# Patient Record
Sex: FEMALE | Race: WHITE | NOT HISPANIC OR LATINO | Employment: UNEMPLOYED | URBAN - NONMETROPOLITAN AREA
[De-identification: names, ages, dates, MRNs, and addresses within clinical notes are randomized per-mention and may not be internally consistent; named-entity substitution may affect disease eponyms.]

---

## 2017-03-20 ENCOUNTER — TRANSCRIBE ORDERS (OUTPATIENT)
Dept: ADMINISTRATIVE | Facility: HOSPITAL | Age: 57
End: 2017-03-20

## 2017-03-20 ENCOUNTER — HOSPITAL ENCOUNTER (OUTPATIENT)
Dept: GENERAL RADIOLOGY | Facility: HOSPITAL | Age: 57
Discharge: HOME | End: 2017-03-20
Attending: INTERNAL MEDICINE | Admitting: INTERNAL MEDICINE

## 2017-03-20 ENCOUNTER — TRANSCRIBE ORDERS (OUTPATIENT)
Dept: LAB | Facility: HOSPITAL | Age: 57
End: 2017-03-20

## 2017-03-20 DIAGNOSIS — D50.9 IRON DEFICIENCY ANEMIA, UNSPECIFIED: Primary | ICD-10-CM

## 2017-03-20 DIAGNOSIS — R19.7 DIARRHEA, UNSPECIFIED: ICD-10-CM

## 2017-03-20 DIAGNOSIS — L03.90 RECURRENT CELLULITIS: Primary | ICD-10-CM

## 2017-03-20 DIAGNOSIS — R19.7 DIARRHEA, UNSPECIFIED TYPE: ICD-10-CM

## 2017-03-20 PROCEDURE — 71020 HC CHEST PA AND LATERAL: CPT

## 2017-03-21 ENCOUNTER — APPOINTMENT (OUTPATIENT)
Dept: LAB | Facility: HOSPITAL | Age: 57
End: 2017-03-21
Attending: INTERNAL MEDICINE

## 2017-03-21 LAB
ADV 40+41 DNA STL QL NAA+NON-PROBE: NOT DETECTED
ASTRO TYP 1-8 RNA STL QL NAA+NON-PROBE: NOT DETECTED
C CAYETANENSIS DNA STL QL NAA+NON-PROBE: NOT DETECTED
C DIFF TOX GENS STL QL NAA+PROBE: NOT DETECTED
CAMPY SP DNA.DIARRHEA STL QL NAA+PROBE: NOT DETECTED
CRYPTOSP STL CULT: NOT DETECTED
E COLI DNA SPEC QL NAA+PROBE: NOT DETECTED
E HISTOLYT AG STL-ACNC: NOT DETECTED
EAEC PAA PLAS AGGR+AATA ST NAA+NON-PRB: NOT DETECTED
EC STX1+STX2 GENES STL QL NAA+NON-PROBE: NOT DETECTED
EPEC EAE GENE STL QL NAA+NON-PROBE: NOT DETECTED
ETEC LTA+ST1A+ST1B TOX ST NAA+NON-PROBE: NOT DETECTED
G LAMBLIA DNA SPEC QL NAA+PROBE: NOT DETECTED
NOROVIRUS GI+II RNA STL QL NAA+NON-PROBE: NOT DETECTED
P SHIGELLOIDES DNA STL QL NAA+NON-PROBE: NOT DETECTED
RV RNA STL NAA+PROBE: NOT DETECTED
SALMONELLA DNA SPEC QL NAA+PROBE: NOT DETECTED
SAPO I+II+IV+V RNA STL QL NAA+NON-PROBE: NOT DETECTED
SHIGELLA SP+EIEC IPAH ST NAA+NON-PROBE: NOT DETECTED
V CHOLERAE DNA SPEC QL NAA+PROBE: NOT DETECTED
VIBRIO DNA SPEC NAA+PROBE: NOT DETECTED
YERSINIA STL CULT: NOT DETECTED

## 2017-03-21 PROCEDURE — 87999 UNLISTED MICROBIOLOGY PX: CPT | Performed by: INTERNAL MEDICINE

## 2017-03-21 PROCEDURE — 87507 IADNA-DNA/RNA PROBE TQ 12-25: CPT

## 2017-05-08 PROCEDURE — 10004125 HB COUNTER-FIRST DAY ADMIT

## 2017-05-09 ENCOUNTER — APPOINTMENT (OUTPATIENT)
Dept: ULTRASOUND IMAGING | Age: 57
DRG: 219 | End: 2017-05-09
Attending: NURSE PRACTITIONER

## 2017-05-09 ENCOUNTER — HOSPITAL ENCOUNTER (INPATIENT)
Age: 57
LOS: 22 days | Discharge: NURSING, REHAB OR LONG-TERM CARE | DRG: 219 | End: 2017-05-31
Attending: INTERNAL MEDICINE

## 2017-05-09 ENCOUNTER — APPOINTMENT (OUTPATIENT)
Dept: CV DIAGNOSTICS | Age: 57
DRG: 219 | End: 2017-05-09
Attending: INTERNAL MEDICINE

## 2017-05-09 ENCOUNTER — ANESTHESIA EVENT (OUTPATIENT)
Dept: SURGERY | Age: 57
DRG: 219 | End: 2017-05-09

## 2017-05-09 ENCOUNTER — APPOINTMENT (OUTPATIENT)
Dept: GENERAL RADIOLOGY | Age: 57
DRG: 219 | End: 2017-05-09
Attending: THORACIC SURGERY (CARDIOTHORACIC VASCULAR SURGERY)

## 2017-05-09 DIAGNOSIS — Z95.3 S/P MITRAL VALVE REPLACEMENT WITH BIOPROSTHETIC VALVE: ICD-10-CM

## 2017-05-09 DIAGNOSIS — Z87.74 S/P PATENT FORAMEN OVALE CLOSURE: ICD-10-CM

## 2017-05-09 DIAGNOSIS — Z95.1 S/P CABG X 4: ICD-10-CM

## 2017-05-09 DIAGNOSIS — Z45.09 ENCOUNTER FOR MANAGEMENT OF INTRA-AORTIC BALLOON PUMP: ICD-10-CM

## 2017-05-09 DIAGNOSIS — Z98.890 S/P TRICUSPID VALVE REPAIR: ICD-10-CM

## 2017-05-09 DIAGNOSIS — R57.0 CARDIOGENIC SHOCK (CMD): ICD-10-CM

## 2017-05-09 DIAGNOSIS — I21.4 NSTEMI (NON-ST ELEVATED MYOCARDIAL INFARCTION) (CMD): ICD-10-CM

## 2017-05-09 LAB
ALBUMIN SERPL-MCNC: 2.2 G/DL (ref 3.6–5.1)
ALBUMIN SERPL-MCNC: 2.2 G/DL (ref 3.6–5.1)
ALBUMIN/GLOB SERPL: 0.5 {RATIO} (ref 1–2.4)
ALBUMIN/GLOB SERPL: 0.5 {RATIO} (ref 1–2.4)
ALP SERPL-CCNC: 94 UNITS/L (ref 45–117)
ALP SERPL-CCNC: 96 UNITS/L (ref 45–117)
ALT SERPL-CCNC: 31 UNITS/L
ALT SERPL-CCNC: 38 UNITS/L
ANION GAP SERPL CALC-SCNC: 12 MMOL/L (ref 10–20)
ANION GAP SERPL CALC-SCNC: 13 MMOL/L (ref 10–20)
APPEARANCE UR: CLEAR
APTT PPP: 34 SEC (ref 22–30)
APTT PPP: 53 SEC (ref 22–30)
ASCENDING AORTA (AAD): 3.6 CM
AST SERPL-CCNC: 58 UNITS/L
AST SERPL-CCNC: 75 UNITS/L
ATRIAL RATE (BPM): 86
AV MEAN GRADIENT (AVMG): 4.8 MMHG
AV PEAK GRADIENT (AVPG): 9.9 MMHG
AV PEAK VELOCITY (AVPV): 1.6 M/S
BACTERIA #/AREA URNS HPF: NORMAL /HPF
BASOPHILS # BLD AUTO: 0 K/MCL (ref 0–0.3)
BASOPHILS # BLD AUTO: 0 K/MCL (ref 0–0.3)
BASOPHILS NFR BLD AUTO: 0 %
BASOPHILS NFR BLD AUTO: 0 %
BILIRUB SERPL-MCNC: 0.5 MG/DL (ref 0.2–1)
BILIRUB SERPL-MCNC: 0.5 MG/DL (ref 0.2–1)
BILIRUB UR QL STRIP: NEGATIVE
BLOOD BANK CMNT PATIENT-IMP: NORMAL
BNP SERPL-MCNC: 603 PG/ML
BUN SERPL-MCNC: 42 MG/DL (ref 6–20)
BUN SERPL-MCNC: 43 MG/DL (ref 6–20)
BUN/CREAT SERPL: 28 (ref 7–25)
BUN/CREAT SERPL: 29 (ref 7–25)
CALCIUM SERPL-MCNC: 8.3 MG/DL (ref 8.4–10.2)
CALCIUM SERPL-MCNC: 8.8 MG/DL (ref 8.4–10.2)
CHLORIDE SERPL-SCNC: 101 MMOL/L (ref 98–107)
CHLORIDE SERPL-SCNC: 102 MMOL/L (ref 98–107)
CHOLEST SERPL-MCNC: 115 MG/DL
CHOLEST/HDLC SERPL: 2.9 {RATIO}
CO2 SERPL-SCNC: 25 MMOL/L (ref 21–32)
CO2 SERPL-SCNC: 27 MMOL/L (ref 21–32)
COLOR UR: YELLOW
CREAT SERPL-MCNC: 1.49 MG/DL (ref 0.51–0.95)
CREAT SERPL-MCNC: 1.49 MG/DL (ref 0.51–0.95)
DIFFERENTIAL METHOD BLD: ABNORMAL
DIFFERENTIAL METHOD BLD: ABNORMAL
DOP CALC LVOT PEAK VEL (LVOTPV): 1.1 M/S
E WAVE DECELARATION TIME (MDT): 118.1 MS
EOSINOPHIL # BLD AUTO: 0.1 K/MCL (ref 0.1–0.5)
EOSINOPHIL # BLD AUTO: 0.1 K/MCL (ref 0.1–0.5)
EOSINOPHIL NFR SPEC: 0 %
EOSINOPHIL NFR SPEC: 0 %
ERYTHROCYTE [DISTWIDTH] IN BLOOD: 13.4 % (ref 11–15)
ERYTHROCYTE [DISTWIDTH] IN BLOOD: 13.6 % (ref 11–15)
EST RIGHT VENT SYSTOLIC PRESSURE BY TRICUSPID REGURGITATION JET (RVSP): 52.2 MMHG
GLOBULIN SER-MCNC: 4.1 G/DL (ref 2–4)
GLOBULIN SER-MCNC: 4.2 G/DL (ref 2–4)
GLUCOSE BLDC GLUCOMTR-MCNC: 289 MG/DL (ref 65–99)
GLUCOSE BLDC GLUCOMTR-MCNC: 305 MG/DL (ref 65–99)
GLUCOSE SERPL-MCNC: 266 MG/DL (ref 65–99)
GLUCOSE SERPL-MCNC: 276 MG/DL (ref 65–99)
GLUCOSE UR STRIP-MCNC: 500 MG/DL
HCT VFR BLD CALC: 35.3 % (ref 36–46.5)
HCT VFR BLD CALC: 35.9 % (ref 36–46.5)
HDLC SERPL-MCNC: 39 MG/DL
HGB BLD-MCNC: 11.5 G/DL (ref 12–15.5)
HGB BLD-MCNC: 11.9 G/DL (ref 12–15.5)
HGB UR QL STRIP: ABNORMAL
HYALINE CASTS #/AREA URNS LPF: NORMAL /LPF (ref 0–5)
INR PPP: 1
INR PPP: 1.1
INTERVENTRICULAR SEPTUM IN END DIASTOLE (IVSD): 1 CM
KETONES UR STRIP-MCNC: NEGATIVE MG/DL
LDLC SERPL-MCNC: 50 MG/DL
LEFT INTERNAL DIMENSION IN SYSTOLE (LVSD): 5.4 CM
LEFT VENTRICULAR INTERNAL DIMENSION IN DIASTOLE (LVDD): 6.1 CM
LEFT VENTRICULAR POSTERIOR WALL IN END DIASTOLE (LVPW): 1.2 CM
LEUKOCYTE ESTERASE UR QL STRIP: NEGATIVE
LV EF: 30 %
LVOT 2D (LVOTD): 2 CM
LVOT VTI (LVOTVTI): 15.6 CM
LYMPHOCYTES # BLD MANUAL: 1.4 K/MCL (ref 1–4)
LYMPHOCYTES # BLD MANUAL: 1.7 K/MCL (ref 1–4)
LYMPHOCYTES NFR BLD MANUAL: 12 %
LYMPHOCYTES NFR BLD MANUAL: 14 %
MCH RBC QN AUTO: 30.8 PG (ref 26–34)
MCH RBC QN AUTO: 31.2 PG (ref 26–34)
MCHC RBC AUTO-ENTMCNC: 32.6 G/DL (ref 32–36.5)
MCHC RBC AUTO-ENTMCNC: 33.1 G/DL (ref 32–36.5)
MCV RBC AUTO: 94.2 FL (ref 78–100)
MCV RBC AUTO: 94.6 FL (ref 78–100)
MONOCYTES # BLD MANUAL: 0.8 K/MCL (ref 0.3–0.9)
MONOCYTES # BLD MANUAL: 0.8 K/MCL (ref 0.3–0.9)
MONOCYTES NFR BLD MANUAL: 7 %
MONOCYTES NFR BLD MANUAL: 7 %
MRSA DNA SPEC QL NAA+PROBE: NOT DETECTED
MV PEAK A VELOCITY (MVPAV): 0.9 M/S
MV PEAK E VELOCITY (MVPEV): 1.1 M/S
NEUTROPHILS # BLD AUTO: 9.2 K/MCL (ref 1.8–7.7)
NEUTROPHILS # BLD AUTO: 9.5 K/MCL (ref 1.8–7.7)
NEUTROPHILS NFR BLD AUTO: 79 %
NEUTROPHILS NFR BLD AUTO: 81 %
NITRITE UR QL STRIP: NEGATIVE
NONHDLC SERPL-MCNC: 76 MG/DL
P AXIS (DEGREES): 63
PH UR STRIP: 5.5 UNITS (ref 5–7)
PLATELET # BLD: 188 K/MCL (ref 140–450)
PLATELET # BLD: 194 K/MCL (ref 140–450)
POTASSIUM SERPL-SCNC: 4.2 MMOL/L (ref 3.4–5.1)
POTASSIUM SERPL-SCNC: 4.3 MMOL/L (ref 3.4–5.1)
PR-INTERVAL (MSEC): 148
PROT SERPL-MCNC: 6.3 G/DL (ref 6.4–8.2)
PROT SERPL-MCNC: 6.4 G/DL (ref 6.4–8.2)
PROT UR STRIP-MCNC: 30 MG/DL
PROTHROMBIN TIME: 11.5 SEC (ref 9.7–11.8)
PROTHROMBIN TIME: 11.8 SEC (ref 9.7–11.8)
QRS-INTERVAL (MSEC): 112
QT-INTERVAL (MSEC): 376
QTC: 449
R AXIS (DEGREES): 3
RBC # BLD: 3.73 MIL/MCL (ref 4–5.2)
RBC # BLD: 3.81 MIL/MCL (ref 4–5.2)
RBC #/AREA URNS HPF: NORMAL /HPF (ref 0–3)
REPORT TEXT: NORMAL
RV TISSUE DOPPLER FREE WALL HEART RATE (RVSTV): 0.1 M/S
SINUSES OF VALSALVA (SVD): 3 CM
SODIUM SERPL-SCNC: 136 MMOL/L (ref 135–145)
SODIUM SERPL-SCNC: 136 MMOL/L (ref 135–145)
SP GR UR STRIP: 1.02 (ref 1–1.03)
SPECIMEN SOURCE: ABNORMAL
SPECIMEN SOURCE: NORMAL
SQUAMOUS #/AREA URNS HPF: NORMAL /HPF (ref 0–5)
T AXIS (DEGREES): 65
TRICUSPID VALVE PEAK REGURGITATION VELOCITY (TRPV): 3 M/S
TRIGL SERPL-MCNC: 130 MG/DL
TROPONIN I SERPL-MCNC: 24.77 NG/ML
TSH SERPL-ACNC: 1.9 MCUNITS/ML (ref 0.35–5)
TSH SERPL-ACNC: 1.98 MCUNITS/ML (ref 0.35–5)
TV ESTIMATED RIGHT ARTERIAL PRESSURE (RAP): 15 MMHG
URATE CRY URNS QL MICRO: PRESENT
UROBILINOGEN UR STRIP-MCNC: 0.2 MG/DL (ref 0–1)
VENTRICULAR RATE EKG/MIN (BPM): 86
WBC # BLD: 11.7 K/MCL (ref 4.2–11)
WBC # BLD: 11.7 K/MCL (ref 4.2–11)
WBC #/AREA URNS HPF: NORMAL /HPF (ref 0–5)

## 2017-05-09 PROCEDURE — 82962 GLUCOSE BLOOD TEST: CPT

## 2017-05-09 PROCEDURE — 87389 HIV-1 AG W/HIV-1&-2 AB AG IA: CPT

## 2017-05-09 PROCEDURE — 71010 XR CHEST AP OR PA: CPT | Performed by: RADIOLOGY

## 2017-05-09 PROCEDURE — 85730 THROMBOPLASTIN TIME PARTIAL: CPT

## 2017-05-09 PROCEDURE — 93005 ELECTROCARDIOGRAM TRACING: CPT | Performed by: INTERNAL MEDICINE

## 2017-05-09 PROCEDURE — 84484 ASSAY OF TROPONIN QUANT: CPT

## 2017-05-09 PROCEDURE — 84443 ASSAY THYROID STIM HORMONE: CPT

## 2017-05-09 PROCEDURE — 85025 COMPLETE CBC W/AUTO DIFF WBC: CPT

## 2017-05-09 PROCEDURE — 93880 EXTRACRANIAL BILAT STUDY: CPT

## 2017-05-09 PROCEDURE — 93010 ELECTROCARDIOGRAM REPORT: CPT | Performed by: INTERNAL MEDICINE

## 2017-05-09 PROCEDURE — 10002801 HB RX 250 W/O HCPCS: Performed by: INTERNAL MEDICINE

## 2017-05-09 PROCEDURE — 85610 PROTHROMBIN TIME: CPT

## 2017-05-09 PROCEDURE — 86705 HEP B CORE ANTIBODY IGM: CPT

## 2017-05-09 PROCEDURE — 10000008 HB ROOM CHARGE ICU OR CCU

## 2017-05-09 PROCEDURE — 10002800 HB RX 250 W HCPCS: Performed by: INTERNAL MEDICINE

## 2017-05-09 PROCEDURE — 36415 COLL VENOUS BLD VENIPUNCTURE: CPT

## 2017-05-09 PROCEDURE — 86803 HEPATITIS C AB TEST: CPT

## 2017-05-09 PROCEDURE — 99253 IP/OBS CNSLTJ NEW/EST LOW 45: CPT | Performed by: INTERNAL MEDICINE

## 2017-05-09 PROCEDURE — 86923 COMPATIBILITY TEST ELECTRIC: CPT

## 2017-05-09 PROCEDURE — 10002803 HB RX 637: Performed by: NURSE PRACTITIONER

## 2017-05-09 PROCEDURE — 80061 LIPID PANEL: CPT

## 2017-05-09 PROCEDURE — 81001 URINALYSIS AUTO W/SCOPE: CPT

## 2017-05-09 PROCEDURE — 86022 PLATELET ANTIBODIES: CPT

## 2017-05-09 PROCEDURE — 93880 EXTRACRANIAL BILAT STUDY: CPT | Performed by: RADIOLOGY

## 2017-05-09 PROCEDURE — 87640 STAPH A DNA AMP PROBE: CPT

## 2017-05-09 PROCEDURE — 10004149 HB COUNTER-NURSING 1:1 CARE

## 2017-05-09 PROCEDURE — 10004651 HB RX, NO CHARGE ITEM: Performed by: INTERNAL MEDICINE

## 2017-05-09 PROCEDURE — 71010 XR CHEST AP OR PA: CPT

## 2017-05-09 PROCEDURE — 83880 ASSAY OF NATRIURETIC PEPTIDE: CPT

## 2017-05-09 PROCEDURE — 87340 HEPATITIS B SURFACE AG IA: CPT

## 2017-05-09 PROCEDURE — 80053 COMPREHEN METABOLIC PANEL: CPT

## 2017-05-09 PROCEDURE — 93306 TTE W/DOPPLER COMPLETE: CPT | Performed by: INTERNAL MEDICINE

## 2017-05-09 PROCEDURE — 10002803 HB RX 637: Performed by: INTERNAL MEDICINE

## 2017-05-09 PROCEDURE — 86900 BLOOD TYPING SEROLOGIC ABO: CPT

## 2017-05-09 PROCEDURE — 83036 HEMOGLOBIN GLYCOSYLATED A1C: CPT

## 2017-05-09 PROCEDURE — 10004125 HB COUNTER-FIRST DAY ADMIT

## 2017-05-09 PROCEDURE — 99253 IP/OBS CNSLTJ NEW/EST LOW 45: CPT | Performed by: THORACIC SURGERY (CARDIOTHORACIC VASCULAR SURGERY)

## 2017-05-09 PROCEDURE — 87641 MR-STAPH DNA AMP PROBE: CPT

## 2017-05-09 RX ORDER — SODIUM CHLORIDE 9 MG/ML
INJECTION, SOLUTION INTRAVENOUS CONTINUOUS PRN
Status: DISCONTINUED | OUTPATIENT
Start: 2017-05-09 | End: 2017-05-10

## 2017-05-09 RX ORDER — POTASSIUM CHLORIDE 1.5 G/1.58G
20 POWDER, FOR SOLUTION ORAL EVERY 4 HOURS PRN
Status: DISCONTINUED | OUTPATIENT
Start: 2017-05-09 | End: 2017-05-10

## 2017-05-09 RX ORDER — NICOTINE POLACRILEX 4 MG
15 LOZENGE BUCCAL PRN
Status: DISCONTINUED | OUTPATIENT
Start: 2017-05-09 | End: 2017-05-10

## 2017-05-09 RX ORDER — NICOTINE POLACRILEX 4 MG
15 LOZENGE BUCCAL
Status: DISCONTINUED | OUTPATIENT
Start: 2017-05-09 | End: 2017-05-10 | Stop reason: HOSPADM

## 2017-05-09 RX ORDER — DEXTROSE MONOHYDRATE 25 G/50ML
25 INJECTION, SOLUTION INTRAVENOUS PRN
Status: DISCONTINUED | OUTPATIENT
Start: 2017-05-09 | End: 2017-05-09

## 2017-05-09 RX ORDER — ONDANSETRON 4 MG/1
4 TABLET, ORALLY DISINTEGRATING ORAL ONCE
Status: COMPLETED | OUTPATIENT
Start: 2017-05-10 | End: 2017-05-10

## 2017-05-09 RX ORDER — LIDOCAINE AND PRILOCAINE 25; 25 MG/G; MG/G
1 CREAM TOPICAL PRN
Status: DISCONTINUED | OUTPATIENT
Start: 2017-05-09 | End: 2017-05-10 | Stop reason: HOSPADM

## 2017-05-09 RX ORDER — PANTOPRAZOLE SODIUM 40 MG/10ML
40 INJECTION, POWDER, LYOPHILIZED, FOR SOLUTION INTRAVENOUS NIGHTLY
Status: DISCONTINUED | OUTPATIENT
Start: 2017-05-09 | End: 2017-05-09 | Stop reason: RX

## 2017-05-09 RX ORDER — 0.9 % SODIUM CHLORIDE 0.9 %
2 VIAL (ML) INJECTION PRN
Status: DISCONTINUED | OUTPATIENT
Start: 2017-05-09 | End: 2017-05-10

## 2017-05-09 RX ORDER — POTASSIUM CHLORIDE 20 MEQ/1
20 TABLET, EXTENDED RELEASE ORAL EVERY 4 HOURS PRN
Status: DISCONTINUED | OUTPATIENT
Start: 2017-05-09 | End: 2017-05-10

## 2017-05-09 RX ORDER — HUMAN INSULIN 100 [IU]/ML
INJECTION, SOLUTION SUBCUTANEOUS
Status: DISCONTINUED | OUTPATIENT
Start: 2017-05-09 | End: 2017-05-10 | Stop reason: HOSPADM

## 2017-05-09 RX ORDER — CHLORHEXIDINE GLUCONATE ORAL RINSE 1.2 MG/ML
15 SOLUTION DENTAL PRN
Status: DISCONTINUED | OUTPATIENT
Start: 2017-05-09 | End: 2017-05-10

## 2017-05-09 RX ORDER — MAGNESIUM SULFATE 1 G/100ML
1 INJECTION INTRAVENOUS DAILY PRN
Status: DISCONTINUED | OUTPATIENT
Start: 2017-05-09 | End: 2017-05-10

## 2017-05-09 RX ORDER — 0.9 % SODIUM CHLORIDE 0.9 %
2 VIAL (ML) INJECTION EVERY 12 HOURS SCHEDULED
Status: DISCONTINUED | OUTPATIENT
Start: 2017-05-09 | End: 2017-05-10

## 2017-05-09 RX ORDER — HEPARIN SODIUM 1000 [USP'U]/ML
3000 INJECTION, SOLUTION INTRAVENOUS; SUBCUTANEOUS PRN
Status: DISCONTINUED | OUTPATIENT
Start: 2017-05-09 | End: 2017-05-10

## 2017-05-09 RX ORDER — MUPIROCIN 20 MG/G
1 OINTMENT TOPICAL
Status: COMPLETED | OUTPATIENT
Start: 2017-05-09 | End: 2017-05-10

## 2017-05-09 RX ORDER — METOPROLOL TARTRATE 50 MG/1
50 TABLET, FILM COATED ORAL EVERY 12 HOURS SCHEDULED
Status: DISCONTINUED | OUTPATIENT
Start: 2017-05-09 | End: 2017-05-09

## 2017-05-09 RX ORDER — POTASSIUM CHLORIDE 14.9 MG/ML
40 INJECTION INTRAVENOUS EVERY 4 HOURS PRN
Status: DISCONTINUED | OUTPATIENT
Start: 2017-05-09 | End: 2017-05-10

## 2017-05-09 RX ORDER — RAMELTEON 8 MG/1
8 TABLET ORAL NIGHTLY PRN
Status: DISCONTINUED | OUTPATIENT
Start: 2017-05-09 | End: 2017-05-10

## 2017-05-09 RX ORDER — HYDRALAZINE HYDROCHLORIDE 20 MG/ML
5 INJECTION INTRAMUSCULAR; INTRAVENOUS EVERY 4 HOURS PRN
Status: DISCONTINUED | OUTPATIENT
Start: 2017-05-09 | End: 2017-05-10

## 2017-05-09 RX ORDER — NITROGLYCERIN 0.4 MG/1
0.4 TABLET SUBLINGUAL EVERY 5 MIN PRN
Status: DISCONTINUED | OUTPATIENT
Start: 2017-05-09 | End: 2017-05-10 | Stop reason: HOSPADM

## 2017-05-09 RX ORDER — LIDOCAINE HYDROCHLORIDE 10 MG/ML
.5-1 INJECTION, SOLUTION INFILTRATION; PERINEURAL PRN
Status: DISCONTINUED | OUTPATIENT
Start: 2017-05-09 | End: 2017-05-10 | Stop reason: HOSPADM

## 2017-05-09 RX ORDER — DEXTROSE MONOHYDRATE 50 MG/ML
100 INJECTION, SOLUTION INTRAVENOUS CONTINUOUS PRN
Status: DISCONTINUED | OUTPATIENT
Start: 2017-05-09 | End: 2017-05-10

## 2017-05-09 RX ORDER — INSULIN LISPRO 100 [IU]/ML
4 INJECTION, SOLUTION INTRAVENOUS; SUBCUTANEOUS
Status: DISCONTINUED | OUTPATIENT
Start: 2017-05-09 | End: 2017-05-10

## 2017-05-09 RX ORDER — SODIUM CHLORIDE, SODIUM LACTATE, POTASSIUM CHLORIDE, CALCIUM CHLORIDE 600; 310; 30; 20 MG/100ML; MG/100ML; MG/100ML; MG/100ML
INJECTION, SOLUTION INTRAVENOUS CONTINUOUS
Status: DISCONTINUED | OUTPATIENT
Start: 2017-05-10 | End: 2017-05-10

## 2017-05-09 RX ORDER — HUMAN INSULIN 100 [IU]/ML
INJECTION, SOLUTION SUBCUTANEOUS EVERY 6 HOURS SCHEDULED
Status: DISCONTINUED | OUTPATIENT
Start: 2017-05-09 | End: 2017-05-09

## 2017-05-09 RX ORDER — NITROGLYCERIN 20 MG/100ML
INJECTION INTRAVENOUS CONTINUOUS
Status: DISCONTINUED | OUTPATIENT
Start: 2017-05-09 | End: 2017-05-10

## 2017-05-09 RX ORDER — NYSTATIN 100000 U/G
OINTMENT TOPICAL 3 TIMES DAILY
Status: DISCONTINUED | OUTPATIENT
Start: 2017-05-09 | End: 2017-05-28

## 2017-05-09 RX ORDER — CHLORHEXIDINE GLUCONATE ORAL RINSE 1.2 MG/ML
15 SOLUTION DENTAL EVERY 12 HOURS SCHEDULED
Status: DISCONTINUED | OUTPATIENT
Start: 2017-05-09 | End: 2017-05-10

## 2017-05-09 RX ORDER — FAMOTIDINE 10 MG/ML
20 INJECTION, SOLUTION INTRAVENOUS NIGHTLY
Status: DISCONTINUED | OUTPATIENT
Start: 2017-05-09 | End: 2017-05-10

## 2017-05-09 RX ORDER — POTASSIUM CHLORIDE 20 MEQ/1
40 TABLET, EXTENDED RELEASE ORAL EVERY 4 HOURS PRN
Status: DISCONTINUED | OUTPATIENT
Start: 2017-05-09 | End: 2017-05-10

## 2017-05-09 RX ORDER — SODIUM CHLORIDE 9 MG/ML
INJECTION, SOLUTION INTRAVENOUS CONTINUOUS
Status: DISCONTINUED | OUTPATIENT
Start: 2017-05-10 | End: 2017-05-10

## 2017-05-09 RX ORDER — DEXTROSE MONOHYDRATE 50 MG/ML
INJECTION, SOLUTION INTRAVENOUS CONTINUOUS PRN
Status: DISCONTINUED | OUTPATIENT
Start: 2017-05-09 | End: 2017-05-09

## 2017-05-09 RX ORDER — BISACODYL 10 MG
10 SUPPOSITORY, RECTAL RECTAL DAILY PRN
Status: DISCONTINUED | OUTPATIENT
Start: 2017-05-09 | End: 2017-05-10 | Stop reason: HOSPADM

## 2017-05-09 RX ORDER — POTASSIUM CHLORIDE 1.5 G/1.58G
40 POWDER, FOR SOLUTION ORAL EVERY 4 HOURS PRN
Status: DISCONTINUED | OUTPATIENT
Start: 2017-05-09 | End: 2017-05-10

## 2017-05-09 RX ORDER — HYDRALAZINE HYDROCHLORIDE 20 MG/ML
INJECTION INTRAMUSCULAR; INTRAVENOUS
Status: DISPENSED
Start: 2017-05-09 | End: 2017-05-09

## 2017-05-09 RX ORDER — HEPARIN SODIUM AND DEXTROSE 5000; 5 [USP'U]/100ML; G/100ML
INJECTION INTRAVENOUS CONTINUOUS
Status: DISCONTINUED | OUTPATIENT
Start: 2017-05-09 | End: 2017-05-10

## 2017-05-09 RX ORDER — CHLORHEXIDINE GLUCONATE ORAL RINSE 1.2 MG/ML
15 SOLUTION DENTAL
Status: COMPLETED | OUTPATIENT
Start: 2017-05-09 | End: 2017-05-10

## 2017-05-09 RX ORDER — DEXTROSE MONOHYDRATE 25 G/50ML
25 INJECTION, SOLUTION INTRAVENOUS PRN
Status: DISCONTINUED | OUTPATIENT
Start: 2017-05-09 | End: 2017-05-10 | Stop reason: HOSPADM

## 2017-05-09 RX ORDER — DEXTROSE MONOHYDRATE 25 G/50ML
25 INJECTION, SOLUTION INTRAVENOUS PRN
Status: DISCONTINUED | OUTPATIENT
Start: 2017-05-09 | End: 2017-05-10

## 2017-05-09 RX ORDER — NICOTINE POLACRILEX 4 MG
15 LOZENGE BUCCAL PRN
Status: DISCONTINUED | OUTPATIENT
Start: 2017-05-09 | End: 2017-05-09

## 2017-05-09 RX ORDER — LOSARTAN POTASSIUM AND HYDROCHLOROTHIAZIDE 25; 100 MG/1; MG/1
1 TABLET ORAL DAILY
Status: ON HOLD | COMMUNITY
End: 2017-05-30 | Stop reason: HOSPADM

## 2017-05-09 RX ORDER — POTASSIUM CHLORIDE 14.9 MG/ML
20 INJECTION INTRAVENOUS EVERY 4 HOURS PRN
Status: DISCONTINUED | OUTPATIENT
Start: 2017-05-09 | End: 2017-05-10

## 2017-05-09 RX ORDER — DILTIAZEM HYDROCHLORIDE 180 MG/1
180 CAPSULE, EXTENDED RELEASE ORAL DAILY
Status: ON HOLD | COMMUNITY
End: 2017-05-30 | Stop reason: HOSPADM

## 2017-05-09 RX ORDER — DEXTROSE MONOHYDRATE 50 MG/ML
INJECTION, SOLUTION INTRAVENOUS CONTINUOUS PRN
Status: DISCONTINUED | OUTPATIENT
Start: 2017-05-09 | End: 2017-05-10

## 2017-05-09 RX ORDER — IBUPROFEN 200 MG
200 TABLET ORAL EVERY 6 HOURS PRN
Status: ON HOLD | COMMUNITY
End: 2017-05-19 | Stop reason: HOSPADM

## 2017-05-09 RX ORDER — AMOXICILLIN 250 MG
2 CAPSULE ORAL DAILY PRN
Status: DISCONTINUED | OUTPATIENT
Start: 2017-05-09 | End: 2017-05-10 | Stop reason: HOSPADM

## 2017-05-09 RX ORDER — NITROGLYCERIN 0.4 MG/1
0.4 TABLET SUBLINGUAL EVERY 5 MIN PRN
Status: DISCONTINUED | OUTPATIENT
Start: 2017-05-09 | End: 2017-05-10

## 2017-05-09 RX ADMIN — FAMOTIDINE 20 MG: 10 INJECTION, SOLUTION INTRAVENOUS at 21:15

## 2017-05-09 RX ADMIN — METOPROLOL TARTRATE 25 MG: 25 TABLET, FILM COATED ORAL at 21:15

## 2017-05-09 RX ADMIN — NYSTATIN: 100000 OINTMENT TOPICAL at 23:02

## 2017-05-09 RX ADMIN — INSULIN HUMAN 4 UNITS/HR: 100 INJECTION, SOLUTION PARENTERAL at 21:15

## 2017-05-09 RX ADMIN — Medication 2 ML: at 21:10

## 2017-05-09 RX ADMIN — INSULIN HUMAN 6 UNITS: 100 INJECTION, SOLUTION PARENTERAL at 18:30

## 2017-05-09 ASSESSMENT — COGNITIVE AND FUNCTIONAL STATUS - GENERAL
ARE YOU BLIND OR DO YOU HAVE SERIOUS DIFFICULTY SEEING, EVEN WHEN WEARING GLASSES: NO
ARE YOU DEAF OR DO YOU HAVE SERIOUS DIFFICULTY  HEARING: NO

## 2017-05-09 ASSESSMENT — LIFESTYLE VARIABLES
TOBACCO_USE_STATUS_IN_THE_LAST_30_DAYS: NO TOBACCO USED IN THE LAST 30 DAYS
AUDIT-C TOTAL SCORE: 0
HOW OFTEN DO YOU HAVE A DRINK CONTAINING ALCOHOL: NEVER
HOW MANY STANDARD DRINKS CONTAINING ALCOHOL DO YOU HAVE ON A TYPICAL DAY: 0,1 OR 2
HOW OFTEN DO YOU HAVE 6 OR MORE DRINKS ON ONE OCCASION: NEVER
E-CIGARETTE_USE: NO E-CIGARETTES USE IN THE LAST 30 DAYS
ALCOHOL_USE_STATUS: NO OR LOW RISK WITH VALIDATED TOOL

## 2017-05-09 ASSESSMENT — PAIN SCALES - GENERAL
PAIN_LEVEL_AT_REST: 0
PAIN_LEVEL_AT_REST: 0
PAIN_LEVEL_WITH_ACTIVITY: 0
PAIN_LEVEL_AT_REST: 0
PAIN_LEVEL_WITH_ACTIVITY: 0
PAIN_LEVEL_WITH_ACTIVITY: 0

## 2017-05-09 ASSESSMENT — ACTIVITIES OF DAILY LIVING (ADL)
ADL_SCORE: 12
ADL_SHORT_OF_BREATH: NO
CHRONIC_PAIN_PRESENT: NO
ADL_BEFORE_ADMISSION: INDEPENDENT
RECENT_DECLINE_ADL: NO

## 2017-05-09 ASSESSMENT — PULMONARY FUNCTION TESTS
FEV1_PREDICTED: 30
FEV1: 33
FEV1_PREDICTED: 36
FEV1_PREDICTED: 33
FEV1: 33
FEV1: .98
FEV1/FVC_PERCENT_PREDICTED: 78

## 2017-05-10 ENCOUNTER — SURGERY (OUTPATIENT)
Age: 57
End: 2017-05-10

## 2017-05-10 ENCOUNTER — APPOINTMENT (OUTPATIENT)
Dept: GENERAL RADIOLOGY | Age: 57
DRG: 219 | End: 2017-05-10
Attending: THORACIC SURGERY (CARDIOTHORACIC VASCULAR SURGERY)

## 2017-05-10 ENCOUNTER — ANESTHESIA (OUTPATIENT)
Dept: SURGERY | Age: 57
DRG: 219 | End: 2017-05-10

## 2017-05-10 PROBLEM — Z95.1 S/P CABG X 4: Status: ACTIVE | Noted: 2017-05-10

## 2017-05-10 PROBLEM — Z95.3 S/P MITRAL VALVE REPLACEMENT WITH BIOPROSTHETIC VALVE: Status: ACTIVE | Noted: 2017-05-10

## 2017-05-10 PROBLEM — Z98.890 S/P TRICUSPID VALVE REPAIR: Status: ACTIVE | Noted: 2017-05-10

## 2017-05-10 PROBLEM — Z87.74 S/P PATENT FORAMEN OVALE CLOSURE: Status: ACTIVE | Noted: 2017-05-10

## 2017-05-10 LAB
ADDENDUM DOC: NORMAL
ALBUMIN SERPL-MCNC: 2.1 G/DL (ref 3.6–5.1)
ALBUMIN/GLOB SERPL: 0.5 {RATIO} (ref 1–2.4)
ALP SERPL-CCNC: 88 UNITS/L (ref 45–117)
ALT SERPL-CCNC: 34 UNITS/L
ANION GAP SERPL CALC-SCNC: 17 MMOL/L (ref 10–20)
APTT PPP: 28 SEC (ref 22–30)
APTT PPP: 35 SEC (ref 22–30)
AST SERPL-CCNC: 58 UNITS/L
BASE DEFICIT BLDA-SCNC: 1 MMOL/L (ref 0–2)
BASE DEFICIT BLDA-SCNC: 4 MMOL/L (ref 0–2)
BASE DEFICIT BLDA-SCNC: 5 MMOL/L (ref 0–2)
BASE DEFICIT BLDA-SCNC: 5 MMOL/L (ref 0–2)
BASE EXCESS BLDA CALC-SCNC: 0 MMOL/L (ref 0–3)
BASE EXCESS BLDA CALC-SCNC: 0 MMOL/L (ref 0–3)
BASOPHILS # BLD: 0 K/MCL (ref 0–0.3)
BASOPHILS NFR BLD: 0 %
BILIRUB SERPL-MCNC: 0.4 MG/DL (ref 0.2–1)
BUN SERPL-MCNC: 42 MG/DL (ref 6–20)
BUN/CREAT SERPL: 29 (ref 7–25)
CA-I BLD ISE-SCNC: 1.07 MMOL/L (ref 1.15–1.29)
CA-I BLD ISE-SCNC: 1.07 MMOL/L (ref 1.15–1.29)
CA-I BLD ISE-SCNC: 1.09 MMOL/L (ref 1.15–1.29)
CA-I BLD ISE-SCNC: 1.1 MMOL/L (ref 1.15–1.29)
CA-I BLD ISE-SCNC: 1.11 MMOL/L (ref 1.15–1.29)
CA-I BLD ISE-SCNC: 1.11 MMOL/L (ref 1.15–1.29)
CA-I BLD ISE-SCNC: 1.12 MMOL/L (ref 1.15–1.29)
CA-I BLD ISE-SCNC: 1.13 MMOL/L (ref 1.15–1.29)
CA-I BLD ISE-SCNC: 1.35 MMOL/L (ref 1.15–1.29)
CALCIUM SERPL-MCNC: 8.4 MG/DL (ref 8.4–10.2)
CHLORIDE SERPL-SCNC: 102 MMOL/L (ref 98–107)
CO2 BLD-SCNC: 21 MMOL/L (ref 19–24)
CO2 BLD-SCNC: 24 MMOL/L (ref 19–24)
CO2 BLD-SCNC: 25 MMOL/L (ref 19–24)
CO2 BLD-SCNC: 26 MMOL/L (ref 19–24)
CO2 BLD-SCNC: 26 MMOL/L (ref 19–24)
CO2 BLD-SCNC: 27 MMOL/L (ref 19–24)
CO2 BLD-SCNC: 27 MMOL/L (ref 19–24)
CO2 SERPL-SCNC: 27 MMOL/L (ref 21–32)
CREAT SERPL-MCNC: 1.44 MG/DL (ref 0.51–0.95)
DIFFERENTIAL METHOD BLD: ABNORMAL
EOSINOPHIL # BLD: 0.2 K/MCL (ref 0.1–0.5)
EOSINOPHIL NFR BLD: 2 %
ERYTHROCYTE [DISTWIDTH] IN BLOOD: 13.4 % (ref 11–15)
ERYTHROCYTE [DISTWIDTH] IN BLOOD: 13.5 % (ref 11–15)
FIBRINOGEN PPP-MCNC: 500 MG/DL (ref 190–425)
GLOBULIN SER-MCNC: 4.2 G/DL (ref 2–4)
GLUCOSE BLD-MCNC: 150 MG/DL (ref 65–99)
GLUCOSE BLD-MCNC: 169 MG/DL (ref 65–99)
GLUCOSE BLD-MCNC: 177 MG/DL (ref 65–99)
GLUCOSE BLD-MCNC: 179 MG/DL (ref 65–99)
GLUCOSE BLD-MCNC: 183 MG/DL (ref 65–99)
GLUCOSE BLD-MCNC: 185 MG/DL (ref 65–99)
GLUCOSE BLD-MCNC: 188 MG/DL (ref 65–99)
GLUCOSE BLD-MCNC: 189 MG/DL (ref 65–99)
GLUCOSE BLD-MCNC: 205 MG/DL (ref 65–99)
GLUCOSE BLDC GLUCOMTR-MCNC: 143 MG/DL (ref 65–99)
GLUCOSE BLDC GLUCOMTR-MCNC: 148 MG/DL (ref 65–99)
GLUCOSE BLDC GLUCOMTR-MCNC: 149 MG/DL (ref 65–99)
GLUCOSE BLDC GLUCOMTR-MCNC: 158 MG/DL (ref 65–99)
GLUCOSE BLDC GLUCOMTR-MCNC: 159 MG/DL (ref 65–99)
GLUCOSE BLDC GLUCOMTR-MCNC: 163 MG/DL (ref 65–99)
GLUCOSE BLDC GLUCOMTR-MCNC: 164 MG/DL (ref 65–99)
GLUCOSE BLDC GLUCOMTR-MCNC: 171 MG/DL (ref 65–99)
GLUCOSE BLDC GLUCOMTR-MCNC: 183 MG/DL (ref 65–99)
GLUCOSE BLDC GLUCOMTR-MCNC: 189 MG/DL (ref 65–99)
GLUCOSE BLDC GLUCOMTR-MCNC: 193 MG/DL (ref 65–99)
GLUCOSE BLDC GLUCOMTR-MCNC: 199 MG/DL (ref 65–99)
GLUCOSE BLDC GLUCOMTR-MCNC: 225 MG/DL (ref 65–99)
GLUCOSE BLDC GLUCOMTR-MCNC: 258 MG/DL (ref 65–99)
GLUCOSE BLDC GLUCOMTR-MCNC: 262 MG/DL (ref 65–99)
GLUCOSE BLDC GLUCOMTR-MCNC: 300 MG/DL (ref 65–99)
GLUCOSE SERPL-MCNC: 170 MG/DL (ref 65–99)
HBA1C MFR BLD: 11.4 % (ref 4.5–5.6)
HBV CORE IGM SER QL: NEGATIVE
HBV SURFACE AG SER QL: NEGATIVE
HCO3 BLDA-SCNC: 20 MMOL/L (ref 22–28)
HCO3 BLDA-SCNC: 21 MMOL/L (ref 22–28)
HCO3 BLDA-SCNC: 22 MMOL/L (ref 22–28)
HCO3 BLDA-SCNC: 23 MMOL/L (ref 22–28)
HCO3 BLDA-SCNC: 23 MMOL/L (ref 22–28)
HCO3 BLDA-SCNC: 24 MMOL/L (ref 22–28)
HCO3 BLDA-SCNC: 25 MMOL/L (ref 22–28)
HCT VFR BLD CALC: 28.4 % (ref 36–46.5)
HCT VFR BLD CALC: 30 % (ref 36–46.5)
HCT VFR BLD CALC: 31 % (ref 36–46.5)
HCT VFR BLD CALC: 34.9 % (ref 36–46.5)
HCV AB SER QL: NEGATIVE
HGB BLD-MCNC: 10.9 G/DL (ref 12–15.5)
HGB BLD-MCNC: 11.3 G/DL (ref 12–15.5)
HGB BLD-MCNC: 11.3 G/DL (ref 12–15.5)
HGB BLD-MCNC: 12.1 G/DL (ref 12–15.5)
HGB BLD-MCNC: 8.3 G/DL (ref 12–15.5)
HGB BLD-MCNC: 8.8 G/DL (ref 12–15.5)
HGB BLD-MCNC: 8.9 G/DL (ref 12–15.5)
HGB BLD-MCNC: 9 G/DL (ref 12–15.5)
HGB BLD-MCNC: 9.1 G/DL (ref 12–15.5)
HGB BLD-MCNC: 9.3 G/DL (ref 12–15.5)
HGB BLD-MCNC: 9.5 G/DL (ref 12–15.5)
HGB BLD-MCNC: 9.7 G/DL (ref 12–15.5)
HGB BLDA-MCNC: 10 G/DL (ref 12–15.5)
HGB BLDA-MCNC: 9.5 G/DL (ref 12–15.5)
HGB BLDA-MCNC: 9.6 G/DL (ref 12–15.5)
HIV 1+2 AB+HIV1 P24 AG SERPL QL IA: NONREACTIVE
HOROWITZ INDEX BLDA+IHG-RTO: 89 % (ref 94–98)
HOROWITZ INDEX BLDA+IHG-RTO: 95 % (ref 94–98)
HOROWITZ INDEX BLDA+IHG-RTO: 96 % (ref 94–98)
HOROWITZ INDEX BLDV+IHG-RTO: 58 % (ref 60–80)
INHALED O2 CONCENTRATION: 50 %
INR PPP: 1.1
LYMPHOCYTES # BLD: 1.2 K/MCL (ref 1–4)
LYMPHOCYTES NFR BLD: 11 %
MCH RBC QN AUTO: 30.4 PG (ref 26–34)
MCH RBC QN AUTO: 31.1 PG (ref 26–34)
MCHC RBC AUTO-ENTMCNC: 32.4 G/DL (ref 32–36.5)
MCHC RBC AUTO-ENTMCNC: 32.7 G/DL (ref 32–36.5)
MCV RBC AUTO: 93.8 FL (ref 78–100)
MCV RBC AUTO: 95 FL (ref 78–100)
METHGB MFR BLD: 1.3 %
METHGB MFR BLD: 1.5 %
METHGB MFR BLD: 1.8 %
MONOCYTES # BLD: 0.7 K/MCL (ref 0.3–0.9)
MONOCYTES NFR BLD: 7 %
MRSA DNA SPEC QL NAA+PROBE: NOT DETECTED
NEUTROPHILS # BLD: 8.5 K/MCL (ref 1.8–7.7)
NEUTS SEG NFR BLD: 80 %
PCO2 BLDA: 36 MM HG (ref 32–45)
PCO2 BLDA: 37 MM HG (ref 32–45)
PCO2 BLDA: 40 MM HG (ref 32–45)
PCO2 BLDA: 41 MM HG (ref 32–45)
PCO2 BLDA: 41 MM HG (ref 32–45)
PCO2 BLDA: 42 MM HG (ref 32–45)
PCO2 BLDA: 42 MM HG (ref 32–45)
PCO2 BLDA: 44 MM HG (ref 32–45)
PCO2 BLDA: 44 MM HG (ref 32–45)
PCO2 BLDA: 45 MM HG (ref 32–45)
PCO2 BLDA: 48 MM HG (ref 32–45)
PCO2 BLDA: 48 MM HG (ref 32–45)
PF4 HEPARIN CMPLX IGG SER-IMP: NORMAL
PF4 HEPARIN CMPLX IGG SERPL QL IA: NEGATIVE
PH BLDA: 7.3 UNITS (ref 7.35–7.45)
PH BLDA: 7.32 UNITS (ref 7.35–7.45)
PH BLDA: 7.33 UNITS (ref 7.35–7.45)
PH BLDA: 7.34 UNITS (ref 7.35–7.45)
PH BLDA: 7.35 UNITS (ref 7.35–7.45)
PH BLDA: 7.37 UNITS (ref 7.35–7.45)
PH BLDA: 7.37 UNITS (ref 7.35–7.45)
PH BLDA: 7.38 UNITS (ref 7.35–7.45)
PH BLDA: 7.41 UNITS (ref 7.35–7.45)
PH BLDA: 7.42 UNITS (ref 7.35–7.45)
PLAT MORPH BLD: NORMAL
PLATELET # BLD: 100 K/MCL (ref 140–450)
PLATELET # BLD: 185 K/MCL (ref 140–450)
PO2 BLDA: 106 MM HG (ref 83–108)
PO2 BLDA: 149 MM HG (ref 83–108)
PO2 BLDA: 256 MM HG (ref 83–108)
PO2 BLDA: 270 MM HG (ref 83–108)
PO2 BLDA: 307 MM HG (ref 83–108)
PO2 BLDA: 308 MM HG (ref 83–108)
PO2 BLDA: 314 MM HG (ref 83–108)
PO2 BLDA: 314 MM HG (ref 83–108)
PO2 BLDA: 346 MM HG (ref 83–108)
PO2 BLDA: 352 MM HG (ref 83–108)
PO2 BLDA: 69 MM HG (ref 83–108)
PO2 BLDA: 92 MM HG (ref 83–108)
POTASSIUM BLD-SCNC: 3.8 MMOL/L (ref 3.4–5.1)
POTASSIUM BLD-SCNC: 4.1 MMOL/L (ref 3.4–5.1)
POTASSIUM BLD-SCNC: 4.1 MMOL/L (ref 3.4–5.1)
POTASSIUM BLD-SCNC: 4.2 MMOL/L (ref 3.4–5.1)
POTASSIUM BLD-SCNC: 4.5 MMOL/L (ref 3.4–5.1)
POTASSIUM BLD-SCNC: 4.5 MMOL/L (ref 3.4–5.1)
POTASSIUM BLD-SCNC: 4.6 MMOL/L (ref 3.4–5.1)
POTASSIUM BLD-SCNC: 4.8 MMOL/L (ref 3.4–5.1)
POTASSIUM BLD-SCNC: 4.8 MMOL/L (ref 3.4–5.1)
POTASSIUM SERPL-SCNC: 3.8 MMOL/L (ref 3.4–5.1)
POTASSIUM SERPL-SCNC: 4 MMOL/L (ref 3.4–5.1)
POTASSIUM SERPL-SCNC: 4.4 MMOL/L (ref 3.4–5.1)
PROT SERPL-MCNC: 6.3 G/DL (ref 6.4–8.2)
PROTHROMBIN TIME: 12 SEC (ref 9.7–11.8)
RBC # BLD: 2.99 MIL/MCL (ref 4–5.2)
RBC # BLD: 3.72 MIL/MCL (ref 4–5.2)
RBC MORPH BLD: NORMAL
S AUREUS DNA SPEC QL NAA+PROBE: POSITIVE
SAO2 % BLDA: 100 %
SAO2 % BLDA: 93 % (ref 95–99)
SAO2 % BLDA: 96 %
SAO2 % BLDA: 98 %
SAO2 % BLDA: 98 % (ref 95–99)
SAO2 % BLDA: 99 %
SAO2 % BLDA: 99 % (ref 95–99)
SAO2 % BLDV: 59 % (ref 60–80)
SODIUM SERPL-SCNC: 142 MMOL/L (ref 135–145)
SPECIMEN SOURCE: ABNORMAL
TROPONIN I SERPL-MCNC: 21.35 NG/ML
TROPONIN I SERPL-MCNC: 23.07 NG/ML
WBC # BLD: 12.2 K/MCL (ref 4.2–11)
WBC # BLD: 9.9 K/MCL (ref 4.2–11)
WBC MORPH BLD: NORMAL

## 2017-05-10 PROCEDURE — 10004281 HB COUNTER-STAFF TIME PER 15 MIN

## 2017-05-10 PROCEDURE — 10002800 HB RX 250 W HCPCS: Performed by: INTERNAL MEDICINE

## 2017-05-10 PROCEDURE — 10002801 HB RX 250 W/O HCPCS: Performed by: ANESTHESIOLOGY

## 2017-05-10 PROCEDURE — 10002116 HB ADDITIONAL OR RN TIME/30 MIN

## 2017-05-10 PROCEDURE — 10001401 HB CARDIAC SURGERY 3: Performed by: THORACIC SURGERY (CARDIOTHORACIC VASCULAR SURGERY)

## 2017-05-10 PROCEDURE — 021209W BYPASS CORONARY ARTERY, THREE ARTERIES FROM AORTA WITH AUTOLOGOUS VENOUS TISSUE, OPEN APPROACH: ICD-10-PCS | Performed by: THORACIC SURGERY (CARDIOTHORACIC VASCULAR SURGERY)

## 2017-05-10 PROCEDURE — 5A1945Z RESPIRATORY VENTILATION, 24-96 CONSECUTIVE HOURS: ICD-10-PCS | Performed by: INTERNAL MEDICINE

## 2017-05-10 PROCEDURE — 10002803 HB RX 637: Performed by: NURSE PRACTITIONER

## 2017-05-10 PROCEDURE — 84132 ASSAY OF SERUM POTASSIUM: CPT

## 2017-05-10 PROCEDURE — 02QJ0ZZ REPAIR TRICUSPID VALVE, OPEN APPROACH: ICD-10-PCS | Performed by: THORACIC SURGERY (CARDIOTHORACIC VASCULAR SURGERY)

## 2017-05-10 PROCEDURE — 82962 GLUCOSE BLOOD TEST: CPT

## 2017-05-10 PROCEDURE — 33641 REPAIR HEART SEPTUM DEFECT: CPT | Performed by: PHYSICIAN ASSISTANT

## 2017-05-10 PROCEDURE — 10002801 HB RX 250 W/O HCPCS: Performed by: PHYSICIAN ASSISTANT

## 2017-05-10 PROCEDURE — 10002803 HB RX 637: Performed by: INTERNAL MEDICINE

## 2017-05-10 PROCEDURE — 10003061 HB DISPOSABLE INSTRUMENT/SUPPLY 6: Performed by: THORACIC SURGERY (CARDIOTHORACIC VASCULAR SURGERY)

## 2017-05-10 PROCEDURE — B24BZZ4 ULTRASONOGRAPHY OF HEART WITH AORTA, TRANSESOPHAGEAL: ICD-10-PCS | Performed by: ANESTHESIOLOGY

## 2017-05-10 PROCEDURE — 10002807 HB RX 258: Performed by: INTERNAL MEDICINE

## 2017-05-10 PROCEDURE — 33508 ENDOSCOPIC VEIN HARVEST: CPT | Performed by: PHYSICIAN ASSISTANT

## 2017-05-10 PROCEDURE — 10002807 HB RX 258: Performed by: ANESTHESIOLOGY

## 2017-05-10 PROCEDURE — 84484 ASSAY OF TROPONIN QUANT: CPT

## 2017-05-10 PROCEDURE — 33430 REPLACEMENT OF MITRAL VALVE: CPT | Performed by: PHYSICIAN ASSISTANT

## 2017-05-10 PROCEDURE — 10002800 HB RX 250 W HCPCS: Performed by: PHYSICIAN ASSISTANT

## 2017-05-10 PROCEDURE — 82947 ASSAY GLUCOSE BLOOD QUANT: CPT

## 2017-05-10 PROCEDURE — 10003125 HB IMPLANT, CARDIOVASCULAR 6: Performed by: THORACIC SURGERY (CARDIOTHORACIC VASCULAR SURGERY)

## 2017-05-10 PROCEDURE — 85025 COMPLETE CBC W/AUTO DIFF WBC: CPT

## 2017-05-10 PROCEDURE — 83050 HGB METHEMOGLOBIN QUAN: CPT

## 2017-05-10 PROCEDURE — C9132 KCENTRA, PER I.U.: HCPCS | Performed by: ANESTHESIOLOGY

## 2017-05-10 PROCEDURE — 99254 IP/OBS CNSLTJ NEW/EST MOD 60: CPT | Performed by: INTERNAL MEDICINE

## 2017-05-10 PROCEDURE — P9045 ALBUMIN (HUMAN), 5%, 250 ML: HCPCS | Performed by: PHYSICIAN ASSISTANT

## 2017-05-10 PROCEDURE — 10003056 HB DISPOSABLE INSTRUMENT/SUPPLY 1: Performed by: THORACIC SURGERY (CARDIOTHORACIC VASCULAR SURGERY)

## 2017-05-10 PROCEDURE — 10004149 HB COUNTER-NURSING 1:1 CARE

## 2017-05-10 PROCEDURE — 10002800 HB RX 250 W HCPCS

## 2017-05-10 PROCEDURE — 88312 SPECIAL STAINS GROUP 1: CPT

## 2017-05-10 PROCEDURE — 33519 CABG ARTERY-VEIN THREE: CPT | Performed by: PHYSICIAN ASSISTANT

## 2017-05-10 PROCEDURE — 10003058 HB DISPOSABLE INSTRUMENT/SUPPLY 3: Performed by: THORACIC SURGERY (CARDIOTHORACIC VASCULAR SURGERY)

## 2017-05-10 PROCEDURE — 10003086 HB IMPLANT GENERAL OR NON CARDIAC 6: Performed by: THORACIC SURGERY (CARDIOTHORACIC VASCULAR SURGERY)

## 2017-05-10 PROCEDURE — 94770 HB END TIDAL CO2 DETERMINATION: CPT

## 2017-05-10 PROCEDURE — 33430 REPLACEMENT OF MITRAL VALVE: CPT | Performed by: THORACIC SURGERY (CARDIOTHORACIC VASCULAR SURGERY)

## 2017-05-10 PROCEDURE — 10002801 HB RX 250 W/O HCPCS: Performed by: THORACIC SURGERY (CARDIOTHORACIC VASCULAR SURGERY)

## 2017-05-10 PROCEDURE — 85730 THROMBOPLASTIN TIME PARTIAL: CPT

## 2017-05-10 PROCEDURE — 85018 HEMOGLOBIN: CPT

## 2017-05-10 PROCEDURE — 10004125 HB COUNTER-FIRST DAY ADMIT

## 2017-05-10 PROCEDURE — C1729 CATH, DRAINAGE: HCPCS | Performed by: THORACIC SURGERY (CARDIOTHORACIC VASCULAR SURGERY)

## 2017-05-10 PROCEDURE — 33508 ENDOSCOPIC VEIN HARVEST: CPT | Performed by: THORACIC SURGERY (CARDIOTHORACIC VASCULAR SURGERY)

## 2017-05-10 PROCEDURE — 10003060 HB DISPOSABLE INSTRUMENT/SUPPLY 5: Performed by: THORACIC SURGERY (CARDIOTHORACIC VASCULAR SURGERY)

## 2017-05-10 PROCEDURE — 99291 CRITICAL CARE FIRST HOUR: CPT | Performed by: ANESTHESIOLOGY

## 2017-05-10 PROCEDURE — P9047 ALBUMIN (HUMAN), 25%, 50ML: HCPCS | Performed by: THORACIC SURGERY (CARDIOTHORACIC VASCULAR SURGERY)

## 2017-05-10 PROCEDURE — 10002807 HB RX 258: Performed by: THORACIC SURGERY (CARDIOTHORACIC VASCULAR SURGERY)

## 2017-05-10 PROCEDURE — 82800 BLOOD PH: CPT

## 2017-05-10 PROCEDURE — 84295 ASSAY OF SERUM SODIUM: CPT

## 2017-05-10 PROCEDURE — 02L70ZK OCCLUSION OF LEFT ATRIAL APPENDAGE, OPEN APPROACH: ICD-10-PCS | Performed by: THORACIC SURGERY (CARDIOTHORACIC VASCULAR SURGERY)

## 2017-05-10 PROCEDURE — 99190 SPECIAL PUMP SERVICES: CPT

## 2017-05-10 PROCEDURE — 02RG08Z REPLACEMENT OF MITRAL VALVE WITH ZOOPLASTIC TISSUE, OPEN APPROACH: ICD-10-PCS | Performed by: THORACIC SURGERY (CARDIOTHORACIC VASCULAR SURGERY)

## 2017-05-10 PROCEDURE — 10002800 HB RX 250 W HCPCS: Performed by: THORACIC SURGERY (CARDIOTHORACIC VASCULAR SURGERY)

## 2017-05-10 PROCEDURE — 02100ZC BYPASS CORONARY ARTERY, ONE ARTERY FROM THORACIC ARTERY, OPEN APPROACH: ICD-10-PCS | Performed by: THORACIC SURGERY (CARDIOTHORACIC VASCULAR SURGERY)

## 2017-05-10 PROCEDURE — 85610 PROTHROMBIN TIME: CPT

## 2017-05-10 PROCEDURE — 10002117 HB ADDITIONAL SURGERY TIME/30 MIN: Performed by: THORACIC SURGERY (CARDIOTHORACIC VASCULAR SURGERY)

## 2017-05-10 PROCEDURE — 10002803 HB RX 637: Performed by: PHYSICIAN ASSISTANT

## 2017-05-10 PROCEDURE — 85384 FIBRINOGEN ACTIVITY: CPT

## 2017-05-10 PROCEDURE — 99191 SPECIAL PUMP SERVICES: CPT

## 2017-05-10 PROCEDURE — 82810 BLOOD GASES O2 SAT ONLY: CPT

## 2017-05-10 PROCEDURE — 88304 TISSUE EXAM BY PATHOLOGIST: CPT

## 2017-05-10 PROCEDURE — 10000008 HB ROOM CHARGE ICU OR CCU

## 2017-05-10 PROCEDURE — 85347 COAGULATION TIME ACTIVATED: CPT | Performed by: ANESTHESIOLOGY

## 2017-05-10 PROCEDURE — 5A1221Z PERFORMANCE OF CARDIAC OUTPUT, CONTINUOUS: ICD-10-PCS | Performed by: THORACIC SURGERY (CARDIOTHORACIC VASCULAR SURGERY)

## 2017-05-10 PROCEDURE — C1768 GRAFT, VASCULAR: HCPCS | Performed by: THORACIC SURGERY (CARDIOTHORACIC VASCULAR SURGERY)

## 2017-05-10 PROCEDURE — 33463 VALVULOPLASTY TRICUSPID: CPT | Performed by: THORACIC SURGERY (CARDIOTHORACIC VASCULAR SURGERY)

## 2017-05-10 PROCEDURE — 80053 COMPREHEN METABOLIC PANEL: CPT

## 2017-05-10 PROCEDURE — 82330 ASSAY OF CALCIUM: CPT

## 2017-05-10 PROCEDURE — 10002357 HB ANESTH FM W/SWAN ADD'L 1/2 HR: Performed by: THORACIC SURGERY (CARDIOTHORACIC VASCULAR SURGERY)

## 2017-05-10 PROCEDURE — P9045 ALBUMIN (HUMAN), 5%, 250 ML: HCPCS

## 2017-05-10 PROCEDURE — 10002800 HB RX 250 W HCPCS: Performed by: ANESTHESIOLOGY

## 2017-05-10 PROCEDURE — A7041 WATER SEAL DRAIN CONTAINER: HCPCS | Performed by: THORACIC SURGERY (CARDIOTHORACIC VASCULAR SURGERY)

## 2017-05-10 PROCEDURE — 99233 SBSQ HOSP IP/OBS HIGH 50: CPT | Performed by: INTERNAL MEDICINE

## 2017-05-10 PROCEDURE — 33641 REPAIR HEART SEPTUM DEFECT: CPT | Performed by: THORACIC SURGERY (CARDIOTHORACIC VASCULAR SURGERY)

## 2017-05-10 PROCEDURE — 33533 CABG ARTERIAL SINGLE: CPT | Performed by: THORACIC SURGERY (CARDIOTHORACIC VASCULAR SURGERY)

## 2017-05-10 PROCEDURE — 85396 CLOTTING ASSAY WHOLE BLOOD: CPT | Performed by: ANESTHESIOLOGY

## 2017-05-10 PROCEDURE — 85027 COMPLETE CBC AUTOMATED: CPT

## 2017-05-10 PROCEDURE — 06BP4ZZ EXCISION OF RIGHT SAPHENOUS VEIN, PERCUTANEOUS ENDOSCOPIC APPROACH: ICD-10-PCS | Performed by: THORACIC SURGERY (CARDIOTHORACIC VASCULAR SURGERY)

## 2017-05-10 PROCEDURE — 10002356 HB ANESTH FM W/SWAN 1ST 1/2 HR: Performed by: THORACIC SURGERY (CARDIOTHORACIC VASCULAR SURGERY)

## 2017-05-10 PROCEDURE — 82803 BLOOD GASES ANY COMBINATION: CPT

## 2017-05-10 PROCEDURE — 71010 XR CHEST AP OR PA: CPT

## 2017-05-10 PROCEDURE — 10003059 HB DISPOSABLE INSTRUMENT/SUPPLY 4: Performed by: THORACIC SURGERY (CARDIOTHORACIC VASCULAR SURGERY)

## 2017-05-10 PROCEDURE — 10002803 HB RX 637: Performed by: ANESTHESIOLOGY

## 2017-05-10 PROCEDURE — 33463 VALVULOPLASTY TRICUSPID: CPT | Performed by: PHYSICIAN ASSISTANT

## 2017-05-10 PROCEDURE — 36415 COLL VENOUS BLD VENIPUNCTURE: CPT

## 2017-05-10 PROCEDURE — 33533 CABG ARTERIAL SINGLE: CPT | Performed by: PHYSICIAN ASSISTANT

## 2017-05-10 PROCEDURE — 02Q60ZZ REPAIR RIGHT ATRIUM, OPEN APPROACH: ICD-10-PCS | Performed by: THORACIC SURGERY (CARDIOTHORACIC VASCULAR SURGERY)

## 2017-05-10 PROCEDURE — 33519 CABG ARTERY-VEIN THREE: CPT | Performed by: THORACIC SURGERY (CARDIOTHORACIC VASCULAR SURGERY)

## 2017-05-10 PROCEDURE — 94003 VENT MGMT INPAT SUBQ DAY: CPT

## 2017-05-10 PROCEDURE — 82805 BLOOD GASES W/O2 SATURATION: CPT

## 2017-05-10 PROCEDURE — 94002 VENT MGMT INPAT INIT DAY: CPT

## 2017-05-10 PROCEDURE — P9045 ALBUMIN (HUMAN), 5%, 250 ML: HCPCS | Performed by: ANESTHESIOLOGY

## 2017-05-10 PROCEDURE — 71010 XR CHEST AP OR PA: CPT | Performed by: RADIOLOGY

## 2017-05-10 DEVICE — MMIS - VALVE MTRL 25MM MOSAIC CINCH PORCINE 18MM: Type: IMPLANTABLE DEVICE | Site: CHEST | Status: FUNCTIONAL

## 2017-05-10 DEVICE — MMIS - FELT CV 1X1IN THK1.65MM PTFE STRL LF: Type: IMPLANTABLE DEVICE | Site: CHEST | Status: FUNCTIONAL

## 2017-05-10 DEVICE — MMIS - RELOAD STPLR 60MM 4MM 4.5MM 5MM BLK ENDO GIA: Type: IMPLANTABLE DEVICE | Site: CHEST | Status: FUNCTIONAL

## 2017-05-10 RX ORDER — SODIUM CHLORIDE 9 MG/ML
INJECTION, SOLUTION INTRAVENOUS CONTINUOUS PRN
Status: DISCONTINUED | OUTPATIENT
Start: 2017-05-10 | End: 2017-05-19

## 2017-05-10 RX ORDER — DEXTROSE AND SODIUM CHLORIDE 5; .45 G/100ML; G/100ML
INJECTION, SOLUTION INTRAVENOUS CONTINUOUS PRN
Status: DISCONTINUED | OUTPATIENT
Start: 2017-05-10 | End: 2017-05-19

## 2017-05-10 RX ORDER — ADENOSINE 3 MG/ML
INJECTION, SOLUTION INTRAVENOUS PRN
Status: DISCONTINUED | OUTPATIENT
Start: 2017-05-10 | End: 2017-05-10 | Stop reason: HOSPADM

## 2017-05-10 RX ORDER — SODIUM CHLORIDE 9 MG/ML
INJECTION, SOLUTION INTRAVENOUS CONTINUOUS PRN
Status: DISCONTINUED | OUTPATIENT
Start: 2017-05-10 | End: 2017-05-10

## 2017-05-10 RX ORDER — CALCIUM CHLORIDE 100 MG/ML
INJECTION INTRAVENOUS; INTRAVENTRICULAR PRN
Status: DISCONTINUED | OUTPATIENT
Start: 2017-05-10 | End: 2017-05-10 | Stop reason: HOSPADM

## 2017-05-10 RX ORDER — BISACODYL 10 MG
10 SUPPOSITORY, RECTAL RECTAL ONCE
Status: COMPLETED | OUTPATIENT
Start: 2017-05-12 | End: 2017-05-12

## 2017-05-10 RX ORDER — POTASSIUM CHLORIDE 29.8 MG/ML
60 INJECTION INTRAVENOUS PRN
Status: DISCONTINUED | OUTPATIENT
Start: 2017-05-10 | End: 2017-05-19

## 2017-05-10 RX ORDER — BISACODYL 10 MG
10 SUPPOSITORY, RECTAL RECTAL DAILY PRN
Status: DISCONTINUED | OUTPATIENT
Start: 2017-05-10 | End: 2017-05-31 | Stop reason: HOSPADM

## 2017-05-10 RX ORDER — CHLORHEXIDINE GLUCONATE ORAL RINSE 1.2 MG/ML
15 SOLUTION DENTAL EVERY 12 HOURS SCHEDULED
Status: DISCONTINUED | OUTPATIENT
Start: 2017-05-10 | End: 2017-05-11

## 2017-05-10 RX ORDER — OXYCODONE HYDROCHLORIDE 5 MG/1
5-10 TABLET ORAL
Status: DISCONTINUED | OUTPATIENT
Start: 2017-05-10 | End: 2017-05-15

## 2017-05-10 RX ORDER — FAMOTIDINE 10 MG/ML
20 INJECTION, SOLUTION INTRAVENOUS NIGHTLY
Status: DISCONTINUED | OUTPATIENT
Start: 2017-05-10 | End: 2017-05-12 | Stop reason: SDUPTHER

## 2017-05-10 RX ORDER — MAGNESIUM HYDROXIDE/ALUMINUM HYDROXICE/SIMETHICONE 120; 1200; 1200 MG/30ML; MG/30ML; MG/30ML
30 SUSPENSION ORAL EVERY 4 HOURS PRN
Status: DISCONTINUED | OUTPATIENT
Start: 2017-05-10 | End: 2017-05-31 | Stop reason: HOSPADM

## 2017-05-10 RX ORDER — AMOXICILLIN 250 MG
2 CAPSULE ORAL DAILY PRN
Status: DISCONTINUED | OUTPATIENT
Start: 2017-05-10 | End: 2017-05-31 | Stop reason: HOSPADM

## 2017-05-10 RX ORDER — ACETAMINOPHEN 650 MG/1
650 SUPPOSITORY RECTAL EVERY 4 HOURS PRN
Status: DISCONTINUED | OUTPATIENT
Start: 2017-05-10 | End: 2017-05-31 | Stop reason: HOSPADM

## 2017-05-10 RX ORDER — DEXTROSE MONOHYDRATE, SODIUM CHLORIDE, AND POTASSIUM CHLORIDE 50; 2.98; 4.5 G/1000ML; G/1000ML; G/1000ML
INJECTION, SOLUTION INTRAVENOUS CONTINUOUS PRN
Status: DISCONTINUED | OUTPATIENT
Start: 2017-05-10 | End: 2017-05-19

## 2017-05-10 RX ORDER — DOBUTAMINE HYDROCHLORIDE 200 MG/100ML
INJECTION INTRAVENOUS CONTINUOUS PRN
Status: DISCONTINUED | OUTPATIENT
Start: 2017-05-10 | End: 2017-05-10

## 2017-05-10 RX ORDER — DOBUTAMINE HYDROCHLORIDE 200 MG/100ML
INJECTION INTRAVENOUS CONTINUOUS
Status: DISCONTINUED | OUTPATIENT
Start: 2017-05-10 | End: 2017-05-18

## 2017-05-10 RX ORDER — CHLORHEXIDINE GLUCONATE ORAL RINSE 1.2 MG/ML
15 SOLUTION DENTAL PRN
Status: DISCONTINUED | OUTPATIENT
Start: 2017-05-10 | End: 2017-05-11

## 2017-05-10 RX ORDER — SODIUM CHLORIDE, SODIUM LACTATE, POTASSIUM CHLORIDE, CALCIUM CHLORIDE 600; 310; 30; 20 MG/100ML; MG/100ML; MG/100ML; MG/100ML
INJECTION, SOLUTION INTRAVENOUS CONTINUOUS PRN
Status: DISCONTINUED | OUTPATIENT
Start: 2017-05-10 | End: 2017-05-10

## 2017-05-10 RX ORDER — AMOXICILLIN 250 MG
2 CAPSULE ORAL DAILY
Status: DISCONTINUED | OUTPATIENT
Start: 2017-05-11 | End: 2017-05-31 | Stop reason: HOSPADM

## 2017-05-10 RX ORDER — ALBUMIN (HUMAN) 12.5 G/50ML
SOLUTION INTRAVENOUS CONTINUOUS PRN
Status: DISCONTINUED | OUTPATIENT
Start: 2017-05-10 | End: 2017-05-10

## 2017-05-10 RX ORDER — DEXMEDETOMIDINE HYDROCHLORIDE 4 UG/ML
INJECTION, SOLUTION INTRAVENOUS CONTINUOUS PRN
Status: DISCONTINUED | OUTPATIENT
Start: 2017-05-10 | End: 2017-05-10

## 2017-05-10 RX ORDER — ALBUMIN, HUMAN INJ 5% 5 %
SOLUTION INTRAVENOUS CONTINUOUS PRN
Status: DISCONTINUED | OUTPATIENT
Start: 2017-05-10 | End: 2017-05-10

## 2017-05-10 RX ORDER — POTASSIUM CHLORIDE 29.8 MG/ML
20 INJECTION INTRAVENOUS PRN
Status: DISCONTINUED | OUTPATIENT
Start: 2017-05-10 | End: 2017-05-19

## 2017-05-10 RX ORDER — POTASSIUM CHLORIDE 29.8 MG/ML
40 INJECTION INTRAVENOUS PRN
Status: DISCONTINUED | OUTPATIENT
Start: 2017-05-10 | End: 2017-05-19

## 2017-05-10 RX ORDER — ENEMA 19; 7 G/133ML; G/133ML
1 ENEMA RECTAL ONCE
Status: DISCONTINUED | OUTPATIENT
Start: 2017-05-13 | End: 2017-05-17

## 2017-05-10 RX ORDER — DEXTROSE MONOHYDRATE 50 MG/ML
INJECTION, SOLUTION INTRAVENOUS CONTINUOUS PRN
Status: DISCONTINUED | OUTPATIENT
Start: 2017-05-10 | End: 2017-05-19 | Stop reason: SDUPTHER

## 2017-05-10 RX ORDER — CEFAZOLIN SODIUM 1 G/3ML
INJECTION, POWDER, FOR SOLUTION INTRAMUSCULAR; INTRAVENOUS PRN
Status: DISCONTINUED | OUTPATIENT
Start: 2017-05-10 | End: 2017-05-10

## 2017-05-10 RX ORDER — CALCIUM CHLORIDE 100 MG/ML
INJECTION INTRAVENOUS; INTRAVENTRICULAR PRN
Status: DISCONTINUED | OUTPATIENT
Start: 2017-05-10 | End: 2017-05-10

## 2017-05-10 RX ORDER — VECURONIUM BROMIDE 1 MG/ML
INJECTION, POWDER, LYOPHILIZED, FOR SOLUTION INTRAVENOUS PRN
Status: DISCONTINUED | OUTPATIENT
Start: 2017-05-10 | End: 2017-05-10

## 2017-05-10 RX ORDER — ALBUMIN, HUMAN INJ 5% 5 %
12.5 SOLUTION INTRAVENOUS PRN
Status: COMPLETED | OUTPATIENT
Start: 2017-05-10 | End: 2017-05-16

## 2017-05-10 RX ORDER — NICOTINE POLACRILEX 4 MG
15 LOZENGE BUCCAL PRN
Status: DISCONTINUED | OUTPATIENT
Start: 2017-05-10 | End: 2017-05-19 | Stop reason: SDUPTHER

## 2017-05-10 RX ORDER — POTASSIUM CHLORIDE, SODIUM CHLORIDE, CALCIUM CHLORIDE, AND MAGNESIUM CHLORIDE 17.6; 325.3; 119.3; 643 MG/100ML; MG/100ML; MG/100ML; MG/100ML
INJECTION, SOLUTION INTRA-ARTERIAL PRN
Status: DISCONTINUED | OUTPATIENT
Start: 2017-05-10 | End: 2017-05-10 | Stop reason: HOSPADM

## 2017-05-10 RX ORDER — PROPOFOL 10 MG/ML
INJECTION, EMULSION INTRAVENOUS PRN
Status: DISCONTINUED | OUTPATIENT
Start: 2017-05-10 | End: 2017-05-10

## 2017-05-10 RX ORDER — DEXTROSE AND SODIUM CHLORIDE 5; .45 G/100ML; G/100ML
INJECTION, SOLUTION INTRAVENOUS
Status: DISPENSED
Start: 2017-05-10 | End: 2017-05-11

## 2017-05-10 RX ORDER — DEXMEDETOMIDINE HYDROCHLORIDE 4 UG/ML
INJECTION, SOLUTION INTRAVENOUS CONTINUOUS
Status: DISCONTINUED | OUTPATIENT
Start: 2017-05-10 | End: 2017-05-11

## 2017-05-10 RX ORDER — ONDANSETRON 2 MG/ML
INJECTION INTRAMUSCULAR; INTRAVENOUS PRN
Status: DISCONTINUED | OUTPATIENT
Start: 2017-05-10 | End: 2017-05-10

## 2017-05-10 RX ORDER — MANNITOL 250 MG/ML
INJECTION, SOLUTION INTRAVENOUS CONTINUOUS PRN
Status: DISCONTINUED | OUTPATIENT
Start: 2017-05-10 | End: 2017-05-10

## 2017-05-10 RX ORDER — HEPARIN SODIUM 1000 [USP'U]/ML
INJECTION, SOLUTION INTRAVENOUS; SUBCUTANEOUS PRN
Status: DISCONTINUED | OUTPATIENT
Start: 2017-05-10 | End: 2017-05-10

## 2017-05-10 RX ORDER — HYDROCODONE BITARTRATE AND ACETAMINOPHEN 5; 325 MG/1; MG/1
1-2 TABLET ORAL EVERY 4 HOURS PRN
Status: DISCONTINUED | OUTPATIENT
Start: 2017-05-10 | End: 2017-05-13

## 2017-05-10 RX ORDER — ASPIRIN 81 MG/1
81 TABLET, CHEWABLE ORAL DAILY
Status: DISCONTINUED | OUTPATIENT
Start: 2017-05-11 | End: 2017-05-31 | Stop reason: HOSPADM

## 2017-05-10 RX ORDER — NITROGLYCERIN 20 MG/100ML
INJECTION INTRAVENOUS CONTINUOUS PRN
Status: ACTIVE | OUTPATIENT
Start: 2017-05-10 | End: 2017-05-12

## 2017-05-10 RX ORDER — ONDANSETRON 2 MG/ML
4 INJECTION INTRAMUSCULAR; INTRAVENOUS 2 TIMES DAILY PRN
Status: DISCONTINUED | OUTPATIENT
Start: 2017-05-10 | End: 2017-05-31 | Stop reason: HOSPADM

## 2017-05-10 RX ORDER — DEXTROSE MONOHYDRATE 25 G/50ML
25 INJECTION, SOLUTION INTRAVENOUS PRN
Status: DISCONTINUED | OUTPATIENT
Start: 2017-05-10 | End: 2017-05-19 | Stop reason: SDUPTHER

## 2017-05-10 RX ORDER — MUPIROCIN 20 MG/G
1 OINTMENT TOPICAL EVERY 12 HOURS SCHEDULED
Status: COMPLETED | OUTPATIENT
Start: 2017-05-10 | End: 2017-05-14

## 2017-05-10 RX ORDER — PHENYLEPHRINE HYDROCHLORIDE 10 MG/ML
INJECTION, SOLUTION INTRAMUSCULAR; INTRAVENOUS; SUBCUTANEOUS PRN
Status: DISCONTINUED | OUTPATIENT
Start: 2017-05-10 | End: 2017-05-10

## 2017-05-10 RX ORDER — POTASSIUM CHLORIDE 29.8 MG/ML
INJECTION INTRAVENOUS PRN
Status: DISCONTINUED | OUTPATIENT
Start: 2017-05-10 | End: 2017-05-10 | Stop reason: HOSPADM

## 2017-05-10 RX ORDER — PAPAVERINE HYDROCHLORIDE 30 MG/ML
INJECTION, SOLUTION INTRAVENOUS PRN
Status: DISCONTINUED | OUTPATIENT
Start: 2017-05-10 | End: 2017-05-10 | Stop reason: HOSPADM

## 2017-05-10 RX ORDER — INSULIN LISPRO 100 [IU]/ML
4 INJECTION, SOLUTION INTRAVENOUS; SUBCUTANEOUS
Status: DISCONTINUED | OUTPATIENT
Start: 2017-05-11 | End: 2017-05-18

## 2017-05-10 RX ORDER — ALBUMIN, HUMAN INJ 5% 5 %
SOLUTION INTRAVENOUS
Status: COMPLETED
Start: 2017-05-10 | End: 2017-05-10

## 2017-05-10 RX ORDER — ACETAMINOPHEN 325 MG/1
650 TABLET ORAL EVERY 4 HOURS PRN
Status: DISCONTINUED | OUTPATIENT
Start: 2017-05-10 | End: 2017-05-31 | Stop reason: HOSPADM

## 2017-05-10 RX ORDER — POLYETHYLENE GLYCOL 3350 17 G/17G
17 POWDER, FOR SOLUTION ORAL 2 TIMES DAILY
Status: DISCONTINUED | OUTPATIENT
Start: 2017-05-14 | End: 2017-05-28

## 2017-05-10 RX ORDER — MAGNESIUM SULFATE 500 MG/ML
VIAL (ML) INJECTION PRN
Status: DISCONTINUED | OUTPATIENT
Start: 2017-05-10 | End: 2017-05-10 | Stop reason: HOSPADM

## 2017-05-10 RX ORDER — LORAZEPAM 2 MG/ML
INJECTION INTRAMUSCULAR PRN
Status: DISCONTINUED | OUTPATIENT
Start: 2017-05-10 | End: 2017-05-10

## 2017-05-10 RX ORDER — NITROGLYCERIN 0.4 MG/1
0.4 TABLET SUBLINGUAL EVERY 5 MIN PRN
Status: DISCONTINUED | OUTPATIENT
Start: 2017-05-10 | End: 2017-05-31 | Stop reason: HOSPADM

## 2017-05-10 RX ORDER — HEPARIN SODIUM 1000 [USP'U]/ML
INJECTION, SOLUTION INTRAVENOUS; SUBCUTANEOUS PRN
Status: DISCONTINUED | OUTPATIENT
Start: 2017-05-10 | End: 2017-05-10 | Stop reason: HOSPADM

## 2017-05-10 RX ADMIN — SODIUM CHLORIDE: 9 INJECTION, SOLUTION INTRAVENOUS at 08:41

## 2017-05-10 RX ADMIN — MAGNESIUM SULFATE HEPTAHYDRATE: 500 INJECTION, SOLUTION INTRAMUSCULAR; INTRAVENOUS at 15:49

## 2017-05-10 RX ADMIN — VASOPRESSIN 0.1 UNITS/MIN: 20 INJECTION INTRAVENOUS at 16:27

## 2017-05-10 RX ADMIN — PHENYLEPHRINE HYDROCHLORIDE 200 MCG: 10 INJECTION INTRAVENOUS at 13:36

## 2017-05-10 RX ADMIN — LIDOCAINE HYDROCHLORIDE 100 MG: 20 INJECTION, SOLUTION INTRAVENOUS at 13:13

## 2017-05-10 RX ADMIN — INSULIN HUMAN 8 UNITS: 100 INJECTION, SOLUTION PARENTERAL at 10:05

## 2017-05-10 RX ADMIN — MAGNESIUM SULFATE HEPTAHYDRATE 2.5 G: 500 INJECTION, SOLUTION INTRAMUSCULAR; INTRAVENOUS at 13:33

## 2017-05-10 RX ADMIN — INSULIN HUMAN 12 UNITS/HR: 100 INJECTION, SOLUTION PARENTERAL at 18:32

## 2017-05-10 RX ADMIN — SODIUM BICARBONATE 100 MEQ: 84 INJECTION, SOLUTION INTRAVENOUS at 18:33

## 2017-05-10 RX ADMIN — POTASSIUM CHLORIDE 20 MEQ: 29.8 INJECTION, SOLUTION INTRAVENOUS at 18:52

## 2017-05-10 RX ADMIN — MUPIROCIN 1 APPLICATION.: 20 OINTMENT TOPICAL at 08:23

## 2017-05-10 RX ADMIN — SODIUM CHLORIDE, POTASSIUM CHLORIDE, SODIUM LACTATE AND CALCIUM CHLORIDE: 600; 310; 30; 20 INJECTION, SOLUTION INTRAVENOUS at 14:27

## 2017-05-10 RX ADMIN — CEFAZOLIN SODIUM 2 G: 1 INJECTION, POWDER, FOR SOLUTION INTRAMUSCULAR; INTRAVENOUS at 12:11

## 2017-05-10 RX ADMIN — PROTAMINE SULFATE 250 MG: 10 INJECTION, SOLUTION INTRAVENOUS at 13:38

## 2017-05-10 RX ADMIN — ALBUMIN HUMAN 12.5 G: 0.05 INJECTION, SOLUTION INTRAVENOUS at 16:26

## 2017-05-10 RX ADMIN — DOBUTAMINE HYDROCHLORIDE 5 MCG/KG/MIN: 200 INJECTION INTRAVENOUS at 09:17

## 2017-05-10 RX ADMIN — INSULIN HUMAN 6 UNITS: 100 INJECTION, SOLUTION PARENTERAL at 11:58

## 2017-05-10 RX ADMIN — CHLORHEXIDINE GLUCONATE ORAL RINSE 15 ML: 1.2 SOLUTION DENTAL at 08:24

## 2017-05-10 RX ADMIN — PHENYLEPHRINE HYDROCHLORIDE 200 MCG: 10 INJECTION INTRAVENOUS at 08:55

## 2017-05-10 RX ADMIN — ADENOSINE 4.6 MG: 3 INJECTION, SOLUTION INTRAVENOUS at 13:27

## 2017-05-10 RX ADMIN — ONDANSETRON 4 MG: 2 SOLUTION INTRAMUSCULAR; INTRAVENOUS at 10:05

## 2017-05-10 RX ADMIN — CALCIUM CHLORIDE 1 G: 100 INJECTION INTRAVENOUS; INTRAVENTRICULAR at 13:41

## 2017-05-10 RX ADMIN — PHENYLEPHRINE HYDROCHLORIDE 200 MCG: 10 INJECTION INTRAVENOUS at 08:49

## 2017-05-10 RX ADMIN — PHENYLEPHRINE HYDROCHLORIDE 200 MCG: 10 INJECTION INTRAVENOUS at 13:38

## 2017-05-10 RX ADMIN — VECURONIUM BROMIDE 20 MG: 1 INJECTION, POWDER, LYOPHILIZED, FOR SOLUTION INTRAVENOUS at 08:43

## 2017-05-10 RX ADMIN — SODIUM BICARBONATE 50 MEQ: 84 INJECTION, SOLUTION INTRAVENOUS at 06:51

## 2017-05-10 RX ADMIN — CALCIUM CHLORIDE 1 G: 100 INJECTION INTRAVENOUS; INTRAVENTRICULAR at 13:17

## 2017-05-10 RX ADMIN — VECURONIUM BROMIDE 10 MG: 1 INJECTION, POWDER, LYOPHILIZED, FOR SOLUTION INTRAVENOUS at 11:08

## 2017-05-10 RX ADMIN — PHENYLEPHRINE HYDROCHLORIDE 10.5 MG: 10 INJECTION INTRAMUSCULAR; INTRAVENOUS; SUBCUTANEOUS at 13:33

## 2017-05-10 RX ADMIN — PROTHROMBIN, COAGULATION FACTOR VII HUMAN, COAGULATION FACTOR IX HUMAN, COAGULATION FACTOR X HUMAN, PROTEIN C, PROTEIN S HUMAN, AND WATER 1625 UNITS: KIT at 14:00

## 2017-05-10 RX ADMIN — ALBUMIN HUMAN 12.5 G: 0.05 INJECTION, SOLUTION INTRAVENOUS at 21:00

## 2017-05-10 RX ADMIN — HEPARIN SODIUM 250 ML: 1000 INJECTION, SOLUTION INTRAVENOUS; SUBCUTANEOUS at 10:05

## 2017-05-10 RX ADMIN — ONDANSETRON 4 MG: 4 TABLET, ORALLY DISINTEGRATING ORAL at 07:30

## 2017-05-10 RX ADMIN — MUPIROCIN 1 APPLICATION.: 20 OINTMENT TOPICAL at 21:23

## 2017-05-10 RX ADMIN — INSULIN HUMAN 8 UNITS/HR: 100 INJECTION, SOLUTION PARENTERAL at 09:47

## 2017-05-10 RX ADMIN — HEPARIN SODIUM 10000 UNITS: 1000 INJECTION, SOLUTION INTRAVENOUS; SUBCUTANEOUS at 10:56

## 2017-05-10 RX ADMIN — CEFAZOLIN 2000 MG: 10 INJECTION, POWDER, FOR SOLUTION INTRAVENOUS at 22:17

## 2017-05-10 RX ADMIN — CARDIOPLEGIA REPERFUSION LOW K LOW TROM SOLUTION 41.5 ML: at 13:31

## 2017-05-10 RX ADMIN — HEPARIN SODIUM 10000 UNITS: 1000 INJECTION, SOLUTION INTRAVENOUS; SUBCUTANEOUS at 10:46

## 2017-05-10 RX ADMIN — HEPARIN SODIUM 15000 UNITS: 1000 INJECTION, SOLUTION INTRAVENOUS; SUBCUTANEOUS at 12:01

## 2017-05-10 RX ADMIN — PAPAVERINE HYDROCHLORIDE 120 MG: 30 INJECTION, SOLUTION INTRAVENOUS at 10:05

## 2017-05-10 RX ADMIN — PHENYLEPHRINE HYDROCHLORIDE 200 MCG: 10 INJECTION INTRAVENOUS at 13:32

## 2017-05-10 RX ADMIN — LORAZEPAM 2 MG: 2 INJECTION INTRAMUSCULAR; INTRAVENOUS at 08:41

## 2017-05-10 RX ADMIN — HEPARIN SODIUM 40000 UNITS: 1000 INJECTION, SOLUTION INTRAVENOUS; SUBCUTANEOUS at 09:54

## 2017-05-10 RX ADMIN — Medication 50 MCG/HR: at 14:01

## 2017-05-10 RX ADMIN — ALBUMIN HUMAN: 0.05 INJECTION, SOLUTION INTRAVENOUS at 14:55

## 2017-05-10 RX ADMIN — SODIUM CHLORIDE: 9 INJECTION, SOLUTION INTRAVENOUS at 05:33

## 2017-05-10 RX ADMIN — CEFAZOLIN 2000 MG: 10 INJECTION, POWDER, FOR SOLUTION INTRAVENOUS at 16:26

## 2017-05-10 RX ADMIN — FENTANYL CITRATE 100 MCG: 50 INJECTION INTRAMUSCULAR; INTRAVENOUS at 08:43

## 2017-05-10 RX ADMIN — CEFAZOLIN SODIUM 2 G: 1 INJECTION, POWDER, FOR SOLUTION INTRAMUSCULAR; INTRAVENOUS at 09:11

## 2017-05-10 RX ADMIN — ALBUMIN HUMAN 12.5 G: 0.25 SOLUTION INTRAVENOUS at 06:51

## 2017-05-10 RX ADMIN — POTASSIUM CHLORIDE 48 MEQ: 2 INJECTION, SOLUTION, CONCENTRATE INTRAVENOUS at 13:19

## 2017-05-10 RX ADMIN — VASOPRESSIN 0.1 UNITS/MIN: 20 INJECTION INTRAVENOUS at 09:17

## 2017-05-10 RX ADMIN — MANNITOL 25 G: 12.5 INJECTION, SOLUTION INTRAVENOUS at 06:51

## 2017-05-10 RX ADMIN — PHENYLEPHRINE HYDROCHLORIDE 200 MCG: 10 INJECTION INTRAVENOUS at 09:05

## 2017-05-10 RX ADMIN — HEPARIN SODIUM AND DEXTROSE 13 UNITS/KG/HR: 5000; 5 INJECTION INTRAVENOUS at 05:34

## 2017-05-10 RX ADMIN — ALBUMIN HUMAN: 0.05 INJECTION, SOLUTION INTRAVENOUS at 14:45

## 2017-05-10 RX ADMIN — ALBUMIN HUMAN 12.5 G: 0.05 INJECTION, SOLUTION INTRAVENOUS at 20:14

## 2017-05-10 RX ADMIN — MANNITOL 12.5 G: 12.5 INJECTION, SOLUTION INTRAVENOUS at 11:58

## 2017-05-10 RX ADMIN — AMINOCAPROIC ACID 10 G: 250 INJECTION, SOLUTION INTRAVENOUS at 09:12

## 2017-05-10 RX ADMIN — MANNITOL 12.5 G: 12.5 INJECTION, SOLUTION INTRAVENOUS at 11:37

## 2017-05-10 RX ADMIN — FAMOTIDINE 20 MG: 10 INJECTION, SOLUTION INTRAVENOUS at 21:23

## 2017-05-10 RX ADMIN — AMINOCAPROIC ACID 2 G/HR: 250 INJECTION, SOLUTION INTRAVENOUS at 09:35

## 2017-05-10 RX ADMIN — NYSTATIN: 100000 OINTMENT TOPICAL at 22:00

## 2017-05-10 RX ADMIN — METOPROLOL TARTRATE 25 MG: 25 TABLET, FILM COATED ORAL at 07:30

## 2017-05-10 RX ADMIN — CHLORHEXIDINE GLUCONATE 15 ML: 1.2 RINSE ORAL at 21:23

## 2017-05-10 RX ADMIN — PROPOFOL 80 MG: 10 INJECTION, EMULSION INTRAVENOUS at 08:43

## 2017-05-10 RX ADMIN — DEXTROSE AND SODIUM CHLORIDE: 5; 450 INJECTION, SOLUTION INTRAVENOUS at 15:50

## 2017-05-10 RX ADMIN — DEXMEDETOMIDINE 0.4 MCG/KG/HR: 100 INJECTION, SOLUTION, CONCENTRATE INTRAVENOUS at 14:01

## 2017-05-10 RX ADMIN — SODIUM CHLORIDE, POTASSIUM CHLORIDE, SODIUM LACTATE AND CALCIUM CHLORIDE: 600; 310; 30; 20 INJECTION, SOLUTION INTRAVENOUS at 09:13

## 2017-05-10 RX ADMIN — DESMOPRESSIN ACETATE 30 MCG: 4 SOLUTION INTRAVENOUS at 13:49

## 2017-05-10 RX ADMIN — EPINEPHRINE 1 MCG/MIN: 1 INJECTION, SOLUTION INTRAMUSCULAR; SUBCUTANEOUS at 13:21

## 2017-05-10 RX ADMIN — HEPARIN SODIUM 3000 UNITS: 1000 INJECTION, SOLUTION INTRAVENOUS; SUBCUTANEOUS at 10:08

## 2017-05-10 RX ADMIN — FENTANYL CITRATE 150 MCG: 50 INJECTION INTRAMUSCULAR; INTRAVENOUS at 09:38

## 2017-05-10 RX ADMIN — PHENYLEPHRINE HYDROCHLORIDE 200 MCG: 10 INJECTION INTRAVENOUS at 09:17

## 2017-05-10 RX ADMIN — SODIUM CHLORIDE: 9 INJECTION, SOLUTION INTRAVENOUS at 00:00

## 2017-05-10 RX ADMIN — MANNITOL 12.5 G: 12.5 INJECTION, SOLUTION INTRAVENOUS at 13:12

## 2017-05-10 ASSESSMENT — PAIN SCALES - GENERAL
PAIN_LEVEL_AT_REST: 0
PAIN_LEVEL_WITH_ACTIVITY: 0
PAIN_LEVEL_WITH_ACTIVITY: 0
PAIN_LEVEL_AT_REST: 0

## 2017-05-10 ASSESSMENT — ACTIVITIES OF DAILY LIVING (ADL)
FEEDING: DEPENDENT
ADL_SCORE: 0
BATHING: DEPENDENT
TOILETING: DEPENDENT
DRESSING: DEPENDENT

## 2017-05-11 ENCOUNTER — APPOINTMENT (OUTPATIENT)
Dept: GENERAL RADIOLOGY | Age: 57
DRG: 219 | End: 2017-05-11
Attending: PHYSICIAN ASSISTANT

## 2017-05-11 LAB
APTT PPP: 30 SEC (ref 22–30)
BASE DEFICIT BLDA-SCNC: 1 MMOL/L (ref 0–2)
BASE EXCESS BLDA CALC-SCNC: 0 MMOL/L (ref 0–3)
BASE EXCESS BLDA CALC-SCNC: 0 MMOL/L (ref 0–3)
CO2 BLD-SCNC: 24 MMOL/L (ref 19–24)
ERYTHROCYTE [DISTWIDTH] IN BLOOD: 13.6 % (ref 11–15)
FIBRINOGEN PPP-MCNC: 467 MG/DL (ref 190–425)
GLUCOSE BLD-MCNC: 172 MG/DL (ref 65–99)
GLUCOSE BLDC GLUCOMTR-MCNC: 108 MG/DL (ref 65–99)
GLUCOSE BLDC GLUCOMTR-MCNC: 115 MG/DL (ref 65–99)
GLUCOSE BLDC GLUCOMTR-MCNC: 116 MG/DL (ref 65–99)
GLUCOSE BLDC GLUCOMTR-MCNC: 117 MG/DL (ref 65–99)
GLUCOSE BLDC GLUCOMTR-MCNC: 121 MG/DL (ref 65–99)
GLUCOSE BLDC GLUCOMTR-MCNC: 123 MG/DL (ref 65–99)
GLUCOSE BLDC GLUCOMTR-MCNC: 129 MG/DL (ref 65–99)
GLUCOSE BLDC GLUCOMTR-MCNC: 130 MG/DL (ref 65–99)
GLUCOSE BLDC GLUCOMTR-MCNC: 131 MG/DL (ref 65–99)
GLUCOSE BLDC GLUCOMTR-MCNC: 132 MG/DL (ref 65–99)
GLUCOSE BLDC GLUCOMTR-MCNC: 136 MG/DL (ref 65–99)
GLUCOSE BLDC GLUCOMTR-MCNC: 145 MG/DL (ref 65–99)
GLUCOSE BLDC GLUCOMTR-MCNC: 148 MG/DL (ref 65–99)
GLUCOSE BLDC GLUCOMTR-MCNC: 153 MG/DL (ref 65–99)
GLUCOSE BLDC GLUCOMTR-MCNC: 155 MG/DL (ref 65–99)
GLUCOSE BLDC GLUCOMTR-MCNC: 161 MG/DL (ref 65–99)
HCO3 BLDA-SCNC: 24 MMOL/L (ref 22–28)
HCT VFR BLD CALC: 26 % (ref 36–46.5)
HCT VFR BLD CALC: 26.3 % (ref 36–46.5)
HGB BLD-MCNC: 10 G/DL (ref 12–15.5)
HGB BLD-MCNC: 8.6 G/DL (ref 12–15.5)
HGB BLDA-MCNC: 8.4 G/DL (ref 12–15.5)
HOROWITZ INDEX BLDA+IHG-RTO: 96 % (ref 94–98)
HOROWITZ INDEX BLDA+IHG-RTO: 97 % (ref 94–98)
HOROWITZ INDEX BLDV+IHG-RTO: 65 % (ref 60–80)
INHALED O2 CONCENTRATION: 40 %
INHALED O2 CONCENTRATION: 40 %
INR PPP: 1.1
INR PPP: 1.1
MCH RBC QN AUTO: 31 PG (ref 26–34)
MCHC RBC AUTO-ENTMCNC: 32.7 G/DL (ref 32–36.5)
MCV RBC AUTO: 94.9 FL (ref 78–100)
METHGB MFR BLD: 1.8 %
PCO2 BLDA: 34 MM HG (ref 32–45)
PCO2 BLDA: 35 MM HG (ref 32–45)
PCO2 BLDA: 36 MM HG (ref 32–45)
PCO2 BLDA: NORMAL MM HG (ref 32–45)
PH BLDA: 7.42 UNITS (ref 7.35–7.45)
PH BLDA: 7.44 UNITS (ref 7.35–7.45)
PH BLDA: 7.46 UNITS (ref 7.35–7.45)
PH BLDA: NORMAL UNITS (ref 7.35–7.45)
PLATELET # BLD: 83 K/MCL (ref 140–450)
PO2 BLDA: 138 MM HG (ref 83–108)
PO2 BLDA: 145 MM HG (ref 83–108)
PO2 BLDA: 155 MM HG (ref 83–108)
POTASSIUM BLD-SCNC: 4.4 MMOL/L (ref 3.4–5.1)
POTASSIUM BLD-SCNC: 4.5 MMOL/L (ref 3.4–5.1)
POTASSIUM SERPL-SCNC: 4.2 MMOL/L (ref 3.4–5.1)
POTASSIUM SERPL-SCNC: 4.4 MMOL/L (ref 3.4–5.1)
PROTHROMBIN TIME: 12 SEC (ref 9.7–11.8)
PROTHROMBIN TIME: 12.2 SEC (ref 9.7–11.8)
RBC # BLD: 2.77 MIL/MCL (ref 4–5.2)
SAO2 % BLDA: 99 % (ref 95–99)
SAO2 % BLDA: 99 % (ref 95–99)
SAO2 % BLDV: 67 % (ref 60–80)
WBC # BLD: 7 K/MCL (ref 4.2–11)

## 2017-05-11 PROCEDURE — 02PYXDZ REMOVAL OF INTRALUMINAL DEVICE FROM GREAT VESSEL, EXTERNAL APPROACH: ICD-10-PCS | Performed by: THORACIC SURGERY (CARDIOTHORACIC VASCULAR SURGERY)

## 2017-05-11 PROCEDURE — P9045 ALBUMIN (HUMAN), 5%, 250 ML: HCPCS | Performed by: PHYSICIAN ASSISTANT

## 2017-05-11 PROCEDURE — 84132 ASSAY OF SERUM POTASSIUM: CPT

## 2017-05-11 PROCEDURE — 85610 PROTHROMBIN TIME: CPT

## 2017-05-11 PROCEDURE — 85027 COMPLETE CBC AUTOMATED: CPT

## 2017-05-11 PROCEDURE — 99232 SBSQ HOSP IP/OBS MODERATE 35: CPT | Performed by: INTERNAL MEDICINE

## 2017-05-11 PROCEDURE — 82805 BLOOD GASES W/O2 SATURATION: CPT

## 2017-05-11 PROCEDURE — 99291 CRITICAL CARE FIRST HOUR: CPT | Performed by: ANESTHESIOLOGY

## 2017-05-11 PROCEDURE — 10000008 HB ROOM CHARGE ICU OR CCU

## 2017-05-11 PROCEDURE — 33968 REMOVE AORTIC ASSIST DEVICE: CPT | Performed by: PHYSICIAN ASSISTANT

## 2017-05-11 PROCEDURE — 94750 HB PULMONARY COMPLIANCE STUDY: CPT

## 2017-05-11 PROCEDURE — 10002807 HB RX 258: Performed by: PHYSICIAN ASSISTANT

## 2017-05-11 PROCEDURE — 99254 IP/OBS CNSLTJ NEW/EST MOD 60: CPT | Performed by: INTERNAL MEDICINE

## 2017-05-11 PROCEDURE — 10004149 HB COUNTER-NURSING 1:1 CARE

## 2017-05-11 PROCEDURE — 82962 GLUCOSE BLOOD TEST: CPT

## 2017-05-11 PROCEDURE — 10002800 HB RX 250 W HCPCS: Performed by: THORACIC SURGERY (CARDIOTHORACIC VASCULAR SURGERY)

## 2017-05-11 PROCEDURE — 10004281 HB COUNTER-STAFF TIME PER 15 MIN

## 2017-05-11 PROCEDURE — 71010 XR CHEST AP OR PA: CPT

## 2017-05-11 PROCEDURE — 10002800 HB RX 250 W HCPCS: Performed by: INTERNAL MEDICINE

## 2017-05-11 PROCEDURE — 85018 HEMOGLOBIN: CPT

## 2017-05-11 PROCEDURE — 10002807 HB RX 258: Performed by: THORACIC SURGERY (CARDIOTHORACIC VASCULAR SURGERY)

## 2017-05-11 PROCEDURE — 85730 THROMBOPLASTIN TIME PARTIAL: CPT

## 2017-05-11 PROCEDURE — 10002800 HB RX 250 W HCPCS: Performed by: PHYSICIAN ASSISTANT

## 2017-05-11 PROCEDURE — 10002803 HB RX 637: Performed by: PHYSICIAN ASSISTANT

## 2017-05-11 PROCEDURE — 84295 ASSAY OF SERUM SODIUM: CPT

## 2017-05-11 PROCEDURE — 10002801 HB RX 250 W/O HCPCS: Performed by: THORACIC SURGERY (CARDIOTHORACIC VASCULAR SURGERY)

## 2017-05-11 PROCEDURE — 10002800 HB RX 250 W HCPCS

## 2017-05-11 PROCEDURE — 83050 HGB METHEMOGLOBIN QUAN: CPT

## 2017-05-11 PROCEDURE — 10004450 HB COUNTER-CARDIAC REHAB PHASE I

## 2017-05-11 PROCEDURE — 82810 BLOOD GASES O2 SAT ONLY: CPT

## 2017-05-11 PROCEDURE — 10002801 HB RX 250 W/O HCPCS: Performed by: PHYSICIAN ASSISTANT

## 2017-05-11 PROCEDURE — 10002803 HB RX 637: Performed by: NURSE PRACTITIONER

## 2017-05-11 PROCEDURE — 71010 XR CHEST AP OR PA: CPT | Performed by: RADIOLOGY

## 2017-05-11 PROCEDURE — 94770 HB END TIDAL CO2 DETERMINATION: CPT

## 2017-05-11 PROCEDURE — P9045 ALBUMIN (HUMAN), 5%, 250 ML: HCPCS | Performed by: THORACIC SURGERY (CARDIOTHORACIC VASCULAR SURGERY)

## 2017-05-11 PROCEDURE — 94003 VENT MGMT INPAT SUBQ DAY: CPT

## 2017-05-11 PROCEDURE — 82803 BLOOD GASES ANY COMBINATION: CPT

## 2017-05-11 PROCEDURE — P9045 ALBUMIN (HUMAN), 5%, 250 ML: HCPCS

## 2017-05-11 PROCEDURE — 85384 FIBRINOGEN ACTIVITY: CPT

## 2017-05-11 RX ORDER — WARFARIN SODIUM 2 MG/1
2 TABLET ORAL ONCE
Status: COMPLETED | OUTPATIENT
Start: 2017-05-11 | End: 2017-05-11

## 2017-05-11 RX ORDER — ALBUMIN, HUMAN INJ 5% 5 %
12.5 SOLUTION INTRAVENOUS ONCE
Status: COMPLETED | OUTPATIENT
Start: 2017-05-11 | End: 2017-05-11

## 2017-05-11 RX ORDER — PROTAMINE SULFATE 10 MG/ML
INJECTION, SOLUTION INTRAVENOUS PRN
Status: DISCONTINUED | OUTPATIENT
Start: 2017-05-10 | End: 2017-05-11

## 2017-05-11 RX ORDER — ATORVASTATIN CALCIUM 80 MG/1
80 TABLET, FILM COATED ORAL NIGHTLY
Status: DISCONTINUED | OUTPATIENT
Start: 2017-05-11 | End: 2017-05-14

## 2017-05-11 RX ORDER — INSULIN GLARGINE 100 [IU]/ML
50 INJECTION, SOLUTION SUBCUTANEOUS NIGHTLY
Status: DISCONTINUED | OUTPATIENT
Start: 2017-05-11 | End: 2017-05-13

## 2017-05-11 RX ORDER — ALBUMIN, HUMAN INJ 5% 5 %
12.5 SOLUTION INTRAVENOUS PRN
Status: DISCONTINUED | OUTPATIENT
Start: 2017-05-11 | End: 2017-05-11 | Stop reason: ALTCHOICE

## 2017-05-11 RX ORDER — ALBUMIN (HUMAN) 12.5 G/50ML
12.5 SOLUTION INTRAVENOUS PRN
Status: DISCONTINUED | OUTPATIENT
Start: 2017-05-11 | End: 2017-05-19

## 2017-05-11 RX ORDER — ALBUMIN, HUMAN INJ 5% 5 %
SOLUTION INTRAVENOUS
Status: COMPLETED
Start: 2017-05-11 | End: 2017-05-11

## 2017-05-11 RX ADMIN — Medication 1 EACH: at 22:41

## 2017-05-11 RX ADMIN — ATORVASTATIN CALCIUM 80 MG: 80 TABLET ORAL at 22:40

## 2017-05-11 RX ADMIN — INSULIN HUMAN 8.8 UNITS/HR: 100 INJECTION, SOLUTION PARENTERAL at 18:00

## 2017-05-11 RX ADMIN — INSULIN HUMAN 13.5 UNITS/HR: 100 INJECTION, SOLUTION PARENTERAL at 04:30

## 2017-05-11 RX ADMIN — CHLORHEXIDINE GLUCONATE 15 ML: 1.2 RINSE ORAL at 08:00

## 2017-05-11 RX ADMIN — ALBUMIN, HUMAN INJ 5% 12.5 G: 5 SOLUTION at 06:56

## 2017-05-11 RX ADMIN — POTASSIUM CHLORIDE, DEXTROSE MONOHYDRATE AND SODIUM CHLORIDE: 300; 5; 450 INJECTION, SOLUTION INTRAVENOUS at 06:30

## 2017-05-11 RX ADMIN — DEXMEDETOMIDINE 0.1 MCG/KG/HR: 100 INJECTION, SOLUTION, CONCENTRATE INTRAVENOUS at 01:21

## 2017-05-11 RX ADMIN — DEXTROSE AND SODIUM CHLORIDE: 5; 450 INJECTION, SOLUTION INTRAVENOUS at 08:00

## 2017-05-11 RX ADMIN — ALBUMIN HUMAN 12.5 G: 0.05 INJECTION, SOLUTION INTRAVENOUS at 15:39

## 2017-05-11 RX ADMIN — NYSTATIN: 100000 OINTMENT TOPICAL at 13:31

## 2017-05-11 RX ADMIN — Medication 40 MCG/HR: at 04:30

## 2017-05-11 RX ADMIN — MUPIROCIN 1 APPLICATION.: 20 OINTMENT TOPICAL at 22:39

## 2017-05-11 RX ADMIN — SENNA AND DOCUSATE SODIUM 2 TABLET: 50; 8.6 TABLET, FILM COATED ORAL at 22:41

## 2017-05-11 RX ADMIN — ALBUMIN HUMAN 12.5 G: 0.05 INJECTION, SOLUTION INTRAVENOUS at 03:00

## 2017-05-11 RX ADMIN — ALBUMIN HUMAN 12.5 G: 0.05 INJECTION, SOLUTION INTRAVENOUS at 06:56

## 2017-05-11 RX ADMIN — INSULIN GLARGINE 50 UNITS: 100 INJECTION, SOLUTION SUBCUTANEOUS at 22:40

## 2017-05-11 RX ADMIN — NYSTATIN: 100000 OINTMENT TOPICAL at 22:41

## 2017-05-11 RX ADMIN — NYSTATIN: 100000 OINTMENT TOPICAL at 09:26

## 2017-05-11 RX ADMIN — DOBUTAMINE HYDROCHLORIDE 5 MCG/KG/MIN: 200 INJECTION INTRAVENOUS at 17:23

## 2017-05-11 RX ADMIN — WARFARIN SODIUM 2 MG: 2 TABLET ORAL at 22:40

## 2017-05-11 RX ADMIN — FUROSEMIDE 2.5 MG/HR: 10 INJECTION, SOLUTION INTRAVENOUS at 22:31

## 2017-05-11 RX ADMIN — FAMOTIDINE 20 MG: 10 INJECTION, SOLUTION INTRAVENOUS at 22:39

## 2017-05-11 RX ADMIN — MUPIROCIN 1 APPLICATION.: 20 OINTMENT TOPICAL at 09:26

## 2017-05-11 RX ADMIN — DOBUTAMINE HYDROCHLORIDE 5 MCG/KG/MIN: 200 INJECTION INTRAVENOUS at 00:51

## 2017-05-11 ASSESSMENT — ACTIVITIES OF DAILY LIVING (ADL)
BATHING: DEPENDENT
DRESSING: DEPENDENT
BATHING: DEPENDENT
TOILETING: DEPENDENT
ADL_SCORE: 0
TOILETING: DEPENDENT
DRESSING: DEPENDENT
FEEDING: DEPENDENT
FEEDING: DEPENDENT
ADL_SCORE: 0
DRESSING: DEPENDENT
FEEDING: DEPENDENT
FEEDING: DEPENDENT
DRESSING: DEPENDENT
TOILETING: DEPENDENT
BATHING: DEPENDENT
TOILETING: DEPENDENT
ADL_SCORE: 0
ADL_SCORE: 0
BATHING: DEPENDENT

## 2017-05-11 ASSESSMENT — PAIN SCALES - GENERAL: PAIN_LEVEL_AT_REST: 1

## 2017-05-12 ENCOUNTER — APPOINTMENT (OUTPATIENT)
Dept: GENERAL RADIOLOGY | Age: 57
DRG: 219 | End: 2017-05-12
Attending: THORACIC SURGERY (CARDIOTHORACIC VASCULAR SURGERY)

## 2017-05-12 LAB
AMORPH SED URNS QL MICRO: PRESENT
ANION GAP SERPL CALC-SCNC: 16 MMOL/L (ref 10–20)
APPEARANCE UR: ABNORMAL
BACTERIA #/AREA URNS HPF: ABNORMAL /HPF
BASE DEFICIT BLDA-SCNC: 1 MMOL/L (ref 0–2)
BILIRUB UR QL STRIP: NEGATIVE
BUN SERPL-MCNC: 51 MG/DL (ref 6–20)
BUN/CREAT SERPL: 28 (ref 7–25)
CALCIUM SERPL-MCNC: 8.3 MG/DL (ref 8.4–10.2)
CHLORIDE SERPL-SCNC: 108 MMOL/L (ref 98–107)
CO2 SERPL-SCNC: 23 MMOL/L (ref 21–32)
COLOR UR: ABNORMAL
CREAT SERPL-MCNC: 1.84 MG/DL (ref 0.51–0.95)
ERYTHROCYTE [DISTWIDTH] IN BLOOD: 14.3 % (ref 11–15)
GLUCOSE BLDC GLUCOMTR-MCNC: 108 MG/DL (ref 65–99)
GLUCOSE BLDC GLUCOMTR-MCNC: 109 MG/DL (ref 65–99)
GLUCOSE BLDC GLUCOMTR-MCNC: 112 MG/DL (ref 65–99)
GLUCOSE BLDC GLUCOMTR-MCNC: 113 MG/DL (ref 65–99)
GLUCOSE BLDC GLUCOMTR-MCNC: 117 MG/DL (ref 65–99)
GLUCOSE BLDC GLUCOMTR-MCNC: 117 MG/DL (ref 65–99)
GLUCOSE BLDC GLUCOMTR-MCNC: 118 MG/DL (ref 65–99)
GLUCOSE BLDC GLUCOMTR-MCNC: 118 MG/DL (ref 65–99)
GLUCOSE BLDC GLUCOMTR-MCNC: 121 MG/DL (ref 65–99)
GLUCOSE BLDC GLUCOMTR-MCNC: 123 MG/DL (ref 65–99)
GLUCOSE BLDC GLUCOMTR-MCNC: 127 MG/DL (ref 65–99)
GLUCOSE BLDC GLUCOMTR-MCNC: 128 MG/DL (ref 65–99)
GLUCOSE SERPL-MCNC: 121 MG/DL (ref 65–99)
GLUCOSE UR STRIP-MCNC: NEGATIVE MG/DL
GRAN CASTS #/AREA URNS LPF: ABNORMAL /LPF
HCO3 BLDA-SCNC: 23 MMOL/L (ref 22–28)
HCT VFR BLD CALC: 24.6 % (ref 36–46.5)
HCT VFR BLD CALC: 26 % (ref 36–46.5)
HGB BLD-MCNC: 7.9 G/DL (ref 12–15.5)
HGB BLDA-MCNC: 8.2 G/DL (ref 12–15.5)
HGB UR QL STRIP: ABNORMAL
HOROWITZ INDEX BLDA+IHG-RTO: 96 % (ref 94–98)
HOROWITZ INDEX BLDA+IHG-RTO: 97 % (ref 94–98)
HOROWITZ INDEX BLDA+IHG-RTO: 97 % (ref 94–98)
HOROWITZ INDEX BLDV+IHG-RTO: 49 % (ref 60–80)
HOROWITZ INDEX BLDV+IHG-RTO: 50 % (ref 60–80)
HOROWITZ INDEX BLDV+IHG-RTO: 56 % (ref 60–80)
HYALINE CASTS #/AREA URNS LPF: ABNORMAL /LPF (ref 0–5)
INHALED O2 CONCENTRATION: 40 %
INR PPP: 1.2
KETONES UR STRIP-MCNC: ABNORMAL MG/DL
LEUKOCYTE ESTERASE UR QL STRIP: NEGATIVE
MAGNESIUM SERPL-MCNC: 2.9 MG/DL (ref 1.7–2.4)
MCH RBC QN AUTO: 30.9 PG (ref 26–34)
MCHC RBC AUTO-ENTMCNC: 32.1 G/DL (ref 32–36.5)
MCV RBC AUTO: 96.1 FL (ref 78–100)
METHGB MFR BLD: 0.9 %
NITRITE UR QL STRIP: NEGATIVE
PCO2 BLDA: 38 MM HG (ref 32–45)
PH BLDA: 7.4 UNITS (ref 7.35–7.45)
PH BLDA: 7.4 UNITS (ref 7.35–7.45)
PH BLDA: 7.41 UNITS (ref 7.35–7.45)
PH UR STRIP: 5 UNITS (ref 5–7)
PLATELET # BLD: 90 K/MCL (ref 140–450)
PO2 BLDA: 127 MM HG (ref 83–108)
PO2 BLDA: 138 MM HG (ref 83–108)
PO2 BLDA: 155 MM HG (ref 83–108)
POTASSIUM SERPL-SCNC: 4.4 MMOL/L (ref 3.4–5.1)
POTASSIUM SERPL-SCNC: 4.5 MMOL/L (ref 3.4–5.1)
POTASSIUM SERPL-SCNC: 4.6 MMOL/L (ref 3.4–5.1)
POTASSIUM SERPL-SCNC: 4.7 MMOL/L (ref 3.4–5.1)
PROT UR STRIP-MCNC: 30 MG/DL
PROTHROMBIN TIME: 13 SEC (ref 9.7–11.8)
RBC # BLD: 2.56 MIL/MCL (ref 4–5.2)
RBC #/AREA URNS HPF: ABNORMAL /HPF (ref 0–3)
SAO2 % BLDA: 99 % (ref 95–99)
SAO2 % BLDV: 49 % (ref 60–80)
SAO2 % BLDV: 51 % (ref 60–80)
SAO2 % BLDV: 57 % (ref 60–80)
SODIUM SERPL-SCNC: 142 MMOL/L (ref 135–145)
SP GR UR STRIP: 1.02 (ref 1–1.03)
SPECIMEN SOURCE: ABNORMAL
SQUAMOUS #/AREA URNS HPF: ABNORMAL /HPF (ref 0–5)
UROBILINOGEN UR STRIP-MCNC: 1 MG/DL (ref 0–1)
WBC # BLD: 8.8 K/MCL (ref 4.2–11)
WBC #/AREA URNS HPF: ABNORMAL /HPF (ref 0–5)

## 2017-05-12 PROCEDURE — 83050 HGB METHEMOGLOBIN QUAN: CPT

## 2017-05-12 PROCEDURE — 10002800 HB RX 250 W HCPCS: Performed by: PHYSICIAN ASSISTANT

## 2017-05-12 PROCEDURE — 85027 COMPLETE CBC AUTOMATED: CPT

## 2017-05-12 PROCEDURE — 82962 GLUCOSE BLOOD TEST: CPT

## 2017-05-12 PROCEDURE — 10004281 HB COUNTER-STAFF TIME PER 15 MIN

## 2017-05-12 PROCEDURE — 94640 AIRWAY INHALATION TREATMENT: CPT

## 2017-05-12 PROCEDURE — 10002800 HB RX 250 W HCPCS: Performed by: INTERNAL MEDICINE

## 2017-05-12 PROCEDURE — 85610 PROTHROMBIN TIME: CPT

## 2017-05-12 PROCEDURE — 71010 XR CHEST AP OR PA: CPT

## 2017-05-12 PROCEDURE — 99291 CRITICAL CARE FIRST HOUR: CPT | Performed by: ANESTHESIOLOGY

## 2017-05-12 PROCEDURE — 84295 ASSAY OF SERUM SODIUM: CPT

## 2017-05-12 PROCEDURE — 80048 BASIC METABOLIC PNL TOTAL CA: CPT

## 2017-05-12 PROCEDURE — 10004651 HB RX, NO CHARGE ITEM: Performed by: PHYSICIAN ASSISTANT

## 2017-05-12 PROCEDURE — 83735 ASSAY OF MAGNESIUM: CPT

## 2017-05-12 PROCEDURE — P9047 ALBUMIN (HUMAN), 25%, 50ML: HCPCS | Performed by: THORACIC SURGERY (CARDIOTHORACIC VASCULAR SURGERY)

## 2017-05-12 PROCEDURE — 10003441 HB CARDIAC REHAB PHASE 1

## 2017-05-12 PROCEDURE — 99232 SBSQ HOSP IP/OBS MODERATE 35: CPT | Performed by: INTERNAL MEDICINE

## 2017-05-12 PROCEDURE — 30233N1 TRANSFUSION OF NONAUTOLOGOUS RED BLOOD CELLS INTO PERIPHERAL VEIN, PERCUTANEOUS APPROACH: ICD-10-PCS | Performed by: THORACIC SURGERY (CARDIOTHORACIC VASCULAR SURGERY)

## 2017-05-12 PROCEDURE — 10002801 HB RX 250 W/O HCPCS: Performed by: ANESTHESIOLOGY

## 2017-05-12 PROCEDURE — 10002801 HB RX 250 W/O HCPCS: Performed by: THORACIC SURGERY (CARDIOTHORACIC VASCULAR SURGERY)

## 2017-05-12 PROCEDURE — P9016 RBC LEUKOCYTES REDUCED: HCPCS

## 2017-05-12 PROCEDURE — 10002803 HB RX 637: Performed by: NURSE PRACTITIONER

## 2017-05-12 PROCEDURE — 10002803 HB RX 637: Performed by: PHYSICIAN ASSISTANT

## 2017-05-12 PROCEDURE — 81001 URINALYSIS AUTO W/SCOPE: CPT

## 2017-05-12 PROCEDURE — 85018 HEMOGLOBIN: CPT

## 2017-05-12 PROCEDURE — 10002800 HB RX 250 W HCPCS: Performed by: ANESTHESIOLOGY

## 2017-05-12 PROCEDURE — P9047 ALBUMIN (HUMAN), 25%, 50ML: HCPCS | Performed by: ANESTHESIOLOGY

## 2017-05-12 PROCEDURE — 93005 ELECTROCARDIOGRAM TRACING: CPT | Performed by: PHYSICIAN ASSISTANT

## 2017-05-12 PROCEDURE — 10002801 HB RX 250 W/O HCPCS: Performed by: PHYSICIAN ASSISTANT

## 2017-05-12 PROCEDURE — 84132 ASSAY OF SERUM POTASSIUM: CPT

## 2017-05-12 PROCEDURE — 10002803 HB RX 637: Performed by: THORACIC SURGERY (CARDIOTHORACIC VASCULAR SURGERY)

## 2017-05-12 PROCEDURE — 10000008 HB ROOM CHARGE ICU OR CCU

## 2017-05-12 PROCEDURE — 82810 BLOOD GASES O2 SAT ONLY: CPT

## 2017-05-12 PROCEDURE — 10002807 HB RX 258: Performed by: THORACIC SURGERY (CARDIOTHORACIC VASCULAR SURGERY)

## 2017-05-12 PROCEDURE — 10002800 HB RX 250 W HCPCS: Performed by: THORACIC SURGERY (CARDIOTHORACIC VASCULAR SURGERY)

## 2017-05-12 PROCEDURE — 71010 XR CHEST AP OR PA: CPT | Performed by: RADIOLOGY

## 2017-05-12 PROCEDURE — 82805 BLOOD GASES W/O2 SATURATION: CPT

## 2017-05-12 PROCEDURE — 10002803 HB RX 637: Performed by: ANESTHESIOLOGY

## 2017-05-12 RX ORDER — WARFARIN SODIUM 2 MG/1
2 TABLET ORAL ONCE
Status: COMPLETED | OUTPATIENT
Start: 2017-05-12 | End: 2017-05-12

## 2017-05-12 RX ORDER — ALBUMIN (HUMAN) 12.5 G/50ML
25 SOLUTION INTRAVENOUS ONCE
Status: COMPLETED | OUTPATIENT
Start: 2017-05-12 | End: 2017-05-16

## 2017-05-12 RX ORDER — IPRATROPIUM BROMIDE AND ALBUTEROL SULFATE 2.5; .5 MG/3ML; MG/3ML
3 SOLUTION RESPIRATORY (INHALATION)
Status: DISCONTINUED | OUTPATIENT
Start: 2017-05-12 | End: 2017-05-16

## 2017-05-12 RX ORDER — FAMOTIDINE 20 MG/1
20 TABLET, FILM COATED ORAL NIGHTLY
Status: DISCONTINUED | OUTPATIENT
Start: 2017-05-12 | End: 2017-05-31 | Stop reason: HOSPADM

## 2017-05-12 RX ADMIN — MUPIROCIN 1 APPLICATION.: 20 OINTMENT TOPICAL at 20:18

## 2017-05-12 RX ADMIN — ATORVASTATIN CALCIUM 80 MG: 80 TABLET ORAL at 20:16

## 2017-05-12 RX ADMIN — MUPIROCIN 1 APPLICATION.: 20 OINTMENT TOPICAL at 08:36

## 2017-05-12 RX ADMIN — DOBUTAMINE HYDROCHLORIDE 3 MCG/KG/MIN: 200 INJECTION INTRAVENOUS at 12:00

## 2017-05-12 RX ADMIN — NYSTATIN: 100000 OINTMENT TOPICAL at 13:29

## 2017-05-12 RX ADMIN — ALBUMIN HUMAN 25 G: 0.25 SOLUTION INTRAVENOUS at 15:30

## 2017-05-12 RX ADMIN — Medication 1 EACH: at 17:00

## 2017-05-12 RX ADMIN — DOBUTAMINE HYDROCHLORIDE 3 MCG/KG/MIN: 200 INJECTION INTRAVENOUS at 14:09

## 2017-05-12 RX ADMIN — IPRATROPIUM BROMIDE AND ALBUTEROL SULFATE 3 ML: .5; 3 SOLUTION RESPIRATORY (INHALATION) at 15:45

## 2017-05-12 RX ADMIN — CALCIUM CHLORIDE 1 G: 100 INJECTION INTRAVENOUS; INTRAVENTRICULAR at 14:30

## 2017-05-12 RX ADMIN — SENNA AND DOCUSATE SODIUM 2 TABLET: 50; 8.6 TABLET, FILM COATED ORAL at 09:23

## 2017-05-12 RX ADMIN — GUAIFENESIN AND DEXTROMETHORPHAN HYDROBROMIDE 2 TABLET: 600; 30 TABLET, EXTENDED RELEASE ORAL at 13:42

## 2017-05-12 RX ADMIN — NYSTATIN: 100000 OINTMENT TOPICAL at 09:23

## 2017-05-12 RX ADMIN — GUAIFENESIN AND DEXTROMETHORPHAN HYDROBROMIDE 2 TABLET: 600; 30 TABLET, EXTENDED RELEASE ORAL at 20:18

## 2017-05-12 RX ADMIN — BISACODYL 10 MG: 10 SUPPOSITORY RECTAL at 09:55

## 2017-05-12 RX ADMIN — FAMOTIDINE 20 MG: 20 TABLET, FILM COATED ORAL at 20:18

## 2017-05-12 RX ADMIN — WARFARIN SODIUM 2 MG: 2 TABLET ORAL at 17:00

## 2017-05-12 RX ADMIN — IPRATROPIUM BROMIDE AND ALBUTEROL SULFATE 3 ML: .5; 3 SOLUTION RESPIRATORY (INHALATION) at 20:04

## 2017-05-12 RX ADMIN — ASPIRIN 81 MG: 81 TABLET, CHEWABLE ORAL at 09:23

## 2017-05-12 RX ADMIN — IPRATROPIUM BROMIDE AND ALBUTEROL SULFATE 3 ML: .5; 3 SOLUTION RESPIRATORY (INHALATION) at 11:36

## 2017-05-12 RX ADMIN — INSULIN GLARGINE 50 UNITS: 100 INJECTION, SOLUTION SUBCUTANEOUS at 20:18

## 2017-05-12 RX ADMIN — MUPIROCIN 1 APPLICATION.: 20 OINTMENT TOPICAL at 20:19

## 2017-05-12 RX ADMIN — DEXTROSE AND SODIUM CHLORIDE: 5; 450 INJECTION, SOLUTION INTRAVENOUS at 01:19

## 2017-05-12 RX ADMIN — ALBUMIN HUMAN 25 G: 0.25 SOLUTION INTRAVENOUS at 11:25

## 2017-05-12 RX ADMIN — NYSTATIN: 100000 OINTMENT TOPICAL at 20:20

## 2017-05-12 RX ADMIN — INSULIN HUMAN 2.3 UNITS/HR: 100 INJECTION, SOLUTION PARENTERAL at 10:00

## 2017-05-12 ASSESSMENT — ACTIVITIES OF DAILY LIVING (ADL)
FEEDING: DEPENDENT
DRESSING: NEEDS ASSISTANCE
FEEDING: DEPENDENT
TOILETING: DEPENDENT
DRESSING: DEPENDENT
BATHING: DEPENDENT
DRESSING: DEPENDENT
ADL_SCORE: 0
TOILETING: DEPENDENT
BATHING: NEEDS ASSISTANCE
ADL_SCORE: 6
DRESSING: DEPENDENT
TOILETING: NEEDS ASSISTANCE
BATHING: DEPENDENT
BATHING: DEPENDENT
ADL_SCORE: 0
TOILETING: DEPENDENT
FEEDING: DEPENDENT
ADL_SCORE: 0
FEEDING: NEEDS ASSISTANCE

## 2017-05-12 ASSESSMENT — PAIN SCALES - GENERAL
PAIN_LEVEL_AT_REST: 1

## 2017-05-13 ENCOUNTER — APPOINTMENT (OUTPATIENT)
Dept: GENERAL RADIOLOGY | Age: 57
DRG: 219 | End: 2017-05-13
Attending: NURSE PRACTITIONER

## 2017-05-13 LAB
ABO + RH BLD: NORMAL
ABO + RH BLD: NORMAL
ANION GAP SERPL CALC-SCNC: 17 MMOL/L (ref 10–20)
ATRIAL RATE (BPM): 100
BASE DEFICIT BLDA-SCNC: 2 MMOL/L (ref 0–2)
BASE DEFICIT BLDA-SCNC: 3 MMOL/L (ref 0–2)
BASE DEFICIT BLDA-SCNC: 4 MMOL/L (ref 0–2)
BLD GP AB SCN SERPL QL: NEGATIVE
BLD GP AB SCN SERPL QL: NEGATIVE
BLD PROD TYP BPU: NORMAL
BLD UNIT ID BPU: NORMAL
BUN SERPL-MCNC: 67 MG/DL (ref 6–20)
BUN/CREAT SERPL: 30 (ref 7–25)
CALCIUM SERPL-MCNC: 8 MG/DL (ref 8.4–10.2)
CHLORIDE SERPL-SCNC: 108 MMOL/L (ref 98–107)
CO2 SERPL-SCNC: 22 MMOL/L (ref 21–32)
CREAT SERPL-MCNC: 2.22 MG/DL (ref 0.51–0.95)
CROSSMATCH EXPIRE: NORMAL
CROSSMATCH EXPIRE: NORMAL
ERYTHROCYTE [DISTWIDTH] IN BLOOD: 15.8 % (ref 11–15)
GAS FLOW.O2 O2 DELIVERY SYS: 4 LPM
GAS FLOW.O2 O2 DELIVERY SYS: 4 LPM
GAS FLOW.O2 O2 DELIVERY SYS: 8 LPM
GLUCOSE BLDC GLUCOMTR-MCNC: 104 MG/DL (ref 65–99)
GLUCOSE BLDC GLUCOMTR-MCNC: 108 MG/DL (ref 65–99)
GLUCOSE BLDC GLUCOMTR-MCNC: 109 MG/DL (ref 65–99)
GLUCOSE BLDC GLUCOMTR-MCNC: 111 MG/DL (ref 65–99)
GLUCOSE BLDC GLUCOMTR-MCNC: 112 MG/DL (ref 65–99)
GLUCOSE BLDC GLUCOMTR-MCNC: 113 MG/DL (ref 65–99)
GLUCOSE BLDC GLUCOMTR-MCNC: 117 MG/DL (ref 65–99)
GLUCOSE BLDC GLUCOMTR-MCNC: 117 MG/DL (ref 65–99)
GLUCOSE BLDC GLUCOMTR-MCNC: 119 MG/DL (ref 65–99)
GLUCOSE BLDC GLUCOMTR-MCNC: 119 MG/DL (ref 65–99)
GLUCOSE BLDC GLUCOMTR-MCNC: 120 MG/DL (ref 65–99)
GLUCOSE BLDC GLUCOMTR-MCNC: 124 MG/DL (ref 65–99)
GLUCOSE BLDC GLUCOMTR-MCNC: 125 MG/DL (ref 65–99)
GLUCOSE BLDC GLUCOMTR-MCNC: 132 MG/DL (ref 65–99)
GLUCOSE SERPL-MCNC: 120 MG/DL (ref 65–99)
HCO3 BLDA-SCNC: 20 MMOL/L (ref 22–28)
HCO3 BLDA-SCNC: 22 MMOL/L (ref 22–28)
HCO3 BLDA-SCNC: 22 MMOL/L (ref 22–28)
HCT VFR BLD CALC: 25.8 % (ref 36–46.5)
HGB BLD-MCNC: 8.2 G/DL (ref 12–15.5)
HOROWITZ INDEX BLDA+IHG-RTO: 89 % (ref 94–98)
HOROWITZ INDEX BLDA+IHG-RTO: 96 % (ref 94–98)
HOROWITZ INDEX BLDA+IHG-RTO: 96 % (ref 94–98)
INR PPP: 1.7
MAJ XM SERPL-IMP: NORMAL
MCH RBC QN AUTO: 29.8 PG (ref 26–34)
MCHC RBC AUTO-ENTMCNC: 31.8 G/DL (ref 32–36.5)
MCV RBC AUTO: 93.8 FL (ref 78–100)
NUM BPU REQUESTED: 2
PCO2 BLDA: 34 MM HG (ref 32–45)
PCO2 BLDA: 38 MM HG (ref 32–45)
PCO2 BLDA: 39 MM HG (ref 32–45)
PH BLDA: 7.37 UNITS (ref 7.35–7.45)
PH BLDA: 7.38 UNITS (ref 7.35–7.45)
PH BLDA: 7.4 UNITS (ref 7.35–7.45)
PLATELET # BLD: 97 K/MCL (ref 140–450)
PO2 BLDA: 109 MM HG (ref 83–108)
PO2 BLDA: 62 MM HG (ref 83–108)
PO2 BLDA: 98 MM HG (ref 83–108)
POTASSIUM SERPL-SCNC: 4.6 MMOL/L (ref 3.4–5.1)
POTASSIUM SERPL-SCNC: 4.6 MMOL/L (ref 3.4–5.1)
POTASSIUM SERPL-SCNC: 4.9 MMOL/L (ref 3.4–5.1)
PR-INTERVAL (MSEC): 240
PROTHROMBIN TIME: 18.4 SEC (ref 9.7–11.8)
QRS-INTERVAL (MSEC): 98
QT-INTERVAL (MSEC): 330
QTC: 425
R AXIS (DEGREES): 19
RBC # BLD: 2.75 MIL/MCL (ref 4–5.2)
REPORT TEXT: NORMAL
SAO2 % BLDA: 91 % (ref 95–99)
SAO2 % BLDA: 98 % (ref 95–99)
SAO2 % BLDA: 99 % (ref 95–99)
SODIUM SERPL-SCNC: 142 MMOL/L (ref 135–145)
STATUS OF UNIT: NORMAL
T AXIS (DEGREES): 83
TRANSFUSION STATUS: NORMAL
UNIT DIVISION: 0
VENTRICULAR RATE EKG/MIN (BPM): 100
WBC # BLD: 11.1 K/MCL (ref 4.2–11)

## 2017-05-13 PROCEDURE — 71010 XR CHEST AP OR PA: CPT

## 2017-05-13 PROCEDURE — 80048 BASIC METABOLIC PNL TOTAL CA: CPT

## 2017-05-13 PROCEDURE — 10002807 HB RX 258: Performed by: THORACIC SURGERY (CARDIOTHORACIC VASCULAR SURGERY)

## 2017-05-13 PROCEDURE — 10002801 HB RX 250 W/O HCPCS: Performed by: PHYSICIAN ASSISTANT

## 2017-05-13 PROCEDURE — 10004172 HB COUNTER-THERAPY VISIT OT

## 2017-05-13 PROCEDURE — 10002803 HB RX 637: Performed by: NURSE PRACTITIONER

## 2017-05-13 PROCEDURE — 82962 GLUCOSE BLOOD TEST: CPT

## 2017-05-13 PROCEDURE — 93010 ELECTROCARDIOGRAM REPORT: CPT | Performed by: INTERNAL MEDICINE

## 2017-05-13 PROCEDURE — 10002803 HB RX 637: Performed by: PHYSICIAN ASSISTANT

## 2017-05-13 PROCEDURE — 10003441 HB CARDIAC REHAB PHASE 1

## 2017-05-13 PROCEDURE — 97163 PT EVAL HIGH COMPLEX 45 MIN: CPT

## 2017-05-13 PROCEDURE — 93005 ELECTROCARDIOGRAM TRACING: CPT | Performed by: PHYSICIAN ASSISTANT

## 2017-05-13 PROCEDURE — 85027 COMPLETE CBC AUTOMATED: CPT

## 2017-05-13 PROCEDURE — 97166 OT EVAL MOD COMPLEX 45 MIN: CPT

## 2017-05-13 PROCEDURE — 10004281 HB COUNTER-STAFF TIME PER 15 MIN

## 2017-05-13 PROCEDURE — 99291 CRITICAL CARE FIRST HOUR: CPT | Performed by: ANESTHESIOLOGY

## 2017-05-13 PROCEDURE — 99232 SBSQ HOSP IP/OBS MODERATE 35: CPT | Performed by: INTERNAL MEDICINE

## 2017-05-13 PROCEDURE — 85610 PROTHROMBIN TIME: CPT

## 2017-05-13 PROCEDURE — 94640 AIRWAY INHALATION TREATMENT: CPT

## 2017-05-13 PROCEDURE — 97530 THERAPEUTIC ACTIVITIES: CPT

## 2017-05-13 PROCEDURE — 10002800 HB RX 250 W HCPCS: Performed by: PHYSICIAN ASSISTANT

## 2017-05-13 PROCEDURE — 10002800 HB RX 250 W HCPCS: Performed by: THORACIC SURGERY (CARDIOTHORACIC VASCULAR SURGERY)

## 2017-05-13 PROCEDURE — 10002801 HB RX 250 W/O HCPCS: Performed by: ANESTHESIOLOGY

## 2017-05-13 PROCEDURE — 82805 BLOOD GASES W/O2 SATURATION: CPT

## 2017-05-13 PROCEDURE — 97535 SELF CARE MNGMENT TRAINING: CPT

## 2017-05-13 PROCEDURE — 10002800 HB RX 250 W HCPCS: Performed by: INTERNAL MEDICINE

## 2017-05-13 PROCEDURE — 10004173 HB COUNTER-THERAPY VISIT PT

## 2017-05-13 PROCEDURE — 86900 BLOOD TYPING SEROLOGIC ABO: CPT

## 2017-05-13 PROCEDURE — 71010 XR CHEST AP OR PA: CPT | Performed by: RADIOLOGY

## 2017-05-13 PROCEDURE — 10002803 HB RX 637: Performed by: THORACIC SURGERY (CARDIOTHORACIC VASCULAR SURGERY)

## 2017-05-13 PROCEDURE — 10002803 HB RX 637: Performed by: ANESTHESIOLOGY

## 2017-05-13 PROCEDURE — 10004651 HB RX, NO CHARGE ITEM: Performed by: PHYSICIAN ASSISTANT

## 2017-05-13 PROCEDURE — 10000008 HB ROOM CHARGE ICU OR CCU

## 2017-05-13 PROCEDURE — 84132 ASSAY OF SERUM POTASSIUM: CPT

## 2017-05-13 RX ORDER — INSULIN GLARGINE 100 [IU]/ML
60 INJECTION, SOLUTION SUBCUTANEOUS NIGHTLY
Status: DISCONTINUED | OUTPATIENT
Start: 2017-05-13 | End: 2017-05-14

## 2017-05-13 RX ADMIN — MUPIROCIN 1 APPLICATION.: 20 OINTMENT TOPICAL at 09:12

## 2017-05-13 RX ADMIN — IPRATROPIUM BROMIDE AND ALBUTEROL SULFATE 3 ML: .5; 3 SOLUTION RESPIRATORY (INHALATION) at 09:36

## 2017-05-13 RX ADMIN — FUROSEMIDE 2.5 MG/HR: 10 INJECTION, SOLUTION INTRAVENOUS at 04:50

## 2017-05-13 RX ADMIN — SENNA AND DOCUSATE SODIUM 2 TABLET: 50; 8.6 TABLET, FILM COATED ORAL at 09:12

## 2017-05-13 RX ADMIN — IPRATROPIUM BROMIDE AND ALBUTEROL SULFATE 3 ML: .5; 3 SOLUTION RESPIRATORY (INHALATION) at 12:24

## 2017-05-13 RX ADMIN — IPRATROPIUM BROMIDE AND ALBUTEROL SULFATE 3 ML: .5; 3 SOLUTION RESPIRATORY (INHALATION) at 15:49

## 2017-05-13 RX ADMIN — MUPIROCIN 1 APPLICATION.: 20 OINTMENT TOPICAL at 21:07

## 2017-05-13 RX ADMIN — GUAIFENESIN AND DEXTROMETHORPHAN HYDROBROMIDE 2 TABLET: 600; 30 TABLET, EXTENDED RELEASE ORAL at 21:07

## 2017-05-13 RX ADMIN — ASPIRIN 81 MG: 81 TABLET, CHEWABLE ORAL at 09:12

## 2017-05-13 RX ADMIN — IPRATROPIUM BROMIDE AND ALBUTEROL SULFATE 3 ML: .5; 3 SOLUTION RESPIRATORY (INHALATION) at 18:40

## 2017-05-13 RX ADMIN — NYSTATIN: 100000 OINTMENT TOPICAL at 09:12

## 2017-05-13 RX ADMIN — NYSTATIN: 100000 OINTMENT TOPICAL at 14:00

## 2017-05-13 RX ADMIN — FAMOTIDINE 20 MG: 20 TABLET, FILM COATED ORAL at 21:07

## 2017-05-13 RX ADMIN — DEXTROSE AND SODIUM CHLORIDE: 5; 450 INJECTION, SOLUTION INTRAVENOUS at 04:50

## 2017-05-13 RX ADMIN — ATORVASTATIN CALCIUM 80 MG: 80 TABLET ORAL at 21:08

## 2017-05-13 RX ADMIN — INSULIN HUMAN 2.5 UNITS/HR: 100 INJECTION, SOLUTION PARENTERAL at 04:50

## 2017-05-13 RX ADMIN — GUAIFENESIN AND DEXTROMETHORPHAN HYDROBROMIDE 2 TABLET: 600; 30 TABLET, EXTENDED RELEASE ORAL at 09:12

## 2017-05-13 RX ADMIN — INSULIN GLARGINE 60 UNITS: 100 INJECTION, SOLUTION SUBCUTANEOUS at 21:07

## 2017-05-13 RX ADMIN — NYSTATIN: 100000 OINTMENT TOPICAL at 21:07

## 2017-05-13 RX ADMIN — DOBUTAMINE HYDROCHLORIDE 3 MCG/KG/MIN: 200 INJECTION INTRAVENOUS at 14:22

## 2017-05-13 ASSESSMENT — ACTIVITIES OF DAILY LIVING (ADL)
PRIOR_ADL: INDEPENDENT
TOILETING: DEPENDENT
ADL_SCORE: 0
DRESSING: DEPENDENT
TOILETING: DEPENDENT
PRIOR_ADL_BATHING: INDEPENDENT
FEEDING: DEPENDENT
ADL_SCORE: 0
FEEDING: DEPENDENT
TOILETING: DEPENDENT
ADL_SCORE: 0
TOILETING: DEPENDENT
DRESSING: DEPENDENT
ADL_SCORE: 0
DRESSING: DEPENDENT
BATHING: DEPENDENT
TOILETING: DEPENDENT
FEEDING: DEPENDENT
DRESSING: DEPENDENT
BATHING: DEPENDENT
BATHING: DEPENDENT
ADL_SCORE: 0
FEEDING: DEPENDENT
BATHING: DEPENDENT
FEEDING: DEPENDENT
BATHING: DEPENDENT
DRESSING: DEPENDENT

## 2017-05-13 ASSESSMENT — PAIN SCALES - GENERAL
PAIN_LEVEL_AT_REST: 5
PAIN_LEVEL_AT_REST: 0
PAIN_LEVEL_AT_REST: 5
PAIN_LEVEL_AT_REST: 5

## 2017-05-14 LAB
ALBUMIN SERPL-MCNC: 2.7 G/DL (ref 3.6–5.1)
ALBUMIN/GLOB SERPL: 0.9 {RATIO} (ref 1–2.4)
ALP SERPL-CCNC: 153 UNITS/L (ref 45–117)
ALT SERPL-CCNC: 321 UNITS/L
ANION GAP SERPL CALC-SCNC: 18 MMOL/L (ref 10–20)
AST SERPL-CCNC: 418 UNITS/L
BASE DEFICIT BLDA-SCNC: 4 MMOL/L (ref 0–2)
BASOPHILS # BLD AUTO: 0 K/MCL (ref 0–0.3)
BASOPHILS NFR BLD AUTO: 0 %
BILIRUB SERPL-MCNC: 1.1 MG/DL (ref 0.2–1)
BUN SERPL-MCNC: 81 MG/DL (ref 6–20)
BUN/CREAT SERPL: 35 (ref 7–25)
CALCIUM SERPL-MCNC: 8 MG/DL (ref 8.4–10.2)
CHLORIDE SERPL-SCNC: 106 MMOL/L (ref 98–107)
CO2 SERPL-SCNC: 21 MMOL/L (ref 21–32)
CREAT SERPL-MCNC: 2.3 MG/DL (ref 0.51–0.95)
DIFFERENTIAL METHOD BLD: ABNORMAL
EOSINOPHIL # BLD AUTO: 0 K/MCL (ref 0.1–0.5)
EOSINOPHIL NFR SPEC: 0 %
ERYTHROCYTE [DISTWIDTH] IN BLOOD: 15.3 % (ref 11–15)
GAS FLOW.O2 O2 DELIVERY SYS: 4 LPM
GLOBULIN SER-MCNC: 3.1 G/DL (ref 2–4)
GLUCOSE BLDC GLUCOMTR-MCNC: 104 MG/DL (ref 65–99)
GLUCOSE BLDC GLUCOMTR-MCNC: 110 MG/DL (ref 65–99)
GLUCOSE BLDC GLUCOMTR-MCNC: 112 MG/DL (ref 65–99)
GLUCOSE BLDC GLUCOMTR-MCNC: 120 MG/DL (ref 65–99)
GLUCOSE BLDC GLUCOMTR-MCNC: 124 MG/DL (ref 65–99)
GLUCOSE BLDC GLUCOMTR-MCNC: 126 MG/DL (ref 65–99)
GLUCOSE BLDC GLUCOMTR-MCNC: 132 MG/DL (ref 65–99)
GLUCOSE BLDC GLUCOMTR-MCNC: 134 MG/DL (ref 65–99)
GLUCOSE BLDC GLUCOMTR-MCNC: 138 MG/DL (ref 65–99)
GLUCOSE BLDC GLUCOMTR-MCNC: 140 MG/DL (ref 65–99)
GLUCOSE BLDC GLUCOMTR-MCNC: 142 MG/DL (ref 65–99)
GLUCOSE BLDC GLUCOMTR-MCNC: 143 MG/DL (ref 65–99)
GLUCOSE BLDC GLUCOMTR-MCNC: 147 MG/DL (ref 65–99)
GLUCOSE BLDC GLUCOMTR-MCNC: 153 MG/DL (ref 65–99)
GLUCOSE BLDC GLUCOMTR-MCNC: 154 MG/DL (ref 65–99)
GLUCOSE BLDC GLUCOMTR-MCNC: 157 MG/DL (ref 65–99)
GLUCOSE BLDC GLUCOMTR-MCNC: 166 MG/DL (ref 65–99)
GLUCOSE BLDC GLUCOMTR-MCNC: 97 MG/DL (ref 65–99)
GLUCOSE SERPL-MCNC: 135 MG/DL (ref 65–99)
HCO3 BLDA-SCNC: 20 MMOL/L (ref 22–28)
HCT VFR BLD CALC: 25.2 % (ref 36–46.5)
HGB BLD-MCNC: 8.4 G/DL (ref 12–15.5)
HOROWITZ INDEX BLDA+IHG-RTO: 95 % (ref 94–98)
HOROWITZ INDEX BLDV+IHG-RTO: 55 % (ref 60–80)
INR PPP: 1.9
LYMPHOCYTES # BLD MANUAL: 1.1 K/MCL (ref 1–4)
LYMPHOCYTES NFR BLD MANUAL: 10 %
MAGNESIUM SERPL-MCNC: 2.7 MG/DL (ref 1.7–2.4)
MCH RBC QN AUTO: 30.8 PG (ref 26–34)
MCHC RBC AUTO-ENTMCNC: 33.3 G/DL (ref 32–36.5)
MCV RBC AUTO: 92.3 FL (ref 78–100)
MONOCYTES # BLD MANUAL: 1 K/MCL (ref 0.3–0.9)
MONOCYTES NFR BLD MANUAL: 9 %
NEUTROPHILS # BLD AUTO: 9.2 K/MCL (ref 1.8–7.7)
NEUTROPHILS NFR BLD AUTO: 81 %
NRBC BLD MANUAL-RTO: 0 %
PCO2 BLDA: 33 MM HG (ref 32–45)
PH BLDA: 7.4 UNITS (ref 7.35–7.45)
PLATELET # BLD: 108 K/MCL (ref 140–450)
PO2 BLDA: 93 MM HG (ref 83–108)
POTASSIUM SERPL-SCNC: 4.3 MMOL/L (ref 3.4–5.1)
POTASSIUM SERPL-SCNC: 4.6 MMOL/L (ref 3.4–5.1)
PROT SERPL-MCNC: 5.8 G/DL (ref 6.4–8.2)
PROTHROMBIN TIME: 20.7 SEC (ref 9.7–11.8)
RBC # BLD: 2.73 MIL/MCL (ref 4–5.2)
SAO2 % BLDA: 97 % (ref 95–99)
SAO2 % BLDV: 56 % (ref 60–80)
SODIUM SERPL-SCNC: 140 MMOL/L (ref 135–145)
WBC # BLD: 11.3 K/MCL (ref 4.2–11)

## 2017-05-14 PROCEDURE — 10002803 HB RX 637: Performed by: INTERNAL MEDICINE

## 2017-05-14 PROCEDURE — 99232 SBSQ HOSP IP/OBS MODERATE 35: CPT | Performed by: INTERNAL MEDICINE

## 2017-05-14 PROCEDURE — 10002807 HB RX 258: Performed by: THORACIC SURGERY (CARDIOTHORACIC VASCULAR SURGERY)

## 2017-05-14 PROCEDURE — 82805 BLOOD GASES W/O2 SATURATION: CPT

## 2017-05-14 PROCEDURE — 10002801 HB RX 250 W/O HCPCS: Performed by: ANESTHESIOLOGY

## 2017-05-14 PROCEDURE — 10002803 HB RX 637: Performed by: ANESTHESIOLOGY

## 2017-05-14 PROCEDURE — 85025 COMPLETE CBC W/AUTO DIFF WBC: CPT

## 2017-05-14 PROCEDURE — 85610 PROTHROMBIN TIME: CPT

## 2017-05-14 PROCEDURE — 84132 ASSAY OF SERUM POTASSIUM: CPT

## 2017-05-14 PROCEDURE — 10002800 HB RX 250 W HCPCS: Performed by: PHYSICIAN ASSISTANT

## 2017-05-14 PROCEDURE — 10002800 HB RX 250 W HCPCS: Performed by: NURSE PRACTITIONER

## 2017-05-14 PROCEDURE — 82962 GLUCOSE BLOOD TEST: CPT

## 2017-05-14 PROCEDURE — 83735 ASSAY OF MAGNESIUM: CPT

## 2017-05-14 PROCEDURE — 10004651 HB RX, NO CHARGE ITEM: Performed by: PHYSICIAN ASSISTANT

## 2017-05-14 PROCEDURE — 10002801 HB RX 250 W/O HCPCS: Performed by: PHYSICIAN ASSISTANT

## 2017-05-14 PROCEDURE — 99291 CRITICAL CARE FIRST HOUR: CPT | Performed by: INTERNAL MEDICINE

## 2017-05-14 PROCEDURE — 82810 BLOOD GASES O2 SAT ONLY: CPT

## 2017-05-14 PROCEDURE — 10002800 HB RX 250 W HCPCS: Performed by: INTERNAL MEDICINE

## 2017-05-14 PROCEDURE — 10002807 HB RX 258: Performed by: PHYSICIAN ASSISTANT

## 2017-05-14 PROCEDURE — 10000008 HB ROOM CHARGE ICU OR CCU

## 2017-05-14 PROCEDURE — 93005 ELECTROCARDIOGRAM TRACING: CPT | Performed by: NURSE PRACTITIONER

## 2017-05-14 PROCEDURE — 94640 AIRWAY INHALATION TREATMENT: CPT

## 2017-05-14 PROCEDURE — 10002800 HB RX 250 W HCPCS

## 2017-05-14 PROCEDURE — 10002800 HB RX 250 W HCPCS: Performed by: THORACIC SURGERY (CARDIOTHORACIC VASCULAR SURGERY)

## 2017-05-14 PROCEDURE — 10002803 HB RX 637: Performed by: THORACIC SURGERY (CARDIOTHORACIC VASCULAR SURGERY)

## 2017-05-14 PROCEDURE — 10002803 HB RX 637: Performed by: PHYSICIAN ASSISTANT

## 2017-05-14 PROCEDURE — 80053 COMPREHEN METABOLIC PANEL: CPT

## 2017-05-14 RX ORDER — LANOLIN ALCOHOL/MO/W.PET/CERES
3 CREAM (GRAM) TOPICAL NIGHTLY
Status: DISCONTINUED | OUTPATIENT
Start: 2017-05-14 | End: 2017-05-31 | Stop reason: HOSPADM

## 2017-05-14 RX ORDER — HYDRALAZINE HYDROCHLORIDE 20 MG/ML
INJECTION INTRAMUSCULAR; INTRAVENOUS
Status: COMPLETED
Start: 2017-05-14 | End: 2017-05-14

## 2017-05-14 RX ORDER — GUAIFENESIN 600 MG/1
600 TABLET, EXTENDED RELEASE ORAL EVERY 12 HOURS SCHEDULED
Status: DISCONTINUED | OUTPATIENT
Start: 2017-05-14 | End: 2017-05-31 | Stop reason: HOSPADM

## 2017-05-14 RX ORDER — INSULIN GLARGINE 100 [IU]/ML
55 INJECTION, SOLUTION SUBCUTANEOUS NIGHTLY
Status: DISCONTINUED | OUTPATIENT
Start: 2017-05-14 | End: 2017-05-15

## 2017-05-14 RX ORDER — AMLODIPINE BESYLATE 2.5 MG/1
2.5 TABLET ORAL DAILY
Status: DISCONTINUED | OUTPATIENT
Start: 2017-05-14 | End: 2017-05-18

## 2017-05-14 RX ORDER — HYDRALAZINE HYDROCHLORIDE 20 MG/ML
10-20 INJECTION INTRAMUSCULAR; INTRAVENOUS EVERY 6 HOURS PRN
Status: DISCONTINUED | OUTPATIENT
Start: 2017-05-14 | End: 2017-05-18

## 2017-05-14 RX ADMIN — ASPIRIN 81 MG: 81 TABLET, CHEWABLE ORAL at 08:46

## 2017-05-14 RX ADMIN — DEXTROSE AND SODIUM CHLORIDE 10 ML/HR: 5; 450 INJECTION, SOLUTION INTRAVENOUS at 04:35

## 2017-05-14 RX ADMIN — IPRATROPIUM BROMIDE AND ALBUTEROL SULFATE 3 ML: .5; 3 SOLUTION RESPIRATORY (INHALATION) at 19:49

## 2017-05-14 RX ADMIN — GUAIFENESIN 600 MG: 600 TABLET, EXTENDED RELEASE ORAL at 17:09

## 2017-05-14 RX ADMIN — ACETAMINOPHEN 650 MG: 325 TABLET ORAL at 09:45

## 2017-05-14 RX ADMIN — INSULIN GLARGINE 55 UNITS: 100 INJECTION, SOLUTION SUBCUTANEOUS at 20:17

## 2017-05-14 RX ADMIN — SENNA AND DOCUSATE SODIUM 2 TABLET: 50; 8.6 TABLET, FILM COATED ORAL at 08:46

## 2017-05-14 RX ADMIN — DEXTROSE AND SODIUM CHLORIDE 10 ML/HR: 5; 450 INJECTION, SOLUTION INTRAVENOUS at 00:25

## 2017-05-14 RX ADMIN — IPRATROPIUM BROMIDE AND ALBUTEROL SULFATE 3 ML: .5; 3 SOLUTION RESPIRATORY (INHALATION) at 16:26

## 2017-05-14 RX ADMIN — AMLODIPINE BESYLATE 2.5 MG: 2.5 TABLET ORAL at 11:22

## 2017-05-14 RX ADMIN — FUROSEMIDE 2.5 MG/HR: 10 INJECTION, SOLUTION INTRAVENOUS at 04:33

## 2017-05-14 RX ADMIN — IPRATROPIUM BROMIDE AND ALBUTEROL SULFATE 3 ML: .5; 3 SOLUTION RESPIRATORY (INHALATION) at 08:03

## 2017-05-14 RX ADMIN — SODIUM CHLORIDE: 9 INJECTION, SOLUTION INTRAVENOUS at 04:54

## 2017-05-14 RX ADMIN — IPRATROPIUM BROMIDE AND ALBUTEROL SULFATE 3 ML: .5; 3 SOLUTION RESPIRATORY (INHALATION) at 12:40

## 2017-05-14 RX ADMIN — Medication 3 MG: at 21:55

## 2017-05-14 RX ADMIN — GUAIFENESIN AND DEXTROMETHORPHAN HYDROBROMIDE 2 TABLET: 600; 30 TABLET, EXTENDED RELEASE ORAL at 08:46

## 2017-05-14 RX ADMIN — NYSTATIN: 100000 OINTMENT TOPICAL at 08:46

## 2017-05-14 RX ADMIN — NYSTATIN: 100000 OINTMENT TOPICAL at 13:49

## 2017-05-14 RX ADMIN — HYDRALAZINE HYDROCHLORIDE 20 MG: 20 INJECTION INTRAMUSCULAR; INTRAVENOUS at 08:04

## 2017-05-14 RX ADMIN — MUPIROCIN 1 APPLICATION.: 20 OINTMENT TOPICAL at 09:47

## 2017-05-14 RX ADMIN — FAMOTIDINE 20 MG: 20 TABLET, FILM COATED ORAL at 20:17

## 2017-05-14 RX ADMIN — DOBUTAMINE HYDROCHLORIDE 2 MCG/KG/MIN: 200 INJECTION INTRAVENOUS at 13:51

## 2017-05-14 RX ADMIN — NYSTATIN: 100000 OINTMENT TOPICAL at 20:17

## 2017-05-14 RX ADMIN — MUPIROCIN 1 APPLICATION.: 20 OINTMENT TOPICAL at 20:17

## 2017-05-14 RX ADMIN — INSULIN HUMAN 1.2 UNITS/HR: 100 INJECTION, SOLUTION PARENTERAL at 04:31

## 2017-05-14 RX ADMIN — BISACODYL 10 MG: 10 SUPPOSITORY RECTAL at 08:46

## 2017-05-14 ASSESSMENT — PAIN SCALES - GENERAL
PAIN_LEVEL_AT_REST: 0
PAIN_LEVEL_AT_REST: 0
PAIN_LEVEL_AT_REST: 3
PAIN_LEVEL_AT_REST: 0
PAIN_LEVEL_AT_REST: 0
PAIN_LEVEL_AT_REST: 5
PAIN_LEVEL_AT_REST: 0
PAIN_LEVEL_AT_REST: 0
PAIN_LEVEL_AT_REST: 8
PAIN_LEVEL_AT_REST: 6

## 2017-05-15 LAB
ALBUMIN SERPL-MCNC: 2.5 G/DL (ref 3.6–5.1)
ALBUMIN/GLOB SERPL: 0.8 {RATIO} (ref 1–2.4)
ALP SERPL-CCNC: 164 UNITS/L (ref 45–117)
ALT SERPL-CCNC: 273 UNITS/L
ANION GAP SERPL CALC-SCNC: 18 MMOL/L (ref 10–20)
AST SERPL-CCNC: 275 UNITS/L
ATRIAL RATE (BPM): 104
ATRIAL RATE (BPM): 93
BASE DEFICIT BLDA-SCNC: 4 MMOL/L (ref 0–2)
BASE DEFICIT BLDA-SCNC: 4 MMOL/L (ref 0–2)
BASOPHILS # BLD AUTO: 0 K/MCL (ref 0–0.3)
BASOPHILS NFR BLD AUTO: 0 %
BILIRUB SERPL-MCNC: 1 MG/DL (ref 0.2–1)
BUN SERPL-MCNC: 89 MG/DL (ref 6–20)
BUN/CREAT SERPL: 41 (ref 7–25)
CALCIUM SERPL-MCNC: 8.4 MG/DL (ref 8.4–10.2)
CHLORIDE SERPL-SCNC: 103 MMOL/L (ref 98–107)
CO2 SERPL-SCNC: 21 MMOL/L (ref 21–32)
CREAT SERPL-MCNC: 2.19 MG/DL (ref 0.51–0.95)
DIFFERENTIAL METHOD BLD: ABNORMAL
EOSINOPHIL # BLD AUTO: 0.1 K/MCL (ref 0.1–0.5)
EOSINOPHIL NFR SPEC: 1 %
ERYTHROCYTE [DISTWIDTH] IN BLOOD: 14.9 % (ref 11–15)
GAS FLOW.O2 O2 DELIVERY SYS: 1 LPM
GAS FLOW.O2 O2 DELIVERY SYS: 2 LPM
GLOBULIN SER-MCNC: 3.3 G/DL (ref 2–4)
GLUCOSE BLDC GLUCOMTR-MCNC: 100 MG/DL (ref 65–99)
GLUCOSE BLDC GLUCOMTR-MCNC: 105 MG/DL (ref 65–99)
GLUCOSE BLDC GLUCOMTR-MCNC: 106 MG/DL (ref 65–99)
GLUCOSE BLDC GLUCOMTR-MCNC: 107 MG/DL (ref 65–99)
GLUCOSE BLDC GLUCOMTR-MCNC: 132 MG/DL (ref 65–99)
GLUCOSE BLDC GLUCOMTR-MCNC: 158 MG/DL (ref 65–99)
GLUCOSE BLDC GLUCOMTR-MCNC: 159 MG/DL (ref 65–99)
GLUCOSE BLDC GLUCOMTR-MCNC: 161 MG/DL (ref 65–99)
GLUCOSE BLDC GLUCOMTR-MCNC: 161 MG/DL (ref 65–99)
GLUCOSE BLDC GLUCOMTR-MCNC: 166 MG/DL (ref 65–99)
GLUCOSE BLDC GLUCOMTR-MCNC: 184 MG/DL (ref 65–99)
GLUCOSE BLDC GLUCOMTR-MCNC: 186 MG/DL (ref 65–99)
GLUCOSE BLDC GLUCOMTR-MCNC: 196 MG/DL (ref 65–99)
GLUCOSE BLDC GLUCOMTR-MCNC: 209 MG/DL (ref 65–99)
GLUCOSE BLDC GLUCOMTR-MCNC: 56 MG/DL (ref 65–99)
GLUCOSE BLDC GLUCOMTR-MCNC: 56 MG/DL (ref 65–99)
GLUCOSE BLDC GLUCOMTR-MCNC: 60 MG/DL (ref 65–99)
GLUCOSE BLDC GLUCOMTR-MCNC: 81 MG/DL (ref 65–99)
GLUCOSE BLDC GLUCOMTR-MCNC: 85 MG/DL (ref 65–99)
GLUCOSE BLDC GLUCOMTR-MCNC: 86 MG/DL (ref 65–99)
GLUCOSE BLDC GLUCOMTR-MCNC: 87 MG/DL (ref 65–99)
GLUCOSE BLDC GLUCOMTR-MCNC: 89 MG/DL (ref 65–99)
GLUCOSE SERPL-MCNC: 100 MG/DL (ref 65–99)
HCO3 BLDA-SCNC: 20 MMOL/L (ref 22–28)
HCO3 BLDA-SCNC: 20 MMOL/L (ref 22–28)
HCT VFR BLD CALC: 27 % (ref 36–46.5)
HGB BLD-MCNC: 8.9 G/DL (ref 12–15.5)
HOROWITZ INDEX BLDA+IHG-RTO: 95 % (ref 94–98)
HOROWITZ INDEX BLDA+IHG-RTO: 96 % (ref 94–98)
INR PPP: 1.5
IRON SATN MFR SERPL: 9 % (ref 15–45)
IRON SERPL-MCNC: 12 MCG/DL (ref 50–170)
LYMPHOCYTES # BLD MANUAL: 1.2 K/MCL (ref 1–4)
LYMPHOCYTES NFR BLD MANUAL: 10 %
MCH RBC QN AUTO: 30.2 PG (ref 26–34)
MCHC RBC AUTO-ENTMCNC: 33 G/DL (ref 32–36.5)
MCV RBC AUTO: 91.5 FL (ref 78–100)
MONOCYTES # BLD MANUAL: 1.1 K/MCL (ref 0.3–0.9)
MONOCYTES NFR BLD MANUAL: 10 %
NEUTROPHILS # BLD AUTO: 9.1 K/MCL (ref 1.8–7.7)
NEUTROPHILS NFR BLD AUTO: 79 %
NRBC BLD MANUAL-RTO: 0 %
PATHOLOGIST NAME: NORMAL
PCO2 BLDA: 30 MM HG (ref 32–45)
PCO2 BLDA: 32 MM HG (ref 32–45)
PH BLDA: 7.42 UNITS (ref 7.35–7.45)
PH BLDA: 7.42 UNITS (ref 7.35–7.45)
PLATELET # BLD: 132 K/MCL (ref 140–450)
PO2 BLDA: 131 MM HG (ref 83–108)
PO2 BLDA: 93 MM HG (ref 83–108)
POTASSIUM SERPL-SCNC: 3.9 MMOL/L (ref 3.4–5.1)
POTASSIUM SERPL-SCNC: 4 MMOL/L (ref 3.4–5.1)
PROT SERPL-MCNC: 5.8 G/DL (ref 6.4–8.2)
PROTHROMBIN TIME: 16.3 SEC (ref 9.7–11.8)
QRS-INTERVAL (MSEC): 102
QRS-INTERVAL (MSEC): 106
QT-INTERVAL (MSEC): 340
QT-INTERVAL (MSEC): 374
QTC: 412
QTC: 427
R AXIS (DEGREES): 12
R AXIS (DEGREES): 30
RBC # BLD: 2.95 MIL/MCL (ref 4–5.2)
REPORT TEXT: NORMAL
REPORT TEXT: NORMAL
SAO2 % BLDA: 97 % (ref 95–99)
SAO2 % BLDA: 99 % (ref 95–99)
SODIUM SERPL-SCNC: 138 MMOL/L (ref 135–145)
T AXIS (DEGREES): 83
T AXIS (DEGREES): 83
TIBC SERPL-MCNC: 134 MCG/DL (ref 250–450)
VENTRICULAR RATE EKG/MIN (BPM): 73
VENTRICULAR RATE EKG/MIN (BPM): 95
WBC # BLD: 11.5 K/MCL (ref 4.2–11)

## 2017-05-15 PROCEDURE — 93010 ELECTROCARDIOGRAM REPORT: CPT | Performed by: INTERNAL MEDICINE

## 2017-05-15 PROCEDURE — 85610 PROTHROMBIN TIME: CPT

## 2017-05-15 PROCEDURE — 85025 COMPLETE CBC W/AUTO DIFF WBC: CPT

## 2017-05-15 PROCEDURE — 86022 PLATELET ANTIBODIES: CPT

## 2017-05-15 PROCEDURE — 10000008 HB ROOM CHARGE ICU OR CCU

## 2017-05-15 PROCEDURE — 84132 ASSAY OF SERUM POTASSIUM: CPT

## 2017-05-15 PROCEDURE — 80053 COMPREHEN METABOLIC PANEL: CPT

## 2017-05-15 PROCEDURE — 10002801 HB RX 250 W/O HCPCS: Performed by: ANESTHESIOLOGY

## 2017-05-15 PROCEDURE — 99232 SBSQ HOSP IP/OBS MODERATE 35: CPT | Performed by: INTERNAL MEDICINE

## 2017-05-15 PROCEDURE — 10004651 HB RX, NO CHARGE ITEM: Performed by: PHYSICIAN ASSISTANT

## 2017-05-15 PROCEDURE — 97530 THERAPEUTIC ACTIVITIES: CPT

## 2017-05-15 PROCEDURE — 10002800 HB RX 250 W HCPCS: Performed by: PHYSICIAN ASSISTANT

## 2017-05-15 PROCEDURE — 10002800 HB RX 250 W HCPCS: Performed by: NURSE PRACTITIONER

## 2017-05-15 PROCEDURE — 99291 CRITICAL CARE FIRST HOUR: CPT | Performed by: INTERNAL MEDICINE

## 2017-05-15 PROCEDURE — 82962 GLUCOSE BLOOD TEST: CPT

## 2017-05-15 PROCEDURE — 97535 SELF CARE MNGMENT TRAINING: CPT

## 2017-05-15 PROCEDURE — 10002803 HB RX 637: Performed by: NURSE PRACTITIONER

## 2017-05-15 PROCEDURE — 99231 SBSQ HOSP IP/OBS SF/LOW 25: CPT | Performed by: INTERNAL MEDICINE

## 2017-05-15 PROCEDURE — 82805 BLOOD GASES W/O2 SATURATION: CPT

## 2017-05-15 PROCEDURE — 10004325 HB COUNTER ASSESSMENT EA 15 MIN

## 2017-05-15 PROCEDURE — 10004172 HB COUNTER-THERAPY VISIT OT

## 2017-05-15 PROCEDURE — 10002807 HB RX 258: Performed by: THORACIC SURGERY (CARDIOTHORACIC VASCULAR SURGERY)

## 2017-05-15 PROCEDURE — 10002801 HB RX 250 W/O HCPCS: Performed by: PHYSICIAN ASSISTANT

## 2017-05-15 PROCEDURE — 10002800 HB RX 250 W HCPCS: Performed by: INTERNAL MEDICINE

## 2017-05-15 PROCEDURE — 10002800 HB RX 250 W HCPCS: Performed by: THORACIC SURGERY (CARDIOTHORACIC VASCULAR SURGERY)

## 2017-05-15 PROCEDURE — 10002803 HB RX 637: Performed by: PHYSICIAN ASSISTANT

## 2017-05-15 PROCEDURE — 97116 GAIT TRAINING THERAPY: CPT

## 2017-05-15 PROCEDURE — 83540 ASSAY OF IRON: CPT

## 2017-05-15 PROCEDURE — 10004173 HB COUNTER-THERAPY VISIT PT

## 2017-05-15 PROCEDURE — 94640 AIRWAY INHALATION TREATMENT: CPT

## 2017-05-15 PROCEDURE — 10002803 HB RX 637: Performed by: THORACIC SURGERY (CARDIOTHORACIC VASCULAR SURGERY)

## 2017-05-15 PROCEDURE — 10002803 HB RX 637: Performed by: INTERNAL MEDICINE

## 2017-05-15 RX ORDER — MULTIVIT WITH MINERALS/LUTEIN
250 TABLET ORAL DAILY
Status: DISCONTINUED | OUTPATIENT
Start: 2017-05-15 | End: 2017-05-31 | Stop reason: HOSPADM

## 2017-05-15 RX ORDER — METOLAZONE 2.5 MG/1
2.5 TABLET ORAL ONCE
Status: DISCONTINUED | OUTPATIENT
Start: 2017-05-15 | End: 2017-05-15

## 2017-05-15 RX ORDER — WARFARIN SODIUM 2 MG/1
2 TABLET ORAL ONCE
Status: COMPLETED | OUTPATIENT
Start: 2017-05-15 | End: 2017-05-15

## 2017-05-15 RX ORDER — TRAMADOL HYDROCHLORIDE 50 MG/1
50 TABLET ORAL EVERY 6 HOURS PRN
Status: DISCONTINUED | OUTPATIENT
Start: 2017-05-15 | End: 2017-05-31 | Stop reason: HOSPADM

## 2017-05-15 RX ORDER — IRON POLYSACCHARIDE COMPLEX 150 MG
150 CAPSULE ORAL
Status: DISCONTINUED | OUTPATIENT
Start: 2017-05-15 | End: 2017-05-31 | Stop reason: HOSPADM

## 2017-05-15 RX ORDER — INSULIN GLARGINE 100 [IU]/ML
45 INJECTION, SOLUTION SUBCUTANEOUS NIGHTLY
Status: DISCONTINUED | OUTPATIENT
Start: 2017-05-15 | End: 2017-05-16

## 2017-05-15 RX ADMIN — IPRATROPIUM BROMIDE AND ALBUTEROL SULFATE 3 ML: .5; 3 SOLUTION RESPIRATORY (INHALATION) at 08:56

## 2017-05-15 RX ADMIN — ASPIRIN 81 MG: 81 TABLET, CHEWABLE ORAL at 09:00

## 2017-05-15 RX ADMIN — IPRATROPIUM BROMIDE AND ALBUTEROL SULFATE 3 ML: .5; 3 SOLUTION RESPIRATORY (INHALATION) at 16:35

## 2017-05-15 RX ADMIN — GUAIFENESIN 600 MG: 600 TABLET, EXTENDED RELEASE ORAL at 20:52

## 2017-05-15 RX ADMIN — DEXTROSE AND SODIUM CHLORIDE: 5; 450 INJECTION, SOLUTION INTRAVENOUS at 04:00

## 2017-05-15 RX ADMIN — ASCORBIC ACID TAB 250 MG 250 MG: 250 TAB at 21:48

## 2017-05-15 RX ADMIN — POTASSIUM CHLORIDE 20 MEQ: 29.8 INJECTION, SOLUTION INTRAVENOUS at 13:10

## 2017-05-15 RX ADMIN — AMLODIPINE BESYLATE 2.5 MG: 2.5 TABLET ORAL at 09:00

## 2017-05-15 RX ADMIN — Medication 3 MG: at 21:49

## 2017-05-15 RX ADMIN — INSULIN GLARGINE 45 UNITS: 100 INJECTION, SOLUTION SUBCUTANEOUS at 20:51

## 2017-05-15 RX ADMIN — NYSTATIN: 100000 OINTMENT TOPICAL at 20:52

## 2017-05-15 RX ADMIN — FAMOTIDINE 20 MG: 20 TABLET, FILM COATED ORAL at 20:52

## 2017-05-15 RX ADMIN — HYDRALAZINE HYDROCHLORIDE 20 MG: 20 INJECTION INTRAMUSCULAR; INTRAVENOUS at 03:11

## 2017-05-15 RX ADMIN — GUAIFENESIN 600 MG: 600 TABLET, EXTENDED RELEASE ORAL at 09:00

## 2017-05-15 RX ADMIN — POLYETHYLENE GLYCOL (3350) 17 G: 17 POWDER, FOR SOLUTION ORAL at 17:19

## 2017-05-15 RX ADMIN — ACETAMINOPHEN 650 MG: 325 TABLET ORAL at 18:53

## 2017-05-15 RX ADMIN — SENNA AND DOCUSATE SODIUM 2 TABLET: 50; 8.6 TABLET, FILM COATED ORAL at 09:00

## 2017-05-15 RX ADMIN — DOBUTAMINE HYDROCHLORIDE 1.6 MCG/KG/MIN: 200 INJECTION INTRAVENOUS at 15:15

## 2017-05-15 RX ADMIN — ACETAMINOPHEN 650 MG: 325 TABLET ORAL at 03:47

## 2017-05-15 RX ADMIN — WARFARIN SODIUM 2 MG: 2 TABLET ORAL at 17:19

## 2017-05-15 RX ADMIN — Medication 1 EACH: at 17:21

## 2017-05-15 RX ADMIN — IPRATROPIUM BROMIDE AND ALBUTEROL SULFATE 3 ML: .5; 3 SOLUTION RESPIRATORY (INHALATION) at 20:33

## 2017-05-15 RX ADMIN — NYSTATIN: 100000 OINTMENT TOPICAL at 09:00

## 2017-05-15 RX ADMIN — INSULIN HUMAN 9 UNITS/HR: 100 INJECTION, SOLUTION PARENTERAL at 15:15

## 2017-05-15 RX ADMIN — FUROSEMIDE 2.5 MG/HR: 10 INJECTION, SOLUTION INTRAVENOUS at 04:00

## 2017-05-15 RX ADMIN — NYSTATIN: 100000 OINTMENT TOPICAL at 13:14

## 2017-05-15 RX ADMIN — IPRATROPIUM BROMIDE AND ALBUTEROL SULFATE 3 ML: .5; 3 SOLUTION RESPIRATORY (INHALATION) at 11:20

## 2017-05-15 RX ADMIN — Medication 150 MG: at 17:19

## 2017-05-15 ASSESSMENT — ACTIVITIES OF DAILY LIVING (ADL)
TOILETING: NEEDS ASSISTANCE
BATHING: DEPENDENT
DRESSING: NEEDS ASSISTANCE
BATHING: DEPENDENT
FEEDING: NEEDS ASSISTANCE
ADL_SCORE: 5
BATHING: DEPENDENT
DRESSING: NEEDS ASSISTANCE
FEEDING: NEEDS ASSISTANCE
TOILETING: NEEDS ASSISTANCE
ADL_SCORE: 5
FEEDING: NEEDS ASSISTANCE
TOILETING: NEEDS ASSISTANCE
DRESSING: DEPENDENT
FEEDING: NEEDS ASSISTANCE
ADL_SCORE: 5
DRESSING: NEEDS ASSISTANCE
BATHING: DEPENDENT
ADL_SCORE: 1
TOILETING: DEPENDENT

## 2017-05-15 ASSESSMENT — PAIN SCALES - GENERAL
PAIN_LEVEL_AT_REST: 8
PAIN_LEVEL_AT_REST: 7
PAIN_LEVEL_AT_REST: 0
PAIN_LEVEL_AT_REST: 6
PAIN_LEVEL_AT_REST: 5
PAIN_LEVEL_AT_REST: 7
PAIN_LEVEL_AT_REST: 6

## 2017-05-16 LAB
ABO + RH BLD: NORMAL
ALBUMIN SERPL-MCNC: 2.4 G/DL (ref 3.6–5.1)
ALBUMIN/GLOB SERPL: 0.7 {RATIO} (ref 1–2.4)
ALP SERPL-CCNC: 163 UNITS/L (ref 45–117)
ALT SERPL-CCNC: 286 UNITS/L
ANION GAP SERPL CALC-SCNC: 19 MMOL/L (ref 10–20)
APTT PPP: 46 SEC (ref 22–30)
AST SERPL-CCNC: 293 UNITS/L
ATRIAL RATE (BPM): 85
BILIRUB SERPL-MCNC: 0.9 MG/DL (ref 0.2–1)
BLD GP AB SCN SERPL QL: NEGATIVE
BUN SERPL-MCNC: 93 MG/DL (ref 6–20)
BUN/CREAT SERPL: 47 (ref 7–25)
CALCIUM SERPL-MCNC: 8 MG/DL (ref 8.4–10.2)
CHLORIDE SERPL-SCNC: 102 MMOL/L (ref 98–107)
CO2 SERPL-SCNC: 21 MMOL/L (ref 21–32)
CREAT SERPL-MCNC: 1.98 MG/DL (ref 0.51–0.95)
CROSSMATCH EXPIRE: NORMAL
ERYTHROCYTE [DISTWIDTH] IN BLOOD: 14.5 % (ref 11–15)
GLOBULIN SER-MCNC: 3.4 G/DL (ref 2–4)
GLUCOSE BLDC GLUCOMTR-MCNC: 107 MG/DL (ref 65–99)
GLUCOSE BLDC GLUCOMTR-MCNC: 126 MG/DL (ref 65–99)
GLUCOSE BLDC GLUCOMTR-MCNC: 136 MG/DL (ref 65–99)
GLUCOSE BLDC GLUCOMTR-MCNC: 144 MG/DL (ref 65–99)
GLUCOSE BLDC GLUCOMTR-MCNC: 146 MG/DL (ref 65–99)
GLUCOSE BLDC GLUCOMTR-MCNC: 152 MG/DL (ref 65–99)
GLUCOSE BLDC GLUCOMTR-MCNC: 158 MG/DL (ref 65–99)
GLUCOSE BLDC GLUCOMTR-MCNC: 159 MG/DL (ref 65–99)
GLUCOSE BLDC GLUCOMTR-MCNC: 162 MG/DL (ref 65–99)
GLUCOSE BLDC GLUCOMTR-MCNC: 181 MG/DL (ref 65–99)
GLUCOSE BLDC GLUCOMTR-MCNC: 75 MG/DL (ref 65–99)
GLUCOSE BLDC GLUCOMTR-MCNC: 79 MG/DL (ref 65–99)
GLUCOSE BLDC GLUCOMTR-MCNC: 82 MG/DL (ref 65–99)
GLUCOSE BLDC GLUCOMTR-MCNC: 83 MG/DL (ref 65–99)
GLUCOSE BLDC GLUCOMTR-MCNC: 84 MG/DL (ref 65–99)
GLUCOSE BLDC GLUCOMTR-MCNC: 84 MG/DL (ref 65–99)
GLUCOSE BLDC GLUCOMTR-MCNC: 89 MG/DL (ref 65–99)
GLUCOSE BLDC GLUCOMTR-MCNC: 94 MG/DL (ref 65–99)
GLUCOSE BLDC GLUCOMTR-MCNC: 95 MG/DL (ref 65–99)
GLUCOSE SERPL-MCNC: 83 MG/DL (ref 65–99)
HCT VFR BLD CALC: 26.8 % (ref 36–46.5)
HGB BLD-MCNC: 9.1 G/DL (ref 12–15.5)
HOROWITZ INDEX BLDV+IHG-RTO: 63 % (ref 60–80)
INR PPP: 1.3
MAGNESIUM SERPL-MCNC: 2.4 MG/DL (ref 1.7–2.4)
MCH RBC QN AUTO: 30.6 PG (ref 26–34)
MCHC RBC AUTO-ENTMCNC: 34 G/DL (ref 32–36.5)
MCV RBC AUTO: 90.2 FL (ref 78–100)
PF4 HEPARIN CMPLX IGG SER-IMP: ABNORMAL
PF4 HEPARIN CMPLX IGG SERPL IA: 1.44
PF4 HEPARIN CMPLX IGG SERPL QL IA: POSITIVE
PLATELET # BLD: 155 K/MCL (ref 140–450)
POTASSIUM SERPL-SCNC: 3.7 MMOL/L (ref 3.4–5.1)
POTASSIUM SERPL-SCNC: 4 MMOL/L (ref 3.4–5.1)
POTASSIUM SERPL-SCNC: 4.3 MMOL/L (ref 3.4–5.1)
PRCNT HEP INHIBITED SER-RTO: 99 %
PROT SERPL-MCNC: 5.8 G/DL (ref 6.4–8.2)
PROTHROMBIN TIME: 14.3 SEC (ref 9.7–11.8)
QRS-INTERVAL (MSEC): 110
QT-INTERVAL (MSEC): 376
QTC: 457
R AXIS (DEGREES): 30
RBC # BLD: 2.97 MIL/MCL (ref 4–5.2)
REPORT TEXT: NORMAL
SAO2 % BLDV: 64 % (ref 60–80)
SODIUM SERPL-SCNC: 138 MMOL/L (ref 135–145)
T AXIS (DEGREES): 79
VENTRICULAR RATE EKG/MIN (BPM): 89
WBC # BLD: 14 K/MCL (ref 4.2–11)

## 2017-05-16 PROCEDURE — 10002800 HB RX 250 W HCPCS: Performed by: INTERNAL MEDICINE

## 2017-05-16 PROCEDURE — 99291 CRITICAL CARE FIRST HOUR: CPT | Performed by: INTERNAL MEDICINE

## 2017-05-16 PROCEDURE — 10004651 HB RX, NO CHARGE ITEM: Performed by: PHYSICIAN ASSISTANT

## 2017-05-16 PROCEDURE — 97110 THERAPEUTIC EXERCISES: CPT

## 2017-05-16 PROCEDURE — 10002803 HB RX 637: Performed by: THORACIC SURGERY (CARDIOTHORACIC VASCULAR SURGERY)

## 2017-05-16 PROCEDURE — 93005 ELECTROCARDIOGRAM TRACING: CPT | Performed by: THORACIC SURGERY (CARDIOTHORACIC VASCULAR SURGERY)

## 2017-05-16 PROCEDURE — 82810 BLOOD GASES O2 SAT ONLY: CPT

## 2017-05-16 PROCEDURE — 10002800 HB RX 250 W HCPCS: Performed by: PHYSICIAN ASSISTANT

## 2017-05-16 PROCEDURE — 86901 BLOOD TYPING SEROLOGIC RH(D): CPT

## 2017-05-16 PROCEDURE — 10002803 HB RX 637: Performed by: PHYSICIAN ASSISTANT

## 2017-05-16 PROCEDURE — 84132 ASSAY OF SERUM POTASSIUM: CPT

## 2017-05-16 PROCEDURE — 10002800 HB RX 250 W HCPCS: Performed by: THORACIC SURGERY (CARDIOTHORACIC VASCULAR SURGERY)

## 2017-05-16 PROCEDURE — 99232 SBSQ HOSP IP/OBS MODERATE 35: CPT | Performed by: INTERNAL MEDICINE

## 2017-05-16 PROCEDURE — 10002807 HB RX 258: Performed by: THORACIC SURGERY (CARDIOTHORACIC VASCULAR SURGERY)

## 2017-05-16 PROCEDURE — 93010 ELECTROCARDIOGRAM REPORT: CPT | Performed by: INTERNAL MEDICINE

## 2017-05-16 PROCEDURE — 10002803 HB RX 637: Performed by: NURSE PRACTITIONER

## 2017-05-16 PROCEDURE — 80053 COMPREHEN METABOLIC PANEL: CPT

## 2017-05-16 PROCEDURE — 10000008 HB ROOM CHARGE ICU OR CCU

## 2017-05-16 PROCEDURE — P9047 ALBUMIN (HUMAN), 25%, 50ML: HCPCS | Performed by: NURSE PRACTITIONER

## 2017-05-16 PROCEDURE — 10002800 HB RX 250 W HCPCS: Performed by: NURSE PRACTITIONER

## 2017-05-16 PROCEDURE — 85610 PROTHROMBIN TIME: CPT

## 2017-05-16 PROCEDURE — 83735 ASSAY OF MAGNESIUM: CPT

## 2017-05-16 PROCEDURE — 10004173 HB COUNTER-THERAPY VISIT PT

## 2017-05-16 PROCEDURE — 10002801 HB RX 250 W/O HCPCS: Performed by: ANESTHESIOLOGY

## 2017-05-16 PROCEDURE — 85027 COMPLETE CBC AUTOMATED: CPT

## 2017-05-16 PROCEDURE — 85730 THROMBOPLASTIN TIME PARTIAL: CPT

## 2017-05-16 PROCEDURE — 10004172 HB COUNTER-THERAPY VISIT OT

## 2017-05-16 PROCEDURE — 10002807 HB RX 258: Performed by: INTERNAL MEDICINE

## 2017-05-16 PROCEDURE — 97530 THERAPEUTIC ACTIVITIES: CPT

## 2017-05-16 PROCEDURE — 82962 GLUCOSE BLOOD TEST: CPT

## 2017-05-16 PROCEDURE — 10002801 HB RX 250 W/O HCPCS: Performed by: PHYSICIAN ASSISTANT

## 2017-05-16 PROCEDURE — 97535 SELF CARE MNGMENT TRAINING: CPT

## 2017-05-16 PROCEDURE — 94640 AIRWAY INHALATION TREATMENT: CPT

## 2017-05-16 PROCEDURE — 10002803 HB RX 637: Performed by: INTERNAL MEDICINE

## 2017-05-16 RX ORDER — ALBUMIN (HUMAN) 12.5 G/50ML
25 SOLUTION INTRAVENOUS ONCE
Status: COMPLETED | OUTPATIENT
Start: 2017-05-16 | End: 2017-05-16

## 2017-05-16 RX ORDER — IPRATROPIUM BROMIDE AND ALBUTEROL SULFATE 2.5; .5 MG/3ML; MG/3ML
3 SOLUTION RESPIRATORY (INHALATION)
Status: DISCONTINUED | OUTPATIENT
Start: 2017-05-16 | End: 2017-05-20

## 2017-05-16 RX ADMIN — HYDRALAZINE HYDROCHLORIDE 20 MG: 20 INJECTION INTRAMUSCULAR; INTRAVENOUS at 18:53

## 2017-05-16 RX ADMIN — IPRATROPIUM BROMIDE AND ALBUTEROL SULFATE 3 ML: .5; 3 SOLUTION RESPIRATORY (INHALATION) at 15:38

## 2017-05-16 RX ADMIN — NYSTATIN: 100000 OINTMENT TOPICAL at 08:12

## 2017-05-16 RX ADMIN — POLYETHYLENE GLYCOL (3350) 17 G: 17 POWDER, FOR SOLUTION ORAL at 18:00

## 2017-05-16 RX ADMIN — NYSTATIN: 100000 OINTMENT TOPICAL at 15:00

## 2017-05-16 RX ADMIN — GUAIFENESIN 600 MG: 600 TABLET, EXTENDED RELEASE ORAL at 08:11

## 2017-05-16 RX ADMIN — IPRATROPIUM BROMIDE AND ALBUTEROL SULFATE 3 ML: .5; 3 SOLUTION RESPIRATORY (INHALATION) at 11:00

## 2017-05-16 RX ADMIN — NYSTATIN: 100000 OINTMENT TOPICAL at 20:43

## 2017-05-16 RX ADMIN — ALBUMIN HUMAN 25 G: 0.25 SOLUTION INTRAVENOUS at 14:46

## 2017-05-16 RX ADMIN — ASCORBIC ACID TAB 250 MG 250 MG: 250 TAB at 18:00

## 2017-05-16 RX ADMIN — GUAIFENESIN 600 MG: 600 TABLET, EXTENDED RELEASE ORAL at 20:42

## 2017-05-16 RX ADMIN — DEXTROSE AND SODIUM CHLORIDE: 5; 450 INJECTION, SOLUTION INTRAVENOUS at 04:29

## 2017-05-16 RX ADMIN — SODIUM CHLORIDE 0.5 MCG/KG/MIN: 9 INJECTION, SOLUTION INTRAVENOUS at 09:41

## 2017-05-16 RX ADMIN — POTASSIUM CHLORIDE 20 MEQ: 29.8 INJECTION, SOLUTION INTRAVENOUS at 11:27

## 2017-05-16 RX ADMIN — FAMOTIDINE 20 MG: 20 TABLET, FILM COATED ORAL at 20:41

## 2017-05-16 RX ADMIN — INSULIN HUMAN 3 UNITS/HR: 100 INJECTION, SOLUTION PARENTERAL at 16:51

## 2017-05-16 RX ADMIN — Medication 3 MG: at 20:42

## 2017-05-16 RX ADMIN — ASPIRIN 81 MG: 81 TABLET, CHEWABLE ORAL at 08:11

## 2017-05-16 RX ADMIN — IPRATROPIUM BROMIDE AND ALBUTEROL SULFATE 3 ML: .5; 3 SOLUTION RESPIRATORY (INHALATION) at 07:53

## 2017-05-16 RX ADMIN — FUROSEMIDE 2.5 MG/HR: 10 INJECTION, SOLUTION INTRAVENOUS at 04:25

## 2017-05-16 RX ADMIN — Medication 150 MG: at 18:00

## 2017-05-16 RX ADMIN — AMLODIPINE BESYLATE 2.5 MG: 2.5 TABLET ORAL at 08:11

## 2017-05-16 RX ADMIN — DEXTROSE AND SODIUM CHLORIDE: 5; 450 INJECTION, SOLUTION INTRAVENOUS at 04:30

## 2017-05-16 RX ADMIN — SENNA AND DOCUSATE SODIUM 2 TABLET: 50; 8.6 TABLET, FILM COATED ORAL at 08:11

## 2017-05-16 ASSESSMENT — ACTIVITIES OF DAILY LIVING (ADL)
BATHING: NEEDS ASSISTANCE
FEEDING: NEEDS ASSISTANCE
TOILETING: DEPENDENT
FEEDING: NEEDS ASSISTANCE
ADL_SCORE: 6
BATHING: NEEDS ASSISTANCE
DRESSING: NEEDS ASSISTANCE
ADL_SCORE: 6
TOILETING: NEEDS ASSISTANCE
ADL_SCORE: 6
DRESSING: NEEDS ASSISTANCE
DRESSING: NEEDS ASSISTANCE
DRESSING: DEPENDENT
BATHING: DEPENDENT
FEEDING: NEEDS ASSISTANCE
FEEDING: NEEDS ASSISTANCE
DRESSING: DEPENDENT
ADL_SCORE: 3
BATHING: NEEDS ASSISTANCE
TOILETING: DEPENDENT
BATHING: DEPENDENT
TOILETING: NEEDS ASSISTANCE
TOILETING: NEEDS ASSISTANCE
ADL_SCORE: 3
FEEDING: NEEDS ASSISTANCE

## 2017-05-16 ASSESSMENT — PAIN SCALES - GENERAL
PAIN_LEVEL_AT_REST: 0
PAIN_LEVEL_AT_REST: 8
PAIN_LEVEL_WITH_ACTIVITY: 0
PAIN_LEVEL_AT_REST: 0
PAIN_LEVEL_WITH_ACTIVITY: 6
PAIN_LEVEL_AT_REST: 0
PAIN_LEVEL_AT_REST: 0

## 2017-05-17 LAB
ALBUMIN SERPL-MCNC: 1.6 G/DL (ref 3.6–5.1)
ALBUMIN/GLOB SERPL: 0.7 {RATIO} (ref 1–2.4)
ALP SERPL-CCNC: 91 UNITS/L (ref 45–117)
ALT SERPL-CCNC: 155 UNITS/L
ANALYZER ANC (IANC): NORMAL K/MCL (ref 1.8–7.7)
ANION GAP SERPL CALC-SCNC: 14 MMOL/L (ref 10–20)
ANION GAP SERPL CALC-SCNC: 15 MMOL/L (ref 10–20)
APTT PPP: 59 SEC (ref 22–30)
AST SERPL-CCNC: 136 UNITS/L
BILIRUB SERPL-MCNC: 0.6 MG/DL (ref 0.2–1)
BUN SERPL-MCNC: 63 MG/DL (ref 6–20)
BUN SERPL-MCNC: 92 MG/DL (ref 6–20)
BUN/CREAT SERPL: 52 (ref 7–25)
BUN/CREAT SERPL: 57 (ref 7–25)
CA-I ADJ PH7.4 SERPL-SCNC: 1.18 MMOL/L (ref 1.15–1.29)
CA-I SERPL-SCNC: 1.19 MMOL/L (ref 1.15–1.29)
CALCIUM SERPL-MCNC: 5.1 MG/DL (ref 8.4–10.2)
CALCIUM SERPL-MCNC: 8.1 MG/DL (ref 8.4–10.2)
CHLORIDE SERPL-SCNC: 103 MMOL/L (ref 98–107)
CHLORIDE SERPL-SCNC: 115 MMOL/L (ref 98–107)
CO2 SERPL-SCNC: 15 MMOL/L (ref 21–32)
CO2 SERPL-SCNC: 22 MMOL/L (ref 21–32)
CREAT SERPL-MCNC: 1.11 MG/DL (ref 0.51–0.95)
CREAT SERPL-MCNC: 1.76 MG/DL (ref 0.51–0.95)
DEPRECATED RDW RBC AUTO: NORMAL FL (ref 39–50)
DEPRECATED SRA 0.1U/ML PORCINE HEPARIN S: 68 %
DEPRECATED SRA 100U/ML PORCINE HEPARIN S: 2 %
ERYTHROCYTE [DISTWIDTH] IN BLOOD: 14.6 % (ref 11–15)
ERYTHROCYTE [DISTWIDTH] IN BLOOD: NORMAL % (ref 11–15)
GLOBULIN SER-MCNC: 2.2 G/DL (ref 2–4)
GLUCOSE BLDC GLUCOMTR-MCNC: 104 MG/DL (ref 65–99)
GLUCOSE BLDC GLUCOMTR-MCNC: 113 MG/DL (ref 65–99)
GLUCOSE BLDC GLUCOMTR-MCNC: 113 MG/DL (ref 65–99)
GLUCOSE BLDC GLUCOMTR-MCNC: 114 MG/DL (ref 65–99)
GLUCOSE BLDC GLUCOMTR-MCNC: 116 MG/DL (ref 65–99)
GLUCOSE BLDC GLUCOMTR-MCNC: 120 MG/DL (ref 65–99)
GLUCOSE BLDC GLUCOMTR-MCNC: 125 MG/DL (ref 65–99)
GLUCOSE BLDC GLUCOMTR-MCNC: 151 MG/DL (ref 65–99)
GLUCOSE BLDC GLUCOMTR-MCNC: 219 MG/DL (ref 65–99)
GLUCOSE BLDC GLUCOMTR-MCNC: 223 MG/DL (ref 65–99)
GLUCOSE BLDC GLUCOMTR-MCNC: 54 MG/DL (ref 65–99)
GLUCOSE BLDC GLUCOMTR-MCNC: 77 MG/DL (ref 65–99)
GLUCOSE BLDC GLUCOMTR-MCNC: 81 MG/DL (ref 65–99)
GLUCOSE BLDC GLUCOMTR-MCNC: 90 MG/DL (ref 65–99)
GLUCOSE BLDC GLUCOMTR-MCNC: 90 MG/DL (ref 65–99)
GLUCOSE BLDC GLUCOMTR-MCNC: 93 MG/DL (ref 65–99)
GLUCOSE BLDC GLUCOMTR-MCNC: 99 MG/DL (ref 65–99)
GLUCOSE SERPL-MCNC: 106 MG/DL (ref 65–99)
GLUCOSE SERPL-MCNC: 78 MG/DL (ref 65–99)
HCT VFR BLD CALC: 27 % (ref 36–46.5)
HCT VFR BLD CALC: NORMAL % (ref 36–46.5)
HGB BLD-MCNC: 9 G/DL (ref 12–15.5)
HGB BLD-MCNC: NORMAL G/DL (ref 12–15.5)
INR PPP: 10.3
INR PPP: 2.5
MCH RBC QN AUTO: 30.3 PG (ref 26–34)
MCH RBC QN AUTO: NORMAL PG (ref 26–34)
MCHC RBC AUTO-ENTMCNC: 33.3 G/DL (ref 32–36.5)
MCHC RBC AUTO-ENTMCNC: NORMAL G/DL (ref 32–36.5)
MCV RBC AUTO: 90.9 FL (ref 78–100)
MCV RBC AUTO: NORMAL FL (ref 78–100)
PLATELET # BLD: 162 K/MCL (ref 140–450)
PLATELET # BLD: NORMAL K/MCL (ref 140–450)
POTASSIUM SERPL-SCNC: 2.7 MMOL/L (ref 3.4–5.1)
POTASSIUM SERPL-SCNC: 4.4 MMOL/L (ref 3.4–5.1)
PROT SERPL-MCNC: 3.8 G/DL (ref 6.4–8.2)
PROTHROMBIN TIME: 109.4 SEC (ref 9.7–11.8)
PROTHROMBIN TIME: 27.2 SEC (ref 9.7–11.8)
RBC # BLD: 2.97 MIL/MCL (ref 4–5.2)
RBC # BLD: NORMAL MIL/MCL (ref 4–5.2)
SODIUM SERPL-SCNC: 135 MMOL/L (ref 135–145)
SODIUM SERPL-SCNC: 142 MMOL/L (ref 135–145)
SRA PORCINE INTERP SER-IMP: NORMAL
WBC # BLD: 15.4 K/MCL (ref 4.2–11)
WBC # BLD: NORMAL K/MCL (ref 4.2–11)

## 2017-05-17 PROCEDURE — 99233 SBSQ HOSP IP/OBS HIGH 50: CPT | Performed by: INTERNAL MEDICINE

## 2017-05-17 PROCEDURE — 99251 INPATIENT CONSULT LEVEL ONE: CPT | Performed by: INTERNAL MEDICINE

## 2017-05-17 PROCEDURE — 10002016 HB COUNTER INCENTIVE SPIROMETRY

## 2017-05-17 PROCEDURE — 10004651 HB RX, NO CHARGE ITEM: Performed by: PHYSICIAN ASSISTANT

## 2017-05-17 PROCEDURE — 85610 PROTHROMBIN TIME: CPT

## 2017-05-17 PROCEDURE — 97530 THERAPEUTIC ACTIVITIES: CPT

## 2017-05-17 PROCEDURE — 94640 AIRWAY INHALATION TREATMENT: CPT

## 2017-05-17 PROCEDURE — 85027 COMPLETE CBC AUTOMATED: CPT

## 2017-05-17 PROCEDURE — 10002800 HB RX 250 W HCPCS: Performed by: INTERNAL MEDICINE

## 2017-05-17 PROCEDURE — 97116 GAIT TRAINING THERAPY: CPT

## 2017-05-17 PROCEDURE — 10002803 HB RX 637: Performed by: PHYSICIAN ASSISTANT

## 2017-05-17 PROCEDURE — 10000008 HB ROOM CHARGE ICU OR CCU

## 2017-05-17 PROCEDURE — 80048 BASIC METABOLIC PNL TOTAL CA: CPT

## 2017-05-17 PROCEDURE — 82330 ASSAY OF CALCIUM: CPT

## 2017-05-17 PROCEDURE — 85730 THROMBOPLASTIN TIME PARTIAL: CPT

## 2017-05-17 PROCEDURE — 80053 COMPREHEN METABOLIC PANEL: CPT

## 2017-05-17 PROCEDURE — 10002803 HB RX 637: Performed by: NURSE PRACTITIONER

## 2017-05-17 PROCEDURE — 10004172 HB COUNTER-THERAPY VISIT OT

## 2017-05-17 PROCEDURE — 82962 GLUCOSE BLOOD TEST: CPT

## 2017-05-17 PROCEDURE — 97535 SELF CARE MNGMENT TRAINING: CPT

## 2017-05-17 PROCEDURE — 99232 SBSQ HOSP IP/OBS MODERATE 35: CPT | Performed by: INTERNAL MEDICINE

## 2017-05-17 PROCEDURE — 10002803 HB RX 637: Performed by: THORACIC SURGERY (CARDIOTHORACIC VASCULAR SURGERY)

## 2017-05-17 PROCEDURE — 10004173 HB COUNTER-THERAPY VISIT PT

## 2017-05-17 PROCEDURE — 10002803 HB RX 637: Performed by: INTERNAL MEDICINE

## 2017-05-17 PROCEDURE — 10002801 HB RX 250 W/O HCPCS: Performed by: PHYSICIAN ASSISTANT

## 2017-05-17 PROCEDURE — 10002800 HB RX 250 W HCPCS: Performed by: PHYSICIAN ASSISTANT

## 2017-05-17 PROCEDURE — 10002807 HB RX 258: Performed by: INTERNAL MEDICINE

## 2017-05-17 RX ORDER — INSULIN GLARGINE 100 [IU]/ML
20 INJECTION, SOLUTION SUBCUTANEOUS NIGHTLY
Status: DISCONTINUED | OUTPATIENT
Start: 2017-05-17 | End: 2017-05-17

## 2017-05-17 RX ORDER — WARFARIN SODIUM 1 MG/1
1 TABLET ORAL ONCE
Status: DISCONTINUED | OUTPATIENT
Start: 2017-05-17 | End: 2017-05-18

## 2017-05-17 RX ORDER — INSULIN GLARGINE 100 [IU]/ML
20 INJECTION, SOLUTION SUBCUTANEOUS DAILY
Status: DISCONTINUED | OUTPATIENT
Start: 2017-05-17 | End: 2017-05-18

## 2017-05-17 RX ADMIN — GUAIFENESIN 600 MG: 600 TABLET, EXTENDED RELEASE ORAL at 22:00

## 2017-05-17 RX ADMIN — FAMOTIDINE 20 MG: 20 TABLET, FILM COATED ORAL at 22:00

## 2017-05-17 RX ADMIN — GUAIFENESIN 600 MG: 600 TABLET, EXTENDED RELEASE ORAL at 08:47

## 2017-05-17 RX ADMIN — INSULIN LISPRO 4 UNITS: 100 INJECTION, SOLUTION INTRAVENOUS; SUBCUTANEOUS at 14:25

## 2017-05-17 RX ADMIN — POLYETHYLENE GLYCOL (3350) 17 G: 17 POWDER, FOR SOLUTION ORAL at 17:24

## 2017-05-17 RX ADMIN — DEXTROSE AND SODIUM CHLORIDE: 5; 450 INJECTION, SOLUTION INTRAVENOUS at 04:21

## 2017-05-17 RX ADMIN — ASPIRIN 81 MG: 81 TABLET, CHEWABLE ORAL at 08:47

## 2017-05-17 RX ADMIN — AMLODIPINE BESYLATE 2.5 MG: 2.5 TABLET ORAL at 08:47

## 2017-05-17 RX ADMIN — NYSTATIN: 100000 OINTMENT TOPICAL at 14:26

## 2017-05-17 RX ADMIN — SODIUM CHLORIDE 0.5 MCG/KG/MIN: 9 INJECTION, SOLUTION INTRAVENOUS at 05:58

## 2017-05-17 RX ADMIN — NYSTATIN: 100000 OINTMENT TOPICAL at 22:00

## 2017-05-17 RX ADMIN — Medication 150 MG: at 17:24

## 2017-05-17 RX ADMIN — DEXTROSE AND SODIUM CHLORIDE: 5; 450 INJECTION, SOLUTION INTRAVENOUS at 04:22

## 2017-05-17 RX ADMIN — NYSTATIN: 100000 OINTMENT TOPICAL at 08:47

## 2017-05-17 RX ADMIN — ASCORBIC ACID TAB 250 MG 250 MG: 250 TAB at 17:24

## 2017-05-17 RX ADMIN — POLYETHYLENE GLYCOL (3350) 17 G: 17 POWDER, FOR SOLUTION ORAL at 08:47

## 2017-05-17 RX ADMIN — Medication 3 MG: at 22:00

## 2017-05-17 RX ADMIN — SENNA AND DOCUSATE SODIUM 2 TABLET: 50; 8.6 TABLET, FILM COATED ORAL at 08:47

## 2017-05-17 RX ADMIN — POTASSIUM CHLORIDE 20 MEQ: 29.8 INJECTION, SOLUTION INTRAVENOUS at 05:19

## 2017-05-17 RX ADMIN — INSULIN LISPRO 4 UNITS: 100 INJECTION, SOLUTION INTRAVENOUS; SUBCUTANEOUS at 08:48

## 2017-05-17 RX ADMIN — INSULIN GLARGINE 20 UNITS: 100 INJECTION, SOLUTION SUBCUTANEOUS at 10:00

## 2017-05-17 ASSESSMENT — ACTIVITIES OF DAILY LIVING (ADL)
TOILETING: NEEDS ASSISTANCE
TOILETING: NEEDS ASSISTANCE
DRESSING: NEEDS ASSISTANCE
DRESSING: NEEDS ASSISTANCE
BATHING: DEPENDENT
FEEDING: INDEPENDENT
FEEDING: INDEPENDENT
DRESSING: DEPENDENT
BATHING: NEEDS ASSISTANCE
ADL_SCORE: 5
FEEDING: INDEPENDENT
ADL_SCORE: 7
BATHING: NEEDS ASSISTANCE
FEEDING: INDEPENDENT
TOILETING: NEEDS ASSISTANCE
ADL_SCORE: 7
ADL_SCORE: 7
TOILETING: NEEDS ASSISTANCE
BATHING: NEEDS ASSISTANCE
DRESSING: NEEDS ASSISTANCE

## 2017-05-17 ASSESSMENT — PAIN SCALES - GENERAL
PAIN_LEVEL_AT_REST: 0
PAIN_LEVEL_AT_REST: 0
PAIN_LEVEL_WITH_ACTIVITY: 0

## 2017-05-18 ENCOUNTER — APPOINTMENT (OUTPATIENT)
Dept: CV DIAGNOSTICS | Age: 57
DRG: 219 | End: 2017-05-18
Attending: NURSE PRACTITIONER

## 2017-05-18 LAB
ALBUMIN SERPL-MCNC: 2.4 G/DL (ref 3.6–5.1)
ALBUMIN/GLOB SERPL: 0.7 {RATIO} (ref 1–2.4)
ALP SERPL-CCNC: 133 UNITS/L (ref 45–117)
ALT SERPL-CCNC: 213 UNITS/L
ANION GAP SERPL CALC-SCNC: 14 MMOL/L (ref 10–20)
APTT PPP: 46 SEC (ref 22–30)
AST SERPL-CCNC: 133 UNITS/L
BASOPHILS # BLD AUTO: 0 K/MCL (ref 0–0.3)
BASOPHILS NFR BLD AUTO: 0 %
BILIRUB SERPL-MCNC: 0.8 MG/DL (ref 0.2–1)
BUN SERPL-MCNC: 80 MG/DL (ref 6–20)
BUN/CREAT SERPL: 54 (ref 7–25)
CALCIUM SERPL-MCNC: 8.3 MG/DL (ref 8.4–10.2)
CHLORIDE SERPL-SCNC: 103 MMOL/L (ref 98–107)
CO2 SERPL-SCNC: 23 MMOL/L (ref 21–32)
CREAT SERPL-MCNC: 1.47 MG/DL (ref 0.51–0.95)
DIFFERENTIAL METHOD BLD: ABNORMAL
EOSINOPHIL # BLD AUTO: 0.3 K/MCL (ref 0.1–0.5)
EOSINOPHIL NFR SPEC: 2 %
ERYTHROCYTE [DISTWIDTH] IN BLOOD: 14.6 % (ref 11–15)
GLOBULIN SER-MCNC: 3.5 G/DL (ref 2–4)
GLUCOSE BLDC GLUCOMTR-MCNC: 120 MG/DL (ref 65–99)
GLUCOSE BLDC GLUCOMTR-MCNC: 240 MG/DL (ref 65–99)
GLUCOSE BLDC GLUCOMTR-MCNC: 262 MG/DL (ref 65–99)
GLUCOSE SERPL-MCNC: 101 MG/DL (ref 65–99)
HCT VFR BLD CALC: 29.1 % (ref 36–46.5)
HGB BLD-MCNC: 9.4 G/DL (ref 12–15.5)
INR PPP: 2.2
LYMPHOCYTES # BLD MANUAL: 1.5 K/MCL (ref 1–4)
LYMPHOCYTES NFR BLD MANUAL: 10 %
MCH RBC QN AUTO: 29.5 PG (ref 26–34)
MCHC RBC AUTO-ENTMCNC: 32.3 G/DL (ref 32–36.5)
MCV RBC AUTO: 91.2 FL (ref 78–100)
MONOCYTES # BLD MANUAL: 1.1 K/MCL (ref 0.3–0.9)
MONOCYTES NFR BLD MANUAL: 7 %
NEUTROPHILS # BLD AUTO: 12 K/MCL (ref 1.8–7.7)
NEUTROPHILS NFR BLD AUTO: 81 %
PLATELET # BLD: 196 K/MCL (ref 140–450)
POTASSIUM SERPL-SCNC: 4.2 MMOL/L (ref 3.4–5.1)
PROT SERPL-MCNC: 5.9 G/DL (ref 6.4–8.2)
PROTHROMBIN TIME: 23.7 SEC (ref 9.7–11.8)
RBC # BLD: 3.19 MIL/MCL (ref 4–5.2)
SODIUM SERPL-SCNC: 136 MMOL/L (ref 135–145)
WBC # BLD: 14.8 K/MCL (ref 4.2–11)

## 2017-05-18 PROCEDURE — 10002803 HB RX 637: Performed by: THORACIC SURGERY (CARDIOTHORACIC VASCULAR SURGERY)

## 2017-05-18 PROCEDURE — 97530 THERAPEUTIC ACTIVITIES: CPT

## 2017-05-18 PROCEDURE — 10000008 HB ROOM CHARGE ICU OR CCU

## 2017-05-18 PROCEDURE — 10004651 HB RX, NO CHARGE ITEM: Performed by: PHYSICIAN ASSISTANT

## 2017-05-18 PROCEDURE — 10002803 HB RX 637: Performed by: PHYSICIAN ASSISTANT

## 2017-05-18 PROCEDURE — 85610 PROTHROMBIN TIME: CPT

## 2017-05-18 PROCEDURE — 85730 THROMBOPLASTIN TIME PARTIAL: CPT

## 2017-05-18 PROCEDURE — 10002800 HB RX 250 W HCPCS: Performed by: INTERNAL MEDICINE

## 2017-05-18 PROCEDURE — 94640 AIRWAY INHALATION TREATMENT: CPT

## 2017-05-18 PROCEDURE — 10002803 HB RX 637: Performed by: INTERNAL MEDICINE

## 2017-05-18 PROCEDURE — 10002807 HB RX 258: Performed by: INTERNAL MEDICINE

## 2017-05-18 PROCEDURE — 93306 TTE W/DOPPLER COMPLETE: CPT | Performed by: INTERNAL MEDICINE

## 2017-05-18 PROCEDURE — 99231 SBSQ HOSP IP/OBS SF/LOW 25: CPT | Performed by: INTERNAL MEDICINE

## 2017-05-18 PROCEDURE — 99233 SBSQ HOSP IP/OBS HIGH 50: CPT | Performed by: INTERNAL MEDICINE

## 2017-05-18 PROCEDURE — 85025 COMPLETE CBC W/AUTO DIFF WBC: CPT

## 2017-05-18 PROCEDURE — 99232 SBSQ HOSP IP/OBS MODERATE 35: CPT | Performed by: INTERNAL MEDICINE

## 2017-05-18 PROCEDURE — 82962 GLUCOSE BLOOD TEST: CPT

## 2017-05-18 PROCEDURE — 10004325 HB COUNTER ASSESSMENT EA 15 MIN

## 2017-05-18 PROCEDURE — 10004173 HB COUNTER-THERAPY VISIT PT

## 2017-05-18 PROCEDURE — 10002803 HB RX 637: Performed by: NURSE PRACTITIONER

## 2017-05-18 PROCEDURE — 10002801 HB RX 250 W/O HCPCS: Performed by: PHYSICIAN ASSISTANT

## 2017-05-18 PROCEDURE — 97116 GAIT TRAINING THERAPY: CPT

## 2017-05-18 PROCEDURE — 80053 COMPREHEN METABOLIC PANEL: CPT

## 2017-05-18 RX ORDER — INSULIN GLARGINE 100 [IU]/ML
25 INJECTION, SOLUTION SUBCUTANEOUS DAILY
Status: DISCONTINUED | OUTPATIENT
Start: 2017-05-19 | End: 2017-05-19

## 2017-05-18 RX ORDER — INSULIN LISPRO 100 [IU]/ML
6 INJECTION, SOLUTION INTRAVENOUS; SUBCUTANEOUS
Status: DISCONTINUED | OUTPATIENT
Start: 2017-05-18 | End: 2017-05-19

## 2017-05-18 RX ORDER — LIDOCAINE AND PRILOCAINE 25; 25 MG/G; MG/G
1 CREAM TOPICAL ONCE
Status: COMPLETED | OUTPATIENT
Start: 2017-05-18 | End: 2017-05-18

## 2017-05-18 RX ORDER — WARFARIN SODIUM 1 MG/1
1 TABLET ORAL ONCE
Status: DISCONTINUED | OUTPATIENT
Start: 2017-05-18 | End: 2017-05-18

## 2017-05-18 RX ORDER — CARVEDILOL 3.12 MG/1
3.12 TABLET ORAL 2 TIMES DAILY WITH MEALS
Status: DISCONTINUED | OUTPATIENT
Start: 2017-05-18 | End: 2017-05-18

## 2017-05-18 RX ORDER — AMLODIPINE BESYLATE 5 MG/1
5 TABLET ORAL DAILY
Status: DISCONTINUED | OUTPATIENT
Start: 2017-05-19 | End: 2017-05-31 | Stop reason: HOSPADM

## 2017-05-18 RX ORDER — WARFARIN SODIUM 5 MG/1
5 TABLET ORAL ONCE
Status: COMPLETED | OUTPATIENT
Start: 2017-05-18 | End: 2017-05-18

## 2017-05-18 RX ORDER — HYDRALAZINE HYDROCHLORIDE 20 MG/ML
10-20 INJECTION INTRAMUSCULAR; INTRAVENOUS EVERY 6 HOURS PRN
Status: DISCONTINUED | OUTPATIENT
Start: 2017-05-18 | End: 2017-05-31 | Stop reason: HOSPADM

## 2017-05-18 RX ORDER — FOLIC ACID 1 MG/1
1 TABLET ORAL DAILY
Status: DISCONTINUED | OUTPATIENT
Start: 2017-05-18 | End: 2017-05-31 | Stop reason: HOSPADM

## 2017-05-18 RX ORDER — AMLODIPINE BESYLATE 2.5 MG/1
2.5 TABLET ORAL ONCE
Status: COMPLETED | OUTPATIENT
Start: 2017-05-18 | End: 2017-05-18

## 2017-05-18 RX ADMIN — GUAIFENESIN 600 MG: 600 TABLET, EXTENDED RELEASE ORAL at 20:40

## 2017-05-18 RX ADMIN — LIDOCAINE AND PRILOCAINE 1 APPLICATION.: 25; 25 CREAM TOPICAL at 14:16

## 2017-05-18 RX ADMIN — INSULIN GLARGINE 20 UNITS: 100 INJECTION, SOLUTION SUBCUTANEOUS at 08:33

## 2017-05-18 RX ADMIN — DEXTROSE AND SODIUM CHLORIDE: 5; 450 INJECTION, SOLUTION INTRAVENOUS at 06:38

## 2017-05-18 RX ADMIN — FAMOTIDINE 20 MG: 20 TABLET, FILM COATED ORAL at 20:40

## 2017-05-18 RX ADMIN — ASCORBIC ACID TAB 250 MG 250 MG: 250 TAB at 17:40

## 2017-05-18 RX ADMIN — Medication 3 MG: at 20:40

## 2017-05-18 RX ADMIN — INSULIN LISPRO 6 UNITS: 100 INJECTION, SOLUTION INTRAVENOUS; SUBCUTANEOUS at 19:15

## 2017-05-18 RX ADMIN — Medication 150 MG: at 17:39

## 2017-05-18 RX ADMIN — SODIUM CHLORIDE 0.5 MCG/KG/MIN: 9 INJECTION, SOLUTION INTRAVENOUS at 06:37

## 2017-05-18 RX ADMIN — NYSTATIN: 100000 OINTMENT TOPICAL at 13:00

## 2017-05-18 RX ADMIN — ASPIRIN 81 MG: 81 TABLET, CHEWABLE ORAL at 08:32

## 2017-05-18 RX ADMIN — AMLODIPINE BESYLATE 2.5 MG: 2.5 TABLET ORAL at 08:32

## 2017-05-18 RX ADMIN — SENNA AND DOCUSATE SODIUM 2 TABLET: 50; 8.6 TABLET, FILM COATED ORAL at 08:32

## 2017-05-18 RX ADMIN — INSULIN LISPRO 6 UNITS: 100 INJECTION, SOLUTION INTRAVENOUS; SUBCUTANEOUS at 13:49

## 2017-05-18 RX ADMIN — NYSTATIN: 100000 OINTMENT TOPICAL at 08:34

## 2017-05-18 RX ADMIN — NYSTATIN: 100000 OINTMENT TOPICAL at 20:40

## 2017-05-18 RX ADMIN — AMLODIPINE BESYLATE 2.5 MG: 2.5 TABLET ORAL at 11:00

## 2017-05-18 RX ADMIN — FOLIC ACID 1 MG: 1 TABLET ORAL at 12:49

## 2017-05-18 RX ADMIN — Medication 1 EACH: at 17:30

## 2017-05-18 RX ADMIN — GUAIFENESIN 600 MG: 600 TABLET, EXTENDED RELEASE ORAL at 08:32

## 2017-05-18 RX ADMIN — WARFARIN SODIUM 5 MG: 5 TABLET ORAL at 17:40

## 2017-05-18 ASSESSMENT — ACTIVITIES OF DAILY LIVING (ADL)
TOILETING: NEEDS ASSISTANCE
ADL_SCORE: 7
FEEDING: INDEPENDENT
DRESSING: NEEDS ASSISTANCE
TOILETING: NEEDS ASSISTANCE
BATHING: NEEDS ASSISTANCE
DRESSING: NEEDS ASSISTANCE
TOILETING: NEEDS ASSISTANCE
BATHING: NEEDS ASSISTANCE
BATHING: NEEDS ASSISTANCE
ADL_SCORE: 7
TOILETING: NEEDS ASSISTANCE
DRESSING: NEEDS ASSISTANCE
BATHING: NEEDS ASSISTANCE
ADL_SCORE: 7
FEEDING: INDEPENDENT
DRESSING: NEEDS ASSISTANCE
FEEDING: INDEPENDENT
TOILETING: NEEDS ASSISTANCE
BATHING: NEEDS ASSISTANCE
DRESSING: NEEDS ASSISTANCE
FEEDING: INDEPENDENT
DRESSING: NEEDS ASSISTANCE
BATHING: NEEDS ASSISTANCE
FEEDING: INDEPENDENT
TOILETING: NEEDS ASSISTANCE
FEEDING: INDEPENDENT
ADL_SCORE: 7

## 2017-05-18 ASSESSMENT — PAIN SCALES - GENERAL
PAIN_LEVEL_AT_REST: 0
PAIN_LEVEL_AT_REST: 1
PAIN_LEVEL_AT_REST: 2
PAIN_LEVEL_AT_REST: 5
PAIN_LEVEL_AT_REST: 2
PAIN_LEVEL_AT_REST: 0
PAIN_LEVEL_AT_REST: 1

## 2017-05-18 ASSESSMENT — ENCOUNTER SYMPTOMS
SUBJECTIVE PATIENT PAIN CONTROL: WELL CONTROLLED
DIETARY ISSUES: ADEQUATE INTAKE
DIARRHEA: 0
PAIN SEVERITY NOW: MILD
FEVER: 0
NAUSEA: 0
SHORTNESS OF BREATH: 0
WEAKNESS: 1
VOMITING: 0
COUGH: 0

## 2017-05-19 LAB
ALBUMIN SERPL-MCNC: 2.2 G/DL (ref 3.6–5.1)
ALBUMIN/GLOB SERPL: 0.6 {RATIO} (ref 1–2.4)
ALP SERPL-CCNC: 118 UNITS/L (ref 45–117)
ALT SERPL-CCNC: 157 UNITS/L
ANION GAP SERPL CALC-SCNC: 14 MMOL/L (ref 10–20)
APTT PPP: 43 SEC (ref 22–30)
AST SERPL-CCNC: 90 UNITS/L
BASOPHILS # BLD AUTO: 0 K/MCL (ref 0–0.3)
BASOPHILS NFR BLD AUTO: 0 %
BILIRUB SERPL-MCNC: 0.7 MG/DL (ref 0.2–1)
BUN SERPL-MCNC: 81 MG/DL (ref 6–20)
BUN/CREAT SERPL: 59 (ref 7–25)
CALCIUM SERPL-MCNC: 7.8 MG/DL (ref 8.4–10.2)
CHLORIDE SERPL-SCNC: 104 MMOL/L (ref 98–107)
CO2 SERPL-SCNC: 22 MMOL/L (ref 21–32)
CREAT SERPL-MCNC: 1.37 MG/DL (ref 0.51–0.95)
DIFFERENTIAL METHOD BLD: ABNORMAL
EOSINOPHIL # BLD AUTO: 0.3 K/MCL (ref 0.1–0.5)
EOSINOPHIL NFR SPEC: 2 %
ERYTHROCYTE [DISTWIDTH] IN BLOOD: 14.7 % (ref 11–15)
GLOBULIN SER-MCNC: 3.9 G/DL (ref 2–4)
GLUCOSE BLDC GLUCOMTR-MCNC: 190 MG/DL (ref 65–99)
GLUCOSE BLDC GLUCOMTR-MCNC: 206 MG/DL (ref 65–99)
GLUCOSE BLDC GLUCOMTR-MCNC: 217 MG/DL (ref 65–99)
GLUCOSE SERPL-MCNC: 220 MG/DL (ref 65–99)
HCT VFR BLD CALC: 27.6 % (ref 36–46.5)
HGB BLD-MCNC: 9 G/DL (ref 12–15.5)
INR PPP: 1.3
INR PPP: 1.9
LYMPHOCYTES # BLD MANUAL: 1.3 K/MCL (ref 1–4)
LYMPHOCYTES NFR BLD MANUAL: 10 %
MCH RBC QN AUTO: 29.9 PG (ref 26–34)
MCHC RBC AUTO-ENTMCNC: 32.6 G/DL (ref 32–36.5)
MCV RBC AUTO: 91.7 FL (ref 78–100)
MONOCYTES # BLD MANUAL: 0.9 K/MCL (ref 0.3–0.9)
MONOCYTES NFR BLD MANUAL: 7 %
NEUTROPHILS # BLD AUTO: 10.4 K/MCL (ref 1.8–7.7)
NEUTROPHILS NFR BLD AUTO: 81 %
NRBC BLD MANUAL-RTO: 0 %
PLATELET # BLD: 229 K/MCL (ref 140–450)
POTASSIUM SERPL-SCNC: 4 MMOL/L (ref 3.4–5.1)
POTASSIUM SERPL-SCNC: 5.5 MMOL/L (ref 3.4–5.1)
PROT SERPL-MCNC: 6.1 G/DL (ref 6.4–8.2)
PROTHROMBIN TIME: 14.4 SEC (ref 9.7–11.8)
PROTHROMBIN TIME: 20.5 SEC (ref 9.7–11.8)
RBC # BLD: 3.01 MIL/MCL (ref 4–5.2)
SODIUM SERPL-SCNC: 134 MMOL/L (ref 135–145)
WBC # BLD: 12.9 K/MCL (ref 4.2–11)

## 2017-05-19 PROCEDURE — 10002800 HB RX 250 W HCPCS: Performed by: INTERNAL MEDICINE

## 2017-05-19 PROCEDURE — 10002807 HB RX 258: Performed by: INTERNAL MEDICINE

## 2017-05-19 PROCEDURE — 10000002 HB ROOM CHARGE MED SURG

## 2017-05-19 PROCEDURE — 10002807 HB RX 258: Performed by: NURSE PRACTITIONER

## 2017-05-19 PROCEDURE — 10002016 HB COUNTER INCENTIVE SPIROMETRY

## 2017-05-19 PROCEDURE — 85025 COMPLETE CBC W/AUTO DIFF WBC: CPT

## 2017-05-19 PROCEDURE — 99231 SBSQ HOSP IP/OBS SF/LOW 25: CPT | Performed by: INTERNAL MEDICINE

## 2017-05-19 PROCEDURE — 82962 GLUCOSE BLOOD TEST: CPT

## 2017-05-19 PROCEDURE — 10002801 HB RX 250 W/O HCPCS: Performed by: PHYSICIAN ASSISTANT

## 2017-05-19 PROCEDURE — 10002801 HB RX 250 W/O HCPCS: Performed by: NURSE PRACTITIONER

## 2017-05-19 PROCEDURE — 10002800 HB RX 250 W HCPCS: Performed by: NURSE PRACTITIONER

## 2017-05-19 PROCEDURE — 94640 AIRWAY INHALATION TREATMENT: CPT

## 2017-05-19 PROCEDURE — 84132 ASSAY OF SERUM POTASSIUM: CPT

## 2017-05-19 PROCEDURE — 99233 SBSQ HOSP IP/OBS HIGH 50: CPT | Performed by: INTERNAL MEDICINE

## 2017-05-19 PROCEDURE — 85730 THROMBOPLASTIN TIME PARTIAL: CPT

## 2017-05-19 PROCEDURE — 10004651 HB RX, NO CHARGE ITEM: Performed by: PHYSICIAN ASSISTANT

## 2017-05-19 PROCEDURE — 10002803 HB RX 637: Performed by: INTERNAL MEDICINE

## 2017-05-19 PROCEDURE — 85610 PROTHROMBIN TIME: CPT

## 2017-05-19 PROCEDURE — 99232 SBSQ HOSP IP/OBS MODERATE 35: CPT | Performed by: INTERNAL MEDICINE

## 2017-05-19 PROCEDURE — 10004651 HB RX, NO CHARGE ITEM: Performed by: THORACIC SURGERY (CARDIOTHORACIC VASCULAR SURGERY)

## 2017-05-19 PROCEDURE — 10002803 HB RX 637: Performed by: PHYSICIAN ASSISTANT

## 2017-05-19 PROCEDURE — 80053 COMPREHEN METABOLIC PANEL: CPT

## 2017-05-19 PROCEDURE — 10003441 HB CARDIAC REHAB PHASE 1

## 2017-05-19 PROCEDURE — 10002803 HB RX 637: Performed by: NURSE PRACTITIONER

## 2017-05-19 PROCEDURE — 10003445 HB TELEMETRY PER DAY

## 2017-05-19 PROCEDURE — 10002803 HB RX 637: Performed by: THORACIC SURGERY (CARDIOTHORACIC VASCULAR SURGERY)

## 2017-05-19 RX ORDER — MAGNESIUM SULFATE 1 G/100ML
1 INJECTION INTRAVENOUS DAILY PRN
Status: DISCONTINUED | OUTPATIENT
Start: 2017-05-19 | End: 2017-05-31 | Stop reason: HOSPADM

## 2017-05-19 RX ORDER — CARVEDILOL 3.12 MG/1
3.12 TABLET ORAL 2 TIMES DAILY WITH MEALS
Status: DISCONTINUED | OUTPATIENT
Start: 2017-05-19 | End: 2017-05-31 | Stop reason: HOSPADM

## 2017-05-19 RX ORDER — INSULIN LISPRO 100 [IU]/ML
10 INJECTION, SOLUTION INTRAVENOUS; SUBCUTANEOUS
Status: DISCONTINUED | OUTPATIENT
Start: 2017-05-19 | End: 2017-05-21

## 2017-05-19 RX ORDER — POTASSIUM CHLORIDE 14.9 MG/ML
40 INJECTION INTRAVENOUS EVERY 4 HOURS PRN
Status: DISCONTINUED | OUTPATIENT
Start: 2017-05-19 | End: 2017-05-31 | Stop reason: HOSPADM

## 2017-05-19 RX ORDER — WARFARIN SODIUM 5 MG/1
5 TABLET ORAL EVERY EVENING
Status: DISCONTINUED | OUTPATIENT
Start: 2017-05-19 | End: 2017-05-21

## 2017-05-19 RX ORDER — DEXTROSE MONOHYDRATE 50 MG/ML
INJECTION, SOLUTION INTRAVENOUS CONTINUOUS PRN
Status: DISCONTINUED | OUTPATIENT
Start: 2017-05-19 | End: 2017-05-31 | Stop reason: HOSPADM

## 2017-05-19 RX ORDER — DEXTROSE MONOHYDRATE 25 G/50ML
25 INJECTION, SOLUTION INTRAVENOUS PRN
Status: DISCONTINUED | OUTPATIENT
Start: 2017-05-19 | End: 2017-05-31 | Stop reason: HOSPADM

## 2017-05-19 RX ORDER — INSULIN GLARGINE 100 [IU]/ML
32 INJECTION, SOLUTION SUBCUTANEOUS DAILY
Status: DISCONTINUED | OUTPATIENT
Start: 2017-05-20 | End: 2017-05-22

## 2017-05-19 RX ORDER — DEXTROSE MONOHYDRATE 25 G/50ML
25 INJECTION, SOLUTION INTRAVENOUS ONCE
Status: COMPLETED | OUTPATIENT
Start: 2017-05-19 | End: 2017-05-19

## 2017-05-19 RX ORDER — POTASSIUM CHLORIDE 14.9 MG/ML
20 INJECTION INTRAVENOUS EVERY 4 HOURS PRN
Status: DISCONTINUED | OUTPATIENT
Start: 2017-05-19 | End: 2017-05-31 | Stop reason: HOSPADM

## 2017-05-19 RX ORDER — NICOTINE POLACRILEX 4 MG
15 LOZENGE BUCCAL PRN
Status: DISCONTINUED | OUTPATIENT
Start: 2017-05-19 | End: 2017-05-31 | Stop reason: HOSPADM

## 2017-05-19 RX ORDER — POTASSIUM CHLORIDE 1.5 G/1.58G
40 POWDER, FOR SOLUTION ORAL EVERY 4 HOURS PRN
Status: DISCONTINUED | OUTPATIENT
Start: 2017-05-19 | End: 2017-05-31 | Stop reason: HOSPADM

## 2017-05-19 RX ORDER — POTASSIUM CHLORIDE 20 MEQ/1
40 TABLET, EXTENDED RELEASE ORAL EVERY 4 HOURS PRN
Status: DISCONTINUED | OUTPATIENT
Start: 2017-05-19 | End: 2017-05-31 | Stop reason: HOSPADM

## 2017-05-19 RX ORDER — FUROSEMIDE 10 MG/ML
40 INJECTION INTRAMUSCULAR; INTRAVENOUS ONCE
Status: DISCONTINUED | OUTPATIENT
Start: 2017-05-19 | End: 2017-05-19

## 2017-05-19 RX ORDER — FONDAPARINUX SODIUM 10 MG/.8ML
10 INJECTION SUBCUTANEOUS NIGHTLY
Status: DISCONTINUED | OUTPATIENT
Start: 2017-05-19 | End: 2017-05-23

## 2017-05-19 RX ORDER — POTASSIUM CHLORIDE 20 MEQ/1
20 TABLET, EXTENDED RELEASE ORAL EVERY 4 HOURS PRN
Status: DISCONTINUED | OUTPATIENT
Start: 2017-05-19 | End: 2017-05-31 | Stop reason: HOSPADM

## 2017-05-19 RX ORDER — POTASSIUM CHLORIDE 1.5 G/1.58G
20 POWDER, FOR SOLUTION ORAL EVERY 4 HOURS PRN
Status: DISCONTINUED | OUTPATIENT
Start: 2017-05-19 | End: 2017-05-31 | Stop reason: HOSPADM

## 2017-05-19 RX ORDER — FUROSEMIDE 40 MG/1
40 TABLET ORAL DAILY
Status: DISCONTINUED | OUTPATIENT
Start: 2017-05-19 | End: 2017-05-20

## 2017-05-19 RX ADMIN — DEXTROSE AND SODIUM CHLORIDE 10 ML/HR: 5; 450 INJECTION, SOLUTION INTRAVENOUS at 05:25

## 2017-05-19 RX ADMIN — CARVEDILOL 3.12 MG: 3.12 TABLET, FILM COATED ORAL at 18:57

## 2017-05-19 RX ADMIN — GUAIFENESIN 600 MG: 600 TABLET, EXTENDED RELEASE ORAL at 20:00

## 2017-05-19 RX ADMIN — SODIUM CHLORIDE 0.5 MCG/KG/MIN: 9 INJECTION, SOLUTION INTRAVENOUS at 05:31

## 2017-05-19 RX ADMIN — INSULIN LISPRO 8 UNITS: 100 INJECTION, SOLUTION INTRAVENOUS; SUBCUTANEOUS at 08:00

## 2017-05-19 RX ADMIN — INSULIN LISPRO 10 UNITS: 100 INJECTION, SOLUTION INTRAVENOUS; SUBCUTANEOUS at 13:33

## 2017-05-19 RX ADMIN — INSULIN HUMAN 10 UNITS: 100 INJECTION, SOLUTION PARENTERAL at 09:56

## 2017-05-19 RX ADMIN — SENNA AND DOCUSATE SODIUM 2 TABLET: 50; 8.6 TABLET, FILM COATED ORAL at 09:51

## 2017-05-19 RX ADMIN — Medication 10 ML: at 21:13

## 2017-05-19 RX ADMIN — Medication 3 MG: at 21:12

## 2017-05-19 RX ADMIN — FAMOTIDINE 20 MG: 20 TABLET, FILM COATED ORAL at 20:00

## 2017-05-19 RX ADMIN — Medication 150 MG: at 18:00

## 2017-05-19 RX ADMIN — SODIUM BICARBONATE 50 MEQ: 84 INJECTION, SOLUTION INTRAVENOUS at 09:55

## 2017-05-19 RX ADMIN — ASCORBIC ACID TAB 250 MG 250 MG: 250 TAB at 18:00

## 2017-05-19 RX ADMIN — CARVEDILOL 3.12 MG: 3.12 TABLET, FILM COATED ORAL at 12:25

## 2017-05-19 RX ADMIN — AMLODIPINE BESYLATE 5 MG: 5 TABLET ORAL at 09:51

## 2017-05-19 RX ADMIN — NYSTATIN: 100000 OINTMENT TOPICAL at 09:52

## 2017-05-19 RX ADMIN — ASPIRIN 81 MG: 81 TABLET, CHEWABLE ORAL at 09:51

## 2017-05-19 RX ADMIN — NYSTATIN: 100000 OINTMENT TOPICAL at 20:00

## 2017-05-19 RX ADMIN — DEXTROSE MONOHYDRATE 25 G: 25 INJECTION, SOLUTION INTRAVENOUS at 09:55

## 2017-05-19 RX ADMIN — FUROSEMIDE 40 MG: 40 TABLET ORAL at 18:57

## 2017-05-19 RX ADMIN — INSULIN GLARGINE 25 UNITS: 100 INJECTION, SOLUTION SUBCUTANEOUS at 09:51

## 2017-05-19 RX ADMIN — WARFARIN SODIUM 5 MG: 5 TABLET ORAL at 18:18

## 2017-05-19 RX ADMIN — NYSTATIN: 100000 OINTMENT TOPICAL at 13:00

## 2017-05-19 RX ADMIN — CALCIUM CHLORIDE 1 G: 100 INJECTION INTRAVENOUS; INTRAVENTRICULAR at 09:55

## 2017-05-19 RX ADMIN — GUAIFENESIN 600 MG: 600 TABLET, EXTENDED RELEASE ORAL at 09:51

## 2017-05-19 RX ADMIN — FONDAPARINUX SODIUM 10 MG: 10 INJECTION SUBCUTANEOUS at 20:00

## 2017-05-19 RX ADMIN — INSULIN LISPRO 12 UNITS: 100 INJECTION, SOLUTION INTRAVENOUS; SUBCUTANEOUS at 18:19

## 2017-05-19 RX ADMIN — FOLIC ACID 1 MG: 1 TABLET ORAL at 09:51

## 2017-05-19 ASSESSMENT — ACTIVITIES OF DAILY LIVING (ADL)
DRESSING: NEEDS ASSISTANCE
FEEDING: INDEPENDENT
TOILETING: NEEDS ASSISTANCE
ADL_SCORE: 7
BATHING: NEEDS ASSISTANCE
BATHING: NEEDS ASSISTANCE
TOILETING: NEEDS ASSISTANCE
ADL_SCORE: 7
DRESSING: NEEDS ASSISTANCE
FEEDING: INDEPENDENT

## 2017-05-19 ASSESSMENT — ENCOUNTER SYMPTOMS
SUBJECTIVE PATIENT PAIN CONTROL: WELL CONTROLLED
DIETARY ISSUES: ADEQUATE INTAKE
SHORTNESS OF BREATH: 0
FEVER: 0
PAIN SEVERITY NOW: MILD
WEAKNESS: 1
VOMITING: 0
NAUSEA: 0
COUGH: 0
DIARRHEA: 0

## 2017-05-19 ASSESSMENT — PAIN SCALES - GENERAL
PAIN_LEVEL_AT_REST: 4
PAIN_LEVEL_AT_REST: 2
PAIN_LEVEL_AT_REST: 1

## 2017-05-20 LAB
ALBUMIN SERPL-MCNC: 2.3 G/DL (ref 3.6–5.1)
ALBUMIN/GLOB SERPL: 0.6 {RATIO} (ref 1–2.4)
ALP SERPL-CCNC: 115 UNITS/L (ref 45–117)
ALT SERPL-CCNC: 114 UNITS/L
ANION GAP SERPL CALC-SCNC: 15 MMOL/L (ref 10–20)
AST SERPL-CCNC: 39 UNITS/L
BASOPHILS # BLD AUTO: 0 K/MCL (ref 0–0.3)
BASOPHILS NFR BLD AUTO: 0 %
BILIRUB SERPL-MCNC: 0.6 MG/DL (ref 0.2–1)
BUN SERPL-MCNC: 70 MG/DL (ref 6–20)
BUN/CREAT SERPL: 51 (ref 7–25)
CALCIUM SERPL-MCNC: 8.3 MG/DL (ref 8.4–10.2)
CHLORIDE SERPL-SCNC: 102 MMOL/L (ref 98–107)
CO2 SERPL-SCNC: 21 MMOL/L (ref 21–32)
CREAT SERPL-MCNC: 1.38 MG/DL (ref 0.51–0.95)
DIFFERENTIAL METHOD BLD: ABNORMAL
EOSINOPHIL # BLD AUTO: 0.3 K/MCL (ref 0.1–0.5)
EOSINOPHIL NFR SPEC: 3 %
ERYTHROCYTE [DISTWIDTH] IN BLOOD: 14.5 % (ref 11–15)
GLOBULIN SER-MCNC: 3.6 G/DL (ref 2–4)
GLUCOSE BLDC GLUCOMTR-MCNC: 188 MG/DL (ref 65–99)
GLUCOSE BLDC GLUCOMTR-MCNC: 193 MG/DL (ref 65–99)
GLUCOSE BLDC GLUCOMTR-MCNC: 237 MG/DL (ref 65–99)
GLUCOSE BLDC GLUCOMTR-MCNC: 239 MG/DL (ref 65–99)
GLUCOSE SERPL-MCNC: 220 MG/DL (ref 65–99)
HCT VFR BLD CALC: 27.4 % (ref 36–46.5)
HGB BLD-MCNC: 8.9 G/DL (ref 12–15.5)
INR PPP: 1.3
LYMPHOCYTES # BLD MANUAL: 1.7 K/MCL (ref 1–4)
LYMPHOCYTES NFR BLD MANUAL: 15 %
MAGNESIUM SERPL-MCNC: 1.9 MG/DL (ref 1.7–2.4)
MCH RBC QN AUTO: 29.9 PG (ref 26–34)
MCHC RBC AUTO-ENTMCNC: 32.5 G/DL (ref 32–36.5)
MCV RBC AUTO: 91.9 FL (ref 78–100)
MONOCYTES # BLD MANUAL: 0.5 K/MCL (ref 0.3–0.9)
MONOCYTES NFR BLD MANUAL: 4 %
NEUTROPHILS # BLD AUTO: 8.8 K/MCL (ref 1.8–7.7)
NEUTROPHILS NFR BLD AUTO: 78 %
PLATELET # BLD: 234 K/MCL (ref 140–450)
POTASSIUM SERPL-SCNC: 4.2 MMOL/L (ref 3.4–5.1)
PROT SERPL-MCNC: 5.9 G/DL (ref 6.4–8.2)
PROTHROMBIN TIME: 13.7 SEC (ref 9.7–11.8)
RBC # BLD: 2.98 MIL/MCL (ref 4–5.2)
SODIUM SERPL-SCNC: 134 MMOL/L (ref 135–145)
WBC # BLD: 11.4 K/MCL (ref 4.2–11)

## 2017-05-20 PROCEDURE — 85610 PROTHROMBIN TIME: CPT

## 2017-05-20 PROCEDURE — 10002801 HB RX 250 W/O HCPCS: Performed by: INTERNAL MEDICINE

## 2017-05-20 PROCEDURE — 94640 AIRWAY INHALATION TREATMENT: CPT

## 2017-05-20 PROCEDURE — 10002803 HB RX 637: Performed by: PHYSICIAN ASSISTANT

## 2017-05-20 PROCEDURE — 82962 GLUCOSE BLOOD TEST: CPT

## 2017-05-20 PROCEDURE — 10003441 HB CARDIAC REHAB PHASE 1

## 2017-05-20 PROCEDURE — 10002803 HB RX 637: Performed by: NURSE PRACTITIONER

## 2017-05-20 PROCEDURE — 10004281 HB COUNTER-STAFF TIME PER 15 MIN

## 2017-05-20 PROCEDURE — 10004651 HB RX, NO CHARGE ITEM: Performed by: PHYSICIAN ASSISTANT

## 2017-05-20 PROCEDURE — 80053 COMPREHEN METABOLIC PANEL: CPT

## 2017-05-20 PROCEDURE — 10004325 HB COUNTER ASSESSMENT EA 15 MIN

## 2017-05-20 PROCEDURE — 94150 VITAL CAPACITY TEST: CPT

## 2017-05-20 PROCEDURE — 10002803 HB RX 637: Performed by: THORACIC SURGERY (CARDIOTHORACIC VASCULAR SURGERY)

## 2017-05-20 PROCEDURE — 10002803 HB RX 637: Performed by: INTERNAL MEDICINE

## 2017-05-20 PROCEDURE — 10000002 HB ROOM CHARGE MED SURG

## 2017-05-20 PROCEDURE — 10002019 HB COUNTER RESP ASSESSMENT

## 2017-05-20 PROCEDURE — 10003445 HB TELEMETRY PER DAY

## 2017-05-20 PROCEDURE — 83735 ASSAY OF MAGNESIUM: CPT

## 2017-05-20 PROCEDURE — 10004651 HB RX, NO CHARGE ITEM: Performed by: THORACIC SURGERY (CARDIOTHORACIC VASCULAR SURGERY)

## 2017-05-20 PROCEDURE — 99232 SBSQ HOSP IP/OBS MODERATE 35: CPT | Performed by: INTERNAL MEDICINE

## 2017-05-20 PROCEDURE — 10002800 HB RX 250 W HCPCS: Performed by: INTERNAL MEDICINE

## 2017-05-20 PROCEDURE — 10002801 HB RX 250 W/O HCPCS: Performed by: THORACIC SURGERY (CARDIOTHORACIC VASCULAR SURGERY)

## 2017-05-20 PROCEDURE — 85025 COMPLETE CBC W/AUTO DIFF WBC: CPT

## 2017-05-20 RX ORDER — IPRATROPIUM BROMIDE AND ALBUTEROL SULFATE 2.5; .5 MG/3ML; MG/3ML
3 SOLUTION RESPIRATORY (INHALATION)
Status: DISCONTINUED | OUTPATIENT
Start: 2017-05-20 | End: 2017-05-21

## 2017-05-20 RX ADMIN — AMLODIPINE BESYLATE 5 MG: 5 TABLET ORAL at 09:29

## 2017-05-20 RX ADMIN — FUROSEMIDE 40 MG: 40 TABLET ORAL at 09:29

## 2017-05-20 RX ADMIN — ASPIRIN 81 MG: 81 TABLET, CHEWABLE ORAL at 09:29

## 2017-05-20 RX ADMIN — SENNA AND DOCUSATE SODIUM 2 TABLET: 50; 8.6 TABLET, FILM COATED ORAL at 09:29

## 2017-05-20 RX ADMIN — Medication 10 ML: at 13:30

## 2017-05-20 RX ADMIN — NYSTATIN: 100000 OINTMENT TOPICAL at 13:30

## 2017-05-20 RX ADMIN — INSULIN LISPRO 10 UNITS: 100 INJECTION, SOLUTION INTRAVENOUS; SUBCUTANEOUS at 07:42

## 2017-05-20 RX ADMIN — WARFARIN SODIUM 5 MG: 5 TABLET ORAL at 17:21

## 2017-05-20 RX ADMIN — IPRATROPIUM BROMIDE AND ALBUTEROL SULFATE 3 ML: .5; 3 SOLUTION RESPIRATORY (INHALATION) at 00:52

## 2017-05-20 RX ADMIN — GUAIFENESIN 600 MG: 600 TABLET, EXTENDED RELEASE ORAL at 09:29

## 2017-05-20 RX ADMIN — Medication 150 MG: at 17:19

## 2017-05-20 RX ADMIN — IPRATROPIUM BROMIDE AND ALBUTEROL SULFATE 3 ML: .5; 3 SOLUTION RESPIRATORY (INHALATION) at 15:38

## 2017-05-20 RX ADMIN — ASCORBIC ACID TAB 250 MG 250 MG: 250 TAB at 17:20

## 2017-05-20 RX ADMIN — INSULIN GLARGINE 32 UNITS: 100 INJECTION, SOLUTION SUBCUTANEOUS at 09:29

## 2017-05-20 RX ADMIN — NYSTATIN: 100000 OINTMENT TOPICAL at 20:56

## 2017-05-20 RX ADMIN — NYSTATIN: 100000 OINTMENT TOPICAL at 09:32

## 2017-05-20 RX ADMIN — FAMOTIDINE 20 MG: 20 TABLET, FILM COATED ORAL at 20:51

## 2017-05-20 RX ADMIN — FOLIC ACID 1 MG: 1 TABLET ORAL at 09:29

## 2017-05-20 RX ADMIN — Medication 10 ML: at 09:38

## 2017-05-20 RX ADMIN — INSULIN LISPRO 10 UNITS: 100 INJECTION, SOLUTION INTRAVENOUS; SUBCUTANEOUS at 12:45

## 2017-05-20 RX ADMIN — FONDAPARINUX SODIUM 10 MG: 10 INJECTION SUBCUTANEOUS at 20:54

## 2017-05-20 RX ADMIN — CARVEDILOL 3.12 MG: 3.12 TABLET, FILM COATED ORAL at 17:20

## 2017-05-20 RX ADMIN — Medication 3 MG: at 20:51

## 2017-05-20 RX ADMIN — IPRATROPIUM BROMIDE AND ALBUTEROL SULFATE 3 ML: .5; 3 SOLUTION RESPIRATORY (INHALATION) at 21:45

## 2017-05-20 RX ADMIN — IPRATROPIUM BROMIDE AND ALBUTEROL SULFATE 3 ML: .5; 3 SOLUTION RESPIRATORY (INHALATION) at 09:13

## 2017-05-20 RX ADMIN — CARVEDILOL 3.12 MG: 3.12 TABLET, FILM COATED ORAL at 09:29

## 2017-05-20 RX ADMIN — GUAIFENESIN 600 MG: 600 TABLET, EXTENDED RELEASE ORAL at 20:51

## 2017-05-20 RX ADMIN — Medication 10 ML: at 20:55

## 2017-05-20 RX ADMIN — INSULIN LISPRO 12 UNITS: 100 INJECTION, SOLUTION INTRAVENOUS; SUBCUTANEOUS at 17:19

## 2017-05-20 ASSESSMENT — PAIN SCALES - GENERAL
PAIN_LEVEL_AT_REST: 0
PAIN_LEVEL_AT_REST: 1
PAIN_LEVEL_AT_REST: 0
PAIN_LEVEL_AT_REST: 1

## 2017-05-20 ASSESSMENT — PULMONARY FUNCTION TESTS
FEV1: .79
FEV1_PREDICTED: 24
FEV1/FVC_PERCENT_PREDICTED: 50-69%
FEV1_PREDICTED: 26
FEV1: .84
FEV1_PREDICTED: 28
FEV1/FVC_PERCENT_PREDICTED: 51
FEV1/FVC: 50-69%
FEV1/FVC_PERCENT_PREDICTED: 50-69%
FEV1: 28
FEV1_PREDICTED: 23
FEV1_PREDICTED: 31
FEV1/FVC_PERCENT_PREDICTED: 62
FEV1_PREDICTED: 29
FEV1_PREDICTED: 26
FEV1: 26
FEV1: 28
FEV1/FVC: 50-69%
FEV1: 26

## 2017-05-21 ENCOUNTER — APPOINTMENT (OUTPATIENT)
Dept: GENERAL RADIOLOGY | Age: 57
DRG: 219 | End: 2017-05-21
Attending: THORACIC SURGERY (CARDIOTHORACIC VASCULAR SURGERY)

## 2017-05-21 LAB
ANION GAP SERPL CALC-SCNC: 14 MMOL/L (ref 10–20)
BUN SERPL-MCNC: 68 MG/DL (ref 6–20)
BUN/CREAT SERPL: 50 (ref 7–25)
CALCIUM SERPL-MCNC: 7.9 MG/DL (ref 8.4–10.2)
CHLORIDE SERPL-SCNC: 103 MMOL/L (ref 98–107)
CO2 SERPL-SCNC: 21 MMOL/L (ref 21–32)
CREAT SERPL-MCNC: 1.35 MG/DL (ref 0.51–0.95)
GLUCOSE BLDC GLUCOMTR-MCNC: 167 MG/DL (ref 65–99)
GLUCOSE BLDC GLUCOMTR-MCNC: 173 MG/DL (ref 65–99)
GLUCOSE BLDC GLUCOMTR-MCNC: 183 MG/DL (ref 65–99)
GLUCOSE BLDC GLUCOMTR-MCNC: 219 MG/DL (ref 65–99)
GLUCOSE SERPL-MCNC: 187 MG/DL (ref 65–99)
INR PPP: 1.4
MAGNESIUM SERPL-MCNC: 2.1 MG/DL (ref 1.7–2.4)
POTASSIUM SERPL-SCNC: 4.3 MMOL/L (ref 3.4–5.1)
PROTHROMBIN TIME: 15 SEC (ref 9.7–11.8)
SODIUM SERPL-SCNC: 134 MMOL/L (ref 135–145)

## 2017-05-21 PROCEDURE — 97116 GAIT TRAINING THERAPY: CPT

## 2017-05-21 PROCEDURE — 10002803 HB RX 637: Performed by: NURSE PRACTITIONER

## 2017-05-21 PROCEDURE — 10003445 HB TELEMETRY PER DAY

## 2017-05-21 PROCEDURE — 10004094 HB COUNTER, VISIT INPATIENT

## 2017-05-21 PROCEDURE — 10004651 HB RX, NO CHARGE ITEM: Performed by: PHYSICIAN ASSISTANT

## 2017-05-21 PROCEDURE — 10004651 HB RX, NO CHARGE ITEM: Performed by: THORACIC SURGERY (CARDIOTHORACIC VASCULAR SURGERY)

## 2017-05-21 PROCEDURE — 83735 ASSAY OF MAGNESIUM: CPT

## 2017-05-21 PROCEDURE — 10002800 HB RX 250 W HCPCS: Performed by: INTERNAL MEDICINE

## 2017-05-21 PROCEDURE — 10002803 HB RX 637: Performed by: PHYSICIAN ASSISTANT

## 2017-05-21 PROCEDURE — 10002019 HB COUNTER RESP ASSESSMENT

## 2017-05-21 PROCEDURE — 10004281 HB COUNTER-STAFF TIME PER 15 MIN

## 2017-05-21 PROCEDURE — 10003716 HB NURSING WOUND CARE PER 15 MIN

## 2017-05-21 PROCEDURE — 82962 GLUCOSE BLOOD TEST: CPT

## 2017-05-21 PROCEDURE — 10003441 HB CARDIAC REHAB PHASE 1

## 2017-05-21 PROCEDURE — 36415 COLL VENOUS BLD VENIPUNCTURE: CPT

## 2017-05-21 PROCEDURE — 10004185 HB COUNTER-VISIT  CENSUS

## 2017-05-21 PROCEDURE — 99232 SBSQ HOSP IP/OBS MODERATE 35: CPT | Performed by: INTERNAL MEDICINE

## 2017-05-21 PROCEDURE — 10002803 HB RX 637: Performed by: THORACIC SURGERY (CARDIOTHORACIC VASCULAR SURGERY)

## 2017-05-21 PROCEDURE — 71010 XR CHEST AP OR PA: CPT

## 2017-05-21 PROCEDURE — 94150 VITAL CAPACITY TEST: CPT

## 2017-05-21 PROCEDURE — 10002801 HB RX 250 W/O HCPCS: Performed by: INTERNAL MEDICINE

## 2017-05-21 PROCEDURE — 97530 THERAPEUTIC ACTIVITIES: CPT

## 2017-05-21 PROCEDURE — 85610 PROTHROMBIN TIME: CPT

## 2017-05-21 PROCEDURE — 80048 BASIC METABOLIC PNL TOTAL CA: CPT

## 2017-05-21 PROCEDURE — 10002803 HB RX 637: Performed by: INTERNAL MEDICINE

## 2017-05-21 PROCEDURE — 71010 XR CHEST AP OR PA: CPT | Performed by: RADIOLOGY

## 2017-05-21 PROCEDURE — 10000002 HB ROOM CHARGE MED SURG

## 2017-05-21 PROCEDURE — 10004173 HB COUNTER-THERAPY VISIT PT

## 2017-05-21 PROCEDURE — 97112 NEUROMUSCULAR REEDUCATION: CPT

## 2017-05-21 RX ORDER — INSULIN LISPRO 100 [IU]/ML
14 INJECTION, SOLUTION INTRAVENOUS; SUBCUTANEOUS
Status: DISCONTINUED | OUTPATIENT
Start: 2017-05-21 | End: 2017-05-23

## 2017-05-21 RX ORDER — WARFARIN SODIUM 6 MG/1
6 TABLET ORAL EVERY EVENING
Status: DISCONTINUED | OUTPATIENT
Start: 2017-05-21 | End: 2017-05-22

## 2017-05-21 RX ADMIN — INSULIN LISPRO 10 UNITS: 100 INJECTION, SOLUTION INTRAVENOUS; SUBCUTANEOUS at 08:16

## 2017-05-21 RX ADMIN — NYSTATIN: 100000 OINTMENT TOPICAL at 13:41

## 2017-05-21 RX ADMIN — NYSTATIN: 100000 OINTMENT TOPICAL at 20:37

## 2017-05-21 RX ADMIN — Medication 10 ML: at 08:16

## 2017-05-21 RX ADMIN — GUAIFENESIN 600 MG: 600 TABLET, EXTENDED RELEASE ORAL at 20:37

## 2017-05-21 RX ADMIN — FAMOTIDINE 20 MG: 20 TABLET, FILM COATED ORAL at 20:37

## 2017-05-21 RX ADMIN — TRAMADOL HYDROCHLORIDE 50 MG: 50 TABLET, FILM COATED ORAL at 05:11

## 2017-05-21 RX ADMIN — NYSTATIN: 100000 OINTMENT TOPICAL at 08:18

## 2017-05-21 RX ADMIN — ASCORBIC ACID TAB 250 MG 250 MG: 250 TAB at 17:58

## 2017-05-21 RX ADMIN — TRAMADOL HYDROCHLORIDE 50 MG: 50 TABLET, FILM COATED ORAL at 18:37

## 2017-05-21 RX ADMIN — FOLIC ACID 1 MG: 1 TABLET ORAL at 08:15

## 2017-05-21 RX ADMIN — GUAIFENESIN 600 MG: 600 TABLET, EXTENDED RELEASE ORAL at 08:15

## 2017-05-21 RX ADMIN — Medication 3 MG: at 20:37

## 2017-05-21 RX ADMIN — INSULIN LISPRO 14 UNITS: 100 INJECTION, SOLUTION INTRAVENOUS; SUBCUTANEOUS at 18:37

## 2017-05-21 RX ADMIN — Medication 10 ML: at 13:43

## 2017-05-21 RX ADMIN — WARFARIN SODIUM 6 MG: 6 TABLET ORAL at 17:58

## 2017-05-21 RX ADMIN — ALUMINUM HYDROXIDE, MAGNESIUM HYDROXIDE, AND SIMETHICONE 30 ML: 200; 200; 20 SUSPENSION ORAL at 18:37

## 2017-05-21 RX ADMIN — CARVEDILOL 3.12 MG: 3.12 TABLET, FILM COATED ORAL at 08:16

## 2017-05-21 RX ADMIN — ASPIRIN 81 MG: 81 TABLET, CHEWABLE ORAL at 08:16

## 2017-05-21 RX ADMIN — Medication 150 MG: at 17:58

## 2017-05-21 RX ADMIN — AMLODIPINE BESYLATE 5 MG: 5 TABLET ORAL at 08:16

## 2017-05-21 RX ADMIN — CARVEDILOL 3.12 MG: 3.12 TABLET, FILM COATED ORAL at 17:58

## 2017-05-21 RX ADMIN — INSULIN GLARGINE 32 UNITS: 100 INJECTION, SOLUTION SUBCUTANEOUS at 08:16

## 2017-05-21 RX ADMIN — INSULIN LISPRO 14 UNITS: 100 INJECTION, SOLUTION INTRAVENOUS; SUBCUTANEOUS at 13:41

## 2017-05-21 RX ADMIN — FONDAPARINUX SODIUM 10 MG: 10 INJECTION SUBCUTANEOUS at 20:37

## 2017-05-21 RX ADMIN — Medication 10 ML: at 20:38

## 2017-05-21 ASSESSMENT — PAIN SCALES - GENERAL
PAIN_LEVEL_AT_REST: 0
PAIN_LEVEL_AT_REST: 0
PAIN_LEVEL_WITH_ACTIVITY: 6
PAIN_LEVEL_WITH_ACTIVITY: 0
PAIN_LEVEL_AT_REST: 0
PAIN_LEVEL_AT_REST: 0
PAIN_LEVEL_AT_REST: 8
PAIN_LEVEL_AT_REST: 9
PAIN_LEVEL_AT_REST: 0

## 2017-05-21 ASSESSMENT — PULMONARY FUNCTION TESTS
FEV1: 31
FEV1_PREDICTED: 29
FEV1: 31
FEV1: .93
FEV1/FVC_PERCENT_PREDICTED: 58
FEV1/FVC: 50-69%
FEV1_PREDICTED: 31
FEV1_PREDICTED: 35
FEV1/FVC_PERCENT_PREDICTED: 50-69%

## 2017-05-22 LAB
ANION GAP SERPL CALC-SCNC: 14 MMOL/L (ref 10–20)
BASOPHILS # BLD AUTO: 0 K/MCL (ref 0–0.3)
BASOPHILS NFR BLD AUTO: 0 %
BUN SERPL-MCNC: 58 MG/DL (ref 6–20)
BUN/CREAT SERPL: 43 (ref 7–25)
CALCIUM SERPL-MCNC: 8.1 MG/DL (ref 8.4–10.2)
CHLORIDE SERPL-SCNC: 103 MMOL/L (ref 98–107)
CO2 SERPL-SCNC: 23 MMOL/L (ref 21–32)
CREAT SERPL-MCNC: 1.35 MG/DL (ref 0.51–0.95)
DIFFERENTIAL METHOD BLD: ABNORMAL
EOSINOPHIL # BLD AUTO: 0.3 K/MCL (ref 0.1–0.5)
EOSINOPHIL NFR SPEC: 2 %
ERYTHROCYTE [DISTWIDTH] IN BLOOD: 14.6 % (ref 11–15)
GLUCOSE BLDC GLUCOMTR-MCNC: 114 MG/DL (ref 65–99)
GLUCOSE BLDC GLUCOMTR-MCNC: 131 MG/DL (ref 65–99)
GLUCOSE BLDC GLUCOMTR-MCNC: 160 MG/DL (ref 65–99)
GLUCOSE BLDC GLUCOMTR-MCNC: 182 MG/DL (ref 65–99)
GLUCOSE SERPL-MCNC: 225 MG/DL (ref 65–99)
HCT VFR BLD CALC: 28 % (ref 36–46.5)
HGB BLD-MCNC: 8.9 G/DL (ref 12–15.5)
INR PPP: 1.5
LYMPHOCYTES # BLD MANUAL: 2.1 K/MCL (ref 1–4)
LYMPHOCYTES NFR BLD MANUAL: 17 %
MCH RBC QN AUTO: 29.2 PG (ref 26–34)
MCHC RBC AUTO-ENTMCNC: 31.8 G/DL (ref 32–36.5)
MCV RBC AUTO: 91.8 FL (ref 78–100)
MONOCYTES # BLD MANUAL: 0.8 K/MCL (ref 0.3–0.9)
MONOCYTES NFR BLD MANUAL: 6 %
NEUTROPHILS # BLD AUTO: 9.4 K/MCL (ref 1.8–7.7)
NEUTROPHILS NFR BLD AUTO: 75 %
PLATELET # BLD: 292 K/MCL (ref 140–450)
POTASSIUM SERPL-SCNC: 4.5 MMOL/L (ref 3.4–5.1)
PROTHROMBIN TIME: 16.6 SEC (ref 9.7–11.8)
RBC # BLD: 3.05 MIL/MCL (ref 4–5.2)
SODIUM SERPL-SCNC: 135 MMOL/L (ref 135–145)
WBC # BLD: 12.6 K/MCL (ref 4.2–11)

## 2017-05-22 PROCEDURE — 82962 GLUCOSE BLOOD TEST: CPT

## 2017-05-22 PROCEDURE — 99232 SBSQ HOSP IP/OBS MODERATE 35: CPT | Performed by: INTERNAL MEDICINE

## 2017-05-22 PROCEDURE — 10002803 HB RX 637: Performed by: NURSE PRACTITIONER

## 2017-05-22 PROCEDURE — 10002803 HB RX 637: Performed by: PHYSICIAN ASSISTANT

## 2017-05-22 PROCEDURE — 10004173 HB COUNTER-THERAPY VISIT PT

## 2017-05-22 PROCEDURE — 85610 PROTHROMBIN TIME: CPT

## 2017-05-22 PROCEDURE — 97530 THERAPEUTIC ACTIVITIES: CPT

## 2017-05-22 PROCEDURE — 10002803 HB RX 637: Performed by: INTERNAL MEDICINE

## 2017-05-22 PROCEDURE — 10002800 HB RX 250 W HCPCS: Performed by: INTERNAL MEDICINE

## 2017-05-22 PROCEDURE — 10004651 HB RX, NO CHARGE ITEM: Performed by: PHYSICIAN ASSISTANT

## 2017-05-22 PROCEDURE — 80048 BASIC METABOLIC PNL TOTAL CA: CPT

## 2017-05-22 PROCEDURE — 97535 SELF CARE MNGMENT TRAINING: CPT

## 2017-05-22 PROCEDURE — 36415 COLL VENOUS BLD VENIPUNCTURE: CPT

## 2017-05-22 PROCEDURE — 85025 COMPLETE CBC W/AUTO DIFF WBC: CPT

## 2017-05-22 PROCEDURE — 10004172 HB COUNTER-THERAPY VISIT OT

## 2017-05-22 PROCEDURE — 10004651 HB RX, NO CHARGE ITEM: Performed by: THORACIC SURGERY (CARDIOTHORACIC VASCULAR SURGERY)

## 2017-05-22 PROCEDURE — 10003441 HB CARDIAC REHAB PHASE 1

## 2017-05-22 PROCEDURE — 10003445 HB TELEMETRY PER DAY

## 2017-05-22 PROCEDURE — 10000002 HB ROOM CHARGE MED SURG

## 2017-05-22 PROCEDURE — 97116 GAIT TRAINING THERAPY: CPT

## 2017-05-22 PROCEDURE — 10002803 HB RX 637: Performed by: THORACIC SURGERY (CARDIOTHORACIC VASCULAR SURGERY)

## 2017-05-22 RX ORDER — WARFARIN SODIUM 7.5 MG/1
7.5 TABLET ORAL EVERY EVENING
Status: DISCONTINUED | OUTPATIENT
Start: 2017-05-22 | End: 2017-05-23

## 2017-05-22 RX ORDER — INSULIN GLARGINE 100 [IU]/ML
36 INJECTION, SOLUTION SUBCUTANEOUS DAILY
Status: DISCONTINUED | OUTPATIENT
Start: 2017-05-23 | End: 2017-05-31

## 2017-05-22 RX ADMIN — Medication 10 ML: at 06:14

## 2017-05-22 RX ADMIN — WARFARIN SODIUM 7.5 MG: 7.5 TABLET ORAL at 18:30

## 2017-05-22 RX ADMIN — INSULIN LISPRO 14 UNITS: 100 INJECTION, SOLUTION INTRAVENOUS; SUBCUTANEOUS at 18:28

## 2017-05-22 RX ADMIN — FONDAPARINUX SODIUM 10 MG: 10 INJECTION SUBCUTANEOUS at 21:19

## 2017-05-22 RX ADMIN — NYSTATIN: 100000 OINTMENT TOPICAL at 08:48

## 2017-05-22 RX ADMIN — POLYETHYLENE GLYCOL (3350) 17 G: 17 POWDER, FOR SOLUTION ORAL at 18:36

## 2017-05-22 RX ADMIN — TRAMADOL HYDROCHLORIDE 50 MG: 50 TABLET, FILM COATED ORAL at 09:33

## 2017-05-22 RX ADMIN — Medication 10 ML: at 13:19

## 2017-05-22 RX ADMIN — INSULIN LISPRO 14 UNITS: 100 INJECTION, SOLUTION INTRAVENOUS; SUBCUTANEOUS at 08:53

## 2017-05-22 RX ADMIN — INSULIN GLARGINE 32 UNITS: 100 INJECTION, SOLUTION SUBCUTANEOUS at 08:50

## 2017-05-22 RX ADMIN — Medication 3 MG: at 21:19

## 2017-05-22 RX ADMIN — Medication 10 ML: at 21:19

## 2017-05-22 RX ADMIN — GUAIFENESIN 600 MG: 600 TABLET, EXTENDED RELEASE ORAL at 08:49

## 2017-05-22 RX ADMIN — SENNA AND DOCUSATE SODIUM 2 TABLET: 50; 8.6 TABLET, FILM COATED ORAL at 08:48

## 2017-05-22 RX ADMIN — FOLIC ACID 1 MG: 1 TABLET ORAL at 08:49

## 2017-05-22 RX ADMIN — GUAIFENESIN 600 MG: 600 TABLET, EXTENDED RELEASE ORAL at 22:25

## 2017-05-22 RX ADMIN — ASCORBIC ACID TAB 250 MG 250 MG: 250 TAB at 18:31

## 2017-05-22 RX ADMIN — ALUMINUM HYDROXIDE, MAGNESIUM HYDROXIDE, AND SIMETHICONE 30 ML: 200; 200; 20 SUSPENSION ORAL at 18:45

## 2017-05-22 RX ADMIN — AMLODIPINE BESYLATE 5 MG: 5 TABLET ORAL at 08:47

## 2017-05-22 RX ADMIN — ASPIRIN 81 MG: 81 TABLET, CHEWABLE ORAL at 08:48

## 2017-05-22 RX ADMIN — FAMOTIDINE 20 MG: 20 TABLET, FILM COATED ORAL at 21:19

## 2017-05-22 RX ADMIN — CARVEDILOL 3.12 MG: 3.12 TABLET, FILM COATED ORAL at 08:48

## 2017-05-22 RX ADMIN — ALUMINUM HYDROXIDE, MAGNESIUM HYDROXIDE, AND SIMETHICONE 30 ML: 200; 200; 20 SUSPENSION ORAL at 09:33

## 2017-05-22 RX ADMIN — CARVEDILOL 3.12 MG: 3.12 TABLET, FILM COATED ORAL at 18:31

## 2017-05-22 RX ADMIN — Medication 150 MG: at 18:31

## 2017-05-22 RX ADMIN — NYSTATIN: 100000 OINTMENT TOPICAL at 13:19

## 2017-05-22 RX ADMIN — INSULIN LISPRO 14 UNITS: 100 INJECTION, SOLUTION INTRAVENOUS; SUBCUTANEOUS at 12:42

## 2017-05-22 RX ADMIN — NYSTATIN: 100000 OINTMENT TOPICAL at 21:19

## 2017-05-22 ASSESSMENT — PAIN SCALES - GENERAL
PAIN_LEVEL_AT_REST: 3
PAIN_LEVEL_AT_REST: 0
PAIN_LEVEL_AT_REST: 3
PAIN_LEVEL_AT_REST: 0
PAIN_LEVEL_AT_REST: 3
PAIN_LEVEL_AT_REST: 7

## 2017-05-22 ASSESSMENT — ENCOUNTER SYMPTOMS
SHORTNESS OF BREATH: 0
DIETARY ISSUES: ADEQUATE INTAKE
VOMITING: 0
PAIN SEVERITY NOW: MILD
SUBJECTIVE PATIENT PAIN CONTROL: WELL CONTROLLED
DIARRHEA: 0
NAUSEA: 0
COUGH: 0
WEAKNESS: 1
FEVER: 0

## 2017-05-23 ENCOUNTER — APPOINTMENT (OUTPATIENT)
Dept: GENERAL RADIOLOGY | Age: 57
DRG: 219 | End: 2017-05-23
Attending: NURSE PRACTITIONER

## 2017-05-23 LAB
ALBUMIN SERPL-MCNC: 2.2 G/DL (ref 3.6–5.1)
ALBUMIN/GLOB SERPL: 0.6 {RATIO} (ref 1–2.4)
ALP SERPL-CCNC: 108 UNITS/L (ref 45–117)
ALT SERPL-CCNC: 82 UNITS/L
ANION GAP SERPL CALC-SCNC: 16 MMOL/L (ref 10–20)
AST SERPL-CCNC: 33 UNITS/L
BILIRUB SERPL-MCNC: 0.4 MG/DL (ref 0.2–1)
BUN SERPL-MCNC: 54 MG/DL (ref 6–20)
BUN/CREAT SERPL: 43 (ref 7–25)
CALCIUM SERPL-MCNC: 8.1 MG/DL (ref 8.4–10.2)
CHLORIDE SERPL-SCNC: 105 MMOL/L (ref 98–107)
CO2 SERPL-SCNC: 23 MMOL/L (ref 21–32)
CREAT SERPL-MCNC: 1.26 MG/DL (ref 0.51–0.95)
ERYTHROCYTE [DISTWIDTH] IN BLOOD: 14.6 % (ref 11–15)
GLOBULIN SER-MCNC: 3.6 G/DL (ref 2–4)
GLUCOSE BLDC GLUCOMTR-MCNC: 107 MG/DL (ref 65–99)
GLUCOSE BLDC GLUCOMTR-MCNC: 115 MG/DL (ref 65–99)
GLUCOSE BLDC GLUCOMTR-MCNC: 211 MG/DL (ref 65–99)
GLUCOSE BLDC GLUCOMTR-MCNC: 90 MG/DL (ref 65–99)
GLUCOSE SERPL-MCNC: 86 MG/DL (ref 65–99)
HCT VFR BLD CALC: 24.9 % (ref 36–46.5)
HGB BLD-MCNC: 8.1 G/DL (ref 12–15.5)
INR PPP: 1.8
MCH RBC QN AUTO: 29.6 PG (ref 26–34)
MCHC RBC AUTO-ENTMCNC: 32.5 G/DL (ref 32–36.5)
MCV RBC AUTO: 90.9 FL (ref 78–100)
PLATELET # BLD: 271 K/MCL (ref 140–450)
POTASSIUM SERPL-SCNC: 4.5 MMOL/L (ref 3.4–5.1)
PROT SERPL-MCNC: 5.8 G/DL (ref 6.4–8.2)
PROTHROMBIN TIME: 19.2 SEC (ref 9.7–11.8)
RBC # BLD: 2.74 MIL/MCL (ref 4–5.2)
SODIUM SERPL-SCNC: 139 MMOL/L (ref 135–145)
WBC # BLD: 10.9 K/MCL (ref 4.2–11)

## 2017-05-23 PROCEDURE — 10004185 HB COUNTER-VISIT  CENSUS

## 2017-05-23 PROCEDURE — 10002800 HB RX 250 W HCPCS: Performed by: INTERNAL MEDICINE

## 2017-05-23 PROCEDURE — 99232 SBSQ HOSP IP/OBS MODERATE 35: CPT | Performed by: INTERNAL MEDICINE

## 2017-05-23 PROCEDURE — 82962 GLUCOSE BLOOD TEST: CPT

## 2017-05-23 PROCEDURE — 71020 XR CHEST PA AND LATERAL: CPT

## 2017-05-23 PROCEDURE — 10003441 HB CARDIAC REHAB PHASE 1

## 2017-05-23 PROCEDURE — 10004325 HB COUNTER ASSESSMENT EA 15 MIN

## 2017-05-23 PROCEDURE — 10004651 HB RX, NO CHARGE ITEM: Performed by: PHYSICIAN ASSISTANT

## 2017-05-23 PROCEDURE — 10002803 HB RX 637: Performed by: NURSE PRACTITIONER

## 2017-05-23 PROCEDURE — 85610 PROTHROMBIN TIME: CPT

## 2017-05-23 PROCEDURE — 97535 SELF CARE MNGMENT TRAINING: CPT

## 2017-05-23 PROCEDURE — 10002803 HB RX 637: Performed by: PHYSICIAN ASSISTANT

## 2017-05-23 PROCEDURE — 10002803 HB RX 637: Performed by: THORACIC SURGERY (CARDIOTHORACIC VASCULAR SURGERY)

## 2017-05-23 PROCEDURE — 10003445 HB TELEMETRY PER DAY

## 2017-05-23 PROCEDURE — 10004172 HB COUNTER-THERAPY VISIT OT

## 2017-05-23 PROCEDURE — 71020 XR CHEST PA AND LATERAL: CPT | Performed by: RADIOLOGY

## 2017-05-23 PROCEDURE — 10004651 HB RX, NO CHARGE ITEM: Performed by: THORACIC SURGERY (CARDIOTHORACIC VASCULAR SURGERY)

## 2017-05-23 PROCEDURE — 10002803 HB RX 637: Performed by: INTERNAL MEDICINE

## 2017-05-23 PROCEDURE — 36415 COLL VENOUS BLD VENIPUNCTURE: CPT

## 2017-05-23 PROCEDURE — 10000002 HB ROOM CHARGE MED SURG

## 2017-05-23 PROCEDURE — 10003716 HB NURSING WOUND CARE PER 15 MIN

## 2017-05-23 PROCEDURE — 80053 COMPREHEN METABOLIC PANEL: CPT

## 2017-05-23 PROCEDURE — 97530 THERAPEUTIC ACTIVITIES: CPT

## 2017-05-23 PROCEDURE — 10004094 HB COUNTER, VISIT INPATIENT

## 2017-05-23 PROCEDURE — 85027 COMPLETE CBC AUTOMATED: CPT

## 2017-05-23 RX ORDER — WARFARIN SODIUM 5 MG/1
5 TABLET ORAL EVERY EVENING
Status: DISCONTINUED | OUTPATIENT
Start: 2017-05-23 | End: 2017-05-24

## 2017-05-23 RX ORDER — INSULIN LISPRO 100 [IU]/ML
10 INJECTION, SOLUTION INTRAVENOUS; SUBCUTANEOUS
Status: DISCONTINUED | OUTPATIENT
Start: 2017-05-23 | End: 2017-05-31

## 2017-05-23 RX ADMIN — CARVEDILOL 3.12 MG: 3.12 TABLET, FILM COATED ORAL at 09:11

## 2017-05-23 RX ADMIN — GUAIFENESIN 600 MG: 600 TABLET, EXTENDED RELEASE ORAL at 20:23

## 2017-05-23 RX ADMIN — Medication 10 ML: at 20:23

## 2017-05-23 RX ADMIN — FOLIC ACID 1 MG: 1 TABLET ORAL at 09:10

## 2017-05-23 RX ADMIN — ASPIRIN 81 MG: 81 TABLET, CHEWABLE ORAL at 09:11

## 2017-05-23 RX ADMIN — INSULIN LISPRO 14 UNITS: 100 INJECTION, SOLUTION INTRAVENOUS; SUBCUTANEOUS at 17:03

## 2017-05-23 RX ADMIN — SENNA AND DOCUSATE SODIUM 2 TABLET: 50; 8.6 TABLET, FILM COATED ORAL at 09:11

## 2017-05-23 RX ADMIN — FAMOTIDINE 20 MG: 20 TABLET, FILM COATED ORAL at 20:23

## 2017-05-23 RX ADMIN — WARFARIN SODIUM 5 MG: 5 TABLET ORAL at 17:03

## 2017-05-23 RX ADMIN — NYSTATIN: 100000 OINTMENT TOPICAL at 13:39

## 2017-05-23 RX ADMIN — GUAIFENESIN 600 MG: 600 TABLET, EXTENDED RELEASE ORAL at 09:11

## 2017-05-23 RX ADMIN — Medication 3 MG: at 20:23

## 2017-05-23 RX ADMIN — INSULIN LISPRO 10 UNITS: 100 INJECTION, SOLUTION INTRAVENOUS; SUBCUTANEOUS at 13:38

## 2017-05-23 RX ADMIN — NYSTATIN: 100000 OINTMENT TOPICAL at 20:23

## 2017-05-23 RX ADMIN — Medication 10 ML: at 05:20

## 2017-05-23 RX ADMIN — Medication 150 MG: at 17:03

## 2017-05-23 RX ADMIN — ASCORBIC ACID TAB 250 MG 250 MG: 250 TAB at 17:03

## 2017-05-23 RX ADMIN — NYSTATIN: 100000 OINTMENT TOPICAL at 09:12

## 2017-05-23 RX ADMIN — CARVEDILOL 3.12 MG: 3.12 TABLET, FILM COATED ORAL at 17:03

## 2017-05-23 RX ADMIN — POLYETHYLENE GLYCOL (3350) 17 G: 17 POWDER, FOR SOLUTION ORAL at 09:15

## 2017-05-23 RX ADMIN — INSULIN GLARGINE 36 UNITS: 100 INJECTION, SOLUTION SUBCUTANEOUS at 09:11

## 2017-05-23 RX ADMIN — Medication 10 ML: at 13:38

## 2017-05-23 RX ADMIN — INSULIN LISPRO 10 UNITS: 100 INJECTION, SOLUTION INTRAVENOUS; SUBCUTANEOUS at 09:15

## 2017-05-23 RX ADMIN — AMLODIPINE BESYLATE 5 MG: 5 TABLET ORAL at 09:10

## 2017-05-23 ASSESSMENT — PAIN SCALES - GENERAL
PAIN_LEVEL_AT_REST: 0

## 2017-05-23 ASSESSMENT — ENCOUNTER SYMPTOMS
DIARRHEA: 0
PAIN SEVERITY NOW: MILD
FEVER: 0
NAUSEA: 0
WEAKNESS: 1
SUBJECTIVE PATIENT PAIN CONTROL: WELL CONTROLLED
VOMITING: 0
DIETARY ISSUES: ADEQUATE INTAKE
SHORTNESS OF BREATH: 0
COUGH: 0

## 2017-05-24 LAB
ANION GAP SERPL CALC-SCNC: 13 MMOL/L (ref 10–20)
BASOPHILS # BLD AUTO: 0 K/MCL (ref 0–0.3)
BASOPHILS NFR BLD AUTO: 0 %
BUN SERPL-MCNC: 50 MG/DL (ref 6–20)
BUN/CREAT SERPL: 38 (ref 7–25)
CALCIUM SERPL-MCNC: 8 MG/DL (ref 8.4–10.2)
CHLORIDE SERPL-SCNC: 105 MMOL/L (ref 98–107)
CO2 SERPL-SCNC: 23 MMOL/L (ref 21–32)
CREAT SERPL-MCNC: 1.31 MG/DL (ref 0.51–0.95)
DIFFERENTIAL METHOD BLD: ABNORMAL
EOSINOPHIL # BLD AUTO: 0.2 K/MCL (ref 0.1–0.5)
EOSINOPHIL NFR SPEC: 2 %
ERYTHROCYTE [DISTWIDTH] IN BLOOD: 14.6 % (ref 11–15)
GLUCOSE BLDC GLUCOMTR-MCNC: 122 MG/DL (ref 65–99)
GLUCOSE BLDC GLUCOMTR-MCNC: 135 MG/DL (ref 65–99)
GLUCOSE BLDC GLUCOMTR-MCNC: 146 MG/DL (ref 65–99)
GLUCOSE BLDC GLUCOMTR-MCNC: 244 MG/DL (ref 65–99)
GLUCOSE SERPL-MCNC: 98 MG/DL (ref 65–99)
HCT VFR BLD CALC: 26.9 % (ref 36–46.5)
HGB BLD-MCNC: 8.6 G/DL (ref 12–15.5)
INR PPP: 2.4
LYMPHOCYTES # BLD MANUAL: 2.1 K/MCL (ref 1–4)
LYMPHOCYTES NFR BLD MANUAL: 21 %
MCH RBC QN AUTO: 29.4 PG (ref 26–34)
MCHC RBC AUTO-ENTMCNC: 32 G/DL (ref 32–36.5)
MCV RBC AUTO: 91.8 FL (ref 78–100)
MONOCYTES # BLD MANUAL: 0.7 K/MCL (ref 0.3–0.9)
MONOCYTES NFR BLD MANUAL: 7 %
NEUTROPHILS # BLD AUTO: 6.8 K/MCL (ref 1.8–7.7)
NEUTROPHILS NFR BLD AUTO: 70 %
PLATELET # BLD: 315 K/MCL (ref 140–450)
POTASSIUM SERPL-SCNC: 4.5 MMOL/L (ref 3.4–5.1)
PROTHROMBIN TIME: 25.8 SEC (ref 9.7–11.8)
RBC # BLD: 2.93 MIL/MCL (ref 4–5.2)
SODIUM SERPL-SCNC: 136 MMOL/L (ref 135–145)
WBC # BLD: 9.7 K/MCL (ref 4.2–11)

## 2017-05-24 PROCEDURE — 97116 GAIT TRAINING THERAPY: CPT

## 2017-05-24 PROCEDURE — 10004173 HB COUNTER-THERAPY VISIT PT

## 2017-05-24 PROCEDURE — 10004651 HB RX, NO CHARGE ITEM: Performed by: PHYSICIAN ASSISTANT

## 2017-05-24 PROCEDURE — 10002803 HB RX 637: Performed by: NURSE PRACTITIONER

## 2017-05-24 PROCEDURE — 97110 THERAPEUTIC EXERCISES: CPT

## 2017-05-24 PROCEDURE — 10002803 HB RX 637: Performed by: INTERNAL MEDICINE

## 2017-05-24 PROCEDURE — 99232 SBSQ HOSP IP/OBS MODERATE 35: CPT | Performed by: INTERNAL MEDICINE

## 2017-05-24 PROCEDURE — 85610 PROTHROMBIN TIME: CPT

## 2017-05-24 PROCEDURE — 10003445 HB TELEMETRY PER DAY

## 2017-05-24 PROCEDURE — 36415 COLL VENOUS BLD VENIPUNCTURE: CPT

## 2017-05-24 PROCEDURE — 10004651 HB RX, NO CHARGE ITEM: Performed by: THORACIC SURGERY (CARDIOTHORACIC VASCULAR SURGERY)

## 2017-05-24 PROCEDURE — 85025 COMPLETE CBC W/AUTO DIFF WBC: CPT

## 2017-05-24 PROCEDURE — 10002800 HB RX 250 W HCPCS: Performed by: INTERNAL MEDICINE

## 2017-05-24 PROCEDURE — 10002803 HB RX 637: Performed by: PHYSICIAN ASSISTANT

## 2017-05-24 PROCEDURE — 10000002 HB ROOM CHARGE MED SURG

## 2017-05-24 PROCEDURE — 10002803 HB RX 637: Performed by: THORACIC SURGERY (CARDIOTHORACIC VASCULAR SURGERY)

## 2017-05-24 PROCEDURE — 80048 BASIC METABOLIC PNL TOTAL CA: CPT

## 2017-05-24 PROCEDURE — 82962 GLUCOSE BLOOD TEST: CPT

## 2017-05-24 RX ORDER — TRAMADOL HYDROCHLORIDE 50 MG/1
50 TABLET ORAL EVERY 6 HOURS PRN
Qty: 60 TABLET | Refills: 0 | Status: ON HOLD | OUTPATIENT
Start: 2017-05-24 | End: 2017-06-08 | Stop reason: SDUPTHER

## 2017-05-24 RX ORDER — WARFARIN SODIUM 4 MG/1
4 TABLET ORAL EVERY EVENING
Status: DISCONTINUED | OUTPATIENT
Start: 2017-05-24 | End: 2017-05-25

## 2017-05-24 RX ADMIN — FOLIC ACID 1 MG: 1 TABLET ORAL at 09:09

## 2017-05-24 RX ADMIN — NYSTATIN: 100000 OINTMENT TOPICAL at 13:37

## 2017-05-24 RX ADMIN — GUAIFENESIN 600 MG: 600 TABLET, EXTENDED RELEASE ORAL at 09:09

## 2017-05-24 RX ADMIN — INSULIN LISPRO 10 UNITS: 100 INJECTION, SOLUTION INTRAVENOUS; SUBCUTANEOUS at 18:10

## 2017-05-24 RX ADMIN — Medication 10 ML: at 13:36

## 2017-05-24 RX ADMIN — POLYETHYLENE GLYCOL (3350) 17 G: 17 POWDER, FOR SOLUTION ORAL at 09:09

## 2017-05-24 RX ADMIN — CARVEDILOL 3.12 MG: 3.12 TABLET, FILM COATED ORAL at 17:23

## 2017-05-24 RX ADMIN — INSULIN LISPRO 10 UNITS: 100 INJECTION, SOLUTION INTRAVENOUS; SUBCUTANEOUS at 14:19

## 2017-05-24 RX ADMIN — FAMOTIDINE 20 MG: 20 TABLET, FILM COATED ORAL at 20:16

## 2017-05-24 RX ADMIN — WARFARIN SODIUM 4 MG: 4 TABLET ORAL at 17:23

## 2017-05-24 RX ADMIN — AMLODIPINE BESYLATE 5 MG: 5 TABLET ORAL at 09:09

## 2017-05-24 RX ADMIN — CARVEDILOL 3.12 MG: 3.12 TABLET, FILM COATED ORAL at 09:09

## 2017-05-24 RX ADMIN — ASCORBIC ACID TAB 250 MG 250 MG: 250 TAB at 17:23

## 2017-05-24 RX ADMIN — Medication 10 ML: at 20:17

## 2017-05-24 RX ADMIN — TRAMADOL HYDROCHLORIDE 50 MG: 50 TABLET, FILM COATED ORAL at 18:13

## 2017-05-24 RX ADMIN — Medication 150 MG: at 17:23

## 2017-05-24 RX ADMIN — NYSTATIN: 100000 OINTMENT TOPICAL at 20:16

## 2017-05-24 RX ADMIN — Medication 10 ML: at 10:32

## 2017-05-24 RX ADMIN — SENNA AND DOCUSATE SODIUM 2 TABLET: 50; 8.6 TABLET, FILM COATED ORAL at 09:09

## 2017-05-24 RX ADMIN — Medication 3 MG: at 20:16

## 2017-05-24 RX ADMIN — ASPIRIN 81 MG: 81 TABLET, CHEWABLE ORAL at 09:09

## 2017-05-24 RX ADMIN — GUAIFENESIN 600 MG: 600 TABLET, EXTENDED RELEASE ORAL at 20:16

## 2017-05-24 RX ADMIN — INSULIN GLARGINE 36 UNITS: 100 INJECTION, SOLUTION SUBCUTANEOUS at 09:08

## 2017-05-24 RX ADMIN — INSULIN LISPRO 10 UNITS: 100 INJECTION, SOLUTION INTRAVENOUS; SUBCUTANEOUS at 09:24

## 2017-05-24 RX ADMIN — NYSTATIN: 100000 OINTMENT TOPICAL at 09:09

## 2017-05-24 ASSESSMENT — ACTIVITIES OF DAILY LIVING (ADL)
DRESSING: NEEDS ASSISTANCE
BATHING: NEEDS ASSISTANCE
ADL_SCORE: 8
FEEDING: INDEPENDENT
TOILETING: NEEDS ASSISTANCE

## 2017-05-24 ASSESSMENT — PAIN SCALES - GENERAL
PAIN_LEVEL_AT_REST: 5
PAIN_LEVEL_AT_REST: 0

## 2017-05-24 ASSESSMENT — ENCOUNTER SYMPTOMS
PAIN SEVERITY NOW: MILD
VOMITING: 0
SUBJECTIVE PATIENT PAIN CONTROL: WELL CONTROLLED
DIETARY ISSUES: ADEQUATE INTAKE
COUGH: 0
WEAKNESS: 1
DIARRHEA: 0
NAUSEA: 0
FEVER: 0
SHORTNESS OF BREATH: 0

## 2017-05-25 LAB
ANION GAP SERPL CALC-SCNC: 13 MMOL/L (ref 10–20)
BASOPHILS # BLD AUTO: 0 K/MCL (ref 0–0.3)
BASOPHILS NFR BLD AUTO: 0 %
BUN SERPL-MCNC: 45 MG/DL (ref 6–20)
BUN/CREAT SERPL: 33 (ref 7–25)
CALCIUM SERPL-MCNC: 8 MG/DL (ref 8.4–10.2)
CHLORIDE SERPL-SCNC: 106 MMOL/L (ref 98–107)
CO2 SERPL-SCNC: 23 MMOL/L (ref 21–32)
CREAT SERPL-MCNC: 1.37 MG/DL (ref 0.51–0.95)
DIFFERENTIAL METHOD BLD: ABNORMAL
EOSINOPHIL # BLD AUTO: 0.3 K/MCL (ref 0.1–0.5)
EOSINOPHIL NFR SPEC: 3 %
ERYTHROCYTE [DISTWIDTH] IN BLOOD: 14.6 % (ref 11–15)
GLUCOSE BLDC GLUCOMTR-MCNC: 103 MG/DL (ref 65–99)
GLUCOSE BLDC GLUCOMTR-MCNC: 119 MG/DL (ref 65–99)
GLUCOSE BLDC GLUCOMTR-MCNC: 120 MG/DL (ref 65–99)
GLUCOSE BLDC GLUCOMTR-MCNC: 121 MG/DL (ref 65–99)
GLUCOSE SERPL-MCNC: 97 MG/DL (ref 65–99)
HCT VFR BLD CALC: 26.9 % (ref 36–46.5)
HGB BLD-MCNC: 8.4 G/DL (ref 12–15.5)
INR PPP: 2.4
LYMPHOCYTES # BLD MANUAL: 2.1 K/MCL (ref 1–4)
LYMPHOCYTES NFR BLD MANUAL: 22 %
MCH RBC QN AUTO: 29.1 PG (ref 26–34)
MCHC RBC AUTO-ENTMCNC: 31.2 G/DL (ref 32–36.5)
MCV RBC AUTO: 93.1 FL (ref 78–100)
MONOCYTES # BLD MANUAL: 0.7 K/MCL (ref 0.3–0.9)
MONOCYTES NFR BLD MANUAL: 7 %
NEUTROPHILS # BLD AUTO: 6.5 K/MCL (ref 1.8–7.7)
NEUTROPHILS NFR BLD AUTO: 68 %
PLATELET # BLD: 308 K/MCL (ref 140–450)
POTASSIUM SERPL-SCNC: 4.7 MMOL/L (ref 3.4–5.1)
PROTHROMBIN TIME: 26.2 SEC (ref 9.7–11.8)
RBC # BLD: 2.89 MIL/MCL (ref 4–5.2)
SODIUM SERPL-SCNC: 137 MMOL/L (ref 135–145)
WBC # BLD: 9.5 K/MCL (ref 4.2–11)

## 2017-05-25 PROCEDURE — 10003445 HB TELEMETRY PER DAY

## 2017-05-25 PROCEDURE — 99232 SBSQ HOSP IP/OBS MODERATE 35: CPT | Performed by: INTERNAL MEDICINE

## 2017-05-25 PROCEDURE — 10002803 HB RX 637: Performed by: INTERNAL MEDICINE

## 2017-05-25 PROCEDURE — 10003716 HB NURSING WOUND CARE PER 15 MIN

## 2017-05-25 PROCEDURE — 10004651 HB RX, NO CHARGE ITEM: Performed by: PHYSICIAN ASSISTANT

## 2017-05-25 PROCEDURE — 10002803 HB RX 637: Performed by: NURSE PRACTITIONER

## 2017-05-25 PROCEDURE — 10003441 HB CARDIAC REHAB PHASE 1

## 2017-05-25 PROCEDURE — 10004094 HB COUNTER, VISIT INPATIENT

## 2017-05-25 PROCEDURE — 85610 PROTHROMBIN TIME: CPT

## 2017-05-25 PROCEDURE — 36415 COLL VENOUS BLD VENIPUNCTURE: CPT

## 2017-05-25 PROCEDURE — 80048 BASIC METABOLIC PNL TOTAL CA: CPT

## 2017-05-25 PROCEDURE — 82962 GLUCOSE BLOOD TEST: CPT

## 2017-05-25 PROCEDURE — 10002800 HB RX 250 W HCPCS: Performed by: INTERNAL MEDICINE

## 2017-05-25 PROCEDURE — 10000002 HB ROOM CHARGE MED SURG

## 2017-05-25 PROCEDURE — 85025 COMPLETE CBC W/AUTO DIFF WBC: CPT

## 2017-05-25 PROCEDURE — 10004185 HB COUNTER-VISIT  CENSUS

## 2017-05-25 PROCEDURE — 10004651 HB RX, NO CHARGE ITEM: Performed by: THORACIC SURGERY (CARDIOTHORACIC VASCULAR SURGERY)

## 2017-05-25 PROCEDURE — 10002803 HB RX 637: Performed by: PHYSICIAN ASSISTANT

## 2017-05-25 PROCEDURE — 10002803 HB RX 637: Performed by: THORACIC SURGERY (CARDIOTHORACIC VASCULAR SURGERY)

## 2017-05-25 RX ORDER — AMLODIPINE BESYLATE 5 MG/1
5 TABLET ORAL DAILY
Status: ON HOLD | INPATIENT
Start: 2017-05-25 | End: 2017-06-23 | Stop reason: HOSPADM

## 2017-05-25 RX ORDER — ASPIRIN 81 MG/1
81 TABLET, CHEWABLE ORAL DAILY
Status: SHIPPED | INPATIENT
Start: 2017-05-25

## 2017-05-25 RX ORDER — WARFARIN SODIUM 5 MG/1
5 TABLET ORAL EVERY EVENING
Status: DISCONTINUED | OUTPATIENT
Start: 2017-05-25 | End: 2017-05-26

## 2017-05-25 RX ADMIN — GUAIFENESIN 600 MG: 600 TABLET, EXTENDED RELEASE ORAL at 20:23

## 2017-05-25 RX ADMIN — FOLIC ACID 1 MG: 1 TABLET ORAL at 08:31

## 2017-05-25 RX ADMIN — INSULIN LISPRO 10 UNITS: 100 INJECTION, SOLUTION INTRAVENOUS; SUBCUTANEOUS at 13:09

## 2017-05-25 RX ADMIN — ASPIRIN 81 MG: 81 TABLET, CHEWABLE ORAL at 08:31

## 2017-05-25 RX ADMIN — FAMOTIDINE 20 MG: 20 TABLET, FILM COATED ORAL at 20:23

## 2017-05-25 RX ADMIN — Medication 10 ML: at 13:11

## 2017-05-25 RX ADMIN — POLYETHYLENE GLYCOL (3350) 17 G: 17 POWDER, FOR SOLUTION ORAL at 08:32

## 2017-05-25 RX ADMIN — INSULIN LISPRO 10 UNITS: 100 INJECTION, SOLUTION INTRAVENOUS; SUBCUTANEOUS at 08:36

## 2017-05-25 RX ADMIN — Medication 3 MG: at 20:23

## 2017-05-25 RX ADMIN — Medication 150 MG: at 17:51

## 2017-05-25 RX ADMIN — CARVEDILOL 3.12 MG: 3.12 TABLET, FILM COATED ORAL at 08:31

## 2017-05-25 RX ADMIN — NYSTATIN: 100000 OINTMENT TOPICAL at 08:31

## 2017-05-25 RX ADMIN — INSULIN LISPRO 10 UNITS: 100 INJECTION, SOLUTION INTRAVENOUS; SUBCUTANEOUS at 17:51

## 2017-05-25 RX ADMIN — NYSTATIN: 100000 OINTMENT TOPICAL at 20:23

## 2017-05-25 RX ADMIN — INSULIN GLARGINE 36 UNITS: 100 INJECTION, SOLUTION SUBCUTANEOUS at 08:31

## 2017-05-25 RX ADMIN — SENNA AND DOCUSATE SODIUM 2 TABLET: 50; 8.6 TABLET, FILM COATED ORAL at 08:31

## 2017-05-25 RX ADMIN — CARVEDILOL 3.12 MG: 3.12 TABLET, FILM COATED ORAL at 16:24

## 2017-05-25 RX ADMIN — GUAIFENESIN 600 MG: 600 TABLET, EXTENDED RELEASE ORAL at 08:31

## 2017-05-25 RX ADMIN — AMLODIPINE BESYLATE 5 MG: 5 TABLET ORAL at 08:31

## 2017-05-25 RX ADMIN — Medication 10 ML: at 20:24

## 2017-05-25 RX ADMIN — ASCORBIC ACID TAB 250 MG 250 MG: 250 TAB at 17:51

## 2017-05-25 RX ADMIN — WARFARIN SODIUM 5 MG: 5 TABLET ORAL at 17:51

## 2017-05-25 RX ADMIN — NYSTATIN: 100000 OINTMENT TOPICAL at 13:11

## 2017-05-25 ASSESSMENT — ACTIVITIES OF DAILY LIVING (ADL)
FEEDING: INDEPENDENT
TOILETING: NEEDS ASSISTANCE
BATHING: NEEDS ASSISTANCE
ADL_SCORE: 8
DRESSING: NEEDS ASSISTANCE

## 2017-05-25 ASSESSMENT — ENCOUNTER SYMPTOMS
PAIN SEVERITY NOW: MILD
DIARRHEA: 0
DIETARY ISSUES: ADEQUATE INTAKE
WEAKNESS: 1
COUGH: 0
SHORTNESS OF BREATH: 0
SUBJECTIVE PATIENT PAIN CONTROL: WELL CONTROLLED
VOMITING: 0
FEVER: 0
NAUSEA: 0

## 2017-05-25 ASSESSMENT — PAIN SCALES - GENERAL
PAIN_LEVEL_AT_REST: 4
PAIN_LEVEL_AT_REST: 3
PAIN_LEVEL_AT_REST: 4
PAIN_LEVEL_AT_REST: 4

## 2017-05-26 LAB
GLUCOSE BLDC GLUCOMTR-MCNC: 110 MG/DL (ref 65–99)
GLUCOSE BLDC GLUCOMTR-MCNC: 170 MG/DL (ref 65–99)
GLUCOSE BLDC GLUCOMTR-MCNC: 197 MG/DL (ref 65–99)
GLUCOSE BLDC GLUCOMTR-MCNC: 73 MG/DL (ref 65–99)
INR PPP: 2.2
PROTHROMBIN TIME: 23.9 SEC (ref 9.7–11.8)

## 2017-05-26 PROCEDURE — 85610 PROTHROMBIN TIME: CPT

## 2017-05-26 PROCEDURE — 10002803 HB RX 637: Performed by: NURSE PRACTITIONER

## 2017-05-26 PROCEDURE — 10004173 HB COUNTER-THERAPY VISIT PT

## 2017-05-26 PROCEDURE — 97530 THERAPEUTIC ACTIVITIES: CPT

## 2017-05-26 PROCEDURE — 10002800 HB RX 250 W HCPCS: Performed by: INTERNAL MEDICINE

## 2017-05-26 PROCEDURE — 10002803 HB RX 637: Performed by: INTERNAL MEDICINE

## 2017-05-26 PROCEDURE — 10004651 HB RX, NO CHARGE ITEM: Performed by: PHYSICIAN ASSISTANT

## 2017-05-26 PROCEDURE — 36415 COLL VENOUS BLD VENIPUNCTURE: CPT

## 2017-05-26 PROCEDURE — 10002803 HB RX 637: Performed by: THORACIC SURGERY (CARDIOTHORACIC VASCULAR SURGERY)

## 2017-05-26 PROCEDURE — 82962 GLUCOSE BLOOD TEST: CPT

## 2017-05-26 PROCEDURE — 10004651 HB RX, NO CHARGE ITEM: Performed by: THORACIC SURGERY (CARDIOTHORACIC VASCULAR SURGERY)

## 2017-05-26 PROCEDURE — 99232 SBSQ HOSP IP/OBS MODERATE 35: CPT | Performed by: INTERNAL MEDICINE

## 2017-05-26 PROCEDURE — 10003445 HB TELEMETRY PER DAY

## 2017-05-26 PROCEDURE — 97116 GAIT TRAINING THERAPY: CPT

## 2017-05-26 PROCEDURE — 10002803 HB RX 637: Performed by: PHYSICIAN ASSISTANT

## 2017-05-26 PROCEDURE — 10000002 HB ROOM CHARGE MED SURG

## 2017-05-26 PROCEDURE — 10003441 HB CARDIAC REHAB PHASE 1

## 2017-05-26 RX ORDER — WARFARIN SODIUM 6 MG/1
6 TABLET ORAL EVERY EVENING
Status: DISCONTINUED | OUTPATIENT
Start: 2017-05-26 | End: 2017-05-28

## 2017-05-26 RX ORDER — FUROSEMIDE 20 MG/1
20 TABLET ORAL DAILY
Status: DISCONTINUED | OUTPATIENT
Start: 2017-05-26 | End: 2017-05-29

## 2017-05-26 RX ADMIN — ASCORBIC ACID TAB 250 MG 250 MG: 250 TAB at 16:56

## 2017-05-26 RX ADMIN — GUAIFENESIN 600 MG: 600 TABLET, EXTENDED RELEASE ORAL at 21:07

## 2017-05-26 RX ADMIN — FOLIC ACID 1 MG: 1 TABLET ORAL at 08:52

## 2017-05-26 RX ADMIN — Medication 3 MG: at 21:08

## 2017-05-26 RX ADMIN — INSULIN LISPRO 10 UNITS: 100 INJECTION, SOLUTION INTRAVENOUS; SUBCUTANEOUS at 10:47

## 2017-05-26 RX ADMIN — INSULIN GLARGINE 36 UNITS: 100 INJECTION, SOLUTION SUBCUTANEOUS at 08:52

## 2017-05-26 RX ADMIN — AMLODIPINE BESYLATE 5 MG: 5 TABLET ORAL at 08:52

## 2017-05-26 RX ADMIN — Medication 10 ML: at 05:00

## 2017-05-26 RX ADMIN — INSULIN LISPRO 8 UNITS: 100 INJECTION, SOLUTION INTRAVENOUS; SUBCUTANEOUS at 18:37

## 2017-05-26 RX ADMIN — Medication 10 ML: at 12:38

## 2017-05-26 RX ADMIN — NYSTATIN: 100000 OINTMENT TOPICAL at 14:30

## 2017-05-26 RX ADMIN — SENNA AND DOCUSATE SODIUM 2 TABLET: 50; 8.6 TABLET, FILM COATED ORAL at 08:52

## 2017-05-26 RX ADMIN — Medication 150 MG: at 16:56

## 2017-05-26 RX ADMIN — CARVEDILOL 3.12 MG: 3.12 TABLET, FILM COATED ORAL at 16:56

## 2017-05-26 RX ADMIN — NYSTATIN: 100000 OINTMENT TOPICAL at 21:09

## 2017-05-26 RX ADMIN — INSULIN LISPRO 12 UNITS: 100 INJECTION, SOLUTION INTRAVENOUS; SUBCUTANEOUS at 12:36

## 2017-05-26 RX ADMIN — Medication 10 ML: at 21:09

## 2017-05-26 RX ADMIN — TRAMADOL HYDROCHLORIDE 50 MG: 50 TABLET, FILM COATED ORAL at 21:08

## 2017-05-26 RX ADMIN — FAMOTIDINE 20 MG: 20 TABLET, FILM COATED ORAL at 21:08

## 2017-05-26 RX ADMIN — CARVEDILOL 3.12 MG: 3.12 TABLET, FILM COATED ORAL at 08:52

## 2017-05-26 RX ADMIN — WARFARIN SODIUM 6 MG: 6 TABLET ORAL at 19:37

## 2017-05-26 RX ADMIN — NYSTATIN: 100000 OINTMENT TOPICAL at 08:53

## 2017-05-26 RX ADMIN — GUAIFENESIN 600 MG: 600 TABLET, EXTENDED RELEASE ORAL at 08:52

## 2017-05-26 RX ADMIN — ASPIRIN 81 MG: 81 TABLET, CHEWABLE ORAL at 08:52

## 2017-05-26 RX ADMIN — FUROSEMIDE 20 MG: 20 TABLET ORAL at 14:29

## 2017-05-26 ASSESSMENT — PAIN SCALES - GENERAL
PAIN_LEVEL_AT_REST: 0
PAIN_LEVEL_AT_REST: 0
PAIN_LEVEL_AT_REST: 2
PAIN_LEVEL_AT_REST: 0
PAIN_LEVEL_AT_REST: 0

## 2017-05-26 ASSESSMENT — ENCOUNTER SYMPTOMS
WEAKNESS: 1
DIARRHEA: 0
FEVER: 0
VOMITING: 0
NAUSEA: 0
PAIN SEVERITY NOW: MILD
SHORTNESS OF BREATH: 0
SUBJECTIVE PATIENT PAIN CONTROL: WELL CONTROLLED
COUGH: 0
DIETARY ISSUES: ADEQUATE INTAKE

## 2017-05-27 LAB
ANION GAP SERPL CALC-SCNC: 13 MMOL/L (ref 10–20)
BASOPHILS # BLD AUTO: 0 K/MCL (ref 0–0.3)
BASOPHILS NFR BLD AUTO: 0 %
BUN SERPL-MCNC: 35 MG/DL (ref 6–20)
BUN/CREAT SERPL: 27 (ref 7–25)
CALCIUM SERPL-MCNC: 8 MG/DL (ref 8.4–10.2)
CHLORIDE SERPL-SCNC: 106 MMOL/L (ref 98–107)
CO2 SERPL-SCNC: 24 MMOL/L (ref 21–32)
CREAT SERPL-MCNC: 1.32 MG/DL (ref 0.51–0.95)
DIFFERENTIAL METHOD BLD: ABNORMAL
EOSINOPHIL # BLD AUTO: 0.2 K/MCL (ref 0.1–0.5)
EOSINOPHIL NFR SPEC: 2 %
ERYTHROCYTE [DISTWIDTH] IN BLOOD: 14.8 % (ref 11–15)
GLUCOSE BLDC GLUCOMTR-MCNC: 118 MG/DL (ref 65–99)
GLUCOSE BLDC GLUCOMTR-MCNC: 128 MG/DL (ref 65–99)
GLUCOSE BLDC GLUCOMTR-MCNC: 163 MG/DL (ref 65–99)
GLUCOSE BLDC GLUCOMTR-MCNC: 191 MG/DL (ref 65–99)
GLUCOSE SERPL-MCNC: 114 MG/DL (ref 65–99)
HCT VFR BLD CALC: 24.3 % (ref 36–46.5)
HGB BLD-MCNC: 7.8 G/DL (ref 12–15.5)
INR PPP: 2.2
LYMPHOCYTES # BLD MANUAL: 1.7 K/MCL (ref 1–4)
LYMPHOCYTES NFR BLD MANUAL: 21 %
MCH RBC QN AUTO: 29.3 PG (ref 26–34)
MCHC RBC AUTO-ENTMCNC: 32.1 G/DL (ref 32–36.5)
MCV RBC AUTO: 91.4 FL (ref 78–100)
MONOCYTES # BLD MANUAL: 0.5 K/MCL (ref 0.3–0.9)
MONOCYTES NFR BLD MANUAL: 6 %
NEUTROPHILS # BLD AUTO: 5.9 K/MCL (ref 1.8–7.7)
NEUTROPHILS NFR BLD AUTO: 71 %
PLATELET # BLD: 301 K/MCL (ref 140–450)
POTASSIUM SERPL-SCNC: 4.5 MMOL/L (ref 3.4–5.1)
PROTHROMBIN TIME: 24.4 SEC (ref 9.7–11.8)
RBC # BLD: 2.66 MIL/MCL (ref 4–5.2)
SODIUM SERPL-SCNC: 138 MMOL/L (ref 135–145)
WBC # BLD: 8.3 K/MCL (ref 4.2–11)

## 2017-05-27 PROCEDURE — 10002803 HB RX 637: Performed by: INTERNAL MEDICINE

## 2017-05-27 PROCEDURE — 10004094 HB COUNTER, VISIT INPATIENT

## 2017-05-27 PROCEDURE — 10004651 HB RX, NO CHARGE ITEM: Performed by: THORACIC SURGERY (CARDIOTHORACIC VASCULAR SURGERY)

## 2017-05-27 PROCEDURE — 10002803 HB RX 637: Performed by: NURSE PRACTITIONER

## 2017-05-27 PROCEDURE — 82962 GLUCOSE BLOOD TEST: CPT

## 2017-05-27 PROCEDURE — 10004173 HB COUNTER-THERAPY VISIT PT

## 2017-05-27 PROCEDURE — 10002803 HB RX 637: Performed by: PHYSICIAN ASSISTANT

## 2017-05-27 PROCEDURE — 10004172 HB COUNTER-THERAPY VISIT OT

## 2017-05-27 PROCEDURE — 85025 COMPLETE CBC W/AUTO DIFF WBC: CPT

## 2017-05-27 PROCEDURE — 10002800 HB RX 250 W HCPCS: Performed by: INTERNAL MEDICINE

## 2017-05-27 PROCEDURE — 97530 THERAPEUTIC ACTIVITIES: CPT

## 2017-05-27 PROCEDURE — 80048 BASIC METABOLIC PNL TOTAL CA: CPT

## 2017-05-27 PROCEDURE — 10003716 HB NURSING WOUND CARE PER 15 MIN

## 2017-05-27 PROCEDURE — 10004185 HB COUNTER-VISIT  CENSUS

## 2017-05-27 PROCEDURE — 97535 SELF CARE MNGMENT TRAINING: CPT

## 2017-05-27 PROCEDURE — 10000002 HB ROOM CHARGE MED SURG

## 2017-05-27 PROCEDURE — 97110 THERAPEUTIC EXERCISES: CPT

## 2017-05-27 PROCEDURE — 10003445 HB TELEMETRY PER DAY

## 2017-05-27 PROCEDURE — 10003441 HB CARDIAC REHAB PHASE 1

## 2017-05-27 PROCEDURE — 10004651 HB RX, NO CHARGE ITEM: Performed by: PHYSICIAN ASSISTANT

## 2017-05-27 PROCEDURE — 97116 GAIT TRAINING THERAPY: CPT

## 2017-05-27 PROCEDURE — 10002803 HB RX 637: Performed by: THORACIC SURGERY (CARDIOTHORACIC VASCULAR SURGERY)

## 2017-05-27 PROCEDURE — 85610 PROTHROMBIN TIME: CPT

## 2017-05-27 RX ADMIN — INSULIN LISPRO 12 UNITS: 100 INJECTION, SOLUTION INTRAVENOUS; SUBCUTANEOUS at 18:00

## 2017-05-27 RX ADMIN — GUAIFENESIN 600 MG: 600 TABLET, EXTENDED RELEASE ORAL at 08:55

## 2017-05-27 RX ADMIN — Medication 150 MG: at 19:13

## 2017-05-27 RX ADMIN — GUAIFENESIN 600 MG: 600 TABLET, EXTENDED RELEASE ORAL at 21:06

## 2017-05-27 RX ADMIN — CARVEDILOL 3.12 MG: 3.12 TABLET, FILM COATED ORAL at 17:41

## 2017-05-27 RX ADMIN — NYSTATIN: 100000 OINTMENT TOPICAL at 13:11

## 2017-05-27 RX ADMIN — NYSTATIN: 100000 OINTMENT TOPICAL at 08:54

## 2017-05-27 RX ADMIN — Medication 10 ML: at 21:07

## 2017-05-27 RX ADMIN — CARVEDILOL 3.12 MG: 3.12 TABLET, FILM COATED ORAL at 08:55

## 2017-05-27 RX ADMIN — INSULIN LISPRO 10 UNITS: 100 INJECTION, SOLUTION INTRAVENOUS; SUBCUTANEOUS at 09:51

## 2017-05-27 RX ADMIN — WARFARIN SODIUM 6 MG: 6 TABLET ORAL at 17:41

## 2017-05-27 RX ADMIN — FAMOTIDINE 20 MG: 20 TABLET, FILM COATED ORAL at 21:06

## 2017-05-27 RX ADMIN — AMLODIPINE BESYLATE 5 MG: 5 TABLET ORAL at 08:54

## 2017-05-27 RX ADMIN — Medication 10 ML: at 13:13

## 2017-05-27 RX ADMIN — SENNA AND DOCUSATE SODIUM 2 TABLET: 50; 8.6 TABLET, FILM COATED ORAL at 08:55

## 2017-05-27 RX ADMIN — Medication 10 ML: at 06:51

## 2017-05-27 RX ADMIN — ASCORBIC ACID TAB 250 MG 250 MG: 250 TAB at 17:41

## 2017-05-27 RX ADMIN — NYSTATIN: 100000 OINTMENT TOPICAL at 21:07

## 2017-05-27 RX ADMIN — Medication 3 MG: at 21:06

## 2017-05-27 RX ADMIN — INSULIN GLARGINE 36 UNITS: 100 INJECTION, SOLUTION SUBCUTANEOUS at 09:50

## 2017-05-27 RX ADMIN — INSULIN LISPRO 10 UNITS: 100 INJECTION, SOLUTION INTRAVENOUS; SUBCUTANEOUS at 13:26

## 2017-05-27 RX ADMIN — FOLIC ACID 1 MG: 1 TABLET ORAL at 08:55

## 2017-05-27 RX ADMIN — ASPIRIN 81 MG: 81 TABLET, CHEWABLE ORAL at 08:55

## 2017-05-27 RX ADMIN — FUROSEMIDE 20 MG: 20 TABLET ORAL at 08:55

## 2017-05-27 RX ADMIN — COLLAGENASE SANTYL: 250 OINTMENT TOPICAL at 15:57

## 2017-05-27 ASSESSMENT — PAIN SCALES - GENERAL
PAIN_LEVEL_AT_REST: 0

## 2017-05-28 LAB
GLUCOSE BLDC GLUCOMTR-MCNC: 119 MG/DL (ref 65–99)
GLUCOSE BLDC GLUCOMTR-MCNC: 141 MG/DL (ref 65–99)
GLUCOSE BLDC GLUCOMTR-MCNC: 164 MG/DL (ref 65–99)
GLUCOSE BLDC GLUCOMTR-MCNC: 209 MG/DL (ref 65–99)
GLUCOSE BLDC GLUCOMTR-MCNC: 63 MG/DL (ref 65–99)
HCT VFR BLD CALC: 25.8 % (ref 36–46.5)
HGB BLD-MCNC: 7.9 G/DL (ref 12–15.5)
INR PPP: 2.7
PROTHROMBIN TIME: 28.9 SEC (ref 9.7–11.8)

## 2017-05-28 PROCEDURE — 10004651 HB RX, NO CHARGE ITEM: Performed by: THORACIC SURGERY (CARDIOTHORACIC VASCULAR SURGERY)

## 2017-05-28 PROCEDURE — 10004172 HB COUNTER-THERAPY VISIT OT

## 2017-05-28 PROCEDURE — 10002803 HB RX 637: Performed by: INTERNAL MEDICINE

## 2017-05-28 PROCEDURE — 10002803 HB RX 637: Performed by: NURSE PRACTITIONER

## 2017-05-28 PROCEDURE — 10002800 HB RX 250 W HCPCS: Performed by: INTERNAL MEDICINE

## 2017-05-28 PROCEDURE — 10003441 HB CARDIAC REHAB PHASE 1

## 2017-05-28 PROCEDURE — 10000002 HB ROOM CHARGE MED SURG

## 2017-05-28 PROCEDURE — 10002803 HB RX 637: Performed by: PHYSICIAN ASSISTANT

## 2017-05-28 PROCEDURE — 82962 GLUCOSE BLOOD TEST: CPT

## 2017-05-28 PROCEDURE — 85014 HEMATOCRIT: CPT

## 2017-05-28 PROCEDURE — 10002800 HB RX 250 W HCPCS: Performed by: NURSE PRACTITIONER

## 2017-05-28 PROCEDURE — 10003445 HB TELEMETRY PER DAY

## 2017-05-28 PROCEDURE — 97530 THERAPEUTIC ACTIVITIES: CPT

## 2017-05-28 PROCEDURE — 85610 PROTHROMBIN TIME: CPT

## 2017-05-28 PROCEDURE — 10004651 HB RX, NO CHARGE ITEM: Performed by: PHYSICIAN ASSISTANT

## 2017-05-28 PROCEDURE — 10002803 HB RX 637: Performed by: THORACIC SURGERY (CARDIOTHORACIC VASCULAR SURGERY)

## 2017-05-28 RX ORDER — FUROSEMIDE 10 MG/ML
40 INJECTION INTRAMUSCULAR; INTRAVENOUS ONCE
Status: COMPLETED | OUTPATIENT
Start: 2017-05-28 | End: 2017-05-28

## 2017-05-28 RX ORDER — INSULIN GLARGINE 100 [IU]/ML
36 INJECTION, SOLUTION SUBCUTANEOUS DAILY
Qty: 10 ML | Refills: 0 | Status: SHIPPED
Start: 2017-05-28 | End: 2017-05-31 | Stop reason: HOSPADM

## 2017-05-28 RX ORDER — INSULIN LISPRO 100 [IU]/ML
10 INJECTION, SOLUTION INTRAVENOUS; SUBCUTANEOUS
Qty: 10 ML | Refills: 0 | Status: SHIPPED
Start: 2017-05-28 | End: 2017-05-31 | Stop reason: HOSPADM

## 2017-05-28 RX ORDER — WARFARIN SODIUM 4 MG/1
4 TABLET ORAL DAILY
Status: DISCONTINUED | OUTPATIENT
Start: 2017-05-28 | End: 2017-05-31 | Stop reason: HOSPADM

## 2017-05-28 RX ADMIN — INSULIN LISPRO 10 UNITS: 100 INJECTION, SOLUTION INTRAVENOUS; SUBCUTANEOUS at 09:51

## 2017-05-28 RX ADMIN — Medication 10 ML: at 20:53

## 2017-05-28 RX ADMIN — INSULIN GLARGINE 36 UNITS: 100 INJECTION, SOLUTION SUBCUTANEOUS at 09:51

## 2017-05-28 RX ADMIN — COLLAGENASE SANTYL: 250 OINTMENT TOPICAL at 14:45

## 2017-05-28 RX ADMIN — ASCORBIC ACID TAB 250 MG 250 MG: 250 TAB at 18:05

## 2017-05-28 RX ADMIN — FOLIC ACID 1 MG: 1 TABLET ORAL at 08:32

## 2017-05-28 RX ADMIN — Medication 10 ML: at 06:28

## 2017-05-28 RX ADMIN — INSULIN LISPRO 10 UNITS: 100 INJECTION, SOLUTION INTRAVENOUS; SUBCUTANEOUS at 19:27

## 2017-05-28 RX ADMIN — FUROSEMIDE 20 MG: 20 TABLET ORAL at 08:32

## 2017-05-28 RX ADMIN — Medication 150 MG: at 18:05

## 2017-05-28 RX ADMIN — GUAIFENESIN 600 MG: 600 TABLET, EXTENDED RELEASE ORAL at 08:32

## 2017-05-28 RX ADMIN — SENNA AND DOCUSATE SODIUM 2 TABLET: 50; 8.6 TABLET, FILM COATED ORAL at 08:31

## 2017-05-28 RX ADMIN — Medication 3 MG: at 20:52

## 2017-05-28 RX ADMIN — Medication 10 ML: at 14:44

## 2017-05-28 RX ADMIN — NYSTATIN: 100000 OINTMENT TOPICAL at 08:33

## 2017-05-28 RX ADMIN — FAMOTIDINE 20 MG: 20 TABLET, FILM COATED ORAL at 20:52

## 2017-05-28 RX ADMIN — CARVEDILOL 3.12 MG: 3.12 TABLET, FILM COATED ORAL at 08:32

## 2017-05-28 RX ADMIN — AMLODIPINE BESYLATE 5 MG: 5 TABLET ORAL at 08:32

## 2017-05-28 RX ADMIN — INSULIN LISPRO 12 UNITS: 100 INJECTION, SOLUTION INTRAVENOUS; SUBCUTANEOUS at 12:54

## 2017-05-28 RX ADMIN — FUROSEMIDE 40 MG: 10 INJECTION, SOLUTION INTRAVENOUS at 14:43

## 2017-05-28 RX ADMIN — WARFARIN SODIUM 4 MG: 4 TABLET ORAL at 18:04

## 2017-05-28 RX ADMIN — CARVEDILOL 3.12 MG: 3.12 TABLET, FILM COATED ORAL at 18:04

## 2017-05-28 RX ADMIN — ASPIRIN 81 MG: 81 TABLET, CHEWABLE ORAL at 08:32

## 2017-05-28 RX ADMIN — GUAIFENESIN 600 MG: 600 TABLET, EXTENDED RELEASE ORAL at 20:52

## 2017-05-28 ASSESSMENT — PAIN SCALES - GENERAL
PAIN_LEVEL_AT_REST: 0

## 2017-05-29 LAB
ANION GAP SERPL CALC-SCNC: 11 MMOL/L (ref 10–20)
BUN SERPL-MCNC: 33 MG/DL (ref 6–20)
BUN/CREAT SERPL: 24 (ref 7–25)
CALCIUM SERPL-MCNC: 7.9 MG/DL (ref 8.4–10.2)
CHLORIDE SERPL-SCNC: 106 MMOL/L (ref 98–107)
CO2 SERPL-SCNC: 26 MMOL/L (ref 21–32)
CREAT SERPL-MCNC: 1.38 MG/DL (ref 0.51–0.95)
ERYTHROCYTE [DISTWIDTH] IN BLOOD: 14.8 % (ref 11–15)
GLUCOSE BLDC GLUCOMTR-MCNC: 129 MG/DL (ref 65–99)
GLUCOSE BLDC GLUCOMTR-MCNC: 140 MG/DL (ref 65–99)
GLUCOSE BLDC GLUCOMTR-MCNC: 165 MG/DL (ref 65–99)
GLUCOSE BLDC GLUCOMTR-MCNC: 86 MG/DL (ref 65–99)
GLUCOSE SERPL-MCNC: 121 MG/DL (ref 65–99)
HCT VFR BLD CALC: 24.5 % (ref 36–46.5)
HGB BLD-MCNC: 7.8 G/DL (ref 12–15.5)
INR PPP: 2.9
MCH RBC QN AUTO: 29.2 PG (ref 26–34)
MCHC RBC AUTO-ENTMCNC: 31.8 G/DL (ref 32–36.5)
MCV RBC AUTO: 91.8 FL (ref 78–100)
PLATELET # BLD: 290 K/MCL (ref 140–450)
POTASSIUM SERPL-SCNC: 4 MMOL/L (ref 3.4–5.1)
PROTHROMBIN TIME: 31.7 SEC (ref 9.7–11.8)
RBC # BLD: 2.67 MIL/MCL (ref 4–5.2)
SODIUM SERPL-SCNC: 139 MMOL/L (ref 135–145)
WBC # BLD: 7.8 K/MCL (ref 4.2–11)

## 2017-05-29 PROCEDURE — 80048 BASIC METABOLIC PNL TOTAL CA: CPT

## 2017-05-29 PROCEDURE — 10002803 HB RX 637: Performed by: THORACIC SURGERY (CARDIOTHORACIC VASCULAR SURGERY)

## 2017-05-29 PROCEDURE — 10002803 HB RX 637: Performed by: NURSE PRACTITIONER

## 2017-05-29 PROCEDURE — 82962 GLUCOSE BLOOD TEST: CPT

## 2017-05-29 PROCEDURE — 85610 PROTHROMBIN TIME: CPT

## 2017-05-29 PROCEDURE — 99232 SBSQ HOSP IP/OBS MODERATE 35: CPT | Performed by: INTERNAL MEDICINE

## 2017-05-29 PROCEDURE — 10004651 HB RX, NO CHARGE ITEM: Performed by: THORACIC SURGERY (CARDIOTHORACIC VASCULAR SURGERY)

## 2017-05-29 PROCEDURE — 10002803 HB RX 637: Performed by: INTERNAL MEDICINE

## 2017-05-29 PROCEDURE — 85027 COMPLETE CBC AUTOMATED: CPT

## 2017-05-29 PROCEDURE — 10003445 HB TELEMETRY PER DAY

## 2017-05-29 PROCEDURE — 10002803 HB RX 637: Performed by: PHYSICIAN ASSISTANT

## 2017-05-29 PROCEDURE — 10002800 HB RX 250 W HCPCS: Performed by: NURSE PRACTITIONER

## 2017-05-29 PROCEDURE — 10000002 HB ROOM CHARGE MED SURG

## 2017-05-29 PROCEDURE — 10004651 HB RX, NO CHARGE ITEM: Performed by: PHYSICIAN ASSISTANT

## 2017-05-29 PROCEDURE — 10002800 HB RX 250 W HCPCS: Performed by: INTERNAL MEDICINE

## 2017-05-29 PROCEDURE — 10003441 HB CARDIAC REHAB PHASE 1

## 2017-05-29 RX ORDER — FUROSEMIDE 40 MG/1
40 TABLET ORAL DAILY
Status: DISCONTINUED | OUTPATIENT
Start: 2017-05-30 | End: 2017-05-31 | Stop reason: HOSPADM

## 2017-05-29 RX ORDER — FUROSEMIDE 10 MG/ML
40 INJECTION INTRAMUSCULAR; INTRAVENOUS ONCE
Status: COMPLETED | OUTPATIENT
Start: 2017-05-29 | End: 2017-05-29

## 2017-05-29 RX ORDER — CARVEDILOL 3.12 MG/1
3.12 TABLET ORAL 2 TIMES DAILY WITH MEALS
Status: ON HOLD | INPATIENT
Start: 2017-05-29 | End: 2017-06-23 | Stop reason: HOSPADM

## 2017-05-29 RX ORDER — LIDOCAINE HYDROCHLORIDE 20 MG/ML
10 INJECTION, SOLUTION INFILTRATION; PERINEURAL ONCE
Status: DISCONTINUED | OUTPATIENT
Start: 2017-05-29 | End: 2017-05-31

## 2017-05-29 RX ADMIN — INSULIN GLARGINE 36 UNITS: 100 INJECTION, SOLUTION SUBCUTANEOUS at 08:36

## 2017-05-29 RX ADMIN — Medication 10 ML: at 21:17

## 2017-05-29 RX ADMIN — COLLAGENASE SANTYL: 250 OINTMENT TOPICAL at 08:38

## 2017-05-29 RX ADMIN — CARVEDILOL 3.12 MG: 3.12 TABLET, FILM COATED ORAL at 08:36

## 2017-05-29 RX ADMIN — FAMOTIDINE 20 MG: 20 TABLET, FILM COATED ORAL at 21:17

## 2017-05-29 RX ADMIN — SENNA AND DOCUSATE SODIUM 2 TABLET: 50; 8.6 TABLET, FILM COATED ORAL at 08:35

## 2017-05-29 RX ADMIN — FOLIC ACID 1 MG: 1 TABLET ORAL at 08:36

## 2017-05-29 RX ADMIN — ASCORBIC ACID TAB 250 MG 250 MG: 250 TAB at 16:58

## 2017-05-29 RX ADMIN — INSULIN LISPRO 10 UNITS: 100 INJECTION, SOLUTION INTRAVENOUS; SUBCUTANEOUS at 10:22

## 2017-05-29 RX ADMIN — Medication 10 ML: at 15:01

## 2017-05-29 RX ADMIN — FUROSEMIDE 20 MG: 20 TABLET ORAL at 08:36

## 2017-05-29 RX ADMIN — FUROSEMIDE 40 MG: 10 INJECTION, SOLUTION INTRAVENOUS at 15:00

## 2017-05-29 RX ADMIN — AMLODIPINE BESYLATE 5 MG: 5 TABLET ORAL at 08:36

## 2017-05-29 RX ADMIN — Medication 10 ML: at 05:36

## 2017-05-29 RX ADMIN — CARVEDILOL 3.12 MG: 3.12 TABLET, FILM COATED ORAL at 16:58

## 2017-05-29 RX ADMIN — INSULIN LISPRO 10 UNITS: 100 INJECTION, SOLUTION INTRAVENOUS; SUBCUTANEOUS at 13:11

## 2017-05-29 RX ADMIN — WARFARIN SODIUM 4 MG: 4 TABLET ORAL at 16:58

## 2017-05-29 RX ADMIN — Medication 150 MG: at 16:58

## 2017-05-29 RX ADMIN — GUAIFENESIN 600 MG: 600 TABLET, EXTENDED RELEASE ORAL at 08:35

## 2017-05-29 RX ADMIN — Medication 3 MG: at 21:17

## 2017-05-29 RX ADMIN — ASPIRIN 81 MG: 81 TABLET, CHEWABLE ORAL at 08:36

## 2017-05-29 RX ADMIN — GUAIFENESIN 600 MG: 600 TABLET, EXTENDED RELEASE ORAL at 21:17

## 2017-05-29 RX ADMIN — INSULIN LISPRO 10 UNITS: 100 INJECTION, SOLUTION INTRAVENOUS; SUBCUTANEOUS at 19:47

## 2017-05-29 ASSESSMENT — PAIN SCALES - GENERAL
PAIN_LEVEL_AT_REST: 0

## 2017-05-30 LAB
ANION GAP SERPL CALC-SCNC: 10 MMOL/L (ref 10–20)
AORTIC VALVE AREA: 1.7 CM2
AV MEAN GRADIENT (AVMG): 2.8 MMHG
AV PEAK GRADIENT (AVPG): 8.1 MMHG
AV PEAK VELOCITY (AVPV): 1.4 M/S
BASOPHILS # BLD AUTO: 0 K/MCL (ref 0–0.3)
BASOPHILS NFR BLD AUTO: 0 %
BUN SERPL-MCNC: 32 MG/DL (ref 6–20)
BUN/CREAT SERPL: 23 (ref 7–25)
CALCIUM SERPL-MCNC: 8.3 MG/DL (ref 8.4–10.2)
CHLORIDE SERPL-SCNC: 104 MMOL/L (ref 98–107)
CO2 SERPL-SCNC: 27 MMOL/L (ref 21–32)
CREAT SERPL-MCNC: 1.42 MG/DL (ref 0.51–0.95)
DIFFERENTIAL METHOD BLD: ABNORMAL
DOP CALC LVOT PEAK VEL (LVOTPV): 0.8 M/S
EOSINOPHIL # BLD AUTO: 0.2 K/MCL (ref 0.1–0.5)
EOSINOPHIL NFR SPEC: 2 %
ERYTHROCYTE [DISTWIDTH] IN BLOOD: 14.8 % (ref 11–15)
GLUCOSE BLDC GLUCOMTR-MCNC: 131 MG/DL (ref 65–99)
GLUCOSE BLDC GLUCOMTR-MCNC: 153 MG/DL (ref 65–99)
GLUCOSE BLDC GLUCOMTR-MCNC: 77 MG/DL (ref 65–99)
GLUCOSE BLDC GLUCOMTR-MCNC: 94 MG/DL (ref 65–99)
GLUCOSE SERPL-MCNC: 102 MG/DL (ref 65–99)
HCT VFR BLD CALC: 25.2 % (ref 36–46.5)
HGB BLD-MCNC: 7.9 G/DL (ref 12–15.5)
INR PPP: 2.8
INTERVENTRICULAR SEPTUM IN END DIASTOLE (IVSD): 0.9 CM
LEFT INTERNAL DIMENSION IN SYSTOLE (LVSD): 1.3 CM
LEFT VENTRICULAR INTERNAL DIMENSION IN DIASTOLE (LVDD): 4.7 CM
LV EF: 30 %
LVOT 2D (LVOTD): 1.7 CM
LVOT VTI (LVOTVTI): 13.1 CM
LYMPHOCYTES # BLD MANUAL: 1.3 K/MCL (ref 1–4)
LYMPHOCYTES NFR BLD MANUAL: 21 %
MCH RBC QN AUTO: 28.8 PG (ref 26–34)
MCHC RBC AUTO-ENTMCNC: 31.3 G/DL (ref 32–36.5)
MCV RBC AUTO: 92 FL (ref 78–100)
MONOCYTES # BLD MANUAL: 0.5 K/MCL (ref 0.3–0.9)
MONOCYTES NFR BLD MANUAL: 8 %
NEUTROPHILS # BLD AUTO: 4.4 K/MCL (ref 1.8–7.7)
NEUTROPHILS NFR BLD AUTO: 69 %
PLATELET # BLD: 303 K/MCL (ref 140–450)
POTASSIUM SERPL-SCNC: 4 MMOL/L (ref 3.4–5.1)
PROTHROMBIN TIME: 30.1 SEC (ref 9.7–11.8)
RBC # BLD: 2.74 MIL/MCL (ref 4–5.2)
SINUSES OF VALSALVA (SVD): 3.1 CM
SODIUM SERPL-SCNC: 137 MMOL/L (ref 135–145)
WBC # BLD: 6.4 K/MCL (ref 4.2–11)

## 2017-05-30 PROCEDURE — 10004651 HB RX, NO CHARGE ITEM: Performed by: THORACIC SURGERY (CARDIOTHORACIC VASCULAR SURGERY)

## 2017-05-30 PROCEDURE — 10002803 HB RX 637: Performed by: NURSE PRACTITIONER

## 2017-05-30 PROCEDURE — 10002803 HB RX 637: Performed by: PHYSICIAN ASSISTANT

## 2017-05-30 PROCEDURE — 97530 THERAPEUTIC ACTIVITIES: CPT

## 2017-05-30 PROCEDURE — 10002803 HB RX 637: Performed by: THORACIC SURGERY (CARDIOTHORACIC VASCULAR SURGERY)

## 2017-05-30 PROCEDURE — 10003441 HB CARDIAC REHAB PHASE 1

## 2017-05-30 PROCEDURE — 99232 SBSQ HOSP IP/OBS MODERATE 35: CPT | Performed by: INTERNAL MEDICINE

## 2017-05-30 PROCEDURE — 10004172 HB COUNTER-THERAPY VISIT OT

## 2017-05-30 PROCEDURE — 10002803 HB RX 637: Performed by: INTERNAL MEDICINE

## 2017-05-30 PROCEDURE — 97535 SELF CARE MNGMENT TRAINING: CPT

## 2017-05-30 PROCEDURE — 10003445 HB TELEMETRY PER DAY

## 2017-05-30 PROCEDURE — 82962 GLUCOSE BLOOD TEST: CPT

## 2017-05-30 PROCEDURE — 10004173 HB COUNTER-THERAPY VISIT PT

## 2017-05-30 PROCEDURE — 36415 COLL VENOUS BLD VENIPUNCTURE: CPT

## 2017-05-30 PROCEDURE — 97116 GAIT TRAINING THERAPY: CPT

## 2017-05-30 PROCEDURE — 80048 BASIC METABOLIC PNL TOTAL CA: CPT

## 2017-05-30 PROCEDURE — 10000002 HB ROOM CHARGE MED SURG

## 2017-05-30 PROCEDURE — 10002800 HB RX 250 W HCPCS: Performed by: INTERNAL MEDICINE

## 2017-05-30 PROCEDURE — 10004651 HB RX, NO CHARGE ITEM: Performed by: PHYSICIAN ASSISTANT

## 2017-05-30 PROCEDURE — 10004325 HB COUNTER ASSESSMENT EA 15 MIN

## 2017-05-30 PROCEDURE — 85025 COMPLETE CBC W/AUTO DIFF WBC: CPT

## 2017-05-30 PROCEDURE — 85610 PROTHROMBIN TIME: CPT

## 2017-05-30 RX ORDER — IRON POLYSACCHARIDE COMPLEX 150 MG
150 CAPSULE ORAL
Status: SHIPPED | INPATIENT
Start: 2017-05-30

## 2017-05-30 RX ORDER — WARFARIN SODIUM 5 MG/1
5 TABLET ORAL ONCE
Status: DISCONTINUED | OUTPATIENT
Start: 2017-05-30 | End: 2017-05-30

## 2017-05-30 RX ORDER — WARFARIN SODIUM 5 MG/1
TABLET ORAL
Status: SHIPPED | INPATIENT
Start: 2017-05-30 | End: 2017-05-31

## 2017-05-30 RX ORDER — FUROSEMIDE 40 MG/1
40 TABLET ORAL DAILY
Status: ON HOLD | INPATIENT
Start: 2017-05-30 | End: 2017-06-23 | Stop reason: HOSPADM

## 2017-05-30 RX ADMIN — Medication 10 ML: at 20:25

## 2017-05-30 RX ADMIN — ASCORBIC ACID TAB 250 MG 250 MG: 250 TAB at 17:20

## 2017-05-30 RX ADMIN — AMLODIPINE BESYLATE 5 MG: 5 TABLET ORAL at 08:53

## 2017-05-30 RX ADMIN — INSULIN LISPRO 10 UNITS: 100 INJECTION, SOLUTION INTRAVENOUS; SUBCUTANEOUS at 08:49

## 2017-05-30 RX ADMIN — CARVEDILOL 3.12 MG: 3.12 TABLET, FILM COATED ORAL at 08:53

## 2017-05-30 RX ADMIN — GUAIFENESIN 600 MG: 600 TABLET, EXTENDED RELEASE ORAL at 20:24

## 2017-05-30 RX ADMIN — GUAIFENESIN 600 MG: 600 TABLET, EXTENDED RELEASE ORAL at 08:52

## 2017-05-30 RX ADMIN — INSULIN GLARGINE 36 UNITS: 100 INJECTION, SOLUTION SUBCUTANEOUS at 08:53

## 2017-05-30 RX ADMIN — Medication 3 MG: at 20:24

## 2017-05-30 RX ADMIN — FUROSEMIDE 40 MG: 40 TABLET ORAL at 08:52

## 2017-05-30 RX ADMIN — SENNA AND DOCUSATE SODIUM 2 TABLET: 50; 8.6 TABLET, FILM COATED ORAL at 08:53

## 2017-05-30 RX ADMIN — Medication 10 ML: at 14:20

## 2017-05-30 RX ADMIN — INSULIN LISPRO 10 UNITS: 100 INJECTION, SOLUTION INTRAVENOUS; SUBCUTANEOUS at 17:19

## 2017-05-30 RX ADMIN — FOLIC ACID 1 MG: 1 TABLET ORAL at 08:53

## 2017-05-30 RX ADMIN — Medication 150 MG: at 17:20

## 2017-05-30 RX ADMIN — CARVEDILOL 3.12 MG: 3.12 TABLET, FILM COATED ORAL at 17:21

## 2017-05-30 RX ADMIN — INSULIN LISPRO 8 UNITS: 100 INJECTION, SOLUTION INTRAVENOUS; SUBCUTANEOUS at 12:30

## 2017-05-30 RX ADMIN — Medication 10 ML: at 05:23

## 2017-05-30 RX ADMIN — WARFARIN SODIUM 4 MG: 4 TABLET ORAL at 17:21

## 2017-05-30 RX ADMIN — FAMOTIDINE 20 MG: 20 TABLET, FILM COATED ORAL at 20:24

## 2017-05-30 RX ADMIN — ASPIRIN 81 MG: 81 TABLET, CHEWABLE ORAL at 08:53

## 2017-05-30 ASSESSMENT — ENCOUNTER SYMPTOMS
NAUSEA: 0
FEVER: 0
DIARRHEA: 0
VOMITING: 0
COUGH: 0
PAIN SEVERITY NOW: MILD
SHORTNESS OF BREATH: 0
WEAKNESS: 1
DIETARY ISSUES: ADEQUATE INTAKE
SUBJECTIVE PATIENT PAIN CONTROL: WELL CONTROLLED

## 2017-05-30 ASSESSMENT — PAIN SCALES - GENERAL
PAIN_LEVEL_AT_REST: 0
PAIN_LEVEL_WITH_ACTIVITY: 0
PAIN_LEVEL_AT_REST: 0

## 2017-05-31 ENCOUNTER — CASE MANAGEMENT (OUTPATIENT)
Dept: FAMILY MEDICINE | Age: 57
End: 2017-05-31

## 2017-05-31 ENCOUNTER — HOSPITAL ENCOUNTER (EMERGENCY)
Age: 57
End: 2017-05-31

## 2017-05-31 VITALS
HEART RATE: 91 BPM | HEIGHT: 71 IN | TEMPERATURE: 98.3 F | WEIGHT: 260.14 LBS | RESPIRATION RATE: 20 BRPM | DIASTOLIC BLOOD PRESSURE: 65 MMHG | OXYGEN SATURATION: 97 % | BODY MASS INDEX: 36.42 KG/M2 | SYSTOLIC BLOOD PRESSURE: 113 MMHG

## 2017-05-31 LAB
GLUCOSE BLDC GLUCOMTR-MCNC: 111 MG/DL (ref 65–99)
GLUCOSE BLDC GLUCOMTR-MCNC: 65 MG/DL (ref 65–99)
GLUCOSE BLDC GLUCOMTR-MCNC: 79 MG/DL (ref 65–99)
GLUCOSE BLDC GLUCOMTR-MCNC: 97 MG/DL (ref 65–99)
INR PPP: 2.6
PROTHROMBIN TIME: 27.7 SEC (ref 9.7–11.8)

## 2017-05-31 PROCEDURE — 10002803 HB RX 637: Performed by: NURSE PRACTITIONER

## 2017-05-31 PROCEDURE — 10004172 HB COUNTER-THERAPY VISIT OT

## 2017-05-31 PROCEDURE — 99232 SBSQ HOSP IP/OBS MODERATE 35: CPT | Performed by: INTERNAL MEDICINE

## 2017-05-31 PROCEDURE — 82962 GLUCOSE BLOOD TEST: CPT

## 2017-05-31 PROCEDURE — 10004651 HB RX, NO CHARGE ITEM: Performed by: PHYSICIAN ASSISTANT

## 2017-05-31 PROCEDURE — 10002803 HB RX 637: Performed by: INTERNAL MEDICINE

## 2017-05-31 PROCEDURE — 10002803 HB RX 637: Performed by: PHYSICIAN ASSISTANT

## 2017-05-31 PROCEDURE — 10003441 HB CARDIAC REHAB PHASE 1

## 2017-05-31 PROCEDURE — 97535 SELF CARE MNGMENT TRAINING: CPT

## 2017-05-31 PROCEDURE — 10002800 HB RX 250 W HCPCS: Performed by: INTERNAL MEDICINE

## 2017-05-31 PROCEDURE — 10004651 HB RX, NO CHARGE ITEM: Performed by: THORACIC SURGERY (CARDIOTHORACIC VASCULAR SURGERY)

## 2017-05-31 PROCEDURE — 85610 PROTHROMBIN TIME: CPT

## 2017-05-31 RX ORDER — WARFARIN SODIUM 2 MG/1
TABLET ORAL
Status: ON HOLD | INPATIENT
Start: 2017-05-31 | End: 2017-06-23 | Stop reason: HOSPADM

## 2017-05-31 RX ORDER — INSULIN GLARGINE 100 [IU]/ML
28 INJECTION, SOLUTION SUBCUTANEOUS DAILY
Status: DISCONTINUED | OUTPATIENT
Start: 2017-06-01 | End: 2017-05-31 | Stop reason: HOSPADM

## 2017-05-31 RX ORDER — INSULIN LISPRO 100 [IU]/ML
8 INJECTION, SOLUTION INTRAVENOUS; SUBCUTANEOUS
Qty: 10 ML | Refills: 12 | Status: SHIPPED | OUTPATIENT
Start: 2017-05-31 | End: 2017-11-08 | Stop reason: SDUPTHER

## 2017-05-31 RX ORDER — INSULIN GLARGINE 100 [IU]/ML
28 INJECTION, SOLUTION SUBCUTANEOUS DAILY
Qty: 10 ML | Refills: 12 | Status: SHIPPED | OUTPATIENT
Start: 2017-06-01

## 2017-05-31 RX ADMIN — Medication 10 ML: at 05:46

## 2017-05-31 RX ADMIN — INSULIN LISPRO 6 UNITS: 100 INJECTION, SOLUTION INTRAVENOUS; SUBCUTANEOUS at 13:23

## 2017-05-31 RX ADMIN — ASCORBIC ACID TAB 250 MG 250 MG: 250 TAB at 17:01

## 2017-05-31 RX ADMIN — GUAIFENESIN 600 MG: 600 TABLET, EXTENDED RELEASE ORAL at 08:31

## 2017-05-31 RX ADMIN — WARFARIN SODIUM 4 MG: 4 TABLET ORAL at 17:01

## 2017-05-31 RX ADMIN — COLLAGENASE SANTYL: 250 OINTMENT TOPICAL at 08:33

## 2017-05-31 RX ADMIN — CARVEDILOL 3.12 MG: 3.12 TABLET, FILM COATED ORAL at 08:32

## 2017-05-31 RX ADMIN — Medication 150 MG: at 17:01

## 2017-05-31 RX ADMIN — ASPIRIN 81 MG: 81 TABLET, CHEWABLE ORAL at 08:31

## 2017-05-31 RX ADMIN — FOLIC ACID 1 MG: 1 TABLET ORAL at 08:31

## 2017-05-31 RX ADMIN — INSULIN LISPRO 10 UNITS: 100 INJECTION, SOLUTION INTRAVENOUS; SUBCUTANEOUS at 08:31

## 2017-05-31 RX ADMIN — FUROSEMIDE 40 MG: 40 TABLET ORAL at 08:31

## 2017-05-31 RX ADMIN — CARVEDILOL 3.12 MG: 3.12 TABLET, FILM COATED ORAL at 17:01

## 2017-05-31 RX ADMIN — SENNA AND DOCUSATE SODIUM 2 TABLET: 50; 8.6 TABLET, FILM COATED ORAL at 08:32

## 2017-05-31 RX ADMIN — AMLODIPINE BESYLATE 5 MG: 5 TABLET ORAL at 08:31

## 2017-05-31 RX ADMIN — INSULIN GLARGINE 36 UNITS: 100 INJECTION, SOLUTION SUBCUTANEOUS at 08:32

## 2017-05-31 ASSESSMENT — PAIN SCALES - GENERAL
PAIN_LEVEL_AT_REST: 0
PAIN_LEVEL_AT_REST: 0

## 2017-05-31 ASSESSMENT — ENCOUNTER SYMPTOMS
DIARRHEA: 0
DIETARY ISSUES: ADEQUATE INTAKE
VOMITING: 0
FEVER: 0
COUGH: 0
SUBJECTIVE PATIENT PAIN CONTROL: WELL CONTROLLED
WEAKNESS: 1
SHORTNESS OF BREATH: 0
PAIN SEVERITY NOW: MILD
NAUSEA: 0

## 2017-06-01 ENCOUNTER — TELEPHONE (OUTPATIENT)
Dept: FAMILY MEDICINE | Age: 57
End: 2017-06-01

## 2017-06-01 ENCOUNTER — TELEPHONE (OUTPATIENT)
Dept: CARDIOLOGY | Age: 57
End: 2017-06-01

## 2017-06-01 ENCOUNTER — OFF PREMISE (OUTPATIENT)
Dept: OTHER | Age: 57
End: 2017-06-01

## 2017-06-01 LAB
ANION GAP SERPL CALC-SCNC: 14 MMOL/L (ref 10–18)
BASOPHILS # BLD: 0.03 10E3/UL (ref 0.01–0.1)
BASOPHILS NFR BLD: 1 % (ref 0–1)
BUN SERPL-MCNC: 29 MG/DL (ref 6–23)
CALCIUM SERPL-MCNC: 8.8 MG/DL (ref 8.6–10.2)
CHLORIDE SERPL-SCNC: 103 MMOL/L (ref 96–105)
CO2 SERPL-SCNC: 23 MMOL/L (ref 22–29)
CREAT SERPL-MCNC: 1.36 MG/DL (ref 0.5–1.1)
EOSINOPHIL # BLD: 0.13 10E3/UL (ref 0.03–0.52)
EOSINOPHIL NFR BLD: 2 % (ref 0–6)
ERYTHROCYTE [DISTWIDTH] IN BLOOD: 14.5 % (ref 11–14.9)
GLUCOSE SERPL-MCNC: 160 MG/DL (ref 65–99)
HCT VFR BLD CALC: 25 % (ref 34–45)
HGB BLD-MCNC: 7.8 G/DL (ref 11.3–15.1)
IMMATURE GRANULOCYTES (OFFPRE25): 0 % (ref 0–1)
INR PPP: 2.4
LYMPHOCYTES # BLD: 1.11 10E3/UL (ref 0.9–3.2)
LYMPHOCYTES NFR BLD: 19 % (ref 17–46)
MCH RBC QN AUTO: 28.8 PG (ref 25.7–34.1)
MCHC RBC AUTO-ENTMCNC: 31 G/DL (ref 32–36)
MCV RBC AUTO: 93 FL (ref 79–98)
MONOCYTES # BLD: 0.38 10E3/UL (ref 0.26–0.86)
MONOCYTES NFR BLD: 7 % (ref 4–13)
MPV (OFFPRE2): 11.5 FL (ref 9–11.8)
NEUTROPHILS # BLD: 4.23 10E3/UL (ref 1.9–7.8)
NEUTROPHILS NFR BLD: 71 % (ref 43–74)
PLATELET # BLD: 289 10E3/UL (ref 165–366)
POTASSIUM SERPL-SCNC: 4.2 MMOL/L (ref 3.4–5.1)
PROTHROMBIN TIME: 24.1 SEC (ref 9.5–11.8)
RBC # BLD: 2.7 10E6/UL (ref 3.7–5.2)
SODIUM SERPL-SCNC: 141 MMOL/L (ref 136–145)
WBC # BLD: 5.9 10E3/UL (ref 3.9–11.2)

## 2017-06-02 ENCOUNTER — ANTI-COAG (OUTPATIENT)
Dept: CARDIOLOGY | Age: 57
End: 2017-06-02

## 2017-06-02 ENCOUNTER — TELEPHONE (OUTPATIENT)
Dept: FAMILY MEDICINE | Age: 57
End: 2017-06-02

## 2017-06-02 ENCOUNTER — CASE MANAGEMENT (OUTPATIENT)
Dept: FAMILY MEDICINE | Age: 57
End: 2017-06-02

## 2017-06-02 ENCOUNTER — TELEPHONE (OUTPATIENT)
Dept: CARDIOLOGY | Age: 57
End: 2017-06-02

## 2017-06-02 DIAGNOSIS — Z95.3 S/P MITRAL VALVE REPLACEMENT WITH BIOPROSTHETIC VALVE: ICD-10-CM

## 2017-06-04 ENCOUNTER — OFF PREMISE (OUTPATIENT)
Dept: OTHER | Age: 57
End: 2017-06-04

## 2017-06-04 LAB — HEMOCCULT STL QL: NEGATIVE

## 2017-06-05 ENCOUNTER — OFF PREMISE (OUTPATIENT)
Dept: OTHER | Age: 57
End: 2017-06-05

## 2017-06-05 LAB
BASOPHILS # BLD: 0.03 10E3/UL (ref 0.01–0.1)
BASOPHILS NFR BLD: 1 % (ref 0–1)
EOSINOPHIL # BLD: 0.24 10E3/UL (ref 0.03–0.52)
EOSINOPHIL NFR BLD: 5 % (ref 0–6)
ERYTHROCYTE [DISTWIDTH] IN BLOOD: 14.4 % (ref 11–14.9)
HCT VFR BLD CALC: 25 % (ref 34–45)
HGB BLD-MCNC: 7.9 G/DL (ref 11.3–15.1)
IMMATURE GRANULOCYTES (OFFPRE25): 0 % (ref 0–1)
LYMPHOCYTES # BLD: 1.23 10E3/UL (ref 0.9–3.2)
LYMPHOCYTES NFR BLD: 24 % (ref 17–46)
MCH RBC QN AUTO: 29 PG (ref 25.7–34.1)
MCHC RBC AUTO-ENTMCNC: 32 G/DL (ref 32–36)
MCV RBC AUTO: 92 FL (ref 79–98)
MONOCYTES # BLD: 0.31 10E3/UL (ref 0.26–0.86)
MONOCYTES NFR BLD: 6 % (ref 4–13)
MPV (OFFPRE2): 11.6 FL (ref 9–11.8)
NEUTROPHILS # BLD: 3.33 10E3/UL (ref 1.9–7.8)
NEUTROPHILS NFR BLD: 64 % (ref 43–74)
PLATELET # BLD: 299 10E3/UL (ref 165–366)
RBC # BLD: 2.7 10E6/UL (ref 3.7–5.2)
WBC # BLD: 5.2 10E3/UL (ref 3.9–11.2)

## 2017-06-06 RX ORDER — TRAMADOL HYDROCHLORIDE 50 MG/1
50 TABLET ORAL EVERY 6 HOURS PRN
Qty: 30 TABLET | Refills: 0 | Status: ON HOLD | OUTPATIENT
Start: 2017-06-06 | End: 2017-06-08 | Stop reason: CLARIF

## 2017-06-07 ENCOUNTER — ANTI-COAG (OUTPATIENT)
Dept: CARDIOLOGY | Age: 57
End: 2017-06-07

## 2017-06-07 ENCOUNTER — OFF PREMISE (OUTPATIENT)
Dept: OTHER | Age: 57
End: 2017-06-07

## 2017-06-07 ENCOUNTER — TELEPHONE (OUTPATIENT)
Dept: FAMILY MEDICINE | Age: 57
End: 2017-06-07

## 2017-06-07 ENCOUNTER — HOSPITAL ENCOUNTER (EMERGENCY)
Age: 57
Discharge: NURSING, REHAB OR LONG-TERM CARE | End: 2017-06-07
Attending: EMERGENCY MEDICINE

## 2017-06-07 VITALS
RESPIRATION RATE: 28 BRPM | OXYGEN SATURATION: 95 % | HEART RATE: 94 BPM | DIASTOLIC BLOOD PRESSURE: 79 MMHG | SYSTOLIC BLOOD PRESSURE: 150 MMHG

## 2017-06-07 DIAGNOSIS — Z79.01 LONG TERM (CURRENT) USE OF ANTICOAGULANTS: Primary | ICD-10-CM

## 2017-06-07 DIAGNOSIS — I47.29 NONSUSTAINED VENTRICULAR TACHYCARDIA (CMD): ICD-10-CM

## 2017-06-07 DIAGNOSIS — Z95.3 S/P MITRAL VALVE REPLACEMENT WITH BIOPROSTHETIC VALVE: ICD-10-CM

## 2017-06-07 DIAGNOSIS — R00.0 WIDE-COMPLEX TACHYCARDIA: Primary | ICD-10-CM

## 2017-06-07 DIAGNOSIS — E83.42 HYPOMAGNESEMIA: ICD-10-CM

## 2017-06-07 LAB
ALBUMIN SERPL-MCNC: 2.6 G/DL (ref 3.6–5.1)
ALBUMIN/GLOB SERPL: 0.7 {RATIO} (ref 1–2.4)
ALP SERPL-CCNC: 107 UNITS/L (ref 45–117)
ALT SERPL-CCNC: 23 UNITS/L
ANION GAP SERPL CALC-SCNC: 10 MMOL/L (ref 10–20)
AST SERPL-CCNC: 19 UNITS/L
ATRIAL RATE (BPM): 154
ATRIAL RATE (BPM): 98
BILIRUB SERPL-MCNC: 0.3 MG/DL (ref 0.2–1)
BUN SERPL-MCNC: 32 MG/DL (ref 6–20)
BUN/CREAT SERPL: 21 (ref 7–25)
CALCIUM SERPL-MCNC: 8.5 MG/DL (ref 8.4–10.2)
CHLORIDE SERPL-SCNC: 103 MMOL/L (ref 98–107)
CO2 SERPL-SCNC: 29 MMOL/L (ref 21–32)
CREAT SERPL-MCNC: 1.52 MG/DL (ref 0.51–0.95)
DIASTOLIC BLOOD PRESSURE: 87
DIASTOLIC BLOOD PRESSURE: 87
GLOBULIN SER-MCNC: 4 G/DL (ref 2–4)
GLUCOSE SERPL-MCNC: 100 MG/DL (ref 65–99)
INR PPP: 1.6
INR PPP: 1.6
MAGNESIUM SERPL-MCNC: 1.4 MG/DL (ref 1.7–2.4)
P AXIS (DEGREES): 70
POTASSIUM SERPL-SCNC: 3.7 MMOL/L (ref 3.4–5.1)
PR-INTERVAL (MSEC): 148
PROT SERPL-MCNC: 6.6 G/DL (ref 6.4–8.2)
PROTHROMBIN TIME: 15.9 SEC (ref 9.5–11.8)
QRS-INTERVAL (MSEC): 108
QRS-INTERVAL (MSEC): 140
QT-INTERVAL (MSEC): 336
QT-INTERVAL (MSEC): 378
QTC: 482
QTC: 538
R AXIS (DEGREES): -58
R AXIS (DEGREES): 47
REPORT TEXT: NORMAL
REPORT TEXT: NORMAL
SODIUM SERPL-SCNC: 138 MMOL/L (ref 135–145)
SYSTOLIC BLOOD PRESSURE: 144
SYSTOLIC BLOOD PRESSURE: 144
T AXIS (DEGREES): 111
T AXIS (DEGREES): 128
VENTRICULAR RATE EKG/MIN (BPM): 154
VENTRICULAR RATE EKG/MIN (BPM): 98

## 2017-06-07 PROCEDURE — 83735 ASSAY OF MAGNESIUM: CPT

## 2017-06-07 PROCEDURE — 93005 ELECTROCARDIOGRAM TRACING: CPT | Performed by: EMERGENCY MEDICINE

## 2017-06-07 PROCEDURE — 10002807 HB RX 258: Performed by: EMERGENCY MEDICINE

## 2017-06-07 PROCEDURE — 10002800 HB RX 250 W HCPCS: Performed by: EMERGENCY MEDICINE

## 2017-06-07 PROCEDURE — 99285 EMERGENCY DEPT VISIT HI MDM: CPT | Performed by: EMERGENCY MEDICINE

## 2017-06-07 PROCEDURE — 10002803 HB RX 637: Performed by: EMERGENCY MEDICINE

## 2017-06-07 PROCEDURE — 36415 COLL VENOUS BLD VENIPUNCTURE: CPT

## 2017-06-07 PROCEDURE — 99284 EMERGENCY DEPT VISIT MOD MDM: CPT

## 2017-06-07 PROCEDURE — 99282 EMERGENCY DEPT VISIT SF MDM: CPT

## 2017-06-07 PROCEDURE — 80053 COMPREHEN METABOLIC PANEL: CPT

## 2017-06-07 PROCEDURE — 96365 THER/PROPH/DIAG IV INF INIT: CPT

## 2017-06-07 RX ORDER — AMIODARONE HYDROCHLORIDE 200 MG/1
TABLET ORAL
Qty: 60 TABLET | Refills: 0 | Status: ON HOLD | OUTPATIENT
Start: 2017-06-07 | End: 2017-06-23 | Stop reason: HOSPADM

## 2017-06-07 RX ORDER — AMIODARONE HYDROCHLORIDE 200 MG/1
400 TABLET ORAL ONCE
Status: COMPLETED | OUTPATIENT
Start: 2017-06-07 | End: 2017-06-07

## 2017-06-07 RX ORDER — DEXTROSE MONOHYDRATE 50 MG/ML
INJECTION, SOLUTION INTRAVENOUS
Status: DISCONTINUED
Start: 2017-06-07 | End: 2017-06-07 | Stop reason: HOSPADM

## 2017-06-07 RX ORDER — AMIODARONE HYDROCHLORIDE 50 MG/ML
150 INJECTION, SOLUTION INTRAVENOUS ONCE
Status: DISCONTINUED | OUTPATIENT
Start: 2017-06-07 | End: 2017-06-07

## 2017-06-07 RX ADMIN — MAGNESIUM SULFATE HEPTAHYDRATE 2 G: 500 INJECTION, SOLUTION INTRAMUSCULAR; INTRAVENOUS at 17:45

## 2017-06-07 RX ADMIN — AMIODARONE HYDROCHLORIDE 150 MG: 50 INJECTION, SOLUTION INTRAVENOUS at 18:21

## 2017-06-07 RX ADMIN — AMIODARONE HYDROCHLORIDE 400 MG: 200 TABLET ORAL at 18:24

## 2017-06-08 ENCOUNTER — APPOINTMENT (OUTPATIENT)
Dept: GENERAL RADIOLOGY | Age: 57
DRG: 264 | End: 2017-06-08
Attending: INTERNAL MEDICINE

## 2017-06-08 ENCOUNTER — HOSPITAL ENCOUNTER (EMERGENCY)
Age: 57
Discharge: ANOTHER HOSPITAL | End: 2017-06-08
Attending: EMERGENCY MEDICINE

## 2017-06-08 ENCOUNTER — HOSPITAL ENCOUNTER (INPATIENT)
Age: 57
LOS: 15 days | Discharge: NURSING, REHAB OR LONG-TERM CARE | DRG: 264 | End: 2017-06-23
Attending: INTERNAL MEDICINE | Admitting: INTERNAL MEDICINE

## 2017-06-08 ENCOUNTER — SURGERY (OUTPATIENT)
Age: 57
End: 2017-06-08

## 2017-06-08 ENCOUNTER — APPOINTMENT (OUTPATIENT)
Dept: OTHER | Age: 57
DRG: 264 | End: 2017-06-08
Attending: INTERNAL MEDICINE

## 2017-06-08 VITALS
RESPIRATION RATE: 18 BRPM | SYSTOLIC BLOOD PRESSURE: 113 MMHG | BODY MASS INDEX: 37.8 KG/M2 | HEART RATE: 80 BPM | OXYGEN SATURATION: 98 % | DIASTOLIC BLOOD PRESSURE: 70 MMHG | TEMPERATURE: 98 F | WEIGHT: 264 LBS | HEIGHT: 70 IN

## 2017-06-08 DIAGNOSIS — Z95.1 S/P CABG X 4: ICD-10-CM

## 2017-06-08 DIAGNOSIS — Z87.74 S/P PATENT FORAMEN OVALE CLOSURE: ICD-10-CM

## 2017-06-08 DIAGNOSIS — T81.89XD NON-HEALING SURGICAL WOUND, SUBSEQUENT ENCOUNTER: ICD-10-CM

## 2017-06-08 DIAGNOSIS — I47.29 VENTRICULAR TACHYCARDIA, NONSUSTAINED (CMD): Primary | ICD-10-CM

## 2017-06-08 DIAGNOSIS — Z51.81 ANTICOAGULATION GOAL OF INR 2 TO 3: ICD-10-CM

## 2017-06-08 DIAGNOSIS — Z98.890 S/P TRICUSPID VALVE REPAIR: ICD-10-CM

## 2017-06-08 DIAGNOSIS — Z95.3 S/P MITRAL VALVE REPLACEMENT WITH BIOPROSTHETIC VALVE: ICD-10-CM

## 2017-06-08 DIAGNOSIS — Z01.89 ENCOUNTER FOR REHABILITATION EVALUATION: ICD-10-CM

## 2017-06-08 DIAGNOSIS — I47.20 VT (VENTRICULAR TACHYCARDIA) (CMD): ICD-10-CM

## 2017-06-08 DIAGNOSIS — D75.829 HEPARIN INDUCED THROMBOCYTOPENIA (CMD): Primary | ICD-10-CM

## 2017-06-08 DIAGNOSIS — Z79.01 ANTICOAGULATION GOAL OF INR 2 TO 3: ICD-10-CM

## 2017-06-08 LAB
ALBUMIN SERPL-MCNC: 2.5 G/DL (ref 3.6–5.1)
ALBUMIN/GLOB SERPL: 0.7 {RATIO} (ref 1–2.4)
ALP SERPL-CCNC: 110 UNITS/L (ref 45–117)
ALT SERPL-CCNC: 23 UNITS/L
ANION GAP SERPL CALC-SCNC: 11 MMOL/L (ref 10–20)
ANION GAP SERPL CALC-SCNC: 14 MMOL/L (ref 10–20)
ANION GAP SERPL CALC-SCNC: 17 MMOL/L (ref 10–20)
APTT PPP: 30 SEC (ref 22–30)
APTT PPP: 70 SEC (ref 22–30)
AST SERPL-CCNC: 12 UNITS/L
ATRIAL RATE (BPM): 86
BASE DEFICIT BLDA-SCNC: 2 MMOL/L (ref 0–2)
BASOPHILS # BLD AUTO: 0 K/MCL (ref 0–0.3)
BASOPHILS # BLD AUTO: 0 K/MCL (ref 0–0.3)
BASOPHILS NFR BLD AUTO: 0 %
BASOPHILS NFR BLD AUTO: 1 %
BILIRUB SERPL-MCNC: 0.4 MG/DL (ref 0.2–1)
BNP SERPL-MCNC: 660 PG/ML
BUN SERPL-MCNC: 27 MG/DL (ref 6–20)
BUN SERPL-MCNC: 31 MG/DL (ref 6–20)
BUN SERPL-MCNC: 32 MG/DL (ref 6–20)
BUN/CREAT SERPL: 19 (ref 7–25)
BUN/CREAT SERPL: 20 (ref 7–25)
BUN/CREAT SERPL: 21 (ref 7–25)
CA-I BLD ISE-SCNC: 1.18 MMOL/L (ref 1.15–1.29)
CALCIUM SERPL-MCNC: 8 MG/DL (ref 8.4–10.2)
CALCIUM SERPL-MCNC: 8.6 MG/DL (ref 8.4–10.2)
CALCIUM SERPL-MCNC: 8.9 MG/DL (ref 8.4–10.2)
CHLORIDE BLD-SCNC: 99 MMOL/L (ref 98–107)
CHLORIDE SERPL-SCNC: 101 MMOL/L (ref 98–107)
CHLORIDE SERPL-SCNC: 103 MMOL/L (ref 98–107)
CHLORIDE SERPL-SCNC: 97 MMOL/L (ref 98–107)
CO2 BLD-SCNC: 24 MMOL/L (ref 19–24)
CO2 SERPL-SCNC: 24 MMOL/L (ref 21–32)
CO2 SERPL-SCNC: 27 MMOL/L (ref 21–32)
CO2 SERPL-SCNC: 28 MMOL/L (ref 21–32)
CREAT SERPL-MCNC: 1.44 MG/DL (ref 0.51–0.95)
CREAT SERPL-MCNC: 1.49 MG/DL (ref 0.51–0.95)
CREAT SERPL-MCNC: 1.55 MG/DL (ref 0.51–0.95)
CROSSMATCH EXPIRE: NORMAL
DIFFERENTIAL METHOD BLD: ABNORMAL
DIFFERENTIAL METHOD BLD: ABNORMAL
EOSINOPHIL # BLD AUTO: 0.1 K/MCL (ref 0.1–0.5)
EOSINOPHIL # BLD AUTO: 0.3 K/MCL (ref 0.1–0.5)
EOSINOPHIL NFR SPEC: 1 %
EOSINOPHIL NFR SPEC: 6 %
ERYTHROCYTE [DISTWIDTH] IN BLOOD: 14.6 % (ref 11–15)
ERYTHROCYTE [DISTWIDTH] IN BLOOD: 14.6 % (ref 11–15)
GLOBULIN SER-MCNC: 3.8 G/DL (ref 2–4)
GLUCOSE BLDC GLUCOMTR-MCNC: 125 MG/DL (ref 65–99)
GLUCOSE BLDC GLUCOMTR-MCNC: 154 MG/DL (ref 65–99)
GLUCOSE BLDC GLUCOMTR-MCNC: 186 MG/DL (ref 65–99)
GLUCOSE BLDC GLUCOMTR-MCNC: 244 MG/DL (ref 65–99)
GLUCOSE BLDC GLUCOMTR-MCNC: 275 MG/DL (ref 65–99)
GLUCOSE SERPL-MCNC: 114 MG/DL (ref 65–99)
GLUCOSE SERPL-MCNC: 131 MG/DL (ref 65–99)
GLUCOSE SERPL-MCNC: 354 MG/DL (ref 65–99)
HCO3 BLDA-SCNC: 22 MMOL/L (ref 22–28)
HCT VFR BLD CALC: 26.5 % (ref 36–46.5)
HCT VFR BLD CALC: 28.6 % (ref 36–46.5)
HGB BLD-MCNC: 8.3 G/DL (ref 12–15.5)
HGB BLD-MCNC: 8.8 G/DL (ref 12–15.5)
HGB BLD-MCNC: 9.3 G/DL (ref 12–15.5)
INR PPP: 1.5
LYMPHOCYTES # BLD MANUAL: 0.5 K/MCL (ref 1–4)
LYMPHOCYTES # BLD MANUAL: 1.3 K/MCL (ref 1–4)
LYMPHOCYTES NFR BLD MANUAL: 25 %
LYMPHOCYTES NFR BLD MANUAL: 6 %
MAGNESIUM SERPL-MCNC: 1.7 MG/DL (ref 1.7–2.4)
MAGNESIUM SERPL-MCNC: 2 MG/DL (ref 1.7–2.4)
MAGNESIUM SERPL-MCNC: 2.1 MG/DL (ref 1.7–2.4)
MCH RBC QN AUTO: 28 PG (ref 26–34)
MCH RBC QN AUTO: 28.3 PG (ref 26–34)
MCHC RBC AUTO-ENTMCNC: 30.8 G/DL (ref 32–36.5)
MCHC RBC AUTO-ENTMCNC: 31.3 G/DL (ref 32–36.5)
MCV RBC AUTO: 90.4 FL (ref 78–100)
MCV RBC AUTO: 91.1 FL (ref 78–100)
MONOCYTES # BLD MANUAL: 0.2 K/MCL (ref 0.3–0.9)
MONOCYTES # BLD MANUAL: 0.4 K/MCL (ref 0.3–0.9)
MONOCYTES NFR BLD MANUAL: 3 %
MONOCYTES NFR BLD MANUAL: 7 %
NEUTROPHILS # BLD AUTO: 3.1 K/MCL (ref 1.8–7.7)
NEUTROPHILS # BLD AUTO: 7.1 K/MCL (ref 1.8–7.7)
NEUTROPHILS NFR BLD AUTO: 61 %
NEUTROPHILS NFR BLD AUTO: 90 %
P AXIS (DEGREES): 57
PCO2 BLDA: 40 MM HG (ref 32–45)
PH BLDA: 7.37 UNITS (ref 7.35–7.45)
PLATELET # BLD: 297 K/MCL (ref 140–450)
PLATELET # BLD: 342 K/MCL (ref 140–450)
PO2 BLDA: 77 MM HG (ref 83–108)
POTASSIUM BLD-SCNC: 4 MMOL/L (ref 3.4–5.1)
POTASSIUM SERPL-SCNC: 3.7 MMOL/L (ref 3.4–5.1)
POTASSIUM SERPL-SCNC: 3.9 MMOL/L (ref 3.4–5.1)
POTASSIUM SERPL-SCNC: 3.9 MMOL/L (ref 3.4–5.1)
POTASSIUM SERPL-SCNC: 4 MMOL/L (ref 3.4–5.1)
PR-INTERVAL (MSEC): 152
PROT SERPL-MCNC: 6.3 G/DL (ref 6.4–8.2)
PROTHROMBIN TIME: 16.4 SEC (ref 9.7–11.8)
QRS-INTERVAL (MSEC): 106
QT-INTERVAL (MSEC): 374
QTC: 447
R AXIS (DEGREES): 51
RBC # BLD: 2.93 MIL/MCL (ref 4–5.2)
RBC # BLD: 3.14 MIL/MCL (ref 4–5.2)
REPORT TEXT: NORMAL
SAO2 % BLDA: 96 %
SODIUM BLD-SCNC: 134 MMOL/L (ref 135–145)
SODIUM SERPL-SCNC: 134 MMOL/L (ref 135–145)
SODIUM SERPL-SCNC: 137 MMOL/L (ref 135–145)
SODIUM SERPL-SCNC: 139 MMOL/L (ref 135–145)
T AXIS (DEGREES): 160
TROPONIN I SERPL-MCNC: 0.14 NG/ML
TROPONIN I SERPL-MCNC: 0.15 NG/ML
TROPONIN I SERPL-MCNC: 0.16 NG/ML
TSH SERPL-ACNC: 4 MCUNITS/ML (ref 0.35–5)
VENTRICULAR RATE EKG/MIN (BPM): 86
WBC # BLD: 5.1 K/MCL (ref 4.2–11)
WBC # BLD: 7.8 K/MCL (ref 4.2–11)

## 2017-06-08 PROCEDURE — 82962 GLUCOSE BLOOD TEST: CPT

## 2017-06-08 PROCEDURE — 10002800 HB RX 250 W HCPCS: Performed by: INTERNAL MEDICINE

## 2017-06-08 PROCEDURE — 99223 1ST HOSP IP/OBS HIGH 75: CPT | Performed by: INTERNAL MEDICINE

## 2017-06-08 PROCEDURE — 10002807 HB RX 258: Performed by: INTERNAL MEDICINE

## 2017-06-08 PROCEDURE — 10004281 HB COUNTER-STAFF TIME PER 15 MIN

## 2017-06-08 PROCEDURE — 36600 WITHDRAWAL OF ARTERIAL BLOOD: CPT

## 2017-06-08 PROCEDURE — C1760 CLOSURE DEV, VASC: HCPCS | Performed by: INTERNAL MEDICINE

## 2017-06-08 PROCEDURE — 10004651 HB RX, NO CHARGE ITEM: Performed by: INTERNAL MEDICINE

## 2017-06-08 PROCEDURE — 80048 BASIC METABOLIC PNL TOTAL CA: CPT

## 2017-06-08 PROCEDURE — 83735 ASSAY OF MAGNESIUM: CPT

## 2017-06-08 PROCEDURE — 84443 ASSAY THYROID STIM HORMONE: CPT

## 2017-06-08 PROCEDURE — 84484 ASSAY OF TROPONIN QUANT: CPT

## 2017-06-08 PROCEDURE — B2111ZZ FLUOROSCOPY OF MULTIPLE CORONARY ARTERIES USING LOW OSMOLAR CONTRAST: ICD-10-PCS | Performed by: INTERNAL MEDICINE

## 2017-06-08 PROCEDURE — 10000008 HB ROOM CHARGE ICU OR CCU

## 2017-06-08 PROCEDURE — 10002805 HB CONTRAST AGENT: Performed by: INTERNAL MEDICINE

## 2017-06-08 PROCEDURE — 10004370 HB CARDIAC CATH: Performed by: INTERNAL MEDICINE

## 2017-06-08 PROCEDURE — 36415 COLL VENOUS BLD VENIPUNCTURE: CPT

## 2017-06-08 PROCEDURE — 85730 THROMBOPLASTIN TIME PARTIAL: CPT

## 2017-06-08 PROCEDURE — 93455 CORONARY ART/GRFT ANGIO S&I: CPT | Performed by: INTERNAL MEDICINE

## 2017-06-08 PROCEDURE — 85610 PROTHROMBIN TIME: CPT

## 2017-06-08 PROCEDURE — B2181ZZ FLUOROSCOPY OF LEFT INTERNAL MAMMARY BYPASS GRAFT USING LOW OSMOLAR CONTRAST: ICD-10-PCS | Performed by: INTERNAL MEDICINE

## 2017-06-08 PROCEDURE — 10002803 HB RX 637: Performed by: INTERNAL MEDICINE

## 2017-06-08 PROCEDURE — 10004351 HB COUNTER-CATH LAB CASE: Performed by: INTERNAL MEDICINE

## 2017-06-08 PROCEDURE — 94660 CPAP INITIATION&MGMT: CPT

## 2017-06-08 PROCEDURE — 71010 XR CHEST AP OR PA: CPT

## 2017-06-08 PROCEDURE — 87641 MR-STAPH DNA AMP PROBE: CPT

## 2017-06-08 PROCEDURE — 10002800 HB RX 250 W HCPCS

## 2017-06-08 PROCEDURE — 10004125 HB COUNTER-FIRST DAY ADMIT

## 2017-06-08 PROCEDURE — 85025 COMPLETE CBC W/AUTO DIFF WBC: CPT

## 2017-06-08 PROCEDURE — 96365 THER/PROPH/DIAG IV INF INIT: CPT

## 2017-06-08 PROCEDURE — 99255 IP/OBS CONSLTJ NEW/EST HI 80: CPT | Performed by: INTERNAL MEDICINE

## 2017-06-08 PROCEDURE — 10004137 IR PICC

## 2017-06-08 PROCEDURE — B2131ZZ FLUOROSCOPY OF MULTIPLE CORONARY ARTERY BYPASS GRAFTS USING LOW OSMOLAR CONTRAST: ICD-10-PCS | Performed by: INTERNAL MEDICINE

## 2017-06-08 PROCEDURE — 93005 ELECTROCARDIOGRAM TRACING: CPT | Performed by: EMERGENCY MEDICINE

## 2017-06-08 PROCEDURE — C1769 GUIDE WIRE: HCPCS | Performed by: INTERNAL MEDICINE

## 2017-06-08 PROCEDURE — 83880 ASSAY OF NATRIURETIC PEPTIDE: CPT

## 2017-06-08 PROCEDURE — 10002800 HB RX 250 W HCPCS: Performed by: EMERGENCY MEDICINE

## 2017-06-08 PROCEDURE — 99285 EMERGENCY DEPT VISIT HI MDM: CPT

## 2017-06-08 PROCEDURE — 82803 BLOOD GASES ANY COMBINATION: CPT

## 2017-06-08 PROCEDURE — 82330 ASSAY OF CALCIUM: CPT

## 2017-06-08 PROCEDURE — 84132 ASSAY OF SERUM POTASSIUM: CPT

## 2017-06-08 PROCEDURE — 80053 COMPREHEN METABOLIC PANEL: CPT

## 2017-06-08 PROCEDURE — 10002801 HB RX 250 W/O HCPCS: Performed by: INTERNAL MEDICINE

## 2017-06-08 PROCEDURE — 99291 CRITICAL CARE FIRST HOUR: CPT | Performed by: INTERNAL MEDICINE

## 2017-06-08 PROCEDURE — 82435 ASSAY OF BLOOD CHLORIDE: CPT

## 2017-06-08 PROCEDURE — 99285 EMERGENCY DEPT VISIT HI MDM: CPT | Performed by: EMERGENCY MEDICINE

## 2017-06-08 PROCEDURE — 71010 XR CHEST AP OR PA: CPT | Performed by: RADIOLOGY

## 2017-06-08 PROCEDURE — 99254 IP/OBS CNSLTJ NEW/EST MOD 60: CPT | Performed by: INTERNAL MEDICINE

## 2017-06-08 PROCEDURE — C1894 INTRO/SHEATH, NON-LASER: HCPCS | Performed by: INTERNAL MEDICINE

## 2017-06-08 PROCEDURE — 96367 TX/PROPH/DG ADDL SEQ IV INF: CPT

## 2017-06-08 PROCEDURE — 84295 ASSAY OF SERUM SODIUM: CPT

## 2017-06-08 DEVICE — MMIS - DEVICE PROGLIDE PRCLS 6FR SUT MEDIATE KNOT PUSH: Type: IMPLANTABLE DEVICE | Site: GROIN | Status: FUNCTIONAL

## 2017-06-08 RX ORDER — NICOTINE POLACRILEX 4 MG
15 LOZENGE BUCCAL PRN
COMMUNITY
End: 2017-08-01 | Stop reason: ALTCHOICE

## 2017-06-08 RX ORDER — ASPIRIN 81 MG/1
81 TABLET, CHEWABLE ORAL DAILY
Status: DISCONTINUED | OUTPATIENT
Start: 2017-06-08 | End: 2017-06-10

## 2017-06-08 RX ORDER — ONDANSETRON 2 MG/ML
4 INJECTION INTRAMUSCULAR; INTRAVENOUS EVERY 12 HOURS PRN
Status: DISCONTINUED | OUTPATIENT
Start: 2017-06-08 | End: 2017-06-10

## 2017-06-08 RX ORDER — POTASSIUM CHLORIDE 20 MEQ/1
40 TABLET, EXTENDED RELEASE ORAL EVERY 4 HOURS PRN
Status: DISCONTINUED | OUTPATIENT
Start: 2017-06-08 | End: 2017-06-08 | Stop reason: HOSPADM

## 2017-06-08 RX ORDER — ACETAMINOPHEN 325 MG/1
650 TABLET ORAL EVERY 4 HOURS PRN
Status: DISCONTINUED | OUTPATIENT
Start: 2017-06-08 | End: 2017-06-08 | Stop reason: HOSPADM

## 2017-06-08 RX ORDER — POTASSIUM CHLORIDE 1.5 G/1.58G
20 POWDER, FOR SOLUTION ORAL EVERY 4 HOURS PRN
Status: DISCONTINUED | OUTPATIENT
Start: 2017-06-08 | End: 2017-06-12

## 2017-06-08 RX ORDER — POTASSIUM CHLORIDE 20 MEQ/1
40 TABLET, EXTENDED RELEASE ORAL EVERY 4 HOURS PRN
Status: DISCONTINUED | OUTPATIENT
Start: 2017-06-08 | End: 2017-06-12

## 2017-06-08 RX ORDER — AMIODARONE HYDROCHLORIDE 50 MG/ML
INJECTION, SOLUTION INTRAVENOUS
Status: COMPLETED
Start: 2017-06-08 | End: 2017-06-08

## 2017-06-08 RX ORDER — DEXMEDETOMIDINE HYDROCHLORIDE 4 UG/ML
INJECTION, SOLUTION INTRAVENOUS CONTINUOUS
Status: DISCONTINUED | OUTPATIENT
Start: 2017-06-08 | End: 2017-06-11

## 2017-06-08 RX ORDER — IRON POLYSACCHARIDE COMPLEX 150 MG
150 CAPSULE ORAL
Status: DISCONTINUED | OUTPATIENT
Start: 2017-06-08 | End: 2017-06-12

## 2017-06-08 RX ORDER — LORAZEPAM 2 MG/ML
INJECTION INTRAMUSCULAR
Status: COMPLETED
Start: 2017-06-08 | End: 2017-06-08

## 2017-06-08 RX ORDER — NITROGLYCERIN 0.4 MG/1
0.4 TABLET SUBLINGUAL EVERY 5 MIN PRN
Status: ACTIVE | OUTPATIENT
Start: 2017-06-08 | End: 2017-06-09

## 2017-06-08 RX ORDER — POTASSIUM CHLORIDE 14.9 MG/ML
20 INJECTION INTRAVENOUS EVERY 4 HOURS PRN
Status: DISCONTINUED | OUTPATIENT
Start: 2017-06-08 | End: 2017-06-08 | Stop reason: HOSPADM

## 2017-06-08 RX ORDER — CLONIDINE HYDROCHLORIDE 0.1 MG/1
0.1 TABLET ORAL EVERY 4 HOURS PRN
Status: ACTIVE | OUTPATIENT
Start: 2017-06-08 | End: 2017-06-09

## 2017-06-08 RX ORDER — INSULIN GLARGINE 100 [IU]/ML
28 INJECTION, SOLUTION SUBCUTANEOUS DAILY
Status: DISCONTINUED | OUTPATIENT
Start: 2017-06-08 | End: 2017-06-09

## 2017-06-08 RX ORDER — ACETAMINOPHEN 325 MG/1
650 TABLET ORAL EVERY 4 HOURS PRN
Status: DISCONTINUED | OUTPATIENT
Start: 2017-06-08 | End: 2017-06-10

## 2017-06-08 RX ORDER — AMLODIPINE BESYLATE 5 MG/1
5 TABLET ORAL DAILY
Status: DISCONTINUED | OUTPATIENT
Start: 2017-06-08 | End: 2017-06-10

## 2017-06-08 RX ORDER — 0.9 % SODIUM CHLORIDE 0.9 %
2 VIAL (ML) INJECTION PRN
Status: DISCONTINUED | OUTPATIENT
Start: 2017-06-08 | End: 2017-06-23 | Stop reason: HOSPADM

## 2017-06-08 RX ORDER — AMOXICILLIN 250 MG
2 CAPSULE ORAL DAILY PRN
Status: DISCONTINUED | OUTPATIENT
Start: 2017-06-08 | End: 2017-06-10

## 2017-06-08 RX ORDER — POTASSIUM CHLORIDE 1.5 G/1.58G
20 POWDER, FOR SOLUTION ORAL EVERY 4 HOURS PRN
Status: DISCONTINUED | OUTPATIENT
Start: 2017-06-08 | End: 2017-06-08 | Stop reason: HOSPADM

## 2017-06-08 RX ORDER — ACETAMINOPHEN 325 MG/1
650 TABLET ORAL EVERY 4 HOURS PRN
Status: DISCONTINUED | OUTPATIENT
Start: 2017-06-08 | End: 2017-06-08 | Stop reason: SDUPTHER

## 2017-06-08 RX ORDER — NICOTINE POLACRILEX 4 MG
15 LOZENGE BUCCAL PRN
Status: DISCONTINUED | OUTPATIENT
Start: 2017-06-08 | End: 2017-06-09 | Stop reason: SDUPTHER

## 2017-06-08 RX ORDER — ATORVASTATIN CALCIUM 40 MG/1
40 TABLET, FILM COATED ORAL NIGHTLY
Status: DISCONTINUED | OUTPATIENT
Start: 2017-06-08 | End: 2017-06-10

## 2017-06-08 RX ORDER — IPRATROPIUM BROMIDE AND ALBUTEROL SULFATE 2.5; .5 MG/3ML; MG/3ML
3 SOLUTION RESPIRATORY (INHALATION)
Status: DISCONTINUED | OUTPATIENT
Start: 2017-06-08 | End: 2017-06-23 | Stop reason: HOSPADM

## 2017-06-08 RX ORDER — POTASSIUM CHLORIDE 1.5 G/1.58G
40 POWDER, FOR SOLUTION ORAL EVERY 4 HOURS PRN
Status: DISCONTINUED | OUTPATIENT
Start: 2017-06-08 | End: 2017-06-08 | Stop reason: HOSPADM

## 2017-06-08 RX ORDER — POTASSIUM CHLORIDE 14.9 MG/ML
40 INJECTION INTRAVENOUS EVERY 4 HOURS PRN
Status: DISCONTINUED | OUTPATIENT
Start: 2017-06-08 | End: 2017-06-08 | Stop reason: HOSPADM

## 2017-06-08 RX ORDER — LORAZEPAM 2 MG/ML
2 INJECTION INTRAMUSCULAR ONCE
Status: COMPLETED | OUTPATIENT
Start: 2017-06-08 | End: 2017-06-08

## 2017-06-08 RX ORDER — NITROGLYCERIN 0.4 MG/1
0.4 TABLET SUBLINGUAL EVERY 5 MIN PRN
Status: DISCONTINUED | OUTPATIENT
Start: 2017-06-08 | End: 2017-06-08

## 2017-06-08 RX ORDER — POTASSIUM CHLORIDE 1.5 G/1.58G
40 POWDER, FOR SOLUTION ORAL EVERY 4 HOURS PRN
Status: DISCONTINUED | OUTPATIENT
Start: 2017-06-08 | End: 2017-06-12

## 2017-06-08 RX ORDER — ACETAMINOPHEN 650 MG/1
650 SUPPOSITORY RECTAL EVERY 4 HOURS PRN
Status: ON HOLD | COMMUNITY
End: 2017-06-23 | Stop reason: HOSPADM

## 2017-06-08 RX ORDER — CHLORHEXIDINE GLUCONATE ORAL RINSE 1.2 MG/ML
15 SOLUTION DENTAL PRN
Status: DISCONTINUED | OUTPATIENT
Start: 2017-06-08 | End: 2017-06-09 | Stop reason: SDUPTHER

## 2017-06-08 RX ORDER — DEXTROSE MONOHYDRATE 50 MG/ML
INJECTION, SOLUTION INTRAVENOUS
Status: DISCONTINUED
Start: 2017-06-08 | End: 2017-06-08 | Stop reason: HOSPADM

## 2017-06-08 RX ORDER — LIDOCAINE HYDROCHLORIDE ANHYDROUS AND DEXTROSE MONOHYDRATE 5; 400 G/100ML; MG/100ML
INJECTION, SOLUTION INTRAVENOUS CONTINUOUS
Status: DISCONTINUED | OUTPATIENT
Start: 2017-06-08 | End: 2017-06-09

## 2017-06-08 RX ORDER — CLONIDINE HYDROCHLORIDE 0.1 MG/1
0.1 TABLET ORAL EVERY 4 HOURS PRN
Status: DISCONTINUED | OUTPATIENT
Start: 2017-06-08 | End: 2017-06-08 | Stop reason: HOSPADM

## 2017-06-08 RX ORDER — DEXTROSE MONOHYDRATE 25 G/50ML
25 INJECTION, SOLUTION INTRAVENOUS PRN
Status: DISCONTINUED | OUTPATIENT
Start: 2017-06-08 | End: 2017-06-09 | Stop reason: SDUPTHER

## 2017-06-08 RX ORDER — TRAMADOL HYDROCHLORIDE 50 MG/1
50 TABLET ORAL EVERY 6 HOURS PRN
Status: DISCONTINUED | OUTPATIENT
Start: 2017-06-08 | End: 2017-06-10

## 2017-06-08 RX ORDER — MAGNESIUM SULFATE 1 G/100ML
1 INJECTION INTRAVENOUS DAILY PRN
Status: DISCONTINUED | OUTPATIENT
Start: 2017-06-08 | End: 2017-06-23 | Stop reason: HOSPADM

## 2017-06-08 RX ORDER — SODIUM CHLORIDE 9 MG/ML
INJECTION, SOLUTION INTRAVENOUS CONTINUOUS PRN
Status: DISCONTINUED | OUTPATIENT
Start: 2017-06-08 | End: 2017-06-08 | Stop reason: HOSPADM

## 2017-06-08 RX ORDER — DEXTROSE MONOHYDRATE 50 MG/ML
INJECTION, SOLUTION INTRAVENOUS
Status: COMPLETED
Start: 2017-06-08 | End: 2017-06-08

## 2017-06-08 RX ORDER — ENEMA 19; 7 G/133ML; G/133ML
1 ENEMA RECTAL ONCE
Status: ON HOLD | COMMUNITY
End: 2017-06-23 | Stop reason: HOSPADM

## 2017-06-08 RX ORDER — DEXTROSE MONOHYDRATE 50 MG/ML
INJECTION, SOLUTION INTRAVENOUS CONTINUOUS PRN
Status: DISCONTINUED | OUTPATIENT
Start: 2017-06-08 | End: 2017-06-09 | Stop reason: SDUPTHER

## 2017-06-08 RX ORDER — HYDRALAZINE HYDROCHLORIDE 20 MG/ML
10 INJECTION INTRAMUSCULAR; INTRAVENOUS EVERY 4 HOURS PRN
Status: DISPENSED | OUTPATIENT
Start: 2017-06-08 | End: 2017-06-09

## 2017-06-08 RX ORDER — BISACODYL 10 MG
10 SUPPOSITORY, RECTAL RECTAL DAILY PRN
COMMUNITY
End: 2017-08-01 | Stop reason: ALTCHOICE

## 2017-06-08 RX ORDER — LIDOCAINE HYDROCHLORIDE 10 MG/ML
1 INJECTION, SOLUTION INFILTRATION; PERINEURAL PRN
Status: ACTIVE | OUTPATIENT
Start: 2017-06-08 | End: 2017-06-09

## 2017-06-08 RX ORDER — PANTOPRAZOLE SODIUM 40 MG/10ML
40 INJECTION, POWDER, LYOPHILIZED, FOR SOLUTION INTRAVENOUS NIGHTLY
Status: DISCONTINUED | OUTPATIENT
Start: 2017-06-08 | End: 2017-06-08 | Stop reason: RX

## 2017-06-08 RX ORDER — POTASSIUM CHLORIDE 20 MEQ/1
20 TABLET, EXTENDED RELEASE ORAL EVERY 4 HOURS PRN
Status: DISCONTINUED | OUTPATIENT
Start: 2017-06-08 | End: 2017-06-08 | Stop reason: HOSPADM

## 2017-06-08 RX ORDER — 0.9 % SODIUM CHLORIDE 0.9 %
2 VIAL (ML) INJECTION EVERY 12 HOURS SCHEDULED
Status: DISCONTINUED | OUTPATIENT
Start: 2017-06-08 | End: 2017-06-23 | Stop reason: HOSPADM

## 2017-06-08 RX ORDER — CARVEDILOL 3.12 MG/1
3.12 TABLET ORAL 2 TIMES DAILY WITH MEALS
Status: DISCONTINUED | OUTPATIENT
Start: 2017-06-08 | End: 2017-06-10

## 2017-06-08 RX ORDER — HYDRALAZINE HYDROCHLORIDE 20 MG/ML
10 INJECTION INTRAMUSCULAR; INTRAVENOUS EVERY 6 HOURS PRN
Status: DISCONTINUED | OUTPATIENT
Start: 2017-06-08 | End: 2017-06-08

## 2017-06-08 RX ORDER — POTASSIUM CHLORIDE 20 MEQ/1
20 TABLET, EXTENDED RELEASE ORAL EVERY 4 HOURS PRN
Status: DISCONTINUED | OUTPATIENT
Start: 2017-06-08 | End: 2017-06-12

## 2017-06-08 RX ORDER — AMIODARONE HYDROCHLORIDE 200 MG/1
400 TABLET ORAL EVERY 12 HOURS SCHEDULED
Status: DISCONTINUED | OUTPATIENT
Start: 2017-06-08 | End: 2017-06-10

## 2017-06-08 RX ORDER — ACETAMINOPHEN 325 MG/1
650 TABLET ORAL EVERY 4 HOURS PRN
COMMUNITY

## 2017-06-08 RX ORDER — POTASSIUM CHLORIDE 14.9 MG/ML
20 INJECTION INTRAVENOUS EVERY 4 HOURS PRN
Status: DISCONTINUED | OUTPATIENT
Start: 2017-06-08 | End: 2017-06-12

## 2017-06-08 RX ORDER — FUROSEMIDE 10 MG/ML
INJECTION INTRAMUSCULAR; INTRAVENOUS PRN
Status: DISCONTINUED | OUTPATIENT
Start: 2017-06-08 | End: 2017-06-08 | Stop reason: HOSPADM

## 2017-06-08 RX ORDER — FUROSEMIDE 40 MG/1
40 TABLET ORAL DAILY
Status: DISCONTINUED | OUTPATIENT
Start: 2017-06-08 | End: 2017-06-08

## 2017-06-08 RX ORDER — HYDRALAZINE HYDROCHLORIDE 20 MG/ML
10 INJECTION INTRAMUSCULAR; INTRAVENOUS EVERY 4 HOURS PRN
Status: DISCONTINUED | OUTPATIENT
Start: 2017-06-08 | End: 2017-06-08 | Stop reason: HOSPADM

## 2017-06-08 RX ORDER — BISACODYL 10 MG
10 SUPPOSITORY, RECTAL RECTAL DAILY PRN
Status: DISCONTINUED | OUTPATIENT
Start: 2017-06-08 | End: 2017-06-23 | Stop reason: HOSPADM

## 2017-06-08 RX ORDER — WARFARIN SODIUM 6 MG/1
6 TABLET ORAL ONCE
Status: DISCONTINUED | OUTPATIENT
Start: 2017-06-08 | End: 2017-06-08

## 2017-06-08 RX ORDER — POTASSIUM CHLORIDE 14.9 MG/ML
40 INJECTION INTRAVENOUS EVERY 4 HOURS PRN
Status: DISCONTINUED | OUTPATIENT
Start: 2017-06-08 | End: 2017-06-12

## 2017-06-08 RX ORDER — LORAZEPAM 2 MG/ML
1 INJECTION INTRAMUSCULAR ONCE
Status: COMPLETED | OUTPATIENT
Start: 2017-06-08 | End: 2017-06-08

## 2017-06-08 RX ORDER — CHLORHEXIDINE GLUCONATE ORAL RINSE 1.2 MG/ML
15 SOLUTION DENTAL EVERY 12 HOURS SCHEDULED
Status: DISCONTINUED | OUTPATIENT
Start: 2017-06-08 | End: 2017-06-09 | Stop reason: SDUPTHER

## 2017-06-08 RX ORDER — MIDAZOLAM HYDROCHLORIDE 1 MG/ML
1 INJECTION, SOLUTION INTRAMUSCULAR; INTRAVENOUS PRN
Status: ACTIVE | OUTPATIENT
Start: 2017-06-08 | End: 2017-06-09

## 2017-06-08 RX ORDER — MIDAZOLAM HYDROCHLORIDE 1 MG/ML
INJECTION, SOLUTION INTRAMUSCULAR; INTRAVENOUS PRN
Status: DISCONTINUED | OUTPATIENT
Start: 2017-06-08 | End: 2017-06-08 | Stop reason: HOSPADM

## 2017-06-08 RX ORDER — FAMOTIDINE 10 MG/ML
20 INJECTION, SOLUTION INTRAVENOUS NIGHTLY
Status: DISCONTINUED | OUTPATIENT
Start: 2017-06-08 | End: 2017-06-11

## 2017-06-08 RX ORDER — HYDROCODONE BITARTRATE AND ACETAMINOPHEN 5; 325 MG/1; MG/1
1-2 TABLET ORAL EVERY 4 HOURS PRN
Status: DISCONTINUED | OUTPATIENT
Start: 2017-06-08 | End: 2017-06-10

## 2017-06-08 RX ADMIN — FUROSEMIDE 40 MG: 10 INJECTION INTRAVENOUS at 14:57

## 2017-06-08 RX ADMIN — FUROSEMIDE 40 MG: 10 INJECTION INTRAVENOUS at 15:14

## 2017-06-08 RX ADMIN — AMIODARONE HYDROCHLORIDE 400 MG: 200 TABLET ORAL at 11:27

## 2017-06-08 RX ADMIN — SODIUM CHLORIDE 1000 ML: 9 INJECTION, SOLUTION INTRAVENOUS at 15:19

## 2017-06-08 RX ADMIN — FUROSEMIDE 10 MG/HR: 10 INJECTION, SOLUTION INTRAVENOUS at 18:40

## 2017-06-08 RX ADMIN — Medication 1 MG/MIN: at 06:54

## 2017-06-08 RX ADMIN — LORAZEPAM 2 MG: 2 INJECTION INTRAMUSCULAR; INTRAVENOUS at 19:04

## 2017-06-08 RX ADMIN — SODIUM CHLORIDE 0.5 MCG/KG/MIN: 9 INJECTION, SOLUTION INTRAVENOUS at 19:50

## 2017-06-08 RX ADMIN — LIDOCAINE HYDROCHLORIDE 10 ML: 20 INJECTION, SOLUTION EPIDURAL; INFILTRATION; INTRACAUDAL; PERINEURAL at 15:13

## 2017-06-08 RX ADMIN — SODIUM CHLORIDE 0.5 MCG/KG/MIN: 9 INJECTION, SOLUTION INTRAVENOUS at 13:08

## 2017-06-08 RX ADMIN — IOPAMIDOL 106 ML: 755 INJECTION, SOLUTION INTRAVENOUS at 16:01

## 2017-06-08 RX ADMIN — HYDRALAZINE HYDROCHLORIDE 10 MG: 20 INJECTION INTRAMUSCULAR; INTRAVENOUS at 17:59

## 2017-06-08 RX ADMIN — FENTANYL CITRATE 25 MCG: 50 INJECTION INTRAMUSCULAR; INTRAVENOUS at 15:00

## 2017-06-08 RX ADMIN — LIDOCAINE HYDROCHLORIDE 76 MG: 20 INJECTION, SOLUTION INTRAVENOUS at 09:34

## 2017-06-08 RX ADMIN — INSULIN LISPRO 2 UNITS: 100 INJECTION, SOLUTION INTRAVENOUS; SUBCUTANEOUS at 22:00

## 2017-06-08 RX ADMIN — MAGNESIUM SULFATE HEPTAHYDRATE 2 G: 500 INJECTION, SOLUTION INTRAMUSCULAR; INTRAVENOUS at 02:43

## 2017-06-08 RX ADMIN — FAMOTIDINE 20 MG: 10 INJECTION, SOLUTION INTRAVENOUS at 22:21

## 2017-06-08 RX ADMIN — HYDRALAZINE HYDROCHLORIDE 10 MG: 20 INJECTION INTRAMUSCULAR; INTRAVENOUS at 18:33

## 2017-06-08 RX ADMIN — AMIODARONE HYDROCHLORIDE 1 MG/MIN: 50 INJECTION, SOLUTION INTRAVENOUS at 21:18

## 2017-06-08 RX ADMIN — ASPIRIN 81 MG: 81 TABLET, CHEWABLE ORAL at 10:02

## 2017-06-08 RX ADMIN — LORAZEPAM 1 MG: 2 INJECTION INTRAMUSCULAR at 18:19

## 2017-06-08 RX ADMIN — AMLODIPINE BESYLATE 5 MG: 5 TABLET ORAL at 09:59

## 2017-06-08 RX ADMIN — INSULIN GLARGINE 28 UNITS: 100 INJECTION, SOLUTION SUBCUTANEOUS at 10:02

## 2017-06-08 RX ADMIN — LIDOCAINE HYDROCHLORIDE 2 MG/MIN: 4 INJECTION, SOLUTION INTRAVENOUS at 09:34

## 2017-06-08 RX ADMIN — Medication 1 MG/MIN: at 03:19

## 2017-06-08 RX ADMIN — DEXMEDETOMIDINE 0.2 MCG/KG/HR: 100 INJECTION, SOLUTION, CONCENTRATE INTRAVENOUS at 20:10

## 2017-06-08 RX ADMIN — FUROSEMIDE 40 MG: 40 TABLET ORAL at 10:03

## 2017-06-08 RX ADMIN — AMIODARONE HYDROCHLORIDE 1 MG/MIN: 50 INJECTION, SOLUTION INTRAVENOUS at 13:14

## 2017-06-08 RX ADMIN — LORAZEPAM 2 MG: 2 INJECTION INTRAMUSCULAR at 19:04

## 2017-06-08 RX ADMIN — LORAZEPAM 1 MG: 2 INJECTION INTRAMUSCULAR; INTRAVENOUS at 18:19

## 2017-06-08 RX ADMIN — MIDAZOLAM HYDROCHLORIDE 1 MG: 1 INJECTION, SOLUTION INTRAMUSCULAR; INTRAVENOUS at 15:00

## 2017-06-08 ASSESSMENT — LIFESTYLE VARIABLES
HOW OFTEN DO YOU HAVE A DRINK CONTAINING ALCOHOL: NEVER
HOW MANY STANDARD DRINKS CONTAINING ALCOHOL DO YOU HAVE ON A TYPICAL DAY: 0,1 OR 2
TOBACCO_USE_STATUS_IN_THE_LAST_30_DAYS: NO TOBACCO USED IN THE LAST 30 DAYS
E-CIGARETTE_USE: NO E-CIGARETTES USE IN THE LAST 30 DAYS
HOW OFTEN DO YOU HAVE 6 OR MORE DRINKS ON ONE OCCASION: NEVER
AUDIT-C TOTAL SCORE: 0
ALCOHOL_USE_STATUS: NO OR LOW RISK WITH VALIDATED TOOL

## 2017-06-08 ASSESSMENT — PAIN SCALES - GENERAL
PAIN_LEVEL_WITH_ACTIVITY: 0
PAIN_LEVEL_WITH_ACTIVITY: 0
PAIN_LEVEL_AT_REST: 0
PAIN_LEVEL_WITH_ACTIVITY: 0
PAIN_LEVEL_AT_REST: 3

## 2017-06-08 ASSESSMENT — ACTIVITIES OF DAILY LIVING (ADL)
ADL_BEFORE_ADMISSION: INDEPENDENT
ADL_SCORE: 12
ADL_SHORT_OF_BREATH: NO
CHRONIC_PAIN_PRESENT: NO
RECENT_DECLINE_ADL: NO

## 2017-06-08 ASSESSMENT — ENCOUNTER SYMPTOMS
RESPIRATORY NEGATIVE: 1
BRUISES/BLEEDS EASILY: 1
GASTROINTESTINAL NEGATIVE: 1
PSYCHIATRIC NEGATIVE: 1
CONSTITUTIONAL NEGATIVE: 1

## 2017-06-08 ASSESSMENT — NEW YORK HEART ASSOCIATION (NYHA) CLASSIFICATION: NYHA FUNCTIONAL CLASS: NOT CLASS IV

## 2017-06-09 ENCOUNTER — APPOINTMENT (OUTPATIENT)
Dept: GENERAL RADIOLOGY | Age: 57
DRG: 264 | End: 2017-06-09
Attending: NURSE PRACTITIONER

## 2017-06-09 ENCOUNTER — TELEPHONE (OUTPATIENT)
Dept: ELECTROPHYSIOLOGY | Age: 57
End: 2017-06-09

## 2017-06-09 LAB
ALBUMIN SERPL-MCNC: 2.5 G/DL (ref 3.6–5.1)
ALBUMIN/GLOB SERPL: 0.7 {RATIO} (ref 1–2.4)
ALP SERPL-CCNC: 108 UNITS/L (ref 45–117)
ALT SERPL-CCNC: 21 UNITS/L
ANION GAP SERPL CALC-SCNC: 13 MMOL/L (ref 10–20)
ANION GAP SERPL CALC-SCNC: 13 MMOL/L (ref 10–20)
APTT PPP: 59 SEC (ref 22–30)
AST SERPL-CCNC: 13 UNITS/L
ATRIAL RATE (BPM): 75
BASE DEFICIT BLDA-SCNC: 5 MMOL/L (ref 0–2)
BASE EXCESS BLDA CALC-SCNC: 5 MMOL/L (ref 0–3)
BASE EXCESS BLDA CALC-SCNC: 5 MMOL/L (ref 0–3)
BASE EXCESS BLDA CALC-SCNC: 6 MMOL/L (ref 0–3)
BILIRUB SERPL-MCNC: 0.3 MG/DL (ref 0.2–1)
BUN SERPL-MCNC: 30 MG/DL (ref 6–20)
BUN SERPL-MCNC: 31 MG/DL (ref 6–20)
BUN/CREAT SERPL: 19 (ref 7–25)
BUN/CREAT SERPL: 20 (ref 7–25)
CALCIUM SERPL-MCNC: 8.4 MG/DL (ref 8.4–10.2)
CALCIUM SERPL-MCNC: 8.5 MG/DL (ref 8.4–10.2)
CHLORIDE SERPL-SCNC: 101 MMOL/L (ref 98–107)
CHLORIDE SERPL-SCNC: 99 MMOL/L (ref 98–107)
CO2 BLD-SCNC: 26 MMOL/L (ref 19–24)
CO2 BLD-SCNC: 28 MMOL/L (ref 19–24)
CO2 BLD-SCNC: 30 MMOL/L (ref 19–24)
CO2 SERPL-SCNC: 27 MMOL/L (ref 21–32)
CO2 SERPL-SCNC: 27 MMOL/L (ref 21–32)
CREAT SERPL-MCNC: 1.53 MG/DL (ref 0.51–0.95)
CREAT SERPL-MCNC: 1.63 MG/DL (ref 0.51–0.95)
ERYTHROCYTE [DISTWIDTH] IN BLOOD: 14.6 % (ref 11–15)
GLOBULIN SER-MCNC: 3.8 G/DL (ref 2–4)
GLUCOSE BLDC GLUCOMTR-MCNC: 155 MG/DL (ref 65–99)
GLUCOSE BLDC GLUCOMTR-MCNC: 160 MG/DL (ref 65–99)
GLUCOSE BLDC GLUCOMTR-MCNC: 194 MG/DL (ref 65–99)
GLUCOSE BLDC GLUCOMTR-MCNC: 217 MG/DL (ref 65–99)
GLUCOSE SERPL-MCNC: 147 MG/DL (ref 65–99)
GLUCOSE SERPL-MCNC: 272 MG/DL (ref 65–99)
HCO3 BLDA-SCNC: 20 MMOL/L (ref 22–28)
HCO3 BLDA-SCNC: 28 MMOL/L (ref 22–28)
HCO3 BLDA-SCNC: 29 MMOL/L (ref 22–28)
HCO3 BLDA-SCNC: 30 MMOL/L (ref 22–28)
HCT VFR BLD CALC: 24.7 % (ref 36–46.5)
HGB BLD-MCNC: 7.9 G/DL (ref 12–15.5)
HGB BLD-MCNC: 9.5 G/DL (ref 12–15.5)
HOROWITZ INDEX BLDA+IHG-RTO: 97 % (ref 94–98)
INHALED O2 CONCENTRATION: 40 %
INR PPP: 4.3
LACTATE SERPL-SCNC: 1.5 MMOL/L (ref 0–2)
MCH RBC QN AUTO: 28.6 PG (ref 26–34)
MCHC RBC AUTO-ENTMCNC: 32 G/DL (ref 32–36.5)
MCV RBC AUTO: 89.5 FL (ref 78–100)
MRSA DNA SPEC QL NAA+PROBE: NOT DETECTED
P AXIS (DEGREES): 48
PCO2 BLDA: 33 MM HG (ref 32–45)
PCO2 BLDA: 36 MM HG (ref 32–45)
PCO2 BLDA: 36 MM HG (ref 32–45)
PCO2 BLDA: 68 MM HG (ref 32–45)
PH BLDA: 7.16 UNITS (ref 7.35–7.45)
PH BLDA: 7.5 UNITS (ref 7.35–7.45)
PH BLDA: 7.52 UNITS (ref 7.35–7.45)
PH BLDA: 7.53 UNITS (ref 7.35–7.45)
PLATELET # BLD: 274 K/MCL (ref 140–450)
PO2 BLDA: 111 MM HG (ref 83–108)
PO2 BLDA: 126 MM HG (ref 83–108)
PO2 BLDA: 156 MM HG (ref 83–108)
PO2 BLDA: 164 MM HG (ref 83–108)
POTASSIUM SERPL-SCNC: 3.4 MMOL/L (ref 3.4–5.1)
POTASSIUM SERPL-SCNC: 4 MMOL/L (ref 3.4–5.1)
PR-INTERVAL (MSEC): 176
PROCALCITONIN SERPL-MCNC: 2.17 NG/ML
PROT SERPL-MCNC: 6.3 G/DL (ref 6.4–8.2)
PROTHROMBIN TIME: 46.3 SEC (ref 9.7–11.8)
QRS-INTERVAL (MSEC): 114
QT-INTERVAL (MSEC): 456
QTC: 509
R AXIS (DEGREES): 33
RBC # BLD: 2.76 MIL/MCL (ref 4–5.2)
REPORT TEXT: NORMAL
SAO2 % BLDA: 100 % (ref 95–99)
SAO2 % BLDA: 97 %
SODIUM SERPL-SCNC: 135 MMOL/L (ref 135–145)
SODIUM SERPL-SCNC: 138 MMOL/L (ref 135–145)
SPECIMEN SOURCE: NORMAL
T AXIS (DEGREES): 139
TROPONIN I SERPL-MCNC: 0.15 NG/ML
VENTRICULAR RATE EKG/MIN (BPM): 75
WBC # BLD: 5.6 K/MCL (ref 4.2–11)

## 2017-06-09 PROCEDURE — 82803 BLOOD GASES ANY COMBINATION: CPT

## 2017-06-09 PROCEDURE — 5A1945Z RESPIRATORY VENTILATION, 24-96 CONSECUTIVE HOURS: ICD-10-PCS | Performed by: INTERNAL MEDICINE

## 2017-06-09 PROCEDURE — 85610 PROTHROMBIN TIME: CPT

## 2017-06-09 PROCEDURE — 10002800 HB RX 250 W HCPCS

## 2017-06-09 PROCEDURE — 10004651 HB RX, NO CHARGE ITEM: Performed by: INTERNAL MEDICINE

## 2017-06-09 PROCEDURE — 10004281 HB COUNTER-STAFF TIME PER 15 MIN

## 2017-06-09 PROCEDURE — 10002803 HB RX 637: Performed by: INTERNAL MEDICINE

## 2017-06-09 PROCEDURE — 80053 COMPREHEN METABOLIC PANEL: CPT

## 2017-06-09 PROCEDURE — 31500 INSERT EMERGENCY AIRWAY: CPT | Performed by: INTERNAL MEDICINE

## 2017-06-09 PROCEDURE — 84484 ASSAY OF TROPONIN QUANT: CPT

## 2017-06-09 PROCEDURE — 82962 GLUCOSE BLOOD TEST: CPT

## 2017-06-09 PROCEDURE — 10002800 HB RX 250 W HCPCS: Performed by: INTERNAL MEDICINE

## 2017-06-09 PROCEDURE — 10000008 HB ROOM CHARGE ICU OR CCU

## 2017-06-09 PROCEDURE — 10002807 HB RX 258: Performed by: INTERNAL MEDICINE

## 2017-06-09 PROCEDURE — 82805 BLOOD GASES W/O2 SATURATION: CPT

## 2017-06-09 PROCEDURE — 71010 XR CHEST AP OR PA: CPT | Performed by: RADIOLOGY

## 2017-06-09 PROCEDURE — 36415 COLL VENOUS BLD VENIPUNCTURE: CPT

## 2017-06-09 PROCEDURE — 84145 PROCALCITONIN (PCT): CPT

## 2017-06-09 PROCEDURE — 87040 BLOOD CULTURE FOR BACTERIA: CPT

## 2017-06-09 PROCEDURE — 94002 VENT MGMT INPAT INIT DAY: CPT

## 2017-06-09 PROCEDURE — P9045 ALBUMIN (HUMAN), 5%, 250 ML: HCPCS

## 2017-06-09 PROCEDURE — 94640 AIRWAY INHALATION TREATMENT: CPT

## 2017-06-09 PROCEDURE — 99254 IP/OBS CNSLTJ NEW/EST MOD 60: CPT | Performed by: INTERNAL MEDICINE

## 2017-06-09 PROCEDURE — 85730 THROMBOPLASTIN TIME PARTIAL: CPT

## 2017-06-09 PROCEDURE — 93010 ELECTROCARDIOGRAM REPORT: CPT | Performed by: INTERNAL MEDICINE

## 2017-06-09 PROCEDURE — 36620 INSERTION CATHETER ARTERY: CPT

## 2017-06-09 PROCEDURE — 85027 COMPLETE CBC AUTOMATED: CPT

## 2017-06-09 PROCEDURE — 94660 CPAP INITIATION&MGMT: CPT

## 2017-06-09 PROCEDURE — 36600 WITHDRAWAL OF ARTERIAL BLOOD: CPT

## 2017-06-09 PROCEDURE — 10002801 HB RX 250 W/O HCPCS: Performed by: INTERNAL MEDICINE

## 2017-06-09 PROCEDURE — 0BH17EZ INSERTION OF ENDOTRACHEAL AIRWAY INTO TRACHEA, VIA NATURAL OR ARTIFICIAL OPENING: ICD-10-PCS | Performed by: INTERNAL MEDICINE

## 2017-06-09 PROCEDURE — 93005 ELECTROCARDIOGRAM TRACING: CPT | Performed by: NURSE PRACTITIONER

## 2017-06-09 PROCEDURE — 80048 BASIC METABOLIC PNL TOTAL CA: CPT

## 2017-06-09 PROCEDURE — 71010 XR CHEST AP OR PA: CPT

## 2017-06-09 PROCEDURE — 10002800 HB RX 250 W HCPCS: Performed by: NURSE PRACTITIONER

## 2017-06-09 PROCEDURE — 10002801 HB RX 250 W/O HCPCS

## 2017-06-09 PROCEDURE — 83605 ASSAY OF LACTIC ACID: CPT

## 2017-06-09 PROCEDURE — 99291 CRITICAL CARE FIRST HOUR: CPT | Performed by: INTERNAL MEDICINE

## 2017-06-09 RX ORDER — ENALAPRILAT 1.25 MG/ML
0.62 INJECTION INTRAVENOUS EVERY 8 HOURS SCHEDULED
Status: DISCONTINUED | OUTPATIENT
Start: 2017-06-09 | End: 2017-06-11

## 2017-06-09 RX ORDER — INSULIN GLARGINE 100 [IU]/ML
10 INJECTION, SOLUTION SUBCUTANEOUS DAILY
Status: DISCONTINUED | OUTPATIENT
Start: 2017-06-10 | End: 2017-06-12

## 2017-06-09 RX ORDER — MINERAL OIL AND WHITE PETROLATUM 150; 830 MG/G; MG/G
1 OINTMENT OPHTHALMIC
Status: DISCONTINUED | OUTPATIENT
Start: 2017-06-09 | End: 2017-06-12

## 2017-06-09 RX ORDER — CHLORHEXIDINE GLUCONATE ORAL RINSE 1.2 MG/ML
15 SOLUTION DENTAL PRN
Status: DISCONTINUED | OUTPATIENT
Start: 2017-06-09 | End: 2017-06-11

## 2017-06-09 RX ORDER — HUMAN INSULIN 100 [IU]/ML
INJECTION, SOLUTION SUBCUTANEOUS EVERY 6 HOURS SCHEDULED
Status: DISCONTINUED | OUTPATIENT
Start: 2017-06-09 | End: 2017-06-12

## 2017-06-09 RX ORDER — DEXTROSE MONOHYDRATE 50 MG/ML
INJECTION, SOLUTION INTRAVENOUS CONTINUOUS PRN
Status: DISCONTINUED | OUTPATIENT
Start: 2017-06-09 | End: 2017-06-23 | Stop reason: HOSPADM

## 2017-06-09 RX ORDER — NICOTINE POLACRILEX 4 MG
15 LOZENGE BUCCAL PRN
Status: DISCONTINUED | OUTPATIENT
Start: 2017-06-09 | End: 2017-06-10

## 2017-06-09 RX ORDER — ALBUMIN, HUMAN INJ 5% 5 %
12.5 SOLUTION INTRAVENOUS ONCE
Status: COMPLETED | OUTPATIENT
Start: 2017-06-09 | End: 2017-06-09

## 2017-06-09 RX ORDER — ESOMEPRAZOLE MAGNESIUM 40 MG/1
40 GRANULE, DELAYED RELEASE ORAL
Status: DISCONTINUED | OUTPATIENT
Start: 2017-06-09 | End: 2017-06-09

## 2017-06-09 RX ORDER — DEXTROSE MONOHYDRATE 25 G/50ML
25 INJECTION, SOLUTION INTRAVENOUS PRN
Status: DISCONTINUED | OUTPATIENT
Start: 2017-06-09 | End: 2017-06-23 | Stop reason: HOSPADM

## 2017-06-09 RX ORDER — CHLORHEXIDINE GLUCONATE ORAL RINSE 1.2 MG/ML
15 SOLUTION DENTAL EVERY 12 HOURS SCHEDULED
Status: DISCONTINUED | OUTPATIENT
Start: 2017-06-09 | End: 2017-06-11

## 2017-06-09 RX ORDER — ALBUMIN, HUMAN INJ 5% 5 %
SOLUTION INTRAVENOUS
Status: COMPLETED
Start: 2017-06-09 | End: 2017-06-09

## 2017-06-09 RX ADMIN — ALBUMIN, HUMAN INJ 5% 12.5 G: 5 SOLUTION at 22:08

## 2017-06-09 RX ADMIN — PROPOFOL 25 MCG/KG/MIN: 10 INJECTION, EMULSION INTRAVENOUS at 10:55

## 2017-06-09 RX ADMIN — Medication 50 MCG/HR: at 10:56

## 2017-06-09 RX ADMIN — IPRATROPIUM BROMIDE AND ALBUTEROL SULFATE 3 ML: .5; 3 SOLUTION RESPIRATORY (INHALATION) at 09:45

## 2017-06-09 RX ADMIN — INSULIN HUMAN 2 UNITS: 100 INJECTION, SOLUTION PARENTERAL at 18:24

## 2017-06-09 RX ADMIN — MINERAL OIL AND WHITE PETROLATUM 1 APPLICATION.: 150; 830 OINTMENT OPHTHALMIC at 21:00

## 2017-06-09 RX ADMIN — ASPIRIN 81 MG: 81 TABLET, CHEWABLE ORAL at 16:14

## 2017-06-09 RX ADMIN — CHLORHEXIDINE GLUCONATE ORAL RINSE 15 ML: 1.2 SOLUTION DENTAL at 20:58

## 2017-06-09 RX ADMIN — LIDOCAINE HYDROCHLORIDE 2 MG/MIN: 4 INJECTION, SOLUTION INTRAVENOUS at 01:04

## 2017-06-09 RX ADMIN — Medication 10 ML: at 21:00

## 2017-06-09 RX ADMIN — FAMOTIDINE 20 MG: 10 INJECTION, SOLUTION INTRAVENOUS at 20:58

## 2017-06-09 RX ADMIN — AMLODIPINE BESYLATE 5 MG: 5 TABLET ORAL at 16:14

## 2017-06-09 RX ADMIN — DEXMEDETOMIDINE 0.5 MCG/KG/HR: 100 INJECTION, SOLUTION, CONCENTRATE INTRAVENOUS at 18:12

## 2017-06-09 RX ADMIN — ALBUMIN HUMAN 12.5 G: 0.05 INJECTION, SOLUTION INTRAVENOUS at 22:08

## 2017-06-09 RX ADMIN — INSULIN LISPRO 2 UNITS: 100 INJECTION, SOLUTION INTRAVENOUS; SUBCUTANEOUS at 06:00

## 2017-06-09 RX ADMIN — FUROSEMIDE 10 MG/HR: 10 INJECTION, SOLUTION INTRAVENOUS at 19:50

## 2017-06-09 RX ADMIN — ATORVASTATIN CALCIUM 40 MG: 40 TABLET, FILM COATED ORAL at 20:58

## 2017-06-09 RX ADMIN — AMIODARONE HYDROCHLORIDE 400 MG: 200 TABLET ORAL at 20:58

## 2017-06-09 RX ADMIN — Medication 2 ML: at 21:00

## 2017-06-09 RX ADMIN — INSULIN LISPRO 4 UNITS: 100 INJECTION, SOLUTION INTRAVENOUS; SUBCUTANEOUS at 11:00

## 2017-06-09 RX ADMIN — Medication 75 MCG/HR: at 18:13

## 2017-06-09 ASSESSMENT — ACTIVITIES OF DAILY LIVING (ADL)
BATHING: NEEDS ASSISTANCE
BATHING: DEPENDENT
ADL_SCORE: 0
FEEDING: DEPENDENT
FEEDING: NEEDS ASSISTANCE
TOILETING: DEPENDENT
TOILETING: NEEDS ASSISTANCE
ADL_SCORE: 6
DRESSING: DEPENDENT
DRESSING: NEEDS ASSISTANCE

## 2017-06-09 ASSESSMENT — PAIN SCALES - GENERAL
PAIN_LEVEL_AT_REST: 0
PAIN_LEVEL_WITH_ACTIVITY: 0

## 2017-06-10 ENCOUNTER — APPOINTMENT (OUTPATIENT)
Dept: GENERAL RADIOLOGY | Age: 57
DRG: 264 | End: 2017-06-10
Attending: NURSE PRACTITIONER

## 2017-06-10 LAB
ALBUMIN SERPL-MCNC: 2.7 G/DL (ref 3.6–5.1)
ALBUMIN/GLOB SERPL: 0.7 {RATIO} (ref 1–2.4)
ALP SERPL-CCNC: 97 UNITS/L (ref 45–117)
ALT SERPL-CCNC: 18 UNITS/L
ANION GAP SERPL CALC-SCNC: 12 MMOL/L (ref 10–20)
ANION GAP SERPL CALC-SCNC: 9 MMOL/L (ref 10–20)
APTT PPP: 75 SEC (ref 22–30)
AST SERPL-CCNC: 14 UNITS/L
BASE EXCESS BLDA CALC-SCNC: 4 MMOL/L (ref 0–3)
BASOPHILS # BLD AUTO: 0 K/MCL (ref 0–0.3)
BASOPHILS NFR BLD AUTO: 0 %
BILIRUB SERPL-MCNC: 0.4 MG/DL (ref 0.2–1)
BUN SERPL-MCNC: 29 MG/DL (ref 6–20)
BUN SERPL-MCNC: 30 MG/DL (ref 6–20)
BUN/CREAT SERPL: 17 (ref 7–25)
BUN/CREAT SERPL: 18 (ref 7–25)
CALCIUM SERPL-MCNC: 8.5 MG/DL (ref 8.4–10.2)
CALCIUM SERPL-MCNC: 8.7 MG/DL (ref 8.4–10.2)
CHLORIDE SERPL-SCNC: 101 MMOL/L (ref 98–107)
CHLORIDE SERPL-SCNC: 102 MMOL/L (ref 98–107)
CO2 BLD-SCNC: 29 MMOL/L (ref 19–24)
CO2 SERPL-SCNC: 31 MMOL/L (ref 21–32)
CO2 SERPL-SCNC: 32 MMOL/L (ref 21–32)
CREAT SERPL-MCNC: 1.66 MG/DL (ref 0.51–0.95)
CREAT SERPL-MCNC: 1.67 MG/DL (ref 0.51–0.95)
DIFFERENTIAL METHOD BLD: ABNORMAL
EOSINOPHIL # BLD AUTO: 0.2 K/MCL (ref 0.1–0.5)
EOSINOPHIL NFR SPEC: 3 %
ERYTHROCYTE [DISTWIDTH] IN BLOOD: 14.8 % (ref 11–15)
GLOBULIN SER-MCNC: 3.7 G/DL (ref 2–4)
GLUCOSE BLDC GLUCOMTR-MCNC: 110 MG/DL (ref 65–99)
GLUCOSE BLDC GLUCOMTR-MCNC: 118 MG/DL (ref 65–99)
GLUCOSE BLDC GLUCOMTR-MCNC: 128 MG/DL (ref 65–99)
GLUCOSE BLDC GLUCOMTR-MCNC: 171 MG/DL (ref 65–99)
GLUCOSE SERPL-MCNC: 122 MG/DL (ref 65–99)
GLUCOSE SERPL-MCNC: 135 MG/DL (ref 65–99)
HCO3 BLDA-SCNC: 28 MMOL/L (ref 22–28)
HCT VFR BLD CALC: 22.6 % (ref 36–46.5)
HGB BLD-MCNC: 7.2 G/DL (ref 12–15.5)
INR PPP: 4.7
INR PPP: 7.5
LYMPHOCYTES # BLD MANUAL: 1.4 K/MCL (ref 1–4)
LYMPHOCYTES NFR BLD MANUAL: 25 %
MAGNESIUM SERPL-MCNC: 1.7 MG/DL (ref 1.7–2.4)
MCH RBC QN AUTO: 28.5 PG (ref 26–34)
MCHC RBC AUTO-ENTMCNC: 31.9 G/DL (ref 32–36.5)
MCV RBC AUTO: 89.3 FL (ref 78–100)
MONOCYTES # BLD MANUAL: 0.4 K/MCL (ref 0.3–0.9)
MONOCYTES NFR BLD MANUAL: 6 %
NEUTROPHILS # BLD AUTO: 3.8 K/MCL (ref 1.8–7.7)
NEUTROPHILS NFR BLD AUTO: 66 %
PCO2 BLDA: 37 MM HG (ref 32–45)
PH BLDA: 7.49 UNITS (ref 7.35–7.45)
PLATELET # BLD: 257 K/MCL (ref 140–450)
PO2 BLDA: 52 MM HG (ref 83–108)
POTASSIUM SERPL-SCNC: 3.1 MMOL/L (ref 3.4–5.1)
POTASSIUM SERPL-SCNC: 3.2 MMOL/L (ref 3.4–5.1)
PROT SERPL-MCNC: 6.4 G/DL (ref 6.4–8.2)
PROTHROMBIN TIME: 50.4 SEC (ref 9.7–11.8)
PROTHROMBIN TIME: 79.7 SEC (ref 9.7–11.8)
RBC # BLD: 2.53 MIL/MCL (ref 4–5.2)
SODIUM SERPL-SCNC: 140 MMOL/L (ref 135–145)
SODIUM SERPL-SCNC: 141 MMOL/L (ref 135–145)
WBC # BLD: 5.8 K/MCL (ref 4.2–11)

## 2017-06-10 PROCEDURE — 85610 PROTHROMBIN TIME: CPT

## 2017-06-10 PROCEDURE — 10002801 HB RX 250 W/O HCPCS: Performed by: INTERNAL MEDICINE

## 2017-06-10 PROCEDURE — 85730 THROMBOPLASTIN TIME PARTIAL: CPT

## 2017-06-10 PROCEDURE — 10000008 HB ROOM CHARGE ICU OR CCU

## 2017-06-10 PROCEDURE — 99233 SBSQ HOSP IP/OBS HIGH 50: CPT | Performed by: INTERNAL MEDICINE

## 2017-06-10 PROCEDURE — 80048 BASIC METABOLIC PNL TOTAL CA: CPT

## 2017-06-10 PROCEDURE — 10004651 HB RX, NO CHARGE ITEM: Performed by: INTERNAL MEDICINE

## 2017-06-10 PROCEDURE — 94003 VENT MGMT INPAT SUBQ DAY: CPT

## 2017-06-10 PROCEDURE — 10002807 HB RX 258: Performed by: INTERNAL MEDICINE

## 2017-06-10 PROCEDURE — 10002803 HB RX 637: Performed by: INTERNAL MEDICINE

## 2017-06-10 PROCEDURE — 10004326 HB COUNTER ASSESSMENT EA 15 MIN ENTERAL

## 2017-06-10 PROCEDURE — 99291 CRITICAL CARE FIRST HOUR: CPT | Performed by: INTERNAL MEDICINE

## 2017-06-10 PROCEDURE — 71010 XR CHEST AP OR PA: CPT

## 2017-06-10 PROCEDURE — 10002800 HB RX 250 W HCPCS: Performed by: INTERNAL MEDICINE

## 2017-06-10 PROCEDURE — 3E0G76Z INTRODUCTION OF NUTRITIONAL SUBSTANCE INTO UPPER GI, VIA NATURAL OR ARTIFICIAL OPENING: ICD-10-PCS | Performed by: THORACIC SURGERY (CARDIOTHORACIC VASCULAR SURGERY)

## 2017-06-10 PROCEDURE — 83735 ASSAY OF MAGNESIUM: CPT

## 2017-06-10 PROCEDURE — 82803 BLOOD GASES ANY COMBINATION: CPT

## 2017-06-10 PROCEDURE — 71010 XR CHEST AP OR PA: CPT | Performed by: RADIOLOGY

## 2017-06-10 PROCEDURE — 10002800 HB RX 250 W HCPCS

## 2017-06-10 PROCEDURE — 82962 GLUCOSE BLOOD TEST: CPT

## 2017-06-10 PROCEDURE — 80053 COMPREHEN METABOLIC PANEL: CPT

## 2017-06-10 PROCEDURE — 94750 HB PULMONARY COMPLIANCE STUDY: CPT

## 2017-06-10 PROCEDURE — 10002800 HB RX 250 W HCPCS: Performed by: NURSE PRACTITIONER

## 2017-06-10 PROCEDURE — 85025 COMPLETE CBC W/AUTO DIFF WBC: CPT

## 2017-06-10 RX ORDER — TRAMADOL HYDROCHLORIDE 50 MG/1
50 TABLET ORAL EVERY 6 HOURS PRN
Status: DISCONTINUED | OUTPATIENT
Start: 2017-06-10 | End: 2017-06-12

## 2017-06-10 RX ORDER — HYDROCODONE BITARTRATE AND ACETAMINOPHEN 5; 325 MG/1; MG/1
1-2 TABLET ORAL EVERY 4 HOURS PRN
Status: DISCONTINUED | OUTPATIENT
Start: 2017-06-10 | End: 2017-06-12

## 2017-06-10 RX ORDER — AMOXICILLIN 250 MG
2 CAPSULE ORAL DAILY PRN
Status: DISCONTINUED | OUTPATIENT
Start: 2017-06-10 | End: 2017-06-12

## 2017-06-10 RX ORDER — AMLODIPINE BESYLATE 5 MG/1
5 TABLET ORAL DAILY
Status: DISCONTINUED | OUTPATIENT
Start: 2017-06-11 | End: 2017-06-11

## 2017-06-10 RX ORDER — NICOTINE POLACRILEX 4 MG
15 LOZENGE BUCCAL PRN
Status: DISCONTINUED | OUTPATIENT
Start: 2017-06-10 | End: 2017-06-12

## 2017-06-10 RX ORDER — AMIODARONE HYDROCHLORIDE 200 MG/1
400 TABLET ORAL 3 TIMES DAILY
Status: DISCONTINUED | OUTPATIENT
Start: 2017-06-10 | End: 2017-06-10

## 2017-06-10 RX ORDER — CARVEDILOL 3.12 MG/1
3.12 TABLET ORAL 2 TIMES DAILY WITH MEALS
Status: DISCONTINUED | OUTPATIENT
Start: 2017-06-11 | End: 2017-06-11

## 2017-06-10 RX ORDER — MIDAZOLAM HYDROCHLORIDE 1 MG/ML
2 INJECTION, SOLUTION INTRAMUSCULAR; INTRAVENOUS
Status: DISCONTINUED | OUTPATIENT
Start: 2017-06-10 | End: 2017-06-11

## 2017-06-10 RX ORDER — ASPIRIN 81 MG/1
81 TABLET, CHEWABLE ORAL DAILY
Status: DISCONTINUED | OUTPATIENT
Start: 2017-06-11 | End: 2017-06-11

## 2017-06-10 RX ORDER — MIDAZOLAM HYDROCHLORIDE 1 MG/ML
INJECTION INTRAMUSCULAR; INTRAVENOUS
Status: COMPLETED
Start: 2017-06-10 | End: 2017-06-10

## 2017-06-10 RX ORDER — ACETAMINOPHEN 325 MG/1
650 TABLET ORAL EVERY 4 HOURS PRN
Status: DISCONTINUED | OUTPATIENT
Start: 2017-06-10 | End: 2017-06-12

## 2017-06-10 RX ORDER — AMIODARONE HYDROCHLORIDE 200 MG/1
400 TABLET ORAL 3 TIMES DAILY
Status: DISCONTINUED | OUTPATIENT
Start: 2017-06-11 | End: 2017-06-11

## 2017-06-10 RX ORDER — ATORVASTATIN CALCIUM 40 MG/1
40 TABLET, FILM COATED ORAL NIGHTLY
Status: DISCONTINUED | OUTPATIENT
Start: 2017-06-11 | End: 2017-06-11

## 2017-06-10 RX ADMIN — DEXMEDETOMIDINE 1 MCG/KG/HR: 100 INJECTION, SOLUTION, CONCENTRATE INTRAVENOUS at 12:09

## 2017-06-10 RX ADMIN — CARVEDILOL 3.12 MG: 3.12 TABLET, FILM COATED ORAL at 08:10

## 2017-06-10 RX ADMIN — ASPIRIN 81 MG: 81 TABLET, CHEWABLE ORAL at 08:10

## 2017-06-10 RX ADMIN — CHLORHEXIDINE GLUCONATE ORAL RINSE 15 ML: 1.2 SOLUTION DENTAL at 21:54

## 2017-06-10 RX ADMIN — AMIODARONE HYDROCHLORIDE 400 MG: 200 TABLET ORAL at 14:39

## 2017-06-10 RX ADMIN — FUROSEMIDE 10 MG/HR: 10 INJECTION, SOLUTION INTRAVENOUS at 22:31

## 2017-06-10 RX ADMIN — MIDAZOLAM HYDROCHLORIDE 2 MG: 1 INJECTION, SOLUTION INTRAMUSCULAR; INTRAVENOUS at 17:00

## 2017-06-10 RX ADMIN — CHLORHEXIDINE GLUCONATE ORAL RINSE 15 ML: 1.2 SOLUTION DENTAL at 08:10

## 2017-06-10 RX ADMIN — MINERAL OIL AND WHITE PETROLATUM 1 APPLICATION.: 150; 830 OINTMENT OPHTHALMIC at 00:06

## 2017-06-10 RX ADMIN — MAGNESIUM SULFATE IN DEXTROSE 1 G: 10 INJECTION, SOLUTION INTRAVENOUS at 23:41

## 2017-06-10 RX ADMIN — MIDAZOLAM HYDROCHLORIDE 2 MG: 1 INJECTION, SOLUTION INTRAMUSCULAR; INTRAVENOUS at 11:11

## 2017-06-10 RX ADMIN — AMIODARONE HYDROCHLORIDE 400 MG: 200 TABLET ORAL at 08:10

## 2017-06-10 RX ADMIN — MINERAL OIL AND WHITE PETROLATUM 1 APPLICATION.: 150; 830 OINTMENT OPHTHALMIC at 05:34

## 2017-06-10 RX ADMIN — ENALAPRILAT 0.62 MG: 1.25 INJECTION INTRAVENOUS at 14:38

## 2017-06-10 RX ADMIN — DEXMEDETOMIDINE 0.3 MCG/KG/HR: 100 INJECTION, SOLUTION, CONCENTRATE INTRAVENOUS at 02:52

## 2017-06-10 RX ADMIN — DEXMEDETOMIDINE 0.5 MCG/KG/HR: 100 INJECTION, SOLUTION, CONCENTRATE INTRAVENOUS at 17:32

## 2017-06-10 RX ADMIN — Medication 10 ML: at 14:46

## 2017-06-10 RX ADMIN — INSULIN HUMAN 2 UNITS: 100 INJECTION, SOLUTION PARENTERAL at 11:46

## 2017-06-10 RX ADMIN — DEXMEDETOMIDINE 0.5 MCG/KG/HR: 100 INJECTION, SOLUTION, CONCENTRATE INTRAVENOUS at 23:41

## 2017-06-10 RX ADMIN — Medication 10 ML: at 05:36

## 2017-06-10 RX ADMIN — FAMOTIDINE 20 MG: 10 INJECTION, SOLUTION INTRAVENOUS at 21:44

## 2017-06-10 RX ADMIN — INSULIN GLARGINE 10 UNITS: 100 INJECTION, SOLUTION SUBCUTANEOUS at 11:46

## 2017-06-10 RX ADMIN — Medication 50 MCG/HR: at 09:21

## 2017-06-10 RX ADMIN — ATORVASTATIN CALCIUM 40 MG: 40 TABLET, FILM COATED ORAL at 21:45

## 2017-06-10 RX ADMIN — POTASSIUM CHLORIDE 40 MEQ: 1.5 POWDER, FOR SOLUTION ORAL at 05:47

## 2017-06-10 RX ADMIN — ENALAPRILAT 0.62 MG: 1.25 INJECTION INTRAVENOUS at 21:44

## 2017-06-10 RX ADMIN — AMIODARONE HYDROCHLORIDE 400 MG: 200 TABLET ORAL at 21:44

## 2017-06-10 RX ADMIN — POTASSIUM CHLORIDE 40 MEQ: 1.5 POWDER, FOR SOLUTION ORAL at 18:51

## 2017-06-10 RX ADMIN — AMLODIPINE BESYLATE 5 MG: 5 TABLET ORAL at 08:10

## 2017-06-10 RX ADMIN — Medication 50 MCG/HR: at 16:38

## 2017-06-10 ASSESSMENT — ACTIVITIES OF DAILY LIVING (ADL)
TOILETING: DEPENDENT
ADL_SCORE: 0
DRESSING: DEPENDENT
TOILETING: DEPENDENT
FEEDING: DEPENDENT
FEEDING: DEPENDENT
ADL_SCORE: 0
DRESSING: DEPENDENT
BATHING: DEPENDENT
BATHING: DEPENDENT

## 2017-06-11 ENCOUNTER — APPOINTMENT (OUTPATIENT)
Dept: GENERAL RADIOLOGY | Age: 57
DRG: 264 | End: 2017-06-11
Attending: NURSE PRACTITIONER

## 2017-06-11 LAB
ANION GAP SERPL CALC-SCNC: 10 MMOL/L (ref 10–20)
ANION GAP SERPL CALC-SCNC: 13 MMOL/L (ref 10–20)
APTT PPP: 102 SEC (ref 22–30)
APTT PPP: 63 SEC (ref 22–30)
APTT PPP: 64 SEC (ref 22–30)
BUN SERPL-MCNC: 27 MG/DL (ref 6–20)
BUN SERPL-MCNC: 33 MG/DL (ref 6–20)
BUN/CREAT SERPL: 18 (ref 7–25)
BUN/CREAT SERPL: 18 (ref 7–25)
CALCIUM SERPL-MCNC: 8 MG/DL (ref 8.4–10.2)
CALCIUM SERPL-MCNC: 8.8 MG/DL (ref 8.4–10.2)
CHLORIDE SERPL-SCNC: 103 MMOL/L (ref 98–107)
CHLORIDE SERPL-SCNC: 104 MMOL/L (ref 98–107)
CO2 SERPL-SCNC: 29 MMOL/L (ref 21–32)
CO2 SERPL-SCNC: 32 MMOL/L (ref 21–32)
CREAT SERPL-MCNC: 1.54 MG/DL (ref 0.51–0.95)
CREAT SERPL-MCNC: 1.86 MG/DL (ref 0.51–0.95)
ERYTHROCYTE [DISTWIDTH] IN BLOOD: 14.6 % (ref 11–15)
GLUCOSE BLDC GLUCOMTR-MCNC: 143 MG/DL (ref 65–99)
GLUCOSE BLDC GLUCOMTR-MCNC: 147 MG/DL (ref 65–99)
GLUCOSE BLDC GLUCOMTR-MCNC: 161 MG/DL (ref 65–99)
GLUCOSE BLDC GLUCOMTR-MCNC: 177 MG/DL (ref 65–99)
GLUCOSE BLDC GLUCOMTR-MCNC: 209 MG/DL (ref 65–99)
GLUCOSE SERPL-MCNC: 140 MG/DL (ref 65–99)
GLUCOSE SERPL-MCNC: 171 MG/DL (ref 65–99)
HCT VFR BLD CALC: 19.9 % (ref 36–46.5)
HGB BLD-MCNC: 6.4 G/DL (ref 12–15.5)
HGB BLD-MCNC: 7.4 G/DL (ref 12–15.5)
INR PPP: 5.9
IRON SATN MFR SERPL: 6 % (ref 15–45)
IRON SERPL-MCNC: 14 MCG/DL (ref 50–170)
MAGNESIUM SERPL-MCNC: 1.7 MG/DL (ref 1.7–2.4)
MCH RBC QN AUTO: 28.6 PG (ref 26–34)
MCHC RBC AUTO-ENTMCNC: 32.2 G/DL (ref 32–36.5)
MCV RBC AUTO: 88.8 FL (ref 78–100)
PHOSPHATE SERPL-MCNC: 2.8 MG/DL (ref 2.4–4.7)
PLATELET # BLD: 229 K/MCL (ref 140–450)
POTASSIUM SERPL-SCNC: 2.8 MMOL/L (ref 3.4–5.1)
POTASSIUM SERPL-SCNC: 3.7 MMOL/L (ref 3.4–5.1)
PROTHROMBIN TIME: 62.7 SEC (ref 9.7–11.8)
RBC # BLD: 2.24 MIL/MCL (ref 4–5.2)
SODIUM SERPL-SCNC: 141 MMOL/L (ref 135–145)
SODIUM SERPL-SCNC: 143 MMOL/L (ref 135–145)
TIBC SERPL-MCNC: 250 MCG/DL (ref 250–450)
WBC # BLD: 5 K/MCL (ref 4.2–11)

## 2017-06-11 PROCEDURE — 85730 THROMBOPLASTIN TIME PARTIAL: CPT

## 2017-06-11 PROCEDURE — 10002807 HB RX 258: Performed by: INTERNAL MEDICINE

## 2017-06-11 PROCEDURE — 10002803 HB RX 637: Performed by: INTERNAL MEDICINE

## 2017-06-11 PROCEDURE — 10002800 HB RX 250 W HCPCS: Performed by: INTERNAL MEDICINE

## 2017-06-11 PROCEDURE — 10004281 HB COUNTER-STAFF TIME PER 15 MIN

## 2017-06-11 PROCEDURE — 94003 VENT MGMT INPAT SUBQ DAY: CPT

## 2017-06-11 PROCEDURE — 99232 SBSQ HOSP IP/OBS MODERATE 35: CPT | Performed by: INTERNAL MEDICINE

## 2017-06-11 PROCEDURE — 10000008 HB ROOM CHARGE ICU OR CCU

## 2017-06-11 PROCEDURE — 71010 XR CHEST AP OR PA: CPT

## 2017-06-11 PROCEDURE — 85610 PROTHROMBIN TIME: CPT

## 2017-06-11 PROCEDURE — 83540 ASSAY OF IRON: CPT

## 2017-06-11 PROCEDURE — 83735 ASSAY OF MAGNESIUM: CPT

## 2017-06-11 PROCEDURE — 10002807 HB RX 258

## 2017-06-11 PROCEDURE — 84100 ASSAY OF PHOSPHORUS: CPT

## 2017-06-11 PROCEDURE — 85018 HEMOGLOBIN: CPT

## 2017-06-11 PROCEDURE — 10002801 HB RX 250 W/O HCPCS: Performed by: INTERNAL MEDICINE

## 2017-06-11 PROCEDURE — 80048 BASIC METABOLIC PNL TOTAL CA: CPT

## 2017-06-11 PROCEDURE — 10004651 HB RX, NO CHARGE ITEM: Performed by: INTERNAL MEDICINE

## 2017-06-11 PROCEDURE — 82962 GLUCOSE BLOOD TEST: CPT

## 2017-06-11 PROCEDURE — 71010 XR CHEST AP OR PA: CPT | Performed by: RADIOLOGY

## 2017-06-11 PROCEDURE — 99233 SBSQ HOSP IP/OBS HIGH 50: CPT | Performed by: INTERNAL MEDICINE

## 2017-06-11 PROCEDURE — 85027 COMPLETE CBC AUTOMATED: CPT

## 2017-06-11 PROCEDURE — 10002800 HB RX 250 W HCPCS: Performed by: NURSE PRACTITIONER

## 2017-06-11 PROCEDURE — 94750 HB PULMONARY COMPLIANCE STUDY: CPT

## 2017-06-11 PROCEDURE — 99291 CRITICAL CARE FIRST HOUR: CPT | Performed by: INTERNAL MEDICINE

## 2017-06-11 PROCEDURE — 10002803 HB RX 637: Performed by: NURSE PRACTITIONER

## 2017-06-11 RX ORDER — SODIUM CHLORIDE 9 MG/ML
INJECTION, SOLUTION INTRAVENOUS CONTINUOUS
Status: DISCONTINUED | OUTPATIENT
Start: 2017-06-11 | End: 2017-06-12

## 2017-06-11 RX ORDER — FAMOTIDINE 20 MG/1
20 TABLET, FILM COATED ORAL DAILY
Status: DISCONTINUED | OUTPATIENT
Start: 2017-06-11 | End: 2017-06-23 | Stop reason: HOSPADM

## 2017-06-11 RX ORDER — ATORVASTATIN CALCIUM 40 MG/1
40 TABLET, FILM COATED ORAL NIGHTLY
Status: DISCONTINUED | OUTPATIENT
Start: 2017-06-11 | End: 2017-06-23 | Stop reason: HOSPADM

## 2017-06-11 RX ORDER — POTASSIUM CHLORIDE 20 MEQ/1
40 TABLET, EXTENDED RELEASE ORAL 2 TIMES DAILY WITH MEALS
Status: DISCONTINUED | OUTPATIENT
Start: 2017-06-11 | End: 2017-06-16

## 2017-06-11 RX ORDER — AMLODIPINE BESYLATE 2.5 MG/1
2.5 TABLET ORAL DAILY
Status: DISCONTINUED | OUTPATIENT
Start: 2017-06-12 | End: 2017-06-11

## 2017-06-11 RX ORDER — CARVEDILOL 3.12 MG/1
3.12 TABLET ORAL ONCE
Status: COMPLETED | OUTPATIENT
Start: 2017-06-11 | End: 2017-06-11

## 2017-06-11 RX ORDER — ASPIRIN 81 MG/1
81 TABLET, CHEWABLE ORAL DAILY
Status: DISCONTINUED | OUTPATIENT
Start: 2017-06-12 | End: 2017-06-23 | Stop reason: HOSPADM

## 2017-06-11 RX ORDER — POLYETHYLENE GLYCOL 3350 17 G/17G
17 POWDER, FOR SOLUTION ORAL DAILY
Status: DISCONTINUED | OUTPATIENT
Start: 2017-06-11 | End: 2017-06-23 | Stop reason: HOSPADM

## 2017-06-11 RX ORDER — SODIUM CHLORIDE 9 MG/ML
INJECTION, SOLUTION INTRAVENOUS
Status: COMPLETED
Start: 2017-06-11 | End: 2017-06-11

## 2017-06-11 RX ORDER — CARVEDILOL 3.12 MG/1
3.12 TABLET ORAL 2 TIMES DAILY WITH MEALS
Status: DISCONTINUED | OUTPATIENT
Start: 2017-06-11 | End: 2017-06-11

## 2017-06-11 RX ORDER — CARVEDILOL 6.25 MG/1
6.25 TABLET ORAL 2 TIMES DAILY WITH MEALS
Status: DISCONTINUED | OUTPATIENT
Start: 2017-06-11 | End: 2017-06-12

## 2017-06-11 RX ORDER — ONDANSETRON 2 MG/ML
INJECTION INTRAMUSCULAR; INTRAVENOUS
Status: DISPENSED
Start: 2017-06-11 | End: 2017-06-12

## 2017-06-11 RX ORDER — AMIODARONE HYDROCHLORIDE 200 MG/1
400 TABLET ORAL 3 TIMES DAILY
Status: DISCONTINUED | OUTPATIENT
Start: 2017-06-11 | End: 2017-06-13

## 2017-06-11 RX ORDER — BISACODYL 10 MG
10 SUPPOSITORY, RECTAL RECTAL ONCE
Status: DISCONTINUED | OUTPATIENT
Start: 2017-06-11 | End: 2017-06-11

## 2017-06-11 RX ORDER — LISINOPRIL 2.5 MG/1
2.5 TABLET ORAL DAILY
Status: DISCONTINUED | OUTPATIENT
Start: 2017-06-11 | End: 2017-06-12

## 2017-06-11 RX ORDER — AMLODIPINE BESYLATE 2.5 MG/1
2.5 TABLET ORAL DAILY
Status: DISCONTINUED | OUTPATIENT
Start: 2017-06-12 | End: 2017-06-12

## 2017-06-11 RX ORDER — CARVEDILOL 6.25 MG/1
6.25 TABLET ORAL 2 TIMES DAILY WITH MEALS
Status: DISCONTINUED | OUTPATIENT
Start: 2017-06-11 | End: 2017-06-11

## 2017-06-11 RX ADMIN — ATORVASTATIN CALCIUM 40 MG: 40 TABLET, FILM COATED ORAL at 20:09

## 2017-06-11 RX ADMIN — INSULIN GLARGINE 10 UNITS: 100 INJECTION, SOLUTION SUBCUTANEOUS at 08:00

## 2017-06-11 RX ADMIN — AMIODARONE HYDROCHLORIDE 400 MG: 200 TABLET ORAL at 14:50

## 2017-06-11 RX ADMIN — AMLODIPINE BESYLATE 5 MG: 5 TABLET ORAL at 08:00

## 2017-06-11 RX ADMIN — CARVEDILOL 3.12 MG: 3.12 TABLET, FILM COATED ORAL at 08:00

## 2017-06-11 RX ADMIN — SODIUM CHLORIDE 0.3 MCG/KG/MIN: 9 INJECTION, SOLUTION INTRAVENOUS at 15:46

## 2017-06-11 RX ADMIN — ENALAPRILAT 0.62 MG: 1.25 INJECTION INTRAVENOUS at 05:02

## 2017-06-11 RX ADMIN — CARVEDILOL 3.12 MG: 3.12 TABLET, FILM COATED ORAL at 11:43

## 2017-06-11 RX ADMIN — CARVEDILOL 6.25 MG: 6.25 TABLET, FILM COATED ORAL at 20:10

## 2017-06-11 RX ADMIN — INSULIN HUMAN 2 UNITS: 100 INJECTION, SOLUTION PARENTERAL at 19:12

## 2017-06-11 RX ADMIN — POTASSIUM CHLORIDE 40 MEQ: 1.5 POWDER, FOR SOLUTION ORAL at 06:36

## 2017-06-11 RX ADMIN — MAGNESIUM SULFATE IN DEXTROSE 1 G: 10 INJECTION, SOLUTION INTRAVENOUS at 06:36

## 2017-06-11 RX ADMIN — ASPIRIN 81 MG: 81 TABLET, CHEWABLE ORAL at 08:00

## 2017-06-11 RX ADMIN — POTASSIUM CHLORIDE 40 MEQ: 14.9 INJECTION, SOLUTION INTRAVENOUS at 06:37

## 2017-06-11 RX ADMIN — Medication 50 MCG/HR: at 04:09

## 2017-06-11 RX ADMIN — FAMOTIDINE 20 MG: 20 TABLET, FILM COATED ORAL at 20:08

## 2017-06-11 RX ADMIN — AMIODARONE HYDROCHLORIDE 400 MG: 200 TABLET ORAL at 08:00

## 2017-06-11 RX ADMIN — POTASSIUM CHLORIDE 40 MEQ: 1500 TABLET, EXTENDED RELEASE ORAL at 20:07

## 2017-06-11 RX ADMIN — SODIUM CHLORIDE: 9 INJECTION, SOLUTION INTRAVENOUS at 01:00

## 2017-06-11 RX ADMIN — Medication 2 ML: at 20:11

## 2017-06-11 RX ADMIN — AMIODARONE HYDROCHLORIDE 400 MG: 200 TABLET ORAL at 20:09

## 2017-06-11 RX ADMIN — Medication 150 MG: at 20:09

## 2017-06-11 RX ADMIN — LISINOPRIL 2.5 MG: 2.5 TABLET ORAL at 14:50

## 2017-06-11 ASSESSMENT — ACTIVITIES OF DAILY LIVING (ADL)
TOILETING: NEEDS ASSISTANCE
ADL_SCORE: 7
DRESSING: DEPENDENT
BATHING: NEEDS ASSISTANCE
BATHING: DEPENDENT
FEEDING: INDEPENDENT
FEEDING: DEPENDENT
ADL_SCORE: 0
TOILETING: DEPENDENT
DRESSING: NEEDS ASSISTANCE

## 2017-06-11 ASSESSMENT — PAIN SCALES - GENERAL: PAIN_LEVEL_AT_REST: 0

## 2017-06-12 ENCOUNTER — APPOINTMENT (OUTPATIENT)
Dept: CV DIAGNOSTICS | Age: 57
DRG: 264 | End: 2017-06-12
Attending: NURSE PRACTITIONER

## 2017-06-12 LAB
ANION GAP SERPL CALC-SCNC: 9 MMOL/L (ref 10–20)
AORTIC VALVE AREA: 2.1 CM2
APTT PPP: 65 SEC (ref 22–30)
AV MEAN GRADIENT (AVMG): 5.9 MMHG
AV PEAK GRADIENT (AVPG): 13 MMHG
AV PEAK VELOCITY (AVPV): 1.8 M/S
BUN SERPL-MCNC: 34 MG/DL (ref 6–20)
BUN/CREAT SERPL: 17 (ref 7–25)
CALCIUM SERPL-MCNC: 8.6 MG/DL (ref 8.4–10.2)
CHLORIDE SERPL-SCNC: 102 MMOL/L (ref 98–107)
CO2 SERPL-SCNC: 33 MMOL/L (ref 21–32)
CREAT SERPL-MCNC: 1.98 MG/DL (ref 0.51–0.95)
DOP CALC LVOT PEAK VEL (LVOTPV): 1.3 M/S
ERYTHROCYTE [DISTWIDTH] IN BLOOD: 15 % (ref 11–15)
GLUCOSE BLDC GLUCOMTR-MCNC: 186 MG/DL (ref 65–99)
GLUCOSE BLDC GLUCOMTR-MCNC: 217 MG/DL (ref 65–99)
GLUCOSE BLDC GLUCOMTR-MCNC: 230 MG/DL (ref 65–99)
GLUCOSE BLDC GLUCOMTR-MCNC: 234 MG/DL (ref 65–99)
GLUCOSE SERPL-MCNC: 169 MG/DL (ref 65–99)
HCT VFR BLD CALC: 24.2 % (ref 36–46.5)
HGB BLD-MCNC: 7.5 G/DL (ref 12–15.5)
INR PPP: 3.9
INTERVENTRICULAR SEPTUM IN END DIASTOLE (IVSD): 1 CM
LEFT INTERNAL DIMENSION IN SYSTOLE (LVSD): 4.4 CM
LEFT VENTRICULAR INTERNAL DIMENSION IN DIASTOLE (LVDD): 5.4 CM
LEFT VENTRICULAR POSTERIOR WALL IN END DIASTOLE (LVPW): 1 CM
LV EF: 40 %
LVOT 2D (LVOTD): 2 CM
LVOT VTI (LVOTVTI): 21.6 CM
MAGNESIUM SERPL-MCNC: 1.8 MG/DL (ref 1.7–2.4)
MCH RBC QN AUTO: 28.2 PG (ref 26–34)
MCHC RBC AUTO-ENTMCNC: 31 G/DL (ref 32–36.5)
MCV RBC AUTO: 91 FL (ref 78–100)
PHOSPHATE SERPL-MCNC: 3.4 MG/DL (ref 2.4–4.7)
PLATELET # BLD: 306 K/MCL (ref 140–450)
POTASSIUM SERPL-SCNC: 3.7 MMOL/L (ref 3.4–5.1)
PROTHROMBIN TIME: 41.5 SEC (ref 9.7–11.8)
RBC # BLD: 2.66 MIL/MCL (ref 4–5.2)
SODIUM SERPL-SCNC: 140 MMOL/L (ref 135–145)
TRICUSPID VALVE PEAK REGURGITATION VELOCITY (TRPV): 3.2 M/S
TV ESTIMATED RIGHT ARTERIAL PRESSURE (RAP): 15 MMHG
WBC # BLD: 7.3 K/MCL (ref 4.2–11)

## 2017-06-12 PROCEDURE — 85027 COMPLETE CBC AUTOMATED: CPT

## 2017-06-12 PROCEDURE — 99233 SBSQ HOSP IP/OBS HIGH 50: CPT | Performed by: INTERNAL MEDICINE

## 2017-06-12 PROCEDURE — 87077 CULTURE AEROBIC IDENTIFY: CPT

## 2017-06-12 PROCEDURE — 10002807 HB RX 258: Performed by: INTERNAL MEDICINE

## 2017-06-12 PROCEDURE — 10002803 HB RX 637: Performed by: NURSE PRACTITIONER

## 2017-06-12 PROCEDURE — 10004651 HB RX, NO CHARGE ITEM: Performed by: INTERNAL MEDICINE

## 2017-06-12 PROCEDURE — 82962 GLUCOSE BLOOD TEST: CPT

## 2017-06-12 PROCEDURE — 10002803 HB RX 637: Performed by: INTERNAL MEDICINE

## 2017-06-12 PROCEDURE — 85730 THROMBOPLASTIN TIME PARTIAL: CPT

## 2017-06-12 PROCEDURE — 76376 3D RENDER W/INTRP POSTPROCES: CPT | Performed by: INTERNAL MEDICINE

## 2017-06-12 PROCEDURE — 80048 BASIC METABOLIC PNL TOTAL CA: CPT

## 2017-06-12 PROCEDURE — 99232 SBSQ HOSP IP/OBS MODERATE 35: CPT | Performed by: INTERNAL MEDICINE

## 2017-06-12 PROCEDURE — 10002800 HB RX 250 W HCPCS: Performed by: NURSE PRACTITIONER

## 2017-06-12 PROCEDURE — 85610 PROTHROMBIN TIME: CPT

## 2017-06-12 PROCEDURE — 10004325 HB COUNTER ASSESSMENT EA 15 MIN

## 2017-06-12 PROCEDURE — 93306 TTE W/DOPPLER COMPLETE: CPT

## 2017-06-12 PROCEDURE — 87186 SC STD MICRODIL/AGAR DIL: CPT

## 2017-06-12 PROCEDURE — 87070 CULTURE OTHR SPECIMN AEROBIC: CPT

## 2017-06-12 PROCEDURE — 84100 ASSAY OF PHOSPHORUS: CPT

## 2017-06-12 PROCEDURE — 83735 ASSAY OF MAGNESIUM: CPT

## 2017-06-12 PROCEDURE — 10002800 HB RX 250 W HCPCS: Performed by: INTERNAL MEDICINE

## 2017-06-12 PROCEDURE — 99254 IP/OBS CNSLTJ NEW/EST MOD 60: CPT | Performed by: INTERNAL MEDICINE

## 2017-06-12 PROCEDURE — 10000008 HB ROOM CHARGE ICU OR CCU

## 2017-06-12 PROCEDURE — 93306 TTE W/DOPPLER COMPLETE: CPT | Performed by: INTERNAL MEDICINE

## 2017-06-12 RX ORDER — AMOXICILLIN 250 MG
2 CAPSULE ORAL DAILY PRN
Status: DISCONTINUED | OUTPATIENT
Start: 2017-06-12 | End: 2017-06-23 | Stop reason: HOSPADM

## 2017-06-12 RX ORDER — POTASSIUM CHLORIDE 1.5 G/1.58G
40 POWDER, FOR SOLUTION ORAL EVERY 4 HOURS PRN
Status: DISCONTINUED | OUTPATIENT
Start: 2017-06-12 | End: 2017-06-23 | Stop reason: HOSPADM

## 2017-06-12 RX ORDER — POTASSIUM CHLORIDE 14.9 MG/ML
20 INJECTION INTRAVENOUS EVERY 4 HOURS PRN
Status: DISCONTINUED | OUTPATIENT
Start: 2017-06-12 | End: 2017-06-23 | Stop reason: HOSPADM

## 2017-06-12 RX ORDER — POTASSIUM CHLORIDE 20 MEQ/1
20 TABLET, EXTENDED RELEASE ORAL EVERY 4 HOURS PRN
Status: DISCONTINUED | OUTPATIENT
Start: 2017-06-12 | End: 2017-06-23 | Stop reason: HOSPADM

## 2017-06-12 RX ORDER — CARVEDILOL 12.5 MG/1
12.5 TABLET ORAL 2 TIMES DAILY WITH MEALS
Status: DISCONTINUED | OUTPATIENT
Start: 2017-06-12 | End: 2017-06-23 | Stop reason: HOSPADM

## 2017-06-12 RX ORDER — FUROSEMIDE 10 MG/ML
40 INJECTION INTRAMUSCULAR; INTRAVENOUS EVERY 8 HOURS
Status: DISCONTINUED | OUTPATIENT
Start: 2017-06-12 | End: 2017-06-12

## 2017-06-12 RX ORDER — INSULIN GLARGINE 100 [IU]/ML
28 INJECTION, SOLUTION SUBCUTANEOUS DAILY
Status: DISCONTINUED | OUTPATIENT
Start: 2017-06-13 | End: 2017-06-15

## 2017-06-12 RX ORDER — METOLAZONE 5 MG/1
5 TABLET ORAL ONCE
Status: COMPLETED | OUTPATIENT
Start: 2017-06-12 | End: 2017-06-12

## 2017-06-12 RX ORDER — LISINOPRIL 2.5 MG/1
2.5 TABLET ORAL DAILY
Status: DISCONTINUED | OUTPATIENT
Start: 2017-06-13 | End: 2017-06-12

## 2017-06-12 RX ORDER — POTASSIUM CHLORIDE 14.9 MG/ML
40 INJECTION INTRAVENOUS EVERY 4 HOURS PRN
Status: DISCONTINUED | OUTPATIENT
Start: 2017-06-12 | End: 2017-06-23 | Stop reason: HOSPADM

## 2017-06-12 RX ORDER — LISINOPRIL 5 MG/1
5 TABLET ORAL DAILY
Status: DISCONTINUED | OUTPATIENT
Start: 2017-06-13 | End: 2017-06-12

## 2017-06-12 RX ORDER — TRAMADOL HYDROCHLORIDE 50 MG/1
50 TABLET ORAL EVERY 6 HOURS PRN
Status: DISCONTINUED | OUTPATIENT
Start: 2017-06-12 | End: 2017-06-14 | Stop reason: SDUPTHER

## 2017-06-12 RX ORDER — HUMAN INSULIN 100 [IU]/ML
INJECTION, SOLUTION SUBCUTANEOUS
Status: DISCONTINUED | OUTPATIENT
Start: 2017-06-12 | End: 2017-06-12

## 2017-06-12 RX ORDER — NICOTINE POLACRILEX 4 MG
15 LOZENGE BUCCAL PRN
Status: DISCONTINUED | OUTPATIENT
Start: 2017-06-12 | End: 2017-06-23 | Stop reason: HOSPADM

## 2017-06-12 RX ORDER — POTASSIUM CHLORIDE 1.5 G/1.58G
20 POWDER, FOR SOLUTION ORAL EVERY 4 HOURS PRN
Status: DISCONTINUED | OUTPATIENT
Start: 2017-06-12 | End: 2017-06-23 | Stop reason: HOSPADM

## 2017-06-12 RX ORDER — FUROSEMIDE 10 MG/ML
40 INJECTION INTRAMUSCULAR; INTRAVENOUS 2 TIMES DAILY
Status: DISCONTINUED | OUTPATIENT
Start: 2017-06-12 | End: 2017-06-14

## 2017-06-12 RX ORDER — POTASSIUM CHLORIDE 20 MEQ/1
40 TABLET, EXTENDED RELEASE ORAL EVERY 4 HOURS PRN
Status: DISCONTINUED | OUTPATIENT
Start: 2017-06-12 | End: 2017-06-23 | Stop reason: HOSPADM

## 2017-06-12 RX ADMIN — Medication 2 ML: at 20:06

## 2017-06-12 RX ADMIN — FUROSEMIDE 10 MG/HR: 10 INJECTION, SOLUTION INTRAVENOUS at 01:56

## 2017-06-12 RX ADMIN — METOLAZONE 5 MG: 5 TABLET ORAL at 15:37

## 2017-06-12 RX ADMIN — POTASSIUM CHLORIDE 40 MEQ: 1500 TABLET, EXTENDED RELEASE ORAL at 09:05

## 2017-06-12 RX ADMIN — ASPIRIN 81 MG: 81 TABLET, CHEWABLE ORAL at 09:05

## 2017-06-12 RX ADMIN — Medication 2 ML: at 09:00

## 2017-06-12 RX ADMIN — POTASSIUM CHLORIDE 40 MEQ: 1500 TABLET, EXTENDED RELEASE ORAL at 17:18

## 2017-06-12 RX ADMIN — IRON SUCROSE 200 MG: 20 INJECTION, SOLUTION INTRAVENOUS at 12:46

## 2017-06-12 RX ADMIN — LISINOPRIL 2.5 MG: 2.5 TABLET ORAL at 09:05

## 2017-06-12 RX ADMIN — FUROSEMIDE 40 MG: 10 INJECTION, SOLUTION INTRAVENOUS at 11:45

## 2017-06-12 RX ADMIN — AMIODARONE HYDROCHLORIDE 400 MG: 200 TABLET ORAL at 14:44

## 2017-06-12 RX ADMIN — FAMOTIDINE 20 MG: 20 TABLET, FILM COATED ORAL at 20:03

## 2017-06-12 RX ADMIN — AMLODIPINE BESYLATE 2.5 MG: 2.5 TABLET ORAL at 09:05

## 2017-06-12 RX ADMIN — AMIODARONE HYDROCHLORIDE 400 MG: 200 TABLET ORAL at 20:03

## 2017-06-12 RX ADMIN — INSULIN HUMAN 4 UNITS: 100 INJECTION, SOLUTION PARENTERAL at 12:35

## 2017-06-12 RX ADMIN — ATORVASTATIN CALCIUM 40 MG: 40 TABLET, FILM COATED ORAL at 20:03

## 2017-06-12 RX ADMIN — INSULIN GLARGINE 10 UNITS: 100 INJECTION, SOLUTION SUBCUTANEOUS at 09:08

## 2017-06-12 RX ADMIN — CARVEDILOL 6.25 MG: 6.25 TABLET, FILM COATED ORAL at 09:05

## 2017-06-12 RX ADMIN — FUROSEMIDE 40 MG: 10 INJECTION, SOLUTION INTRAVENOUS at 17:18

## 2017-06-12 RX ADMIN — AMIODARONE HYDROCHLORIDE 400 MG: 200 TABLET ORAL at 09:05

## 2017-06-12 RX ADMIN — INSULIN HUMAN 2 UNITS: 100 INJECTION, SOLUTION PARENTERAL at 06:44

## 2017-06-12 RX ADMIN — CARVEDILOL 12.5 MG: 12.5 TABLET, FILM COATED ORAL at 17:22

## 2017-06-12 RX ADMIN — INSULIN HUMAN 4 UNITS: 100 INJECTION, SOLUTION PARENTERAL at 00:12

## 2017-06-12 ASSESSMENT — PAIN SCALES - GENERAL
PAIN_LEVEL_AT_REST: 0

## 2017-06-12 ASSESSMENT — ACTIVITIES OF DAILY LIVING (ADL)
FEEDING: INDEPENDENT
BATHING: NEEDS ASSISTANCE
BATHING: NEEDS ASSISTANCE
DRESSING: NEEDS ASSISTANCE
TOILETING: NEEDS ASSISTANCE
ADL_SCORE: 7
FEEDING: INDEPENDENT
BATHING: NEEDS ASSISTANCE
DRESSING: NEEDS ASSISTANCE
ADL_SCORE: 7
TOILETING: NEEDS ASSISTANCE
BATHING: NEEDS ASSISTANCE
ADL_SCORE: 7
DRESSING: NEEDS ASSISTANCE
DRESSING: NEEDS ASSISTANCE
ADL_SCORE: 7
TOILETING: NEEDS ASSISTANCE
FEEDING: INDEPENDENT
DRESSING: NEEDS ASSISTANCE
FEEDING: INDEPENDENT
TOILETING: NEEDS ASSISTANCE
ADL_SCORE: 7
BATHING: NEEDS ASSISTANCE
TOILETING: NEEDS ASSISTANCE
FEEDING: INDEPENDENT

## 2017-06-13 LAB
ANION GAP SERPL CALC-SCNC: 11 MMOL/L (ref 10–20)
ANION GAP SERPL CALC-SCNC: 9 MMOL/L (ref 10–20)
APTT PPP: 61 SEC (ref 22–30)
BUN SERPL-MCNC: 34 MG/DL (ref 6–20)
BUN SERPL-MCNC: 36 MG/DL (ref 6–20)
BUN/CREAT SERPL: 18 (ref 7–25)
BUN/CREAT SERPL: 20 (ref 7–25)
CA-I ADJ PH7.4 SERPL-SCNC: 1.22 MMOL/L (ref 1.15–1.29)
CA-I SERPL-SCNC: 1.21 MMOL/L (ref 1.15–1.29)
CALCIUM SERPL-MCNC: 8.7 MG/DL (ref 8.4–10.2)
CALCIUM SERPL-MCNC: 8.7 MG/DL (ref 8.4–10.2)
CHLORIDE SERPL-SCNC: 101 MMOL/L (ref 98–107)
CHLORIDE SERPL-SCNC: 99 MMOL/L (ref 98–107)
CO2 SERPL-SCNC: 33 MMOL/L (ref 21–32)
CO2 SERPL-SCNC: 35 MMOL/L (ref 21–32)
CREAT SERPL-MCNC: 1.83 MG/DL (ref 0.51–0.95)
CREAT SERPL-MCNC: 1.89 MG/DL (ref 0.51–0.95)
ERYTHROCYTE [DISTWIDTH] IN BLOOD: 14.5 % (ref 11–15)
GLUCOSE BLDC GLUCOMTR-MCNC: 163 MG/DL (ref 65–99)
GLUCOSE BLDC GLUCOMTR-MCNC: 168 MG/DL (ref 65–99)
GLUCOSE BLDC GLUCOMTR-MCNC: 213 MG/DL (ref 65–99)
GLUCOSE BLDC GLUCOMTR-MCNC: 216 MG/DL (ref 65–99)
GLUCOSE BLDC GLUCOMTR-MCNC: 272 MG/DL (ref 65–99)
GLUCOSE SERPL-MCNC: 139 MG/DL (ref 65–99)
GLUCOSE SERPL-MCNC: 158 MG/DL (ref 65–99)
HCT VFR BLD CALC: 23.2 % (ref 36–46.5)
HGB BLD-MCNC: 7.2 G/DL (ref 12–15.5)
INR PPP: 2.5
INR PPP: 2.5
MAGNESIUM SERPL-MCNC: 1.8 MG/DL (ref 1.7–2.4)
MCH RBC QN AUTO: 28.2 PG (ref 26–34)
MCHC RBC AUTO-ENTMCNC: 31 G/DL (ref 32–36.5)
MCV RBC AUTO: 91 FL (ref 78–100)
PHOSPHATE SERPL-MCNC: 3.4 MG/DL (ref 2.4–4.7)
PLATELET # BLD: 287 K/MCL (ref 140–450)
POTASSIUM SERPL-SCNC: 3.6 MMOL/L (ref 3.4–5.1)
POTASSIUM SERPL-SCNC: 3.7 MMOL/L (ref 3.4–5.1)
POTASSIUM SERPL-SCNC: 3.8 MMOL/L (ref 3.4–5.1)
POTASSIUM SERPL-SCNC: 4.4 MMOL/L (ref 3.4–5.1)
PROTHROMBIN TIME: 27 SEC (ref 9.7–11.8)
PROTHROMBIN TIME: 27.3 SEC (ref 9.7–11.8)
RBC # BLD: 2.55 MIL/MCL (ref 4–5.2)
SODIUM SERPL-SCNC: 139 MMOL/L (ref 135–145)
SODIUM SERPL-SCNC: 141 MMOL/L (ref 135–145)
WBC # BLD: 7 K/MCL (ref 4.2–11)

## 2017-06-13 PROCEDURE — 99233 SBSQ HOSP IP/OBS HIGH 50: CPT | Performed by: NURSE PRACTITIONER

## 2017-06-13 PROCEDURE — 10002807 HB RX 258: Performed by: INTERNAL MEDICINE

## 2017-06-13 PROCEDURE — 10002803 HB RX 637: Performed by: INTERNAL MEDICINE

## 2017-06-13 PROCEDURE — 94150 VITAL CAPACITY TEST: CPT

## 2017-06-13 PROCEDURE — 82330 ASSAY OF CALCIUM: CPT

## 2017-06-13 PROCEDURE — 84100 ASSAY OF PHOSPHORUS: CPT

## 2017-06-13 PROCEDURE — 10002803 HB RX 637: Performed by: NURSE PRACTITIONER

## 2017-06-13 PROCEDURE — 97530 THERAPEUTIC ACTIVITIES: CPT | Performed by: OCCUPATIONAL THERAPIST

## 2017-06-13 PROCEDURE — 10004651 HB RX, NO CHARGE ITEM: Performed by: INTERNAL MEDICINE

## 2017-06-13 PROCEDURE — 80048 BASIC METABOLIC PNL TOTAL CA: CPT

## 2017-06-13 PROCEDURE — 85730 THROMBOPLASTIN TIME PARTIAL: CPT

## 2017-06-13 PROCEDURE — 85027 COMPLETE CBC AUTOMATED: CPT

## 2017-06-13 PROCEDURE — 86901 BLOOD TYPING SEROLOGIC RH(D): CPT

## 2017-06-13 PROCEDURE — 97530 THERAPEUTIC ACTIVITIES: CPT

## 2017-06-13 PROCEDURE — 82962 GLUCOSE BLOOD TEST: CPT

## 2017-06-13 PROCEDURE — 97116 GAIT TRAINING THERAPY: CPT

## 2017-06-13 PROCEDURE — 36415 COLL VENOUS BLD VENIPUNCTURE: CPT

## 2017-06-13 PROCEDURE — 10002807 HB RX 258

## 2017-06-13 PROCEDURE — 97535 SELF CARE MNGMENT TRAINING: CPT | Performed by: OCCUPATIONAL THERAPIST

## 2017-06-13 PROCEDURE — 99233 SBSQ HOSP IP/OBS HIGH 50: CPT | Performed by: INTERNAL MEDICINE

## 2017-06-13 PROCEDURE — 84132 ASSAY OF SERUM POTASSIUM: CPT

## 2017-06-13 PROCEDURE — 97162 PT EVAL MOD COMPLEX 30 MIN: CPT

## 2017-06-13 PROCEDURE — 10004173 HB COUNTER-THERAPY VISIT PT

## 2017-06-13 PROCEDURE — 97166 OT EVAL MOD COMPLEX 45 MIN: CPT | Performed by: OCCUPATIONAL THERAPIST

## 2017-06-13 PROCEDURE — 86900 BLOOD TYPING SEROLOGIC ABO: CPT

## 2017-06-13 PROCEDURE — 10004172 HB COUNTER-THERAPY VISIT OT: Performed by: OCCUPATIONAL THERAPIST

## 2017-06-13 PROCEDURE — 99232 SBSQ HOSP IP/OBS MODERATE 35: CPT | Performed by: INTERNAL MEDICINE

## 2017-06-13 PROCEDURE — 10000002 HB ROOM CHARGE MED SURG

## 2017-06-13 PROCEDURE — 85610 PROTHROMBIN TIME: CPT

## 2017-06-13 PROCEDURE — 94640 AIRWAY INHALATION TREATMENT: CPT

## 2017-06-13 PROCEDURE — 86923 COMPATIBILITY TEST ELECTRIC: CPT

## 2017-06-13 PROCEDURE — 10002800 HB RX 250 W HCPCS: Performed by: NURSE PRACTITIONER

## 2017-06-13 PROCEDURE — 10002019 HB COUNTER RESP ASSESSMENT

## 2017-06-13 PROCEDURE — 87040 BLOOD CULTURE FOR BACTERIA: CPT

## 2017-06-13 PROCEDURE — 10003445 HB TELEMETRY PER DAY

## 2017-06-13 PROCEDURE — 10002801 HB RX 250 W/O HCPCS: Performed by: NURSE PRACTITIONER

## 2017-06-13 PROCEDURE — 83735 ASSAY OF MAGNESIUM: CPT

## 2017-06-13 RX ORDER — SODIUM CHLORIDE 9 MG/ML
INJECTION, SOLUTION INTRAVENOUS
Status: COMPLETED
Start: 2017-06-13 | End: 2017-06-13

## 2017-06-13 RX ORDER — AMIODARONE HYDROCHLORIDE 200 MG/1
200 TABLET ORAL DAILY
Status: DISCONTINUED | OUTPATIENT
Start: 2017-06-16 | End: 2017-06-23 | Stop reason: HOSPADM

## 2017-06-13 RX ORDER — IPRATROPIUM BROMIDE AND ALBUTEROL SULFATE 2.5; .5 MG/3ML; MG/3ML
3 SOLUTION RESPIRATORY (INHALATION)
Status: DISCONTINUED | OUTPATIENT
Start: 2017-06-13 | End: 2017-06-14

## 2017-06-13 RX ORDER — CHLORHEXIDINE GLUCONATE ORAL RINSE 1.2 MG/ML
15 SOLUTION DENTAL
Status: DISCONTINUED | OUTPATIENT
Start: 2017-06-13 | End: 2017-06-14 | Stop reason: HOSPADM

## 2017-06-13 RX ORDER — SODIUM CHLORIDE 9 MG/ML
INJECTION, SOLUTION INTRAVENOUS CONTINUOUS
Status: DISCONTINUED | OUTPATIENT
Start: 2017-06-13 | End: 2017-06-22 | Stop reason: ALTCHOICE

## 2017-06-13 RX ORDER — METOLAZONE 5 MG/1
5 TABLET ORAL ONCE
Status: COMPLETED | OUTPATIENT
Start: 2017-06-13 | End: 2017-06-13

## 2017-06-13 RX ORDER — ONDANSETRON 2 MG/ML
4 INJECTION INTRAMUSCULAR; INTRAVENOUS EVERY 12 HOURS PRN
Status: DISCONTINUED | OUTPATIENT
Start: 2017-06-13 | End: 2017-06-23 | Stop reason: HOSPADM

## 2017-06-13 RX ORDER — AMIODARONE HYDROCHLORIDE 200 MG/1
400 TABLET ORAL EVERY 12 HOURS SCHEDULED
Status: COMPLETED | OUTPATIENT
Start: 2017-06-13 | End: 2017-06-15

## 2017-06-13 RX ORDER — MUPIROCIN 20 MG/G
1 OINTMENT TOPICAL
Status: DISCONTINUED | OUTPATIENT
Start: 2017-06-13 | End: 2017-06-14 | Stop reason: HOSPADM

## 2017-06-13 RX ADMIN — IPRATROPIUM BROMIDE AND ALBUTEROL SULFATE 3 ML: .5; 3 SOLUTION RESPIRATORY (INHALATION) at 19:54

## 2017-06-13 RX ADMIN — ASPIRIN 81 MG: 81 TABLET, CHEWABLE ORAL at 08:55

## 2017-06-13 RX ADMIN — METOLAZONE 5 MG: 5 TABLET ORAL at 09:59

## 2017-06-13 RX ADMIN — INSULIN LISPRO 7 UNITS: 100 INJECTION, SOLUTION INTRAVENOUS; SUBCUTANEOUS at 09:57

## 2017-06-13 RX ADMIN — AMIODARONE HYDROCHLORIDE 400 MG: 200 TABLET ORAL at 13:00

## 2017-06-13 RX ADMIN — INSULIN GLARGINE 28 UNITS: 100 INJECTION, SOLUTION SUBCUTANEOUS at 08:55

## 2017-06-13 RX ADMIN — CARVEDILOL 12.5 MG: 12.5 TABLET, FILM COATED ORAL at 08:55

## 2017-06-13 RX ADMIN — INSULIN LISPRO 7 UNITS: 100 INJECTION, SOLUTION INTRAVENOUS; SUBCUTANEOUS at 18:15

## 2017-06-13 RX ADMIN — FAMOTIDINE 20 MG: 20 TABLET, FILM COATED ORAL at 20:37

## 2017-06-13 RX ADMIN — MAGNESIUM SULFATE IN DEXTROSE 1 G: 10 INJECTION, SOLUTION INTRAVENOUS at 16:25

## 2017-06-13 RX ADMIN — FUROSEMIDE 40 MG: 10 INJECTION, SOLUTION INTRAVENOUS at 18:09

## 2017-06-13 RX ADMIN — ATORVASTATIN CALCIUM 40 MG: 40 TABLET, FILM COATED ORAL at 20:37

## 2017-06-13 RX ADMIN — Medication 10 ML: at 20:42

## 2017-06-13 RX ADMIN — CARVEDILOL 12.5 MG: 12.5 TABLET, FILM COATED ORAL at 16:25

## 2017-06-13 RX ADMIN — SODIUM CHLORIDE: 9 INJECTION, SOLUTION INTRAVENOUS at 07:06

## 2017-06-13 RX ADMIN — Medication 2 ML: at 08:55

## 2017-06-13 RX ADMIN — VANCOMYCIN HYDROCHLORIDE 1500 MG: 10 INJECTION, POWDER, LYOPHILIZED, FOR SOLUTION INTRAVENOUS at 11:54

## 2017-06-13 RX ADMIN — AMIODARONE HYDROCHLORIDE 400 MG: 200 TABLET ORAL at 20:37

## 2017-06-13 RX ADMIN — INSULIN LISPRO 9 UNITS: 100 INJECTION, SOLUTION INTRAVENOUS; SUBCUTANEOUS at 13:01

## 2017-06-13 RX ADMIN — FUROSEMIDE 40 MG: 10 INJECTION, SOLUTION INTRAVENOUS at 08:56

## 2017-06-13 RX ADMIN — Medication 10 ML: at 07:00

## 2017-06-13 RX ADMIN — Medication 2 ML: at 20:45

## 2017-06-13 RX ADMIN — POLYETHYLENE GLYCOL (3350) 17 G: 17 POWDER, FOR SOLUTION ORAL at 08:55

## 2017-06-13 RX ADMIN — POTASSIUM CHLORIDE 40 MEQ: 1500 TABLET, EXTENDED RELEASE ORAL at 08:55

## 2017-06-13 RX ADMIN — POTASSIUM CHLORIDE 20 MEQ: 1500 TABLET, EXTENDED RELEASE ORAL at 16:25

## 2017-06-13 RX ADMIN — IPRATROPIUM BROMIDE AND ALBUTEROL SULFATE 3 ML: .5; 3 SOLUTION RESPIRATORY (INHALATION) at 16:56

## 2017-06-13 RX ADMIN — IRON SUCROSE 200 MG: 20 INJECTION, SOLUTION INTRAVENOUS at 08:55

## 2017-06-13 RX ADMIN — VANCOMYCIN HYDROCHLORIDE 1500 MG: 10 INJECTION, POWDER, LYOPHILIZED, FOR SOLUTION INTRAVENOUS at 18:09

## 2017-06-13 RX ADMIN — AMIODARONE HYDROCHLORIDE 400 MG: 200 TABLET ORAL at 08:55

## 2017-06-13 RX ADMIN — POTASSIUM CHLORIDE 40 MEQ: 1500 TABLET, EXTENDED RELEASE ORAL at 16:25

## 2017-06-13 ASSESSMENT — ACTIVITIES OF DAILY LIVING (ADL)
ADL_SCORE: 7
PRIOR_ADL: INDEPENDENT
DRESSING: NEEDS ASSISTANCE
BATHING: NEEDS ASSISTANCE
TOILETING: NEEDS ASSISTANCE
FEEDING: INDEPENDENT
DRESSING: NEEDS ASSISTANCE
FEEDING: INDEPENDENT
GROOMING: INDEPENDENT
EATING: INDEPENDENT
BATHING: NEEDS ASSISTANCE
TOILETING: NEEDS ASSISTANCE
ADL_SCORE: 7
PRIOR_ADL_TOILETING: INDEPENDENT
PRIOR_ADL_BATHING: INDEPENDENT

## 2017-06-13 ASSESSMENT — PULMONARY FUNCTION TESTS
FEV1_PREDICTED: 0
FEV1/FVC: GREATER THAN 70% AND SYMPTOMATIC
FEV1/FVC_PERCENT_PREDICTED: GREATER THAN 70% AND SYMPTOMATIC
FEV1_PREDICTED: 0
FEV1: 0
FEV1: 0
FEV1_PREDICTED: 0
FEV1/FVC_PERCENT_PREDICTED: 0
FEV1: 0

## 2017-06-13 ASSESSMENT — PAIN SCALES - GENERAL
PAIN_LEVEL_AT_REST: 0
PAIN_LEVEL_WITH_ACTIVITY: 0

## 2017-06-14 ENCOUNTER — ANESTHESIA (OUTPATIENT)
Dept: SURGERY | Age: 57
DRG: 264 | End: 2017-06-14

## 2017-06-14 ENCOUNTER — SURGERY (OUTPATIENT)
Age: 57
End: 2017-06-14

## 2017-06-14 ENCOUNTER — ANESTHESIA EVENT (OUTPATIENT)
Dept: SURGERY | Age: 57
DRG: 264 | End: 2017-06-14

## 2017-06-14 LAB
ANION GAP SERPL CALC-SCNC: 7 MMOL/L (ref 10–20)
ANION GAP SERPL CALC-SCNC: 8 MMOL/L (ref 10–20)
APTT PPP: 40 SEC (ref 22–30)
BUN SERPL-MCNC: 37 MG/DL (ref 6–20)
BUN SERPL-MCNC: 39 MG/DL (ref 6–20)
BUN/CREAT SERPL: 18 (ref 7–25)
BUN/CREAT SERPL: 21 (ref 7–25)
CA-I ADJ PH7.4 SERPL-SCNC: 1.24 MMOL/L (ref 1.15–1.29)
CA-I SERPL-SCNC: 1.22 MMOL/L (ref 1.15–1.29)
CALCIUM SERPL-MCNC: 8.8 MG/DL (ref 8.4–10.2)
CALCIUM SERPL-MCNC: 8.9 MG/DL (ref 8.4–10.2)
CHLORIDE SERPL-SCNC: 100 MMOL/L (ref 98–107)
CHLORIDE SERPL-SCNC: 99 MMOL/L (ref 98–107)
CO2 SERPL-SCNC: 33 MMOL/L (ref 21–32)
CO2 SERPL-SCNC: 35 MMOL/L (ref 21–32)
CREAT SERPL-MCNC: 1.89 MG/DL (ref 0.51–0.95)
CREAT SERPL-MCNC: 2.04 MG/DL (ref 0.51–0.95)
ERYTHROCYTE [DISTWIDTH] IN BLOOD: 14.4 % (ref 11–15)
GLUCOSE BLDC GLUCOMTR-MCNC: 176 MG/DL (ref 65–99)
GLUCOSE BLDC GLUCOMTR-MCNC: 178 MG/DL (ref 65–99)
GLUCOSE BLDC GLUCOMTR-MCNC: 215 MG/DL (ref 65–99)
GLUCOSE BLDC GLUCOMTR-MCNC: 362 MG/DL (ref 65–99)
GLUCOSE SERPL-MCNC: 148 MG/DL (ref 65–99)
GLUCOSE SERPL-MCNC: 378 MG/DL (ref 65–99)
HCT VFR BLD CALC: 24 % (ref 36–46.5)
HGB BLD-MCNC: 7.3 G/DL (ref 12–15.5)
INR PPP: 2.1
MAGNESIUM SERPL-MCNC: 1.8 MG/DL (ref 1.7–2.4)
MCH RBC QN AUTO: 27.7 PG (ref 26–34)
MCHC RBC AUTO-ENTMCNC: 30.4 G/DL (ref 32–36.5)
MCV RBC AUTO: 90.9 FL (ref 78–100)
PHOSPHATE SERPL-MCNC: 2.9 MG/DL (ref 2.4–4.7)
PLATELET # BLD: 285 K/MCL (ref 140–450)
POTASSIUM SERPL-SCNC: 4.2 MMOL/L (ref 3.4–5.1)
POTASSIUM SERPL-SCNC: 5.3 MMOL/L (ref 3.4–5.1)
PROTHROMBIN TIME: 22.8 SEC (ref 9.7–11.8)
RBC # BLD: 2.64 MIL/MCL (ref 4–5.2)
SODIUM SERPL-SCNC: 135 MMOL/L (ref 135–145)
SODIUM SERPL-SCNC: 138 MMOL/L (ref 135–145)
WBC # BLD: 8.3 K/MCL (ref 4.2–11)

## 2017-06-14 PROCEDURE — 10002807 HB RX 258: Performed by: NURSE PRACTITIONER

## 2017-06-14 PROCEDURE — 84100 ASSAY OF PHOSPHORUS: CPT

## 2017-06-14 PROCEDURE — 82962 GLUCOSE BLOOD TEST: CPT

## 2017-06-14 PROCEDURE — 82330 ASSAY OF CALCIUM: CPT

## 2017-06-14 PROCEDURE — 10002800 HB RX 250 W HCPCS: Performed by: NURSE PRACTITIONER

## 2017-06-14 PROCEDURE — 10004571 HB COUNTER-HOLDING 30 MIN: Performed by: ANESTHESIOLOGY

## 2017-06-14 PROCEDURE — 85610 PROTHROMBIN TIME: CPT

## 2017-06-14 PROCEDURE — 87176 TISSUE HOMOGENIZATION CULTR: CPT

## 2017-06-14 PROCEDURE — 10002359 HB ANESTH GENERAL ADD'L 1/2 HR: Performed by: THORACIC SURGERY (CARDIOTHORACIC VASCULAR SURGERY)

## 2017-06-14 PROCEDURE — 21501 I&D DP ABSC/HMTMA SFT TS NCK: CPT | Performed by: THORACIC SURGERY (CARDIOTHORACIC VASCULAR SURGERY)

## 2017-06-14 PROCEDURE — 10002801 HB RX 250 W/O HCPCS: Performed by: NURSE PRACTITIONER

## 2017-06-14 PROCEDURE — 10004651 HB RX, NO CHARGE ITEM: Performed by: INTERNAL MEDICINE

## 2017-06-14 PROCEDURE — 10002803 HB RX 637: Performed by: NURSE PRACTITIONER

## 2017-06-14 PROCEDURE — 10002801 HB RX 250 W/O HCPCS: Performed by: ANESTHESIOLOGY

## 2017-06-14 PROCEDURE — 10002803 HB RX 637: Performed by: ANESTHESIOLOGY

## 2017-06-14 PROCEDURE — 85730 THROMBOPLASTIN TIME PARTIAL: CPT

## 2017-06-14 PROCEDURE — 10002358 HB ANESTH GENERAL 1ST 1/2 HR: Performed by: THORACIC SURGERY (CARDIOTHORACIC VASCULAR SURGERY)

## 2017-06-14 PROCEDURE — 10002803 HB RX 637: Performed by: INTERNAL MEDICINE

## 2017-06-14 PROCEDURE — 10004452 HB PACU ADDL 30 MINUTES: Performed by: THORACIC SURGERY (CARDIOTHORACIC VASCULAR SURGERY)

## 2017-06-14 PROCEDURE — 85027 COMPLETE CBC AUTOMATED: CPT

## 2017-06-14 PROCEDURE — 10004281 HB COUNTER-STAFF TIME PER 15 MIN

## 2017-06-14 PROCEDURE — 10002116 HB ADDITIONAL OR RN TIME/30 MIN

## 2017-06-14 PROCEDURE — 10002800 HB RX 250 W HCPCS: Performed by: THORACIC SURGERY (CARDIOTHORACIC VASCULAR SURGERY)

## 2017-06-14 PROCEDURE — 10000002 HB ROOM CHARGE MED SURG

## 2017-06-14 PROCEDURE — 87077 CULTURE AEROBIC IDENTIFY: CPT

## 2017-06-14 PROCEDURE — 94640 AIRWAY INHALATION TREATMENT: CPT

## 2017-06-14 PROCEDURE — 87070 CULTURE OTHR SPECIMN AEROBIC: CPT

## 2017-06-14 PROCEDURE — 10003056 HB DISPOSABLE INSTRUMENT/SUPPLY 1: Performed by: THORACIC SURGERY (CARDIOTHORACIC VASCULAR SURGERY)

## 2017-06-14 PROCEDURE — 10002019 HB COUNTER RESP ASSESSMENT

## 2017-06-14 PROCEDURE — 87186 SC STD MICRODIL/AGAR DIL: CPT

## 2017-06-14 PROCEDURE — 10002807 HB RX 258: Performed by: INTERNAL MEDICINE

## 2017-06-14 PROCEDURE — 80048 BASIC METABOLIC PNL TOTAL CA: CPT

## 2017-06-14 PROCEDURE — 94150 VITAL CAPACITY TEST: CPT

## 2017-06-14 PROCEDURE — 10002801 HB RX 250 W/O HCPCS: Performed by: THORACIC SURGERY (CARDIOTHORACIC VASCULAR SURGERY)

## 2017-06-14 PROCEDURE — 10004451 HB PACU RECOVERY 1ST 30 MINUTES: Performed by: THORACIC SURGERY (CARDIOTHORACIC VASCULAR SURGERY)

## 2017-06-14 PROCEDURE — 10002326 HB THORACIC SURGERY 1: Performed by: THORACIC SURGERY (CARDIOTHORACIC VASCULAR SURGERY)

## 2017-06-14 PROCEDURE — 83735 ASSAY OF MAGNESIUM: CPT

## 2017-06-14 PROCEDURE — 10002117 HB ADDITIONAL SURGERY TIME/30 MIN: Performed by: THORACIC SURGERY (CARDIOTHORACIC VASCULAR SURGERY)

## 2017-06-14 PROCEDURE — 10002800 HB RX 250 W HCPCS: Performed by: ANESTHESIOLOGY

## 2017-06-14 PROCEDURE — 10003445 HB TELEMETRY PER DAY

## 2017-06-14 PROCEDURE — 0JB60ZZ EXCISION OF CHEST SUBCUTANEOUS TISSUE AND FASCIA, OPEN APPROACH: ICD-10-PCS | Performed by: THORACIC SURGERY (CARDIOTHORACIC VASCULAR SURGERY)

## 2017-06-14 RX ORDER — HUMAN INSULIN 100 [IU]/ML
INJECTION, SOLUTION SUBCUTANEOUS
Status: DISCONTINUED | OUTPATIENT
Start: 2017-06-14 | End: 2017-06-14 | Stop reason: HOSPADM

## 2017-06-14 RX ORDER — DEXTROSE MONOHYDRATE 25 G/50ML
25 INJECTION, SOLUTION INTRAVENOUS PRN
Status: DISCONTINUED | OUTPATIENT
Start: 2017-06-14 | End: 2017-06-14 | Stop reason: HOSPADM

## 2017-06-14 RX ORDER — DIPHENHYDRAMINE HYDROCHLORIDE 50 MG/ML
25 INJECTION INTRAMUSCULAR; INTRAVENOUS
Status: DISCONTINUED | OUTPATIENT
Start: 2017-06-14 | End: 2017-06-14 | Stop reason: HOSPADM

## 2017-06-14 RX ORDER — WARFARIN SODIUM 3 MG/1
3 TABLET ORAL ONCE
Status: COMPLETED | OUTPATIENT
Start: 2017-06-14 | End: 2017-06-14

## 2017-06-14 RX ORDER — CEFAZOLIN SODIUM 1 G/3ML
INJECTION, POWDER, FOR SOLUTION INTRAMUSCULAR; INTRAVENOUS PRN
Status: DISCONTINUED | OUTPATIENT
Start: 2017-06-14 | End: 2017-06-14

## 2017-06-14 RX ORDER — SODIUM CHLORIDE, SODIUM LACTATE, POTASSIUM CHLORIDE, CALCIUM CHLORIDE 600; 310; 30; 20 MG/100ML; MG/100ML; MG/100ML; MG/100ML
INJECTION, SOLUTION INTRAVENOUS CONTINUOUS
Status: DISCONTINUED | OUTPATIENT
Start: 2017-06-14 | End: 2017-06-14

## 2017-06-14 RX ORDER — HYDRALAZINE HYDROCHLORIDE 20 MG/ML
5 INJECTION INTRAMUSCULAR; INTRAVENOUS EVERY 10 MIN PRN
Status: DISCONTINUED | OUTPATIENT
Start: 2017-06-14 | End: 2017-06-14 | Stop reason: HOSPADM

## 2017-06-14 RX ORDER — DEXTROSE MONOHYDRATE 50 MG/ML
INJECTION, SOLUTION INTRAVENOUS CONTINUOUS PRN
Status: DISCONTINUED | OUTPATIENT
Start: 2017-06-14 | End: 2017-06-14

## 2017-06-14 RX ORDER — LIDOCAINE AND PRILOCAINE 25; 25 MG/G; MG/G
1 CREAM TOPICAL PRN
Status: DISCONTINUED | OUTPATIENT
Start: 2017-06-14 | End: 2017-06-14 | Stop reason: HOSPADM

## 2017-06-14 RX ORDER — HYDROCODONE BITARTRATE AND ACETAMINOPHEN 5; 325 MG/1; MG/1
1-2 TABLET ORAL EVERY 4 HOURS PRN
Status: DISCONTINUED | OUTPATIENT
Start: 2017-06-14 | End: 2017-06-23 | Stop reason: HOSPADM

## 2017-06-14 RX ORDER — MIDAZOLAM HYDROCHLORIDE 1 MG/ML
INJECTION, SOLUTION INTRAMUSCULAR; INTRAVENOUS PRN
Status: DISCONTINUED | OUTPATIENT
Start: 2017-06-14 | End: 2017-06-14

## 2017-06-14 RX ORDER — ETOMIDATE 2 MG/ML
INJECTION INTRAVENOUS PRN
Status: DISCONTINUED | OUTPATIENT
Start: 2017-06-14 | End: 2017-06-14

## 2017-06-14 RX ORDER — LABETALOL HYDROCHLORIDE 5 MG/ML
5 INJECTION, SOLUTION INTRAVENOUS EVERY 10 MIN PRN
Status: DISCONTINUED | OUTPATIENT
Start: 2017-06-14 | End: 2017-06-14 | Stop reason: HOSPADM

## 2017-06-14 RX ORDER — DEXAMETHASONE SODIUM PHOSPHATE 4 MG/ML
4 INJECTION, SOLUTION INTRA-ARTICULAR; INTRALESIONAL; INTRAMUSCULAR; INTRAVENOUS; SOFT TISSUE
Status: DISCONTINUED | OUTPATIENT
Start: 2017-06-14 | End: 2017-06-14 | Stop reason: HOSPADM

## 2017-06-14 RX ORDER — LIDOCAINE HYDROCHLORIDE 10 MG/ML
5-10 INJECTION, SOLUTION INFILTRATION; PERINEURAL PRN
Status: DISCONTINUED | OUTPATIENT
Start: 2017-06-14 | End: 2017-06-14 | Stop reason: HOSPADM

## 2017-06-14 RX ORDER — DIPHENHYDRAMINE HYDROCHLORIDE 50 MG/ML
25 INJECTION INTRAMUSCULAR; INTRAVENOUS EVERY 4 HOURS PRN
Status: DISCONTINUED | OUTPATIENT
Start: 2017-06-14 | End: 2017-06-14 | Stop reason: HOSPADM

## 2017-06-14 RX ORDER — ENALAPRILAT 1.25 MG/ML
1.25 INJECTION INTRAVENOUS PRN
Status: DISCONTINUED | OUTPATIENT
Start: 2017-06-14 | End: 2017-06-14 | Stop reason: HOSPADM

## 2017-06-14 RX ORDER — NICOTINE POLACRILEX 4 MG
15 LOZENGE BUCCAL
Status: DISCONTINUED | OUTPATIENT
Start: 2017-06-14 | End: 2017-06-14 | Stop reason: HOSPADM

## 2017-06-14 RX ORDER — ONDANSETRON 4 MG/1
4 TABLET, ORALLY DISINTEGRATING ORAL
Status: COMPLETED | OUTPATIENT
Start: 2017-06-14 | End: 2017-06-14

## 2017-06-14 RX ORDER — SODIUM CHLORIDE 9 MG/ML
INJECTION, SOLUTION INTRAVENOUS CONTINUOUS
Status: DISCONTINUED | OUTPATIENT
Start: 2017-06-14 | End: 2017-06-14

## 2017-06-14 RX ORDER — ONDANSETRON 2 MG/ML
4 INJECTION INTRAMUSCULAR; INTRAVENOUS 2 TIMES DAILY PRN
Status: DISCONTINUED | OUTPATIENT
Start: 2017-06-14 | End: 2017-06-14 | Stop reason: HOSPADM

## 2017-06-14 RX ORDER — DEXAMETHASONE SODIUM PHOSPHATE 10 MG/ML
INJECTION, SOLUTION INTRAMUSCULAR; INTRAVENOUS PRN
Status: DISCONTINUED | OUTPATIENT
Start: 2017-06-14 | End: 2017-06-14

## 2017-06-14 RX ORDER — NITROGLYCERIN 0.4 MG/1
0.4 TABLET SUBLINGUAL EVERY 5 MIN PRN
Status: DISCONTINUED | OUTPATIENT
Start: 2017-06-14 | End: 2017-06-23 | Stop reason: HOSPADM

## 2017-06-14 RX ORDER — TRAMADOL HYDROCHLORIDE 50 MG/1
50 TABLET ORAL EVERY 6 HOURS PRN
Status: DISCONTINUED | OUTPATIENT
Start: 2017-06-14 | End: 2017-06-23 | Stop reason: HOSPADM

## 2017-06-14 RX ADMIN — VANCOMYCIN HYDROCHLORIDE 1000 MG: 1 INJECTION, POWDER, LYOPHILIZED, FOR SOLUTION INTRAVENOUS at 09:14

## 2017-06-14 RX ADMIN — IPRATROPIUM BROMIDE AND ALBUTEROL SULFATE 3 ML: .5; 3 SOLUTION RESPIRATORY (INHALATION) at 08:30

## 2017-06-14 RX ADMIN — CARVEDILOL 12.5 MG: 12.5 TABLET, FILM COATED ORAL at 09:15

## 2017-06-14 RX ADMIN — ASPIRIN 81 MG: 81 TABLET, CHEWABLE ORAL at 09:14

## 2017-06-14 RX ADMIN — IRON SUCROSE 200 MG: 20 INJECTION, SOLUTION INTRAVENOUS at 09:15

## 2017-06-14 RX ADMIN — GENTAMICIN SULFATE: 40 INJECTION, SOLUTION INTRAMUSCULAR; INTRAVENOUS at 11:36

## 2017-06-14 RX ADMIN — DEXAMETHASONE SODIUM PHOSPHATE 10 MG: 10 INJECTION, SOLUTION INTRAMUSCULAR; INTRAVENOUS at 11:17

## 2017-06-14 RX ADMIN — AMIODARONE HYDROCHLORIDE 400 MG: 200 TABLET ORAL at 20:54

## 2017-06-14 RX ADMIN — FENTANYL CITRATE 100 MCG: 50 INJECTION INTRAMUSCULAR; INTRAVENOUS at 10:54

## 2017-06-14 RX ADMIN — Medication 10 ML: at 20:54

## 2017-06-14 RX ADMIN — WARFARIN SODIUM 3 MG: 3 TABLET ORAL at 19:15

## 2017-06-14 RX ADMIN — Medication 10 ML: at 09:15

## 2017-06-14 RX ADMIN — CARVEDILOL 12.5 MG: 12.5 TABLET, FILM COATED ORAL at 17:30

## 2017-06-14 RX ADMIN — Medication 2 ML: at 09:16

## 2017-06-14 RX ADMIN — INSULIN LISPRO 9 UNITS: 100 INJECTION, SOLUTION INTRAVENOUS; SUBCUTANEOUS at 17:29

## 2017-06-14 RX ADMIN — MAGNESIUM SULFATE IN DEXTROSE 1 G: 10 INJECTION, SOLUTION INTRAVENOUS at 06:17

## 2017-06-14 RX ADMIN — MIDAZOLAM HYDROCHLORIDE 2 MG: 1 INJECTION, SOLUTION INTRAMUSCULAR; INTRAVENOUS at 10:54

## 2017-06-14 RX ADMIN — Medication 1 EACH: at 19:16

## 2017-06-14 RX ADMIN — ATORVASTATIN CALCIUM 40 MG: 40 TABLET, FILM COATED ORAL at 20:54

## 2017-06-14 RX ADMIN — FAMOTIDINE 20 MG: 20 TABLET, FILM COATED ORAL at 20:54

## 2017-06-14 RX ADMIN — Medication 2 ML: at 20:54

## 2017-06-14 RX ADMIN — POTASSIUM CHLORIDE 40 MEQ: 1500 TABLET, EXTENDED RELEASE ORAL at 17:33

## 2017-06-14 RX ADMIN — ONDANSETRON 4 MG: 4 TABLET, ORALLY DISINTEGRATING ORAL at 10:06

## 2017-06-14 RX ADMIN — ETOMIDATE 10 MG: 2 INJECTION, SOLUTION INTRAVENOUS at 10:54

## 2017-06-14 RX ADMIN — POTASSIUM CHLORIDE 40 MEQ: 1500 TABLET, EXTENDED RELEASE ORAL at 09:14

## 2017-06-14 RX ADMIN — AMIODARONE HYDROCHLORIDE 400 MG: 200 TABLET ORAL at 09:15

## 2017-06-14 RX ADMIN — CEFAZOLIN SODIUM 2 G: 1 INJECTION, POWDER, FOR SOLUTION INTRAMUSCULAR; INTRAVENOUS at 10:56

## 2017-06-14 ASSESSMENT — PAIN SCALES - GENERAL
PAIN_LEVEL_AT_REST: 0
PAIN_LEVEL_WITH_ACTIVITY: 0

## 2017-06-14 ASSESSMENT — PULMONARY FUNCTION TESTS
FEV1: 33
FEV1/FVC_PERCENT_PREDICTED: 78
FEV1_PREDICTED: 30
FEV1_PREDICTED: 36
FEV1_PREDICTED: 33
FEV1: .97
FEV1/FVC: GREATER THAN 70%
FEV1: 33

## 2017-06-14 ASSESSMENT — ENCOUNTER SYMPTOMS: EXERCISE TOLERANCE: POOR

## 2017-06-14 ASSESSMENT — ACTIVITIES OF DAILY LIVING (ADL)
ADL_SCORE: 8
ADL_SCORE: 8
TOILETING: NEEDS ASSISTANCE
FEEDING: INDEPENDENT
BATHING: NEEDS ASSISTANCE
BATHING: NEEDS ASSISTANCE
TOILETING: NEEDS ASSISTANCE
FEEDING: INDEPENDENT
DRESSING: NEEDS ASSISTANCE
DRESSING: NEEDS ASSISTANCE

## 2017-06-15 PROBLEM — I25.5 ISCHEMIC CARDIOMYOPATHY: Status: ACTIVE | Noted: 2017-06-15

## 2017-06-15 PROBLEM — E11.9 TYPE 2 DIABETES MELLITUS WITHOUT COMPLICATION, WITH LONG-TERM CURRENT USE OF INSULIN (CMD): Status: ACTIVE | Noted: 2017-06-15

## 2017-06-15 PROBLEM — T81.49XA SURGICAL WOUND INFECTION: Status: ACTIVE | Noted: 2017-06-15

## 2017-06-15 PROBLEM — Z79.4 TYPE 2 DIABETES MELLITUS WITHOUT COMPLICATION, WITH LONG-TERM CURRENT USE OF INSULIN (CMD): Status: ACTIVE | Noted: 2017-06-15

## 2017-06-15 PROBLEM — I25.10 CORONARY ARTERY DISEASE INVOLVING NATIVE CORONARY ARTERY OF NATIVE HEART WITHOUT ANGINA PECTORIS: Status: ACTIVE | Noted: 2017-06-15

## 2017-06-15 PROBLEM — N18.30 STAGE 3 CHRONIC KIDNEY DISEASE (CMD): Status: ACTIVE | Noted: 2017-06-15

## 2017-06-15 PROBLEM — R68.89 SUSPECTED HEPARIN INDUCED THROMBOCYTOPENIA (HIT) IN HOSPITALIZED PATIENT: Status: ACTIVE | Noted: 2017-06-15

## 2017-06-15 PROBLEM — I27.20 PULMONARY HYPERTENSION (CMD): Status: ACTIVE | Noted: 2017-06-15

## 2017-06-15 LAB
ANION GAP SERPL CALC-SCNC: 11 MMOL/L (ref 10–20)
ANION GAP SERPL CALC-SCNC: 11 MMOL/L (ref 10–20)
APTT PPP: 45 SEC (ref 22–30)
BACTERIA BLD CULT: NORMAL
BACTERIA BLD CULT: NORMAL
BUN SERPL-MCNC: 45 MG/DL (ref 6–20)
BUN SERPL-MCNC: 46 MG/DL (ref 6–20)
BUN/CREAT SERPL: 22 (ref 7–25)
BUN/CREAT SERPL: 23 (ref 7–25)
CA-I ADJ PH7.4 SERPL-SCNC: 1.26 MMOL/L (ref 1.15–1.29)
CA-I SERPL-SCNC: 1.28 MMOL/L (ref 1.15–1.29)
CALCIUM SERPL-MCNC: 8.7 MG/DL (ref 8.4–10.2)
CALCIUM SERPL-MCNC: 9 MG/DL (ref 8.4–10.2)
CHLORIDE SERPL-SCNC: 100 MMOL/L (ref 98–107)
CHLORIDE SERPL-SCNC: 99 MMOL/L (ref 98–107)
CO2 SERPL-SCNC: 30 MMOL/L (ref 21–32)
CO2 SERPL-SCNC: 31 MMOL/L (ref 21–32)
CREAT SERPL-MCNC: 1.97 MG/DL (ref 0.51–0.95)
CREAT SERPL-MCNC: 2.06 MG/DL (ref 0.51–0.95)
GLUCOSE BLDC GLUCOMTR-MCNC: 170 MG/DL (ref 65–99)
GLUCOSE BLDC GLUCOMTR-MCNC: 189 MG/DL (ref 65–99)
GLUCOSE BLDC GLUCOMTR-MCNC: 207 MG/DL (ref 65–99)
GLUCOSE BLDC GLUCOMTR-MCNC: 247 MG/DL (ref 65–99)
GLUCOSE SERPL-MCNC: 244 MG/DL (ref 65–99)
GLUCOSE SERPL-MCNC: 263 MG/DL (ref 65–99)
INR PPP: 1.3
MAGNESIUM SERPL-MCNC: 2.3 MG/DL (ref 1.7–2.4)
PHOSPHATE SERPL-MCNC: 2.9 MG/DL (ref 2.4–4.7)
POTASSIUM SERPL-SCNC: 4.6 MMOL/L (ref 3.4–5.1)
POTASSIUM SERPL-SCNC: 5.1 MMOL/L (ref 3.4–5.1)
PROTHROMBIN TIME: 14.8 SEC (ref 9.7–11.8)
REPORT STATUS (RPT): NORMAL
REPORT STATUS (RPT): NORMAL
SODIUM SERPL-SCNC: 135 MMOL/L (ref 135–145)
SODIUM SERPL-SCNC: 137 MMOL/L (ref 135–145)
SPECIMEN SOURCE: NORMAL
SPECIMEN SOURCE: NORMAL

## 2017-06-15 PROCEDURE — 10002803 HB RX 637: Performed by: NURSE PRACTITIONER

## 2017-06-15 PROCEDURE — 10004172 HB COUNTER-THERAPY VISIT OT

## 2017-06-15 PROCEDURE — 80048 BASIC METABOLIC PNL TOTAL CA: CPT

## 2017-06-15 PROCEDURE — 10004651 HB RX, NO CHARGE ITEM: Performed by: INTERNAL MEDICINE

## 2017-06-15 PROCEDURE — 97116 GAIT TRAINING THERAPY: CPT

## 2017-06-15 PROCEDURE — 99222 1ST HOSP IP/OBS MODERATE 55: CPT | Performed by: NURSE PRACTITIONER

## 2017-06-15 PROCEDURE — 99232 SBSQ HOSP IP/OBS MODERATE 35: CPT | Performed by: HOSPITALIST

## 2017-06-15 PROCEDURE — 10004094 HB COUNTER, VISIT INPATIENT

## 2017-06-15 PROCEDURE — 10002800 HB RX 250 W HCPCS: Performed by: HOSPITALIST

## 2017-06-15 PROCEDURE — 10004185 HB COUNTER-VISIT  CENSUS

## 2017-06-15 PROCEDURE — 10004173 HB COUNTER-THERAPY VISIT PT

## 2017-06-15 PROCEDURE — 10002807 HB RX 258: Performed by: INTERNAL MEDICINE

## 2017-06-15 PROCEDURE — 10002800 HB RX 250 W HCPCS: Performed by: INTERNAL MEDICINE

## 2017-06-15 PROCEDURE — 82330 ASSAY OF CALCIUM: CPT

## 2017-06-15 PROCEDURE — 97530 THERAPEUTIC ACTIVITIES: CPT

## 2017-06-15 PROCEDURE — 10002807 HB RX 258: Performed by: PHYSICIAN ASSISTANT

## 2017-06-15 PROCEDURE — 97535 SELF CARE MNGMENT TRAINING: CPT

## 2017-06-15 PROCEDURE — 10002801 HB RX 250 W/O HCPCS: Performed by: INTERNAL MEDICINE

## 2017-06-15 PROCEDURE — 97164 PT RE-EVAL EST PLAN CARE: CPT

## 2017-06-15 PROCEDURE — 10002807 HB RX 258: Performed by: NURSE PRACTITIONER

## 2017-06-15 PROCEDURE — 85610 PROTHROMBIN TIME: CPT

## 2017-06-15 PROCEDURE — 85730 THROMBOPLASTIN TIME PARTIAL: CPT

## 2017-06-15 PROCEDURE — 10002803 HB RX 637: Performed by: PHYSICIAN ASSISTANT

## 2017-06-15 PROCEDURE — 10000002 HB ROOM CHARGE MED SURG

## 2017-06-15 PROCEDURE — 94640 AIRWAY INHALATION TREATMENT: CPT

## 2017-06-15 PROCEDURE — 84100 ASSAY OF PHOSPHORUS: CPT

## 2017-06-15 PROCEDURE — 10003716 HB NURSING WOUND CARE PER 15 MIN

## 2017-06-15 PROCEDURE — 10002800 HB RX 250 W HCPCS: Performed by: NURSE PRACTITIONER

## 2017-06-15 PROCEDURE — 10004325 HB COUNTER ASSESSMENT EA 15 MIN

## 2017-06-15 PROCEDURE — 97168 OT RE-EVAL EST PLAN CARE: CPT

## 2017-06-15 PROCEDURE — 10002803 HB RX 637: Performed by: INTERNAL MEDICINE

## 2017-06-15 PROCEDURE — 10003445 HB TELEMETRY PER DAY

## 2017-06-15 PROCEDURE — 10002800 HB RX 250 W HCPCS: Performed by: PHYSICIAN ASSISTANT

## 2017-06-15 PROCEDURE — 83735 ASSAY OF MAGNESIUM: CPT

## 2017-06-15 PROCEDURE — 82962 GLUCOSE BLOOD TEST: CPT

## 2017-06-15 RX ORDER — WARFARIN SODIUM 5 MG/1
5 TABLET ORAL ONCE
Status: COMPLETED | OUTPATIENT
Start: 2017-06-15 | End: 2017-06-15

## 2017-06-15 RX ORDER — WARFARIN SODIUM 5 MG/1
5 TABLET ORAL ONCE
Status: DISCONTINUED | OUTPATIENT
Start: 2017-06-15 | End: 2017-06-15

## 2017-06-15 RX ORDER — INSULIN GLARGINE 100 [IU]/ML
30 INJECTION, SOLUTION SUBCUTANEOUS DAILY
Status: DISCONTINUED | OUTPATIENT
Start: 2017-06-15 | End: 2017-06-23 | Stop reason: HOSPADM

## 2017-06-15 RX ORDER — ALBUTEROL SULFATE 2.5 MG/3ML
2.5 SOLUTION RESPIRATORY (INHALATION)
Status: DISCONTINUED | OUTPATIENT
Start: 2017-06-15 | End: 2017-06-23 | Stop reason: HOSPADM

## 2017-06-15 RX ADMIN — AMIODARONE HYDROCHLORIDE 400 MG: 200 TABLET ORAL at 08:22

## 2017-06-15 RX ADMIN — FAMOTIDINE 20 MG: 20 TABLET, FILM COATED ORAL at 20:32

## 2017-06-15 RX ADMIN — CARVEDILOL 12.5 MG: 12.5 TABLET, FILM COATED ORAL at 08:22

## 2017-06-15 RX ADMIN — INSULIN LISPRO 12 UNITS: 100 INJECTION, SOLUTION INTRAVENOUS; SUBCUTANEOUS at 18:46

## 2017-06-15 RX ADMIN — SODIUM CHLORIDE 0.3 MCG/KG/MIN: 9 INJECTION, SOLUTION INTRAVENOUS at 17:18

## 2017-06-15 RX ADMIN — INSULIN LISPRO 14 UNITS: 100 INJECTION, SOLUTION INTRAVENOUS; SUBCUTANEOUS at 09:45

## 2017-06-15 RX ADMIN — ASPIRIN 81 MG: 81 TABLET, CHEWABLE ORAL at 08:23

## 2017-06-15 RX ADMIN — POLYETHYLENE GLYCOL (3350) 17 G: 17 POWDER, FOR SOLUTION ORAL at 08:26

## 2017-06-15 RX ADMIN — Medication 10 ML: at 13:59

## 2017-06-15 RX ADMIN — Medication 2 ML: at 08:21

## 2017-06-15 RX ADMIN — WARFARIN SODIUM 5 MG: 5 TABLET ORAL at 17:27

## 2017-06-15 RX ADMIN — Medication 10 ML: at 20:34

## 2017-06-15 RX ADMIN — ATORVASTATIN CALCIUM 40 MG: 40 TABLET, FILM COATED ORAL at 20:32

## 2017-06-15 RX ADMIN — INSULIN GLARGINE 30 UNITS: 100 INJECTION, SOLUTION SUBCUTANEOUS at 08:33

## 2017-06-15 RX ADMIN — Medication 10 ML: at 05:55

## 2017-06-15 RX ADMIN — CEFTRIAXONE SODIUM 2000 MG: 2 INJECTION, POWDER, FOR SOLUTION INTRAMUSCULAR; INTRAVENOUS at 15:56

## 2017-06-15 RX ADMIN — CARVEDILOL 12.5 MG: 12.5 TABLET, FILM COATED ORAL at 17:27

## 2017-06-15 RX ADMIN — Medication 1 EACH: at 17:28

## 2017-06-15 RX ADMIN — IPRATROPIUM BROMIDE AND ALBUTEROL SULFATE 3 ML: .5; 3 SOLUTION RESPIRATORY (INHALATION) at 20:13

## 2017-06-15 RX ADMIN — VANCOMYCIN HYDROCHLORIDE 1000 MG: 1 INJECTION, POWDER, LYOPHILIZED, FOR SOLUTION INTRAVENOUS at 08:20

## 2017-06-15 RX ADMIN — INSULIN LISPRO 14 UNITS: 100 INJECTION, SOLUTION INTRAVENOUS; SUBCUTANEOUS at 13:57

## 2017-06-15 RX ADMIN — IRON SUCROSE 200 MG: 20 INJECTION, SOLUTION INTRAVENOUS at 08:23

## 2017-06-15 RX ADMIN — Medication 2 ML: at 20:33

## 2017-06-15 ASSESSMENT — PULMONARY FUNCTION TESTS
FEV1: 1.2
FEV1_PREDICTED: 45
FEV1/FVC_PERCENT_PREDICTED: 69
FEV1_PREDICTED: 41
FEV1: 41
FEV1: 41
FEV1_PREDICTED: 37

## 2017-06-15 ASSESSMENT — PAIN SCALES - GENERAL
PAIN_LEVEL_AT_REST: 0

## 2017-06-16 PROBLEM — N17.9 AKI (ACUTE KIDNEY INJURY) (CMD): Status: ACTIVE | Noted: 2017-06-16

## 2017-06-16 LAB
ABO + RH BLD: NORMAL
ALBUMIN SERPL-MCNC: 2.7 G/DL (ref 3.6–5.1)
ALBUMIN/GLOB SERPL: 0.7 {RATIO} (ref 1–2.4)
ALP SERPL-CCNC: 81 UNITS/L (ref 45–117)
ALT SERPL-CCNC: 14 UNITS/L
ANION GAP SERPL CALC-SCNC: 11 MMOL/L (ref 10–20)
ANION GAP SERPL CALC-SCNC: 13 MMOL/L (ref 10–20)
APTT PPP: 45 SEC (ref 22–30)
AST SERPL-CCNC: 11 UNITS/L
BACTERIA SPEC ANAEROBE+AEROBE CULT: ABNORMAL
BACTERIA SPEC ANAEROBE+AEROBE CULT: ABNORMAL
BASOPHILS # BLD AUTO: 0 K/MCL (ref 0–0.3)
BASOPHILS NFR BLD AUTO: 1 %
BILIRUB SERPL-MCNC: 0.3 MG/DL (ref 0.2–1)
BLD GP AB SCN SERPL QL: NEGATIVE
BLD PROD TYP BPU: NORMAL
BLD PROD TYP BPU: NORMAL
BLD UNIT ID BPU: NORMAL
BLD UNIT ID BPU: NORMAL
BUN SERPL-MCNC: 47 MG/DL (ref 6–20)
BUN SERPL-MCNC: 51 MG/DL (ref 6–20)
BUN/CREAT SERPL: 22 (ref 7–25)
BUN/CREAT SERPL: 26 (ref 7–25)
CALCIUM SERPL-MCNC: 8.3 MG/DL (ref 8.4–10.2)
CALCIUM SERPL-MCNC: 8.6 MG/DL (ref 8.4–10.2)
CHLORIDE SERPL-SCNC: 100 MMOL/L (ref 98–107)
CHLORIDE SERPL-SCNC: 99 MMOL/L (ref 98–107)
CO2 SERPL-SCNC: 29 MMOL/L (ref 21–32)
CO2 SERPL-SCNC: 32 MMOL/L (ref 21–32)
CREAT ?TM UR-MCNC: 110.75 MG/DL
CREAT SERPL-MCNC: 1.97 MG/DL (ref 0.51–0.95)
CREAT SERPL-MCNC: 2.15 MG/DL (ref 0.51–0.95)
CROSSMATCH EXPIRE: NORMAL
DIFFERENTIAL METHOD BLD: ABNORMAL
EOSINOPHIL # BLD AUTO: 0.3 K/MCL (ref 0.1–0.5)
EOSINOPHIL NFR SPEC: 4 %
EOSINOPHIL NFR UR MANUAL: NORMAL %
ERYTHROCYTE [DISTWIDTH] IN BLOOD: 14.6 % (ref 11–15)
GLOBULIN SER-MCNC: 3.9 G/DL (ref 2–4)
GLUCOSE BLDC GLUCOMTR-MCNC: 145 MG/DL (ref 65–99)
GLUCOSE BLDC GLUCOMTR-MCNC: 146 MG/DL (ref 65–99)
GLUCOSE BLDC GLUCOMTR-MCNC: 160 MG/DL (ref 65–99)
GLUCOSE BLDC GLUCOMTR-MCNC: 214 MG/DL (ref 65–99)
GLUCOSE SERPL-MCNC: 123 MG/DL (ref 65–99)
GLUCOSE SERPL-MCNC: 152 MG/DL (ref 65–99)
GRAM STN SPEC: ABNORMAL
HCT VFR BLD CALC: 23.8 % (ref 36–46.5)
HGB BLD-MCNC: 7.3 G/DL (ref 12–15.5)
INR PPP: 2
LYMPHOCYTES # BLD MANUAL: 2.3 K/MCL (ref 1–4)
LYMPHOCYTES NFR BLD MANUAL: 27 %
MAJ XM SERPL-IMP: NORMAL
MAJ XM SERPL-IMP: NORMAL
MCH RBC QN AUTO: 28.2 PG (ref 26–34)
MCHC RBC AUTO-ENTMCNC: 30.7 G/DL (ref 32–36.5)
MCV RBC AUTO: 91.9 FL (ref 78–100)
MONOCYTES # BLD MANUAL: 0.6 K/MCL (ref 0.3–0.9)
MONOCYTES NFR BLD MANUAL: 7 %
NEUTROPHILS # BLD AUTO: 5.4 K/MCL (ref 1.8–7.7)
NEUTROPHILS NFR BLD AUTO: 61 %
NUM BPU REQUESTED: 2
ORGANISM: ABNORMAL
PLATELET # BLD: 286 K/MCL (ref 140–450)
POTASSIUM SERPL-SCNC: 4.1 MMOL/L (ref 3.4–5.1)
POTASSIUM SERPL-SCNC: 4.4 MMOL/L (ref 3.4–5.1)
PROT SERPL-MCNC: 6.6 G/DL (ref 6.4–8.2)
PROTHROMBIN TIME: 21.4 SEC (ref 9.7–11.8)
RBC # BLD: 2.59 MIL/MCL (ref 4–5.2)
REPORT STATUS (RPT): ABNORMAL
SODIUM ?TM UR-SCNC: 45 MMOL/L
SODIUM SERPL-SCNC: 137 MMOL/L (ref 135–145)
SODIUM SERPL-SCNC: 139 MMOL/L (ref 135–145)
SPECIMEN SOURCE: ABNORMAL
STATUS OF UNIT: NORMAL
STATUS OF UNIT: NORMAL
TRANSFUSION STATUS: NORMAL
TRANSFUSION STATUS: NORMAL
UNIT DIVISION: 0
UNIT DIVISION: 0
VANCOMYCIN SERPL-MCNC: 22.2 MCG/ML
WBC # BLD: 8.7 K/MCL (ref 4.2–11)

## 2017-06-16 PROCEDURE — 10004651 HB RX, NO CHARGE ITEM: Performed by: INTERNAL MEDICINE

## 2017-06-16 PROCEDURE — 10004173 HB COUNTER-THERAPY VISIT PT

## 2017-06-16 PROCEDURE — 10002803 HB RX 637: Performed by: NURSE PRACTITIONER

## 2017-06-16 PROCEDURE — 10002800 HB RX 250 W HCPCS: Performed by: PHYSICIAN ASSISTANT

## 2017-06-16 PROCEDURE — 82962 GLUCOSE BLOOD TEST: CPT

## 2017-06-16 PROCEDURE — 10002803 HB RX 637: Performed by: PHYSICIAN ASSISTANT

## 2017-06-16 PROCEDURE — 80202 ASSAY OF VANCOMYCIN: CPT

## 2017-06-16 PROCEDURE — 10002800 HB RX 250 W HCPCS: Performed by: NURSE PRACTITIONER

## 2017-06-16 PROCEDURE — 84540 ASSAY OF URINE/UREA-N: CPT

## 2017-06-16 PROCEDURE — 85025 COMPLETE CBC W/AUTO DIFF WBC: CPT

## 2017-06-16 PROCEDURE — 10002800 HB RX 250 W HCPCS: Performed by: INTERNAL MEDICINE

## 2017-06-16 PROCEDURE — 80053 COMPREHEN METABOLIC PANEL: CPT

## 2017-06-16 PROCEDURE — 10002800 HB RX 250 W HCPCS: Performed by: HOSPITALIST

## 2017-06-16 PROCEDURE — 10002807 HB RX 258: Performed by: PHYSICIAN ASSISTANT

## 2017-06-16 PROCEDURE — 84300 ASSAY OF URINE SODIUM: CPT

## 2017-06-16 PROCEDURE — 80048 BASIC METABOLIC PNL TOTAL CA: CPT

## 2017-06-16 PROCEDURE — 10002803 HB RX 637: Performed by: INTERNAL MEDICINE

## 2017-06-16 PROCEDURE — A6550 NEG PRES WOUND THER DRSG SET: HCPCS

## 2017-06-16 PROCEDURE — 97606 NEG PRS WND THER DME>50 SQCM: CPT

## 2017-06-16 PROCEDURE — 36415 COLL VENOUS BLD VENIPUNCTURE: CPT

## 2017-06-16 PROCEDURE — 10003716 HB NURSING WOUND CARE PER 15 MIN

## 2017-06-16 PROCEDURE — 10002807 HB RX 258: Performed by: NURSE PRACTITIONER

## 2017-06-16 PROCEDURE — 97116 GAIT TRAINING THERAPY: CPT

## 2017-06-16 PROCEDURE — 82570 ASSAY OF URINE CREATININE: CPT

## 2017-06-16 PROCEDURE — 85610 PROTHROMBIN TIME: CPT

## 2017-06-16 PROCEDURE — 99232 SBSQ HOSP IP/OBS MODERATE 35: CPT | Performed by: HOSPITALIST

## 2017-06-16 PROCEDURE — 10000002 HB ROOM CHARGE MED SURG

## 2017-06-16 PROCEDURE — 97530 THERAPEUTIC ACTIVITIES: CPT

## 2017-06-16 PROCEDURE — 10002807 HB RX 258: Performed by: INTERNAL MEDICINE

## 2017-06-16 PROCEDURE — 97112 NEUROMUSCULAR REEDUCATION: CPT

## 2017-06-16 PROCEDURE — 10003445 HB TELEMETRY PER DAY

## 2017-06-16 PROCEDURE — 10004094 HB COUNTER, VISIT INPATIENT

## 2017-06-16 PROCEDURE — 85730 THROMBOPLASTIN TIME PARTIAL: CPT

## 2017-06-16 PROCEDURE — 10004185 HB COUNTER-VISIT  CENSUS

## 2017-06-16 PROCEDURE — 87205 SMEAR GRAM STAIN: CPT

## 2017-06-16 RX ORDER — WARFARIN SODIUM 5 MG/1
5 TABLET ORAL EVERY EVENING
Status: DISCONTINUED | OUTPATIENT
Start: 2017-06-16 | End: 2017-06-17

## 2017-06-16 RX ADMIN — Medication 2 ML: at 09:58

## 2017-06-16 RX ADMIN — POLYETHYLENE GLYCOL (3350) 17 G: 17 POWDER, FOR SOLUTION ORAL at 09:46

## 2017-06-16 RX ADMIN — ASPIRIN 81 MG: 81 TABLET, CHEWABLE ORAL at 09:45

## 2017-06-16 RX ADMIN — CARVEDILOL 12.5 MG: 12.5 TABLET, FILM COATED ORAL at 09:45

## 2017-06-16 RX ADMIN — ATORVASTATIN CALCIUM 40 MG: 40 TABLET, FILM COATED ORAL at 20:01

## 2017-06-16 RX ADMIN — Medication 1 EACH: at 18:14

## 2017-06-16 RX ADMIN — CEFTRIAXONE SODIUM 2000 MG: 2 INJECTION, POWDER, FOR SOLUTION INTRAMUSCULAR; INTRAVENOUS at 18:11

## 2017-06-16 RX ADMIN — SODIUM CHLORIDE 0.28 MCG/KG/MIN: 9 INJECTION, SOLUTION INTRAVENOUS at 05:21

## 2017-06-16 RX ADMIN — AMIODARONE HYDROCHLORIDE 200 MG: 200 TABLET ORAL at 09:45

## 2017-06-16 RX ADMIN — WARFARIN SODIUM 5 MG: 5 TABLET ORAL at 18:13

## 2017-06-16 RX ADMIN — Medication 10 ML: at 13:20

## 2017-06-16 RX ADMIN — Medication 10 ML: at 05:21

## 2017-06-16 RX ADMIN — Medication 10 ML: at 20:01

## 2017-06-16 RX ADMIN — INSULIN LISPRO 12 UNITS: 100 INJECTION, SOLUTION INTRAVENOUS; SUBCUTANEOUS at 18:11

## 2017-06-16 RX ADMIN — Medication 2 ML: at 20:01

## 2017-06-16 RX ADMIN — CARVEDILOL 12.5 MG: 12.5 TABLET, FILM COATED ORAL at 16:11

## 2017-06-16 RX ADMIN — INSULIN LISPRO 10 UNITS: 100 INJECTION, SOLUTION INTRAVENOUS; SUBCUTANEOUS at 09:12

## 2017-06-16 RX ADMIN — INSULIN LISPRO 10 UNITS: 100 INJECTION, SOLUTION INTRAVENOUS; SUBCUTANEOUS at 13:18

## 2017-06-16 RX ADMIN — IRON SUCROSE 200 MG: 20 INJECTION, SOLUTION INTRAVENOUS at 09:43

## 2017-06-16 RX ADMIN — FAMOTIDINE 20 MG: 20 TABLET, FILM COATED ORAL at 20:01

## 2017-06-16 RX ADMIN — ONDANSETRON 4 MG: 2 SOLUTION INTRAMUSCULAR; INTRAVENOUS at 05:32

## 2017-06-16 RX ADMIN — INSULIN GLARGINE 30 UNITS: 100 INJECTION, SOLUTION SUBCUTANEOUS at 09:47

## 2017-06-16 ASSESSMENT — PAIN SCALES - GENERAL
PAIN_LEVEL_AT_REST: 0
PAIN_LEVEL_WITH_ACTIVITY: 0
PAIN_LEVEL_AT_REST: 0

## 2017-06-17 LAB
ALBUMIN SERPL-MCNC: 2.7 G/DL (ref 3.6–5.1)
ALBUMIN/GLOB SERPL: 0.7 {RATIO} (ref 1–2.4)
ALP SERPL-CCNC: 86 UNITS/L (ref 45–117)
ALT SERPL-CCNC: 13 UNITS/L
ANION GAP SERPL CALC-SCNC: 10 MMOL/L (ref 10–20)
ANION GAP SERPL CALC-SCNC: 11 MMOL/L (ref 10–20)
APPEARANCE UR: CLEAR
APTT PPP: 47 SEC (ref 22–30)
AST SERPL-CCNC: 12 UNITS/L
BACTERIA #/AREA URNS HPF: NORMAL /HPF
BASOPHILS # BLD AUTO: 0 K/MCL (ref 0–0.3)
BASOPHILS NFR BLD AUTO: 1 %
BILIRUB SERPL-MCNC: 0.2 MG/DL (ref 0.2–1)
BILIRUB UR QL STRIP: NEGATIVE
BUN SERPL-MCNC: 43 MG/DL (ref 6–20)
BUN SERPL-MCNC: 49 MG/DL (ref 6–20)
BUN/CREAT SERPL: 23 (ref 7–25)
BUN/CREAT SERPL: 25 (ref 7–25)
CALCIUM SERPL-MCNC: 8.4 MG/DL (ref 8.4–10.2)
CALCIUM SERPL-MCNC: 8.5 MG/DL (ref 8.4–10.2)
CHLORIDE SERPL-SCNC: 100 MMOL/L (ref 98–107)
CHLORIDE SERPL-SCNC: 98 MMOL/L (ref 98–107)
CO2 SERPL-SCNC: 29 MMOL/L (ref 21–32)
CO2 SERPL-SCNC: 31 MMOL/L (ref 21–32)
COLOR UR: YELLOW
CREAT SERPL-MCNC: 1.89 MG/DL (ref 0.51–0.95)
CREAT SERPL-MCNC: 1.93 MG/DL (ref 0.51–0.95)
DIFFERENTIAL METHOD BLD: ABNORMAL
EOSINOPHIL # BLD AUTO: 0.5 K/MCL (ref 0.1–0.5)
EOSINOPHIL NFR SPEC: 5 %
EOSINOPHIL NFR UR MANUAL: NORMAL %
ERYTHROCYTE [DISTWIDTH] IN BLOOD: 15.2 % (ref 11–15)
GLOBULIN SER-MCNC: 3.7 G/DL (ref 2–4)
GLUCOSE BLDC GLUCOMTR-MCNC: 108 MG/DL (ref 65–99)
GLUCOSE BLDC GLUCOMTR-MCNC: 127 MG/DL (ref 65–99)
GLUCOSE BLDC GLUCOMTR-MCNC: 144 MG/DL (ref 65–99)
GLUCOSE BLDC GLUCOMTR-MCNC: 155 MG/DL (ref 65–99)
GLUCOSE SERPL-MCNC: 128 MG/DL (ref 65–99)
GLUCOSE SERPL-MCNC: 134 MG/DL (ref 65–99)
GLUCOSE UR STRIP-MCNC: NEGATIVE MG/DL
HCT VFR BLD CALC: 24.2 % (ref 36–46.5)
HGB BLD-MCNC: 7.3 G/DL (ref 12–15.5)
HGB UR QL STRIP: NEGATIVE
HYALINE CASTS #/AREA URNS LPF: NORMAL /LPF (ref 0–5)
INR PPP: 2
KETONES UR STRIP-MCNC: NEGATIVE MG/DL
LEUKOCYTE ESTERASE UR QL STRIP: NEGATIVE
LYMPHOCYTES # BLD MANUAL: 1.8 K/MCL (ref 1–4)
LYMPHOCYTES NFR BLD MANUAL: 21 %
MCH RBC QN AUTO: 28 PG (ref 26–34)
MCHC RBC AUTO-ENTMCNC: 30.2 G/DL (ref 32–36.5)
MCV RBC AUTO: 92.7 FL (ref 78–100)
MONOCYTES # BLD MANUAL: 0.6 K/MCL (ref 0.3–0.9)
MONOCYTES NFR BLD MANUAL: 7 %
NEUTROPHILS # BLD AUTO: 5.7 K/MCL (ref 1.8–7.7)
NEUTROPHILS NFR BLD AUTO: 66 %
NITRITE UR QL STRIP: NEGATIVE
PH UR STRIP: 6.5 UNITS (ref 5–7)
PHOSPHATE SERPL-MCNC: 3 MG/DL (ref 2.4–4.7)
PLATELET # BLD: 290 K/MCL (ref 140–450)
POTASSIUM SERPL-SCNC: 4 MMOL/L (ref 3.4–5.1)
POTASSIUM SERPL-SCNC: 4.4 MMOL/L (ref 3.4–5.1)
PROT SERPL-MCNC: 6.4 G/DL (ref 6.4–8.2)
PROT UR STRIP-MCNC: NEGATIVE MG/DL
PROTHROMBIN TIME: 21.4 SEC (ref 9.7–11.8)
PTH-INTACT SERPL-MCNC: 133 PG/ML (ref 14–72)
RBC # BLD: 2.61 MIL/MCL (ref 4–5.2)
RBC #/AREA URNS HPF: NORMAL /HPF (ref 0–3)
SODIUM SERPL-SCNC: 135 MMOL/L (ref 135–145)
SODIUM SERPL-SCNC: 136 MMOL/L (ref 135–145)
SP GR UR STRIP: 1.01 (ref 1–1.03)
SPECIMEN SOURCE: NORMAL
SQUAMOUS #/AREA URNS HPF: NORMAL /HPF (ref 0–5)
UROBILINOGEN UR STRIP-MCNC: 1 MG/DL (ref 0–1)
UUN UR-MCNC: 821 MG/DL
WBC # BLD: 8.6 K/MCL (ref 4.2–11)
WBC #/AREA URNS HPF: NORMAL /HPF (ref 0–5)

## 2017-06-17 PROCEDURE — 10000002 HB ROOM CHARGE MED SURG

## 2017-06-17 PROCEDURE — 10004651 HB RX, NO CHARGE ITEM: Performed by: INTERNAL MEDICINE

## 2017-06-17 PROCEDURE — 99232 SBSQ HOSP IP/OBS MODERATE 35: CPT | Performed by: HOSPITALIST

## 2017-06-17 PROCEDURE — 10002803 HB RX 637: Performed by: PHYSICIAN ASSISTANT

## 2017-06-17 PROCEDURE — 10002807 HB RX 258: Performed by: INTERNAL MEDICINE

## 2017-06-17 PROCEDURE — 10002800 HB RX 250 W HCPCS: Performed by: INTERNAL MEDICINE

## 2017-06-17 PROCEDURE — 10002803 HB RX 637: Performed by: INTERNAL MEDICINE

## 2017-06-17 PROCEDURE — 82962 GLUCOSE BLOOD TEST: CPT

## 2017-06-17 PROCEDURE — 85730 THROMBOPLASTIN TIME PARTIAL: CPT

## 2017-06-17 PROCEDURE — 10002807 HB RX 258: Performed by: NURSE PRACTITIONER

## 2017-06-17 PROCEDURE — 83970 ASSAY OF PARATHORMONE: CPT

## 2017-06-17 PROCEDURE — 10002803 HB RX 637: Performed by: NURSE PRACTITIONER

## 2017-06-17 PROCEDURE — 10003445 HB TELEMETRY PER DAY

## 2017-06-17 PROCEDURE — 10002800 HB RX 250 W HCPCS: Performed by: NURSE PRACTITIONER

## 2017-06-17 PROCEDURE — 80053 COMPREHEN METABOLIC PANEL: CPT

## 2017-06-17 PROCEDURE — 10002800 HB RX 250 W HCPCS: Performed by: HOSPITALIST

## 2017-06-17 PROCEDURE — 80048 BASIC METABOLIC PNL TOTAL CA: CPT

## 2017-06-17 PROCEDURE — 82306 VITAMIN D 25 HYDROXY: CPT

## 2017-06-17 PROCEDURE — 85025 COMPLETE CBC W/AUTO DIFF WBC: CPT

## 2017-06-17 PROCEDURE — 85610 PROTHROMBIN TIME: CPT

## 2017-06-17 PROCEDURE — 84100 ASSAY OF PHOSPHORUS: CPT

## 2017-06-17 PROCEDURE — 81001 URINALYSIS AUTO W/SCOPE: CPT

## 2017-06-17 PROCEDURE — 86160 COMPLEMENT ANTIGEN: CPT

## 2017-06-17 PROCEDURE — 87205 SMEAR GRAM STAIN: CPT

## 2017-06-17 RX ORDER — WARFARIN SODIUM 7.5 MG/1
7.5 TABLET ORAL EVERY EVENING
Status: DISCONTINUED | OUTPATIENT
Start: 2017-06-17 | End: 2017-06-19

## 2017-06-17 RX ADMIN — Medication 1 EACH: at 17:17

## 2017-06-17 RX ADMIN — POLYETHYLENE GLYCOL (3350) 17 G: 17 POWDER, FOR SOLUTION ORAL at 09:58

## 2017-06-17 RX ADMIN — INSULIN LISPRO 10 UNITS: 100 INJECTION, SOLUTION INTRAVENOUS; SUBCUTANEOUS at 13:36

## 2017-06-17 RX ADMIN — ASPIRIN 81 MG: 81 TABLET, CHEWABLE ORAL at 09:41

## 2017-06-17 RX ADMIN — INSULIN LISPRO 10 UNITS: 100 INJECTION, SOLUTION INTRAVENOUS; SUBCUTANEOUS at 09:43

## 2017-06-17 RX ADMIN — AMIODARONE HYDROCHLORIDE 200 MG: 200 TABLET ORAL at 09:41

## 2017-06-17 RX ADMIN — Medication 10 ML: at 14:00

## 2017-06-17 RX ADMIN — FAMOTIDINE 20 MG: 20 TABLET, FILM COATED ORAL at 20:05

## 2017-06-17 RX ADMIN — CARVEDILOL 12.5 MG: 12.5 TABLET, FILM COATED ORAL at 09:42

## 2017-06-17 RX ADMIN — INSULIN LISPRO 10 UNITS: 100 INJECTION, SOLUTION INTRAVENOUS; SUBCUTANEOUS at 18:22

## 2017-06-17 RX ADMIN — Medication 2 ML: at 09:51

## 2017-06-17 RX ADMIN — SODIUM CHLORIDE: 9 INJECTION, SOLUTION INTRAVENOUS at 09:59

## 2017-06-17 RX ADMIN — VANCOMYCIN HYDROCHLORIDE 1000 MG: 1 INJECTION, POWDER, LYOPHILIZED, FOR SOLUTION INTRAVENOUS at 09:45

## 2017-06-17 RX ADMIN — WARFARIN SODIUM 7.5 MG: 7.5 TABLET ORAL at 17:17

## 2017-06-17 RX ADMIN — Medication 2 ML: at 20:06

## 2017-06-17 RX ADMIN — Medication 10 ML: at 20:06

## 2017-06-17 RX ADMIN — CEFTRIAXONE SODIUM 2000 MG: 2 INJECTION, POWDER, FOR SOLUTION INTRAMUSCULAR; INTRAVENOUS at 17:11

## 2017-06-17 RX ADMIN — ATORVASTATIN CALCIUM 40 MG: 40 TABLET, FILM COATED ORAL at 20:05

## 2017-06-17 RX ADMIN — CARVEDILOL 12.5 MG: 12.5 TABLET, FILM COATED ORAL at 16:47

## 2017-06-17 RX ADMIN — INSULIN GLARGINE 30 UNITS: 100 INJECTION, SOLUTION SUBCUTANEOUS at 09:42

## 2017-06-17 RX ADMIN — Medication 10 ML: at 05:25

## 2017-06-17 ASSESSMENT — PAIN SCALES - GENERAL
PAIN_LEVEL_AT_REST: 0

## 2017-06-18 ENCOUNTER — APPOINTMENT (OUTPATIENT)
Dept: ULTRASOUND IMAGING | Age: 57
DRG: 264 | End: 2017-06-18
Attending: HOSPITALIST

## 2017-06-18 LAB
25(OH)D3+25(OH)D2 SERPL-MCNC: 17.5 NG/ML (ref 30–100)
ALBUMIN SERPL-MCNC: 2.7 G/DL (ref 3.6–5.1)
ALBUMIN/GLOB SERPL: 0.7 {RATIO} (ref 1–2.4)
ALP SERPL-CCNC: 93 UNITS/L (ref 45–117)
ALT SERPL-CCNC: 12 UNITS/L
ANION GAP SERPL CALC-SCNC: 10 MMOL/L (ref 10–20)
ANNOTATION COMMENT IMP: ABNORMAL
APTT PPP: 50 SEC (ref 22–30)
AST SERPL-CCNC: 13 UNITS/L
BACTERIA BLD CULT: NORMAL
BACTERIA BLD CULT: NORMAL
BACTERIA SPEC ANAEROBE+AEROBE CULT: ABNORMAL
BASOPHILS # BLD AUTO: 0 K/MCL (ref 0–0.3)
BASOPHILS NFR BLD AUTO: 0 %
BILIRUB SERPL-MCNC: 0.3 MG/DL (ref 0.2–1)
BUN SERPL-MCNC: 41 MG/DL (ref 6–20)
BUN/CREAT SERPL: 23 (ref 7–25)
CALCIUM SERPL-MCNC: 8.6 MG/DL (ref 8.4–10.2)
CHLORIDE SERPL-SCNC: 99 MMOL/L (ref 98–107)
CO2 SERPL-SCNC: 30 MMOL/L (ref 21–32)
CREAT SERPL-MCNC: 1.8 MG/DL (ref 0.51–0.95)
CREAT UR-MCNC: 75.7 MG/DL
DIFFERENTIAL METHOD BLD: ABNORMAL
EOSINOPHIL # BLD AUTO: 0.5 K/MCL (ref 0.1–0.5)
EOSINOPHIL NFR SPEC: 6 %
ERYTHROCYTE [DISTWIDTH] IN BLOOD: 15.4 % (ref 11–15)
GLOBULIN SER-MCNC: 3.7 G/DL (ref 2–4)
GLUCOSE BLDC GLUCOMTR-MCNC: 151 MG/DL (ref 65–99)
GLUCOSE BLDC GLUCOMTR-MCNC: 152 MG/DL (ref 65–99)
GLUCOSE BLDC GLUCOMTR-MCNC: 180 MG/DL (ref 65–99)
GLUCOSE BLDC GLUCOMTR-MCNC: 185 MG/DL (ref 65–99)
GLUCOSE SERPL-MCNC: 148 MG/DL (ref 65–99)
GRAM STN SPEC: ABNORMAL
HCT VFR BLD CALC: 24.6 % (ref 36–46.5)
HGB BLD-MCNC: 7.7 G/DL (ref 12–15.5)
INR PPP: 2.2
INR PPP: 2.2
LYMPHOCYTES # BLD MANUAL: 1.4 K/MCL (ref 1–4)
LYMPHOCYTES NFR BLD MANUAL: 17 %
MCH RBC QN AUTO: 28.5 PG (ref 26–34)
MCHC RBC AUTO-ENTMCNC: 31.3 G/DL (ref 32–36.5)
MCV RBC AUTO: 91.1 FL (ref 78–100)
MICROALBUMIN UR-MCNC: 2.61 MG/DL
MICROALBUMIN/CREAT UR: 34.5 MCG/MG
MONOCYTES # BLD MANUAL: 0.6 K/MCL (ref 0.3–0.9)
MONOCYTES NFR BLD MANUAL: 7 %
NEUTROPHILS # BLD AUTO: 5.7 K/MCL (ref 1.8–7.7)
NEUTROPHILS NFR BLD AUTO: 70 %
ORGANISM: ABNORMAL
PLATELET # BLD: 323 K/MCL (ref 140–450)
POTASSIUM SERPL-SCNC: 4.2 MMOL/L (ref 3.4–5.1)
PROT SERPL-MCNC: 6.4 G/DL (ref 6.4–8.2)
PROTHROMBIN TIME: 23.6 SEC (ref 9.7–11.8)
PROTHROMBIN TIME: 23.7 SEC (ref 9.7–11.8)
RBC # BLD: 2.7 MIL/MCL (ref 4–5.2)
REPORT STATUS (RPT): ABNORMAL
REPORT STATUS (RPT): NORMAL
REPORT STATUS (RPT): NORMAL
SODIUM SERPL-SCNC: 135 MMOL/L (ref 135–145)
SPECIMEN SOURCE: ABNORMAL
SPECIMEN SOURCE: NORMAL
SPECIMEN SOURCE: NORMAL
WBC # BLD: 8.2 K/MCL (ref 4.2–11)

## 2017-06-18 PROCEDURE — 10002803 HB RX 637: Performed by: PHYSICIAN ASSISTANT

## 2017-06-18 PROCEDURE — 80053 COMPREHEN METABOLIC PANEL: CPT

## 2017-06-18 PROCEDURE — 10002800 HB RX 250 W HCPCS: Performed by: INTERNAL MEDICINE

## 2017-06-18 PROCEDURE — 10002803 HB RX 637: Performed by: NURSE PRACTITIONER

## 2017-06-18 PROCEDURE — 85610 PROTHROMBIN TIME: CPT

## 2017-06-18 PROCEDURE — 76770 US EXAM ABDO BACK WALL COMP: CPT | Performed by: RADIOLOGY

## 2017-06-18 PROCEDURE — 10002807 HB RX 258: Performed by: INTERNAL MEDICINE

## 2017-06-18 PROCEDURE — 10004651 HB RX, NO CHARGE ITEM: Performed by: INTERNAL MEDICINE

## 2017-06-18 PROCEDURE — 10003445 HB TELEMETRY PER DAY

## 2017-06-18 PROCEDURE — 99231 SBSQ HOSP IP/OBS SF/LOW 25: CPT | Performed by: HOSPITALIST

## 2017-06-18 PROCEDURE — 82570 ASSAY OF URINE CREATININE: CPT

## 2017-06-18 PROCEDURE — 10002803 HB RX 637: Performed by: INTERNAL MEDICINE

## 2017-06-18 PROCEDURE — 10000002 HB ROOM CHARGE MED SURG

## 2017-06-18 PROCEDURE — 85730 THROMBOPLASTIN TIME PARTIAL: CPT

## 2017-06-18 PROCEDURE — 76775 US EXAM ABDO BACK WALL LIM: CPT

## 2017-06-18 PROCEDURE — 82962 GLUCOSE BLOOD TEST: CPT

## 2017-06-18 PROCEDURE — 85025 COMPLETE CBC W/AUTO DIFF WBC: CPT

## 2017-06-18 PROCEDURE — 10002800 HB RX 250 W HCPCS: Performed by: HOSPITALIST

## 2017-06-18 RX ADMIN — ASPIRIN 81 MG: 81 TABLET, CHEWABLE ORAL at 10:16

## 2017-06-18 RX ADMIN — ATORVASTATIN CALCIUM 40 MG: 40 TABLET, FILM COATED ORAL at 20:22

## 2017-06-18 RX ADMIN — INSULIN LISPRO 12 UNITS: 100 INJECTION, SOLUTION INTRAVENOUS; SUBCUTANEOUS at 12:30

## 2017-06-18 RX ADMIN — Medication 2 ML: at 20:23

## 2017-06-18 RX ADMIN — Medication 10 ML: at 21:00

## 2017-06-18 RX ADMIN — Medication 1 EACH: at 17:28

## 2017-06-18 RX ADMIN — Medication 10 ML: at 15:36

## 2017-06-18 RX ADMIN — CARVEDILOL 12.5 MG: 12.5 TABLET, FILM COATED ORAL at 10:16

## 2017-06-18 RX ADMIN — WARFARIN SODIUM 7.5 MG: 7.5 TABLET ORAL at 17:28

## 2017-06-18 RX ADMIN — TRAMADOL HYDROCHLORIDE 50 MG: 50 TABLET, FILM COATED ORAL at 01:05

## 2017-06-18 RX ADMIN — INSULIN GLARGINE 30 UNITS: 100 INJECTION, SOLUTION SUBCUTANEOUS at 09:19

## 2017-06-18 RX ADMIN — Medication 10 ML: at 06:33

## 2017-06-18 RX ADMIN — AMIODARONE HYDROCHLORIDE 200 MG: 200 TABLET ORAL at 10:15

## 2017-06-18 RX ADMIN — Medication 2 ML: at 10:17

## 2017-06-18 RX ADMIN — INSULIN LISPRO 12 UNITS: 100 INJECTION, SOLUTION INTRAVENOUS; SUBCUTANEOUS at 18:02

## 2017-06-18 RX ADMIN — INSULIN LISPRO 12 UNITS: 100 INJECTION, SOLUTION INTRAVENOUS; SUBCUTANEOUS at 09:19

## 2017-06-18 RX ADMIN — SODIUM CHLORIDE: 9 INJECTION, SOLUTION INTRAVENOUS at 01:06

## 2017-06-18 RX ADMIN — CEFTRIAXONE SODIUM 2000 MG: 2 INJECTION, POWDER, FOR SOLUTION INTRAMUSCULAR; INTRAVENOUS at 17:28

## 2017-06-18 RX ADMIN — FAMOTIDINE 20 MG: 20 TABLET, FILM COATED ORAL at 20:23

## 2017-06-18 RX ADMIN — POLYETHYLENE GLYCOL (3350) 17 G: 17 POWDER, FOR SOLUTION ORAL at 10:16

## 2017-06-18 RX ADMIN — CARVEDILOL 12.5 MG: 12.5 TABLET, FILM COATED ORAL at 17:28

## 2017-06-18 ASSESSMENT — PAIN SCALES - GENERAL
PAIN_LEVEL_AT_REST: 4
PAIN_LEVEL_AT_REST: 0

## 2017-06-19 LAB
ALBUMIN SERPL-MCNC: 2.7 G/DL (ref 3.6–5.1)
ALBUMIN/GLOB SERPL: 0.7 {RATIO} (ref 1–2.4)
ALP SERPL-CCNC: 100 UNITS/L (ref 45–117)
ALT SERPL-CCNC: 17 UNITS/L
ANION GAP SERPL CALC-SCNC: 10 MMOL/L (ref 10–20)
APTT PPP: 48 SEC (ref 22–30)
AST SERPL-CCNC: 8 UNITS/L
BASOPHILS # BLD AUTO: 0 K/MCL (ref 0–0.3)
BASOPHILS NFR BLD AUTO: 0 %
BILIRUB SERPL-MCNC: 0.3 MG/DL (ref 0.2–1)
BUN SERPL-MCNC: 35 MG/DL (ref 6–20)
BUN/CREAT SERPL: 21 (ref 7–25)
C3 SERPL-MCNC: 86 MG/DL (ref 79–152)
C4 SERPL-MCNC: 19.7 MG/DL (ref 16–38)
CALCIUM SERPL-MCNC: 8.6 MG/DL (ref 8.4–10.2)
CHLORIDE SERPL-SCNC: 101 MMOL/L (ref 98–107)
CO2 SERPL-SCNC: 30 MMOL/L (ref 21–32)
CREAT SERPL-MCNC: 1.7 MG/DL (ref 0.51–0.95)
DIFFERENTIAL METHOD BLD: ABNORMAL
EOSINOPHIL # BLD AUTO: 0.4 K/MCL (ref 0.1–0.5)
EOSINOPHIL NFR SPEC: 5 %
ERYTHROCYTE [DISTWIDTH] IN BLOOD: 15.9 % (ref 11–15)
GLOBULIN SER-MCNC: 3.7 G/DL (ref 2–4)
GLUCOSE BLDC GLUCOMTR-MCNC: 141 MG/DL (ref 65–99)
GLUCOSE BLDC GLUCOMTR-MCNC: 142 MG/DL (ref 65–99)
GLUCOSE BLDC GLUCOMTR-MCNC: 143 MG/DL (ref 65–99)
GLUCOSE BLDC GLUCOMTR-MCNC: 198 MG/DL (ref 65–99)
GLUCOSE SERPL-MCNC: 138 MG/DL (ref 65–99)
HCT VFR BLD CALC: 25 % (ref 36–46.5)
HGB BLD-MCNC: 7.6 G/DL (ref 12–15.5)
INR PPP: 2.4
INR PPP: 6.6
LYMPHOCYTES # BLD MANUAL: 1.4 K/MCL (ref 1–4)
LYMPHOCYTES NFR BLD MANUAL: 18 %
MCH RBC QN AUTO: 28 PG (ref 26–34)
MCHC RBC AUTO-ENTMCNC: 30.4 G/DL (ref 32–36.5)
MCV RBC AUTO: 92.3 FL (ref 78–100)
MONOCYTES # BLD MANUAL: 0.5 K/MCL (ref 0.3–0.9)
MONOCYTES NFR BLD MANUAL: 7 %
NEUTROPHILS # BLD AUTO: 5.7 K/MCL (ref 1.8–7.7)
NEUTROPHILS NFR BLD AUTO: 70 %
PLATELET # BLD: 314 K/MCL (ref 140–450)
POTASSIUM SERPL-SCNC: 4.1 MMOL/L (ref 3.4–5.1)
PROT SERPL-MCNC: 6.4 G/DL (ref 6.4–8.2)
PROTHROMBIN TIME: 26 SEC (ref 9.7–11.8)
PROTHROMBIN TIME: 70.6 SEC (ref 9.7–11.8)
RBC # BLD: 2.71 MIL/MCL (ref 4–5.2)
SODIUM SERPL-SCNC: 137 MMOL/L (ref 135–145)
WBC # BLD: 8 K/MCL (ref 4.2–11)

## 2017-06-19 PROCEDURE — 10003716 HB NURSING WOUND CARE PER 15 MIN

## 2017-06-19 PROCEDURE — 10002803 HB RX 637: Performed by: NURSE PRACTITIONER

## 2017-06-19 PROCEDURE — 10002800 HB RX 250 W HCPCS: Performed by: INTERNAL MEDICINE

## 2017-06-19 PROCEDURE — 10000002 HB ROOM CHARGE MED SURG

## 2017-06-19 PROCEDURE — 10002800 HB RX 250 W HCPCS: Performed by: HOSPITALIST

## 2017-06-19 PROCEDURE — 10004651 HB RX, NO CHARGE ITEM: Performed by: INTERNAL MEDICINE

## 2017-06-19 PROCEDURE — 10002803 HB RX 637: Performed by: INTERNAL MEDICINE

## 2017-06-19 PROCEDURE — A6550 NEG PRES WOUND THER DRSG SET: HCPCS

## 2017-06-19 PROCEDURE — 82962 GLUCOSE BLOOD TEST: CPT

## 2017-06-19 PROCEDURE — 10003445 HB TELEMETRY PER DAY

## 2017-06-19 PROCEDURE — 10004185 HB COUNTER-VISIT  CENSUS

## 2017-06-19 PROCEDURE — 97110 THERAPEUTIC EXERCISES: CPT

## 2017-06-19 PROCEDURE — 97530 THERAPEUTIC ACTIVITIES: CPT

## 2017-06-19 PROCEDURE — 85610 PROTHROMBIN TIME: CPT

## 2017-06-19 PROCEDURE — 99231 SBSQ HOSP IP/OBS SF/LOW 25: CPT | Performed by: HOSPITALIST

## 2017-06-19 PROCEDURE — 85025 COMPLETE CBC W/AUTO DIFF WBC: CPT

## 2017-06-19 PROCEDURE — 10004094 HB COUNTER, VISIT INPATIENT

## 2017-06-19 PROCEDURE — 80053 COMPREHEN METABOLIC PANEL: CPT

## 2017-06-19 PROCEDURE — 10002807 HB RX 258: Performed by: PHYSICIAN ASSISTANT

## 2017-06-19 PROCEDURE — 10002807 HB RX 258: Performed by: NURSE PRACTITIONER

## 2017-06-19 PROCEDURE — 99232 SBSQ HOSP IP/OBS MODERATE 35: CPT | Performed by: NURSE PRACTITIONER

## 2017-06-19 PROCEDURE — 10004173 HB COUNTER-THERAPY VISIT PT

## 2017-06-19 PROCEDURE — 10002800 HB RX 250 W HCPCS: Performed by: PHYSICIAN ASSISTANT

## 2017-06-19 PROCEDURE — 10004172 HB COUNTER-THERAPY VISIT OT

## 2017-06-19 PROCEDURE — 97116 GAIT TRAINING THERAPY: CPT

## 2017-06-19 PROCEDURE — 97535 SELF CARE MNGMENT TRAINING: CPT

## 2017-06-19 PROCEDURE — 10002803 HB RX 637: Performed by: PHYSICIAN ASSISTANT

## 2017-06-19 PROCEDURE — 10002807 HB RX 258: Performed by: INTERNAL MEDICINE

## 2017-06-19 PROCEDURE — 85730 THROMBOPLASTIN TIME PARTIAL: CPT

## 2017-06-19 PROCEDURE — 10002800 HB RX 250 W HCPCS: Performed by: NURSE PRACTITIONER

## 2017-06-19 PROCEDURE — 97605 NEG PRS WND THER DME<=50SQCM: CPT

## 2017-06-19 PROCEDURE — 99233 SBSQ HOSP IP/OBS HIGH 50: CPT | Performed by: PHYSICIAN ASSISTANT

## 2017-06-19 RX ORDER — TORSEMIDE 10 MG/1
10 TABLET ORAL DAILY
Status: DISCONTINUED | OUTPATIENT
Start: 2017-06-19 | End: 2017-06-21

## 2017-06-19 RX ORDER — ERGOCALCIFEROL 1.25 MG/1
50000 CAPSULE ORAL
Status: DISCONTINUED | OUTPATIENT
Start: 2017-06-19 | End: 2017-06-23 | Stop reason: HOSPADM

## 2017-06-19 RX ORDER — WARFARIN SODIUM 10 MG/1
10 TABLET ORAL ONCE
Status: COMPLETED | OUTPATIENT
Start: 2017-06-19 | End: 2017-06-19

## 2017-06-19 RX ORDER — LIDOCAINE HYDROCHLORIDE 40 MG/ML
SOLUTION TOPICAL PRN
Status: DISCONTINUED | OUTPATIENT
Start: 2017-06-19 | End: 2017-06-23 | Stop reason: HOSPADM

## 2017-06-19 RX ADMIN — FAMOTIDINE 20 MG: 20 TABLET, FILM COATED ORAL at 20:49

## 2017-06-19 RX ADMIN — WARFARIN SODIUM 10 MG: 10 TABLET ORAL at 17:17

## 2017-06-19 RX ADMIN — INSULIN GLARGINE 30 UNITS: 100 INJECTION, SOLUTION SUBCUTANEOUS at 08:36

## 2017-06-19 RX ADMIN — Medication 10 ML: at 22:09

## 2017-06-19 RX ADMIN — INSULIN LISPRO 10 UNITS: 100 INJECTION, SOLUTION INTRAVENOUS; SUBCUTANEOUS at 08:39

## 2017-06-19 RX ADMIN — CARVEDILOL 12.5 MG: 12.5 TABLET, FILM COATED ORAL at 08:31

## 2017-06-19 RX ADMIN — VANCOMYCIN HYDROCHLORIDE 1000 MG: 1 INJECTION, POWDER, LYOPHILIZED, FOR SOLUTION INTRAVENOUS at 08:25

## 2017-06-19 RX ADMIN — POLYETHYLENE GLYCOL (3350) 17 G: 17 POWDER, FOR SOLUTION ORAL at 08:33

## 2017-06-19 RX ADMIN — ASPIRIN 81 MG: 81 TABLET, CHEWABLE ORAL at 08:31

## 2017-06-19 RX ADMIN — INSULIN LISPRO 10 UNITS: 100 INJECTION, SOLUTION INTRAVENOUS; SUBCUTANEOUS at 17:43

## 2017-06-19 RX ADMIN — ERGOCALCIFEROL 50000 UNITS: 1.25 CAPSULE ORAL at 15:53

## 2017-06-19 RX ADMIN — Medication 1 EACH: at 17:20

## 2017-06-19 RX ADMIN — TRAMADOL HYDROCHLORIDE 50 MG: 50 TABLET, FILM COATED ORAL at 06:10

## 2017-06-19 RX ADMIN — CARVEDILOL 12.5 MG: 12.5 TABLET, FILM COATED ORAL at 17:17

## 2017-06-19 RX ADMIN — TORSEMIDE 10 MG: 10 TABLET ORAL at 15:53

## 2017-06-19 RX ADMIN — INSULIN LISPRO 10 UNITS: 100 INJECTION, SOLUTION INTRAVENOUS; SUBCUTANEOUS at 12:40

## 2017-06-19 RX ADMIN — SODIUM CHLORIDE 0.28 MCG/KG/MIN: 9 INJECTION, SOLUTION INTRAVENOUS at 10:47

## 2017-06-19 RX ADMIN — Medication 10 ML: at 06:15

## 2017-06-19 RX ADMIN — ATORVASTATIN CALCIUM 40 MG: 40 TABLET, FILM COATED ORAL at 20:49

## 2017-06-19 RX ADMIN — CEFTRIAXONE SODIUM 2000 MG: 2 INJECTION, POWDER, FOR SOLUTION INTRAMUSCULAR; INTRAVENOUS at 17:17

## 2017-06-19 RX ADMIN — Medication 2 ML: at 08:38

## 2017-06-19 RX ADMIN — AMIODARONE HYDROCHLORIDE 200 MG: 200 TABLET ORAL at 08:31

## 2017-06-19 ASSESSMENT — PAIN SCALES - GENERAL
PAIN_LEVEL_AT_REST: 0
PAIN_LEVEL_WITH_ACTIVITY: 0
PAIN_LEVEL_AT_REST: 0
PAIN_LEVEL_AT_REST: 0
PAIN_LEVEL_AT_REST: 5
PAIN_LEVEL_AT_REST: 0
PAIN_LEVEL_AT_REST: 0
PAIN_LEVEL_AT_REST: 2
PAIN_LEVEL_WITH_ACTIVITY: 0
PAIN_LEVEL_AT_REST: 0
PAIN_LEVEL_AT_REST: 0

## 2017-06-20 PROBLEM — D50.9 IRON DEFICIENCY ANEMIA: Status: ACTIVE | Noted: 2017-06-20

## 2017-06-20 LAB
APTT PPP: 51 SEC (ref 22–30)
BASOPHILS # BLD AUTO: 0 K/MCL (ref 0–0.3)
BASOPHILS NFR BLD AUTO: 0 %
DIFFERENTIAL METHOD BLD: ABNORMAL
EOSINOPHIL # BLD AUTO: 0.4 K/MCL (ref 0.1–0.5)
EOSINOPHIL NFR SPEC: 5 %
ERYTHROCYTE [DISTWIDTH] IN BLOOD: 16.4 % (ref 11–15)
GLUCOSE BLDC GLUCOMTR-MCNC: 137 MG/DL (ref 65–99)
GLUCOSE BLDC GLUCOMTR-MCNC: 151 MG/DL (ref 65–99)
GLUCOSE BLDC GLUCOMTR-MCNC: 160 MG/DL (ref 65–99)
GLUCOSE BLDC GLUCOMTR-MCNC: 211 MG/DL (ref 65–99)
HCT VFR BLD CALC: 25.6 % (ref 36–46.5)
HGB BLD-MCNC: 8.1 G/DL (ref 12–15.5)
INR PPP: 3.1
LYMPHOCYTES # BLD MANUAL: 1.3 K/MCL (ref 1–4)
LYMPHOCYTES NFR BLD MANUAL: 16 %
MCH RBC QN AUTO: 28.9 PG (ref 26–34)
MCHC RBC AUTO-ENTMCNC: 31.6 G/DL (ref 32–36.5)
MCV RBC AUTO: 91.4 FL (ref 78–100)
MONOCYTES # BLD MANUAL: 0.6 K/MCL (ref 0.3–0.9)
MONOCYTES NFR BLD MANUAL: 8 %
NEUTROPHILS # BLD AUTO: 5.4 K/MCL (ref 1.8–7.7)
NEUTROPHILS NFR BLD AUTO: 71 %
PLATELET # BLD: 348 K/MCL (ref 140–450)
PREALB SERPL-MCNC: 14.8 MG/DL (ref 18–38)
PROTHROMBIN TIME: 33.4 SEC (ref 9.7–11.8)
RBC # BLD: 2.8 MIL/MCL (ref 4–5.2)
WBC # BLD: 7.8 K/MCL (ref 4.2–11)

## 2017-06-20 PROCEDURE — 85610 PROTHROMBIN TIME: CPT

## 2017-06-20 PROCEDURE — 10004173 HB COUNTER-THERAPY VISIT PT

## 2017-06-20 PROCEDURE — 97530 THERAPEUTIC ACTIVITIES: CPT

## 2017-06-20 PROCEDURE — 85730 THROMBOPLASTIN TIME PARTIAL: CPT

## 2017-06-20 PROCEDURE — 10002800 HB RX 250 W HCPCS: Performed by: HOSPITALIST

## 2017-06-20 PROCEDURE — 10002803 HB RX 637: Performed by: NURSE PRACTITIONER

## 2017-06-20 PROCEDURE — 10002803 HB RX 637: Performed by: PHYSICIAN ASSISTANT

## 2017-06-20 PROCEDURE — 10002800 HB RX 250 W HCPCS: Performed by: INTERNAL MEDICINE

## 2017-06-20 PROCEDURE — 10003445 HB TELEMETRY PER DAY

## 2017-06-20 PROCEDURE — 97116 GAIT TRAINING THERAPY: CPT

## 2017-06-20 PROCEDURE — 10000002 HB ROOM CHARGE MED SURG

## 2017-06-20 PROCEDURE — 82962 GLUCOSE BLOOD TEST: CPT

## 2017-06-20 PROCEDURE — 10004651 HB RX, NO CHARGE ITEM: Performed by: INTERNAL MEDICINE

## 2017-06-20 PROCEDURE — 99231 SBSQ HOSP IP/OBS SF/LOW 25: CPT | Performed by: HOSPITALIST

## 2017-06-20 PROCEDURE — 84134 ASSAY OF PREALBUMIN: CPT

## 2017-06-20 PROCEDURE — 85025 COMPLETE CBC W/AUTO DIFF WBC: CPT

## 2017-06-20 PROCEDURE — 99232 SBSQ HOSP IP/OBS MODERATE 35: CPT | Performed by: PHYSICIAN ASSISTANT

## 2017-06-20 PROCEDURE — 10002803 HB RX 637: Performed by: INTERNAL MEDICINE

## 2017-06-20 PROCEDURE — 10002807 HB RX 258: Performed by: INTERNAL MEDICINE

## 2017-06-20 RX ORDER — WARFARIN SODIUM 10 MG/1
10 TABLET ORAL ONCE
Status: COMPLETED | OUTPATIENT
Start: 2017-06-20 | End: 2017-06-20

## 2017-06-20 RX ORDER — TORSEMIDE 10 MG/1
10 TABLET ORAL ONCE
Status: COMPLETED | OUTPATIENT
Start: 2017-06-20 | End: 2017-06-20

## 2017-06-20 RX ADMIN — AMIODARONE HYDROCHLORIDE 200 MG: 200 TABLET ORAL at 08:57

## 2017-06-20 RX ADMIN — INSULIN GLARGINE 30 UNITS: 100 INJECTION, SOLUTION SUBCUTANEOUS at 08:58

## 2017-06-20 RX ADMIN — Medication 10 ML: at 05:44

## 2017-06-20 RX ADMIN — Medication 10 ML: at 20:00

## 2017-06-20 RX ADMIN — FAMOTIDINE 20 MG: 20 TABLET, FILM COATED ORAL at 20:00

## 2017-06-20 RX ADMIN — TORSEMIDE 10 MG: 10 TABLET ORAL at 17:23

## 2017-06-20 RX ADMIN — CEFTRIAXONE SODIUM 2000 MG: 2 INJECTION, POWDER, FOR SOLUTION INTRAMUSCULAR; INTRAVENOUS at 17:19

## 2017-06-20 RX ADMIN — WARFARIN SODIUM 10 MG: 10 TABLET ORAL at 17:23

## 2017-06-20 RX ADMIN — INSULIN LISPRO 12 UNITS: 100 INJECTION, SOLUTION INTRAVENOUS; SUBCUTANEOUS at 09:01

## 2017-06-20 RX ADMIN — Medication 1 EACH: at 17:24

## 2017-06-20 RX ADMIN — CARVEDILOL 12.5 MG: 12.5 TABLET, FILM COATED ORAL at 08:57

## 2017-06-20 RX ADMIN — Medication 10 ML: at 12:35

## 2017-06-20 RX ADMIN — TORSEMIDE 10 MG: 10 TABLET ORAL at 08:57

## 2017-06-20 RX ADMIN — CARVEDILOL 12.5 MG: 12.5 TABLET, FILM COATED ORAL at 17:23

## 2017-06-20 RX ADMIN — INSULIN LISPRO 10 UNITS: 100 INJECTION, SOLUTION INTRAVENOUS; SUBCUTANEOUS at 18:13

## 2017-06-20 RX ADMIN — ATORVASTATIN CALCIUM 40 MG: 40 TABLET, FILM COATED ORAL at 20:00

## 2017-06-20 RX ADMIN — ASPIRIN 81 MG: 81 TABLET, CHEWABLE ORAL at 08:57

## 2017-06-20 RX ADMIN — INSULIN LISPRO 12 UNITS: 100 INJECTION, SOLUTION INTRAVENOUS; SUBCUTANEOUS at 12:24

## 2017-06-20 ASSESSMENT — PAIN SCALES - GENERAL
PAIN_LEVEL_AT_REST: 0

## 2017-06-21 LAB
ALBUMIN SERPL-MCNC: 2.6 G/DL (ref 3.6–5.1)
ALBUMIN/GLOB SERPL: 0.7 {RATIO} (ref 1–2.4)
ALP SERPL-CCNC: 88 UNITS/L (ref 45–117)
ALT SERPL-CCNC: 15 UNITS/L
ANION GAP SERPL CALC-SCNC: 12 MMOL/L (ref 10–20)
APTT PPP: 62 SEC (ref 22–30)
APTT PPP: 73 SEC (ref 22–30)
AST SERPL-CCNC: 13 UNITS/L
BASOPHILS # BLD AUTO: 0 K/MCL (ref 0–0.3)
BASOPHILS NFR BLD AUTO: 0 %
BILIRUB SERPL-MCNC: 0.2 MG/DL (ref 0.2–1)
BUN SERPL-MCNC: 35 MG/DL (ref 6–20)
BUN/CREAT SERPL: 19 (ref 7–25)
CALCIUM SERPL-MCNC: 8.6 MG/DL (ref 8.4–10.2)
CHLORIDE SERPL-SCNC: 102 MMOL/L (ref 98–107)
CO2 SERPL-SCNC: 30 MMOL/L (ref 21–32)
CREAT SERPL-MCNC: 1.86 MG/DL (ref 0.51–0.95)
DIFFERENTIAL METHOD BLD: ABNORMAL
EOSINOPHIL # BLD AUTO: 0.5 K/MCL (ref 0.1–0.5)
EOSINOPHIL NFR SPEC: 7 %
ERYTHROCYTE [DISTWIDTH] IN BLOOD: 16.6 % (ref 11–15)
GLOBULIN SER-MCNC: 3.7 G/DL (ref 2–4)
GLUCOSE BLDC GLUCOMTR-MCNC: 113 MG/DL (ref 65–99)
GLUCOSE BLDC GLUCOMTR-MCNC: 115 MG/DL (ref 65–99)
GLUCOSE BLDC GLUCOMTR-MCNC: 148 MG/DL (ref 65–99)
GLUCOSE BLDC GLUCOMTR-MCNC: 221 MG/DL (ref 65–99)
GLUCOSE SERPL-MCNC: 111 MG/DL (ref 65–99)
HCT VFR BLD CALC: 24.5 % (ref 36–46.5)
HGB BLD-MCNC: 7.6 G/DL (ref 12–15.5)
INR PPP: 4
INR PPP: 6.1
LYMPHOCYTES # BLD MANUAL: 1.5 K/MCL (ref 1–4)
LYMPHOCYTES NFR BLD MANUAL: 21 %
MCH RBC QN AUTO: 28.4 PG (ref 26–34)
MCHC RBC AUTO-ENTMCNC: 31 G/DL (ref 32–36.5)
MCV RBC AUTO: 91.4 FL (ref 78–100)
MONOCYTES # BLD MANUAL: 0.6 K/MCL (ref 0.3–0.9)
MONOCYTES NFR BLD MANUAL: 9 %
NEUTROPHILS # BLD AUTO: 4.4 K/MCL (ref 1.8–7.7)
NEUTROPHILS NFR BLD AUTO: 63 %
PLATELET # BLD: 320 K/MCL (ref 140–450)
POTASSIUM SERPL-SCNC: 3.7 MMOL/L (ref 3.4–5.1)
PROT SERPL-MCNC: 6.3 G/DL (ref 6.4–8.2)
PROTHROMBIN TIME: 42.8 SEC (ref 9.7–11.8)
PROTHROMBIN TIME: 67.5 SEC (ref 9.7–11.8)
RBC # BLD: 2.68 MIL/MCL (ref 4–5.2)
SODIUM SERPL-SCNC: 140 MMOL/L (ref 135–145)
WBC # BLD: 7 K/MCL (ref 4.2–11)

## 2017-06-21 PROCEDURE — 10002807 HB RX 258: Performed by: INTERNAL MEDICINE

## 2017-06-21 PROCEDURE — 10002807 HB RX 258: Performed by: NURSE PRACTITIONER

## 2017-06-21 PROCEDURE — 10004325 HB COUNTER ASSESSMENT EA 15 MIN

## 2017-06-21 PROCEDURE — 97605 NEG PRS WND THER DME<=50SQCM: CPT

## 2017-06-21 PROCEDURE — 10002800 HB RX 250 W HCPCS: Performed by: NURSE PRACTITIONER

## 2017-06-21 PROCEDURE — 10002803 HB RX 637: Performed by: INTERNAL MEDICINE

## 2017-06-21 PROCEDURE — A6550 NEG PRES WOUND THER DRSG SET: HCPCS

## 2017-06-21 PROCEDURE — 10000002 HB ROOM CHARGE MED SURG

## 2017-06-21 PROCEDURE — 97116 GAIT TRAINING THERAPY: CPT

## 2017-06-21 PROCEDURE — 10002803 HB RX 637: Performed by: NURSE PRACTITIONER

## 2017-06-21 PROCEDURE — 85025 COMPLETE CBC W/AUTO DIFF WBC: CPT

## 2017-06-21 PROCEDURE — 10004185 HB COUNTER-VISIT  CENSUS

## 2017-06-21 PROCEDURE — 85610 PROTHROMBIN TIME: CPT

## 2017-06-21 PROCEDURE — 10004173 HB COUNTER-THERAPY VISIT PT

## 2017-06-21 PROCEDURE — 10004651 HB RX, NO CHARGE ITEM: Performed by: INTERNAL MEDICINE

## 2017-06-21 PROCEDURE — 99232 SBSQ HOSP IP/OBS MODERATE 35: CPT | Performed by: INTERNAL MEDICINE

## 2017-06-21 PROCEDURE — 10002800 HB RX 250 W HCPCS: Performed by: INTERNAL MEDICINE

## 2017-06-21 PROCEDURE — 97530 THERAPEUTIC ACTIVITIES: CPT

## 2017-06-21 PROCEDURE — 36415 COLL VENOUS BLD VENIPUNCTURE: CPT

## 2017-06-21 PROCEDURE — 10002803 HB RX 637: Performed by: PHYSICIAN ASSISTANT

## 2017-06-21 PROCEDURE — 80053 COMPREHEN METABOLIC PANEL: CPT

## 2017-06-21 PROCEDURE — 99233 SBSQ HOSP IP/OBS HIGH 50: CPT | Performed by: HOSPITALIST

## 2017-06-21 PROCEDURE — 10003445 HB TELEMETRY PER DAY

## 2017-06-21 PROCEDURE — 85730 THROMBOPLASTIN TIME PARTIAL: CPT

## 2017-06-21 PROCEDURE — 82962 GLUCOSE BLOOD TEST: CPT

## 2017-06-21 PROCEDURE — 10002801 HB RX 250 W/O HCPCS: Performed by: NURSE PRACTITIONER

## 2017-06-21 PROCEDURE — 10002800 HB RX 250 W HCPCS: Performed by: HOSPITALIST

## 2017-06-21 PROCEDURE — 10004094 HB COUNTER, VISIT INPATIENT

## 2017-06-21 PROCEDURE — 99232 SBSQ HOSP IP/OBS MODERATE 35: CPT | Performed by: PREVENTIVE MEDICINE

## 2017-06-21 RX ORDER — POTASSIUM CHLORIDE 20 MEQ/1
20 TABLET, EXTENDED RELEASE ORAL ONCE
Status: COMPLETED | OUTPATIENT
Start: 2017-06-21 | End: 2017-06-21

## 2017-06-21 RX ORDER — CEPHALEXIN 500 MG/1
500 CAPSULE ORAL EVERY 12 HOURS SCHEDULED
Status: DISCONTINUED | OUTPATIENT
Start: 2017-06-22 | End: 2017-06-23 | Stop reason: HOSPADM

## 2017-06-21 RX ORDER — TORSEMIDE 20 MG/1
20 TABLET ORAL ONCE
Status: COMPLETED | OUTPATIENT
Start: 2017-06-21 | End: 2017-06-21

## 2017-06-21 RX ORDER — TORSEMIDE 20 MG/1
20 TABLET ORAL DAILY
Status: DISCONTINUED | OUTPATIENT
Start: 2017-06-22 | End: 2017-06-23

## 2017-06-21 RX ORDER — WARFARIN SODIUM 10 MG/1
10 TABLET ORAL ONCE
Status: COMPLETED | OUTPATIENT
Start: 2017-06-21 | End: 2017-06-21

## 2017-06-21 RX ADMIN — Medication 10 ML: at 05:23

## 2017-06-21 RX ADMIN — TRAMADOL HYDROCHLORIDE 50 MG: 50 TABLET, FILM COATED ORAL at 11:45

## 2017-06-21 RX ADMIN — CEFTRIAXONE SODIUM 2000 MG: 2 INJECTION, POWDER, FOR SOLUTION INTRAMUSCULAR; INTRAVENOUS at 17:46

## 2017-06-21 RX ADMIN — CARVEDILOL 12.5 MG: 12.5 TABLET, FILM COATED ORAL at 16:22

## 2017-06-21 RX ADMIN — Medication 2 ML: at 21:18

## 2017-06-21 RX ADMIN — SODIUM CHLORIDE 0.28 MCG/KG/MIN: 9 INJECTION, SOLUTION INTRAVENOUS at 05:47

## 2017-06-21 RX ADMIN — AMIODARONE HYDROCHLORIDE 200 MG: 200 TABLET ORAL at 08:38

## 2017-06-21 RX ADMIN — ASPIRIN 81 MG: 81 TABLET, CHEWABLE ORAL at 08:38

## 2017-06-21 RX ADMIN — INSULIN GLARGINE 30 UNITS: 100 INJECTION, SOLUTION SUBCUTANEOUS at 08:31

## 2017-06-21 RX ADMIN — INSULIN LISPRO 10 UNITS: 100 INJECTION, SOLUTION INTRAVENOUS; SUBCUTANEOUS at 12:47

## 2017-06-21 RX ADMIN — FAMOTIDINE 20 MG: 20 TABLET, FILM COATED ORAL at 21:15

## 2017-06-21 RX ADMIN — WARFARIN SODIUM 10 MG: 10 TABLET ORAL at 17:46

## 2017-06-21 RX ADMIN — TORSEMIDE 20 MG: 20 TABLET ORAL at 17:42

## 2017-06-21 RX ADMIN — VANCOMYCIN HYDROCHLORIDE 1000 MG: 1 INJECTION, POWDER, LYOPHILIZED, FOR SOLUTION INTRAVENOUS at 08:33

## 2017-06-21 RX ADMIN — POTASSIUM CHLORIDE 20 MEQ: 1500 TABLET, EXTENDED RELEASE ORAL at 08:36

## 2017-06-21 RX ADMIN — Medication 1 EACH: at 17:51

## 2017-06-21 RX ADMIN — POTASSIUM CHLORIDE 20 MEQ: 1500 TABLET, EXTENDED RELEASE ORAL at 17:42

## 2017-06-21 RX ADMIN — CARVEDILOL 12.5 MG: 12.5 TABLET, FILM COATED ORAL at 08:59

## 2017-06-21 RX ADMIN — Medication 10 ML: at 21:18

## 2017-06-21 RX ADMIN — TORSEMIDE 10 MG: 10 TABLET ORAL at 08:59

## 2017-06-21 RX ADMIN — ATORVASTATIN CALCIUM 40 MG: 40 TABLET, FILM COATED ORAL at 21:15

## 2017-06-21 RX ADMIN — Medication 2 ML: at 08:34

## 2017-06-21 RX ADMIN — SODIUM CHLORIDE: 9 INJECTION, SOLUTION INTRAVENOUS at 05:18

## 2017-06-21 RX ADMIN — INSULIN LISPRO 10 UNITS: 100 INJECTION, SOLUTION INTRAVENOUS; SUBCUTANEOUS at 17:43

## 2017-06-21 RX ADMIN — INSULIN LISPRO 10 UNITS: 100 INJECTION, SOLUTION INTRAVENOUS; SUBCUTANEOUS at 08:26

## 2017-06-21 ASSESSMENT — ACTIVITIES OF DAILY LIVING (ADL)
PRIOR_ADL_TOILETING: INDEPENDENT
EATING: INDEPENDENT
GROOMING: INDEPENDENT
PRIOR_ADL: INDEPENDENT
PRIOR_ADL_BATHING: INDEPENDENT

## 2017-06-21 ASSESSMENT — PAIN SCALES - GENERAL
PAIN_LEVEL_AT_REST: 0
PAIN_LEVEL_AT_REST: 1
PAIN_LEVEL_AT_REST: 1
PAIN_LEVEL_AT_REST: 0

## 2017-06-22 LAB
ANION GAP SERPL CALC-SCNC: 12 MMOL/L (ref 10–20)
APTT PPP: 56 SEC (ref 22–30)
BASOPHILS # BLD AUTO: 0 K/MCL (ref 0–0.3)
BASOPHILS NFR BLD AUTO: 0 %
BUN SERPL-MCNC: 36 MG/DL (ref 6–20)
BUN/CREAT SERPL: 19 (ref 7–25)
CALCIUM SERPL-MCNC: 8.7 MG/DL (ref 8.4–10.2)
CHLORIDE SERPL-SCNC: 102 MMOL/L (ref 98–107)
CO2 SERPL-SCNC: 29 MMOL/L (ref 21–32)
CREAT SERPL-MCNC: 1.91 MG/DL (ref 0.51–0.95)
DIFFERENTIAL METHOD BLD: ABNORMAL
EOSINOPHIL # BLD AUTO: 0.5 K/MCL (ref 0.1–0.5)
EOSINOPHIL NFR SPEC: 7 %
ERYTHROCYTE [DISTWIDTH] IN BLOOD: 16.8 % (ref 11–15)
GLUCOSE BLDC GLUCOMTR-MCNC: 115 MG/DL (ref 65–99)
GLUCOSE BLDC GLUCOMTR-MCNC: 126 MG/DL (ref 65–99)
GLUCOSE BLDC GLUCOMTR-MCNC: 129 MG/DL (ref 65–99)
GLUCOSE BLDC GLUCOMTR-MCNC: 161 MG/DL (ref 65–99)
GLUCOSE SERPL-MCNC: 117 MG/DL (ref 65–99)
HCT VFR BLD CALC: 24 % (ref 36–46.5)
HGB BLD-MCNC: 7.6 G/DL (ref 12–15.5)
INR PPP: 4.4
INR PPP: 5.3
LYMPHOCYTES # BLD MANUAL: 1.4 K/MCL (ref 1–4)
LYMPHOCYTES NFR BLD MANUAL: 20 %
MCH RBC QN AUTO: 28.9 PG (ref 26–34)
MCHC RBC AUTO-ENTMCNC: 31.7 G/DL (ref 32–36.5)
MCV RBC AUTO: 91.3 FL (ref 78–100)
MONOCYTES # BLD MANUAL: 0.5 K/MCL (ref 0.3–0.9)
MONOCYTES NFR BLD MANUAL: 6 %
NEUTROPHILS # BLD AUTO: 4.8 K/MCL (ref 1.8–7.7)
NEUTROPHILS NFR BLD AUTO: 67 %
PLATELET # BLD: 298 K/MCL (ref 140–450)
POTASSIUM SERPL-SCNC: 4 MMOL/L (ref 3.4–5.1)
PROTHROMBIN TIME: 47.1 SEC (ref 9.7–11.8)
PROTHROMBIN TIME: 56.2 SEC (ref 9.7–11.8)
RBC # BLD: 2.63 MIL/MCL (ref 4–5.2)
SODIUM SERPL-SCNC: 139 MMOL/L (ref 135–145)
WBC # BLD: 7.2 K/MCL (ref 4.2–11)

## 2017-06-22 PROCEDURE — 97530 THERAPEUTIC ACTIVITIES: CPT

## 2017-06-22 PROCEDURE — 99232 SBSQ HOSP IP/OBS MODERATE 35: CPT | Performed by: PHYSICIAN ASSISTANT

## 2017-06-22 PROCEDURE — 10002803 HB RX 637: Performed by: INTERNAL MEDICINE

## 2017-06-22 PROCEDURE — 85610 PROTHROMBIN TIME: CPT

## 2017-06-22 PROCEDURE — 10004651 HB RX, NO CHARGE ITEM: Performed by: INTERNAL MEDICINE

## 2017-06-22 PROCEDURE — 10003445 HB TELEMETRY PER DAY

## 2017-06-22 PROCEDURE — 10002800 HB RX 250 W HCPCS: Performed by: HOSPITALIST

## 2017-06-22 PROCEDURE — 10004172 HB COUNTER-THERAPY VISIT OT

## 2017-06-22 PROCEDURE — 97116 GAIT TRAINING THERAPY: CPT

## 2017-06-22 PROCEDURE — 99253 IP/OBS CNSLTJ NEW/EST LOW 45: CPT | Performed by: PHYSICIAN ASSISTANT

## 2017-06-22 PROCEDURE — 85025 COMPLETE CBC W/AUTO DIFF WBC: CPT

## 2017-06-22 PROCEDURE — 10002803 HB RX 637: Performed by: NURSE PRACTITIONER

## 2017-06-22 PROCEDURE — 10000002 HB ROOM CHARGE MED SURG

## 2017-06-22 PROCEDURE — 10002803 HB RX 637: Performed by: PHYSICIAN ASSISTANT

## 2017-06-22 PROCEDURE — 80048 BASIC METABOLIC PNL TOTAL CA: CPT

## 2017-06-22 PROCEDURE — 85730 THROMBOPLASTIN TIME PARTIAL: CPT

## 2017-06-22 PROCEDURE — 97110 THERAPEUTIC EXERCISES: CPT

## 2017-06-22 PROCEDURE — 99233 SBSQ HOSP IP/OBS HIGH 50: CPT | Performed by: HOSPITALIST

## 2017-06-22 PROCEDURE — 82962 GLUCOSE BLOOD TEST: CPT

## 2017-06-22 PROCEDURE — 10004173 HB COUNTER-THERAPY VISIT PT

## 2017-06-22 PROCEDURE — 97535 SELF CARE MNGMENT TRAINING: CPT

## 2017-06-22 RX ORDER — TRAMADOL HYDROCHLORIDE 50 MG/1
50 TABLET ORAL EVERY 6 HOURS PRN
Qty: 120 TABLET | Refills: 0 | Status: SHIPPED | COMMUNITY
Start: 2017-06-08 | End: 2017-06-23

## 2017-06-22 RX ADMIN — CARVEDILOL 12.5 MG: 12.5 TABLET, FILM COATED ORAL at 18:35

## 2017-06-22 RX ADMIN — Medication 10 ML: at 19:25

## 2017-06-22 RX ADMIN — WARFARIN SODIUM 6.5 MG: 6 TABLET ORAL at 18:35

## 2017-06-22 RX ADMIN — ATORVASTATIN CALCIUM 40 MG: 40 TABLET, FILM COATED ORAL at 19:24

## 2017-06-22 RX ADMIN — INSULIN LISPRO 10 UNITS: 100 INJECTION, SOLUTION INTRAVENOUS; SUBCUTANEOUS at 09:44

## 2017-06-22 RX ADMIN — CEPHALEXIN 500 MG: 500 CAPSULE ORAL at 19:25

## 2017-06-22 RX ADMIN — INSULIN LISPRO 10 UNITS: 100 INJECTION, SOLUTION INTRAVENOUS; SUBCUTANEOUS at 18:37

## 2017-06-22 RX ADMIN — ASPIRIN 81 MG: 81 TABLET, CHEWABLE ORAL at 09:43

## 2017-06-22 RX ADMIN — Medication 1 EACH: at 18:35

## 2017-06-22 RX ADMIN — INSULIN LISPRO 10 UNITS: 100 INJECTION, SOLUTION INTRAVENOUS; SUBCUTANEOUS at 13:57

## 2017-06-22 RX ADMIN — Medication 2 ML: at 19:26

## 2017-06-22 RX ADMIN — INSULIN GLARGINE 30 UNITS: 100 INJECTION, SOLUTION SUBCUTANEOUS at 09:44

## 2017-06-22 RX ADMIN — Medication 10 ML: at 14:04

## 2017-06-22 RX ADMIN — CARVEDILOL 12.5 MG: 12.5 TABLET, FILM COATED ORAL at 09:44

## 2017-06-22 RX ADMIN — Medication 10 ML: at 05:57

## 2017-06-22 RX ADMIN — POLYETHYLENE GLYCOL (3350) 17 G: 17 POWDER, FOR SOLUTION ORAL at 09:43

## 2017-06-22 RX ADMIN — FAMOTIDINE 20 MG: 20 TABLET, FILM COATED ORAL at 19:25

## 2017-06-22 RX ADMIN — AMIODARONE HYDROCHLORIDE 200 MG: 200 TABLET ORAL at 09:43

## 2017-06-22 RX ADMIN — CEPHALEXIN 500 MG: 500 CAPSULE ORAL at 09:43

## 2017-06-22 RX ADMIN — Medication 2 ML: at 09:58

## 2017-06-22 ASSESSMENT — ACTIVITIES OF DAILY LIVING (ADL)
FEEDING: INDEPENDENT
TOILETING: NEEDS ASSISTANCE
BATHING: NEEDS ASSISTANCE
DRESSING: NEEDS ASSISTANCE
ADL_SCORE: 8

## 2017-06-22 ASSESSMENT — PAIN SCALES - GENERAL
PAIN_LEVEL_AT_REST: 0
PAIN_LEVEL_WITH_ACTIVITY: 0
PAIN_LEVEL_AT_REST: 0
PAIN_LEVEL_AT_REST: 0

## 2017-06-23 ENCOUNTER — TELEPHONE (OUTPATIENT)
Dept: CARDIOLOGY | Age: 57
End: 2017-06-23

## 2017-06-23 ENCOUNTER — TELEPHONE (OUTPATIENT)
Dept: FAMILY MEDICINE | Age: 57
End: 2017-06-23

## 2017-06-23 VITALS
HEIGHT: 70 IN | HEART RATE: 66 BPM | SYSTOLIC BLOOD PRESSURE: 135 MMHG | WEIGHT: 248.9 LBS | RESPIRATION RATE: 18 BRPM | OXYGEN SATURATION: 97 % | BODY MASS INDEX: 35.63 KG/M2 | DIASTOLIC BLOOD PRESSURE: 73 MMHG | TEMPERATURE: 98 F

## 2017-06-23 DIAGNOSIS — D75.829 HIT (HEPARIN-INDUCED THROMBOCYTOPENIA) (CMD): Primary | ICD-10-CM

## 2017-06-23 LAB
ANION GAP SERPL CALC-SCNC: 11 MMOL/L (ref 10–20)
BASOPHILS # BLD AUTO: 0 K/MCL (ref 0–0.3)
BASOPHILS NFR BLD AUTO: 0 %
BUN SERPL-MCNC: 36 MG/DL (ref 6–20)
BUN/CREAT SERPL: 19 (ref 7–25)
CALCIUM SERPL-MCNC: 8.5 MG/DL (ref 8.4–10.2)
CHLORIDE SERPL-SCNC: 103 MMOL/L (ref 98–107)
CO2 SERPL-SCNC: 29 MMOL/L (ref 21–32)
CREAT SERPL-MCNC: 1.9 MG/DL (ref 0.51–0.95)
DIFFERENTIAL METHOD BLD: ABNORMAL
EOSINOPHIL # BLD AUTO: 0.5 K/MCL (ref 0.1–0.5)
EOSINOPHIL NFR SPEC: 7 %
ERYTHROCYTE [DISTWIDTH] IN BLOOD: 16.8 % (ref 11–15)
GLUCOSE BLDC GLUCOMTR-MCNC: 150 MG/DL (ref 65–99)
GLUCOSE BLDC GLUCOMTR-MCNC: 160 MG/DL (ref 65–99)
GLUCOSE SERPL-MCNC: 132 MG/DL (ref 65–99)
HCT VFR BLD CALC: 23.8 % (ref 36–46.5)
HGB BLD-MCNC: 7.5 G/DL (ref 12–15.5)
INR PPP: 4.3
LYMPHOCYTES # BLD MANUAL: 1.4 K/MCL (ref 1–4)
LYMPHOCYTES NFR BLD MANUAL: 20 %
MCH RBC QN AUTO: 28.6 PG (ref 26–34)
MCHC RBC AUTO-ENTMCNC: 31.5 G/DL (ref 32–36.5)
MCV RBC AUTO: 90.8 FL (ref 78–100)
MONOCYTES # BLD MANUAL: 0.6 K/MCL (ref 0.3–0.9)
MONOCYTES NFR BLD MANUAL: 9 %
NEUTROPHILS # BLD AUTO: 4.3 K/MCL (ref 1.8–7.7)
NEUTROPHILS NFR BLD AUTO: 64 %
PLATELET # BLD: 285 K/MCL (ref 140–450)
POTASSIUM SERPL-SCNC: 4.1 MMOL/L (ref 3.4–5.1)
PROTHROMBIN TIME: 45.9 SEC (ref 9.7–11.8)
RBC # BLD: 2.62 MIL/MCL (ref 4–5.2)
SODIUM SERPL-SCNC: 139 MMOL/L (ref 135–145)
WBC # BLD: 6.7 K/MCL (ref 4.2–11)

## 2017-06-23 PROCEDURE — 99239 HOSP IP/OBS DSCHRG MGMT >30: CPT | Performed by: HOSPITALIST

## 2017-06-23 PROCEDURE — 10002803 HB RX 637: Performed by: INTERNAL MEDICINE

## 2017-06-23 PROCEDURE — 85610 PROTHROMBIN TIME: CPT

## 2017-06-23 PROCEDURE — 10004651 HB RX, NO CHARGE ITEM: Performed by: INTERNAL MEDICINE

## 2017-06-23 PROCEDURE — 80048 BASIC METABOLIC PNL TOTAL CA: CPT

## 2017-06-23 PROCEDURE — 97116 GAIT TRAINING THERAPY: CPT

## 2017-06-23 PROCEDURE — 82962 GLUCOSE BLOOD TEST: CPT

## 2017-06-23 PROCEDURE — 97530 THERAPEUTIC ACTIVITIES: CPT

## 2017-06-23 PROCEDURE — 10002803 HB RX 637: Performed by: NURSE PRACTITIONER

## 2017-06-23 PROCEDURE — 10004173 HB COUNTER-THERAPY VISIT PT

## 2017-06-23 PROCEDURE — 99232 SBSQ HOSP IP/OBS MODERATE 35: CPT | Performed by: NURSE PRACTITIONER

## 2017-06-23 PROCEDURE — 10002800 HB RX 250 W HCPCS: Performed by: HOSPITALIST

## 2017-06-23 PROCEDURE — 85025 COMPLETE CBC W/AUTO DIFF WBC: CPT

## 2017-06-23 PROCEDURE — 10004281 HB COUNTER-STAFF TIME PER 15 MIN

## 2017-06-23 RX ORDER — ATORVASTATIN CALCIUM 40 MG/1
40 TABLET, FILM COATED ORAL NIGHTLY
Status: SHIPPED | INPATIENT
Start: 2017-06-23

## 2017-06-23 RX ORDER — TORSEMIDE 10 MG/1
10 TABLET ORAL DAILY
Status: DISCONTINUED | OUTPATIENT
Start: 2017-06-24 | End: 2017-06-23 | Stop reason: HOSPADM

## 2017-06-23 RX ORDER — CARVEDILOL 12.5 MG/1
12.5 TABLET ORAL 2 TIMES DAILY WITH MEALS
Status: SHIPPED | INPATIENT
Start: 2017-06-23

## 2017-06-23 RX ORDER — LIDOCAINE HYDROCHLORIDE 40 MG/ML
SOLUTION TOPICAL
Qty: 50 ML | Refills: 0 | Status: SHIPPED | OUTPATIENT
Start: 2017-06-23 | End: 2017-11-08 | Stop reason: ALTCHOICE

## 2017-06-23 RX ORDER — ERGOCALCIFEROL 1.25 MG/1
50000 CAPSULE ORAL
Qty: 4 CAPSULE | Refills: 12 | Status: SHIPPED | INPATIENT
Start: 2017-06-26

## 2017-06-23 RX ORDER — CEPHALEXIN 500 MG/1
500 CAPSULE ORAL EVERY 12 HOURS SCHEDULED
Qty: 28 CAPSULE | Status: SHIPPED | INPATIENT
Start: 2017-06-23 | End: 2017-07-07 | Stop reason: ALTCHOICE

## 2017-06-23 RX ORDER — WARFARIN SODIUM 7.5 MG/1
7.5 TABLET ORAL DAILY
Qty: 30 TABLET | Refills: 11 | Status: SHIPPED | COMMUNITY
Start: 2017-06-23 | End: 2017-08-01 | Stop reason: DRUGHIGH

## 2017-06-23 RX ORDER — AMIODARONE HYDROCHLORIDE 200 MG/1
200 TABLET ORAL DAILY
Status: SHIPPED | INPATIENT
Start: 2017-06-23

## 2017-06-23 RX ORDER — TORSEMIDE 10 MG/1
10 TABLET ORAL DAILY
Status: SHIPPED | INPATIENT
Start: 2017-06-25 | End: 2017-07-05 | Stop reason: DRUGHIGH

## 2017-06-23 RX ORDER — WARFARIN SODIUM 7.5 MG/1
7.5 TABLET ORAL DAILY
Qty: 30 TABLET | Refills: 11 | Status: SHIPPED | INPATIENT
Start: 2017-06-23 | End: 2017-06-23

## 2017-06-23 RX ORDER — TRAMADOL HYDROCHLORIDE 50 MG/1
50 TABLET ORAL EVERY 6 HOURS PRN
Qty: 30 TABLET | Refills: 0 | Status: SHIPPED | OUTPATIENT
Start: 2017-06-23 | End: 2017-07-21 | Stop reason: SDUPTHER

## 2017-06-23 RX ORDER — POLYETHYLENE GLYCOL 3350 17 G/17G
17 POWDER, FOR SOLUTION ORAL DAILY
Qty: 14 EACH | Refills: 0 | Status: SHIPPED | INPATIENT
Start: 2017-06-23 | End: 2017-08-01 | Stop reason: ALTCHOICE

## 2017-06-23 RX ADMIN — POLYETHYLENE GLYCOL (3350) 17 G: 17 POWDER, FOR SOLUTION ORAL at 09:41

## 2017-06-23 RX ADMIN — INSULIN LISPRO 12 UNITS: 100 INJECTION, SOLUTION INTRAVENOUS; SUBCUTANEOUS at 12:30

## 2017-06-23 RX ADMIN — TRAMADOL HYDROCHLORIDE 50 MG: 50 TABLET, FILM COATED ORAL at 12:56

## 2017-06-23 RX ADMIN — AMIODARONE HYDROCHLORIDE 200 MG: 200 TABLET ORAL at 09:37

## 2017-06-23 RX ADMIN — CARVEDILOL 12.5 MG: 12.5 TABLET, FILM COATED ORAL at 09:37

## 2017-06-23 RX ADMIN — ASPIRIN 81 MG: 81 TABLET, CHEWABLE ORAL at 09:37

## 2017-06-23 RX ADMIN — CEPHALEXIN 500 MG: 500 CAPSULE ORAL at 09:37

## 2017-06-23 RX ADMIN — INSULIN LISPRO 12 UNITS: 100 INJECTION, SOLUTION INTRAVENOUS; SUBCUTANEOUS at 09:34

## 2017-06-23 RX ADMIN — INSULIN GLARGINE 30 UNITS: 100 INJECTION, SOLUTION SUBCUTANEOUS at 09:35

## 2017-06-23 RX ADMIN — Medication 10 ML: at 13:05

## 2017-06-23 RX ADMIN — Medication 2 ML: at 09:39

## 2017-06-23 ASSESSMENT — PAIN SCALES - GENERAL
PAIN_LEVEL_AT_REST: 0
PAIN_LEVEL_AT_REST: 5

## 2017-06-26 ENCOUNTER — OFF PREMISE (OUTPATIENT)
Dept: OTHER | Age: 57
End: 2017-06-26

## 2017-06-26 ENCOUNTER — TELEPHONE (OUTPATIENT)
Dept: CARDIOLOGY | Age: 57
End: 2017-06-26

## 2017-06-26 DIAGNOSIS — D75.829 HIT (HEPARIN-INDUCED THROMBOCYTOPENIA) (CMD): ICD-10-CM

## 2017-06-26 DIAGNOSIS — Z95.3 S/P MITRAL VALVE REPLACEMENT WITH BIOPROSTHETIC VALVE: ICD-10-CM

## 2017-06-26 LAB
INR PPP: 3.8
PROTHROMBIN TIME: 39.6 SEC (ref 9.5–11.8)

## 2017-06-27 ENCOUNTER — CLINICAL ABSTRACT (OUTPATIENT)
Dept: TRANSPLANT | Age: 57
End: 2017-06-27

## 2017-06-27 ENCOUNTER — OFFICE VISIT (OUTPATIENT)
Dept: CARDIOLOGY | Age: 57
End: 2017-06-27

## 2017-06-27 VITALS
WEIGHT: 249 LBS | DIASTOLIC BLOOD PRESSURE: 52 MMHG | SYSTOLIC BLOOD PRESSURE: 96 MMHG | HEIGHT: 70 IN | OXYGEN SATURATION: 100 % | RESPIRATION RATE: 20 BRPM | HEART RATE: 71 BPM | BODY MASS INDEX: 35.65 KG/M2

## 2017-06-27 DIAGNOSIS — Z95.2 S/P MVR (MITRAL VALVE REPLACEMENT): Primary | ICD-10-CM

## 2017-06-27 DIAGNOSIS — Z09 SURGICAL FOLLOW-UP CARE: ICD-10-CM

## 2017-06-27 DIAGNOSIS — I25.5 ISCHEMIC CARDIOMYOPATHY: Primary | ICD-10-CM

## 2017-06-27 PROCEDURE — 99024 POSTOP FOLLOW-UP VISIT: CPT | Performed by: THORACIC SURGERY (CARDIOTHORACIC VASCULAR SURGERY)

## 2017-06-27 RX ORDER — AMOXICILLIN 500 MG/1
2000 CAPSULE ORAL PRN
Qty: 4 CAPSULE | Refills: 3 | Status: SHIPPED | OUTPATIENT
Start: 2017-06-27

## 2017-06-28 ENCOUNTER — TELEPHONE (OUTPATIENT)
Dept: CARDIOLOGY | Age: 57
End: 2017-06-28

## 2017-06-29 ENCOUNTER — NURSING HOME VISIT (OUTPATIENT)
Dept: FAMILY MEDICINE | Age: 57
End: 2017-06-29

## 2017-06-29 VITALS
TEMPERATURE: 97.8 F | SYSTOLIC BLOOD PRESSURE: 136 MMHG | WEIGHT: 251.6 LBS | BODY MASS INDEX: 36.1 KG/M2 | HEART RATE: 70 BPM | DIASTOLIC BLOOD PRESSURE: 72 MMHG | OXYGEN SATURATION: 95 % | RESPIRATION RATE: 18 BRPM

## 2017-06-29 DIAGNOSIS — E11.9 TYPE 2 DIABETES MELLITUS WITHOUT COMPLICATION, WITH LONG-TERM CURRENT USE OF INSULIN (CMD): ICD-10-CM

## 2017-06-29 DIAGNOSIS — I25.5 ISCHEMIC CARDIOMYOPATHY: ICD-10-CM

## 2017-06-29 DIAGNOSIS — R68.89: ICD-10-CM

## 2017-06-29 DIAGNOSIS — I25.10 CORONARY ARTERY DISEASE INVOLVING NATIVE CORONARY ARTERY OF NATIVE HEART WITHOUT ANGINA PECTORIS: Primary | ICD-10-CM

## 2017-06-29 DIAGNOSIS — Z95.3 S/P MITRAL VALVE REPLACEMENT WITH BIOPROSTHETIC VALVE: ICD-10-CM

## 2017-06-29 DIAGNOSIS — N18.30 STAGE 3 CHRONIC KIDNEY DISEASE (CMD): ICD-10-CM

## 2017-06-29 DIAGNOSIS — Z87.74 S/P PATENT FORAMEN OVALE CLOSURE: ICD-10-CM

## 2017-06-29 DIAGNOSIS — Z79.4 TYPE 2 DIABETES MELLITUS WITHOUT COMPLICATION, WITH LONG-TERM CURRENT USE OF INSULIN (CMD): ICD-10-CM

## 2017-06-29 DIAGNOSIS — Z95.1 S/P CABG X 4: ICD-10-CM

## 2017-06-29 DIAGNOSIS — Z98.890 S/P TRICUSPID VALVE REPAIR: ICD-10-CM

## 2017-06-29 DIAGNOSIS — T81.32XS STERNAL WOUND DEHISCENCE, SEQUELA: ICD-10-CM

## 2017-06-29 PROCEDURE — 99305 1ST NF CARE MODERATE MDM 35: CPT | Performed by: FAMILY MEDICINE

## 2017-07-02 ENCOUNTER — OFF PREMISE (OUTPATIENT)
Dept: OTHER | Age: 57
End: 2017-07-02

## 2017-07-02 LAB
INR PPP: 4
PROTHROMBIN TIME: 42.1 SEC (ref 9.5–11.8)

## 2017-07-03 ENCOUNTER — TELEPHONE (OUTPATIENT)
Dept: FAMILY MEDICINE | Age: 57
End: 2017-07-03

## 2017-07-03 DIAGNOSIS — Z95.3 S/P MITRAL VALVE REPLACEMENT WITH BIOPROSTHETIC VALVE: ICD-10-CM

## 2017-07-03 DIAGNOSIS — D75.829 HIT (HEPARIN-INDUCED THROMBOCYTOPENIA) (CMD): ICD-10-CM

## 2017-07-03 LAB — INR PPP: 4

## 2017-07-05 ENCOUNTER — HOSPITAL ENCOUNTER (OUTPATIENT)
Dept: LAB | Age: 57
Discharge: HOME | End: 2017-07-05
Attending: NURSE PRACTITIONER

## 2017-07-05 ENCOUNTER — TRANSCRIBE ORDERS (OUTPATIENT)
Dept: GENERAL RADIOLOGY | Facility: HOSPITAL | Age: 57
End: 2017-07-05

## 2017-07-05 ENCOUNTER — OFFICE VISIT (OUTPATIENT)
Dept: TRANSPLANT | Age: 57
End: 2017-07-05
Attending: NURSE PRACTITIONER

## 2017-07-05 VITALS
SYSTOLIC BLOOD PRESSURE: 138 MMHG | HEART RATE: 68 BPM | DIASTOLIC BLOOD PRESSURE: 71 MMHG | WEIGHT: 256 LBS | OXYGEN SATURATION: 98 % | RESPIRATION RATE: 20 BRPM | BODY MASS INDEX: 36.73 KG/M2

## 2017-07-05 DIAGNOSIS — D50.9 IRON DEFICIENCY ANEMIA, UNSPECIFIED IRON DEFICIENCY ANEMIA TYPE: ICD-10-CM

## 2017-07-05 DIAGNOSIS — N18.30 STAGE 3 CHRONIC KIDNEY DISEASE (CMD): ICD-10-CM

## 2017-07-05 DIAGNOSIS — I25.5 ISCHEMIC CARDIOMYOPATHY: ICD-10-CM

## 2017-07-05 DIAGNOSIS — Z79.4 TYPE 2 DIABETES MELLITUS WITHOUT COMPLICATION, WITH LONG-TERM CURRENT USE OF INSULIN (CMD): ICD-10-CM

## 2017-07-05 DIAGNOSIS — E11.9 TYPE 2 DIABETES MELLITUS WITHOUT COMPLICATION, WITH LONG-TERM CURRENT USE OF INSULIN (CMD): ICD-10-CM

## 2017-07-05 DIAGNOSIS — I25.5 ISCHEMIC CARDIOMYOPATHY: Primary | ICD-10-CM

## 2017-07-05 LAB
ANION GAP SERPL CALC-SCNC: 10 MMOL/L (ref 10–20)
BUN SERPL-MCNC: 34 MG/DL (ref 6–20)
BUN/CREAT SERPL: 19 (ref 7–25)
CALCIUM SERPL-MCNC: 9 MG/DL (ref 8.4–10.2)
CHLORIDE SERPL-SCNC: 104 MMOL/L (ref 98–107)
CO2 SERPL-SCNC: 27 MMOL/L (ref 21–32)
CREAT SERPL-MCNC: 1.78 MG/DL (ref 0.51–0.95)
GLUCOSE SERPL-MCNC: 180 MG/DL (ref 65–99)
POTASSIUM SERPL-SCNC: 3.9 MMOL/L (ref 3.4–5.1)
SODIUM SERPL-SCNC: 137 MMOL/L (ref 135–145)

## 2017-07-05 PROCEDURE — 99202 OFFICE O/P NEW SF 15 MIN: CPT

## 2017-07-05 PROCEDURE — 36415 COLL VENOUS BLD VENIPUNCTURE: CPT

## 2017-07-05 PROCEDURE — 10004187 HB COUNTER-VISIT CENSUS, OUTPATIENT

## 2017-07-05 PROCEDURE — 99214 OFFICE O/P EST MOD 30 MIN: CPT | Performed by: NURSE PRACTITIONER

## 2017-07-05 PROCEDURE — 80048 BASIC METABOLIC PNL TOTAL CA: CPT

## 2017-07-05 RX ORDER — POTASSIUM CHLORIDE 10 MEQ
10 TABLET, EXT RELEASE, PARTICLES/CRYSTALS ORAL DAILY
Qty: 30 TABLET | Refills: 1 | Status: SHIPPED | OUTPATIENT
Start: 2017-07-05 | End: 2017-11-08 | Stop reason: DRUGHIGH

## 2017-07-05 RX ORDER — TORSEMIDE 10 MG/1
20 TABLET ORAL DAILY
Status: SHIPPED | COMMUNITY
Start: 2017-07-05 | End: 2017-08-01 | Stop reason: DRUGHIGH

## 2017-07-05 ASSESSMENT — NEW YORK HEART ASSOCIATION (NYHA) CLASSIFICATION: NYHA FUNCTIONAL CLASS: III

## 2017-07-07 ENCOUNTER — TELEPHONE (OUTPATIENT)
Dept: FAMILY MEDICINE | Age: 57
End: 2017-07-07

## 2017-07-07 ENCOUNTER — HOSPITAL ENCOUNTER (OUTPATIENT)
Dept: LAB | Age: 57
Discharge: HOME | End: 2017-07-07
Attending: INTERNAL MEDICINE

## 2017-07-07 ENCOUNTER — OFFICE VISIT (OUTPATIENT)
Dept: TRANSPLANT | Age: 57
End: 2017-07-07
Attending: INTERNAL MEDICINE

## 2017-07-07 VITALS
HEART RATE: 66 BPM | HEIGHT: 70 IN | BODY MASS INDEX: 36.65 KG/M2 | OXYGEN SATURATION: 96 % | SYSTOLIC BLOOD PRESSURE: 132 MMHG | WEIGHT: 256 LBS | DIASTOLIC BLOOD PRESSURE: 64 MMHG

## 2017-07-07 DIAGNOSIS — I25.5 ISCHEMIC CARDIOMYOPATHY: ICD-10-CM

## 2017-07-07 DIAGNOSIS — I50.22 CHRONIC SYSTOLIC (CONGESTIVE) HEART FAILURE (CMD): ICD-10-CM

## 2017-07-07 DIAGNOSIS — Z79.4 TYPE 2 DIABETES MELLITUS WITHOUT COMPLICATION, WITH LONG-TERM CURRENT USE OF INSULIN (CMD): ICD-10-CM

## 2017-07-07 DIAGNOSIS — N18.30 CHRONIC KIDNEY DISEASE, STAGE III (MODERATE) (CMD): Primary | ICD-10-CM

## 2017-07-07 DIAGNOSIS — N18.30 STAGE 3 CHRONIC KIDNEY DISEASE (CMD): ICD-10-CM

## 2017-07-07 DIAGNOSIS — D50.9 IRON DEFICIENCY ANEMIA, UNSPECIFIED IRON DEFICIENCY ANEMIA TYPE: ICD-10-CM

## 2017-07-07 DIAGNOSIS — E87.79 OTHER HYPERVOLEMIA: ICD-10-CM

## 2017-07-07 DIAGNOSIS — Z95.3 S/P MITRAL VALVE REPLACEMENT WITH BIOPROSTHETIC VALVE: ICD-10-CM

## 2017-07-07 DIAGNOSIS — D75.829 HIT (HEPARIN-INDUCED THROMBOCYTOPENIA) (CMD): ICD-10-CM

## 2017-07-07 DIAGNOSIS — E11.9 TYPE 2 DIABETES MELLITUS WITHOUT COMPLICATION, WITH LONG-TERM CURRENT USE OF INSULIN (CMD): ICD-10-CM

## 2017-07-07 LAB — INR PPP: 4.8

## 2017-07-07 PROCEDURE — 10004187 HB COUNTER-VISIT CENSUS, OUTPATIENT

## 2017-07-07 PROCEDURE — 99212 OFFICE O/P EST SF 10 MIN: CPT

## 2017-07-08 ENCOUNTER — TELEPHONE (OUTPATIENT)
Dept: FAMILY MEDICINE | Age: 57
End: 2017-07-08

## 2017-07-08 DIAGNOSIS — Z95.3 S/P MITRAL VALVE REPLACEMENT WITH BIOPROSTHETIC VALVE: ICD-10-CM

## 2017-07-08 DIAGNOSIS — D75.829 HIT (HEPARIN-INDUCED THROMBOCYTOPENIA) (CMD): ICD-10-CM

## 2017-07-08 LAB — INR PPP: 3.6

## 2017-07-10 ENCOUNTER — OFF PREMISE (OUTPATIENT)
Dept: OTHER | Age: 57
End: 2017-07-10

## 2017-07-10 ENCOUNTER — TELEPHONE (OUTPATIENT)
Dept: FAMILY MEDICINE | Age: 57
End: 2017-07-10

## 2017-07-10 ENCOUNTER — TELEPHONE (OUTPATIENT)
Dept: TRANSPLANT | Age: 57
End: 2017-07-10

## 2017-07-10 DIAGNOSIS — Z95.3 S/P MITRAL VALVE REPLACEMENT WITH BIOPROSTHETIC VALVE: ICD-10-CM

## 2017-07-10 DIAGNOSIS — D75.829 HIT (HEPARIN-INDUCED THROMBOCYTOPENIA) (CMD): ICD-10-CM

## 2017-07-10 LAB
ANION GAP: 14 MMOL/L (ref 10–18)
BASOPHILS # BLD: 0.06 10E3/UL (ref 0.01–0.1)
BASOPHILS NFR BLD: 1 % (ref 0–1)
BUN SERPL-MCNC: 38 MG/DL (ref 6–23)
CALCIUM SERPL-MCNC: 8.1 (ref 8.6–10.2)
CHLORIDE SERPL-SCNC: 103 MMOL/L (ref 96–105)
CO2: 26 MMOL/L (ref 22–29)
CREAT SERPL-MCNC: 1.73 MG/DL (ref 0.5–1.1)
EOSINOPHIL # BLD: 0.49 10E3/UL (ref 0.03–0.52)
EOSINOPHIL NFR BLD: 9 % (ref 0–6)
ERYTHROCYTE [DISTWIDTH] IN BLOOD: 16.9 % (ref 11–14.9)
GLUCOSE SERPL-MCNC: 115 MG/DL (ref 65–99)
HCT VFR BLD CALC: 28 % (ref 34–45)
HGB BLD-MCNC: 8.7 G/DL (ref 11.3–15.1)
IMMATURE GRANULOCYTES (OFFPRE25): 0 % (ref 0–1)
INR PPP: 3
LYMPHOCYTES # BLD: 1.29 10E3/UL (ref 0.9–3.2)
LYMPHOCYTES NFR BLD: 24 % (ref 17–46)
MCH RBC QN AUTO: 28.8 PG (ref 25.7–34.1)
MCHC RBC AUTO-ENTMCNC: 31 G/DL (ref 32–36)
MCV RBC AUTO: 94 FL (ref 79–98)
MONOCYTES # BLD: 0.34 10E3/UL (ref 0.26–0.86)
MONOCYTES NFR BLD: 6 % (ref 4–13)
MPV (OFFPRE2): 11.7 FL (ref 9–11.8)
NEUTROPHILS # BLD: 3.26 10E3/UL (ref 1.9–7.8)
NEUTROPHILS NFR BLD: 60 % (ref 43–74)
PLATELET # BLD: 267 10E3/UL (ref 165–366)
POTASSIUM SERPL-SCNC: 4.2 MMOL/L (ref 3.4–5.1)
RBC # BLD: 3 10E6/UL (ref 3.7–5.2)
SODIUM SERPL-SCNC: 144 MMOL/L (ref 136–145)
WBC # BLD: 5.5 10E3/UL (ref 3.9–11.2)

## 2017-07-13 ENCOUNTER — NURSING HOME VISIT (OUTPATIENT)
Dept: FAMILY MEDICINE | Age: 57
End: 2017-07-13

## 2017-07-13 ENCOUNTER — OFF PREMISE (OUTPATIENT)
Dept: OTHER | Age: 57
End: 2017-07-13

## 2017-07-13 VITALS
HEART RATE: 79 BPM | SYSTOLIC BLOOD PRESSURE: 138 MMHG | RESPIRATION RATE: 18 BRPM | OXYGEN SATURATION: 94 % | DIASTOLIC BLOOD PRESSURE: 75 MMHG

## 2017-07-13 DIAGNOSIS — R79.1 ELEVATED INR: ICD-10-CM

## 2017-07-13 DIAGNOSIS — E11.9 TYPE 2 DIABETES MELLITUS WITHOUT COMPLICATION, WITH LONG-TERM CURRENT USE OF INSULIN (CMD): Primary | ICD-10-CM

## 2017-07-13 DIAGNOSIS — D50.9 IRON DEFICIENCY ANEMIA, UNSPECIFIED IRON DEFICIENCY ANEMIA TYPE: ICD-10-CM

## 2017-07-13 DIAGNOSIS — Z95.3 S/P MITRAL VALVE REPLACEMENT WITH BIOPROSTHETIC VALVE: ICD-10-CM

## 2017-07-13 DIAGNOSIS — I25.5 ISCHEMIC CARDIOMYOPATHY: ICD-10-CM

## 2017-07-13 DIAGNOSIS — Z79.4 TYPE 2 DIABETES MELLITUS WITHOUT COMPLICATION, WITH LONG-TERM CURRENT USE OF INSULIN (CMD): Primary | ICD-10-CM

## 2017-07-13 DIAGNOSIS — Z98.890 S/P TRICUSPID VALVE REPAIR: ICD-10-CM

## 2017-07-13 DIAGNOSIS — Z87.74 S/P PATENT FORAMEN OVALE CLOSURE: ICD-10-CM

## 2017-07-13 DIAGNOSIS — Z95.1 S/P CABG X 4: ICD-10-CM

## 2017-07-13 DIAGNOSIS — R68.89: ICD-10-CM

## 2017-07-13 LAB
BASOPHILS # BLD: 0.04 10E3/UL (ref 0.01–0.1)
BASOPHILS NFR BLD: 1 % (ref 0–1)
EOSINOPHIL # BLD: 0.44 10E3/UL (ref 0.03–0.52)
EOSINOPHIL NFR BLD: 8 % (ref 0–6)
ERYTHROCYTE [DISTWIDTH] IN BLOOD: 16.9 % (ref 11–14.9)
HCT VFR BLD CALC: 29 % (ref 34–45)
HGB BLD-MCNC: 8.9 G/DL (ref 11.3–15.1)
IMMATURE GRANULOCYTES (OFFPRE25): 0 % (ref 0–1)
INR PPP: 4.6
LYMPHOCYTES # BLD: 1.22 10E3/UL (ref 0.9–3.2)
LYMPHOCYTES NFR BLD: 21 % (ref 17–46)
MCH RBC QN AUTO: 28.9 PG (ref 25.7–34.1)
MCHC RBC AUTO-ENTMCNC: 31 G/DL (ref 32–36)
MCV RBC AUTO: 93 FL (ref 79–98)
MONOCYTES # BLD: 0.3 10E3/UL (ref 0.26–0.86)
MONOCYTES NFR BLD: 5 % (ref 4–13)
MPV (OFFPRE2): 11.7 FL (ref 9–11.8)
NEUTROPHILS # BLD: 3.69 10E3/UL (ref 1.9–7.8)
NEUTROPHILS NFR BLD: 65 % (ref 43–74)
PLATELET # BLD: 266 10E3/UL (ref 165–366)
PROTHROMBIN TIME: 48.7 SEC (ref 9.5–11.8)
RBC # BLD: 3.1 10E6/UL (ref 3.7–5.2)
WBC # BLD: 5.7 10E3/UL (ref 3.9–11.2)

## 2017-07-13 PROCEDURE — 99309 SBSQ NF CARE MODERATE MDM 30: CPT | Performed by: FAMILY MEDICINE

## 2017-07-14 ENCOUNTER — TELEPHONE (OUTPATIENT)
Dept: FAMILY MEDICINE | Age: 57
End: 2017-07-14

## 2017-07-14 DIAGNOSIS — D75.829 HIT (HEPARIN-INDUCED THROMBOCYTOPENIA) (CMD): ICD-10-CM

## 2017-07-14 DIAGNOSIS — Z95.3 S/P MITRAL VALVE REPLACEMENT WITH BIOPROSTHETIC VALVE: ICD-10-CM

## 2017-07-18 ENCOUNTER — CLINICAL ABSTRACT (OUTPATIENT)
Dept: CARDIOLOGY | Age: 57
End: 2017-07-18

## 2017-07-19 ENCOUNTER — OFF PREMISE (OUTPATIENT)
Dept: OTHER | Age: 57
End: 2017-07-19

## 2017-07-19 ENCOUNTER — CASE MANAGEMENT (OUTPATIENT)
Dept: FAMILY MEDICINE | Age: 57
End: 2017-07-19

## 2017-07-19 DIAGNOSIS — D75.829 HIT (HEPARIN-INDUCED THROMBOCYTOPENIA) (CMD): ICD-10-CM

## 2017-07-19 DIAGNOSIS — Z95.3 S/P MITRAL VALVE REPLACEMENT WITH BIOPROSTHETIC VALVE: ICD-10-CM

## 2017-07-19 LAB
INR BLDC: 4.5
INR PPP: 4.5
PROTHROMBIN TIME: 47.6 SEC (ref 9.5–11.8)

## 2017-07-20 ENCOUNTER — TELEPHONE (OUTPATIENT)
Dept: CARDIOLOGY | Age: 57
End: 2017-07-20

## 2017-07-20 ENCOUNTER — OFF PREMISE (OUTPATIENT)
Dept: OTHER | Age: 57
End: 2017-07-20

## 2017-07-20 PROBLEM — R79.1 ELEVATED INR: Status: ACTIVE | Noted: 2017-07-20

## 2017-07-20 LAB
BASOPHILS # BLD: 0.03 10E3/UL (ref 0.01–0.1)
BASOPHILS NFR BLD: 1 % (ref 0–1)
EOSINOPHIL # BLD: 0.37 10E3/UL (ref 0.03–0.52)
EOSINOPHIL NFR BLD: 7 % (ref 0–6)
ERYTHROCYTE [DISTWIDTH] IN BLOOD: 16.8 % (ref 11–14.9)
HCT VFR BLD CALC: 29 % (ref 34–45)
HGB BLD-MCNC: 9 G/DL (ref 11.3–15.1)
IMMATURE GRANULOCYTES (OFFPRE25): 0 % (ref 0–1)
LYMPHOCYTES # BLD: 1.12 10E3/UL (ref 0.9–3.2)
LYMPHOCYTES NFR BLD: 22 % (ref 17–46)
MCH RBC QN AUTO: 29 PG (ref 25.7–34.1)
MCHC RBC AUTO-ENTMCNC: 32 G/DL (ref 32–36)
MCV RBC AUTO: 92 FL (ref 79–98)
MONOCYTES # BLD: 0.33 10E3/UL (ref 0.26–0.86)
MONOCYTES NFR BLD: 6 % (ref 4–13)
MPV (OFFPRE2): 12 FL (ref 9–11.8)
NEUTROPHILS # BLD: 3.26 10E3/UL (ref 1.9–7.8)
NEUTROPHILS NFR BLD: 64 % (ref 43–74)
PLATELET # BLD: 280 10E3/UL (ref 165–366)
RBC # BLD: 3.1 10E6/UL (ref 3.7–5.2)
WBC # BLD: 5.2 10E3/UL (ref 3.9–11.2)

## 2017-07-21 ENCOUNTER — TELEPHONE (OUTPATIENT)
Dept: FAMILY MEDICINE | Age: 57
End: 2017-07-21

## 2017-07-21 ENCOUNTER — CASE MANAGEMENT (OUTPATIENT)
Dept: FAMILY MEDICINE | Age: 57
End: 2017-07-21

## 2017-07-21 DIAGNOSIS — D75.829 HIT (HEPARIN-INDUCED THROMBOCYTOPENIA) (CMD): ICD-10-CM

## 2017-07-21 DIAGNOSIS — Z95.3 S/P MITRAL VALVE REPLACEMENT WITH BIOPROSTHETIC VALVE: ICD-10-CM

## 2017-07-21 LAB
INR PPP: 4.1
PROTHROMBIN TIME: 43.5 SEC (ref 9.5–11.8)

## 2017-07-21 RX ORDER — TRAMADOL HYDROCHLORIDE 50 MG/1
50 TABLET ORAL 2 TIMES DAILY PRN
Qty: 30 TABLET | Refills: 0 | Status: SHIPPED | OUTPATIENT
Start: 2017-07-21

## 2017-07-27 ENCOUNTER — OFFICE VISIT (OUTPATIENT)
Dept: CARDIOLOGY | Age: 57
End: 2017-07-27

## 2017-07-27 VITALS
HEART RATE: 62 BPM | BODY MASS INDEX: 35.36 KG/M2 | SYSTOLIC BLOOD PRESSURE: 128 MMHG | WEIGHT: 247 LBS | HEIGHT: 70 IN | DIASTOLIC BLOOD PRESSURE: 86 MMHG

## 2017-07-27 DIAGNOSIS — R06.02 SOB (SHORTNESS OF BREATH): Primary | ICD-10-CM

## 2017-07-27 PROCEDURE — 99215 OFFICE O/P EST HI 40 MIN: CPT | Performed by: INTERNAL MEDICINE

## 2017-07-28 ENCOUNTER — TELEPHONE (OUTPATIENT)
Dept: CARDIOLOGY | Age: 57
End: 2017-07-28

## 2017-08-01 ENCOUNTER — HOSPITAL ENCOUNTER (OUTPATIENT)
Dept: LAB | Age: 57
Discharge: HOME | End: 2017-08-01
Attending: NURSE PRACTITIONER

## 2017-08-01 ENCOUNTER — OFFICE VISIT (OUTPATIENT)
Dept: TRANSPLANT | Age: 57
End: 2017-08-01
Attending: NURSE PRACTITIONER

## 2017-08-01 ENCOUNTER — OFFICE VISIT (OUTPATIENT)
Dept: TRANSPLANT | Age: 57
End: 2017-08-01
Attending: INTERNAL MEDICINE

## 2017-08-01 VITALS
DIASTOLIC BLOOD PRESSURE: 73 MMHG | HEIGHT: 70 IN | WEIGHT: 242.51 LBS | HEART RATE: 64 BPM | OXYGEN SATURATION: 96 % | SYSTOLIC BLOOD PRESSURE: 137 MMHG | BODY MASS INDEX: 34.72 KG/M2

## 2017-08-01 VITALS
WEIGHT: 242 LBS | BODY MASS INDEX: 34.72 KG/M2 | DIASTOLIC BLOOD PRESSURE: 73 MMHG | RESPIRATION RATE: 16 BRPM | HEART RATE: 64 BPM | SYSTOLIC BLOOD PRESSURE: 137 MMHG

## 2017-08-01 DIAGNOSIS — R06.02 SOB (SHORTNESS OF BREATH): ICD-10-CM

## 2017-08-01 DIAGNOSIS — D75.829 HIT (HEPARIN-INDUCED THROMBOCYTOPENIA) (CMD): ICD-10-CM

## 2017-08-01 DIAGNOSIS — E55.9 VITAMIN D DEFICIENCY: ICD-10-CM

## 2017-08-01 DIAGNOSIS — N18.30 CHRONIC KIDNEY DISEASE, STAGE III (MODERATE) (CMD): Primary | ICD-10-CM

## 2017-08-01 DIAGNOSIS — I10 ESSENTIAL HYPERTENSION: ICD-10-CM

## 2017-08-01 DIAGNOSIS — N17.9 AKI (ACUTE KIDNEY INJURY) (CMD): ICD-10-CM

## 2017-08-01 DIAGNOSIS — E87.79 OTHER HYPERVOLEMIA: ICD-10-CM

## 2017-08-01 DIAGNOSIS — I27.20 PULMONARY HYPERTENSION (CMD): ICD-10-CM

## 2017-08-01 DIAGNOSIS — Z98.890 S/P TRICUSPID VALVE REPAIR: ICD-10-CM

## 2017-08-01 DIAGNOSIS — I50.22 CHRONIC SYSTOLIC (CONGESTIVE) HEART FAILURE (CMD): ICD-10-CM

## 2017-08-01 DIAGNOSIS — N18.30 STAGE 3 CHRONIC KIDNEY DISEASE (CMD): ICD-10-CM

## 2017-08-01 DIAGNOSIS — Z87.74 S/P PATENT FORAMEN OVALE CLOSURE: ICD-10-CM

## 2017-08-01 DIAGNOSIS — Z95.1 S/P CABG X 4: ICD-10-CM

## 2017-08-01 DIAGNOSIS — Z79.4 TYPE 2 DIABETES MELLITUS WITHOUT COMPLICATION, WITH LONG-TERM CURRENT USE OF INSULIN (CMD): ICD-10-CM

## 2017-08-01 DIAGNOSIS — I25.5 ISCHEMIC CARDIOMYOPATHY: Primary | ICD-10-CM

## 2017-08-01 DIAGNOSIS — N18.30 CHRONIC KIDNEY DISEASE, STAGE III (MODERATE) (CMD): ICD-10-CM

## 2017-08-01 DIAGNOSIS — D50.9 IRON DEFICIENCY ANEMIA, UNSPECIFIED IRON DEFICIENCY ANEMIA TYPE: ICD-10-CM

## 2017-08-01 DIAGNOSIS — E11.9 TYPE 2 DIABETES MELLITUS WITHOUT COMPLICATION, WITH LONG-TERM CURRENT USE OF INSULIN (CMD): ICD-10-CM

## 2017-08-01 DIAGNOSIS — Z95.3 S/P MITRAL VALVE REPLACEMENT WITH BIOPROSTHETIC VALVE: ICD-10-CM

## 2017-08-01 DIAGNOSIS — I25.10 CORONARY ARTERY DISEASE INVOLVING NATIVE CORONARY ARTERY OF NATIVE HEART WITHOUT ANGINA PECTORIS: ICD-10-CM

## 2017-08-01 LAB
ANION GAP SERPL CALC-SCNC: 14 MMOL/L (ref 10–20)
BNP SERPL-MCNC: 377 PG/ML
BUN SERPL-MCNC: 43 MG/DL (ref 6–20)
BUN/CREAT SERPL: 20 (ref 7–25)
CALCIUM SERPL-MCNC: 9 MG/DL (ref 8.4–10.2)
CHLORIDE SERPL-SCNC: 107 MMOL/L (ref 98–107)
CO2 SERPL-SCNC: 27 MMOL/L (ref 21–32)
CREAT SERPL-MCNC: 2.19 MG/DL (ref 0.51–0.95)
GLUCOSE SERPL-MCNC: 171 MG/DL (ref 65–99)
POTASSIUM SERPL-SCNC: 4.2 MMOL/L (ref 3.4–5.1)
SODIUM SERPL-SCNC: 144 MMOL/L (ref 135–145)

## 2017-08-01 PROCEDURE — 10004187 HB COUNTER-VISIT CENSUS, OUTPATIENT

## 2017-08-01 PROCEDURE — 99212 OFFICE O/P EST SF 10 MIN: CPT

## 2017-08-01 PROCEDURE — 83880 ASSAY OF NATRIURETIC PEPTIDE: CPT

## 2017-08-01 PROCEDURE — 80048 BASIC METABOLIC PNL TOTAL CA: CPT

## 2017-08-01 PROCEDURE — 36415 COLL VENOUS BLD VENIPUNCTURE: CPT

## 2017-08-01 PROCEDURE — 99214 OFFICE O/P EST MOD 30 MIN: CPT | Performed by: NURSE PRACTITIONER

## 2017-08-01 RX ORDER — TORSEMIDE 10 MG/1
10 TABLET ORAL DAILY
Qty: 90 TABLET | Refills: 3 | Status: SHIPPED | OUTPATIENT
Start: 2017-08-01 | End: 2017-11-08 | Stop reason: DRUGHIGH

## 2017-08-01 RX ORDER — WARFARIN SODIUM 1 MG/1
1 TABLET ORAL DAILY
Qty: 30 TABLET | Refills: 0 | Status: SHIPPED | COMMUNITY
Start: 2017-08-01

## 2017-08-01 RX ORDER — HYDRALAZINE HYDROCHLORIDE 25 MG/1
25 TABLET, FILM COATED ORAL 3 TIMES DAILY
Qty: 270 TABLET | Refills: 0 | Status: SHIPPED | OUTPATIENT
Start: 2017-08-01 | End: 2017-10-21 | Stop reason: SDUPTHER

## 2017-08-03 ASSESSMENT — NEW YORK HEART ASSOCIATION (NYHA) CLASSIFICATION: NYHA FUNCTIONAL CLASS: III

## 2017-08-09 ENCOUNTER — CLINICAL ABSTRACT (OUTPATIENT)
Dept: TRANSPLANT | Age: 57
End: 2017-08-09

## 2017-08-09 ENCOUNTER — TELEPHONE (OUTPATIENT)
Dept: TRANSPLANT | Age: 57
End: 2017-08-09

## 2017-08-09 ENCOUNTER — OFFICE VISIT (OUTPATIENT)
Dept: ELECTROPHYSIOLOGY | Age: 57
End: 2017-08-09

## 2017-08-09 VITALS
SYSTOLIC BLOOD PRESSURE: 122 MMHG | WEIGHT: 242 LBS | BODY MASS INDEX: 34.65 KG/M2 | DIASTOLIC BLOOD PRESSURE: 72 MMHG | HEIGHT: 70 IN | HEART RATE: 62 BPM

## 2017-08-09 DIAGNOSIS — I25.5 ISCHEMIC CARDIOMYOPATHY: ICD-10-CM

## 2017-08-09 DIAGNOSIS — I25.10 CORONARY ARTERY DISEASE INVOLVING NATIVE CORONARY ARTERY OF NATIVE HEART WITHOUT ANGINA PECTORIS: Primary | ICD-10-CM

## 2017-08-09 LAB
ANION GAP: 9
BUN SERPL-MCNC: 36 MG/DL
BUN/CREATININE RATIO: NORMAL
CALCIUM SERPL-MCNC: 9.1 MG/DL
CHLORIDE SERPL-SCNC: 108 MMOL/L
CO2: 24
CREAT SERPL-MCNC: 2.03 MG/DL
FASTING STATUS: NORMAL
GLUCOSE SERPL-MCNC: 164 MG/DL
POTASSIUM SERPL-SCNC: 4.1 MMOL/L
SODIUM SERPL-SCNC: 141 MMOL/L

## 2017-08-09 PROCEDURE — 93000 ELECTROCARDIOGRAM COMPLETE: CPT | Performed by: INTERNAL MEDICINE

## 2017-08-09 PROCEDURE — 99213 OFFICE O/P EST LOW 20 MIN: CPT | Performed by: INTERNAL MEDICINE

## 2017-08-09 ASSESSMENT — ENCOUNTER SYMPTOMS
DIZZINESS: 0
ACTIVITY CHANGE: 0
LIGHT-HEADEDNESS: 1
SHORTNESS OF BREATH: 0

## 2017-08-11 ENCOUNTER — TELEPHONE (OUTPATIENT)
Dept: CARDIOLOGY | Age: 57
End: 2017-08-11

## 2017-08-21 RX ORDER — INSULIN LISPRO 100 [IU]/ML
INJECTION, SOLUTION INTRAVENOUS; SUBCUTANEOUS
Refills: 0 | OUTPATIENT
Start: 2017-08-21

## 2017-08-23 ENCOUNTER — HOSPITAL ENCOUNTER (OUTPATIENT)
Dept: GENERAL RADIOLOGY | Age: 57
Discharge: HOME | End: 2017-08-23
Payer: COMMERCIAL

## 2017-08-23 DIAGNOSIS — M05.79 RHEUMATOID ARTHRITIS OF MULTIPLE SITES WITHOUT ORGAN OR SYSTEM INVOLVEMENT WITH POSITIVE RHEUMATOID FACTOR (HCC): ICD-10-CM

## 2017-08-23 PROCEDURE — 73630 X-RAY EXAM OF FOOT: CPT

## 2017-09-05 ENCOUNTER — TELEPHONE (OUTPATIENT)
Dept: TRANSPLANT | Age: 57
End: 2017-09-05

## 2017-09-05 LAB
ANION GAP: 13
BUN SERPL-MCNC: 39 MG/DL
BUN/CREATININE RATIO: NORMAL
CALCIUM SERPL-MCNC: 9 MG/DL
CHLORIDE SERPL-SCNC: 107 MMOL/L
CO2: 21
CREAT SERPL-MCNC: 1.9 MG/DL
FASTING STATUS: NORMAL
GLUCOSE SERPL-MCNC: 227 MG/DL
POTASSIUM SERPL-SCNC: 4.2 MMOL/L
SODIUM SERPL-SCNC: 141 MMOL/L

## 2017-09-11 RX ORDER — INSULIN LISPRO 100 [IU]/ML
INJECTION, SOLUTION INTRAVENOUS; SUBCUTANEOUS
Qty: 10 ML | Refills: 2 | Status: SHIPPED | OUTPATIENT
Start: 2017-09-11

## 2017-09-11 RX ORDER — INSULIN GLARGINE 100 [IU]/ML
INJECTION, SOLUTION SUBCUTANEOUS
Qty: 10 ML | Refills: 2 | Status: SHIPPED | OUTPATIENT
Start: 2017-09-11 | End: 2017-11-08 | Stop reason: SDUPTHER

## 2017-10-23 RX ORDER — HYDRALAZINE HYDROCHLORIDE 25 MG/1
25 TABLET, FILM COATED ORAL 3 TIMES DAILY
Qty: 270 TABLET | Refills: 3 | Status: SHIPPED | OUTPATIENT
Start: 2017-10-23 | End: 2018-03-02 | Stop reason: DRUGHIGH

## 2017-11-08 ENCOUNTER — HOSPITAL ENCOUNTER (OUTPATIENT)
Dept: LAB | Age: 57
Discharge: HOME | End: 2017-11-08
Attending: INTERNAL MEDICINE

## 2017-11-08 ENCOUNTER — OFFICE VISIT (OUTPATIENT)
Dept: TRANSPLANT | Age: 57
End: 2017-11-08
Attending: INTERNAL MEDICINE

## 2017-11-08 VITALS
DIASTOLIC BLOOD PRESSURE: 78 MMHG | HEART RATE: 60 BPM | SYSTOLIC BLOOD PRESSURE: 149 MMHG | HEIGHT: 70 IN | WEIGHT: 251.2 LBS | OXYGEN SATURATION: 96 % | BODY MASS INDEX: 35.96 KG/M2

## 2017-11-08 VITALS
OXYGEN SATURATION: 96 % | DIASTOLIC BLOOD PRESSURE: 78 MMHG | HEIGHT: 70 IN | SYSTOLIC BLOOD PRESSURE: 149 MMHG | BODY MASS INDEX: 35.96 KG/M2 | HEART RATE: 60 BPM | WEIGHT: 251.2 LBS

## 2017-11-08 DIAGNOSIS — N18.30 CHRONIC KIDNEY DISEASE, STAGE III (MODERATE) (CMD): ICD-10-CM

## 2017-11-08 DIAGNOSIS — Z95.1 S/P CABG X 4: ICD-10-CM

## 2017-11-08 DIAGNOSIS — Z95.3 S/P MITRAL VALVE REPLACEMENT WITH BIOPROSTHETIC VALVE: ICD-10-CM

## 2017-11-08 DIAGNOSIS — Z87.74 S/P PATENT FORAMEN OVALE CLOSURE: ICD-10-CM

## 2017-11-08 DIAGNOSIS — Z98.890 S/P TRICUSPID VALVE REPAIR: ICD-10-CM

## 2017-11-08 DIAGNOSIS — I25.5 ISCHEMIC CARDIOMYOPATHY: Primary | ICD-10-CM

## 2017-11-08 DIAGNOSIS — I50.22 CHRONIC SYSTOLIC CHF (CONGESTIVE HEART FAILURE) (CMD): ICD-10-CM

## 2017-11-08 DIAGNOSIS — E87.79 OTHER HYPERVOLEMIA: ICD-10-CM

## 2017-11-08 DIAGNOSIS — N18.30 CHRONIC KIDNEY DISEASE, STAGE III (MODERATE) (CMD): Primary | ICD-10-CM

## 2017-11-08 LAB
ANION GAP SERPL CALC-SCNC: 14 MMOL/L (ref 10–20)
APPEARANCE UR: CLEAR
BACTERIA #/AREA URNS HPF: ABNORMAL /HPF
BILIRUB UR QL STRIP: NEGATIVE
BUN SERPL-MCNC: 38 MG/DL (ref 6–20)
BUN/CREAT SERPL: 18 (ref 7–25)
CALCIUM SERPL-MCNC: 9 MG/DL (ref 8.4–10.2)
CHLORIDE SERPL-SCNC: 106 MMOL/L (ref 98–107)
CO2 SERPL-SCNC: 24 MMOL/L (ref 21–32)
COLOR UR: YELLOW
CREAT ?TM UR-MCNC: 26.15 MG/DL
CREAT SERPL-MCNC: 2.11 MG/DL (ref 0.51–0.95)
GLUCOSE SERPL-MCNC: 138 MG/DL (ref 65–99)
GLUCOSE UR STRIP-MCNC: NEGATIVE MG/DL
HGB UR QL STRIP: NEGATIVE
HYALINE CASTS #/AREA URNS LPF: ABNORMAL /LPF (ref 0–5)
KETONES UR STRIP-MCNC: NEGATIVE MG/DL
LEUKOCYTE ESTERASE UR QL STRIP: ABNORMAL
NITRITE UR QL STRIP: NEGATIVE
PH UR STRIP: 5.5 UNITS (ref 5–7)
PHOSPHATE SERPL-MCNC: 3.8 MG/DL (ref 2.4–4.7)
POTASSIUM SERPL-SCNC: 4.3 MMOL/L (ref 3.4–5.1)
PROT UR STRIP-MCNC: NEGATIVE MG/DL
PROT UR-MCNC: <6 MG/DL
PROT/CREAT UR: NORMAL MGPR/GCR
PTH-INTACT SERPL-MCNC: 124 PG/ML (ref 14–72)
RBC #/AREA URNS HPF: ABNORMAL /HPF (ref 0–3)
SODIUM SERPL-SCNC: 140 MMOL/L (ref 135–145)
SP GR UR STRIP: 1.01 (ref 1–1.03)
SPECIMEN SOURCE: ABNORMAL
SQUAMOUS #/AREA URNS HPF: ABNORMAL /HPF (ref 0–5)
UROBILINOGEN UR STRIP-MCNC: 0.2 MG/DL (ref 0–1)
WBC #/AREA URNS HPF: ABNORMAL /HPF (ref 0–5)

## 2017-11-08 PROCEDURE — 87086 URINE CULTURE/COLONY COUNT: CPT

## 2017-11-08 PROCEDURE — 80048 BASIC METABOLIC PNL TOTAL CA: CPT

## 2017-11-08 PROCEDURE — 99212 OFFICE O/P EST SF 10 MIN: CPT

## 2017-11-08 PROCEDURE — 81001 URINALYSIS AUTO W/SCOPE: CPT

## 2017-11-08 PROCEDURE — 83970 ASSAY OF PARATHORMONE: CPT

## 2017-11-08 PROCEDURE — 99214 OFFICE O/P EST MOD 30 MIN: CPT | Performed by: NURSE PRACTITIONER

## 2017-11-08 PROCEDURE — 84156 ASSAY OF PROTEIN URINE: CPT

## 2017-11-08 PROCEDURE — 84100 ASSAY OF PHOSPHORUS: CPT

## 2017-11-08 PROCEDURE — 82570 ASSAY OF URINE CREATININE: CPT

## 2017-11-08 PROCEDURE — 36415 COLL VENOUS BLD VENIPUNCTURE: CPT

## 2017-11-08 PROCEDURE — 10004187 HB COUNTER-VISIT CENSUS, OUTPATIENT

## 2017-11-08 RX ORDER — POTASSIUM CHLORIDE 10 MEQ
20 TABLET, EXT RELEASE, PARTICLES/CRYSTALS ORAL DAILY
Qty: 60 TABLET | Refills: 11 | Status: SHIPPED | OUTPATIENT
Start: 2017-11-08

## 2017-11-08 RX ORDER — TORSEMIDE 10 MG/1
20 TABLET ORAL DAILY
COMMUNITY
End: 2017-11-08 | Stop reason: SDUPTHER

## 2017-11-08 RX ORDER — TORSEMIDE 10 MG/1
20 TABLET ORAL DAILY
Qty: 60 TABLET | Refills: 11 | Status: SHIPPED | OUTPATIENT
Start: 2017-11-08

## 2017-11-08 RX ORDER — POTASSIUM CHLORIDE 10 MEQ
20 TABLET, EXT RELEASE, PARTICLES/CRYSTALS ORAL DAILY
COMMUNITY
End: 2017-11-08 | Stop reason: SDUPTHER

## 2017-11-08 ASSESSMENT — NEW YORK HEART ASSOCIATION (NYHA) CLASSIFICATION: NYHA FUNCTIONAL CLASS: III

## 2017-11-09 LAB
BACTERIA UR CULT: NORMAL
CREAT UR-MCNC: 25.6 MG/DL
MICROALBUMIN UR-MCNC: 1.24 MG/DL
MICROALBUMIN/CREAT UR: 48.4 MCG/MG
REPORT STATUS (RPT): NORMAL
SPECIMEN SOURCE: NORMAL

## 2017-11-10 LAB
ANION GAP SERPL CALC-SCNC: 10 MMOL/L
BUN SERPL-MCNC: 47 MG/DL
BUN/CREAT SERPL: NORMAL
CALCIUM SERPL-MCNC: 9.1 MG/DL
CHLORIDE SERPL-SCNC: 105 MMOL/L
CO2 SERPL-SCNC: 26 MMOL/L
CREAT SERPL-MCNC: 2.4 MG/DL
GLUCOSE SERPL-MCNC: 234 MG/DL
LENGTH OF FAST TIME PATIENT: NORMAL H
POTASSIUM SERPL-SCNC: 4.2 MMOL/L
SODIUM SERPL-SCNC: 141 MMOL/L

## 2017-11-15 ENCOUNTER — CLINICAL ABSTRACT (OUTPATIENT)
Dept: TRANSPLANT | Age: 57
End: 2017-11-15

## 2018-01-15 ENCOUNTER — CLINICAL ABSTRACT (OUTPATIENT)
Dept: TRANSPLANT | Age: 58
End: 2018-01-15

## 2018-01-31 ENCOUNTER — OFFICE VISIT (OUTPATIENT)
Dept: GASTROENTEROLOGY | Age: 58
End: 2018-01-31
Payer: COMMERCIAL

## 2018-01-31 VITALS
SYSTOLIC BLOOD PRESSURE: 120 MMHG | OXYGEN SATURATION: 97 % | DIASTOLIC BLOOD PRESSURE: 80 MMHG | BODY MASS INDEX: 39.46 KG/M2 | HEIGHT: 67 IN | HEART RATE: 54 BPM | WEIGHT: 251.4 LBS

## 2018-01-31 DIAGNOSIS — K52.9 NONSPECIFIC COLITIS: Primary | ICD-10-CM

## 2018-01-31 DIAGNOSIS — Z12.11 COLON CANCER SCREENING: ICD-10-CM

## 2018-01-31 PROCEDURE — 99203 OFFICE O/P NEW LOW 30 MIN: CPT | Performed by: NURSE PRACTITIONER

## 2018-01-31 RX ORDER — AMITRIPTYLINE HYDROCHLORIDE 10 MG/1
TABLET, FILM COATED ORAL
Refills: 0 | COMMUNITY
Start: 2018-01-24 | End: 2019-08-19

## 2018-01-31 RX ORDER — FERROUS SULFATE TAB EC 324 MG (65 MG FE EQUIVALENT) 324 (65 FE) MG
TABLET DELAYED RESPONSE ORAL
Refills: 3 | COMMUNITY
Start: 2018-01-24 | End: 2019-08-19

## 2018-01-31 RX ORDER — PANTOPRAZOLE SODIUM 40 MG/1
TABLET, DELAYED RELEASE ORAL
Refills: 0 | COMMUNITY
Start: 2018-01-24 | End: 2019-08-19

## 2018-01-31 ASSESSMENT — ENCOUNTER SYMPTOMS
BACK PAIN: 0
BLOOD IN STOOL: 0
SORE THROAT: 0
DIARRHEA: 0
VOMITING: 0
COUGH: 0
VOICE CHANGE: 0
SHORTNESS OF BREATH: 0
ABDOMINAL DISTENTION: 0
CHEST TIGHTNESS: 0
NAUSEA: 0
ABDOMINAL PAIN: 0
RECTAL PAIN: 0
CONSTIPATION: 0

## 2018-01-31 NOTE — PATIENT INSTRUCTIONS
Schedule colonoscopy. No aspirin, ibuprofen, naproxen, fish oil or vitamin E for 5 days before procedure. Do not eat or drink after midnight the day of the procedure. Allowed medications can be taken with a small sip of water. Please review your prep instructions for allowed medications. You will not be able to drive for 24 hours after the procedure due to sedation. Bring a  with you the day of the procedure. If you are on blood thinners, clearance from the prescribing physician will be obtained before your procedure is scheduled. If it is determined it is not safe to hold these medications for a short time an alternative procedure for evaluation may be recommended. Risks of colonoscopy include, but are not limited to, perforation, bleeding, and infection, Risk of perforation and bleeding are increased if there is a polyp removed. Anesthesia risks will be reviewed with you before the procedure by a member of the anesthesia department. Your physician may also schedule a follow up appointment with the nurse practitioner to discuss pathology, symptoms or to check if you have had any problems related to your procedure. If you prefer not to return to the office after your procedure please discuss this with your physician on the day of your colonoscopy. The physician will talk with you and/or your family after the procedure is completed. Final recommendations are based on the pathologist report if biopsies or specimens are taken. For Colonoscopy: You will be given specific directions regarding restrictions to diet and bowel prep instructions including laxatives. Please read these instructions one week prior to your scheduled procedure to ensure that you are prepared. If you have any questions regarding these instructions please call our office Mon through Fri from 8:00 am to 4:00 pm.     Follow prep instructions provided for bowel prep. Take all of the bowel prep as directed.  If Please follow all instructions as provided for cleansing the bowel. Failure to have an adequately prepped colon may cause you to have incomplete exam with further testing required.      [email or web address]

## 2018-01-31 NOTE — PROGRESS NOTES
Subjective:      Patient ID: Yumi Bartlett is a 62 y.o. female. PCP: Zhang Hassan     HPI  Chief Complaint   Patient presents with   [facility] Colonoscopy     referred by Dr. Dipika Villareal Other     history pancolitis       Patient referred for screening colonoscopy. Last c-scope recorded 2010 noting left sided colitis suspicious for ulcerative colitis. She had colonoscopy in 2009 noting pancolitis. She has had Promethius testing in past return as \"consistent with ulcerative colitis\". She also has RA and is on daily prednisone 5 mg and now receiving monthly injections of Orencia. She states in August she had a bad bout of diarrhea r/t antibiotics at that time but this has since resolved. She denies rectal bleeding. No abdominal pain. No ugi complaints. She feels certain that she had had colonoscopy in approx 2015 however we reviewed all records available and none were found to indicate colonoscopy was completed at that time. Family History   Problem Relation Age of Onset    Prostate Cancer Father     Heart Failure Father     Cancer Father     Colon Cancer Neg Hx     Colon Polyps Neg Hx     Liver Cancer Neg Hx     Liver Disease Neg Hx     Esophageal Cancer Neg Hx     Rectal Cancer Neg Hx     Stomach Cancer Neg Hx        Past Medical History:   Diagnosis Date    Anxiety     ASCVD (arteriosclerotic cardiovascular disease)     Carrier of group B Streptococcus     Cellulitis     Colitis     COPD (chronic obstructive pulmonary disease) (HCC)     Costochondritis     [facility]A [facility]cere[facility] vascular accident) [facility]     Depression     Edema     Fracture of sternum     non union post surgery.      Gallstones     Grief reaction     H/O superior vena cava filter placement     Hyperlipidemia     Hypertension     MI (myocardial infarction)     MRSA (methicillin resistant Staphylococcus aureus)     Obesity     Pseudarthrosis sternal    RA (rheumatoid arthritis) (HCC)     Rosacea     Sinus bradycardia     TIA (transient ischemic attack)     Venous thromboembolism        Past Surgical History:   Procedure Laterality Date    ADENOIDECTOMY      APPENDECTOMY      CARDIAC CATHETERIZATION  3/2009    CARDIAC CATHETERIZATION  11/5/14  JDT    EF 50%, patent bypass grafts    CHOLECYSTECTOMY  2011    COLONOSCOPY  06/03/2010    Dr. Judah Tan: distal colon having ulcerative lesions c/w UC    COLONOSCOPY  2009    pancolitis    COLONOSCOPY      CORONARY ARTERY BYPASS GRAFT  3/2009    Dr Sheeba Menjivar, LIMA to LAD, SVGs to OM & PDA    GASTRIC BYPASS SURGERY  2012    HIATAL HERNIA REPAIR  2011    HYSTERECTOMY      KNEE SURGERY      THORACOTOMY      TONSILLECTOMY      UPPER GASTROINTESTINAL ENDOSCOPY  2010    Dr. Anna Marie [place] ENDOSCOPY  2012       Current Outpatient Prescriptions   Medication Sig Dispense Refill    pantoprazole (PROTONIX) 40 MG tablet TAKE 1 TABLET EVERY DAY  0    abatacept (ORENCIA) 250 MG injection Infuse intravenously once      predniSONE (DELTASONE) 5 MG tablet Take 5 mg by mouth daily      Multiple Vitamins-Minerals (MULTIVITAMIN PO) Take  by mouth daily.  Cholecalciferol (VITAMIN D3) 5000 UNITS TABS Take  by mouth daily.  vitamin B-1 (THIAMINE) 100 MG tablet Take 100 mg by mouth daily.  vitamin B-12 (CYANOCOBALAMIN) 500 MCG tablet Take 500 mcg by mouth daily.  leflunomide (ARAVA) 20 MG tablet Take 20 mg by mouth Monday thru Friday      hydroxychloroquine (PLAQUENIL) 200 MG tablet Take 200 mg by mouth 2 times daily.  metoprolol (TOPROL-XL) 25 MG XL tablet Take 12.5 mg by mouth 2 times daily       furosemide (LASIX) 20 MG tablet Take 20 mg by mouth daily.  folic acid (FOLVITE) 1 MG tablet Take 1 mg by mouth daily.         ferrous sulfate 324 (65 Fe) MG EC tablet TAKE 1 TABLET BY MOUTH TWICE A DAY  3    amitriptyline (ELAVIL) 10 MG tablet TAKE 1 TABLET BY MOUTH EVERY DAY  0    nystatin-triamcinolone (Vianne Beagle II) 546386-4.0 UNIT/GM-% cream APPLY TO THE AFFECTED AREA TWICE DAILY FOR 10 DAYS  1     No current facility-administered medications for this visit. Allergies   Allergen Reactions    Methotrexate Derivatives         reports that she quit smoking about 8 years ago. Her smoking use included Cigarettes. She has a 64.00 pack-year smoking history. She has never used smokeless tobacco. She reports that she drinks alcohol. She reports that she does not use drugs. Review of Systems   Constitutional: Negative for appetite change, fever and unexpected weight change. HENT: Negative for sore throat and voice change. Respiratory: Negative for cough, chest tightness and shortness of breath. Cardiovascular: Negative for chest pain, palpitations and leg swelling. Gastrointestinal: Negative for abdominal distention, abdominal pain, blood in stool, constipation, diarrhea, nausea, rectal pain and vomiting. Musculoskeletal: Positive for arthralgias. Negative for back pain and gait problem. Skin: Negative for pallor, rash and wound. Neurological: Negative for dizziness, weakness and light-headedness. Hematological: Negative for adenopathy. Does not bruise/bleed easily. All other systems reviewed and are negative. Objective:   Physical Exam   Constitutional: She is oriented to person, place, and time. She appears well-developed and well-nourished. No distress. /80   Pulse 54   Ht 5' 7\" (1.702 m)   Wt 251 lb 6.4 oz (114 kg)   SpO2 97%   BMI 39.37 kg/m²      Eyes: Conjunctivae are normal. No scleral icterus. Neck: No tracheal deviation present. Cardiovascular: Normal rate and regular rhythm. Exam reveals no gallop and no friction rub. No murmur heard. Pulmonary/Chest: Effort normal and breath sounds normal. No respiratory distress. She has no wheezes. She has no rhonchi. She has no rales. Abdominal: Soft. Normal appearance and bowel sounds are normal. She exhibits no distension and no mass.

## 2018-03-01 ENCOUNTER — HOSPITAL ENCOUNTER (OUTPATIENT)
Dept: GENERAL RADIOLOGY | Facility: HOSPITAL | Age: 58
Discharge: HOME | End: 2018-03-01
Attending: EMERGENCY MEDICINE | Admitting: EMERGENCY MEDICINE

## 2018-03-01 ENCOUNTER — TRANSCRIBE ORDERS (OUTPATIENT)
Dept: ADMINISTRATIVE | Facility: HOSPITAL | Age: 58
End: 2018-03-01

## 2018-03-01 ENCOUNTER — HOSPITAL ENCOUNTER (OUTPATIENT)
Dept: GENERAL RADIOLOGY | Facility: HOSPITAL | Age: 58
Discharge: HOME | End: 2018-03-01
Attending: EMERGENCY MEDICINE

## 2018-03-01 DIAGNOSIS — Z12.39 SCREENING FOR BREAST CANCER: Primary | ICD-10-CM

## 2018-03-01 DIAGNOSIS — M54.30 SCIATIC LEG PAIN: ICD-10-CM

## 2018-03-01 PROCEDURE — 73521 X-RAY EXAM HIPS BI 2 VIEWS: CPT

## 2018-03-01 PROCEDURE — 72110 X-RAY EXAM L-2 SPINE 4/>VWS: CPT

## 2018-03-02 ENCOUNTER — OFFICE VISIT (OUTPATIENT)
Dept: TRANSPLANT | Age: 58
End: 2018-03-02
Attending: INTERNAL MEDICINE

## 2018-03-02 ENCOUNTER — OFFICE VISIT (OUTPATIENT)
Dept: TRANSPLANT | Age: 58
End: 2018-03-02
Attending: NURSE PRACTITIONER

## 2018-03-02 ENCOUNTER — HOSPITAL ENCOUNTER (OUTPATIENT)
Dept: LAB | Age: 58
Discharge: HOME | End: 2018-03-02
Attending: NURSE PRACTITIONER

## 2018-03-02 VITALS
BODY MASS INDEX: 36.59 KG/M2 | RESPIRATION RATE: 18 BRPM | OXYGEN SATURATION: 96 % | WEIGHT: 255 LBS | HEART RATE: 63 BPM | DIASTOLIC BLOOD PRESSURE: 87 MMHG | SYSTOLIC BLOOD PRESSURE: 139 MMHG

## 2018-03-02 VITALS
WEIGHT: 255 LBS | HEIGHT: 70 IN | DIASTOLIC BLOOD PRESSURE: 87 MMHG | TEMPERATURE: 98.9 F | HEART RATE: 63 BPM | RESPIRATION RATE: 18 BRPM | OXYGEN SATURATION: 96 % | SYSTOLIC BLOOD PRESSURE: 139 MMHG | BODY MASS INDEX: 36.51 KG/M2

## 2018-03-02 DIAGNOSIS — Z79.4 TYPE 2 DIABETES MELLITUS WITHOUT COMPLICATION, WITH LONG-TERM CURRENT USE OF INSULIN (CMD): ICD-10-CM

## 2018-03-02 DIAGNOSIS — N18.4 CHRONIC KIDNEY DISEASE, STAGE IV (SEVERE) (CMD): ICD-10-CM

## 2018-03-02 DIAGNOSIS — N18.30 CHRONIC KIDNEY DISEASE, STAGE III (MODERATE) (CMD): ICD-10-CM

## 2018-03-02 DIAGNOSIS — I50.22 CHRONIC SYSTOLIC (CONGESTIVE) HEART FAILURE (CMD): ICD-10-CM

## 2018-03-02 DIAGNOSIS — I10 HTN (HYPERTENSION), BENIGN: Primary | ICD-10-CM

## 2018-03-02 DIAGNOSIS — Z95.1 S/P CABG X 4: ICD-10-CM

## 2018-03-02 DIAGNOSIS — I25.5 ISCHEMIC CARDIOMYOPATHY: Primary | ICD-10-CM

## 2018-03-02 DIAGNOSIS — Z98.890 S/P TRICUSPID VALVE REPAIR: ICD-10-CM

## 2018-03-02 DIAGNOSIS — Z87.74 S/P PATENT FORAMEN OVALE CLOSURE: ICD-10-CM

## 2018-03-02 DIAGNOSIS — E11.9 TYPE 2 DIABETES MELLITUS WITHOUT COMPLICATION, WITH LONG-TERM CURRENT USE OF INSULIN (CMD): ICD-10-CM

## 2018-03-02 DIAGNOSIS — Z95.3 S/P MITRAL VALVE REPLACEMENT WITH BIOPROSTHETIC VALVE: ICD-10-CM

## 2018-03-02 DIAGNOSIS — N18.30 STAGE 3 CHRONIC KIDNEY DISEASE (CMD): ICD-10-CM

## 2018-03-02 LAB
ANION GAP SERPL CALC-SCNC: 14 MMOL/L (ref 10–20)
BASOPHILS # BLD AUTO: 0 K/MCL (ref 0–0.3)
BASOPHILS NFR BLD AUTO: 1 %
BUN SERPL-MCNC: 54 MG/DL (ref 6–20)
BUN/CREAT SERPL: 24 (ref 7–25)
CALCIUM SERPL-MCNC: 8.9 MG/DL (ref 8.4–10.2)
CHLORIDE SERPL-SCNC: 109 MMOL/L (ref 98–107)
CO2 SERPL-SCNC: 25 MMOL/L (ref 21–32)
CREAT SERPL-MCNC: 2.23 MG/DL (ref 0.51–0.95)
DIFFERENTIAL METHOD BLD: ABNORMAL
EOSINOPHIL # BLD AUTO: 0.4 K/MCL (ref 0.1–0.5)
EOSINOPHIL NFR SPEC: 6 %
ERYTHROCYTE [DISTWIDTH] IN BLOOD: 15.3 % (ref 11–15)
GLUCOSE SERPL-MCNC: 125 MG/DL (ref 65–99)
HCT VFR BLD CALC: 32.7 % (ref 36–46.5)
HGB BLD-MCNC: 10.4 G/DL (ref 12–15.5)
LYMPHOCYTES # BLD MANUAL: 1 K/MCL (ref 1–4)
LYMPHOCYTES NFR BLD MANUAL: 16 %
MCH RBC QN AUTO: 29.7 PG (ref 26–34)
MCHC RBC AUTO-ENTMCNC: 31.8 G/DL (ref 32–36.5)
MCV RBC AUTO: 93.4 FL (ref 78–100)
MONOCYTES # BLD MANUAL: 0.3 K/MCL (ref 0.3–0.9)
MONOCYTES NFR BLD MANUAL: 4 %
NEUTROPHILS # BLD: 4.8 K/MCL (ref 1.8–7.7)
NEUTROPHILS NFR BLD AUTO: 73 %
PLATELET # BLD: 241 K/MCL (ref 140–450)
POTASSIUM SERPL-SCNC: 4.3 MMOL/L (ref 3.4–5.1)
RBC # BLD: 3.5 MIL/MCL (ref 4–5.2)
SODIUM SERPL-SCNC: 144 MMOL/L (ref 135–145)
WBC # BLD: 6.5 K/MCL (ref 4.2–11)

## 2018-03-02 PROCEDURE — 85025 COMPLETE CBC W/AUTO DIFF WBC: CPT

## 2018-03-02 PROCEDURE — 80048 BASIC METABOLIC PNL TOTAL CA: CPT

## 2018-03-02 PROCEDURE — 10004187 HB COUNTER-VISIT CENSUS, OUTPATIENT

## 2018-03-02 PROCEDURE — 99212 OFFICE O/P EST SF 10 MIN: CPT

## 2018-03-02 PROCEDURE — 36415 COLL VENOUS BLD VENIPUNCTURE: CPT

## 2018-03-02 PROCEDURE — 99214 OFFICE O/P EST MOD 30 MIN: CPT | Performed by: NURSE PRACTITIONER

## 2018-03-02 RX ORDER — HYDRALAZINE HYDROCHLORIDE 25 MG/1
50 TABLET, FILM COATED ORAL 3 TIMES DAILY
Qty: 540 TABLET | Refills: 1 | Status: SHIPPED | OUTPATIENT
Start: 2018-03-02 | End: 2018-09-24 | Stop reason: SDUPTHER

## 2018-03-02 RX ORDER — ISOSORBIDE MONONITRATE 60 MG/1
60 TABLET, EXTENDED RELEASE ORAL DAILY
COMMUNITY

## 2018-03-02 RX ORDER — HYDRALAZINE HYDROCHLORIDE 25 MG/1
50 TABLET, FILM COATED ORAL 3 TIMES DAILY
COMMUNITY
End: 2018-03-02 | Stop reason: SDUPTHER

## 2018-03-02 ASSESSMENT — NEW YORK HEART ASSOCIATION (NYHA) CLASSIFICATION: NYHA FUNCTIONAL CLASS: III

## 2018-03-05 ENCOUNTER — TRANSCRIBE ORDERS (OUTPATIENT)
Dept: ADMINISTRATIVE | Facility: HOSPITAL | Age: 58
End: 2018-03-05

## 2018-03-05 ENCOUNTER — TELEPHONE (OUTPATIENT)
Dept: TRANSPLANT | Age: 58
End: 2018-03-05

## 2018-03-05 DIAGNOSIS — R41.0 MENTAL CONFUSION: Primary | ICD-10-CM

## 2018-03-07 ENCOUNTER — APPOINTMENT (OUTPATIENT)
Dept: LAB | Facility: HOSPITAL | Age: 58
End: 2018-03-07
Attending: EMERGENCY MEDICINE

## 2018-03-07 ENCOUNTER — TRANSCRIBE ORDERS (OUTPATIENT)
Dept: ADMINISTRATIVE | Facility: HOSPITAL | Age: 58
End: 2018-03-07

## 2018-03-07 ENCOUNTER — HOSPITAL ENCOUNTER (OUTPATIENT)
Dept: MRI IMAGING | Facility: HOSPITAL | Age: 58
Discharge: HOME | End: 2018-03-07
Attending: EMERGENCY MEDICINE | Admitting: EMERGENCY MEDICINE

## 2018-03-07 DIAGNOSIS — E78.5 HYPERLIPIDEMIA, UNSPECIFIED HYPERLIPIDEMIA TYPE: Primary | ICD-10-CM

## 2018-03-07 DIAGNOSIS — R41.0 MENTAL CONFUSION: ICD-10-CM

## 2018-03-07 DIAGNOSIS — I10 ESSENTIAL (PRIMARY) HYPERTENSION: ICD-10-CM

## 2018-03-07 LAB
ALBUMIN SERPL-MCNC: 3.8 G/DL (ref 3.5–5)
ALBUMIN/GLOB SERPL: 1.3 G/DL (ref 1.1–2.5)
ALP SERPL-CCNC: 164 U/L (ref 24–120)
ALT SERPL W P-5'-P-CCNC: 73 U/L (ref 0–54)
ANION GAP SERPL CALCULATED.3IONS-SCNC: 11 MMOL/L (ref 4–13)
ARTICHOKE IGE QN: 113 MG/DL (ref 0–99)
AST SERPL-CCNC: 83 U/L (ref 7–45)
BASOPHILS # BLD AUTO: 0.08 10*3/MM3 (ref 0–0.2)
BASOPHILS NFR BLD AUTO: 1 % (ref 0–2)
BILIRUB SERPL-MCNC: 0.4 MG/DL (ref 0.1–1)
BUN BLD-MCNC: 8 MG/DL (ref 5–21)
BUN/CREAT SERPL: 11.6 (ref 7–25)
CALCIUM SPEC-SCNC: 9 MG/DL (ref 8.4–10.4)
CHLORIDE SERPL-SCNC: 103 MMOL/L (ref 98–110)
CHOLEST SERPL-MCNC: 229 MG/DL (ref 130–200)
CO2 SERPL-SCNC: 28 MMOL/L (ref 24–31)
CREAT BLD-MCNC: 0.69 MG/DL (ref 0.5–1.4)
DEPRECATED RDW RBC AUTO: 49.1 FL (ref 40–54)
EOSINOPHIL # BLD AUTO: 0.09 10*3/MM3 (ref 0–0.7)
EOSINOPHIL NFR BLD AUTO: 1.2 % (ref 0–4)
ERYTHROCYTE [DISTWIDTH] IN BLOOD BY AUTOMATED COUNT: 16.5 % (ref 12–15)
GFR SERPL CREATININE-BSD FRML MDRD: 88 ML/MIN/1.73
GLOBULIN UR ELPH-MCNC: 3 GM/DL
GLUCOSE BLD-MCNC: 94 MG/DL (ref 70–100)
HCT VFR BLD AUTO: 41.8 % (ref 37–47)
HDLC SERPL-MCNC: 98 MG/DL
HGB BLD-MCNC: 13.5 G/DL (ref 12–16)
IMM GRANULOCYTES # BLD: 0.03 10*3/MM3 (ref 0–0.03)
IMM GRANULOCYTES NFR BLD: 0.4 % (ref 0–5)
LDLC/HDLC SERPL: 1.12 {RATIO}
LYMPHOCYTES # BLD AUTO: 0.66 10*3/MM3 (ref 0.72–4.86)
LYMPHOCYTES NFR BLD AUTO: 8.5 % (ref 15–45)
MCH RBC QN AUTO: 26.5 PG (ref 28–32)
MCHC RBC AUTO-ENTMCNC: 32.3 G/DL (ref 33–36)
MCV RBC AUTO: 82.1 FL (ref 82–98)
MONOCYTES # BLD AUTO: 0.79 10*3/MM3 (ref 0.19–1.3)
MONOCYTES NFR BLD AUTO: 10.1 % (ref 4–12)
NEUTROPHILS # BLD AUTO: 6.14 10*3/MM3 (ref 1.87–8.4)
NEUTROPHILS NFR BLD AUTO: 78.8 % (ref 39–78)
NRBC BLD MANUAL-RTO: 0 /100 WBC (ref 0–0)
PLATELET # BLD AUTO: 274 10*3/MM3 (ref 130–400)
PMV BLD AUTO: 10.7 FL (ref 6–12)
POTASSIUM BLD-SCNC: 4.1 MMOL/L (ref 3.5–5.3)
PROT SERPL-MCNC: 6.8 G/DL (ref 6.3–8.7)
RBC # BLD AUTO: 5.09 10*6/MM3 (ref 4.2–5.4)
SODIUM BLD-SCNC: 142 MMOL/L (ref 135–145)
TRIGL SERPL-MCNC: 104 MG/DL (ref 0–149)
TSH SERPL DL<=0.05 MIU/L-ACNC: 1.08 MIU/ML (ref 0.47–4.68)
WBC NRBC COR # BLD: 7.79 10*3/MM3 (ref 4.8–10.8)

## 2018-03-07 PROCEDURE — 80061 LIPID PANEL: CPT | Performed by: EMERGENCY MEDICINE

## 2018-03-07 PROCEDURE — 70551 MRI BRAIN STEM W/O DYE: CPT

## 2018-03-07 PROCEDURE — 36415 COLL VENOUS BLD VENIPUNCTURE: CPT

## 2018-03-07 PROCEDURE — 80053 COMPREHEN METABOLIC PANEL: CPT | Performed by: EMERGENCY MEDICINE

## 2018-03-07 PROCEDURE — 84443 ASSAY THYROID STIM HORMONE: CPT | Performed by: EMERGENCY MEDICINE

## 2018-03-07 PROCEDURE — 85025 COMPLETE CBC W/AUTO DIFF WBC: CPT | Performed by: EMERGENCY MEDICINE

## 2018-03-12 ENCOUNTER — HOSPITAL ENCOUNTER (OUTPATIENT)
Age: 58
Setting detail: SPECIMEN
Discharge: HOME | End: 2018-03-12
Payer: COMMERCIAL

## 2018-03-12 ENCOUNTER — ANESTHESIA (OUTPATIENT)
Dept: OPERATING ROOM | Age: 58
End: 2018-03-12

## 2018-03-12 ENCOUNTER — ANESTHESIA EVENT (OUTPATIENT)
Dept: OPERATING ROOM | Age: 58
End: 2018-03-12

## 2018-03-12 ENCOUNTER — HOSPITAL ENCOUNTER (OUTPATIENT)
Age: 58
Setting detail: OUTPATIENT SURGERY
Discharge: HOME | End: 2018-03-12
Attending: INTERNAL MEDICINE | Admitting: INTERNAL MEDICINE
Payer: COMMERCIAL

## 2018-03-12 VITALS
RESPIRATION RATE: 18 BRPM | WEIGHT: 242 LBS | BODY MASS INDEX: 37.98 KG/M2 | DIASTOLIC BLOOD PRESSURE: 63 MMHG | HEART RATE: 57 BPM | HEIGHT: 67 IN | TEMPERATURE: 97.4 F | OXYGEN SATURATION: 96 % | SYSTOLIC BLOOD PRESSURE: 141 MMHG

## 2018-03-12 VITALS — OXYGEN SATURATION: 98 % | DIASTOLIC BLOOD PRESSURE: 74 MMHG | SYSTOLIC BLOOD PRESSURE: 137 MMHG

## 2018-03-12 PROCEDURE — 45380 COLONOSCOPY AND BIOPSY: CPT | Performed by: INTERNAL MEDICINE

## 2018-03-12 PROCEDURE — G8918 PT W/O PREOP ORDER IV AB PRO: HCPCS

## 2018-03-12 PROCEDURE — 88305 TISSUE EXAM BY PATHOLOGIST: CPT

## 2018-03-12 PROCEDURE — 45380 COLONOSCOPY AND BIOPSY: CPT

## 2018-03-12 PROCEDURE — G8907 PT DOC NO EVENTS ON DISCHARG: HCPCS

## 2018-03-12 RX ORDER — SODIUM CHLORIDE 9 MG/ML
INJECTION, SOLUTION INTRAVENOUS CONTINUOUS
Status: DISCONTINUED | OUTPATIENT
Start: 2018-03-12 | End: 2018-03-12 | Stop reason: HOSPADM

## 2018-03-12 RX ORDER — LIDOCAINE HYDROCHLORIDE 10 MG/ML
INJECTION, SOLUTION INFILTRATION; PERINEURAL PRN
Status: DISCONTINUED | OUTPATIENT
Start: 2018-03-12 | End: 2018-03-12 | Stop reason: SDUPTHER

## 2018-03-12 RX ORDER — PROPOFOL 10 MG/ML
INJECTION, EMULSION INTRAVENOUS PRN
Status: DISCONTINUED | OUTPATIENT
Start: 2018-03-12 | End: 2018-03-12 | Stop reason: SDUPTHER

## 2018-03-12 RX ADMIN — SODIUM CHLORIDE: 9 INJECTION, SOLUTION INTRAVENOUS at 10:53

## 2018-03-12 RX ADMIN — SODIUM CHLORIDE: 9 INJECTION, SOLUTION INTRAVENOUS at 10:18

## 2018-03-12 RX ADMIN — PROPOFOL 200 MG: 10 INJECTION, EMULSION INTRAVENOUS at 10:50

## 2018-03-12 RX ADMIN — LIDOCAINE HYDROCHLORIDE 40 MG: 10 INJECTION, SOLUTION INFILTRATION; PERINEURAL at 10:49

## 2018-03-12 ASSESSMENT — COPD QUESTIONNAIRES: CAT_SEVERITY: NO INTERVAL CHANGE

## 2018-03-12 NOTE — ANESTHESIA PRE PROCEDURE
Provider, MD       Current medications:    Current Facility-Administered Medications   Medication Dose Route Frequency Provider Last Rate Last Dose    0.9 % sodium chloride infusion   Intravenous Continuous Roslyn Corrales CRNA 100 mL/hr at 03/12/18 1018         Allergies: Allergies   Allergen Reactions    Methotrexate Derivatives        Problem List:    Patient Active Problem List   Diagnosis Code    Heartburn R12    Indigestion K30    Nausea R11.0    Morbid obesity ([address] E66.01    Anticoagulated on warfarin Z79.01    Ulcerative colitis ([address] K51.90    Hypertension I10    Hyperlipidemia E78.5    MI (myocardial infarction) I21.9    ASCVD (arteriosclerotic cardiovascular disease) I25.10    CAD (coronary artery disease) I25.10       Past Medical History:        Diagnosis Date    Anxiety     ASCVD (arteriosclerotic cardiovascular disease)     Carrier of group B Streptococcus     Cellulitis     Colitis     COPD (chronic obstructive pulmonary disease) (HCC)     Costochondritis     CVA (cerebral vascular accident) ([address]     Depression     Edema     Fracture of sternum     non union post surgery.      Gallstones     Grief reaction     H/O superior vena cava filter placement     Hyperlipidemia     Hypertension     MI (myocardial infarction)     MRSA (methicillin resistant Staphylococcus aureus)     Obesity     Pseudarthrosis sternal    RA (rheumatoid arthritis) ([address]     Rosacea     Sinus bradycardia     TIA (transient ischemic attack)     Venous thromboembolism        Past Surgical History:        Procedure Laterality Date    ADENOIDECTOMY      APPENDECTOMY      CARDIAC CATHETERIZATION  3/2009    CARDIAC CATHETERIZATION  11/5/14  JDT    EF 50%, patent bypass grafts    CHOLECYSTECTOMY  2011    COLONOSCOPY  06/03/2010    Dr. Huynh Last: distal colon having ulcerative lesions c/w UC    COLONOSCOPY  2009    pancolitis    COLONOSCOPY      CORONARY ARTERY BYPASS GRAFT  3/2009 results for input(s): POCGLU, POCNA, POCK, POCCL, POCBUN, POCHEMO, POCHCT in the last 72 hours. Coags: No results found for: PROTIME, INR, APTT    HCG (If Applicable): No results found for: PREGTESTUR, PREGSERUM, HCG, HCGQUANT     ABGs: No results found for: PHART, PO2ART, PQJ0QQT, DJM9JQC, BEART, T0ICOLTV     Type & Screen (If Applicable):  No results found for: LABABO, [address]    Anesthesia Evaluation  Patient summary reviewed and Nursing notes reviewed  Airway:         Dental: normal exam         Pulmonary:normal exam    (+) COPD: no interval change,                             Cardiovascular:    (+) hypertension: no interval change, past MI: no interval change, CAD: no interval change,       ECG reviewed               Beta Blocker:  Dose within 24 Hrs         Neuro/Psych:   (+) CVA: no interval change, TIA, psychiatric history:            GI/Hepatic/Renal:   (+) PUD,           Endo/Other:    (+) : arthritis: rheumatoid. , .                 Abdominal:           Vascular: negative vascular ROS. Anesthesia Plan      MAC     ASA 2       Induction: intravenous. Anesthetic plan and risks discussed with patient and spouse. Plan discussed with CRNA.                   Emmett Castellanos CRNA   3/12/2018

## 2018-03-12 NOTE — ANESTHESIA POSTPROCEDURE EVALUATION
Department of Anesthesiology  Postprocedure Note    Patient: Goldie Mtz  MRN: [id number]  YOB: 1960  Date of evaluation: 3/12/2018  Time:  11:08 AM     Procedure Summary     Date:  03/12/18 Room / Location:  [facility] ENDO 01 / [facility] OR    Anesthesia Start:  1047 Anesthesia Stop:  1106    Procedure:  Colonoscopy with biopsy (N/A ) Diagnosis:  (SCREEN, HS PANCOLITIS)    Surgeon:  Xochilt Monk DO Responsible Provider: Yanci Westfall CRNA    Anesthesia Type:  MAC ASA Status:  2          Anesthesia Type: MAC    Nelda Phase I:      Nelda Phase II: Nelda Score: 9    Last vitals: Reviewed and per EMR flowsheets.        Anesthesia Post Evaluation    Patient location during evaluation: bedside  Patient participation: complete - patient participated  Level of consciousness: awake  Airway patency: patent  Nausea & Vomiting: no nausea  Complications: no  Cardiovascular status: blood pressure returned to baseline  Respiratory status: room air and spontaneous ventilation  Hydration status: euvolemic

## 2018-03-12 NOTE — H&P
Patient Name: Mary Ann Brooks  : 1960  MRN: [id number]    Allergies: Allergies   Allergen Reactions    Methotrexate Derivatives          ENDOSCOPY / COLONOSCOPY / BRONCHOSCOPY      PRE-SEDATION ASSESSMENT      Procedure:    [x] Colonoscopy     [] Endoscopy      [] ERCP      [] Bronchoscopy      [] Other  [] History and Physical completed in chart for Inpatient or within 30 daysfrom office. I have examined the patient's status immediately prior to the procedure and:    [x] No interval change in patient status since H&P completed  [] Interval change in patient status (explained below)           BRIEF H&P    HPI/changes/indicators/diagnosis  Active Hospital Problems    Diagnosis Date Noted    Ulcerative colitis ([facility] [K51.90] 2012       Medications:   Prior to Admission medications    Medication Sig Start Date End Date Taking? Authorizing Provider   pantoprazole (PROTONIX) 40 MG tablet TAKE 1 TABLET EVERY DAY 18   Historical Provider, MD   ferrous sulfate 324 (65 Fe) MG EC tablet TAKE 1 TABLET BY MOUTH TWICE A DAY 18   Historical Provider, MD   amitriptyline (ELAVIL) 10 MG tablet TAKE 1 TABLET BY MOUTH EVERY DAY 18   Historical Provider, MD   nystatin-triamcinolone (MYCOLOG II) 579319-6.5 UNIT/GM-% cream APPLY TO THE AFFECTED AREA TWICE DAILY FOR 10 DAYS 18   Historical Provider, MD   abatacept (ORENCIA) 250 MG injection Infuse intravenously once    Historical Provider, MD   predniSONE (DELTASONE) 5 MG tablet Take 5 mg by mouth daily    Historical Provider, MD   Multiple Vitamins-Minerals (MULTIVITAMIN PO) Take  by mouth daily. Historical Provider, MD   Cholecalciferol (VITAMIN D3) 5000 UNITS TABS Take  by mouth daily. Historical Provider, MD   vitamin B-1 (THIAMINE) 100 MG tablet Take 100 mg by mouth daily. Historical Provider, MD   vitamin B-12 (CYANOCOBALAMIN) 500 MCG tablet Take 500 mcg by mouth daily.     Historical Provider, MD   leflunomide (ARAVA) 20 MG tablet Take 20 mg by mouth Monday thru Friday    Historical Provider, MD   hydroxychloroquine (PLAQUENIL) 200 MG tablet Take 200 mg by mouth 2 times daily. Historical Provider, MD   metoprolol (TOPROL-XL) 25 MG XL tablet Take 12.5 mg by mouth 2 times daily     Historical Provider, MD   furosemide (LASIX) 20 MG tablet Take 20 mg by mouth daily. Historical Provider, MD   folic acid (FOLVITE) 1 MG tablet Take 1 mg by mouth daily. Historical Provider, MD       Allergies:   is allergic to methotrexate derivatives. Vital Signs:   Vitals:    03/12/18 1012   BP: (!) 156/81   Pulse: 57   Resp: 18   Temp: 97.4 °F (36.3 °C)   SpO2: 95%       ROS:  Cardiac:  [x]WNL  []Comments:  Pulmonary:  [x]WNL   []Comments:  Neuro/Mental Status:  [x]WNL  []Comments:  Abdominal:                   Active Hospital Problems    Diagnosis Date Noted    Ulcerative colitis ([facility] [address] [K51.90] 05/14/2012        All other pertinent GI symptoms negative. Physical Exam:  Cardiac:  [x]WNL  []Comments:  Pulmonary:  [x]WNL   []Comments:  Neuro/Mental Status:  [x]WNL  []Comments:  Abdominal:  [x]WNL    []Comments:  Other:   []WNL  []Comments:    Informed Consent:  The risks and benefits of the procedure have been discussed with either the patient or if they cannot consent, their representative. Assessment:  Patient examined and appropriate for planned sedation and procedure. Plan:  Proceed with planned sedation and procedure as above.     Bladimir Elias DO  10:46 AM

## 2018-04-10 ENCOUNTER — OFFICE VISIT (OUTPATIENT)
Dept: CARDIOLOGY | Age: 58
End: 2018-04-10
Payer: COMMERCIAL

## 2018-04-10 VITALS
SYSTOLIC BLOOD PRESSURE: 142 MMHG | HEART RATE: 56 BPM | HEIGHT: 67 IN | DIASTOLIC BLOOD PRESSURE: 90 MMHG | WEIGHT: 246 LBS | BODY MASS INDEX: 38.61 KG/M2

## 2018-04-10 DIAGNOSIS — I10 ESSENTIAL HYPERTENSION: Primary | ICD-10-CM

## 2018-04-10 DIAGNOSIS — R10.2 PELVIC PAIN IN FEMALE: ICD-10-CM

## 2018-04-10 DIAGNOSIS — I25.119 CORONARY ARTERY DISEASE INVOLVING NATIVE CORONARY ARTERY OF NATIVE HEART WITH ANGINA PECTORIS (HCC): ICD-10-CM

## 2018-04-10 PROCEDURE — 99213 OFFICE O/P EST LOW 20 MIN: CPT | Performed by: CLINICAL NURSE SPECIALIST

## 2018-04-10 RX ORDER — NITROGLYCERIN 0.4 MG/1
0.4 TABLET SUBLINGUAL EVERY 5 MIN PRN
Qty: 25 TABLET | Refills: 3 | Status: SHIPPED | OUTPATIENT
Start: 2018-04-10

## 2018-04-10 RX ORDER — MULTIVITAMIN WITH IRON
100 TABLET ORAL DAILY
COMMUNITY
End: 2019-08-19

## 2018-04-10 RX ORDER — LOSARTAN POTASSIUM 25 MG/1
25 TABLET ORAL DAILY
Qty: 30 TABLET | Refills: 3 | Status: SHIPPED | OUTPATIENT
Start: 2018-04-10 | End: 2018-08-04 | Stop reason: SDUPTHER

## 2018-06-25 ENCOUNTER — TRANSCRIBE ORDERS (OUTPATIENT)
Dept: ADMINISTRATIVE | Facility: HOSPITAL | Age: 58
End: 2018-06-25

## 2018-06-25 ENCOUNTER — APPOINTMENT (OUTPATIENT)
Dept: LAB | Facility: HOSPITAL | Age: 58
End: 2018-06-25
Attending: INTERNAL MEDICINE

## 2018-06-25 DIAGNOSIS — I10 ESSENTIAL HYPERTENSION, MALIGNANT: Primary | ICD-10-CM

## 2018-06-25 DIAGNOSIS — Z86.718 PERSONAL HISTORY OF DVT (DEEP VEIN THROMBOSIS): ICD-10-CM

## 2018-06-25 DIAGNOSIS — K90.2 POSTOPERATIVE BLIND LOOP SYNDROME: ICD-10-CM

## 2018-06-25 DIAGNOSIS — E66.9 OBESITY, UNSPECIFIED CLASSIFICATION, UNSPECIFIED OBESITY TYPE, UNSPECIFIED WHETHER SERIOUS COMORBIDITY PRESENT: ICD-10-CM

## 2018-06-25 LAB
25(OH)D3 SERPL-MCNC: 40.4 NG/ML (ref 30–100)
ALBUMIN SERPL-MCNC: 4.1 G/DL (ref 3.5–5)
ALBUMIN/GLOB SERPL: 1.4 G/DL (ref 1.1–2.5)
ALP SERPL-CCNC: 103 U/L (ref 24–120)
ALT SERPL W P-5'-P-CCNC: 48 U/L (ref 0–54)
ANION GAP SERPL CALCULATED.3IONS-SCNC: 11 MMOL/L (ref 4–13)
AST SERPL-CCNC: 45 U/L (ref 7–45)
BASOPHILS # BLD AUTO: 0.08 10*3/MM3 (ref 0–0.2)
BASOPHILS NFR BLD AUTO: 1.1 % (ref 0–2)
BILIRUB SERPL-MCNC: 0.4 MG/DL (ref 0.1–1)
BUN BLD-MCNC: 8 MG/DL (ref 5–21)
BUN/CREAT SERPL: 11.4 (ref 7–25)
CALCIUM SPEC-SCNC: 9.4 MG/DL (ref 8.4–10.4)
CHLORIDE SERPL-SCNC: 102 MMOL/L (ref 98–110)
CO2 SERPL-SCNC: 26 MMOL/L (ref 24–31)
CREAT BLD-MCNC: 0.7 MG/DL (ref 0.5–1.4)
D DIMER PPP FEU-MCNC: 0.51 MG/L (FEU) (ref 0–0.5)
DEPRECATED RDW RBC AUTO: 45.3 FL (ref 40–54)
EOSINOPHIL # BLD AUTO: 0.07 10*3/MM3 (ref 0–0.7)
EOSINOPHIL NFR BLD AUTO: 0.9 % (ref 0–4)
ERYTHROCYTE [DISTWIDTH] IN BLOOD BY AUTOMATED COUNT: 13.9 % (ref 12–15)
FERRITIN SERPL-MCNC: 35.9 NG/ML (ref 11.1–264)
GFR SERPL CREATININE-BSD FRML MDRD: 86 ML/MIN/1.73
GLOBULIN UR ELPH-MCNC: 3 GM/DL
GLUCOSE BLD-MCNC: 100 MG/DL (ref 70–100)
HBA1C MFR BLD: 5.7 %
HCT VFR BLD AUTO: 42.1 % (ref 37–47)
HGB BLD-MCNC: 13.7 G/DL (ref 12–16)
IMM GRANULOCYTES # BLD: 0.04 10*3/MM3 (ref 0–0.03)
IMM GRANULOCYTES NFR BLD: 0.5 % (ref 0–5)
LYMPHOCYTES # BLD AUTO: 0.83 10*3/MM3 (ref 0.72–4.86)
LYMPHOCYTES NFR BLD AUTO: 11 % (ref 15–45)
MCH RBC QN AUTO: 29.1 PG (ref 28–32)
MCHC RBC AUTO-ENTMCNC: 32.5 G/DL (ref 33–36)
MCV RBC AUTO: 89.6 FL (ref 82–98)
MONOCYTES # BLD AUTO: 0.49 10*3/MM3 (ref 0.19–1.3)
MONOCYTES NFR BLD AUTO: 6.5 % (ref 4–12)
NEUTROPHILS # BLD AUTO: 6.04 10*3/MM3 (ref 1.87–8.4)
NEUTROPHILS NFR BLD AUTO: 80 % (ref 39–78)
NRBC BLD MANUAL-RTO: 0 /100 WBC (ref 0–0)
PLATELET # BLD AUTO: 307 10*3/MM3 (ref 130–400)
PMV BLD AUTO: 9.9 FL (ref 6–12)
POTASSIUM BLD-SCNC: 4 MMOL/L (ref 3.5–5.3)
PROT SERPL-MCNC: 7.1 G/DL (ref 6.3–8.7)
RBC # BLD AUTO: 4.7 10*6/MM3 (ref 4.2–5.4)
SODIUM BLD-SCNC: 139 MMOL/L (ref 135–145)
TSH SERPL DL<=0.05 MIU/L-ACNC: 0.72 MIU/ML (ref 0.47–4.68)
VIT B12 BLD-MCNC: >1000 PG/ML (ref 239–931)
WBC NRBC COR # BLD: 7.55 10*3/MM3 (ref 4.8–10.8)

## 2018-06-25 PROCEDURE — 82607 VITAMIN B-12: CPT | Performed by: INTERNAL MEDICINE

## 2018-06-25 PROCEDURE — 82306 VITAMIN D 25 HYDROXY: CPT | Performed by: INTERNAL MEDICINE

## 2018-06-25 PROCEDURE — 36415 COLL VENOUS BLD VENIPUNCTURE: CPT

## 2018-06-25 PROCEDURE — 83036 HEMOGLOBIN GLYCOSYLATED A1C: CPT | Performed by: INTERNAL MEDICINE

## 2018-06-25 PROCEDURE — 80053 COMPREHEN METABOLIC PANEL: CPT | Performed by: INTERNAL MEDICINE

## 2018-06-25 PROCEDURE — 84443 ASSAY THYROID STIM HORMONE: CPT | Performed by: INTERNAL MEDICINE

## 2018-06-25 PROCEDURE — 85379 FIBRIN DEGRADATION QUANT: CPT | Performed by: INTERNAL MEDICINE

## 2018-06-25 PROCEDURE — 82728 ASSAY OF FERRITIN: CPT | Performed by: INTERNAL MEDICINE

## 2018-06-25 PROCEDURE — 85025 COMPLETE CBC W/AUTO DIFF WBC: CPT | Performed by: INTERNAL MEDICINE

## 2018-08-06 RX ORDER — LOSARTAN POTASSIUM 25 MG/1
25 TABLET ORAL DAILY
Qty: 30 TABLET | Refills: 5 | Status: SHIPPED | OUTPATIENT
Start: 2018-08-06 | End: 2019-02-04 | Stop reason: SDUPTHER

## 2018-08-23 LAB
ALBUMIN SERPL-MCNC: 4 G/DL (ref 3.5–5.2)
ALP BLD-CCNC: 95 U/L (ref 35–104)
ALT SERPL-CCNC: 39 U/L (ref 5–33)
ANION GAP SERPL CALCULATED.3IONS-SCNC: 18 MMOL/L (ref 7–19)
AST SERPL-CCNC: 59 U/L (ref 5–32)
BASOPHILS ABSOLUTE: 0.1 K/UL (ref 0–0.2)
BASOPHILS RELATIVE PERCENT: 1.3 % (ref 0–1)
BILIRUB SERPL-MCNC: 0.3 MG/DL (ref 0.2–1.2)
BUN BLDV-MCNC: 7 MG/DL (ref 6–20)
C-REACTIVE PROTEIN: 0.66 MG/DL (ref 0–0.5)
CALCIUM SERPL-MCNC: 9.1 MG/DL (ref 8.6–10)
CHLORIDE BLD-SCNC: 100 MMOL/L (ref 98–111)
CO2: 25 MMOL/L (ref 22–29)
CREAT SERPL-MCNC: 0.6 MG/DL (ref 0.5–0.9)
EOSINOPHILS ABSOLUTE: 0.3 K/UL (ref 0–0.6)
EOSINOPHILS RELATIVE PERCENT: 3.3 % (ref 0–5)
GFR NON-AFRICAN AMERICAN: >60
GLUCOSE BLD-MCNC: 83 MG/DL (ref 74–109)
HCT VFR BLD CALC: 43.2 % (ref 37–47)
HEMOGLOBIN: 14 G/DL (ref 12–16)
LYMPHOCYTES ABSOLUTE: 1.2 K/UL (ref 1.1–4.5)
LYMPHOCYTES RELATIVE PERCENT: 13.9 % (ref 20–40)
MCH RBC QN AUTO: 29.2 PG (ref 27–31)
MCHC RBC AUTO-ENTMCNC: 32.4 G/DL (ref 33–37)
MCV RBC AUTO: 90.2 FL (ref 81–99)
MONOCYTES ABSOLUTE: 0.7 K/UL (ref 0–0.9)
MONOCYTES RELATIVE PERCENT: 7.8 % (ref 0–10)
NEUTROPHILS ABSOLUTE: 6.1 K/UL (ref 1.5–7.5)
NEUTROPHILS RELATIVE PERCENT: 73.3 % (ref 50–65)
PDW BLD-RTO: 13.7 % (ref 11.5–14.5)
PLATELET # BLD: 330 K/UL (ref 130–400)
PMV BLD AUTO: 10.3 FL (ref 9.4–12.3)
POTASSIUM SERPL-SCNC: 4.4 MMOL/L (ref 3.5–5)
RBC # BLD: 4.79 M/UL (ref 4.2–5.4)
SEDIMENTATION RATE, ERYTHROCYTE: 20 MM/HR (ref 0–25)
SODIUM BLD-SCNC: 143 MMOL/L (ref 136–145)
TOTAL PROTEIN: 7.1 G/DL (ref 6.6–8.7)
WBC # BLD: 8.3 K/UL (ref 4.8–10.8)

## 2018-09-24 DIAGNOSIS — I27.20 PULMONARY HYPERTENSION (CMD): Primary | ICD-10-CM

## 2018-09-24 DIAGNOSIS — I25.10 CORONARY ARTERY DISEASE INVOLVING NATIVE CORONARY ARTERY OF NATIVE HEART WITHOUT ANGINA PECTORIS: ICD-10-CM

## 2018-09-24 DIAGNOSIS — I25.5 ISCHEMIC CARDIOMYOPATHY: ICD-10-CM

## 2018-09-24 RX ORDER — HYDRALAZINE HYDROCHLORIDE 25 MG/1
50 TABLET, FILM COATED ORAL 3 TIMES DAILY
Qty: 180 TABLET | Refills: 0 | Status: SHIPPED | OUTPATIENT
Start: 2018-09-24 | End: 2018-10-30 | Stop reason: SDUPTHER

## 2018-10-30 DIAGNOSIS — I25.5 ISCHEMIC CARDIOMYOPATHY: ICD-10-CM

## 2018-10-30 DIAGNOSIS — I25.10 CORONARY ARTERY DISEASE INVOLVING NATIVE CORONARY ARTERY OF NATIVE HEART WITHOUT ANGINA PECTORIS: ICD-10-CM

## 2018-10-30 DIAGNOSIS — I27.20 PULMONARY HYPERTENSION (CMD): ICD-10-CM

## 2018-10-31 RX ORDER — HYDRALAZINE HYDROCHLORIDE 25 MG/1
TABLET, FILM COATED ORAL
Qty: 540 TABLET | Refills: 1 | Status: SHIPPED | OUTPATIENT
Start: 2018-10-31 | End: 2019-05-23 | Stop reason: SDUPTHER

## 2018-11-01 ENCOUNTER — TRANSCRIBE ORDERS (OUTPATIENT)
Dept: GENERAL RADIOLOGY | Facility: HOSPITAL | Age: 58
End: 2018-11-01

## 2018-11-01 ENCOUNTER — HOSPITAL ENCOUNTER (OUTPATIENT)
Dept: GENERAL RADIOLOGY | Facility: HOSPITAL | Age: 58
Discharge: HOME | End: 2018-11-01
Attending: INTERNAL MEDICINE | Admitting: INTERNAL MEDICINE

## 2018-11-01 DIAGNOSIS — Z78.0 POST-MENOPAUSAL: Primary | ICD-10-CM

## 2018-11-01 DIAGNOSIS — Z78.0 POST-MENOPAUSAL: ICD-10-CM

## 2018-11-01 PROCEDURE — 77080 DXA BONE DENSITY AXIAL: CPT

## 2018-11-02 ENCOUNTER — TELEPHONE (OUTPATIENT)
Dept: BARIATRICS/WEIGHT MGMT | Facility: CLINIC | Age: 58
End: 2018-11-02

## 2018-11-02 NOTE — TELEPHONE ENCOUNTER
Patient was referred by Dr. Wall. She has had previous bypass surgery in 2012/2013 by Dr. Reyez. We do not have the patient's operative report from this surgery. Per Jennie (), since she had surgery with Dr. Reyez, she would most likely be sent back to Dr. Reyez for care. However, if she could get us the operative report, we could still have Dr. Syed review it. Called patient to let her know and she voiced understanding. I told her that she was more than welcome to attend our monthly support group meetings.

## 2018-11-20 LAB
ALBUMIN SERPL-MCNC: 4.1 G/DL (ref 3.5–5.2)
ALP BLD-CCNC: 95 U/L (ref 35–104)
ALT SERPL-CCNC: 29 U/L (ref 5–33)
ANION GAP SERPL CALCULATED.3IONS-SCNC: 15 MMOL/L (ref 7–19)
AST SERPL-CCNC: 33 U/L (ref 5–32)
BILIRUB SERPL-MCNC: 0.4 MG/DL (ref 0.2–1.2)
BUN BLDV-MCNC: 9 MG/DL (ref 6–20)
CALCIUM SERPL-MCNC: 9.4 MG/DL (ref 8.6–10)
CHLORIDE BLD-SCNC: 101 MMOL/L (ref 98–111)
CO2: 26 MMOL/L (ref 22–29)
CREAT SERPL-MCNC: 0.8 MG/DL (ref 0.5–0.9)
GFR NON-AFRICAN AMERICAN: >60
GLUCOSE BLD-MCNC: 109 MG/DL (ref 74–109)
POTASSIUM SERPL-SCNC: 4.3 MMOL/L (ref 3.5–5)
SODIUM BLD-SCNC: 142 MMOL/L (ref 136–145)
TOTAL PROTEIN: 7.2 G/DL (ref 6.6–8.7)

## 2019-01-07 ENCOUNTER — HOSPITAL ENCOUNTER (OUTPATIENT)
Dept: ULTRASOUND IMAGING | Facility: HOSPITAL | Age: 59
Discharge: HOME | End: 2019-01-07
Attending: INTERNAL MEDICINE

## 2019-01-07 ENCOUNTER — TRANSCRIBE ORDERS (OUTPATIENT)
Dept: ADMINISTRATIVE | Facility: HOSPITAL | Age: 59
End: 2019-01-07

## 2019-01-07 ENCOUNTER — HOSPITAL ENCOUNTER (OUTPATIENT)
Dept: GENERAL RADIOLOGY | Facility: HOSPITAL | Age: 59
Discharge: HOME | End: 2019-01-07
Attending: INTERNAL MEDICINE | Admitting: INTERNAL MEDICINE

## 2019-01-07 DIAGNOSIS — M25.50 PAIN IN JOINT, MULTIPLE SITES: ICD-10-CM

## 2019-01-07 DIAGNOSIS — M25.50 PAIN IN JOINT, MULTIPLE SITES: Primary | ICD-10-CM

## 2019-01-07 DIAGNOSIS — R79.89 ELEVATED LFTS: Primary | ICD-10-CM

## 2019-01-07 DIAGNOSIS — R79.89 ELEVATED LFTS: ICD-10-CM

## 2019-01-07 PROCEDURE — 73522 X-RAY EXAM HIPS BI 3-4 VIEWS: CPT

## 2019-01-07 PROCEDURE — 76705 ECHO EXAM OF ABDOMEN: CPT

## 2019-02-04 RX ORDER — LOSARTAN POTASSIUM 25 MG/1
25 TABLET ORAL DAILY
Qty: 30 TABLET | Refills: 1 | Status: SHIPPED | OUTPATIENT
Start: 2019-02-04 | End: 2019-02-06 | Stop reason: SDUPTHER

## 2019-02-05 ENCOUNTER — TRANSCRIBE ORDERS (OUTPATIENT)
Dept: ADMINISTRATIVE | Facility: HOSPITAL | Age: 59
End: 2019-02-05

## 2019-02-05 DIAGNOSIS — Z00.00 ENCOUNTER FOR GENERAL ADULT MEDICAL EXAMINATION WITHOUT ABNORMAL FINDINGS: Primary | ICD-10-CM

## 2019-02-06 RX ORDER — LOSARTAN POTASSIUM 25 MG/1
25 TABLET ORAL DAILY
Qty: 30 TABLET | Refills: 3 | Status: SHIPPED | OUTPATIENT
Start: 2019-02-06 | End: 2022-07-21

## 2019-02-07 ENCOUNTER — LAB (OUTPATIENT)
Dept: LAB | Facility: HOSPITAL | Age: 59
End: 2019-02-07

## 2019-02-07 DIAGNOSIS — Z00.00 ENCOUNTER FOR GENERAL ADULT MEDICAL EXAMINATION WITHOUT ABNORMAL FINDINGS: ICD-10-CM

## 2019-02-07 LAB
ALBUMIN SERPL-MCNC: 4.2 G/DL (ref 3.5–5)
ALBUMIN/GLOB SERPL: 1.6 G/DL (ref 1.1–2.5)
ALP SERPL-CCNC: 92 U/L (ref 24–120)
ALT SERPL W P-5'-P-CCNC: 21 U/L (ref 0–54)
ANION GAP SERPL CALCULATED.3IONS-SCNC: 7 MMOL/L (ref 4–13)
ARTICHOKE IGE QN: 126 MG/DL (ref 0–99)
AST SERPL-CCNC: 30 U/L (ref 7–45)
BASOPHILS # BLD AUTO: 0.06 10*3/MM3 (ref 0–0.2)
BASOPHILS NFR BLD AUTO: 0.7 % (ref 0–2)
BILIRUB SERPL-MCNC: 0.5 MG/DL (ref 0.1–1)
BUN BLD-MCNC: 11 MG/DL (ref 5–21)
BUN/CREAT SERPL: 15.3 (ref 7–25)
CALCIUM SPEC-SCNC: 9 MG/DL (ref 8.4–10.4)
CHLORIDE SERPL-SCNC: 100 MMOL/L (ref 98–110)
CHOLEST SERPL-MCNC: 245 MG/DL (ref 130–200)
CO2 SERPL-SCNC: 30 MMOL/L (ref 24–31)
CREAT BLD-MCNC: 0.72 MG/DL (ref 0.5–1.4)
DEPRECATED RDW RBC AUTO: 44.2 FL (ref 40–54)
EOSINOPHIL # BLD AUTO: 0.25 10*3/MM3 (ref 0–0.7)
EOSINOPHIL NFR BLD AUTO: 2.8 % (ref 0–4)
ERYTHROCYTE [DISTWIDTH] IN BLOOD BY AUTOMATED COUNT: 13.5 % (ref 12–15)
GFR SERPL CREATININE-BSD FRML MDRD: 83 ML/MIN/1.73
GLOBULIN UR ELPH-MCNC: 2.6 GM/DL
GLUCOSE BLD-MCNC: 112 MG/DL (ref 70–100)
HBA1C MFR BLD: 5.6 %
HCT VFR BLD AUTO: 43.2 % (ref 37–47)
HDLC SERPL-MCNC: 100 MG/DL
HGB BLD-MCNC: 14.5 G/DL (ref 12–16)
IMM GRANULOCYTES # BLD AUTO: 0.04 10*3/MM3 (ref 0–0.03)
IMM GRANULOCYTES NFR BLD AUTO: 0.5 % (ref 0–5)
LDLC/HDLC SERPL: 1.21 {RATIO}
LYMPHOCYTES # BLD AUTO: 0.88 10*3/MM3 (ref 0.72–4.86)
LYMPHOCYTES NFR BLD AUTO: 10 % (ref 15–45)
MAGNESIUM SERPL-MCNC: 1.8 MG/DL (ref 1.4–2.2)
MCH RBC QN AUTO: 30 PG (ref 28–32)
MCHC RBC AUTO-ENTMCNC: 33.6 G/DL (ref 33–36)
MCV RBC AUTO: 89.3 FL (ref 82–98)
MONOCYTES # BLD AUTO: 0.71 10*3/MM3 (ref 0.19–1.3)
MONOCYTES NFR BLD AUTO: 8 % (ref 4–12)
NEUTROPHILS # BLD AUTO: 6.9 10*3/MM3 (ref 1.87–8.4)
NEUTROPHILS NFR BLD AUTO: 78 % (ref 39–78)
NRBC BLD AUTO-RTO: 0 /100 WBC (ref 0–0)
PLATELET # BLD AUTO: 340 10*3/MM3 (ref 130–400)
PMV BLD AUTO: 9.7 FL (ref 6–12)
POTASSIUM BLD-SCNC: 4.1 MMOL/L (ref 3.5–5.3)
PROT SERPL-MCNC: 6.8 G/DL (ref 6.3–8.7)
RBC # BLD AUTO: 4.84 10*6/MM3 (ref 4.2–5.4)
SODIUM BLD-SCNC: 137 MMOL/L (ref 135–145)
T4 FREE SERPL-MCNC: 1.14 NG/DL (ref 0.78–2.19)
TRIGL SERPL-MCNC: 120 MG/DL (ref 0–149)
TSH SERPL DL<=0.05 MIU/L-ACNC: 1.65 MIU/ML (ref 0.47–4.68)
URATE SERPL-MCNC: 8.1 MG/DL (ref 2.7–7.5)
WBC NRBC COR # BLD: 8.84 10*3/MM3 (ref 4.8–10.8)

## 2019-02-07 PROCEDURE — 80061 LIPID PANEL: CPT | Performed by: INTERNAL MEDICINE

## 2019-02-07 PROCEDURE — 85025 COMPLETE CBC W/AUTO DIFF WBC: CPT | Performed by: INTERNAL MEDICINE

## 2019-02-07 PROCEDURE — 84443 ASSAY THYROID STIM HORMONE: CPT | Performed by: INTERNAL MEDICINE

## 2019-02-07 PROCEDURE — 83036 HEMOGLOBIN GLYCOSYLATED A1C: CPT | Performed by: INTERNAL MEDICINE

## 2019-02-07 PROCEDURE — 83735 ASSAY OF MAGNESIUM: CPT | Performed by: INTERNAL MEDICINE

## 2019-02-07 PROCEDURE — 84439 ASSAY OF FREE THYROXINE: CPT | Performed by: INTERNAL MEDICINE

## 2019-02-07 PROCEDURE — 36415 COLL VENOUS BLD VENIPUNCTURE: CPT

## 2019-02-07 PROCEDURE — 84550 ASSAY OF BLOOD/URIC ACID: CPT | Performed by: INTERNAL MEDICINE

## 2019-02-07 PROCEDURE — 80053 COMPREHEN METABOLIC PANEL: CPT | Performed by: INTERNAL MEDICINE

## 2019-04-04 ENCOUNTER — TRANSCRIBE ORDERS (OUTPATIENT)
Dept: ADMINISTRATIVE | Facility: HOSPITAL | Age: 59
End: 2019-04-04

## 2019-04-04 DIAGNOSIS — I10 ESSENTIAL (PRIMARY) HYPERTENSION: Primary | ICD-10-CM

## 2019-04-08 ENCOUNTER — LAB (OUTPATIENT)
Dept: LAB | Facility: HOSPITAL | Age: 59
End: 2019-04-08

## 2019-04-08 DIAGNOSIS — I10 ESSENTIAL (PRIMARY) HYPERTENSION: ICD-10-CM

## 2019-04-08 LAB
ALBUMIN SERPL-MCNC: 4.2 G/DL (ref 3.5–5)
ALBUMIN/GLOB SERPL: 1.4 G/DL (ref 1.1–2.5)
ALP SERPL-CCNC: 73 U/L (ref 24–120)
ALT SERPL W P-5'-P-CCNC: 20 U/L (ref 0–54)
ANION GAP SERPL CALCULATED.3IONS-SCNC: 11 MMOL/L (ref 4–13)
AST SERPL-CCNC: 35 U/L (ref 7–45)
BILIRUB SERPL-MCNC: 0.5 MG/DL (ref 0.1–1)
BUN BLD-MCNC: 15 MG/DL (ref 5–21)
BUN/CREAT SERPL: 20.3 (ref 7–25)
CALCIUM SPEC-SCNC: 9.4 MG/DL (ref 8.4–10.4)
CHLORIDE SERPL-SCNC: 97 MMOL/L (ref 98–110)
CHOLEST SERPL-MCNC: 240 MG/DL (ref 130–200)
CO2 SERPL-SCNC: 31 MMOL/L (ref 24–31)
CREAT BLD-MCNC: 0.74 MG/DL (ref 0.5–1.4)
GFR SERPL CREATININE-BSD FRML MDRD: 81 ML/MIN/1.73
GLOBULIN UR ELPH-MCNC: 2.9 GM/DL
GLUCOSE BLD-MCNC: 107 MG/DL (ref 70–100)
MAGNESIUM SERPL-MCNC: 1.9 MG/DL (ref 1.4–2.2)
POTASSIUM BLD-SCNC: 3.9 MMOL/L (ref 3.5–5.3)
PROT SERPL-MCNC: 7.1 G/DL (ref 6.3–8.7)
SODIUM BLD-SCNC: 139 MMOL/L (ref 135–145)
URATE SERPL-MCNC: 7.6 MG/DL (ref 2.7–7.5)

## 2019-04-08 PROCEDURE — 83735 ASSAY OF MAGNESIUM: CPT | Performed by: INTERNAL MEDICINE

## 2019-04-08 PROCEDURE — 36415 COLL VENOUS BLD VENIPUNCTURE: CPT

## 2019-04-08 PROCEDURE — 84550 ASSAY OF BLOOD/URIC ACID: CPT | Performed by: INTERNAL MEDICINE

## 2019-04-08 PROCEDURE — 82465 ASSAY BLD/SERUM CHOLESTEROL: CPT | Performed by: INTERNAL MEDICINE

## 2019-04-08 PROCEDURE — 80053 COMPREHEN METABOLIC PANEL: CPT | Performed by: INTERNAL MEDICINE

## 2019-04-19 RX ORDER — METOPROLOL SUCCINATE 25 MG/1
25 TABLET, EXTENDED RELEASE ORAL 2 TIMES DAILY
COMMUNITY
End: 2019-05-30

## 2019-04-19 RX ORDER — CALCIUM CARBONATE 500(1250)
600 TABLET ORAL 2 TIMES DAILY
COMMUNITY
End: 2019-05-30

## 2019-04-19 RX ORDER — LOSARTAN POTASSIUM 25 MG/1
25 TABLET ORAL DAILY
COMMUNITY

## 2019-04-19 RX ORDER — POTASSIUM CHLORIDE 750 MG/1
20 CAPSULE, EXTENDED RELEASE ORAL DAILY
COMMUNITY

## 2019-04-19 RX ORDER — FUROSEMIDE 40 MG/1
40 TABLET ORAL DAILY
COMMUNITY

## 2019-04-19 RX ORDER — FERROUS SULFATE 325(65) MG
325 TABLET ORAL
COMMUNITY
End: 2019-05-30

## 2019-04-19 RX ORDER — PREDNISONE 1 MG/1
5 TABLET ORAL DAILY
COMMUNITY

## 2019-04-19 RX ORDER — PANTOPRAZOLE SODIUM 40 MG/1
40 TABLET, DELAYED RELEASE ORAL DAILY
COMMUNITY
End: 2019-05-30

## 2019-04-19 RX ORDER — THIAMINE MONONITRATE (VIT B1) 100 MG
100 TABLET ORAL DAILY
COMMUNITY
End: 2019-05-30

## 2019-04-19 RX ORDER — HYDROXYCHLOROQUINE SULFATE 200 MG/1
200 TABLET, FILM COATED ORAL 2 TIMES DAILY
COMMUNITY

## 2019-04-19 RX ORDER — FOLIC ACID 1 MG/1
1 TABLET ORAL DAILY
COMMUNITY

## 2019-04-30 ENCOUNTER — TELEPHONE (OUTPATIENT)
Dept: VASCULAR SURGERY | Facility: CLINIC | Age: 59
End: 2019-04-30

## 2019-04-30 NOTE — TELEPHONE ENCOUNTER
Patient called to reschedule her apt from 4/30/19 to another day, because she has to go out of town.  Looking, I see that apt was already rescheduled and moved to 5/16, but the patient said she was no aware of it.  She asked to be moved to the end of the month.  Her new apt is 5/30

## 2019-05-23 DIAGNOSIS — I25.5 ISCHEMIC CARDIOMYOPATHY: ICD-10-CM

## 2019-05-23 DIAGNOSIS — I27.20 PULMONARY HYPERTENSION (CMD): ICD-10-CM

## 2019-05-23 DIAGNOSIS — I25.10 CORONARY ARTERY DISEASE INVOLVING NATIVE CORONARY ARTERY OF NATIVE HEART WITHOUT ANGINA PECTORIS: ICD-10-CM

## 2019-05-23 RX ORDER — HYDRALAZINE HYDROCHLORIDE 25 MG/1
TABLET, FILM COATED ORAL
Qty: 180 TABLET | Refills: 0 | Status: SHIPPED | OUTPATIENT
Start: 2019-05-23 | End: 2019-06-30 | Stop reason: SDUPTHER

## 2019-05-30 ENCOUNTER — OFFICE VISIT (OUTPATIENT)
Dept: VASCULAR SURGERY | Facility: CLINIC | Age: 59
End: 2019-05-30

## 2019-05-30 VITALS
HEART RATE: 53 BPM | HEIGHT: 67 IN | SYSTOLIC BLOOD PRESSURE: 130 MMHG | OXYGEN SATURATION: 97 % | WEIGHT: 223.8 LBS | BODY MASS INDEX: 35.12 KG/M2 | DIASTOLIC BLOOD PRESSURE: 72 MMHG

## 2019-05-30 DIAGNOSIS — Z86.718 HISTORY OF DVT (DEEP VEIN THROMBOSIS): ICD-10-CM

## 2019-05-30 DIAGNOSIS — I89.0 LYMPHEDEMA: ICD-10-CM

## 2019-05-30 DIAGNOSIS — I10 ESSENTIAL HYPERTENSION: ICD-10-CM

## 2019-05-30 DIAGNOSIS — M79.89 LEG SWELLING: Primary | ICD-10-CM

## 2019-05-30 PROCEDURE — 99204 OFFICE O/P NEW MOD 45 MIN: CPT | Performed by: SURGERY

## 2019-05-30 RX ORDER — PENICILLIN V POTASSIUM 500 MG/1
250 TABLET ORAL DAILY
Refills: 0 | COMMUNITY
Start: 2019-04-24

## 2019-05-30 RX ORDER — PANTOPRAZOLE SODIUM 40 MG/1
40 TABLET, DELAYED RELEASE ORAL DAILY
COMMUNITY

## 2019-05-30 RX ORDER — TIZANIDINE HYDROCHLORIDE 4 MG/1
CAPSULE, GELATIN COATED ORAL
COMMUNITY

## 2019-05-30 RX ORDER — LORCASERIN HYDROCHLORIDE HEMIHYDRATE 20 MG/1
1 TABLET, FILM COATED, EXTENDED RELEASE ORAL DAILY
Refills: 2 | COMMUNITY
Start: 2019-05-09

## 2019-05-30 NOTE — PROGRESS NOTES
05/30/2019      David Wall DO  [address] [place] [address]    Eloisa Smith  1960    Chief Complaint   Patient presents with   • Establish Care     Referred by Dr. David Wall. Patient complains of leg swelling and has h/o bloot clots. Denies stroke like symptoms at present time.        Dear David Wall DO:      HPI  I had the pleasure of seeing your patient Eloisa Smith in the office today.  Thank you kindly for this consultation.  As you recall, Eloisa Smith is a 58 y.o.  female who you are currently following for routine health maintenance.  She is being referred due to venous insufficiency.  She has chronic leg swelling with chronic venous stasis to bilateral lower extremities.  She has a history of strep infection and follows with Dr. Mcgee.  She is on PCN daily.   She has gained weight and is now currently on Lasix.  She has had DVT to bilateral lower extremities.  She has a history of IVC filter 10 years ago.  She does wear compression stockings faithfully, but still has significant swelling and discomfort.    Past Medical History:   Diagnosis Date   • Anxiety    • Coronary artery disease without angina pectoris    • CVA (cerebral vascular accident) (CMS/[facility]) 1983    Secondary to Toxemia and Hypertension during Pregnancy    • Depression    • Diabetes type 2, controlled (CMS/[facility])    • Gallstones    • H/O gastric bypass 07/2013   • History of DVT (deep vein thrombosis) 2011   • History of palpitations    • Hx of CABG 2009    Dr Lenz cath patent grafts Dr Vicente 2014 LVEF 50%    • Hypertension    • Malabsorption     post operative   • Osteopenia    • Postmenopausal    • Reactive hypoglycemia    • Restrictive lung disease 2012   • Rheumatoid arthritis (CMS/[facility])    • Rosacea    • Sjogren's syndrome (CMS/[facility])    • Thrombus of venous dialysis catheter (CMS/[facility])    • TIA (transient ischemic attack)    • Ulcerative colitis (CMS/[facility]) 03/2018    Dr Medrano   • Venous stasis        Past Surgical  History:   Procedure Laterality Date   • ADENOIDECTOMY     • APPENDECTOMY     • CARDIAC CATHETERIZATION  03/2009   • CARDIAC CATHETERIZATION  11/2014    Dr Vicente LVEF 50% patent grafts   • COLONOSCOPY  06/2010   • CORONARY ARTERY BYPASS GRAFT  03/2009   • GASTRIC BYPASS  2012    hernia/cholecystectomy   • INCISION AND DRAINAGE ABSCESS Right 12/2009    Right leg I and D, streptococcal   • THORACOTOMY  07/2009   • TONSILLECTOMY     • TOTAL ABDOMINAL HYSTERECTOMY         Family History   Problem Relation Age of Onset   • Depression Mother    • COPD Mother    • Tuberculosis Mother    • Mental illness Mother    • Prostate cancer Father    • Coronary artery disease Father    • Heart disease Father        Social History     Socioeconomic History   • Marital status:      Spouse name: Not on file   • Number of children: Not on file   • Years of education: Not on file   • Highest education level: Not on file   Tobacco Use   • Smoking status: Former Smoker   • Smokeless tobacco: Never Used   Substance and Sexual Activity   • Alcohol use: No     Frequency: Never   • Drug use: No   • Sexual activity: Defer       Allergies   Allergen Reactions   • Citalopram Hydrobromide [Citalopram] Other (See Comments)     Sexual side effects     • Methotrexate Derivatives Other (See Comments)     Not Specified           Current Outpatient Medications:   •  abatacept (ORENCIA) 250 MG injection, Infuse  into a venous catheter., Disp: , Rfl:   •  BELVIQ XR 20 MG tablet sustained-release 24 hour, Take 1 tablet by mouth Daily., Disp: , Rfl: 2  •  folic acid (FOLVITE) 1 MG tablet, Take 1 mg by mouth Daily., Disp: , Rfl:   •  furosemide (LASIX) 40 MG tablet, Take 80 mg by mouth Daily., Disp: , Rfl:   •  hydroxychloroquine (PLAQUENIL) 200 MG tablet, Take 200 mg by mouth 2 (Two) Times a Day., Disp: , Rfl:   •  losartan (COZAAR) 25 MG tablet, Take 25 mg by mouth Daily., Disp: , Rfl:   •  Multiple Vitamins-Minerals (MULTIVITAMIN ADULT PO), Take   "by mouth Daily., Disp: , Rfl:   •  nystatin-triamcinolone (MYCOLOG II) 954661-5.1 UNIT/GM-% cream, Apply 1 application topically to the appropriate area as directed 2 (Two) Times a Day., Disp: , Rfl:   •  pantoprazole (PROTONIX) 40 MG EC tablet, Take 40 mg by mouth Daily., Disp: , Rfl:   •  penicillin v potassium (VEETID) 500 MG tablet, Take 250 mg by mouth Daily., Disp: , Rfl: 0  •  potassium chloride (MICRO-K) 10 MEQ CR capsule, Take 20 mEq by mouth Daily., Disp: , Rfl:   •  predniSONE (DELTASONE) 5 MG tablet, Take 5 mg by mouth Daily., Disp: , Rfl:   •  TiZANidine (ZANAFLEX) 4 MG capsule, tizanidine 4 mg capsule  1 at bedtime, Disp: , Rfl:       Review of Systems   Constitutional: Negative.    HENT: Negative.    Eyes: Negative.    Respiratory: Negative.    Cardiovascular: Positive for leg swelling.   Gastrointestinal: Negative.    Endocrine: Negative.    Genitourinary: Negative.    Musculoskeletal: Negative.    Skin: Negative.    Allergic/Immunologic: Negative.    Neurological: Negative.    Hematological: Negative.    Psychiatric/Behavioral: Negative.        /72 (BP Location: Right arm, Patient Position: Sitting, Cuff Size: Adult)   Pulse 53   Ht 170.2 cm (67\")   Wt 102 kg (223 lb 12.8 oz)   SpO2 97%   BMI 35.05 kg/m²   Physical Exam   Constitutional: She is oriented to person, place, and time. She appears well-developed and well-nourished. No distress.   HENT:   Head: Normocephalic and atraumatic.   Mouth/Throat: Oropharynx is clear and moist.   Eyes: Pupils are equal, round, and reactive to light. No scleral icterus.   Neck: Normal range of motion. Neck supple. No JVD present. Carotid bruit is not present. No thyromegaly present.   Cardiovascular: Normal rate, regular rhythm, S2 normal, normal heart sounds, intact distal pulses and normal pulses. Exam reveals no gallop and no friction rub.   No murmur heard.  Leg swelling BLE   Pulmonary/Chest: Effort normal and breath sounds normal.   Abdominal: " Soft. Normal aorta and bowel sounds are normal. There is no hepatosplenomegaly.   Musculoskeletal: Normal range of motion.   Neurological: She is alert and oriented to person, place, and time. No cranial nerve deficit.   Skin: Skin is warm and dry. She is not diaphoretic.   Psychiatric: She has a normal mood and affect. Her behavior is normal. Judgment and thought content normal.   Nursing note and vitals reviewed.      No results found.    Patient Active Problem List   Diagnosis   • Hypertension   • Diabetes type 2, controlled (CMS/HCC)         ICD-10-CM ICD-9-CM   1. Leg swelling M79.89 729.81   2. Essential hypertension I10 401.9   3. History of DVT (deep vein thrombosis) Z86.718 V12.51   4. Lymphedema I89.0 457.1       Plan: After thoroughly evaluating Eloisa Smith, I believe the best course of action is to proceed with a venous valvular insufficiency study.  We will see her back in 1 week to review her testing.  She may be a great candidate for endovenous closure.  I also think she would be a great candidate for home lymphedema pumps.  We will get her set up with these within the next 2 weeks.  She currently wears stockings with very minimal relief.  Her swelling is definitely multifactorial.  I also asked her to start taking an enteric-coated baby aspirin daily.  The patient can continue taking their current medication regimen as previously planned.  This was all discussed in full with complete understanding.    Thank you for allowing me to participate in the care of your patient.  Please do not hesitate with any questions or concerns.  I will keep you aware of any further encounters with Eloisa Smith.        Sincerely yours,         Faheem Maddox, DO         Scribed for Dr. Faheem Maddox by Shayy GOMEZ

## 2019-06-06 ENCOUNTER — HOSPITAL ENCOUNTER (OUTPATIENT)
Dept: ULTRASOUND IMAGING | Facility: HOSPITAL | Age: 59
Discharge: HOME | End: 2019-06-06
Admitting: SURGERY

## 2019-06-06 DIAGNOSIS — M79.89 LEG SWELLING: ICD-10-CM

## 2019-06-06 PROCEDURE — 93970 EXTREMITY STUDY: CPT | Performed by: SURGERY

## 2019-06-06 PROCEDURE — 93970 EXTREMITY STUDY: CPT

## 2019-06-11 ENCOUNTER — TELEPHONE (OUTPATIENT)
Dept: VASCULAR SURGERY | Facility: CLINIC | Age: 59
End: 2019-06-11

## 2019-06-11 NOTE — TELEPHONE ENCOUNTER
Left message reminding  of her appointment for Wednesday, June 12th, 2019 at 315 pm with Shayy GOMEZ. Also advised if she had any questions or needed to reschedule to please call the office at [phone].

## 2019-06-12 ENCOUNTER — OFFICE VISIT (OUTPATIENT)
Dept: VASCULAR SURGERY | Facility: CLINIC | Age: 59
End: 2019-06-12

## 2019-06-12 VITALS
OXYGEN SATURATION: 97 % | SYSTOLIC BLOOD PRESSURE: 120 MMHG | DIASTOLIC BLOOD PRESSURE: 80 MMHG | WEIGHT: 228.4 LBS | HEIGHT: 67 IN | HEART RATE: 49 BPM | BODY MASS INDEX: 35.85 KG/M2

## 2019-06-12 DIAGNOSIS — I87.2 VENOUS INSUFFICIENCY: Primary | ICD-10-CM

## 2019-06-12 DIAGNOSIS — I10 ESSENTIAL HYPERTENSION: ICD-10-CM

## 2019-06-12 DIAGNOSIS — I89.0 LYMPHEDEMA: ICD-10-CM

## 2019-06-12 PROCEDURE — 99214 OFFICE O/P EST MOD 30 MIN: CPT | Performed by: NURSE PRACTITIONER

## 2019-06-12 NOTE — PROGRESS NOTES
"6/12/2019       David Wall,   [address] [place] [address]    Eloisa Smith  1960    Chief Complaint   Patient presents with   • Follow-up     2 Week Follow Up For Leg Swelling. Test 958389 US pad venous lower ext bilat. Patient denies any stroke like symptoms.        Dear David Wall,        HPI  I had the pleasure of seeing your patient Eloisa Smith in the office today.   As you recall, Eloisa Smith is a 58 y.o.  female who you are currently following for routine health maintenance.  She is being referred due to venous insufficiency.  She has chronic leg swelling with chronic venous stasis to bilateral lower extremities.  She has a history of strep infection and follows with Dr. Mcgee.  She is on PCN daily.   She has gained weight and is now currently on Lasix.  She has had DVT to bilateral lower extremities.  She has a history of IVC filter 10 years ago.  She does wear compression stockings faithfully, but still has significant swelling and discomfort.  She did have noninvasive testing performed, which I did review in office.      Review of Systems   Constitutional: Negative.    HENT: Negative.    Eyes: Negative.    Respiratory: Negative.    Cardiovascular: Positive for leg swelling.   Gastrointestinal: Negative.    Endocrine: Negative.    Genitourinary: Negative.    Musculoskeletal: Negative.    Skin: Negative.    Allergic/Immunologic: Negative.    Neurological: Negative.    Hematological: Negative.    Psychiatric/Behavioral: Negative.        /80 (BP Location: Right arm, Patient Position: Sitting, Cuff Size: Adult)   Pulse (!) 49   Ht 170.2 cm (67\")   Wt 104 kg (228 lb 6.4 oz)   SpO2 97%   BMI 35.77 kg/m²   Physical Exam   Constitutional: She is oriented to person, place, and time. She appears well-developed and well-nourished. No distress.   HENT:   Head: Normocephalic and atraumatic.   Mouth/Throat: Oropharynx is clear and moist.   Eyes: Pupils are equal, round, and reactive to " light. No scleral icterus.   Neck: Normal range of motion. Neck supple. No JVD present. Carotid bruit is not present. No thyromegaly present.   Cardiovascular: Normal rate, regular rhythm, S2 normal, normal heart sounds, intact distal pulses and normal pulses. Exam reveals no gallop and no friction rub.   No murmur heard.  Leg swelling BLE   Pulmonary/Chest: Effort normal and breath sounds normal.   Abdominal: Soft. Normal aorta and bowel sounds are normal. There is no hepatosplenomegaly.   Musculoskeletal: Normal range of motion.   Neurological: She is alert and oriented to person, place, and time. No cranial nerve deficit.   Skin: Skin is warm and dry. She is not diaphoretic.   Psychiatric: She has a normal mood and affect. Her behavior is normal. Judgment and thought content normal.   Nursing note and vitals reviewed.      Us Venous Doppler Lower Extremity Bilateral (duplex)    Result Date: 6/7/2019  Narrative: History: Swelling   Comments: Venous valvular insufficiency testing was performed in the bilateral lower extremities using duplex ultrasound with compression techniques.  The common femoral vein, superficial femoral, popliteal vein, posterior tibial vein, peroneal vein, greater saphenous vein, and lesser saphenous veins were interrogated.  On the right, the greater saphenous vein at the junction measured 6.3mm. In the mid thigh measured 2.2mm. Above the knee measured 2.6mm. In the mid calf measured 2.1mm. At the ankle measured 3.2mm. The native greater saphenous vein was previously harvested from the mid thigh to the mid calf. There is a branch that runs superficial several branches and is difficult to follow. There is greater than 0.5 seconds of reflux in this branch from just above the knee to the ankle.. The lesser saphenous vein is within normal limits and no evidence of reflux. There is no evidence of DVT.  On the left, the greater saphenous vein at the junction measured 6.4mm. In the mid thigh  measured 3.3mm. Above the knee measured 4mm. In the mid calf measured 2.1mm. At the ankle measured 3.6mm. There is greater than 0.5 seconds of reflux at the junction and from the mid calf to the ankle.The lesser saphenous vein is within normal limits and no evidence of reflux. There is no evidence of DVT.      Impression: Impression: There is evidence of venous insufficiency from just above the knee to the ankle in the right lower extremity greater saphenous vein. The native greater saphenous vein from the mid thigh to the mid calf has previously been harvested. There is also evidence of reflux in the left lower extremity greater saphenous vein.   This report was finalized on 06/07/2019 14:10 by Dr. Faheem Maddox MD.      Patient Active Problem List   Diagnosis   • Hypertension   • Diabetes type 2, controlled (CMS/[facility])         ICD-10-CM ICD-9-CM   1. Venous insufficiency I87.2 459.81   2. Lymphedema I89.0 457.1   3. Essential hypertension I10 401.9       Plan: After thoroughly evaluating Eloisa Smith, I believe the best course of action is to remain conservative from vascular standpoint.  She does have mild venous insufficiency to her left lower extremity.  She denies any other symptoms of venous insufficiency aside from swelling.  Her swelling appears more consistent with lymphedema.  We will see her back in 6 months for continued surveillance.  She currently wears stockings with very minimal relief.  Her swelling is definitely multifactorial.  I also asked her to start taking an enteric-coated baby aspirin daily.  The patient can continue taking their current medication regimen as previously planned.  This was all discussed in full with complete understanding.    Chronic venous insufficiency has been addressed, no wounds forming and lymphedema needs to be targeted specifically and treated with the pump.  The patient will be unable to use basic pump due to sensitivity and pain caused by high pressures and  increase in proximal fluid due to lack of proximal clearance of the lymphatic system from a basic pump.    Thank you for allowing me to participate in the care of your patient.  Please do not hesitate with any questions or concerns.  I will keep you aware of any further encounters with Eloisa Smith.        Sincerely yours,         PATRICIA Sainz

## 2019-06-30 DIAGNOSIS — I25.5 ISCHEMIC CARDIOMYOPATHY: ICD-10-CM

## 2019-06-30 DIAGNOSIS — I25.10 CORONARY ARTERY DISEASE INVOLVING NATIVE CORONARY ARTERY OF NATIVE HEART WITHOUT ANGINA PECTORIS: ICD-10-CM

## 2019-06-30 DIAGNOSIS — I27.20 PULMONARY HYPERTENSION (CMD): ICD-10-CM

## 2019-07-01 RX ORDER — HYDRALAZINE HYDROCHLORIDE 25 MG/1
TABLET, FILM COATED ORAL
Qty: 180 TABLET | Refills: 0 | Status: SHIPPED | OUTPATIENT
Start: 2019-07-01

## 2019-07-02 ENCOUNTER — TRANSCRIBE ORDERS (OUTPATIENT)
Dept: ADMINISTRATIVE | Facility: HOSPITAL | Age: 59
End: 2019-07-02

## 2019-07-02 DIAGNOSIS — I10 ESSENTIAL (PRIMARY) HYPERTENSION: Primary | ICD-10-CM

## 2019-07-03 ENCOUNTER — TRANSCRIBE ORDERS (OUTPATIENT)
Dept: ADMINISTRATIVE | Facility: HOSPITAL | Age: 59
End: 2019-07-03

## 2019-07-03 DIAGNOSIS — Z12.39 SCREENING BREAST EXAMINATION: Primary | ICD-10-CM

## 2019-07-10 ENCOUNTER — HOSPITAL ENCOUNTER (OUTPATIENT)
Dept: NEUROLOGY | Age: 59
Discharge: HOME | End: 2019-07-10
Payer: COMMERCIAL

## 2019-07-10 PROCEDURE — 95886 MUSC TEST DONE W/N TEST COMP: CPT

## 2019-07-10 PROCEDURE — 95886 MUSC TEST DONE W/N TEST COMP: CPT | Performed by: PSYCHIATRY & NEUROLOGY

## 2019-07-10 PROCEDURE — 95911 NRV CNDJ TEST 9-10 STUDIES: CPT

## 2019-07-10 PROCEDURE — 95911 NRV CNDJ TEST 9-10 STUDIES: CPT | Performed by: PSYCHIATRY & NEUROLOGY

## 2019-07-11 ENCOUNTER — HOSPITAL ENCOUNTER (OUTPATIENT)
Dept: MAMMOGRAPHY | Facility: HOSPITAL | Age: 59
Discharge: HOME | End: 2019-07-11
Admitting: INTERNAL MEDICINE

## 2019-07-11 DIAGNOSIS — Z12.39 SCREENING BREAST EXAMINATION: ICD-10-CM

## 2019-07-11 PROCEDURE — 77063 BREAST TOMOSYNTHESIS BI: CPT

## 2019-07-11 PROCEDURE — 77067 SCR MAMMO BI INCL CAD: CPT

## 2019-08-19 ENCOUNTER — OFFICE VISIT (OUTPATIENT)
Dept: NEUROLOGY | Age: 59
End: 2019-08-19
Payer: COMMERCIAL

## 2019-08-19 VITALS
SYSTOLIC BLOOD PRESSURE: 144 MMHG | BODY MASS INDEX: 33.59 KG/M2 | HEART RATE: 47 BPM | DIASTOLIC BLOOD PRESSURE: 75 MMHG | HEIGHT: 67 IN | WEIGHT: 214 LBS

## 2019-08-19 DIAGNOSIS — Z86.79 HISTORY OF HYPERTENSION: ICD-10-CM

## 2019-08-19 DIAGNOSIS — M79.602 LEFT ARM PAIN: ICD-10-CM

## 2019-08-19 DIAGNOSIS — G62.9 NEUROPATHY: Primary | ICD-10-CM

## 2019-08-19 PROCEDURE — 99204 OFFICE O/P NEW MOD 45 MIN: CPT | Performed by: PSYCHIATRY & NEUROLOGY

## 2019-08-19 RX ORDER — POTASSIUM CHLORIDE 750 MG/1
1 CAPSULE, EXTENDED RELEASE ORAL DAILY
COMMUNITY

## 2019-08-19 RX ORDER — TIZANIDINE HYDROCHLORIDE 4 MG/1
1 CAPSULE, GELATIN COATED ORAL NIGHTLY
COMMUNITY

## 2019-08-19 RX ORDER — PENICILLIN V POTASSIUM 500 MG/1
1 TABLET ORAL DAILY
Refills: 5 | COMMUNITY
Start: 2019-08-01 | End: 2022-07-21

## 2019-08-19 RX ORDER — SIMVASTATIN 10 MG
1 TABLET ORAL DAILY
COMMUNITY
End: 2022-07-21

## 2019-08-20 NOTE — PROGRESS NOTES
Gallstones     Grief reaction     H/O superior vena cava filter placement     MRSA (methicillin resistant Staphylococcus aureus)     Neuropathy     Obesity     Pseudarthrosis sternal    RA (rheumatoid arthritis) (HCC)     Rosacea     Sinus bradycardia     TIA (transient ischemic attack)     Venous thromboembolism        Past Surgical History:   Procedure Laterality Date    ADENOIDECTOMY      APPENDECTOMY      CARDIAC CATHETERIZATION  3/2009    CARDIAC CATHETERIZATION  11/5/14  JDT    EF 50%, patent bypass grafts    CHOLECYSTECTOMY  2011    COLONOSCOPY  06/03/2010    Dr. Ankit Watson: distal colon having ulcerative lesions c/w UC    COLONOSCOPY  2009    pancolitis    COLONOSCOPY      CORONARY ARTERY BYPASS GRAFT  3/2009    Dr Elgin Cornejo, JERONIMO to LAD, SVGs to OM & PDA    GASTRIC BYPASS SURGERY  2012    HIATAL HERNIA REPAIR  2011    HYSTERECTOMY      KNEE SURGERY      [place] COLONOSCOPY FLX DX W/COLLJ SPEC WHEN PFRMD N/A 3/12/2018    Dr Garcia Even rectal inflammation consistent with U.C.-Active proctitis--3 yr recall   [facility] ENDOSCOPY  2010    Dr. Antonio Lora  2012       Family History   Problem Relation Age of Onset    Prostate Cancer Father     Heart Failure Father     Cancer Father     Colon Cancer Neg Hx     Colon Polyps Neg Hx     Liver Cancer Neg Hx     Liver Disease Neg Hx     Esophageal Cancer Neg Hx     Rectal Cancer Neg Hx     Stomach Cancer Neg Hx        Allergies   Allergen Reactions    Methotrexate Derivatives        Social History     Socioeconomic History    Marital status:      Spouse name: Not on file    Number of children: Not on file    Years of education: Not on file    Highest education level: Not on file   Occupational History    Not on file   Social Needs    Financial resource strain: Not on file    Food insecurity:     Worry: Not on file     Inability: Not on file   Norse needs:     Medical: Not on file     Non-medical: Not on file   Tobacco Use    Smoking status: Former Smoker     Packs/day: 2.00     Years: 32.00     Pack years: 64.00     Types: Cigarettes     Last attempt to quit: 3/3/2009     Years since quitting: 10.4    Smokeless tobacco: Never Used   Substance and Sexual Activity    Alcohol use: Yes     Comment: Socially    Drug use: No    Sexual activity: Not on file   Lifestyle    Physical activity:     Days per week: Not on file     Minutes per session: Not on file    Stress: Not on file   Relationships    Social connections:     Talks on phone: Not on file     Gets together: Not on file     Attends Confucianist service: Not on file     Active member of club or organization: Not on file     Attends meetings of clubs or organizations: Not on file     Relationship status: Not on file    Intimate partner violence:     Fear of current or ex partner: Not on file     Emotionally abused: Not on file     Physically abused: Not on file     Forced sexual activity: Not on file   Other Topics Concern    Not on file   Social History Narrative    Not on file       Review of Systems       Constitutional: []Fever []Sweats []Chills [] Recent Injury   [x] Denies all unless marked  HENT:[]Headache  [] Head Injury  [] Sore Throat  [] Ear Pain  [] Dizziness [] Hearing Loss   [x] Denies all unless marked  Spine:  [] Neck pain  [] Back pain  [] Sciaticia  [x] Denies all unless marked  Cardiovascular:[]Chest Pain []Palpitations [] Heart Disease  [x] Denies all unless marked  Pulmonary: []Shortness of Breath []Cough   [x] Denies all unless marked  Gastrointestinal:  []Abdominal Pain  []Blood in Stool  []Diarrhea []Constipation []Nausea  []Vomiting  [x] Denies all unless marked  Genitourinary:  [] Dysuria [] Frequency  [] Incontinence [] Urgency   [x] Denies all unless marked  Musculoskeletal: [] Arthralgia  [] Myalgias [] Muscle cramps  [] Muscle twitches   [x] Denies

## 2019-10-03 ENCOUNTER — TELEPHONE (OUTPATIENT)
Dept: NEUROLOGY | Age: 59
End: 2019-10-03

## 2019-11-22 DIAGNOSIS — I27.20 PULMONARY HYPERTENSION (CMD): ICD-10-CM

## 2019-11-22 DIAGNOSIS — I25.5 ISCHEMIC CARDIOMYOPATHY: ICD-10-CM

## 2019-11-22 DIAGNOSIS — I25.10 CORONARY ARTERY DISEASE INVOLVING NATIVE CORONARY ARTERY OF NATIVE HEART WITHOUT ANGINA PECTORIS: ICD-10-CM

## 2019-11-22 RX ORDER — HYDRALAZINE HYDROCHLORIDE 25 MG/1
TABLET, FILM COATED ORAL
Qty: 180 TABLET | Refills: 0 | OUTPATIENT
Start: 2019-11-22

## 2019-12-03 ENCOUNTER — TELEPHONE (OUTPATIENT)
Dept: VASCULAR SURGERY | Facility: CLINIC | Age: 59
End: 2019-12-03

## 2019-12-03 NOTE — TELEPHONE ENCOUNTER
Left message reminding  of her appointment for Wednesday, December 4th, 2019 at 3 pm with Shayy GOMEZ. Also advised if she had any questions or need to reschedule to please call the office at [phone].

## 2019-12-04 ENCOUNTER — TELEPHONE (OUTPATIENT)
Dept: VASCULAR SURGERY | Facility: CLINIC | Age: 59
End: 2019-12-04

## 2019-12-04 NOTE — TELEPHONE ENCOUNTER
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 PT IS STILL LOSING WEIGHT AND HER PCP TOLD HER TO CONTINUE TO DO WHAT SHE IS DOING. PT WILL CALL US IF NEEDED.

## 2019-12-16 ENCOUNTER — TRANSCRIBE ORDERS (OUTPATIENT)
Dept: ADMINISTRATIVE | Facility: HOSPITAL | Age: 59
End: 2019-12-16

## 2019-12-16 ENCOUNTER — HOSPITAL ENCOUNTER (OUTPATIENT)
Dept: CT IMAGING | Facility: HOSPITAL | Age: 59
Discharge: HOME | End: 2019-12-16
Admitting: INTERNAL MEDICINE

## 2019-12-16 DIAGNOSIS — M50.90 CERVICAL DISC DISEASE: Primary | ICD-10-CM

## 2019-12-16 DIAGNOSIS — M50.90 CERVICAL DISC DISEASE: ICD-10-CM

## 2019-12-16 DIAGNOSIS — M51.16 LUMBAR DISC DISEASE WITH RADICULOPATHY: ICD-10-CM

## 2019-12-16 PROCEDURE — 72125 CT NECK SPINE W/O DYE: CPT

## 2019-12-16 PROCEDURE — 72131 CT LUMBAR SPINE W/O DYE: CPT

## 2020-03-19 ENCOUNTER — HOSPITAL ENCOUNTER (OUTPATIENT)
Dept: GENERAL RADIOLOGY | Facility: HOSPITAL | Age: 60
Discharge: HOME | End: 2020-03-19
Admitting: INTERNAL MEDICINE

## 2020-03-19 ENCOUNTER — TRANSCRIBE ORDERS (OUTPATIENT)
Dept: GENERAL RADIOLOGY | Facility: HOSPITAL | Age: 60
End: 2020-03-19

## 2020-03-19 DIAGNOSIS — I25.10 DISEASE OF CARDIOVASCULAR SYSTEM: ICD-10-CM

## 2020-03-19 DIAGNOSIS — I25.10 DISEASE OF CARDIOVASCULAR SYSTEM: Primary | ICD-10-CM

## 2020-03-19 PROCEDURE — 71046 X-RAY EXAM CHEST 2 VIEWS: CPT

## 2022-06-02 ENCOUNTER — TRANSCRIBE ORDERS (OUTPATIENT)
Dept: ADMINISTRATIVE | Facility: HOSPITAL | Age: 62
End: 2022-06-02

## 2022-06-02 ENCOUNTER — HOSPITAL ENCOUNTER (OUTPATIENT)
Dept: GENERAL RADIOLOGY | Facility: HOSPITAL | Age: 62
Discharge: HOME | End: 2022-06-02
Admitting: INTERNAL MEDICINE

## 2022-06-02 DIAGNOSIS — M25.552 LEFT HIP PAIN: ICD-10-CM

## 2022-06-02 DIAGNOSIS — M54.50 BILATERAL LOW BACK PAIN, UNSPECIFIED CHRONICITY, UNSPECIFIED WHETHER SCIATICA PRESENT: ICD-10-CM

## 2022-06-02 DIAGNOSIS — M25.552 LEFT HIP PAIN: Primary | ICD-10-CM

## 2022-06-02 PROCEDURE — 73502 X-RAY EXAM HIP UNI 2-3 VIEWS: CPT

## 2022-06-02 PROCEDURE — 72100 X-RAY EXAM L-S SPINE 2/3 VWS: CPT

## 2022-07-14 ENCOUNTER — HOSPITAL ENCOUNTER (OUTPATIENT)
Dept: NON INVASIVE DIAGNOSTICS | Age: 62
Discharge: HOME | End: 2022-07-14
Payer: COMMERCIAL

## 2022-07-14 DIAGNOSIS — R07.89 OTHER CHEST PAIN: ICD-10-CM

## 2022-07-14 PROCEDURE — 93306 TTE W/DOPPLER COMPLETE: CPT

## 2022-07-14 PROCEDURE — 93350 STRESS TTE ONLY: CPT

## 2022-07-20 ENCOUNTER — TELEPHONE (OUTPATIENT)
Dept: CARDIOLOGY CLINIC | Age: 62
End: 2022-07-20

## 2022-07-21 ENCOUNTER — OFFICE VISIT (OUTPATIENT)
Dept: CARDIOLOGY CLINIC | Age: 62
End: 2022-07-21
Payer: COMMERCIAL

## 2022-07-21 VITALS
DIASTOLIC BLOOD PRESSURE: 80 MMHG | SYSTOLIC BLOOD PRESSURE: 180 MMHG | HEART RATE: 60 BPM | WEIGHT: 200 LBS | BODY MASS INDEX: 31.32 KG/M2

## 2022-07-21 DIAGNOSIS — I25.10 CORONARY ARTERY DISEASE INVOLVING NATIVE CORONARY ARTERY OF NATIVE HEART, UNSPECIFIED WHETHER ANGINA PRESENT: Primary | ICD-10-CM

## 2022-07-21 DIAGNOSIS — I10 HYPERTENSION, UNSPECIFIED TYPE: ICD-10-CM

## 2022-07-21 DIAGNOSIS — E78.2 MIXED HYPERLIPIDEMIA: ICD-10-CM

## 2022-07-21 DIAGNOSIS — R09.89 RIGHT CAROTID BRUIT: ICD-10-CM

## 2022-07-21 DIAGNOSIS — R07.9 CHEST PAIN, UNSPECIFIED TYPE: ICD-10-CM

## 2022-07-21 DIAGNOSIS — R60.9 EDEMA, UNSPECIFIED TYPE: ICD-10-CM

## 2022-07-21 DIAGNOSIS — Z95.1 HX OF CABG: ICD-10-CM

## 2022-07-21 PROCEDURE — 99214 OFFICE O/P EST MOD 30 MIN: CPT | Performed by: NURSE PRACTITIONER

## 2022-07-21 PROCEDURE — 93000 ELECTROCARDIOGRAM COMPLETE: CPT | Performed by: NURSE PRACTITIONER

## 2022-07-21 RX ORDER — FUROSEMIDE 80 MG
TABLET ORAL
COMMUNITY
Start: 2022-05-16

## 2022-07-21 RX ORDER — FOLIC ACID 1 MG/1
1 TABLET ORAL DAILY
COMMUNITY

## 2022-07-21 RX ORDER — DULAGLUTIDE 1.5 MG/.5ML
INJECTION, SOLUTION SUBCUTANEOUS
COMMUNITY
Start: 2022-07-07

## 2022-07-21 NOTE — PROGRESS NOTES
[facility], [place]. [address] [facility]  [phone] office  [phone] fax      OFFICE VISIT:  2022    Pearl Goldman - : 1960  Reason For Visit:  Yane Jurado is a 64 y.o. female who is here for New Patient (Patient has been complaining of chest pain. ), Hypertension, and Coronary Artery Disease    History:   Diagnosis Orders   1. Coronary artery disease involving native coronary artery of native heart, unspecified whether angina present        2. Hypertension, unspecified type        3. Mixed hyperlipidemia        4. Hx of CABG          The patient presents today for cardiology follow up. She has  a hx of CABG  by Dr. Paty Eaton. Her last cath in  by Dr. Deepika Salmon showed graft patency. She reports oset of atypical chest pain this past March. The pain seems to be random lasting about 2 minutes. She reports associated TOMLINSON as well. She reports \"I am having so much pain and problems with my left hip and back that I just cannot do much. As a result, I have gained 20 pounds. I just have to get something done. \"  Dr. Nicolás Suazo ordered a stress echo wich was non diagnostic due to baseline abnormal EKG and failure to achieve THR. BP is elevated today. The patient's PCP monitors cholesterol. The patient stopped smoking in . She reports having gastric bypass surgery in the past.  She has chronic lower extremity edema on Lasix. Subjective  Smiley denies orthopnea, paroxysmal nocturnal dyspnea, syncope, presyncope, sensed arrhythmia and fatigue. The patient denies numbness or weakness to suggest cerebrovascular accident or transient ischemic attack.  + random chest pain lasting no more than 2 minutes. + shortness of breath. + chronic bilateral lower extremity edema.      Pearl Goldman has the following history as recorded in [employer]:  Patient Active Problem List   Diagnosis Code    Heartburn R12    Indigestion K30 Nausea R11.0    Morbid obesity (HCC) E66.01    Anticoagulated on warfarin Z79.01    Ulcerative colitis ([facility] K51.90    Hypertension I10    Hyperlipidemia E78.5    MI (myocardial infarction) ([facility] I21.9    ASCVD (arteriosclerotic cardiovascular disease) I25.10    CAD (coronary artery disease) I25.10     Past Medical History:   Diagnosis Date    Anxiety     ASCVD (arteriosclerotic cardiovascular disease)     Carrier of group B Streptococcus     Cellulitis     Colitis     COPD (chronic obstructive pulmonary disease) ([facility])     Costochondritis     CVA (cerebral vascular accident) ([facility]     Depression     Edema     Fracture of sternum     non union post surgery.      Gallstones     Grief reaction     H/O superior vena cava filter placement     Hyperlipidemia     Hypertension     MI (myocardial infarction) ([facility]     MRSA (methicillin resistant Staphylococcus aureus)     Neuropathy     Obesity     Pseudarthrosis sternal    RA (rheumatoid arthritis) ([facility]     Rosacea     Sinus bradycardia     TIA (transient ischemic attack)     Venous thromboembolism      Past Surgical History:   Procedure Laterality Date    ADENOIDECTOMY      APPENDECTOMY      CARDIAC CATHETERIZATION  3/2009    CARDIAC CATHETERIZATION  11/5/14  JDT    EF 50%, patent bypass grafts    CHOLECYSTECTOMY  2011    COLONOSCOPY  06/03/2010    Dr. Keisha Miguel: distal colon having ulcerative lesions c/w UC    COLONOSCOPY  2009    pancolitis    COLONOSCOPY      CORONARY ARTERY BYPASS GRAFT  3/2009    PHANI Talavera to LAD, SVGs to OM & PDA    GASTRIC BYPASS SURGERY  2012    HIATAL HERNIA REPAIR  2011    HYSTERECTOMY (CERVIX STATUS UNKNOWN)      KNEE SURGERY      ID COLONOSCOPY FLX DX W/COLLJ SPEC WHEN PFRMD N/A 3/12/2018    Dr Mary Grimes rectal inflammation consistent with U.C.-Active proctitis--3 yr recall    Oneyda Anderson ENDOSCOPY  2010    Dr. Martin Beams  2012     Family History Problem Relation Age of Onset    Prostate Cancer Father     Heart Failure Father     Cancer Father     Colon Cancer Neg Hx     Colon Polyps Neg Hx     Liver Cancer Neg Hx     Liver Disease Neg Hx     Esophageal Cancer Neg Hx     Rectal Cancer Neg Hx     Stomach Cancer Neg Hx      Social History     Tobacco Use    Smoking status: Former     Packs/day: 2.00     Years: 32.00     Pack years: 64.00     Types: Cigarettes     Quit date: 3/3/2009     Years since quittin.3    Smokeless tobacco: Never   Substance Use Topics    Alcohol use: Yes     Comment: Socially      Current Outpatient Medications   Medication Sig Dispense Refill    furosemide (LASIX) 80 MG tablet TAKE 1 TABLET BY MOUTH EVERY DAY      folic acid (FOLVITE) 1 MG tablet Take 1 mg by mouth in the morning. potassium chloride (MICRO-K) 10 MEQ extended release capsule Take 1 capsule by mouth daily      tiZANidine (ZANAFLEX) 4 MG capsule Take 1 capsule by mouth nightly      nitroGLYCERIN (NITROSTAT) 0.4 MG SL tablet Place 1 tablet under the tongue every 5 minutes as needed for Chest pain 25 tablet 3    abatacept (ORENCIA) 250 MG injection Infuse intravenously once      Multiple Vitamins-Minerals (MULTIVITAMIN PO) Take  by mouth daily. hydroxychloroquine (PLAQUENIL) 200 MG tablet Take 200 mg by mouth 2 times daily. metoprolol (TOPROL-XL) 25 MG XL tablet Take 12.5 mg by mouth in the morning. TRULICITY 1.5 PA/9.6FM SOPN INJECT ONE PEN SUBCUTANEOUSLY ONCE WEEKLY       No current facility-administered medications for this visit. Allergies: Methotrexate derivatives    Review of Systems  Constitutional - no appetite change, or unexpected weight change. No fever, chills or diaphoresis. No significant change in activity level or new onset of fatigue. HEENT - no significant rhinorrhea or epistaxis. No tinnitus or significant hearing loss. Eyes - no sudden vision change or amaurosis.  No corneal arcus, xantholasma, subconjunctival hemorrhage or discharge. Respiratory - no significant wheezing, stridor, apnea or cough. + shortness of breath. Cardiovascular - no orthopnea or PND. No sensation of sustained arrythmia. No occurrence of slow heart rate. No palpitations. No claudication. + random chest pain lasting no more than 2 minutes. Gastrointestinal - no abdominal swelling or pain. No blood in stool. No severe constipation, diarrhea, nausea, or vomiting. Genitourinary - no dysuria, frequency, or urgency. No flank pain or hematuria. Musculoskeletal - no myalgia. No problems with gait.+ LBP and left hip pain. Extremities - no clubbing, or cyanosis. + chronic bilateral lower extremity edema. Skin - no color change or rash. No pallor. No new surgical incision. Neurologic - no speech difficulty, facial asymmetry or lateralizing weakness. No seizures, presyncope or syncope. No significant dizziness. Hematologic - no easy bruising or excessive bleeding. Psychiatric - no severe anxiety or insomnia. No confusion. All other review of systems are negative. Objective  Vital Signs - BP (!) 180/80   Pulse 60   Wt 200 lb (90.7 kg)   BMI 31.32 kg/m²   General - Smiley is alert, cooperative, and pleasant. Well groomed. No acute distress. Body habitus - Body mass index is 31.32 kg/m². HEENT - Head is normocephalic. No circumoral cyanosis. Dentition is normal.  EYES -   Lids normal without ptosis. No discharge, edema or subconjunctival hemorrhage. Neck - Symmetrical without apparent mass or lymphadenopathy. Respiratory - Normal respiratory effort without use of accessory muscles. Ausculatation reveals vesicular breath sounds without crackles, wheezes, rub or rhonchi. Cardiovascular - No jugular venous distention. Auscultation reveals regular rate and rhythm. No audible clicks, gallop or rub. 1/6 systolic murmur. No lower extremity varicosities. Soft right carotid bruit noted.     Abdominal -  No visible distention, mass or pulsations. Extremities - No clubbing or cyanosis. No statis dermatitis or ulcers. 2+ pitting bilateral lower extremity edema. Musculoskeletal -   No Osler's nodes. No kyphosis or scoliosis. Gait is even and regular without limp or shuffle. Ambulates without assistance. Skin -  Warm and dry; no rash or pallor. No new surgical wound. Neurological - No focal neurological deficits. Thought processes coherent. No apparent tremor. Oriented to person, place and time. Psychiatric -  Appropriate affect and mood. Data reviewed:  7/14/22 SE   Stress echocardiogram without clinical or echocardiographic evidence of   myocardial ischemia. Abnormal baseline ST-T waves which normalize during  stress and show accentuation late in recovery. Test was supervised by Dr. John Hooker. Electronically signed by Candida Barriga MD(Interpreting physician)  on 07/14/2022 10:43 AM      EKG today reviewed: NSR 60 bpm; incomplete RBBB; no ectopy or acute ischemic changes. BP Readings from Last 3 Encounters:   07/21/22 (!) 180/80   08/19/19 (!) 144/75   04/10/18 (!) 142/90    Pulse Readings from Last 3 Encounters:   07/21/22 60   08/19/19 (!) 47   04/10/18 56        Wt Readings from Last 3 Encounters:   07/21/22 200 lb (90.7 kg)   08/19/19 214 lb (97.1 kg)   04/10/18 246 lb (111.6 kg)     Assessment/Plan:   Diagnosis Orders   1. Coronary artery disease involving native coronary artery of native heart, unspecified whether angina present  NM MYOCARDIAL SPECT REST EXERCISE OR RX    ECHO Complete 2D W Doppler W Color      2. Hypertension, unspecified type  NM MYOCARDIAL SPECT REST EXERCISE OR RX    ECHO Complete 2D W Doppler W Color      3. Mixed hyperlipidemia        4. Hx of CABG        5. Chest pain, unspecified type  NM MYOCARDIAL SPECT REST EXERCISE OR RX      6. Edema, unspecified type  ECHO Complete 2D W Doppler W Color      7.  Right carotid bruit  VL DUP CAROTID BILATERAL        CAD s/p CABG 2009 - cath 2014 showed graft patency -   Recent SE non diagnostic due to baseline abnormal EKG and failure to achieve THR. Based upon SOA - schedule Lexiscan rx. Chest pain is atypical, random lasting no more than 2 minutes. Advised to start Imdur as prescribed by PCP. HTN - elevated today at 180/80. Goal less than 130/80. Patient currenlty taking Toprol XL and Lasix. Adding Imdur may be of some benefit. Plan to have patient log at home. Hyperlipidemia - monitored and managed by PCP. Chronic edema legs - reported as stable - continues on Lasix 80 mg daily. Plan to check a 2D echo to assess cardiac structure. Right carotid bruit - schedule carotid ultrasound. Patient is compliant with medication regimen. Previous cardiac history and records reviewed. Continue current medical management for cardiac related condition. Continue other current medications as directed. Continue to follow up with primary care provider for non cardiac medical problems. If your primary care provider is outside of the [facility], please request that your labs be faxed to this office at [phone]. BP goal 130/80 or less. Call the office with any problems, questions or concerns at [phone]. Cardiology follow up as scheduled in [address] appointments. Educational included in patient instructions. Heart health.       Polly Mcburney, APRN

## 2022-07-21 NOTE — PATIENT INSTRUCTIONS
New instructions for today:  Start Imdur now as prescribed by Dr. Jose Victoria. Patient Instructions:  Continue current medications as prescribed. Always keep a current medication list. Bring your medications to every office visit. Continue to follow up with primary care provider for non cardiac medical problems. Call the office with any problems, questions or concerns at [phone]. If you have been asked to keep a blood pressure log, do so for 2 weeks. Call the office to report readings to the triage nurse at [phone]. Follow up with cardiologist as scheduled. The following educational material has been included in this after visit summary for your review: Life simple 7. Heart health. Life simple 7  1) Manage blood pressure - high blood pressure is a major risk factor for heart disease and stroke. Keeping blood pressure in health range reduces strain on your heart, arteries and kidneys. Blood pressure goal is less than 130/80. 2) Control cholesterol - contributes to plaque, which can clog arteries and lead to heart disease and stroke. When you control your cholesterol you are giving your arteries their best chance to remain clear. It is recommended that you get cholesterol lab work done once a year. 3) Reduce blood sugar - most of the food we eat is turning into glucose or blood sugar that our body uses for energy. Over time, high levels of blood sugar can damage your heart, kidneys, eyes and nerves. 4) Get active - living an active life is one of the most rewarding gifts you can give yourself and those you love. Simply put, daily physical activity increases your length and quality of life. Strive to exercise 15 minutes most days of the week. 5)  Eat better - A healthy diet is one of your best weapons for fighting cardiovascular disease. When you eat a heart healthy diet, you improve your chances for feeling good and staying healthy for life.   6)  Lose weight - when you shed extra fat an unnecessary pounds, you reduce the burden on your hear, lungs, blood vessels and skeleton. You give yourself the gift of active living, you lower your blood pressure and help yourself feel better. 7) Stop smoking - cigarette smokers have a higher risk of developing cardiovascular disease. If  You smoke, quitting is the best thing you can do for your health. Check American Heart Association on line for more information on Life's Simple 7 and tips for healthy living. A Healthy Heart: Care Instructions  Your Care Instructions     Coronary artery disease, also called heart disease, occurs when a substance called plaque builds up in the vessels that supply oxygen-rich blood to your heart muscle. This can narrow the blood vessels and reduce blood flow. A heart attack happens when blood flow is completely blocked. A high-fat diet, smoking, and other factors increase the risk of heart disease. Your doctor has found that you have a chance of having heart disease. You can do lots of things to keep your heart healthy. It may not be easy, but you can change your diet, exercise more, and quit smoking. These steps really work to lower your chance of heart disease. Follow-up care is a key part of your treatment and safety. Be sure to make and go to all appointments, and call your doctor if you are having problems. It's also a good idea to know your test results and keep a list of the medicines you take. How can you care for yourself at home? Diet  Use less salt when you cook and eat. This helps lower your blood pressure. Taste food before salting. Add only a little salt when you think you need it. With time, your taste buds will adjust to less salt. Eat fewer snack items, fast foods, canned soups, and other high-salt, high-fat, processed foods. Read food labels and try to avoid saturated and trans fats. They increase your risk of heart disease by raising cholesterol levels.   Limit the amount of solid fat-butter, margarine, and shortening-you eat. Use olive, peanut, or canola oil when you cook. Bake, broil, and steam foods instead of frying them. Eat a variety of fruit and vegetables every day. Dark green, deep orange, red, or yellow fruits and vegetables are especially good for you. Examples include spinach, carrots, peaches, and berries. Foods high in fiber can reduce your cholesterol and provide important vitamins and minerals. High-fiber foods include whole-grain cereals and breads, oatmeal, beans, brown rice, citrus fruits, and apples. Eat lean proteins. Heart-healthy proteins include seafood, lean meats and poultry, eggs, beans, peas, nuts, seeds, and soy products. Limit drinks and foods with added sugar. These include candy, desserts, and soda pop. Lifestyle changes  If your doctor recommends it, get more exercise. Walking is a good choice. Bit by bit, increase the amount you walk every day. Try for at least 30 minutes on most days of the week. You also may want to swim, bike, or do other activities. Do not smoke. If you need help quitting, talk to your doctor about stop-smoking programs and medicines. These can increase your chances of quitting for good. Quitting smoking may be the most important step you can take to protect your heart. It is never too late to quit. Limit alcohol to 2 drinks a day for men and 1 drink a day for women. Too much alcohol can cause health problems. Manage other health problems such as diabetes, high blood pressure, and high cholesterol. If you think you may have a problem with alcohol or drug use, talk to your doctor. Medicines  Take your medicines exactly as prescribed. Call your doctor if you think you are having a problem with your medicine. If your doctor recommends aspirin, take the amount directed each day. Make sure you take aspirin and not another kind of pain reliever, such as acetaminophen (Tylenol). When should you call for help?    [id number] if you have symptoms of a heart attack. These may include:  Chest pain or pressure, or a strange feeling in the chest.  Sweating. Shortness of breath. Pain, pressure, or a strange feeling in the back, neck, jaw, or upper belly or in one or both shoulders or arms. Lightheadedness or sudden weakness. A fast or irregular heartbeat. After you call 911, the  may tell you to chew 1 adult-strength or 2 to 4 low-dose aspirin. Wait for an ambulance. Do not try to drive yourself. Watch closely for changes in your health, and be sure to contact your doctor if you have any problems. Where can you learn more? Go to https://[employer]. org and sign in to your [employer] account. Enter H000 in the Waff[employer] box to learn more about \"A Healthy Heart: Care Instructions. \"     If you do not have an account, please click on the \"Sign Up Now\" link. Current as of: December 16, 2019               Content Version: 12.5  © 6125-6882 [employer]. Care instructions adapted under license by [address] [place] [address]. If you have questions about a medical condition or this instruction, always ask your healthcare professional. [place] [address] any warranty or liability for your use of this information. [place] at the [facility] and [address] [place] [address] located on the first floor of [place] [address] through hospital main entrance and turn immediately to your left. Date/Time:     Patient's contact number:  [phone] (home)     Echocardiogram -  No prep. Takes approximately 30 min. An echocardiogram uses sound waves to produce images of your heart. This commonly used test allows your doctor to see how your heart is beating and pumping blood. Your doctor can use the images from an echocardiogram to identify various abnormalities in the heart muscle and valves. This test has 2 parts: You will be asked to disrobe from the waist up and given a gown to wear.  The technologist will then hook up an EKG monitor to you for the entire exam.   Tin John will then have an ultrasound of your heart (echocardiogram) to assess the heart muscle, heart valves and heart function. You may eat and take any medicines before the exam.     If you need to change your appointment, please call outpatient scheduling at [phone]. [place] Nuclear Stress Test     Date/Time:    [place] at the [facility] and [address] [facility] located on the first floor of [address] [place] [address] through hospital main entrance and turn immediately to your left. Test Description:  There are two parts to a Lexiscan stress test: the rest portion and the exercise portion. For the rest portion, a radioactive tracer is injected into your arm through the IV. After 30 to 60 minutes, the process of imaging will begin. A nuclear camera will be placed on your chest area and images are taken for the next 15 to 20 minutes. For the exercise portion, a nurse will attach EKG electrodes to your chest to monitor your heart rate. The drug [place] is administered to simulate stress on the heart. Your heart rhythm will then be monitored for the next few minutes. Your blood pressure will also be monitored throughout the exercise portion. [place] through the exercise portion, a second round of radioactive tracer is injected into your body. Your heart rate and EKG will be monitored for another few minutes after administering the drug. Test Preparation:    Bring a list of your current medications. Do not take any of your medications the morning of the test, but bring all morning medications with you as you will take them after the stress portion of the test is completed. No caffeine 12 hours prior to the testing. This includes: coffee, pop/soda, chocolate, cold medications, etc.  Any product that might contain caffeine.     No nicotine or alcohol 12 hours prior to your test.   Nothing to eat or drink 4-6 hours prior to appointment time. It is okay to drink small amounts of water during the four hours prior to the test.  Nitroglycerin patches must be taken off 1 hour before testing. Wear comfortable clothing. Please refrain from any strenuous exercise or activities the day before your test, or the day of your test.  The Nuclear Lexiscan Stress test takes about 2 ½ to 3 hours to complete. If for any reason you are unable to keep this appointment, please contact Outpatient Scheduling, [phone], as soon as possible to reschedule. [place] at the [facility] and [address] [facility] located on the first floor of [place] [address] through hospital main entrance and turn immediately to your left. Patient's contact number:  [phone] (home)     Date/Time:     Carotid Ultrasound   -  No prep. Takes approximately 30 minutes. Carotid ultrasound is a safe, painless procedure that uses sound waves to examine the structure and function of the carotid arteries in the neck. You have two carotid arteries, one on each side of the neck, which deliver blood from the heart to the brain. Carotid ultrasound can reveal whether an artery has any blockage and how well blood flows through the artery. Carotid ultrasound is usually used to screen for blockages that indicate an increased risk of stroke. Results from a carotid ultrasound can help your doctor determine what kind of treatment you may need to lower your risk. If you need to change your appointment, please call outpatient scheduling at [phone].

## 2022-08-01 ENCOUNTER — HOSPITAL ENCOUNTER (OUTPATIENT)
Dept: NON INVASIVE DIAGNOSTICS | Age: 62
Discharge: HOME | End: 2022-08-01
Payer: COMMERCIAL

## 2022-08-01 ENCOUNTER — HOSPITAL ENCOUNTER (OUTPATIENT)
Dept: VASCULAR LAB | Age: 62
Discharge: HOME | End: 2022-08-01
Payer: COMMERCIAL

## 2022-08-01 DIAGNOSIS — R09.89 RIGHT CAROTID BRUIT: ICD-10-CM

## 2022-08-01 DIAGNOSIS — I25.10 CORONARY ARTERY DISEASE INVOLVING NATIVE CORONARY ARTERY OF NATIVE HEART, UNSPECIFIED WHETHER ANGINA PRESENT: ICD-10-CM

## 2022-08-01 DIAGNOSIS — R60.9 EDEMA, UNSPECIFIED TYPE: ICD-10-CM

## 2022-08-01 DIAGNOSIS — I10 HYPERTENSION, UNSPECIFIED TYPE: ICD-10-CM

## 2022-08-01 LAB
LV EF: 60 %
LVEF MODALITY: NORMAL

## 2022-08-01 PROCEDURE — 93306 TTE W/DOPPLER COMPLETE: CPT

## 2022-08-01 PROCEDURE — 93880 EXTRACRANIAL BILAT STUDY: CPT

## 2022-08-03 ENCOUNTER — HOSPITAL ENCOUNTER (OUTPATIENT)
Dept: MRI IMAGING | Age: 62
Discharge: HOME | End: 2022-08-03
Payer: COMMERCIAL

## 2022-08-03 DIAGNOSIS — M51.16 LUMBAR DISC DISEASE WITH RADICULOPATHY: ICD-10-CM

## 2022-08-03 PROCEDURE — 72148 MRI LUMBAR SPINE W/O DYE: CPT | Performed by: RADIOLOGY

## 2022-08-03 PROCEDURE — 72148 MRI LUMBAR SPINE W/O DYE: CPT

## 2022-08-09 ENCOUNTER — HOSPITAL ENCOUNTER (OUTPATIENT)
Dept: NUCLEAR MEDICINE | Age: 62
Discharge: HOME | End: 2022-08-11
Payer: COMMERCIAL

## 2022-08-09 DIAGNOSIS — R07.9 CHEST PAIN, UNSPECIFIED TYPE: ICD-10-CM

## 2022-08-09 DIAGNOSIS — I25.10 CORONARY ARTERY DISEASE INVOLVING NATIVE CORONARY ARTERY OF NATIVE HEART, UNSPECIFIED WHETHER ANGINA PRESENT: ICD-10-CM

## 2022-08-09 DIAGNOSIS — I10 HYPERTENSION, UNSPECIFIED TYPE: ICD-10-CM

## 2022-08-09 PROCEDURE — 6360000002 HC RX W HCPCS: Performed by: NURSE PRACTITIONER

## 2022-08-09 PROCEDURE — 3430000000 HC RX DIAGNOSTIC RADIOPHARMACEUTICAL: Performed by: NURSE PRACTITIONER

## 2022-08-09 PROCEDURE — A9502 TC99M TETROFOSMIN: HCPCS | Performed by: NURSE PRACTITIONER

## 2022-08-09 PROCEDURE — 93017 CV STRESS TEST TRACING ONLY: CPT

## 2022-08-09 RX ADMIN — REGADENOSON 0.4 MG: 0.08 INJECTION, SOLUTION INTRAVENOUS at 11:58

## 2022-08-09 RX ADMIN — TETROFOSMIN 8 MILLICURIE: 1.38 INJECTION, POWDER, LYOPHILIZED, FOR SOLUTION INTRAVENOUS at 13:14

## 2022-08-09 RX ADMIN — TETROFOSMIN 24 MILLICURIE: 1.38 INJECTION, POWDER, LYOPHILIZED, FOR SOLUTION INTRAVENOUS at 13:14

## 2022-08-12 ENCOUNTER — TELEPHONE (OUTPATIENT)
Dept: CARDIOLOGY CLINIC | Age: 62
End: 2022-08-12

## 2022-08-12 LAB
LV EF: 66 %
LVEF MODALITY: NORMAL

## 2022-08-12 PROCEDURE — 93016 CV STRESS TEST SUPVJ ONLY: CPT | Performed by: INTERNAL MEDICINE

## 2022-08-12 PROCEDURE — 78452 HT MUSCLE IMAGE SPECT MULT: CPT | Performed by: INTERNAL MEDICINE

## 2022-08-12 PROCEDURE — 93018 CV STRESS TEST I&R ONLY: CPT | Performed by: INTERNAL MEDICINE

## 2022-08-15 ENCOUNTER — OFFICE VISIT (OUTPATIENT)
Dept: CARDIOLOGY CLINIC | Age: 62
End: 2022-08-15
Payer: COMMERCIAL

## 2022-08-15 VITALS
WEIGHT: 201 LBS | DIASTOLIC BLOOD PRESSURE: 78 MMHG | OXYGEN SATURATION: 98 % | BODY MASS INDEX: 31.55 KG/M2 | HEIGHT: 67 IN | SYSTOLIC BLOOD PRESSURE: 130 MMHG | HEART RATE: 60 BPM

## 2022-08-15 DIAGNOSIS — R07.89 ATYPICAL CHEST PAIN: ICD-10-CM

## 2022-08-15 DIAGNOSIS — E78.2 MIXED HYPERLIPIDEMIA: ICD-10-CM

## 2022-08-15 DIAGNOSIS — I10 HYPERTENSION, UNSPECIFIED TYPE: ICD-10-CM

## 2022-08-15 DIAGNOSIS — Z95.1 HX OF CABG: ICD-10-CM

## 2022-08-15 DIAGNOSIS — I25.10 CORONARY ARTERY DISEASE INVOLVING NATIVE CORONARY ARTERY OF NATIVE HEART, UNSPECIFIED WHETHER ANGINA PRESENT: Primary | ICD-10-CM

## 2022-08-15 PROCEDURE — 99214 OFFICE O/P EST MOD 30 MIN: CPT | Performed by: NURSE PRACTITIONER

## 2022-08-15 RX ORDER — LANOLIN ALCOHOL/MO/W.PET/CERES
3 CREAM (GRAM) TOPICAL NIGHTLY PRN
COMMUNITY

## 2022-08-15 RX ORDER — VITAMIN B COMPLEX
1 CAPSULE ORAL DAILY
COMMUNITY

## 2022-08-15 NOTE — PROGRESS NOTES
[facility], [place]. [address] [facility]  [phone] office  [phone] fax      OFFICE VISIT:  8/15/2022    Leigh Ann Dwyer - : 1960  Reason For Visit:  Roxy Kunz is a 64 y.o. female who is here for Results    History:   Diagnosis Orders   1. Coronary artery disease involving native coronary artery of native heart, unspecified whether angina present        2. Hypertension, unspecified type        3. Mixed hyperlipidemia        4. Hx of CABG        5. Atypical chest pain          The patient presented on 22 for follow up. She has  a hx of CABG in  by Dr. Adam Gomez. Her last cath in  by Dr. Gerhardt Ally showed graft patency. She reported onset of atypical chest pain this past March. The pain seems to be random lasting about 2 minutes. She reports associated TOMLINSON as well. She reported at that visit \"I am having so much pain and problems with my left hip and back that I just cannot do much. As a result, I have gained 20 pounds. I just have to get something done. \"  Dr. Akhil Agudelo ordered a stress echo wich was non diagnostic due to baseline abnormal EKG and failure to achieve THR. She stopped taking Imdur due to headaches with no real changes in the atypical chest pain. Subsequently, a CrossCurrent rx was competed on 22 showing EF at 66% with a mild degree of ischemia distal anterior wall and apex not excluded. The patient reports today she is feeling well. She reports no change in the atypical chest pain. The patient does not want to proceed with a cardiac cath at this time. She reports no sensed arrhythmia or symptoms of fluid overload. BP is well controlled on current regimen (130/78 today). The patient's PCP monitors cholesterol. Subjective  Smiley denies exertional chest pain, shortness of breath, orthopnea, paroxysmal nocturnal dyspnea, syncope, presyncope, sensed arrhythmia, edema and fatigue.   The patient denies numbness or weakness to suggest cerebrovascular accident or transient ischemic attack. Clarke Falcon has the following history as recorded in [facility]:  Patient Active Problem List   Diagnosis Code    Heartburn R12    Indigestion K30    Nausea R11.0    Morbid obesity ([address] E66.01    Anticoagulated on warfarin Z79.01    Ulcerative colitis ([facility] K51.90    Hypertension I10    Hyperlipidemia E78.5    MI (myocardial infarction) ([address] I21.9    ASCVD (arteriosclerotic cardiovascular disease) I25.10    CAD (coronary artery disease) I25.10     Past Medical History:   Diagnosis Date    Anxiety     ASCVD (arteriosclerotic cardiovascular disease)     Carrier of group B Streptococcus     Cellulitis     Colitis     COPD (chronic obstructive pulmonary disease) ([facility])     Costochondritis     CVA (cerebral vascular accident) ([address]     Depression     Edema     Fracture of sternum     non union post surgery.      Gallstones     Grief reaction     H/O superior vena cava filter placement     Hyperlipidemia     Hypertension     MI (myocardial infarction) ([facility]     MRSA (methicillin resistant Staphylococcus aureus)     Neuropathy     Obesity     Pseudarthrosis sternal    RA (rheumatoid arthritis) ([facility]     Rosacea     Sinus bradycardia     TIA (transient ischemic attack)     Venous thromboembolism      Past Surgical History:   Procedure Laterality Date    ADENOIDECTOMY      APPENDECTOMY      CARDIAC CATHETERIZATION  3/2009    CARDIAC CATHETERIZATION  11/5/14  JDT    EF 50%, patent bypass grafts    CHOLECYSTECTOMY  2011    COLONOSCOPY  06/03/2010    Dr. Mariela Melgar: distal colon having ulcerative lesions c/w UC    COLONOSCOPY  2009    pancolitis    COLONOSCOPY      CORONARY ARTERY BYPASS GRAFT  3/2009    Dr Doll Lesa, LIMA to LAD, SVGs to OM & PDA    GASTRIC BYPASS SURGERY  2012    HIATAL HERNIA REPAIR  2011    HYSTERECTOMY (CERVIX STATUS UNKNOWN)      KNEE SURGERY      [place] COLONOSCOPY FLX DX W/COLLJ SPEC WHEN PFRMD N/A 3/12/2018 Dr Shwan Perez rectal inflammation consistent with U.C.-Active proctitis--3 yr recall    [facility] ENDOSCOPY      Dr. Shannon Grossman       Family History   Problem Relation Age of Onset    Prostate Cancer Father     Heart Failure Father     Cancer Father     Colon Cancer Neg Hx     Colon Polyps Neg Hx     Liver Cancer Neg Hx     Liver Disease Neg Hx     Esophageal Cancer Neg Hx     Rectal Cancer Neg Hx     Stomach Cancer Neg Hx      Social History     Tobacco Use    Smoking status: Former     Packs/day: 2.00     Years: 32.00     Pack years: 64.00     Types: Cigarettes     Quit date: 3/3/2009     Years since quittin.4    Smokeless tobacco: Never   Substance Use Topics    Alcohol use: Yes     Comment: Socially      Current Outpatient Medications   Medication Sig Dispense Refill    b complex vitamins capsule Take 1 capsule by mouth in the morning. melatonin 3 MG TABS tablet Take 3 mg by mouth nightly as needed      furosemide (LASIX) 80 MG tablet TAKE 1 TABLET BY MOUTH EVERY DAY      folic acid (FOLVITE) 1 MG tablet Take 1 mg by mouth in the morning. TRULICITY 1.5 KH/7.2NT SOPN INJECT ONE PEN SUBCUTANEOUSLY ONCE WEEKLY      potassium chloride (MICRO-K) 10 MEQ extended release capsule Take 1 capsule by mouth daily      tiZANidine (ZANAFLEX) 4 MG capsule Take 1 capsule by mouth nightly      nitroGLYCERIN (NITROSTAT) 0.4 MG SL tablet Place 1 tablet under the tongue every 5 minutes as needed for Chest pain 25 tablet 3    abatacept (ORENCIA) 250 MG injection Infuse intravenously once      Multiple Vitamins-Minerals (MULTIVITAMIN PO) Take  by mouth daily. hydroxychloroquine (PLAQUENIL) 200 MG tablet Take 200 mg by mouth 2 times daily. metoprolol (TOPROL-XL) 25 MG XL tablet Take 12.5 mg by mouth in the morning. No current facility-administered medications for this visit.      Allergies: Methotrexate derivatives    Review of Systems  Constitutional - no appetite change, or unexpected weight change. No fever, chills or diaphoresis. No significant change in activity level or new onset of fatigue. HEENT - no significant rhinorrhea or epistaxis. No tinnitus or significant hearing loss. Eyes - no sudden vision change or amaurosis. No corneal arcus, xantholasma, subconjunctival hemorrhage or discharge. Respiratory - no significant wheezing, stridor, apnea or cough. No dyspnea on exertion or shortness of air. Cardiovascular - no exertional chest pain to suggest myocardial ischemia. No orthopnea or PND. No sensation of sustained arrythmia. No occurrence of slow heart rate. No palpitations. No claudication. + atypical random chest pain lasting no more than 2 minutes - stable. Gastrointestinal - no abdominal swelling or pain. No blood in stool. No severe constipation, diarrhea, nausea, or vomiting. Genitourinary - no dysuria, frequency, or urgency. No flank pain or hematuria. Musculoskeletal - no back pain or myalgia. No problems with gait. Extremities - no clubbing, cyanosis or extremity edema. Skin - no color change or rash. No pallor. No new surgical incision. Neurologic - no speech difficulty, facial asymmetry or lateralizing weakness. No seizures, presyncope or syncope. No significant dizziness. Hematologic - no easy bruising or excessive bleeding. Psychiatric - no severe anxiety or insomnia. No confusion. All other review of systems are negative. Objective  Vital Signs - /78   Pulse 60   Ht 5' 7\" (1.702 m)   Wt 201 lb (91.2 kg)   SpO2 98%   BMI 31.48 kg/m²   General - Smiley is alert, cooperative, and pleasant. Well groomed. No acute distress. Body habitus - Body mass index is 31.48 kg/m². HEENT - Head is normocephalic. No circumoral cyanosis. Dentition is normal.  EYES -   Lids normal without ptosis. No discharge, edema or subconjunctival hemorrhage.    Neck - Symmetrical without apparent mass or lymphadenopathy. Respiratory - Normal respiratory effort without use of accessory muscles. Ausculatation reveals vesicular breath sounds without crackles, wheezes, rub or rhonchi. Cardiovascular - No jugular venous distention. Auscultation reveals regular rate and rhythm. No audible clicks, gallop or rub. No murmur. No lower extremity varicosities. + right carotid bruit - recent carotid ultrasound showed bilateral less than 50%. Abdominal -  No visible distention, mass or pulsations. Extremities - No clubbing or cyanosis. No statis dermatitis or ulcers. No edema. Musculoskeletal -   No Osler's nodes. No kyphosis or scoliosis. Gait is even and regular without limp or shuffle. Ambulates without assistance. Skin -  Warm and dry; no rash or pallor. No new surgical wound. Neurological - No focal neurological deficits. Thought processes coherent. No apparent tremor. Oriented to person, place and time. Psychiatric -  Appropriate affect and mood. Data reviewed:  8/9/22 Lexiscan  1. Ejection fraction 66%   2. Wall motion study unremarkable   3. Myocardial perfusion imaging demonstrated mildly decreased uptake   in the distal anterior wall and apex the sum stress score was 5 the   sum difference score was 4   . Summary impressions:   Normal ejection fraction 66% a mild degree of ischemia distal anterior   wall and apex is not excluded correlate clinically and/or   angiographically if clinically appropriate   Signed by Dr Hakeem Ricardo     8/1/22 echo   LV is normal in size with normal systolic function. LV ejection fraction  estimated at 60%. Diastolic function is indeterminant. RV is normal in size with normal systolic function. Moderate left atrial enlargement. Right atrium appears normal in size. Aortic valve is trileaflet with normal leaflet mobility. No stenosis or  significant regurgitation.    Mitral valve is mildly thickened with preserved leaflet mobility. No  stenosis. Mild to moderate mitral regurgitation. Trace to mild tricuspid regurgitation. No significant pericardial effusion. Electronically signed by Julian Jaarmillo MD(Interpreting physician)  on 08/01/2022 08:35 PM    Lab Results   Component Value Date    WBC 8.3 08/23/2018    HGB 14.0 08/23/2018    HCT 43.2 08/23/2018    MCV 90.2 08/23/2018     08/23/2018     Lab Results   Component Value Date     11/20/2018    K 4.3 11/20/2018     11/20/2018    CO2 26 11/20/2018    BUN 9 11/20/2018    CREATININE 0.8 11/20/2018    GLUCOSE 109 11/20/2018    CALCIUM 9.4 11/20/2018    PROT 7.2 11/20/2018    LABALBU 4.1 11/20/2018    BILITOT 0.4 11/20/2018    ALKPHOS 95 11/20/2018    AST 33 (H) 11/20/2018    ALT 29 11/20/2018    LABGLOM >60 11/20/2018     BP Readings from Last 3 Encounters:   08/15/22 130/78   07/21/22 (!) 180/80   08/19/19 (!) 144/75    Pulse Readings from Last 3 Encounters:   08/15/22 60   07/21/22 60   08/19/19 (!) 47        Wt Readings from Last 3 Encounters:   08/15/22 201 lb (91.2 kg)   07/21/22 200 lb (90.7 kg)   08/19/19 214 lb (97.1 kg)     Assessment/Plan:   Diagnosis Orders   1. Coronary artery disease involving native coronary artery of native heart, unspecified whether angina present        2. Hypertension, unspecified type        3. Mixed hyperlipidemia        4. Hx of CABG        5. Atypical chest pain          CAD s/p CABG 2009 - cath 2014 showed graft patency -   Recent SE non diagnostic due to baseline abnormal EKG and failure to achieve THR. Madan Chavez rx on 8/2/22 showed EF at 66% with a mild degree of ischemia distal anterior wall and apex not excluded. Chest pain is atypical, random lasting no more than 2 minutes. Patient was intolerant to Imdur which was prescribed initially by PCP. Discussed indication for cath based upon hx and recent Lexiscan results. Patient declined for now. Continue current medical management.   Advised to go to ED with worsened symptoms. HTN - elevated today  130/78. Goal less than 130/80. Patient currenlty taking Toprol XL and Lasix. Hyperlipidemia - monitored and managed by PCP. Patient is compliant with medication regimen. Previous cardiac history and records reviewed. Continue current medical management for cardiac related condition. Continue other current medications as directed. Continue to follow up with primary care provider for non cardiac medical problems. If your primary care provider is outside of the [facility] please request that your labs be faxed to this office at [phone]. BP goal 130/80 or less. Call the office with any problems, questions or concerns at [phone]. Cardiology follow up as scheduled in [address] appointments. Follow up in 2 months with Dr. Camilla Miles. Educational included in patient instructions. Heart health.       ALEENA Mahoney

## 2022-08-15 NOTE — PATIENT INSTRUCTIONS
New instructions for today:    If your symptoms worsen, go to the ER. How to take:  NITROGLYCERIN (Nitrostat) 0.4 mg tablets, sublingual.  Nitroglycerin is in a group of drugs called nitrates. Nitroglycerin dilates (widens) blood vessels, making it easier for blood to flow through them and easier for the heart to pump. Dosing Guidelines for Nitroglycerin Tablets  At the start of an angina (chest pain) attack, place one tablet under the tongue or between the cheek and gum. Do not swallow or chew the tablet; let it dissolve on its own. If necessary, a second and third tablet may be used, with five minutes between using each tablet. If you use a third tablet and your chest pain continues, it is time to seek immediate medical attention. Call 911 immediately and have someone drive you to the emergency room. You may be having a heart attack or other serious heart problem. To prevent angina from exercise or stress, use 1 tablet 5 to 10 minutes before the activity. Patient Instructions:  Continue current medications as prescribed. Always keep a current medication list. Bring your medications to every office visit. Continue to follow up with primary care provider for non cardiac medical problems. Call the office with any problems, questions or concerns at [phone]. If you have been asked to keep a blood pressure log, do so for 2 weeks. Call the office to report readings to the triage nurse at [phone]. Follow up with cardiologist as scheduled. The following educational material has been included in this after visit summary for your review: Life simple 7. Heart health. Life simple 7  1) Manage blood pressure - high blood pressure is a major risk factor for heart disease and stroke. Keeping blood pressure in health range reduces strain on your heart, arteries and kidneys. Blood pressure goal is less than 130/80.    2) Control cholesterol - contributes to plaque, which can clog arteries and not be easy, but you can change your diet, exercise more, and quit smoking. These steps really work to lower your chance of heart disease. Follow-up care is a key part of your treatment and safety. Be sure to make and go to all appointments, and call your doctor if you are having problems. It's also a good idea to know your test results and keep a list of the medicines you take. How can you care for yourself at home? Diet  Use less salt when you cook and eat. This helps lower your blood pressure. Taste food before salting. Add only a little salt when you think you need it. With time, your taste buds will adjust to less salt. Eat fewer snack items, fast foods, canned soups, and other high-salt, high-fat, processed foods. Read food labels and try to avoid saturated and trans fats. They increase your risk of heart disease by raising cholesterol levels. Limit the amount of solid fat-butter, margarine, and shortening-you eat. Use olive, peanut, or canola oil when you cook. Bake, broil, and steam foods instead of frying them. Eat a variety of fruit and vegetables every day. Dark green, deep orange, red, or yellow fruits and vegetables are especially good for you. Examples include spinach, carrots, peaches, and berries. Foods high in fiber can reduce your cholesterol and provide important vitamins and minerals. High-fiber foods include whole-grain cereals and breads, oatmeal, beans, brown rice, citrus fruits, and apples. Eat lean proteins. Heart-healthy proteins include seafood, lean meats and poultry, eggs, beans, peas, nuts, seeds, and soy products. Limit drinks and foods with added sugar. These include candy, desserts, and soda pop. Lifestyle changes  If your doctor recommends it, get more exercise. Walking is a good choice. Bit by bit, increase the amount you walk every day. Try for at least 30 minutes on most days of the week. You also may want to swim, bike, or do other activities. Do not smoke.  If you need help quitting, talk to your doctor about stop-smoking programs and medicines. These can increase your chances of quitting for good. Quitting smoking may be the most important step you can take to protect your heart. It is never too late to quit. Limit alcohol to 2 drinks a day for men and 1 drink a day for women. Too much alcohol can cause health problems. Manage other health problems such as diabetes, high blood pressure, and high cholesterol. If you think you may have a problem with alcohol or drug use, talk to your doctor. Medicines  Take your medicines exactly as prescribed. Call your doctor if you think you are having a problem with your medicine. If your doctor recommends aspirin, take the amount directed each day. Make sure you take aspirin and not another kind of pain reliever, such as acetaminophen (Tylenol). When should you call for help? [id number] if you have symptoms of a heart attack. These may include:  Chest pain or pressure, or a strange feeling in the chest.  Sweating. Shortness of breath. Pain, pressure, or a strange feeling in the back, neck, jaw, or upper belly or in one or both shoulders or arms. Lightheadedness or sudden weakness. A fast or irregular heartbeat. After you call 911, the  may tell you to chew 1 adult-strength or 2 to 4 low-dose aspirin. Wait for an ambulance. Do not try to drive yourself. Watch closely for changes in your health, and be sure to contact your doctor if you have any problems. Where can you learn more? Go to https://[employer].trinket. org and sign in to your [employer] account. Enter L140 in the [facility] box to learn more about \"A Healthy Heart: Care Instructions. \"     If you do not have an account, please click on the \"Sign Up Now\" link. Current as of: December 16, 2019               Content Version: 12.5  © 8219-7250 Healthwise, Incorporated. Care instructions adapted under license by [facility] ([facility]).  If you have questions about a medical condition or this instruction, always ask your healthcare professional. [place] [address] any warranty or liability for your use of this information.

## 2022-10-14 ENCOUNTER — TELEPHONE (OUTPATIENT)
Dept: CARDIOLOGY CLINIC | Age: 62
End: 2022-10-14

## 2022-11-09 ENCOUNTER — APPOINTMENT (OUTPATIENT)
Dept: CT IMAGING | Age: 62
End: 2022-11-09
Payer: COMMERCIAL

## 2022-11-09 ENCOUNTER — HOSPITAL ENCOUNTER (EMERGENCY)
Age: 62
Discharge: ANOTHER HOSPITAL | End: 2022-11-09
Attending: EMERGENCY MEDICINE
Payer: COMMERCIAL

## 2022-11-09 ENCOUNTER — APPOINTMENT (OUTPATIENT)
Dept: GENERAL RADIOLOGY | Age: 62
End: 2022-11-09
Payer: COMMERCIAL

## 2022-11-09 VITALS
WEIGHT: 200 LBS | SYSTOLIC BLOOD PRESSURE: 91 MMHG | TEMPERATURE: 98.3 F | BODY MASS INDEX: 31.32 KG/M2 | RESPIRATION RATE: 27 BRPM | OXYGEN SATURATION: 99 % | DIASTOLIC BLOOD PRESSURE: 77 MMHG | HEART RATE: 77 BPM

## 2022-11-09 DIAGNOSIS — S12.100A DENS FRACTURE, CLOSED, INITIAL ENCOUNTER (HCC): ICD-10-CM

## 2022-11-09 DIAGNOSIS — R47.01 APHASIA DUE TO ACUTE CEREBROVASCULAR ACCIDENT (CVA) (HCC): ICD-10-CM

## 2022-11-09 DIAGNOSIS — I63.512 ACUTE CEREBROVASCULAR ACCIDENT (CVA) DUE TO OCCLUSION OF LEFT MIDDLE CEREBRAL ARTERY (HCC): Primary | ICD-10-CM

## 2022-11-09 DIAGNOSIS — R53.1 ACUTE RIGHT-SIDED WEAKNESS: ICD-10-CM

## 2022-11-09 DIAGNOSIS — I63.9 APHASIA DUE TO ACUTE CEREBROVASCULAR ACCIDENT (CVA) (HCC): ICD-10-CM

## 2022-11-09 LAB
ALBUMIN SERPL-MCNC: 4 G/DL (ref 3.5–5.2)
ALP BLD-CCNC: 75 U/L (ref 35–104)
ALT SERPL-CCNC: 17 U/L (ref 5–33)
AMMONIA: 11 UMOL/L (ref 11–51)
AMPHETAMINE SCREEN, URINE: NEGATIVE
ANION GAP SERPL CALCULATED.3IONS-SCNC: 10 MMOL/L (ref 7–19)
AST SERPL-CCNC: 31 U/L (ref 5–32)
BACTERIA: NEGATIVE /HPF
BARBITURATE SCREEN URINE: NEGATIVE
BASE EXCESS ARTERIAL: -0.3 MMOL/L (ref -2–2)
BASOPHILS ABSOLUTE: 0 K/UL (ref 0–0.2)
BASOPHILS RELATIVE PERCENT: 0.2 % (ref 0–1)
BENZODIAZEPINE SCREEN, URINE: NEGATIVE
BILIRUB SERPL-MCNC: 0.4 MG/DL (ref 0.2–1.2)
BILIRUBIN URINE: NEGATIVE
BLOOD, URINE: NEGATIVE
BUN BLDV-MCNC: 14 MG/DL (ref 8–23)
BUPRENORPHINE URINE: ABNORMAL
CALCIUM SERPL-MCNC: 8.9 MG/DL (ref 8.8–10.2)
CANNABINOID SCREEN URINE: NEGATIVE
CARBOXYHEMOGLOBIN ARTERIAL: 1.8 % (ref 0–5)
CHLORIDE BLD-SCNC: 104 MMOL/L (ref 98–111)
CLARITY: CLEAR
CO2: 25 MMOL/L (ref 22–29)
COCAINE METABOLITE SCREEN URINE: NEGATIVE
COLOR: YELLOW
CREAT SERPL-MCNC: 0.5 MG/DL (ref 0.5–0.9)
CRYSTALS, UA: ABNORMAL /HPF
EOSINOPHILS ABSOLUTE: 0 K/UL (ref 0–0.6)
EOSINOPHILS RELATIVE PERCENT: 0 % (ref 0–5)
EPITHELIAL CELLS, UA: 1 /HPF (ref 0–5)
ETHANOL: <10 MG/DL (ref 0–0.08)
GFR SERPL CREATININE-BSD FRML MDRD: >60 ML/MIN/{1.73_M2}
GLUCOSE BLD-MCNC: 165 MG/DL (ref 74–109)
GLUCOSE URINE: NEGATIVE MG/DL
HCO3 ARTERIAL: 24 MMOL/L (ref 22–26)
HCT VFR BLD CALC: 36.2 % (ref 37–47)
HEMOGLOBIN, ART, EXTENDED: 11.2 G/DL (ref 12–16)
HEMOGLOBIN: 11 G/DL (ref 12–16)
HYALINE CASTS: 0 /HPF (ref 0–8)
IMMATURE GRANULOCYTES #: 0.1 K/UL
KETONES, URINE: NEGATIVE MG/DL
LEUKOCYTE ESTERASE, URINE: NEGATIVE
LYMPHOCYTES ABSOLUTE: 0.5 K/UL (ref 1.1–4.5)
LYMPHOCYTES RELATIVE PERCENT: 3.1 % (ref 20–40)
Lab: ABNORMAL
MCH RBC QN AUTO: 23.5 PG (ref 27–31)
MCHC RBC AUTO-ENTMCNC: 30.4 G/DL (ref 33–37)
MCV RBC AUTO: 77.2 FL (ref 81–99)
METHADONE SCREEN, URINE: NEGATIVE
METHAMPHETAMINE, URINE: NEGATIVE
METHEMOGLOBIN ARTERIAL: 0.7 %
MODE: ABNORMAL
MONOCYTES ABSOLUTE: 0.8 K/UL (ref 0–0.9)
MONOCYTES RELATIVE PERCENT: 4.7 % (ref 0–10)
NEUTROPHILS ABSOLUTE: 15.4 K/UL (ref 1.5–7.5)
NEUTROPHILS RELATIVE PERCENT: 91.6 % (ref 50–65)
NITRITE, URINE: NEGATIVE
O2 CONTENT ARTERIAL: 14.8 ML/DL
O2 SAT, ARTERIAL: 93.8 %
O2 THERAPY: ABNORMAL
OPIATE SCREEN URINE: POSITIVE
OXYCODONE URINE: NEGATIVE
PCO2 ARTERIAL: 37 MMHG (ref 35–45)
PDW BLD-RTO: 16.3 % (ref 11.5–14.5)
PH ARTERIAL: 7.42 (ref 7.35–7.45)
PH UA: 5.5 (ref 5–8)
PHENCYCLIDINE SCREEN URINE: NEGATIVE
PLATELET # BLD: 385 K/UL (ref 130–400)
PMV BLD AUTO: 9.8 FL (ref 9.4–12.3)
PO2 ARTERIAL: 68 MMHG (ref 80–100)
POTASSIUM SERPL-SCNC: 4 MMOL/L (ref 3.5–5)
POTASSIUM, WHOLE BLOOD: 3.8
PRO-BNP: 1058 PG/ML (ref 0–900)
PROPOXYPHENE SCREEN: NEGATIVE
PROTEIN UA: 30 MG/DL
RBC # BLD: 4.69 M/UL (ref 4.2–5.4)
RBC UA: 6 /HPF (ref 0–4)
SAMPLE SOURCE: ABNORMAL
SARS-COV-2, NAAT: NOT DETECTED
SODIUM BLD-SCNC: 139 MMOL/L (ref 136–145)
SPECIFIC GRAVITY UA: 1.02 (ref 1–1.03)
SPONT RATE(BPM): 20
TOTAL CK: 526 U/L (ref 26–192)
TOTAL PROTEIN: 6.4 G/DL (ref 6.6–8.7)
TRICYCLIC, URINE: NEGATIVE
UROBILINOGEN, URINE: 1 E.U./DL
WBC # BLD: 16.8 K/UL (ref 4.8–10.8)
WBC UA: 2 /HPF (ref 0–5)

## 2022-11-09 PROCEDURE — 82077 ASSAY SPEC XCP UR&BREATH IA: CPT

## 2022-11-09 PROCEDURE — 96361 HYDRATE IV INFUSION ADD-ON: CPT

## 2022-11-09 PROCEDURE — 72125 CT NECK SPINE W/O DYE: CPT

## 2022-11-09 PROCEDURE — 96360 HYDRATION IV INFUSION INIT: CPT

## 2022-11-09 PROCEDURE — 93005 ELECTROCARDIOGRAM TRACING: CPT | Performed by: EMERGENCY MEDICINE

## 2022-11-09 PROCEDURE — 82803 BLOOD GASES ANY COMBINATION: CPT

## 2022-11-09 PROCEDURE — 71045 X-RAY EXAM CHEST 1 VIEW: CPT | Performed by: RADIOLOGY

## 2022-11-09 PROCEDURE — 70498 CT ANGIOGRAPHY NECK: CPT | Performed by: RADIOLOGY

## 2022-11-09 PROCEDURE — 71045 X-RAY EXAM CHEST 1 VIEW: CPT

## 2022-11-09 PROCEDURE — 83880 ASSAY OF NATRIURETIC PEPTIDE: CPT

## 2022-11-09 PROCEDURE — 85025 COMPLETE CBC W/AUTO DIFF WBC: CPT

## 2022-11-09 PROCEDURE — 82550 ASSAY OF CK (CPK): CPT

## 2022-11-09 PROCEDURE — 70496 CT ANGIOGRAPHY HEAD: CPT | Performed by: RADIOLOGY

## 2022-11-09 PROCEDURE — 81001 URINALYSIS AUTO W/SCOPE: CPT

## 2022-11-09 PROCEDURE — 36415 COLL VENOUS BLD VENIPUNCTURE: CPT

## 2022-11-09 PROCEDURE — 82140 ASSAY OF AMMONIA: CPT

## 2022-11-09 PROCEDURE — 36600 WITHDRAWAL OF ARTERIAL BLOOD: CPT

## 2022-11-09 PROCEDURE — 99285 EMERGENCY DEPT VISIT HI MDM: CPT

## 2022-11-09 PROCEDURE — 87040 BLOOD CULTURE FOR BACTERIA: CPT

## 2022-11-09 PROCEDURE — 87635 SARS-COV-2 COVID-19 AMP PRB: CPT

## 2022-11-09 PROCEDURE — 80306 DRUG TEST PRSMV INSTRMNT: CPT

## 2022-11-09 PROCEDURE — 80053 COMPREHEN METABOLIC PANEL: CPT

## 2022-11-09 PROCEDURE — 70496 CT ANGIOGRAPHY HEAD: CPT

## 2022-11-09 PROCEDURE — 2580000003 HC RX 258: Performed by: EMERGENCY MEDICINE

## 2022-11-09 PROCEDURE — 72125 CT NECK SPINE W/O DYE: CPT | Performed by: RADIOLOGY

## 2022-11-09 PROCEDURE — 70450 CT HEAD/BRAIN W/O DYE: CPT

## 2022-11-09 RX ORDER — 0.9 % SODIUM CHLORIDE 0.9 %
1000 INTRAVENOUS SOLUTION INTRAVENOUS ONCE
Status: COMPLETED | OUTPATIENT
Start: 2022-11-09 | End: 2022-11-09

## 2022-11-09 RX ADMIN — SODIUM CHLORIDE 1000 ML: 9 INJECTION, SOLUTION INTRAVENOUS at 19:12

## 2022-11-09 NOTE — ED PROVIDER NOTES
[facility] EMERGENCY DEPT  eMERGENCY dEPARTMENT eNCOUnter      Pt Name: Chaitanya Gaitan  MRN: [id number]  [place] 1960  Date of evaluation: 11/9/2022  Provider: Larry Morris MD    CHIEF COMPLAINT       Chief Complaint   Patient presents with    Altered Mental Status     Found down in floor by , LKW 0300 this am         HISTORY OF PRESENT ILLNESS   (Location/Symptom, Timing/Onset,Context/Setting, Quality, Duration, Modifying Factors, Severity)  Note limiting factors. Chaitanya Gaitan is a 64 y.o. female who presents to the emergency department found face down in her own feces at her home. HPI    Patient is an 80-year-old white female with history of anxiety; atherosclerotic disease; colitis; COPD; CVA; depression some: Lower extremity edema; gallstones; grief reaction; history of superior vena cava filter placement; hyperlipidemia; hypertension; CAD status post MRI; MRSA; morbid obesity; rheumatoid arthritis; TIA; history of venous thromboembolism; who presents found lying on her abdomen last known well at 3 AM this morning when her  left for work. She was found lying in her feces facedown very weak and confused not speaking but moving purposefully and intermittently following commands. Other details of what happened are unavailable at this time. She has a history of strokes in the past but was GCS 15 and fully functional prior to this episode. In the ED she is mute but moves all 4 extremities  to some degees and intermittently follows commands. Formally on coumadin as per chart not on anticoagulation as per the patient's . NursingNotes were reviewed.     REVIEW OF SYSTEMS    (2-9 systems for level 4, 10 or more for level 5)     Review of Systems     Unable to obtain secondary to mental status    PAST MEDICALHISTORY     Past Medical History:   Diagnosis Date    Anxiety     ASCVD (arteriosclerotic cardiovascular disease)     Carrier of group B Streptococcus     Cellulitis     Colitis     COPD (chronic obstructive pulmonary disease) (HCC)     Costochondritis     CVA (cerebral vascular accident) ([address]     Depression     Edema     Fracture of sternum     non union post surgery. Gallstones     Grief reaction     H/O superior vena cava filter placement     Hyperlipidemia     Hypertension     MI (myocardial infarction) ([address]     MRSA (methicillin resistant Staphylococcus aureus)     Neuropathy     Obesity     Pseudarthrosis sternal    RA (rheumatoid arthritis) ([address]     Rosacea     Sinus bradycardia     TIA (transient ischemic attack)     Venous thromboembolism          SURGICAL HISTORY       Past Surgical History:   Procedure Laterality Date    ADENOIDECTOMY      APPENDECTOMY      CARDIAC CATHETERIZATION  3/2009    CARDIAC CATHETERIZATION  11/5/14  JDT    EF 50%, patent bypass grafts    CHOLECYSTECTOMY  2011    COLONOSCOPY  06/03/2010    Dr. Adolph Lopez: distal colon having ulcerative lesions c/w UC    COLONOSCOPY  2009    pancolitis    COLONOSCOPY      CORONARY ARTERY BYPASS GRAFT  3/2009     [facility], LIMA to LAD, SVGs to OM & PDA    GASTRIC BYPASS SURGERY  2012    HIATAL HERNIA REPAIR  2011    HYSTERECTOMY (CERVIX STATUS UNKNOWN)      KNEE SURGERY      RI COLONOSCOPY FLX DX W/COLLJ SPEC WHEN PFRMD N/A 3/12/2018    Dr Vallejo Moder rectal inflammation consistent with U.C.-Active proctitis--3 yr recall    [id number]  ENDOSCOPY  2010    Dr. Jr Grimadlo     Previous Medications    ABATACEPT (ORENCIA) 250 MG INJECTION    Infuse intravenously once    B COMPLEX VITAMINS CAPSULE    Take 1 capsule by mouth in the morning. FOLIC ACID (FOLVITE) 1 MG TABLET    Take 1 mg by mouth in the morning. FUROSEMIDE (LASIX) 80 MG TABLET    TAKE 1 TABLET BY MOUTH EVERY DAY    HYDROXYCHLOROQUINE (PLAQUENIL) 200 MG TABLET    Take 200 mg by mouth 2 times daily.     MELATONIN 3 MG TABS TABLET    Take 3 mg by mouth nightly as needed    METOPROLOL (TOPROL-XL) 25 MG XL TABLET    Take 12.5 mg by mouth in the morning. MULTIPLE VITAMINS-MINERALS (MULTIVITAMIN PO)    Take  by mouth daily. NITROGLYCERIN (NITROSTAT) 0.4 MG SL TABLET    Place 1 tablet under the tongue every 5 minutes as needed for Chest pain    POTASSIUM CHLORIDE (MICRO-K) 10 MEQ EXTENDED RELEASE CAPSULE    Take 1 capsule by mouth daily    TIZANIDINE (ZANAFLEX) 4 MG CAPSULE    Take 1 capsule by mouth nightly    TRULICITY 1.5 QW/6.4DG SOPN    INJECT ONE PEN SUBCUTANEOUSLY ONCE WEEKLY       ALLERGIES     Methotrexate derivatives    FAMILY HISTORY       Family History   Problem Relation Age of Onset    Prostate Cancer Father     Heart Failure Father     Cancer Father     Colon Cancer Neg Hx     Colon Polyps Neg Hx     Liver Cancer Neg Hx     Liver Disease Neg Hx     Esophageal Cancer Neg Hx     Rectal Cancer Neg Hx     Stomach Cancer Neg Hx           SOCIAL HISTORY       Social History     Socioeconomic History    Marital status:    Tobacco Use    Smoking status: Former     Packs/day: 2.00     Years: 32.00     Pack years: 64.00     Types: Cigarettes     Quit date: 3/3/2009     Years since quittin.6    Smokeless tobacco: Never   Vaping Use    Vaping Use: Never used   Substance and Sexual Activity    Alcohol use: Yes     Comment: Socially    Drug use: No       SCREENINGS    Isa Coma Scale  Eye Opening: To pain  Best Verbal Response: None  Best Motor Response: Flexion to pain  Isa Coma Scale Score: 6        PHYSICAL EXAM    (up to 7 for level 4, 8 or more for level 5)     ED Triage Vitals [22 1741]   BP Temp Temp src Heart Rate Resp SpO2 Height Weight   -- -- -- 80 -- -- -- --       Physical Exam  Vitals and nursing note reviewed. Constitutional:       General: She is in acute distress. Appearance: She is well-developed. She is obese. HENT:      Head: Normocephalic and atraumatic.       Comments: Closed right eyelid may be related to subtle droop or weakness  Eyes:      General:         Right eye: No discharge. Left eye: No discharge. Extraocular Movements: Extraocular movements intact. Conjunctiva/sclera: Conjunctivae normal.      Pupils: Pupils are equal, round, and reactive to light. Comments: 2mm pupils bilaterally   Cardiovascular:      Rate and Rhythm: Normal rate and regular rhythm. Heart sounds: Normal heart sounds. Comments: Chronic sternal chest wall deformity  Pulmonary:      Effort: Pulmonary effort is normal. No respiratory distress. Breath sounds: Normal breath sounds. No wheezing or rales. Abdominal:      General: Bowel sounds are normal.      Palpations: Abdomen is soft. There is no mass. Tenderness: There is no abdominal tenderness. There is no guarding or rebound. Musculoskeletal:         General: No tenderness. Normal range of motion. Cervical back: Normal range of motion and neck supple. Skin:     General: Skin is warm and dry. Capillary Refill: Capillary refill takes less than 2 seconds. Neurological:      Mental Status: She is alert. She is disoriented. GCS: GCS eye subscore is 4. GCS verbal subscore is 1. GCS motor subscore is 5. Cranial Nerves: Cranial nerve deficit and facial asymmetry present. Motor: Weakness present. Coordination: Coordination abnormal.      Comments: She does not respond to voice but opens her eyes spontaneously; she does not follow commands when I tried to do an NIH scale; she has a flexed upper extremity and forces flexion when you try to extend that arm.   She can wiggle her toes but has weakness to her right lower extremity she does not respond to LOC questions or commands; she has no forced deviation of her gaze; we cannot evaluate visual fields; there is no distinct facial paresis; she moves her left arm and left leg without but cannot cooperate with a thorough exam; she has ataxia in 2 limbs of her right and left although cannot completely cooperate with exam; he is mute; able to do set assessed dysarthria although  reports that she has counted clearly at one point; cannot determine extinction or inattention; stroke scale would be  14 with untestable or incomplete evaluation of visual fields; sensation; dysarthria; extinction and inattention and complete examination of limb ataxia   Psychiatric:         Thought Content: Thought content normal.         Judgment: Judgment normal.      Comments: Flat affect; unable to assess       DIAGNOSTIC RESULTS     EKG: All EKG's areinterpreted by the Emergency Department Physician who either signs or Co-signs this chart in the absence of a cardiologist.    EKG : sinus rhythm with right bundle branch block rate of 73    RADIOLOGY:  Non-plain film images such as CT, Ultrasound and MRI are read by the radiologist. Plain radiographic images are visualized and preliminarily interpreted bythe emergency physician with the below findings:       [facility]   Final Result   Near-total occlusion of the left M1 segment which suggest a long segment thrombosis. Marked attenuation of the remainder of the left middle cerebral artery. This is concordant with the large left middle cerebral artery acute infarct. Normal enhancement of the right internal carotid artery. Recommendation:   Follow up as clinically indicated. All CT scans at this facility utilize dose modulation, iterative reconstruction, and/or weight based dosing when appropriate to reduce radiation dose to as low as reasonably achievable. Exam: CTA OF THE CAROTID ARTERIES   Clinical data:Stroke symptoms. Technique: Axial CT angiography of the carotid arteries within the neck was performed with the administration of intravenous contrast for evaluation of the carotid bifurcation. Oral contrast was not utilized. Coronal, sagittal, and 3D volume    reconstructions of the carotid arteries were performed. Reformatted/3D-MPR images were performed. NASCET Criteria was used in evaluating the study. Radiation Dose: CTDIvol = 43.85 mGy, DLP = 670 mGy x cm. Prior studies: CT scan of brain done on same day. Findings:1.4 cm from its origin the left internal carotid artery demonstrates total occlusion. Minimal atherosclerotic plaque at the carotid bulb. Atherosclerotic plaque at the right distal common carotid artery, carotid bulb and proximal internal carotid artery with 50% narrowing. Vertebral arteries are well opacified. Jugular veins are well opacified   Three vessel aortic arch. Atherosclerosis of the aortic arch. Tubular filling defect within the mid aspect of the aortic arch likely a thrombus. It is in the periphery of the arch. Included lung apices are grossly unremarkable. IMPRESSION: Total occlusion of the left internal carotid artery 1.4 cm from its origin. Atherosclerosis of the right-sided carotid system with 50% stenosis at the carotid bulb and proximal internal carotid artery. Tubular filling defect in the periphery of the mid aspect of the aortic arch likely a thrombus. Vertebral arteries are unremarkable. Recommendation:   Follow up as clinically indicated. All CT scans at this facility utilize dose modulation, iterative reconstruction, and/or weight based dosing when appropriate to reduce radiation dose to as low as reasonably achievable. Amended by Deja Álvarez MD, [facility] CERTIFIED at [id number] 09:12:20 PM EST   Electronically Signed by Deja Álvarez MD, [address] [place] [address] at [id number] 09:03:16 PM EST               CT CERVICAL SPINE WO CONTRAST   Final Result   1. Motion degraded imaging diffusely. 2. A lucency involving the dens is suspected. A fracture here is difficult to exclude with image degradation from motion. Stabilize and then Repeat imaging without motion. 3. Straightening of the normal lordotic curvature from muscle spasm or positioning.     Recommendation: Follow REFLEX TO CULTURE - Abnormal; Notable for the following components:    Protein, UA 30 (*)     All other components within normal limits   BRAIN NATRIURETIC PEPTIDE - Abnormal; Notable for the following components:    Pro-BNP 1,058 (*)     All other components within normal limits   BLOOD GAS, ARTERIAL - Abnormal; Notable for the following components:    pO2, Arterial 68.0 (*)     Hemoglobin, Art, Extended 11.2 (*)     All other components within normal limits   CK - Abnormal; Notable for the following components: Total  (*)     All other components within normal limits   DRUG SCRN, BUPRENORPHINE - Abnormal; Notable for the following components:    Opiate Scrn, Ur POSITIVE (*)     All other components within normal limits   MICROSCOPIC URINALYSIS - Abnormal; Notable for the following components:    Bacteria, UA NEGATIVE (*)     Crystals, UA NEG (*)     RBC, UA 6 (*)     All other components within normal limits   COVID-19, RAPID   CULTURE, BLOOD 1   CULTURE, BLOOD 2   ETHANOL   AMMONIA   LACTIC ACID       All other labs were within normal range or not returned as of this dictation. EMERGENCY DEPARTMENT COURSE and DIFFERENTIAL DIAGNOSIS/MDM:   Vitals:    Vitals:    11/09/22 1741 11/09/22 2030 11/09/22 2100 11/09/22 2111   BP: 126/62 (!) 161/59 91/77    Pulse: 80 76 77    Resp: 22 20 27    Temp: 98.3 °F (36.8 °C)  98.3 °F (36.8 °C)    SpO2: 95%  99%    Weight:    200 lb (90.7 kg)       MDM    Found to have a large MCA stroke. Possible dens fracture. Placed in a rigid collar. Spoke with Dr. Roni Guerra of the [facility] ER for [facility] who accepted the patient for possible LVO extraction; family are in agreement with the plan risks and benefits of transfer were discussed. Case was also reviewed reviewed with neurology here at [facility] and agree that transfer is in the patient's best interest.  Patient was outside of the tPA window.   Reassessment      CONSULTS:  IP CONSULT TO PHARMACY  PHARMACY TO CHANGE BASE FLUIDS    PROCEDURES:  Unless otherwise noted below, none       Critical Care  There was a high probability of life-threatening clinical deterioration in the patient's condition requiring my urgent intervention. Total critical care time with the patient was 76 minutes excluding separately reportable procedures. Critical care required due to patients a cute neurologic emergency. Time spent coordinating care; making the diagnosis;  and conferring with specialist and the family and arranging for transport. Procedures    FINAL IMPRESSION      1. Acute cerebrovascular accident (CVA) due to occlusion of left middle cerebral artery ([address]    2. Acute right-sided weakness    3. Aphasia due to acute cerebrovascular accident (CVA) ([address]    4. Dens fracture, closed, initial encounter [facility])          DISPOSITION/PLAN   DISPOSITION Decision To Transfer 11/09/2022 08:57:42 PM      PATIENT REFERRED TO:  No follow-up provider specified.     DISCHARGE MEDICATIONS:  New Prescriptions    No medications on file          (Please note that portions of this note were completed with a voice recognition program.  Efforts were made to edit thedictations but occasionally words are mis-transcribed.)    Rachael Groves MD (electronically signed)  Attending Emergency Physician          Rachael Groves MD  11/09/22 8215

## 2022-11-10 NOTE — ED NOTES
Report called to [facility] MS, RN at [facility] in [facility].       Cora Cordero RN  11/09/22 3687

## 2022-11-12 LAB
EKG P AXIS: 68 DEGREES
EKG P-R INTERVAL: 124 MS
EKG Q-T INTERVAL: 418 MS
EKG QRS DURATION: 126 MS
EKG QTC CALCULATION (BAZETT): 440 MS
EKG T AXIS: -5 DEGREES

## 2022-11-12 PROCEDURE — 93010 ELECTROCARDIOGRAM REPORT: CPT | Performed by: INTERNAL MEDICINE

## 2022-11-14 LAB
BLOOD CULTURE, ROUTINE: NORMAL
CULTURE, BLOOD 2: NORMAL

## 2022-11-18 ENCOUNTER — HOSPITAL ENCOUNTER (INPATIENT)
Age: 62
LOS: 41 days | Discharge: HOME HEALTH | DRG: 057 | End: 2022-12-29
Attending: PSYCHIATRY & NEUROLOGY | Admitting: PSYCHIATRY & NEUROLOGY
Payer: COMMERCIAL

## 2022-11-18 DIAGNOSIS — F41.9 ANXIETY: ICD-10-CM

## 2022-11-18 DIAGNOSIS — E11.8 TYPE 2 DIABETES MELLITUS WITH COMPLICATION, WITHOUT LONG-TERM CURRENT USE OF INSULIN (HCC): Primary | ICD-10-CM

## 2022-11-18 DIAGNOSIS — R47.01 APHASIA: ICD-10-CM

## 2022-11-18 DIAGNOSIS — I69.391 DYSPHAGIA DUE TO RECENT CEREBRAL INFARCTION: ICD-10-CM

## 2022-11-18 DIAGNOSIS — I63.9 ACUTE ISCHEMIC STROKE (HCC): ICD-10-CM

## 2022-11-18 DIAGNOSIS — I25.10 CORONARY ARTERY DISEASE INVOLVING NATIVE CORONARY ARTERY OF NATIVE HEART WITHOUT ANGINA PECTORIS: ICD-10-CM

## 2022-11-18 DIAGNOSIS — I10 ESSENTIAL HYPERTENSION: ICD-10-CM

## 2022-11-18 PROBLEM — Z95.828 H/O SUPERIOR VENA CAVA FILTER PLACEMENT: Status: ACTIVE | Noted: 2022-11-18

## 2022-11-18 PROBLEM — M06.9 RHEUMATOID ARTHRITIS (HCC): Status: ACTIVE | Noted: 2022-11-18

## 2022-11-18 PROBLEM — I25.2 HISTORY OF MI (MYOCARDIAL INFARCTION): Status: ACTIVE | Noted: 2022-11-18

## 2022-11-18 PROBLEM — J44.9 COPD (CHRONIC OBSTRUCTIVE PULMONARY DISEASE) (HCC): Status: ACTIVE | Noted: 2022-11-18

## 2022-11-18 LAB
PREALBUMIN: 23 MG/DL (ref 20–40)
TSH REFLEX FT4: 2.78 UIU/ML (ref 0.35–5.5)
VITAMIN B-12: 970 PG/ML (ref 211–946)

## 2022-11-18 PROCEDURE — 84443 ASSAY THYROID STIM HORMONE: CPT

## 2022-11-18 PROCEDURE — 94760 N-INVAS EAR/PLS OXIMETRY 1: CPT

## 2022-11-18 PROCEDURE — 82607 VITAMIN B-12: CPT

## 2022-11-18 PROCEDURE — 6370000000 HC RX 637 (ALT 250 FOR IP): Performed by: PSYCHIATRY & NEUROLOGY

## 2022-11-18 PROCEDURE — 94150 VITAL CAPACITY TEST: CPT

## 2022-11-18 PROCEDURE — 1180000000 HC REHAB R&B

## 2022-11-18 PROCEDURE — 84134 ASSAY OF PREALBUMIN: CPT

## 2022-11-18 PROCEDURE — 36415 COLL VENOUS BLD VENIPUNCTURE: CPT

## 2022-11-18 RX ORDER — AMLODIPINE BESYLATE 2.5 MG/1
2.5 TABLET ORAL DAILY
Status: DISCONTINUED | OUTPATIENT
Start: 2022-11-18 | End: 2022-12-29 | Stop reason: HOSPADM

## 2022-11-18 RX ORDER — ATORVASTATIN CALCIUM 40 MG/1
40 TABLET, FILM COATED ORAL NIGHTLY
Status: DISCONTINUED | OUTPATIENT
Start: 2022-11-18 | End: 2022-12-29 | Stop reason: HOSPADM

## 2022-11-18 RX ORDER — ACETAMINOPHEN 325 MG/1
650 TABLET ORAL EVERY 4 HOURS PRN
Status: DISCONTINUED | OUTPATIENT
Start: 2022-11-18 | End: 2022-12-29 | Stop reason: HOSPADM

## 2022-11-18 RX ORDER — ASPIRIN 81 MG/1
81 TABLET, CHEWABLE ORAL DAILY
Status: DISCONTINUED | OUTPATIENT
Start: 2022-11-18 | End: 2022-12-29 | Stop reason: HOSPADM

## 2022-11-18 RX ORDER — MULTIVITAMIN WITH IRON
1 TABLET ORAL DAILY
Status: DISCONTINUED | OUTPATIENT
Start: 2022-11-18 | End: 2022-12-29 | Stop reason: HOSPADM

## 2022-11-18 RX ORDER — ATORVASTATIN CALCIUM 40 MG/1
40 TABLET, FILM COATED ORAL NIGHTLY
Status: ON HOLD | COMMUNITY
End: 2022-12-28 | Stop reason: SDUPTHER

## 2022-11-18 RX ORDER — GABAPENTIN 300 MG/1
300 CAPSULE ORAL NIGHTLY
Status: ON HOLD | COMMUNITY
End: 2022-12-28 | Stop reason: SDUPTHER

## 2022-11-18 RX ORDER — GABAPENTIN 300 MG/1
300 CAPSULE ORAL NIGHTLY
Status: DISCONTINUED | OUTPATIENT
Start: 2022-11-18 | End: 2022-12-29 | Stop reason: HOSPADM

## 2022-11-18 RX ORDER — ASPIRIN 81 MG/1
81 TABLET, CHEWABLE ORAL DAILY
Status: ON HOLD | COMMUNITY
End: 2022-12-28 | Stop reason: SDUPTHER

## 2022-11-18 RX ORDER — ENOXAPARIN SODIUM 100 MG/ML
40 INJECTION SUBCUTANEOUS DAILY
Status: DISCONTINUED | OUTPATIENT
Start: 2022-11-18 | End: 2022-12-29 | Stop reason: HOSPADM

## 2022-11-18 RX ORDER — POLYETHYLENE GLYCOL 3350 17 G/17G
17 POWDER, FOR SOLUTION ORAL DAILY PRN
Status: DISCONTINUED | OUTPATIENT
Start: 2022-11-18 | End: 2022-12-29 | Stop reason: HOSPADM

## 2022-11-18 RX ORDER — FOLIC ACID 1 MG/1
1 TABLET ORAL DAILY
Status: DISCONTINUED | OUTPATIENT
Start: 2022-11-18 | End: 2022-12-29 | Stop reason: HOSPADM

## 2022-11-18 RX ORDER — TIZANIDINE 4 MG/1
4 TABLET ORAL 2 TIMES DAILY PRN
Status: DISCONTINUED | OUTPATIENT
Start: 2022-11-18 | End: 2022-12-29 | Stop reason: HOSPADM

## 2022-11-18 RX ORDER — HYDROXYCHLOROQUINE SULFATE 200 MG/1
200 TABLET, FILM COATED ORAL 2 TIMES DAILY
Status: DISCONTINUED | OUTPATIENT
Start: 2022-11-18 | End: 2022-12-29 | Stop reason: HOSPADM

## 2022-11-18 RX ORDER — METOPROLOL SUCCINATE 25 MG/1
25 TABLET, EXTENDED RELEASE ORAL NIGHTLY
Status: DISCONTINUED | OUTPATIENT
Start: 2022-11-18 | End: 2022-11-21

## 2022-11-18 RX ORDER — NICARDIPINE HYDROCHLORIDE 20 MG/1
20 CAPSULE ORAL 3 TIMES DAILY
Status: ON HOLD | COMMUNITY
End: 2022-12-28 | Stop reason: HOSPADM

## 2022-11-18 RX ADMIN — HYDROXYCHLOROQUINE SULFATE 200 MG: 200 TABLET, FILM COATED ORAL at 20:46

## 2022-11-18 RX ADMIN — GABAPENTIN 300 MG: 300 CAPSULE ORAL at 20:47

## 2022-11-18 RX ADMIN — ATORVASTATIN CALCIUM 40 MG: 40 TABLET, FILM COATED ORAL at 20:47

## 2022-11-18 RX ADMIN — METOPROLOL SUCCINATE 25 MG: 25 TABLET, EXTENDED RELEASE ORAL at 20:46

## 2022-11-18 RX ADMIN — ACETAMINOPHEN 650 MG: 325 TABLET ORAL at 18:03

## 2022-11-18 ASSESSMENT — PAIN SCALES - WONG BAKER
WONGBAKER_NUMERICALRESPONSE: 2
WONGBAKER_NUMERICALRESPONSE: 4
WONGBAKER_NUMERICALRESPONSE: 0

## 2022-11-18 ASSESSMENT — PAIN DESCRIPTION - LOCATION: LOCATION: HEAD

## 2022-11-18 ASSESSMENT — PAIN DESCRIPTION - DESCRIPTORS: DESCRIPTORS: PATIENT UNABLE TO DESCRIBE

## 2022-11-18 NOTE — PLAN OF CARE
Problem: Discharge Planning  Goal: Discharge to home or other facility with appropriate resources  Outcome: Progressing  Flowsheets  Taken 11/18/2022 1534  Discharge to home or other facility with appropriate resources: Refer to discharge planning if patient needs post-hospital services based on physician order or complex needs related to functional status, cognitive ability or social support system  Taken 11/18/2022 1504  Discharge to home or other facility with appropriate resources:   Arrange for needed discharge resources and transportation as appropriate   Refer to discharge planning if patient needs post-hospital services based on physician order or complex needs related to functional status, cognitive ability or social support system   Identify discharge learning needs (meds, wound care, etc)   Identify barriers to discharge with patient and caregiver     Problem: Safety - Adult  Goal: Free from fall injury  Outcome: Progressing     Problem: Neurosensory - Adult  Goal: Achieves stable or improved neurological status  Outcome: Progressing  Flowsheets (Taken 11/18/2022 1534)  Achieves stable or improved neurological status: Assess for and report changes in neurological status  Goal: Achieves maximal functionality and self care  Outcome: Progressing  Flowsheets (Taken 11/18/2022 1534)  Achieves maximal functionality and self care: Monitor swallowing and airway patency with patient fatigue and changes in neurological status     Problem: Skin/Tissue Integrity - Adult  Goal: Skin integrity remains intact  Outcome: Progressing  Flowsheets (Taken 11/18/2022 1534)  Skin Integrity Remains Intact: Monitor for areas of redness and/or skin breakdown     Problem: Pain  Goal: Verbalizes/displays adequate comfort level or baseline comfort level  Outcome: Progressing     Problem: Chronic Conditions and Co-morbidities  Goal: Patient's chronic conditions and co-morbidity symptoms are monitored and maintained or improved  Outcome: Progressing  Flowsheets (Taken 11/18/2022 1534)  Care Plan - Patient's Chronic Conditions and Co-Morbidity Symptoms are Monitored and Maintained or Improved: Monitor and assess patient's chronic conditions and comorbid symptoms for stability, deterioration, or improvement     Problem: ABCDS Injury Assessment  Goal: Absence of physical injury  Outcome: Progressing     Problem: Skin/Tissue Integrity  Goal: Absence of new skin breakdown  Description: 1. Monitor for areas of redness and/or skin breakdown  2. Assess vascular access sites hourly  3. Every 4-6 hours minimum:  Change oxygen saturation probe site  4. Every 4-6 hours:  If on nasal continuous positive airway pressure, respiratory therapy assess nares and determine need for appliance change or resting period. Outcome: Progressing     Problem: Confusion  Goal: Confusion, delirium, dementia, or psychosis is improved or at baseline  Description: INTERVENTIONS:  1. Assess for possible contributors to thought disturbance, including medications, impaired vision or hearing, underlying metabolic abnormalities, dehydration, psychiatric diagnoses, and notify attending LIP  2. [place] high risk fall precautions, as indicated  3. Provide frequent short contacts to provide reality reorientation, refocusing and direction  4. Decrease environmental stimuli, including noise as appropriate  5. Monitor and intervene to maintain adequate nutrition, hydration, elimination, sleep and activity  6. If unable to ensure safety without constant attention obtain sitter and review sitter guidelines with assigned personnel  7.  Initiate Psychosocial CNS and Spiritual Care consult, as indicated  Outcome: Progressing  Flowsheets (Taken 11/18/2022 1534)  Effect of thought disturbance (confusion, delirium, dementia, or psychosis) are managed with adequate functional status:   Assess for contributors to thought disturbance, including medications, impaired vision or hearing, underlying metabolic abnormalities, dehydration, psychiatric diagnoses, notify [facility] high risk fall precautions, as indicated     Problem: Musculoskeletal - Adult  Goal: Return mobility to safest level of function  Outcome: Progressing  Goal: Return ADL status to a safe level of function  Outcome: Progressing     Problem: Gastrointestinal - Adult  Goal: Maintains or returns to baseline bowel function  Outcome: Progressing  Goal: Maintains adequate nutritional intake  Outcome: Progressing       Electronically signed by Fabby Oliveira.  Georgette Espinoza on 11/18/2022 at 5:28 PM

## 2022-11-18 NOTE — PROGRESS NOTES
[facility] arrived to room # [phone]. Presented with: CVA, aphasia, right hemiplegia    Mental Status: Patient is alert. There were no vitals filed for this visit. Patient safety contract and falls prevention contract reviewed with patient Yes. Oriented Patient and Family to room. Call light within reach. Yes. Needs, issues or concerns expressed at this time: no.      Electronically signed by Deedee Rollinsolfo [place] on 11/18/2022 at 1:49 PM

## 2022-11-18 NOTE — PROGRESS NOTES
4 Eyes Skin Assessment    [facility] is being assessed upon: Admission    I agree that I, [address] Adalberto Richard, RN, along with Yenifer Grullon, RN (either 2 RN's or 1 LPN and 1 RN) have performed a thorough Head to Toe Skin Assessment on the patient. ALL assessment sites listed below have been assessed. Areas assessed by both nurses:     [x]   Head, Face, and Ears   [x]   Shoulders, Back, and Chest  [x]   Arms, Elbows, and Hands   [x]   Coccyx, Sacrum, and Ischium  [x]   Legs, Feet, and Heels    Does the Patient have Skin Breakdown? No    Rickey Prevention initiated: No  Wound Care Orders initiated: No    WOC nurse consulted for Pressure Injury (Stage 3,4, Unstageable, DTI, NWPT, and Complex wounds) and New or Established Ostomies: No    Scattered ecchymosis and scabbed abrasions. Primary Nurse eSignature: Electronically signed by Perry Johnson on 11/18/2022 at 4:03 PM      Co-Signer eSignature: {Esignature:[id number]}

## 2022-11-18 NOTE — PLAN OF CARE
Niesha Mar TREATMENT PLAN      Briana Porter    : 1960  [facility]  [facility]N: [id number]   PHYSICIAN:  Bekah Phillips MD  Primary Problem    Patient Active Problem List   Diagnosis    Heartburn    Indigestion    Nausea    Morbid obesity ([facility]    Anticoagulated on warfarin    Ulcerative colitis ([facility]    Hypertension    Hyperlipidemia    MI (myocardial infarction) ([facility]    ASCVD (arteriosclerotic cardiovascular disease)    CAD (coronary artery disease)    Acute ischemic stroke ([facility])    Aphasia    COPD (chronic obstructive pulmonary disease) ([facility])    Anxiety    H/O superior vena cava filter placement    Essential hypertension    History of MI (myocardial infarction)    Rheumatoid arthritis ([facility]    Dysphagia due to recent cerebral infarction    Type 2 diabetes mellitus with complication, without long-term current use of insulin ([facility])       Rehabilitation Diagnosis:     Acute ischemic stroke ([facility] [I63.9]       ADMIT DATE:2022   CARE PLAN     NURSING:  Briana Porter while on this unit will:     [x] Be continent of bowel and bladder      [] Have an adequate number of bowel movements   [] Urinate with no urinary retention >300ml in bladder   [] Complete bladder protocol with dasilva removal   [] Maintain O2 SATs at ___%   [x] Have pain managed while on ARU        [] Be pain free by discharge    [x] Have no skin breakdown while on ARU   [] Have improved skin integrity via wound measurements   [] Have no signs/symptoms of infection at the wound site  [x] Be free from injury during hospitalization   [x] Complete education with patient/family with understanding demonstrated for:  [] Adjustment   [] Other:   Nursing interventions may include bowel/bladder training, education for medical assistive devices, medication education, O2 saturation management, energy conservation, stress management techniques, fall prevention, alarms protocol, seating and positioning, skin/wound care, pressure relief instruction,dressing changes,  infection protection, DVT prophylaxis, and/or assistance with in room safety with transfers to bed, toilet, wheelchair, shower as well as bathroom activities and hygiene. Patient/caregiver education for:   [x] Disease/sustained injury/management      [x] Medication Use   [] Surgical intervention   [x] Safety   [x] Body mechanics and or joint protection   [x] Health maintenance       IRF-ARLEN  Bladder and Bowel Continence  Bladder Continence: Always continent  Bowel Continence: Occassionally incontinent       PHYSICAL THERAPY:  Goals:                  Short Term Goals  Time Frame for Short Term Goals: 2 weeks  Short Term Goal 1: Patient will perform bed mobility with Min A  Short Term Goal 2: Patient will transition supine <> sit with Min A  Short Term Goal 3: Patient will perform bed <> chair transfer with Min A  Short Term Goal 4: Patient propel wheelchair 50 ft with Min A  Short Term Goal 5: Patient will ambulate 10 ft with Mod A            Long Term Goals  Time Frame for Long Term Goals : 3 weeks  Long Term Goal 1: Patient will perform bed mobility independently  Long Term Goal 2: Patient with transition supine <> sit independently  Long Term Goal 3: Patient will perform bed <> chair transfer independently  Long Term Goal 4: Patient will perform car transfer with Min A  Long Term Goal 5: Patient will ambulate 10 ft with CGA  These goals were reviewed with this patient at the time of assessment and [address] [place] [address] is in agreement.      Plan of Care: Frequency:  [] 5 days per week, 90 minutes per day                             [x]  5 days per week, 60 minutes per day                    IRF-ARLEN  Roll Left and Right  Assistance Needed: Substantial/maximal assistance  CARE Score: 2  Discharge Goal: Independent  Sit to Lying  Assistance Needed: Substantial/maximal assistance  CARE Score: 2  Discharge Goal: Independent  Lying to Sitting on Side of Bed  Assistance Needed: Substantial/maximal assistance  CARE Score: 2  Discharge Goal: Independent  Sit to Stand  Assistance Needed: Substantial/maximal assistance  CARE Score: 2  Discharge Goal: Supervision or touching assistance  Chair/Bed-to-Chair Transfer  Assistance Needed: Substantial/maximal assistance  CARE Score: 2  Discharge Goal: Independent  Car Transfer  Reason if not Attempted: Not attempted due to medical condition or safety concerns  CARE Score: 88  Discharge Goal: Partial/moderate assistance  Walk 10 Feet  Reason if not Attempted: Not attempted due to medical condition or safety concerns  CARE Score: 88  Discharge Goal: Partial/moderate assistance  Walk 50 Feet with Two Turns  Reason if not Attempted: Not attempted due to medical condition or safety concerns  CARE Score: 88  Discharge Goal: Partial/moderate assistance  Walk 150 Feet  Reason if not Attempted: Not attempted due to medical condition or safety concerns  CARE Score: 88  Discharge Goal: Not Attempted  Walking 10 Feet on Uneven Surfaces  Reason if not Attempted: Not attempted due to medical condition or safety concerns  CARE Score: 88  Discharge Goal: Not Attempted  1 Step (Curb)  Reason if not Attempted: Not attempted due to medical condition or safety concerns  CARE Score: 88  Discharge Goal: Partial/moderate assistance  4 Steps  Reason if not Attempted: Not attempted due to medical condition or safety concerns  CARE Score: 88  Discharge Goal: Partial/moderate assistance  12 Steps  Reason if not Attempted: Not attempted due to medical condition or safety concerns  CARE Score: 88  Discharge Goal: Not Attempted  Wheel 50 Feet with Two Turns  Assistance Needed: Substantial/maximal assistance  CARE Score: 2  Discharge Goal: Partial/moderate assistance  Wheel 150 Feet  Reason if not Attempted: Not attempted due to medical condition or safety concerns  CARE Score: 88  Discharge Goal: Partial/moderate assistance    OCCUPATIONAL THERAPY:  Goals:             Short Term Goals  Time Frame for Short Term Goals: 2 weeks  Short Term Goal 1: Pt will bathe with mod A, AE prn  Short Term Goal 2: Pt will dress UB using hemitechnique, mod A and cues  Short Term Goal 3: Pt will dress LB, hemitechnique, mod A and cues  Short Term Goal 4: Pt will toilet with mod A  Short Term Goal 5: Toilet transfer with mod A  Additional Goals?: Yes  Short Term Goal 6: Pt will attend to R side during ADL/functional activities with moderate cues  Short Term Goal 7: Pt will perform standing tasks x 2-3 mins with L UE and mod A for balance to enhance ADL/IADL performance  Short Term Goal 8: Pt/family will demo understanding of R UE positioning/HEP/therapeutic activity recommendations with min A after ed :  Long Term Goals  Time Frame for Long Term Goals : 4-5 weeks  Long Term Goal 1: Pt will bathe with min A, AE/DME prn  Long Term Goal 2: Pt will dress UB supervision  Long Term Goal 3: Pt will dress LB min A, AE prn  Long Term Goal 4: Pt will toilet with CGA  Long Term Goal 5: Pt will perform toilet transfer with CGA  Additional Goals?: Yes  Long Term Goal 6: Pt will perform simple home making task with min A  Long Term Goal 7: Pt/family will be independent in HEP/DME/therapeutic activity recommendations after ed  Long Term Goal 8: Pt will attend to R side during functional activities with occasional cueing :     These goals were reviewed with this patient at the time of assessment and [address] [place] [address] is in agreement    Plan of Care: Frequency:   [] 5 days per week, 90 minutes per day     [x] 5 days per week, 60 minutes per day                Occupational Therapy Plan  Current Treatment Recommendations: Strengthening, ROM, Balance training, Functional mobility training, Endurance training, Positioning, Modalities, Neuromuscular re-education, Cognitive reorientation, Safety education & training, Patient/Caregiver education & training, Equipment evaluation, education, & procurement, Self-Care / ADL, Cognitive/Perceptual training, Home management training, Sensory integraion            IRF-ARLEN  Eating  Assistance Needed: Partial/moderate assistance  CARE Score: 3  Discharge Goal: Independent  Oral Hygiene  Assistance Needed: Partial/moderate assistance  CARE Score: 3  Discharge Goal: [address] needed: Dependent  Comment: pt incontinent, urine  CARE Score: 1  Discharge Goal: Supervision or touching assistance  Shower/Bathe Self  Assistance Needed: Substantial/maximal assistance  CARE Score: 2  Discharge Goal: Partial/moderate assistance  Upper Body Dressing  Assistance Needed: Substantial/maximal assistance  CARE Score: 2  Discharge Goal: Supervision or touching assistance  Lower Body Dressing  Assistance Needed: Substantial/maximal assistance  CARE Score: 2  Discharge Goal: Partial/moderate assistance  Putting On/Taking Off Footwear  Assistance Needed: Dependent  CARE Score: 1  Discharge Goal: Partial/moderate assistance  Toilet Transfer  Assistance needed: Substantial/maximal assistance  CARE Score: 2  Discharge Goal: Supervision or touching assistance  Picking Up Object  Reason if not Attempted: Not attempted due to medical condition or safety concerns  CARE Score: 88  Discharge Goal: Not Attempted  Health Literacy  How often do you need to have someone help you when you read instructions, pamphlets, or other written material from your doctor or pharmacy?: Never  Confusion Assessment Method (CAM)  Is there evidence of an acute change in mental status from the patient's baseline?: Yes  Inattention: Behavior present, fluctuates (comes and goes, changes in severity)  Disorganized thinking: Behavior present, fluctuates (comes and goes, changes in severity)  Altered level of consciousness: Behavior not present  Cognitive Patterns  Cognitive Pattern Assessment Used: BIMS  Repetition of Three Words (First Attempt): 3  Temporal Orientation: Year: Missed by > 5 years or no answer  Temporal Orientation: Month: Missed by > 1 month or no answer  Temporal Orientation: Day: Incorrect or no answer  Able to recall \"sock: No, could not recall  Able to recall \"blue\": No, could not recall  Able to recall \"bed\": No, could not recall  BIMS Summary Score: 3  Treatments may include IADL retraining, strengthening, safety education and training, patient/caregiver education and training, equipment evaluation/ training/procurement, neuromuscular reeducation, wheelchair mobility training. SPEECH THERAPY:   Plan of Care and Goals:   LTG Goal 1: Pt will participate in skilled speech therapy services to ensure she is able to communicate her wants and needs. Goal2: Pt will tolerate least restrictive diet with minimal overt s/s of aspiration/penetration during hospitalization. Short Term Goals  Goal 1: Pt will complete y/n tasks with 80% accuracy and minimal verbal cues/modeling. Goal 2: Pt will complete verbal expression tasks with 80% accuracy and minimal verbal cues/modeling. Goal 3: Pt will complete receptive/expressive language tasks with 80% accuracy and minimal verbal cues/modeling. Goal 4: Pt will follow one step commands with with 90% accuracy and minimal verbal cues/modeling. Goal 5: Pt will complete orientation tasks with 90% accuracy and minimal verbal cues/modeling. Goal 6: Pt will complete automatic speech tasks, confrontational naming tasks, and 1 step commands tasks with 80% accuracy and minimal verbal cues/modeling. Short-term Goals  Goal 1: Pt will tolerate soft & bite sized consistencies with mildly  (nectar) thick liquids with minimal overt s/s of aspiration/penetration during hospitalization. Goal 2: Pt will complete Modified Barium Swallow Study. Goal 3: Pt will demonstrate awareness of general aspiration precautions. Goal 4: Pt will participate in swallowing reassessments to ensure safest diet consistencies.   Goal 5: Pt will allow staff to complete daily oral care.  Goal 6: Pt will complete oral motor exercises for lingual and labial strengthening. IRF-ARLEN  Hearing, Speech, and Vision  Expression of Ideas and Wants: Frequent difficulty  Understanding Verbal and Non-Verbal Content: Sometimes understands  Ability to Hear: Adequate  Ability to See in Adequate Light: Adequate  Cognitive Patterns  Cognitive Pattern Assessment Used: BIMS  Repetition of Three Words (First Attempt): 3  Temporal Orientation: Year: Missed by > 5 years or no answer  Temporal Orientation: Month: Missed by > 1 month or no answer  Temporal Orientation: Day: Incorrect or no answer  Able to recall \"sock: No, could not recall  Able to recall \"blue\": No, could not recall  Able to recall \"bed\": No, could not recall  BIMS Summary Score: 3          Plan of Care:  Frequency:   [x] 5 days per week, 60 minutes per day                            [] Not appropriate for Speech Therapy  Treatments may include speech/language/communication therapy, cognitive training, group therapy, education, and/or dysphagia therapy based on the above goals. These goals were reviewed with this patient at the time of assessment and [address] [place] [address] is in agreement. CASE MANAGEMENT:  Goals:   Assist patient/family with discharge planning, patient/family counseling,  and coordination with insurance during ARU stay. Patient Goals: maximal improvement, swallow, speak, move self with ease               Activities Prior to Admit:      Active : Yes        Leisure & Hobbies: volunteers at animal shelter, [employer], [employer], reading, cooking         [address] [place] [address] will be seen a minimum of 3 hours of therapy per day/a minimum of 5 out of 7 days per week. [] In this rare instance due to the nature of this patient's medical involvement, this patient will be seen 15 hours per week (900 minutes within a 7 day period).     Treatments may include therapeutic exercises, gait training, neuromuscular re-ed, transfer training, community reintegration, bed mobility, w/c mobility and training, self care, home mgmt, cognitive training, energy conservation,dysphagia tx, speech/language/communication therapy, group therapy, and patient/family education. In addition, dietician/nutritionist may monitor calorie count as well as intake and collaboratively work with SLP on dietary upgrades. Neuropsychology/Psychology may evaluate and provide necessary support. Medical issues being managed closely and that require 24 hour availability of a physician:   [x] Swallowing Precautions  [x] Bowel/Bladder Fx  [] Weight bearing precautions   [] Wound Care    [] Pain Mgmt   [] Infection Protection   [x] DVT Prophylaxis   [x] Fall Precautions  [x] Fluid/Electrolyte/Nutrition Balance   [] Voice Protection   [] Respiratory  [] Other:    Medical Prognosis: [x] Good  [] Fair    [] Guarded   Total expected IRF days:  17 days   Anticipated discharge destination:    [] Home Independently   [x] Home with supervision    []SNF     [] Other                                           Physician anticipated functional outcomes:  Ambulate household distances with assistance. IPOC brief synthesis: Acute inpatient rehabilitation with occupational therapy,  physical therapy, and speech therapy, 180 minutes, 5 every 7   days will address basic and advancing mobility with self-care   instruction and adaptive equipment training. Caregiver education will   be offered. Expected length of stay prior to the supervised level of   functional discharge to home with home care is 17 days. Assessment and Plan:  1. Acute ischemic stroke-on aspirin/statin  2. Hypertension-on medications monitor  3. Hyperlipidemia-on statin  4. Diabetes-Trulicity-monitor blood sugar  5. DVT prophylaxis-Lovenox  6. History of coronary artery disease-aspirin/statin  7. Neuropathy-on Neurontin  8. Rheumatoid arthritis-on Plaquenil  9. COPD-monitor  10.   History of anxiety and depression-monitor  11. History of colitis/gallstones-monitor  12. History of bariatric surgery-on multivitamin/folic acid  13. History of superior vena cava filter placement with venous thromboembolism-not on anticoagulation-monitor  14. PT/OT/speech             Case Mgmt: Electronically signed by SASHA Ramon on 11/18/2022 at 1:41 PM      OT: Electronically signed by Jose Downs OT on 11/19/22 at 2:58 PM CST     PT: Electronically signed by Rachael Bruner PT on 11/19/22 at 1:25 PM CST     RN: Electronically signed by Velia Aranda on 11/19/2022 at 2:58 PM    ST: Electronically signed by MARCELINO Hayes on 11/19/2022 at 12:13 PM

## 2022-11-19 PROBLEM — E11.8 TYPE 2 DIABETES MELLITUS WITH COMPLICATION, WITHOUT LONG-TERM CURRENT USE OF INSULIN (HCC): Status: ACTIVE | Noted: 2022-11-19

## 2022-11-19 LAB
ALBUMIN SERPL-MCNC: 3.3 G/DL (ref 3.5–5.2)
ALP BLD-CCNC: 83 U/L (ref 35–104)
ALT SERPL-CCNC: 40 U/L (ref 5–33)
ANION GAP SERPL CALCULATED.3IONS-SCNC: 8 MMOL/L (ref 7–19)
AST SERPL-CCNC: 33 U/L (ref 5–32)
BILIRUB SERPL-MCNC: <0.2 MG/DL (ref 0.2–1.2)
BUN BLDV-MCNC: 27 MG/DL (ref 8–23)
CALCIUM SERPL-MCNC: 9.3 MG/DL (ref 8.8–10.2)
CHLORIDE BLD-SCNC: 102 MMOL/L (ref 98–111)
CO2: 27 MMOL/L (ref 22–29)
CREAT SERPL-MCNC: 0.6 MG/DL (ref 0.5–0.9)
GFR SERPL CREATININE-BSD FRML MDRD: >60 ML/MIN/{1.73_M2}
GLUCOSE BLD-MCNC: 92 MG/DL (ref 74–109)
GLUCOSE BLD-MCNC: 94 MG/DL (ref 70–99)
GLUCOSE BLD-MCNC: 96 MG/DL (ref 70–99)
HCT VFR BLD CALC: 39.7 % (ref 37–47)
HEMOGLOBIN: 11.8 G/DL (ref 12–16)
MCH RBC QN AUTO: 23.8 PG (ref 27–31)
MCHC RBC AUTO-ENTMCNC: 29.7 G/DL (ref 33–37)
MCV RBC AUTO: 80.2 FL (ref 81–99)
PDW BLD-RTO: 17.3 % (ref 11.5–14.5)
PERFORMED ON: NORMAL
PERFORMED ON: NORMAL
PLATELET # BLD: 439 K/UL (ref 130–400)
PMV BLD AUTO: 10 FL (ref 9.4–12.3)
POTASSIUM REFLEX MAGNESIUM: 4.3 MMOL/L (ref 3.5–5)
PREALBUMIN: 20 MG/DL (ref 20–40)
RBC # BLD: 4.95 M/UL (ref 4.2–5.4)
SODIUM BLD-SCNC: 137 MMOL/L (ref 136–145)
TOTAL PROTEIN: 6.2 G/DL (ref 6.6–8.7)
WBC # BLD: 11.7 K/UL (ref 4.8–10.8)

## 2022-11-19 PROCEDURE — 97535 SELF CARE MNGMENT TRAINING: CPT

## 2022-11-19 PROCEDURE — 80053 COMPREHEN METABOLIC PANEL: CPT

## 2022-11-19 PROCEDURE — 6360000002 HC RX W HCPCS: Performed by: PSYCHIATRY & NEUROLOGY

## 2022-11-19 PROCEDURE — 82947 ASSAY GLUCOSE BLOOD QUANT: CPT

## 2022-11-19 PROCEDURE — 97165 OT EVAL LOW COMPLEX 30 MIN: CPT

## 2022-11-19 PROCEDURE — 36415 COLL VENOUS BLD VENIPUNCTURE: CPT

## 2022-11-19 PROCEDURE — 92610 EVALUATE SWALLOWING FUNCTION: CPT

## 2022-11-19 PROCEDURE — 94760 N-INVAS EAR/PLS OXIMETRY 1: CPT

## 2022-11-19 PROCEDURE — 92522 EVALUATE SPEECH PRODUCTION: CPT

## 2022-11-19 PROCEDURE — 99223 1ST HOSP IP/OBS HIGH 75: CPT | Performed by: PSYCHIATRY & NEUROLOGY

## 2022-11-19 PROCEDURE — 97110 THERAPEUTIC EXERCISES: CPT

## 2022-11-19 PROCEDURE — 85027 COMPLETE CBC AUTOMATED: CPT

## 2022-11-19 PROCEDURE — 84134 ASSAY OF PREALBUMIN: CPT

## 2022-11-19 PROCEDURE — 97530 THERAPEUTIC ACTIVITIES: CPT

## 2022-11-19 PROCEDURE — 6370000000 HC RX 637 (ALT 250 FOR IP): Performed by: PSYCHIATRY & NEUROLOGY

## 2022-11-19 PROCEDURE — 1180000000 HC REHAB R&B

## 2022-11-19 PROCEDURE — 97161 PT EVAL LOW COMPLEX 20 MIN: CPT

## 2022-11-19 RX ORDER — INSULIN LISPRO 100 [IU]/ML
0-4 INJECTION, SOLUTION INTRAVENOUS; SUBCUTANEOUS NIGHTLY
Status: DISCONTINUED | OUTPATIENT
Start: 2022-11-19 | End: 2022-11-19

## 2022-11-19 RX ORDER — INSULIN LISPRO 100 [IU]/ML
0-4 INJECTION, SOLUTION INTRAVENOUS; SUBCUTANEOUS
Status: DISCONTINUED | OUTPATIENT
Start: 2022-11-19 | End: 2022-11-19

## 2022-11-19 RX ORDER — DEXTROSE MONOHYDRATE 100 MG/ML
INJECTION, SOLUTION INTRAVENOUS CONTINUOUS PRN
Status: DISCONTINUED | OUTPATIENT
Start: 2022-11-19 | End: 2022-11-19

## 2022-11-19 RX ADMIN — FOLIC ACID 1 MG: 1 TABLET ORAL at 08:41

## 2022-11-19 RX ADMIN — ASPIRIN 81 MG 81 MG: 81 TABLET ORAL at 08:41

## 2022-11-19 RX ADMIN — THERA TABS 1 TABLET: TAB at 08:41

## 2022-11-19 RX ADMIN — METOPROLOL SUCCINATE 25 MG: 25 TABLET, EXTENDED RELEASE ORAL at 21:13

## 2022-11-19 RX ADMIN — ENOXAPARIN SODIUM 40 MG: 100 INJECTION SUBCUTANEOUS at 18:23

## 2022-11-19 RX ADMIN — ACETAMINOPHEN 650 MG: 325 TABLET ORAL at 13:29

## 2022-11-19 RX ADMIN — AMLODIPINE BESYLATE 2.5 MG: 2.5 TABLET ORAL at 08:41

## 2022-11-19 RX ADMIN — HYDROXYCHLOROQUINE SULFATE 200 MG: 200 TABLET, FILM COATED ORAL at 08:41

## 2022-11-19 RX ADMIN — GABAPENTIN 300 MG: 300 CAPSULE ORAL at 21:13

## 2022-11-19 RX ADMIN — ATORVASTATIN CALCIUM 40 MG: 40 TABLET, FILM COATED ORAL at 21:13

## 2022-11-19 RX ADMIN — HYDROXYCHLOROQUINE SULFATE 200 MG: 200 TABLET, FILM COATED ORAL at 21:13

## 2022-11-19 ASSESSMENT — PAIN SCALES - WONG BAKER: WONGBAKER_NUMERICALRESPONSE: 0

## 2022-11-19 ASSESSMENT — PAIN DESCRIPTION - LOCATION: LOCATION: HEAD

## 2022-11-19 ASSESSMENT — PAIN SCALES - GENERAL: PAINLEVEL_OUTOF10: 3

## 2022-11-19 ASSESSMENT — PAIN DESCRIPTION - DESCRIPTORS: DESCRIPTORS: ACHING

## 2022-11-19 NOTE — PROGRESS NOTES
Physical Therapy Evaluation Note  DATE:  2022  NAME:  Jefry Haile  :  1960  (61 y.o.,female)  MRN:  [id number]    HEIGHT:  Height: 5' 7\" (170.2 cm)  WEIGHT:  Weight: 201 lb 5 oz (91.3 kg)    PATIENT DIAGNOSIS(ES):    Diagnosis: CVA, R hemiparesis    Additional Pertinent Hx: Anxiety, depression, COPD, CVA, DM, LE edema, hyperlipidemia, HTN, obesity, RA, CAD and venous thromboembolism  RESTRICTIONS/PRECAUTIONS:    Restrictions/Precautions  Restrictions/Precautions: Modified Diet, Swallowing - Thickened Liquids, Fall Risk, Aspiration Risk  Required Braces or Orthoses?: No     OVERALL  ORIENTATION STATUS:  Overall Orientation Status: Impaired  PAIN:          Pain Location: Head            GROSS ASSESSMENT        POSTURE/BALANCE  Balance  Posture: Fair (L truncal lean when seated in W/C)  Sitting - Static: Fair (No LOB, unable to mainain midline position)  Sitting - Dynamic: Fair, -       ACTIVITY TOLERANCE  Activity Tolerance  Activity Tolerance: Patient tolerated treatment well      BED MOBILITY  Bed mobility  Supine to Sit: Maximum assistance  Sit to Supine: Maximum assistance  Scooting: Moderate assistance (Able to follow command to inititate scooting, Mod A to fully execute)        TRANSFERS  Transfers  Sit to Stand: Maximum Assistance  Stand to Sit: Moderate Assistance  Bed to Chair: Maximum assistance  Squat Pivot Transfers: Maximum Assistance (Performed to patients L side)  Car Transfer: Unable to assess       AMBULATION 1  Not appropriate to assess           STAIRS  Not appropriate to assess      WHEELCHAIR PROPULSION 1  Propulsion 1  Propulsion: Manual  Level: Level Tile  Method: LUE, LLE  Level of Assistance: Maximum assistance  Description/ Details: Patient unable to coordinate LUE/LLE together for propulsion, pt able to use LUE for strong forward propulsion. No turns.   Distance: 13' and then dependent for remainder of distance       GOALS:  Short Term Goals  Time Frame for Short Term Goals: 2 weeks  Short Term Goal 1: Patient will perform bed mobility with Min A  Short Term Goal 2: Patient will transition supine <> sit with Min A  Short Term Goal 3: Patient will perform bed <> chair transfer with Min A  Short Term Goal 4: Patient propel wheelchair 50 ft with Min A  Short Term Goal 5: Patient will ambulate 10 ft with Mod A    Long Term Goals  Time Frame for Long Term Goals : 3 weeks  Long Term Goal 1: Patient will perform bed mobility independently  Long Term Goal 2: Patient with transition supine <> sit independently  Long Term Goal 3: Patient will perform bed <> chair transfer independently  Long Term Goal 4: Patient will perform car transfer with Min A  Long Term Goal 5: Patient will ambulate 10 ft with CGA  HOME LIVING:     Type of Home: House  Home Layout: One level  Home Access:  (2 steps to enter)        ASSESSMENT (IMPAIRMENTS/BARRIERS): Body Structures, Functions, Activity Limitations Requiring Skilled Therapeutic Intervention: Decreased ADL status, Decreased ROM, Decreased strength, Decreased safe awareness, Decreased cognition, Decreased endurance, Decreased sensation, Decreased balance, Decreased coordination, Decreased posture  Assessment: Ms. Calixto Ahr tolerated evaluation well, she was very fatigued by end of session. She required Mod-Max A for all function mobility and was not appropriate for attempting ambulation this date due to onset of head pain after standing. She was able to follow one step commands, however inconsistent on appropriate response and action. When performing squat pivot from wheelchair, her R posterior hip became painful due to sitting on a part of wheelchair. PT assessed the area and no wound or skin changes observed. RN was notified. Overall she will greatly benefit from skilled therapy as she is at high risk of falling and uanble to perform functional mobiltiy skills without Mod-Max A.   Activity Tolerance: Patient tolerated treatment well     PLAN:     Discharge Recommendations: Continue to assess pending progress  PATIENT REQUIRES AND IS REASONABLY EXPECTED TO ACTIVELY PARTICIPATE IN AT LEAST 3 HOURS OF INTENSIVE THERAPY PER DAY AT LEAST 5 DAYS PER WEEK, AND BE EXPECTED TO MAKE MEASURABLE IMPROVEMENT THAT WILL BE OF PRACTICAL VALUE TO IMPROVE THE PATIENT'S FUNCTIONAL CAPACITY OR ADAPTATION TO IMPAIRMENTS.    PATIENT GOAL FOR REHAB:  RETURN TO PRIOR LEVEL OF FUNCTION       IRF/ARLEN  Roll Left and Right  Assistance Needed: Substantial/maximal assistance  CARE Score: 2  Discharge Goal: Independent    Sit to Lying  Assistance Needed: Substantial/maximal assistance  CARE Score: 2  Discharge Goal: Independent    Lying to Sitting on Side of Bed  Assistance Needed: Substantial/maximal assistance  CARE Score: 2  Discharge Goal: Independent    Sit to Stand  Assistance Needed: Substantial/maximal assistance  CARE Score: 2  Discharge Goal: Supervision or touching assistance    Chair/Bed-to-Chair Transfer  Assistance Needed: Substantial/maximal assistance  CARE Score: 2  Discharge Goal: Independent    Car Transfer  Reason if not Attempted: Not attempted due to medical condition or safety concerns  CARE Score: 88  Discharge Goal: Partial/moderate assistance    Walk 10 Feet  Reason if not Attempted: Not attempted due to medical condition or safety concerns  CARE Score: 88  Discharge Goal: Partial/moderate assistance    Walk 50 Feet with Two Turns  Reason if not Attempted: Not attempted due to medical condition or safety concerns  CARE Score: 88  Discharge Goal: Partial/moderate assistance    Walk 150 Feet  Reason if not Attempted: Not attempted due to medical condition or safety concerns  CARE Score: 88  Discharge Goal: Not Attempted    Walking 10 Feet on Uneven Surfaces  Reason if not Attempted: Not attempted due to medical condition or safety concerns  CARE Score: 88  Discharge Goal: Not Attempted    1 Step (Curb)  Reason if not Attempted: Not attempted due to medical condition or safety concerns  CARE Score: 88  Discharge Goal: Partial/moderate assistance    4 Steps  Reason if not Attempted: Not attempted due to medical condition or safety concerns  CARE Score: 88  Discharge Goal: Partial/moderate assistance    12 Steps  Reason if not Attempted: Not attempted due to medical condition or safety concerns  CARE Score: 88  Discharge Goal: Not Attempted    Wheel 50 Feet with Two Turns  Assistance Needed: Substantial/maximal assistance  CARE Score: 2  Discharge Goal: Partial/moderate assistance    Wheel 150 Feet  Reason if not Attempted: Not attempted due to medical condition or safety concerns  CARE Score: 88  Discharge Goal: Partial/moderate assistance      LAST TREATMENT TIME  PT Individual Minutes  Time In: 1000  Time Out: 1100  Minutes: 61

## 2022-11-19 NOTE — PROGRESS NOTES
awareness;Decreased high-level IADLs;Decreased cognition;Decreased fine motor control  Assessment: Pt with global aphasia, R hemiplegia, R inattention, decreased functional sitting and standing balance/tolerance, decrease overall ADL/functional mobility. Pt inconsistently following simple commands, requiring gestures and tactile/verbal cues. Pt somewhat impulsive, presents motiviated to participate in therapy and has good family support. Pt would benefit from skilled OT to increase performance in areas mentioned above.   Treatment Diagnosis: L MCA stroke  Activity Tolerance  Activity Tolerance: Patient Tolerated treatment well        Plan   Occupational Therapy Plan  Current Treatment Recommendations: Strengthening, ROM, Balance training, Functional mobility training, Endurance training, Positioning, Modalities, Neuromuscular re-education, Cognitive reorientation, Safety education & training, Patient/Caregiver education & training, Equipment evaluation, education, & procurement, Self-Care / ADL, Cognitive/Perceptual training, Home management training, Sensory integraion     Restrictions  Restrictions/Precautions  Restrictions/Precautions: Modified Diet, Swallowing - Thickened Liquids, Fall Risk, Aspiration Risk  Required Braces or Orthoses?: No    Subjective   General  Patient assessed for rehabilitation services?: Yes     Social/Functional History  Social/Functional History  Lives With: Spouse  Type of Home: House  Home Layout: One level  Home Access:  (2 steps to enter)  Bathroom Shower/Tub: Tub/Shower unit  Bathroom Toilet: Standard  Has the patient had two or more falls in the past year or any fall with injury in the past year?: No  ADL Assistance: Independent  Homemaking Assistance: Independent  Ambulation Assistance: Independent  Transfer Assistance: Independent  Active : Yes  Leisure & Hobbies: volunteers at animal shelter, [employer], makes [employer], reading, cooking       Objective   Heart Rate: 51  Heart Rate Source: Monitor  BP: 112/70  BP Location: Right lower arm  Patient Position: Supine  MAP (Calculated): 84  Resp: 14  SpO2: 95 %  O2 Device: None (Room air)             Safety Devices  Type of Devices: All fall risk precautions in place; Bed alarm in place;Call light within reach;Gait belt;Left in bed;Nurse notified ( present in room)        AROM: Grossly decreased, non-functional (R UE, only slight internal shoulder rotation observed during attempted AROM)  PROM: Grossly decreased, non-functional  Strength: Grossly decreased, non-functional  Coordination: Grossly decreased, non-functional  Tone: Abnormal  Sensation: Impaired (difficult to assess due to aphasia)     Tone RUE  RUE Tone: Hypotonic  RUE Modified Robert Scale  RUE Modified Robert Scale Completed: Yes  RUE Modified Robert Scale  RUE Bicep Score: 1  Tone LUE  LUE Tone: Normotonic  Quality of Movement Other  Comment: pt demonstrates slight R internal rotation during attempts of shoulder flexion/abduction  Activity Tolerance  Activity Tolerance: Patient tolerated treatment well  Bed mobility  Supine to Sit: Maximum assistance  Sit to Supine: Maximum assistance  Transfers  Sit Pivot Transfers: Maximum assistance  Sit to stand: Maximum assistance  Stand to sit: Maximum assistance  Vision  Vision: Impaired  Vision Exceptions: Wears glasses at all times  Tracking: Requires cues, head turns, or add eye shifts to track (difficulty tracking without head turning; prompting to track right visual field)  Saccades: Unable to test secondary to decreased visual attention  Hearing  Hearing: Exceptions to [facility]  Hearing Exceptions: Hard of hearing/hearing concerns  Cognition  Overall Cognitive Status: Exceptions  Cognition Comment: Pt awake and alert, globally aphasic and inconsistently follows simple commands. Verbal and tactile cues/gestures required for ADL. Pt generally responds expressively with \"yes\".  When given receptive choices for orientation questions pt smiles/responds to correct answers at least 50% of occasions. Pt is somewhat impulsive and slightly agitated with challenging tasks/ADL EOB. Mod cues for sequencing, initiating ADL.   Orientation  Overall Orientation Status: Impaired  Orientation Level:  (Difficult to assess)  Perception  Overall Perceptual Status: Impaired  Unilateral Attention: Cues to attend right visual field;Cues to attend to right side of body  Initiation: Cues to initiate tasks  Motor Planning: Hand over hand to sequence tasks (for L UE to transfer bed to chair)                 Eating  Assistance Needed: Partial/moderate assistance  CARE Score: 3  Discharge Goal: Independent    Oral Hygiene  Assistance Needed: Partial/moderate assistance  CARE Score: 3  [address]al[address]: Dependent  Comment: pt incontinent, urine  CARE Score: 1  Discharge Goal: Supervision or touching assistance     Shower/Bathe Self  Assistance Needed: Substantial/maximal assistance  CARE Score: 2  Discharge Goal: Partial/moderate assistance     Upper Body Dressing  Assistance Needed: Substantial/maximal assistance  CARE Score: 2  Discharge Goal: Supervision or touching assistance     Lower Body Dressing  Assistance Needed: Substantial/maximal assistance  CARE Score: 2  Discharge Goal: Partial/moderate assistance     Putting On/Taking Off Footwear  Assistance Needed: Dependent  CARE Score: 1  Discharge Goal: Partial/moderate assistance     Toilet Transfer  Assistance needed: Substantial/maximal assistance  CARE Score: 2  Discharge Goal: Supervision or touching assistance     Picking Up Object  Reason if not Attempted: Not attempted due to medical condition or safety concerns  CARE Score: 88  Discharge Goal: Not Attempted     Health Literacy  How often do you need to have someone help you when you read instructions, pamphlets, or other written material from your doctor or pharmacy?: Never Goals  Short Term Goals  Time Frame for Short Term Goals: 2 weeks  Short Term Goal 1: Pt will bathe with mod A, AE prn  Short Term Goal 2: Pt will dress UB using hemitechnique, mod A and cues  Short Term Goal 3: Pt will dress LB, hemitechnique, mod A and cues  Short Term Goal 4: Pt will toilet with mod A  Short Term Goal 5:  Toilet transfer with mod A  Additional Goals?: Yes  Short Term Goal 6: Pt will attend to R side during ADL/functional activities with moderate cues  Short Term Goal 7: Pt will perform standing tasks x 2-3 mins with L UE and mod A for balance to enhance ADL/IADL performance  Short Term Goal 8: Pt/family will demo understanding of R UE positioning/HEP/therapeutic activity recommendations with min A after ed  Long Term Goals  Time Frame for Long Term Goals : 4-5 weeks  Long Term Goal 1: Pt will bathe with min A, AE/DME prn  Long Term Goal 2: Pt will dress UB supervision  Long Term Goal 3: Pt will dress LB min A, AE prn  Long Term Goal 4: Pt will toilet with CGA  Long Term Goal 5: Pt will perform toilet transfer with CGA  Additional Goals?: Yes  Long Term Goal 6: Pt will perform simple home making task with min A  Long Term Goal 7: Pt/family will be independent in HEP/DME/therapeutic activity recommendations after ed  Long Term Goal 8: Pt will attend to R side during functional activities with occasional cueing       Therapy Time   Individual Concurrent Group Co-treatment   Time In 0900         Time Out 1000         Minutes 60         Timed Code Treatment Minutes: 1400 Nakul Burrell, OT

## 2022-11-19 NOTE — PROGRESS NOTES
Comprehensive Nutrition Assessment    Type and Reason for Visit:  Initial    Nutrition Recommendations/Plan:   Continue to follow for SLP recommendations, po intake      Malnutrition Assessment:  Malnutrition Status:  No malnutrition (11/19/22 1113)    Context:  Acute Illness     Findings of the 6 clinical characteristics of malnutrition:  Energy Intake:  No significant decrease in energy intake  Weight Loss:  No significant weight loss     Body Fat Loss:  No significant body fat loss     Muscle Mass Loss:  No significant muscle mass loss    Fluid Accumulation:  Mild Extremities   Strength:  Not Performed    Nutrition Assessment:    Following patient for new admission to Rehab unit. Pt is receiving a dysphagia pureed diet Carb 4 with mildly thick liquids. PO intake is good with intake ranging %. Unable to obtain preferences at this time. Weight stable    Nutrition Related Findings:    aphasia Wound Type: None       Current Nutrition Intake & Therapies:    Average Meal Intake: %  Average Supplements Intake: None Ordered  ADULT DIET; Dysphagia - Pureed; 4 carb choices (60 gm/meal); Mildly Thick (Nectar)    Anthropometric Measures:  Height: 5' 7\" (170.2 cm)  Ideal Body Weight (IBW): 135 lbs (61 kg)    Admission Body Weight: 201 lb (91.2 kg)  Current Body Weight: 201 lb (91.2 kg), 148.9 % IBW. Weight Source: Bed Scale  Current BMI (kg/m2): 31.5  Usual Body Weight: 201 lb (91.2 kg) (8/2022)  % Weight Change (Calculated): 0  BMI Categories: Obese Class 1 (BMI 30.0-34. 9)    Estimated Daily Nutrient Needs:  Energy Requirements Based On: Kcal/kg  Weight Used for Energy Requirements: Current  Energy (kcal/day): 4282-4621 kcals (15-18 kcals/kg)  Weight Used for Protein Requirements: Ideal  Protein (g/day): 74-123g  Method Used for Fluid Requirements: 1 ml/kcal  Fluid (ml/day): 3560-6517 ml    Nutrition Diagnosis:   Biting/chewing (masticatory) difficulty, Swallowing difficulty related to acute injury/trauma as evidenced by swallow study results    Nutrition Interventions:   Food and/or Nutrient Delivery: Continue Current Diet  Nutrition Education/Counseling: No recommendation at this time  Coordination of Nutrition Care: Continue to monitor while inpatient, Speech Therapy       Goals:     Goals: Meet at least 75% of estimated needs, PO intake 75% or greater       Nutrition Monitoring and Evaluation:   Behavioral-Environmental Outcomes: None Identified  Food/Nutrient Intake Outcomes: Diet Advancement/Tolerance, Food and Nutrient Intake  Physical Signs/Symptoms Outcomes: Biochemical Data, Chewing or Swallowing, Fluid Status or Edema, Weight, Skin, Nutrition Focused Physical Findings    Discharge Planning:     Too soon to determine     Janie Johnson MS, RD, LD  Contact: [phone]

## 2022-11-19 NOTE — PROGRESS NOTES
Speech Language Pathology  Facility/Department: [facility] REHAB UNIT   CLINICAL BEDSIDE SWALLOW EVALUATION    NAME: Wei Bachelor  : 1960  MRN: [id number]    ADMISSION DATE: 2022  ADMITTING DIAGNOSIS: has Heartburn; Indigestion; Nausea; Morbid obesity ([address] Anticoagulated on warfarin; Ulcerative colitis ([address] Hypertension; Hyperlipidemia; MI (myocardial infarction) ([address] ASCVD (arteriosclerotic cardiovascular disease); CAD (coronary artery disease); Acute ischemic stroke ([address] Aphasia; COPD (chronic obstructive pulmonary disease) ([address] Anxiety; H/O superior vena cava filter placement; Essential hypertension; History of MI (myocardial infarction); Rheumatoid arthritis ([address] Dysphagia due to recent cerebral infarction; and Type 2 diabetes mellitus with complication, without long-term current use of insulin ([address] on their problem list.    Date of Eval: 2022  Evaluating Therapist: MARCELINO Bhardwaj    Recent Chest Xray PORTABLE: (Date 2022 )    Impression   COPD. No acute pulmonary process. Recommendation: Follow up as clinically indicated. Electronically Signed by Cele Lorenzo MD, [address] [place] [address] at [id number] 08:19:36 PM EST      Clinical Impression  Clinical Bedside Swallow Evaluation completed on this date. Oral prep reveals decreased rotary chewing/prolonged mastication with ice chips and regular solids. Oral transit timing ranges from 1-2 seconds with  ice chips, nectar thick liquids, puree consistencies, and regular solids. No oral pocketing observed. Minimal oral residue noted and cleared with cued liquid wash. Oral transit timing is suspected to be faster than 1 second with thin liquids. Laryngeal movement observed to be consistently sluggish and mild-moderately decreased for swallow airway protection. No overt s/s of aspiration/penetration observed with ice chips, nectar thick liquids, puree consistencies, and regular solids.  Immediate cough response observed with thin liquids via consecutive sips side of cup. Recommend trial soft & bite sized consistencies with mildly (nectar) thick liquids. Medications crushed in apple sauce/pudding. Recommend Modified Barium Swallow Study to ensure safest diet consistencies and to rule out penetration/aspiration. Please monitor/notify SLP for any spikes in temperature, changes in lung sounds, or difficulties with swallowing. SLP will continue to follow and treat. Current Diet level:  Current Diet : Pureed    Pain:  Pain Assessment  Pain Assessment: Paul-Baker FACES  Paul-Baker Pain Rating: No hurt  Patient's Stated Pain Goal: Unable to verbalize/indicate pain goal  Pain Location: Head  Pain Descriptors: Patient unable to describe  Response to Pain Intervention: Patient satisfied  Side Effects: No reported side effects    Reason for Referral  Dennise Snyder was referred for a bedside swallow evaluation to assess the efficiency of her swallow function, identify signs and symptoms of aspiration and make recommendations regarding safe dietary consistencies, effective compensatory strategies, and safe eating environment. Treatment Plan  Requires SLP Intervention: Yes  Duration of Treatment: 2-3 weeks  D/C Recommendations: Ongoing speech therapy is recommended during this hospitalization    Recommended Diet and Intervention  Soft & Bite Sized consistencies   Mildly (nectar) thick liquids  Recommended Form of Meds: Crushed in puree as able  Recommendations: Modified barium swallow study; Dysphagia treatment  Therapeutic Interventions: Pharyngeal exercises;Diet tolerance monitoring;Oral care;Oral motor exercises; Patient/Family education; Therapeutic PO trials with SLP    Compensatory Swallowing Strategies  Compensatory Swallowing Strategies : Alternate solids and liquids;Eat/Feed slowly; No straws;Upright as possible for all oral intake;Remain upright for 30-45 minutes after meals;Small bites/sips; Check for pocketing of food on the Right; Check for pocketing of food on the Left; Set up assist;Assist feed    Treatment/Goals  Short-term Goals  Goal 1: Pt will tolerate soft & bite sized consistencies with mildly  (nectar) thick liquids with minimal overt s/s of aspiration/penetration during hospitalization. Goal 2: Pt will complete Modified Barium Swallow Study. Goal 3: Pt will demonstrate awareness of general aspiration precautions. Goal 4: Pt will participate in swallowing reassessments to ensure safest diet consistencies. Goal 5: Pt will allow staff to complete daily oral care. Goal 6: Pt will complete oral motor exercises for lingual and labial strengthening. Long-term Goals  Goal 1: Pt will tolerate least restrictive diet with minimal overt s/s of aspiration/penetration during hospitalization. General  Chart Reviewed: Yes  Behavior/Cognition: Alert; Cooperative  Temperature Spikes Noted: No  Respiratory Status: Room air  O2 Device: None (Room air)  Communication Observation: Aphasia; Dysarthria  Follows Directions: Simple  Dentition: Adequate  Patient Positioning: Upright in bed  Baseline Vocal Quality: Weak;Hoarse  Volitional Swallow: Delayed  Prior Dysphagia History: Pt is a poor historian. Dysphagia hx obtained from paper medical records. Pt was evaluated by speec htherapy at [facility] in [place], [place]. 2x reports obtained from speech therapy. 1x report states that pt was not appropriate for participation in speech therapy services (11/15/22). Pt was re evaluated on (11/16/2022). Speech therapy put pt on a modified diet of pureed with mildly (nectar) thick liquids. Pt was on this diet upon admission. The report states suspected oropharyngeal dysphagia.     Vision/Hearing  Vision  Vision: Impaired  Vision Exceptions: Wears glasses at all times  Hearing  Hearing: Exceptions to [facility]  Hearing Exceptions: Hard of hearing/hearing concerns    Oral Motor Deficits  Oral/Motor  Oral Hygiene: Clean    Education  Patient Education Response: Needs reinforcement    Swallowing:   Clinical bedside swallow evaluation completed on this date. Pt upright in bed. Severe receptive/expressive aphasia noted.  present during evaluation. Swallowing assessed with selected P.O. trials. In addition to P.O. trials, a trial tray of soft and bite sized consistencies with mildly (nectar) thick liquids was observed during noon meal time. No overt s/s of aspiration/penetration observed with noon meal. Pt presents with minimal difficulties with self feeding. Pt is impulsive and does not like assistance with meals; however, she does benefit from assistance with meals. Pt does present with impulsivity during meals.  reports he will be present for all meals. Education provided on general aspiration precautions, small bites/sips, alternating bites/sips, clearing oral cavity prior to additional oral intake.  demonstrates understanding and will frequently reassess. Consistencies Administered: ice chips, nectar thick liquids, puree consistencies, regular solid, and thin liquids via consecutive sips side of cup    Oral Prep: Decreased rotary chewing/prolonged mastication with ice chips and regular solids. Oral Transit: Timing ranges from 1-2 seconds with  ice chips, nectar thick liquids, puree consistencies, and regular solids. No oral pocketing observed. Minimal oral residue noted and cleared with cued liquid wash. Oral transit timing is suspected to be faster than 1 second with thin liquids  Laryngeal movement: consistently sluggish and mild-moderately decreased for swallow airway protection with  ice chips, nectar thick liquids, puree consistencies, regular solid, and thin liquids via consecutive sips side of cup. S/S of aspiration: No overt s/s of aspiration/penetration observed with ice chips, nectar thick liquids, puree consistencies, and regular solids.  Immediate cough response observed with thin liquids via consecutive sips side of cup    Recommend trial soft & bite sized consistencies with mildly (nectar) thick liquids. Medications crushed in apple sauce/pudding. Recommend Modified Barium Swallow Study to ensure safest diet consistencies and to rule out penetration/aspiration. Please monitor/notify SLP for any spikes in temperature, changes in lung sounds, or difficulties with swallowing. SLP will continue to follow and treat.         Electronically signed by MARCELINO Mcpherson on 11/19/2022 at 12:56 PM

## 2022-11-19 NOTE — PROGRESS NOTES
Speech Language Pathology  Facility/Department: [facility] 8 REHAB UNIT  Initial Speech/Language/Cognitive Assessment    NAME: Chaitanya Gaitan  : 1960   MRN: [id number]  ADMISSION DATE: 2022  ADMITTING DIAGNOSIS: has Heartburn; Indigestion; Nausea; Morbid obesity ([address] Anticoagulated on warfarin; Ulcerative colitis ([address] Hypertension; Hyperlipidemia; MI (myocardial infarction) ([address] ASCVD (arteriosclerotic cardiovascular disease); CAD (coronary artery disease); Acute ischemic stroke ([address] Aphasia; COPD (chronic obstructive pulmonary disease) ([address] Anxiety; H/O superior vena cava filter placement; Essential hypertension; History of MI (myocardial infarction); Rheumatoid arthritis ([facility] Dysphagia due to recent cerebral infarction; and Type 2 diabetes mellitus with complication, without long-term current use of insulin ([facility] on their problem list.    Date of Eval: 2022   Evaluating Therapist: MARCELINO Chapman    RECENT RESULTS  CT OF HEAD W/O CONTRAST 2022:     Impression   1. Large acute nonhemorrhagic infarct in the left frontal parietal lobe within the middle cerebral artery distribution. Measures up to 7.7 x 5.0 cm. Mass effect on the ventricular system. 2. Minimal left-to-right subfalcine herniation. Recommendation: Follow up as clinically indicated. All CT scans at this facility utilize dose modulation, iterative reconstruction, and/or weight based dosing when appropriate to reduce radiation dose to as low as reasonably achievable. Amended by Evelyne Boo MD, MQSA CERTIFIED at [id number] 08:31:58 PM EST   Electronically Signed by Evelyne Boo MD, [address] [place] [address] at [id number] 08:11:04 PM EST      Clinical Impression:  Speech was considered mildly dysarthric as characterized by slowed rate of speech, decreased vocal intensity, and decreased lingual and labial strength, ROM, and coordination. The Bedside Western Aphasia Battery was administered on this date.  SLP identified global aphasia. Patient exhibited many perseverations, paraphasias, echolalia, and anomia. Deficits within receptive and expressive language are noted. Deficits noted within the following areas: orientation, recall, verbal expression, problem solving, executive functioning, and safety awareness. Question if cognitive deficits are a result of severe aphasia and/or receptive/expressive language deficits. Pt would benefit from continued skilled speech therapy services. SLP will continue to follow and treat. Pain:  Pain Assessment  Pain Assessment: Paul-Baker FACES  Paul-Baker Pain Rating: No hurt  Patient's Stated Pain Goal: Unable to verbalize/indicate pain goal  Pain Location: Head  Pain Descriptors: Patient unable to describe  Response to Pain Intervention: Patient satisfied  Side Effects: No reported side effects    Vision/ Hearing  Vision  Vision: Impaired  Vision Exceptions: Wears glasses at all times  Hearing  Hearing: Exceptions to [facility]  Hearing Exceptions: Hard of hearing/hearing concerns    Assessment:  Administered the Bedside Western Aphasia Battery. Recommendations:  Recommendations  Requires SLP Intervention: Yes  Recommendations: Modified barium swallow study; Dysphagia treatment  Patient Education Response: Needs reinforcement  Duration of Treatment: 2-3 weeks  D/C Recommendations: Ongoing speech therapy is recommended during this hospitalization    Goals:  Long Term Goals  Goal 1: Pt will participate in skilled speech therapy services to ensure she is able to communicate her wants and needs. Goal 2: Pt will participate in skilled speech therapy services to ensure she is able to complete ADLs. Short Term Goals  Goal 1: Pt will complete y/n tasks with 80% accuracy and minimal verbal cues/modeling. Goal 2: Pt will complete verbal expression tasks with 80% accuracy and minimal verbal cues/modeling.   Goal 3: Pt will complete receptive/expressive language tasks with 80% accuracy and minimal verbal cues/modeling. Goal 4: Pt will follow one step commands with with 90% accuracy and minimal verbal cues/modeling. Goal 5: Pt will complete orientation tasks with 90% accuracy and minimal verbal cues/modeling. Goal 6: Pt will complete automatic speech tasks, confrontational naming tasks, and 1 step commands tasks with 80% accuracy and minimal verbal cues/modeling. Subjective:  Vision  Vision: Impaired  Vision Exceptions: Wears glasses at all times  Hearing  Hearing: Exceptions to [facility]  Hearing Exceptions: Hard of hearing/hearing concerns    Objective:    Oral Motor   Labial: Right droop  Dentition: Natural  Oral Hygiene: Clean  Lingual: Decreased rate;Decreased strength;Right deviation    Auditory Comprehension  Comprehension: Exceptions  Two Step Commands: Severe  Common Objects: Moderate  Pictures: Moderate  L/R Discrimination: Moderate    Reading Comprehension  Reading Status: Unable to assess    Expression  Primary Mode of Expression: Verbal    Verbal Expression  Verbal Expression: Exceptions to functional limits  Repetition: Moderate  Automatic Speech: Moderate  Confrontation: Severe  Divergent: Severe  Responsive: Severe    Written Expression  Dominant Hand: Right  Written Expression: Exceptions to [facility]  Legibility Impairment Severity: Severe    Pragmatics/Social Functioning  Pragmatics: Exceptions to [facility]  Affect: Severe  Eye Contact: Severe  Monotone: Severe  Topic Maintenance: Severe  Turn Taking: Moderate    Cognition:      Orientation  Overall Orientation Status: Impaired  Orientation Level: Disoriented to place; Disoriented to time;Disoriented to situation;Disoriented X4  Attention  Attention: Exceptions to [facility]  Selective Attention: Severe  Sustained Attention: Severe  Memory  Memory: Unable to assess  Problem Solving  Problem Solving: Unable to assess  Numeric Reasoning  Numeric Reasoning: Unable to assess  Abstract Reasoning  Abstract Reasoning: Unable to assess  Safety/Judgment  Safety/Judgment: Unable to assess    Education:  Patient Education Response: Needs reinforcement    Receptive: Auditory Comprehension:  Simple Yes/No Questions: WNL- 60% accuracy answers were mildly delayed. Inconsistent y/n responses noted. Simple 1 Step Directions: 0% accuracy- given maximum verbal cues and modeling  Simple 2 Step Directions: WNL-0% accuracy -given maximum verbal cues and modeling     Expressive Skills/ Verbal Expression:  Confrontation Naming/Labelin% accuracy -given maximum verbal cues and modeling    Attention/Orientation:  Attention/concentration:  decreased selective and sustained attention  Orientation: 0% oriented  Pt unable to verbalize name, , age, address, phone number, current location ([facility]), city and state, current month/year/date/day of week. Memory:  Short-term Memory: 0/3 components given a 2 minute distracted time delay    Problem Solving:  Pt unable to identify current MD, pharmacy, and conditions to take medications. Decreased safety awareness noted. Reading and writing not assessed on this date. Dysarthria:   Patient exhibits dysarthria characterized by slowed rate of speech, decreased vocal intensity, and decreased lingual and labial strength, ROM, and coordination. Clinician ranked functional intelligibility of speech for unfamiliar listeners at 80-90%, with background noise present and context known. Oral Mechanism Examination:  Labial retraction: decreased to R  Labial protrusion: decreased to R   Labial compression: decreased   Labial coordination: slow  Lingual extension: decreased  Lingual elevation: decreased  Bilateral lingual movement: decreased  Lingual symmetry: deviated to R  Lingual strength:weak  Lingual coordination: slow       SLP will continue to follow and treat.     Electronically signed by MARCELINO Maynard on 2022 at 12:46 PM

## 2022-11-19 NOTE — PROGRESS NOTES
Occupational Therapy  Facility/Department: [facility] 8 REHAB UNIT  Occupational Therapy Treatment Note    Name: Erasmo Nevarez  : 1960  MRN: [id number]  Date of Service: 2022    Discharge Recommendations:  Continue to assess pending progress          Patient Diagnosis(es): There were no encounter diagnoses. Past Medical History:  has a past medical history of Anxiety, ASCVD (arteriosclerotic cardiovascular disease), Carrier of group B Streptococcus, Cellulitis, Colitis, COPD (chronic obstructive pulmonary disease) ([address] Costochondritis, CVA (cerebral vascular accident) ([address] Depression, Edema, Fracture of sternum, Gallstones, Grief reaction, H/O superior vena cava filter placement, Hyperlipidemia, Hypertension, MI (myocardial infarction) ([address] MRSA (methicillin resistant Staphylococcus aureus), Neuropathy, Obesity, Pseudarthrosis, RA (rheumatoid arthritis) ([address] Rosacea, Sinus bradycardia, TIA (transient ischemic attack), and Venous thromboembolism. Past Surgical History:  has a past surgical history that includes Coronary artery bypass graft (3/2009); Tonsillectomy; Hysterectomy; thoracotomy; knee surgery; Appendectomy; Colonoscopy (2010); Cardiac catheterization (3/2009); Adenoidectomy; Cholecystectomy (); Gastric bypass surgery (); hiatal hernia repair (); Cardiac catheterization (14  JDT); Colonoscopy (); Colonoscopy; Upper gastrointestinal endoscopy (); Upper gastrointestinal endoscopy (); and pr colonoscopy flx dx w/collj spec when pfrmd (N/A, 3/12/2018). Treatment Diagnosis: L MCA stroke      Assessment   Performance deficits / Impairments: Decreased functional mobility ; Decreased endurance;Decreased coordination;Decreased ADL status; Decreased sensation;Decreased posture;Decreased ROM; Decreased balance;Decreased strength;Decreased vision/visual deficit; Decreased safe awareness;Decreased high-level IADLs;Decreased cognition;Decreased fine motor control  Assessment: Pt tolerates passive shoulder flex 0-90 supine. Any attempts of active shoulder movement result in activating internal rotators. No active distal movement noted. Unable to accurately assess light touch sensation due to global aphasia. Ed pt/family in positioning R UE in bed, during mobility and in w/c with laptray. Treatment Diagnosis: L MCA stroke           Plan   Occupational Therapy Plan  Current Treatment Recommendations: Strengthening, ROM, Balance training, Functional mobility training, Endurance training, Positioning, Modalities, Neuromuscular re-education, Cognitive reorientation, Safety education & training, Patient/Caregiver education & training, Equipment evaluation, education, & procurement, Self-Care / ADL, Cognitive/Perceptual training, Home management training, Sensory integraion     Restrictions  Restrictions/Precautions  Restrictions/Precautions: Modified Diet, Swallowing - Thickened Liquids, Fall Risk, Aspiration Risk  Required Braces or Orthoses?: No    Subjective   General  Patient assessed for rehabilitation services?: Yes     Social/Functional History  Social/Functional History  Lives With: Spouse  Type of Home: House  Home Layout: One level  Home Access:  (2 steps to enter)  Bathroom Shower/Tub: Tub/Shower unit  Bathroom Toilet: Standard  Has the patient had two or more falls in the past year or any fall with injury in the past year?: No  ADL Assistance: Independent  Homemaking Assistance: Independent  Ambulation Assistance: Independent  Transfer Assistance: Independent  Active : Yes  Leisure & Hobbies: volunteers at animal shelter, [employer], makes [employer], reading, cooking       Objective   Heart Rate: 51  Heart Rate Source: Monitor  BP: 112/70  BP Location: Right lower arm  Patient Position: Supine  MAP (Calculated): 84  Resp: 14  SpO2: 95 %  O2 Device: None (Room air)             Safety Devices  Type of Devices: All fall risk precautions in place; Bed alarm in place;Call light within reach;Gait belt;Left in bed;Nurse notified ( present in room)        AROM: Grossly decreased, non-functional (R UE, only slight internal shoulder rotation observed during attempted AROM)  PROM: Grossly decreased, non-functional  Strength: Grossly decreased, non-functional  Coordination: Grossly decreased, non-functional  Tone: Abnormal  Sensation: Impaired (difficult to assess due to aphasia)     Tone RUE  RUE Tone: Hypotonic  RUE Modified Robert Scale  RUE Modified Robert Scale Completed: Yes  RUE Modified Robert Scale  RUE Bicep Score: 1  Tone LUE  LUE Tone: Normotonic  Quality of Movement Other  Comment: pt demonstrates slight R internal rotation during attempts of shoulder flexion/abduction  Activity Tolerance  Activity Tolerance: Patient tolerated treatment well                                                Goals  Short Term Goals  Time Frame for Short Term Goals: 2 weeks  Short Term Goal 1: Pt will bathe with mod A, AE prn  Short Term Goal 2: Pt will dress UB using hemitechnique, mod A and cues  Short Term Goal 3: Pt will dress LB, hemitechnique, mod A and cues  Short Term Goal 4: Pt will toilet with mod A  Short Term Goal 5:  Toilet transfer with mod A  Additional Goals?: Yes  Short Term Goal 6: Pt will attend to R side during ADL/functional activities with moderate cues  Short Term Goal 7: Pt will perform standing tasks x 2-3 mins with L UE and mod A for balance to enhance ADL/IADL performance  Short Term Goal 8: Pt/family will demo understanding of R UE positioning/HEP/therapeutic activity recommendations with min A after ed  Long Term Goals  Time Frame for Long Term Goals : 4-5 weeks  Long Term Goal 1: Pt will bathe with min A, AE/DME prn  Long Term Goal 2: Pt will dress UB supervision  Long Term Goal 3: Pt will dress LB min A, AE prn  Long Term Goal 4: Pt will toilet with CGA  Long Term Goal 5: Pt will perform toilet transfer with CGA  Additional Goals?: Yes  Long Term Goal 6: Pt will perform simple home making task with min A  Long Term Goal 7: Pt/family will be independent in HEP/DME/therapeutic activity recommendations after ed  Long Term Goal 8: Pt will attend to R side during functional activities with occasional cueing       Therapy Time   Individual Concurrent Group Co-treatment   Time In 1345         Time Out 1410         Minutes 25         Timed Code Treatment Minutes: 25 Minutes       Bryce Eubanks, OT

## 2022-11-19 NOTE — PLAN OF CARE
Problem: Neurosensory - Adult  Goal: Achieves maximal functionality and self care  Outcome: Not Progressing  Flowsheets (Taken 11/19/2022 0841)  Achieves maximal functionality and self care: Monitor swallowing and airway patency with patient fatigue and changes in neurological status     Problem: Pain  Goal: Verbalizes/displays adequate comfort level or baseline comfort level  Outcome: Not Progressing     Problem: Confusion  Goal: Confusion, delirium, dementia, or psychosis is improved or at baseline  Description: INTERVENTIONS:  1. Assess for possible contributors to thought disturbance, including medications, impaired vision or hearing, underlying metabolic abnormalities, dehydration, psychiatric diagnoses, and notify attending LIP  2. [place] high risk fall precautions, as indicated  3. Provide frequent short contacts to provide reality reorientation, refocusing and direction  4. Decrease environmental stimuli, including noise as appropriate  5. Monitor and intervene to maintain adequate nutrition, hydration, elimination, sleep and activity  6. If unable to ensure safety without constant attention obtain sitter and review sitter guidelines with assigned personnel  7.  Initiate Psychosocial CNS and Spiritual Care consult, as indicated  Outcome: Not Progressing  Flowsheets (Taken 11/19/2022 0841)  Effect of thought disturbance (confusion, delirium, dementia, or psychosis) are managed with adequate functional status:   [place] high risk fall precautions, as indicated   Provide frequent short contacts to provide reality reorientation, refocusing and direction   Decrease environmental stimuli, including noise as appropriate     Problem: Musculoskeletal - Adult  Goal: Return mobility to safest level of function  Outcome: Not Progressing  Flowsheets (Taken 11/19/2022 0841)  Return Mobility to Safest Level of Function:   Assess patient stability and activity tolerance for standing, transferring and ambulating with or without assistive devices   Assist with transfers and ambulation using safe patient handling equipment as needed   Ensure adequate protection for wounds/incisions during mobilization  Goal: Return ADL status to a safe level of function  Outcome: Not Progressing  Flowsheets (Taken 11/19/2022 0841)  Return ADL Status to a Safe Level of Function: Assist and instruct patient to increase activity and self care as tolerated     Problem: Discharge Planning  Goal: Discharge to home or other facility with appropriate resources  Outcome: Progressing  Flowsheets (Taken 11/19/2022 0841)  Discharge to home or other facility with appropriate resources: Refer to discharge planning if patient needs post-hospital services based on physician order or complex needs related to functional status, cognitive ability or social support system     Problem: Safety - Adult  Goal: Free from fall injury  Outcome: Progressing     Problem: Neurosensory - Adult  Goal: Achieves stable or improved neurological status  Outcome: Progressing  Flowsheets (Taken 11/19/2022 0841)  Achieves stable or improved neurological status: Assess for and report changes in neurological status     Problem: Skin/Tissue Integrity - Adult  Goal: Skin integrity remains intact  Outcome: Progressing  Flowsheets (Taken 11/19/2022 0841)  Skin Integrity Remains Intact: Monitor for areas of redness and/or skin breakdown     Problem: Chronic Conditions and Co-morbidities  Goal: Patient's chronic conditions and co-morbidity symptoms are monitored and maintained or improved  Outcome: Progressing  Flowsheets (Taken 11/19/2022 0841)  Care Plan - Patient's Chronic Conditions and Co-Morbidity Symptoms are Monitored and Maintained or Improved: Monitor and assess patient's chronic conditions and comorbid symptoms for stability, deterioration, or improvement     Problem: ABCDS Injury Assessment  Goal: Absence of physical injury  Outcome: Progressing     Problem: Skin/Tissue Integrity  Goal: Absence of new skin breakdown  Description: 1. Monitor for areas of redness and/or skin breakdown  2. Assess vascular access sites hourly  3. Every 4-6 hours minimum:  Change oxygen saturation probe site  4. Every 4-6 hours:  If on nasal continuous positive airway pressure, respiratory therapy assess nares and determine need for appliance change or resting period. Outcome: Progressing     Problem: Gastrointestinal - Adult  Goal: Maintains or returns to baseline bowel function  Outcome: Progressing  Flowsheets (Taken 11/19/2022 0841)  Maintains or returns to baseline bowel function: Assess bowel function  Goal: Maintains adequate nutritional intake  Outcome: Progressing  Flowsheets (Taken 11/19/2022 0841)  Maintains adequate nutritional intake: Monitor percentage of each meal consumed     Problem: Metabolic/Fluid and Electrolytes - Adult  Goal: Electrolytes maintained within normal limits  Outcome: Progressing       Electronically signed by Mike Hawkins on 11/19/2022 at 4:07 PM

## 2022-11-19 NOTE — H&P
[facility]   History and Physical        Patient:   Carlos Topete  MR#:    [id number]  Account Number:                   [id number]      Room:    [facility]826-01   YOB: 1960  Date of Progress Note: 11/19/2022  Time of Note                           9:25 AM  Attending Physician:  Wen Potter MD        Admitting diagnosis:Cerebral infarction, unspecified    Secondary diagnoses:Hemiplegia and hemiparesis following cerebral infarction affecting right dominant side,Dysphagia following cerebral infarction,Aphasia following cerebral infarction,Anxiety disorder, unspecified,Depression, unspecified,Chronic obstructive pulmonary disease, unspecified,Morbid (severe) obesity due to excess calories,Rheumatoid arthritis, unspecified,Essential (primary) hypertension,Atherosclerotic heart disease of native coronary artery without angina pectoris,Hyperlipidemia, unspecified,Type 2 diabetes mellitus with hyperglycemia,Dysphagia, oropharyngeal phase,Urinary tract infection, site not specified,Unspecified Escherichia coli [E. coli] as the cause of diseases classified elsewhere,Rhabdomyolysis    CHIEF COMPLAINT: Acute ischemic stroke      HISTORY OF PRESENT ILLNESS:   This 64 y.o. female with history of anxiety, depression, COPD, atherosclerotic disease, colitis, COPD, CVA, DM,  LE edema, hyperlipidemia, HTN, morbid obesity, RA, CAD  and venous thromboembolism. She presented to [facility] ER on 11/9/22 after being found at home lying facedown in her feces. She was very weak, confused, not able to speak but moving purposefully and intermittently following commands. Her last well know was 3 a.m. when her  left for work. Imaging done revealed a large MCA stroke 7.7 x 5 cm. CTA head/neck revealed an acute M1 occlusion. She was outside of the window for TPA. CK on admit was 1100, consistent with rhabdomylosis. She was also noted to possibly Dens fracture. She was transferred to [facility] for a possible thrombectomy.  Further imaging was done at [facility] and she was deemed not a candidate for thrombectomy. MRI done ruled out Dens fracture. Repeat CT of head on 11/11/22 showed evolving left MCA territory infarct with increasing edema/mass effect resulting in 4mm of  rightward midline shift(previously 2mm) a the level of the third ventricle. No hemorrhagic conversion or definite trapping of the right lateral ventricle, possible small acute right cerebellar infarct. Neurosurgery was consulted for possible hemicraniectomy. She was seen by Dr. Lizbet Hill, who didn't feel any surgical intervention was needed. Patient was found to have a UTI + for [facility] and was placed on Rocephin on 11/14/22. Patient had a PICC line placed. Patient was evaluated by SPT and initially made NPO d/t oropharyngeal dysphagia. NGT placed and feeding started. She was also noted to have global aphasia but is improving. She was re-evaluated by SPT on 11/15/22 for swallow and was started on a dysphagia 1 diet with nectar thick liquids. Patient also continues to have right sided weakness and is participating in both PT/OT. Repeat CT head on 11/13/22 shows stable LMCA ischemic stroke with stable edema, 5 mm shift, no hemorrhage. She is felt to need a stay on Rehab for continued PT/OT/SPT and medical management to work towards her goal of returning home with her . She is now felt ready to start the Rehab program.    REVIEW OF SYSTEMS:    Limited due to aphasia      Past Medical History:      Diagnosis Date    Anxiety     ASCVD (arteriosclerotic cardiovascular disease)     Carrier of group B Streptococcus     Cellulitis     Colitis     COPD (chronic obstructive pulmonary disease) (HCC)     Costochondritis     CVA (cerebral vascular accident) ([address]     Depression     Edema     Fracture of sternum     non union post surgery.      Gallstones     Grief reaction     H/O superior vena cava filter placement     Hyperlipidemia     Hypertension     MI (myocardial infarction) ([address] MRSA (methicillin resistant Staphylococcus aureus)     Neuropathy     Obesity     Pseudarthrosis sternal    RA (rheumatoid arthritis) (HCC)     Rosacea     Sinus bradycardia     TIA (transient ischemic attack)     Venous thromboembolism        Past Surgical History:      Procedure Laterality Date    ADENOIDECTOMY      APPENDECTOMY      CARDIAC CATHETERIZATION  3/2009    CARDIAC CATHETERIZATION  11/5/14  JDT    EF 50%, patent bypass grafts    CHOLECYSTECTOMY  2011    COLONOSCOPY  06/03/2010    Dr. Willem Richard: distal colon having ulcerative lesions c/w UC    COLONOSCOPY  2009    pancolitis    COLONOSCOPY      CORONARY ARTERY BYPASS GRAFT  3/2009    Dr Ileana Beltran, LIMA to LAD, SVGs to OM & PDA    GASTRIC BYPASS SURGERY  2012    HIATAL HERNIA REPAIR  2011    HYSTERECTOMY (CERVIX STATUS UNKNOWN)      KNEE SURGERY      NM COLONOSCOPY FLX DX W/COLLJ SPEC WHEN PFRMD N/A 3/12/2018    Dr Barbara Morris rectal inflammation consistent with U.C.-Active proctitis--3 yr recall    Mari Denise  2010    Dr. Lady Jung  2012       Medications in Hospital:      Current Facility-Administered Medications:     aspirin chewable tablet 81 mg, 81 mg, Oral, Daily, Landon Serrato MD, 81 mg at 11/19/22 0841    atorvastatin (LIPITOR) tablet 40 mg, 40 mg, Oral, Nightly, Landon Serrato MD, 40 mg at 11/18/22 2047    dulaglutide (TRULICITY) SC injection 1.5 mg (Patient Supplied), 1.5 mg, SubCUTAneous, Weekly, Landon Serrato MD    folic acid (FOLVITE) tablet 1 mg, 1 mg, Oral, Daily, Landon Serrato MD, 1 mg at 11/19/22 0841    gabapentin (NEURONTIN) capsule 300 mg, 300 mg, Oral, Nightly, Landon Serrato MD, 300 mg at 11/18/22 2047    hydroxychloroquine (PLAQUENIL) tablet 200 mg, 200 mg, Oral, BID, Landon Serrato MD, 200 mg at 11/19/22 0841    metoprolol succinate (TOPROL XL) extended release tablet 25 mg, 25 mg, Oral, Nightly, Landon Serrato MD, 25 mg at 11/18/22 2046    amLODIPine (NORVASC) tablet 2.5 mg, 2.5 mg, Oral, Daily, Chico Porter MD, 2.5 mg at 11/19/22 0841    tiZANidine (ZANAFLEX) tablet 4 mg, 4 mg, Oral, BID PRN, Chico Porter MD    multivitamin 1 tablet, 1 tablet, Oral, Daily, Chico Porter MD, 1 tablet at 11/19/22 0841    acetaminophen (TYLENOL) tablet 650 mg, 650 mg, Oral, Q4H PRN, Chico Porter MD, 650 mg at 11/18/22 1803    enoxaparin (LOVENOX) injection 40 mg, 40 mg, SubCUTAneous, Daily, Chioc Porter MD    polyethylene glycol (GLYCOLAX) packet 17 g, 17 g, Oral, Daily PRN, Chico Porter MD    Allergies:  Methotrexate derivatives    Social History:   TOBACCO:   reports that she quit smoking about 13 years ago. Her smoking use included cigarettes. She has a 64.00 pack-year smoking history. She has never used smokeless tobacco.  ETOH:   reports current alcohol use. Family History:       Problem Relation Age of Onset    Prostate Cancer Father     Heart Failure Father     Cancer Father     Colon Cancer Neg Hx     Colon Polyps Neg Hx     Liver Cancer Neg Hx     Liver Disease Neg Hx     Esophageal Cancer Neg Hx     Rectal Cancer Neg Hx     Stomach Cancer Neg Hx            Physical Exam:    Vitals: /70   Pulse 51   Temp 97.7 °F (36.5 °C) (Temporal)   Resp 14   Wt 201 lb 5 oz (91.3 kg)   SpO2 95%   BMI 31.53 kg/m²     Constitutional - well developed, well nourished. Eyes - conjunctiva normal.  Pupils not tested  Ear, nose, throat -hearing intact to finger rub No scars, masses, or lesions over external nose or ears, no atrophy of tongue  Neck-symmetric, no masses noted, no jugular vein distension  Respiration- chest wall appears symmetric, good expansion,   normal effort without use of accessory muscles  Musculoskeletal - no significant wasting of muscles noted, no bony deformities  Extremities-no clubbing, cyanosis or edema  Skin - warm, dry, and intact. No rash, erythema, or pallor.   Psychiatric - mood, affect, and behavior appear normal.      Neurological exam  Awake, alert, global aphasia says \"yes\" to all questions asked   Can follow occasional commands such as closing eyes and trying to stick tongue out  Attention and concentration appear appropriate  Recent and remote memory unable to test  Speech with dysarthria      Cranial Nerve Exam   CN II- Visual fields grossly unremarkable  CN III, IV,VI-EOMI, No nystagmus, conjugate eye movements, no ptosis  CN V-sensation intact to LT over face  CN VII-+facial assymetry  CN VIII-Hearing intact to finger rub  CN IX and X- Palate not tested  CN XI-not test shoulder shrug  CN XII-Tongue midline with no fasciculations or fibrillations    Motor Exam  No movement noted on the right arm and right leg no cogwheeling, normal tone    Sensory Exam  Sensation intact to light touch and temperature upper and lower extremities bilaterally    Reflexes   Not tested    Tremors- no tremors in hands or head noted    Gait  Not tested    Coordination  Finger to nose-unable to obtain        CBC:   Recent Labs     11/19/22 0131   WBC 11.7*   HGB 11.8*   *       BMP:    Recent Labs     11/19/22 0131      K 4.3      CO2 27   BUN 27*   CREATININE 0.6   GLUCOSE 92       Hepatic:   Recent Labs     11/19/22 0131   AST 33*   ALT 40*   BILITOT <0.2   ALKPHOS 83       Lipids: No results for input(s): CHOL, HDL in the last 72 hours. Invalid input(s): LDLCALCU    INR: No results for input(s): INR in the last 72 hours. Assessment and Plan     1. Acute ischemic stroke-on aspirin/statin  2. Hypertension-on medications monitor  3. Hyperlipidemia-on statin  4. Diabetes-Trulicity-monitor blood sugar  5. DVT prophylaxis-Lovenox  6. History of coronary artery disease-aspirin/statin  7. Neuropathy-on Neurontin  8. Rheumatoid arthritis-on Plaquenil  9. COPD-monitor  10. History of anxiety and depression-monitor  11. History of colitis/gallstones-monitor  12.   History of bariatric surgery-on multivitamin/folic acid  13. History of superior vena cava filter placement with venous thromboembolism-not on anticoagulation-monitor  14. PT/OT/speech      Patient's functional assessment  Prior to hospitalization the patient was occassionally incontinent of bowel and continent of bladder    Current therapy  Requires PT, OT and/or speech therapy    Anticipated discharge approximately 17 days    Functional assessment  All notes from reehab data were reviewed regarding the patient's functional status. Anticipated discharge setting  Home with home care    No clear barriers to discharge    The patient appears to be an appropriate candidate for inpatient rehabilitation    Sufficiently stable: Patient's condition is sufficiently stable at the time of admission to allow the patient to actively participate in an intensive rehabilitation program    Close medical supervision: A rehabilitation physician, or other licensed treating physician with specialized training and experience in inpatient rehabilitation, will conduct face-to-face visits with the patient a minimum of at least 3 days per week throughout the patient's stay    This patient requires close medical supervision for the active management of the ongoing conditions and potential complications stated in the admission note    Intensive rehabilitation nursing: The patient demonstrates the need for 24-hour rehabilitation nursing care for active management of the multiple medical issues documented in the admission note    Appropriate therapy needed: The patient requires the active and ongoing therapeutic intervention of at least 2 therapeutic disciplines, one of which must be physical or occupational therapy and/or speech therapy    Intensive therapy:  The patient requires and is reasonably expected to actively participate in at least 3 hours of therapy per day at least 5 days per week, and expected to make measurable improvements that will be of practical value to improve the patient's functional capacity or adaptation to impairments. In addition, therapy treatments will begin within 36 hours from midnight of the day of the patient's admission to the inpatient rehabilitation facility    Expected duration and frequency therapy: 180 minutes per day, 5 days per week    Interdisciplinary team: The patient demonstrates the need for an interdisciplinary team for active management of the following medical issues including ataxia, motor planning, balance, disease management, elimination, endurance, family training, education, independent ADLs, pain management, precautions, range of motion, safety, strength, and transfers    I have reviewed the preadmission screening documents and concur with the findings. I believe the patient meets criteria and is sufficiently stable to allow participation in the program. This requires an intensive level of therapy, close medical supervision, and an interdisciplinary team approach provided through an individualized plan of care. I approve admitting this patient for an intensive inpatient rehabilitation program.    Please feel free to call with any questions   [phone] (cell phone)    Dr Lonny Irene certified in Neurology  Board Certified in Clinical Neurophysiology  Fellowship Trained in [place] [address] Neurophysiology      EMR Dragon/transcription disclaimer:Significant part of this  encounter note is electronic transcription/translation of spoken language to printed text. The electronic translation of spoken language may be erroneous, or at times, nonsensical words or phrases may be inadvertently transcribed.  Although I have reviewed the note for such errors, some may still exist.

## 2022-11-20 PROCEDURE — 94760 N-INVAS EAR/PLS OXIMETRY 1: CPT

## 2022-11-20 PROCEDURE — 6370000000 HC RX 637 (ALT 250 FOR IP): Performed by: PSYCHIATRY & NEUROLOGY

## 2022-11-20 PROCEDURE — 99233 SBSQ HOSP IP/OBS HIGH 50: CPT | Performed by: PSYCHIATRY & NEUROLOGY

## 2022-11-20 PROCEDURE — 1180000000 HC REHAB R&B

## 2022-11-20 PROCEDURE — 6360000002 HC RX W HCPCS: Performed by: PSYCHIATRY & NEUROLOGY

## 2022-11-20 RX ORDER — LACTULOSE 10 G/15ML
20 SOLUTION ORAL ONCE
Status: COMPLETED | OUTPATIENT
Start: 2022-11-20 | End: 2022-11-20

## 2022-11-20 RX ADMIN — METOPROLOL SUCCINATE 25 MG: 25 TABLET, EXTENDED RELEASE ORAL at 21:26

## 2022-11-20 RX ADMIN — HYDROXYCHLOROQUINE SULFATE 200 MG: 200 TABLET, FILM COATED ORAL at 08:57

## 2022-11-20 RX ADMIN — FOLIC ACID 1 MG: 1 TABLET ORAL at 08:57

## 2022-11-20 RX ADMIN — ACETAMINOPHEN 650 MG: 325 TABLET ORAL at 21:29

## 2022-11-20 RX ADMIN — ASPIRIN 81 MG 81 MG: 81 TABLET ORAL at 08:57

## 2022-11-20 RX ADMIN — ENOXAPARIN SODIUM 40 MG: 100 INJECTION SUBCUTANEOUS at 17:45

## 2022-11-20 RX ADMIN — GABAPENTIN 300 MG: 300 CAPSULE ORAL at 21:26

## 2022-11-20 RX ADMIN — THERA TABS 1 TABLET: TAB at 08:57

## 2022-11-20 RX ADMIN — ATORVASTATIN CALCIUM 40 MG: 40 TABLET, FILM COATED ORAL at 21:26

## 2022-11-20 RX ADMIN — HYDROXYCHLOROQUINE SULFATE 200 MG: 200 TABLET, FILM COATED ORAL at 21:26

## 2022-11-20 RX ADMIN — LACTULOSE 20 G: 20 SOLUTION ORAL at 08:57

## 2022-11-20 RX ADMIN — AMLODIPINE BESYLATE 2.5 MG: 2.5 TABLET ORAL at 08:57

## 2022-11-20 ASSESSMENT — PAIN DESCRIPTION - LOCATION: LOCATION: HEAD

## 2022-11-20 ASSESSMENT — PAIN - FUNCTIONAL ASSESSMENT: PAIN_FUNCTIONAL_ASSESSMENT: ACTIVITIES ARE NOT PREVENTED

## 2022-11-20 ASSESSMENT — PAIN SCALES - GENERAL: PAINLEVEL_OUTOF10: 7

## 2022-11-20 NOTE — PROGRESS NOTES
Patient:   Yina Sanchez  MR#:    [id number]   Room:    0826/826-01   YOB: 1960  Date of Progress Note: 11/20/2022  Time of Note                           8:40 AM  Consulting Physician:   Pedro Tilley M.D. Attending Physician:  Pedro Tilley MD     Chief complaint Acute ischemic stroke    S:This 64 y.o. female  with history of anxiety, depression, COPD, atherosclerotic disease, colitis, COPD, CVA, DM,  LE edema, hyperlipidemia, HTN, morbid obesity, RA, CAD  and venous thromboembolism. She presented to [facility] ER on 11/9/22 after being found at home lying facedown in her feces. She was very weak, confused, not able to speak but moving purposefully and intermittently following commands. Her last well know was 3 a.m. when her  left for work. Imaging done revealed a large MCA stroke 7.7 x 5 cm. CTA head/neck revealed an acute M1 occlusion. She was outside of the window for TPA. CK on admit was 1100, consistent with rhabdomylosis. She was also noted to possibly Dens fracture. She was transferred to [facility] for a possible thrombectomy. Further imaging was done at [facility] and she was deemed not a candidate for thrombectomy. MRI done ruled out Dens fracture. Repeat CT of head on 11/11/22 showed evolving left MCA territory infarct with increasing edema/mass effect resulting in 4mm of  rightward midline shift(previously 2mm) a the level of the third ventricle. No hemorrhagic conversion or definite trapping of the right lateral ventricle, possible small acute right cerebellar infarct. Neurosurgery was consulted for possible hemicraniectomy. She was seen by Dr. Bertram Jha, who didn't feel any surgical intervention was needed. Patient was found to have a UTI + for [facility] and was placed on Rocephin on 11/14/22. Patient had a PICC line placed. Patient was evaluated by SPT and initially made NPO d/t oropharyngeal dysphagia. NGT placed and feeding started.  She was also noted to have global aphasia but is improving. She was re-evaluated by SPT on 11/15/22 for swallow and was started on a dysphagia 1 diet with nectar thick liquids. Patient also continues to have right sided weakness and is participating in both PT/OT. Repeat CT head on 11/13/22 shows stable LMCA ischemic stroke with stable edema, 5 mm shift, no hemorrhage. She is felt to need a stay on Rehab for continued PT/OT/SPT and medical management to work towards her goal of returning home with her . She is now felt ready to start the Rehab program. No new issues overnight as per nursing and . Is constipated. REVIEW OF SYSTEMS:  Constitutional: No fevers No chills  Neck:No stiffness  Respiratory: No shortness of breath  Cardiovascular: No chest pain No palpitations  Gastrointestinal: No abdominal pain    Genitourinary: No Dysuria  Neurological: No headache, no confusion    Past Medical History:      Diagnosis Date    Anxiety     ASCVD (arteriosclerotic cardiovascular disease)     Carrier of group B Streptococcus     Cellulitis     Colitis     COPD (chronic obstructive pulmonary disease) (HCC)     Costochondritis     CVA (cerebral vascular accident) ([address]     Depression     Edema     Fracture of sternum     non union post surgery.      Gallstones     Grief reaction     H/O superior vena cava filter placement     Hyperlipidemia     Hypertension     MI (myocardial infarction) ([address]     MRSA (methicillin resistant Staphylococcus aureus)     Neuropathy     Obesity     Pseudarthrosis sternal    RA (rheumatoid arthritis) ([address]     Rosacea     Sinus bradycardia     TIA (transient ischemic attack)     Venous thromboembolism        Past Surgical History:      Procedure Laterality Date    ADENOIDECTOMY      APPENDECTOMY      CARDIAC CATHETERIZATION  3/2009    CARDIAC CATHETERIZATION  11/5/14  JDT    EF 50%, patent bypass grafts    CHOLECYSTECTOMY  2011    COLONOSCOPY  06/03/2010    Dr. Scott Barton: distal colon having ulcerative lesions c/w UC COLONOSCOPY  2009    pancolitis    COLONOSCOPY      CORONARY ARTERY BYPASS GRAFT  3/2009    Dr Maya Bee, LIMA to LAD, SVGs to OM & PDA    GASTRIC BYPASS SURGERY  2012    HIATAL HERNIA REPAIR  2011    HYSTERECTOMY (CERVIX STATUS UNKNOWN)      KNEE SURGERY      [place] COLONOSCOPY FLX DX W/COLLJ SPEC WHEN PFRMD N/A 3/12/2018    Dr Daina Chaves rectal inflammation consistent with U.C.-Active proctitis--3 yr recall    [facility] ENDOSCOPY  2010    Dr. Sanchez [facility]  2012       Medications in Hospital:      Current Facility-Administered Medications:     magnesium citrate solution 296 mL, 296 mL, Oral, Once, Chano Apple MD    aspirin chewable tablet 81 mg, 81 mg, Oral, Daily, Chano Apple MD, 81 mg at 11/19/22 0841    atorvastatin (LIPITOR) tablet 40 mg, 40 mg, Oral, Nightly, Chano Apple MD, 40 mg at 11/19/22 2113    dulaglutide (TRULICITY) SC injection 1.5 mg (Patient Supplied), 1.5 mg, SubCUTAneous, Weekly, Chano Apple MD    folic acid (FOLVITE) tablet 1 mg, 1 mg, Oral, Daily, Chano Apple MD, 1 mg at 11/19/22 0841    gabapentin (NEURONTIN) capsule 300 mg, 300 mg, Oral, Nightly, Chano Apple MD, 300 mg at 11/19/22 2113    hydroxychloroquine (PLAQUENIL) tablet 200 mg, 200 mg, Oral, BID, Chano Apple MD, 200 mg at 11/19/22 2113    metoprolol succinate (TOPROL XL) extended release tablet 25 mg, 25 mg, Oral, Nightly, Chano Apple MD, 25 mg at 11/19/22 2113    amLODIPine (NORVASC) tablet 2.5 mg, 2.5 mg, Oral, Daily, Chano Apple MD, 2.5 mg at 11/19/22 0841    tiZANidine (ZANAFLEX) tablet 4 mg, 4 mg, Oral, BID PRN, Chano Apple MD    multivitamin 1 tablet, 1 tablet, Oral, Daily, Chano Apple MD, 1 tablet at 11/19/22 0841    acetaminophen (TYLENOL) tablet 650 mg, 650 mg, Oral, Q4H PRN, Chano Apple MD, 650 mg at 11/19/22 1329    enoxaparin (LOVENOX) injection 40 mg, 40 mg, SubCUTAneous, Daily, Chano Apple MD, 40 mg at 11/19/22 1823    polyethylene glycol (GLYCOLAX) packet 17 g, 17 g, Oral, Daily PRN, Chico Porter MD    Allergies:  Methotrexate derivatives    Social History:   TOBACCO:   reports that she quit smoking about 13 years ago. Her smoking use included cigarettes. She has a 64.00 pack-year smoking history. She has never used smokeless tobacco.  ETOH:   reports current alcohol use. Family History:       Problem Relation Age of Onset    Prostate Cancer Father     Heart Failure Father     Cancer Father     Colon Cancer Neg Hx     Colon Polyps Neg Hx     Liver Cancer Neg Hx     Liver Disease Neg Hx     Esophageal Cancer Neg Hx     Rectal Cancer Neg Hx     Stomach Cancer Neg Hx          PHYSICAL EXAM:  BP (!) 142/71   Pulse (!) 44   Temp 97.3 °F (36.3 °C) (Temporal)   Resp 18   Ht 5' 7\" (1.702 m)   Wt 183 lb 7 oz (83.2 kg)   SpO2 99%   BMI 28.73 kg/m²     Constitutional - well developed, well nourished. Eyes - conjunctiva normal.   Ear, nose, throat - No scars, masses, or lesions over external nose or ears, no atrophy of tongue  Neck-symmetric, no masses noted, no jugular vein distension  Respiration- chest wall appears symmetric, good expansion,   normal effort without use of accessory muscles  Musculoskeletal - no significant wasting of muscles noted, no bony deformities  Extremities-no clubbing, cyanosis or edema  Skin - warm, dry, and intact. No rash, erythema, or pallor. Psychiatric - mood, affect, and behavior appear normal.      Neurological exam  Awake, alert, global aphasia says \"yes\" to all questions asked   Attention and concentration appear appropriate  Recent and remote memory unable to tests     Cranial Nerve Exam     CN III, IV,VI-EOMI, No nystagmus, conjugate eye movements, no ptosis    CN VII-+ facial assymetry       Motor Exam  No movement on the right      Tremors- no tremors in hands or head noted     Gait  Not tested     Nursing/pcp notes, imaging,labs and vitals reviewed.      PT,OT and/or speech notes reviewed    Lab Results   Component Value Date    WBC 11.7 (H) 11/19/2022    HGB 11.8 (L) 11/19/2022    HCT 39.7 11/19/2022    MCV 80.2 (L) 11/19/2022     (H) 11/19/2022     Lab Results   Component Value Date     11/19/2022    K 4.3 11/19/2022     11/19/2022    CO2 27 11/19/2022    BUN 27 (H) 11/19/2022    CREATININE 0.6 11/19/2022    GLUCOSE 92 11/19/2022    CALCIUM 9.3 11/19/2022    PROT 6.2 (L) 11/19/2022    LABALBU 3.3 (L) 11/19/2022    BILITOT <0.2 11/19/2022    ALKPHOS 83 11/19/2022    AST 33 (H) 11/19/2022    ALT 40 (H) 11/19/2022    LABGLOM >60 11/19/2022   No results found for: INR, PROTIME      1. Acute ischemic stroke-on aspirin/statin  2. Hypertension-on medications monitor  3. Hyperlipidemia-on statin  4. Diabetes-Trulicity-monitor blood sugar  5. DVT prophylaxis-Lovenox  6. History of coronary artery disease-aspirin/statin  7. Neuropathy-on Neurontin  8. Rheumatoid arthritis-on Plaquenil  9. COPD-monitor  10. History of anxiety and depression-monitor  11. History of colitis/gallstones-monitor  12. History of bariatric surgery-on multivitamin/folic acid  13. History of superior vena cava filter placement with venous thromboembolism-not on anticoagulation-monitor  14. PT/OT/speech    RECORD REVIEW: Previous medical records, medications were reviewed at today's visit    IMPRESSION:   1. Acute ischemic stroke-on aspirin/statin  2. Hypertension-on medications monitor-relatively normotensive  3. Hyperlipidemia-on statin  4. Diabetes-Trulicity-monitor blood sugar-well controlled  5. DVT prophylaxis-Lovenox  6. History of coronary artery disease-aspirin/statin  7. Neuropathy-on Neurontin  8. Rheumatoid arthritis-on Plaquenil  9. COPD-monitor  10. History of anxiety and depression-monitor  11. History of colitis/gallstones-monitor  12. History of bariatric surgery-on multivitamin/folic acid  13.   History of superior vena cava filter placement with venous thromboembolism-not on anticoagulation-monitor- indicates took herself off blood thinners as not like meds and was bruising   14.   PT/OT/speech    Try Lactulose for constipation     Supportive care as noted      Expected duration and frequency therapy: 180 minutes per day, 5 days per week    [address] [facility]  [phone] CELL  Dr Mirian Waterman

## 2022-11-20 NOTE — PLAN OF CARE
Problem: Discharge Planning  Goal: Discharge to home or other facility with appropriate resources  Outcome: Progressing  Flowsheets (Taken 11/20/2022 0857)  Discharge to home or other facility with appropriate resources: Refer to discharge planning if patient needs post-hospital services based on physician order or complex needs related to functional status, cognitive ability or social support system     Problem: Safety - Adult  Goal: Free from fall injury  Outcome: Progressing     Problem: Neurosensory - Adult  Goal: Achieves stable or improved neurological status  Outcome: Progressing  Flowsheets (Taken 11/20/2022 0857)  Achieves stable or improved neurological status: Assess for and report changes in neurological status  Goal: Achieves maximal functionality and self care  Outcome: Progressing  Flowsheets (Taken 11/20/2022 0857)  Achieves maximal functionality and self care: Monitor swallowing and airway patency with patient fatigue and changes in neurological status     Problem: Skin/Tissue Integrity - Adult  Goal: Skin integrity remains intact  Outcome: Progressing  Flowsheets (Taken 11/20/2022 0857)  Skin Integrity Remains Intact: Monitor for areas of redness and/or skin breakdown     Problem: Pain  Goal: Verbalizes/displays adequate comfort level or baseline comfort level  Outcome: Progressing     Problem: Chronic Conditions and Co-morbidities  Goal: Patient's chronic conditions and co-morbidity symptoms are monitored and maintained or improved  Outcome: Progressing  Flowsheets (Taken 11/20/2022 0857)  Care Plan - Patient's Chronic Conditions and Co-Morbidity Symptoms are Monitored and Maintained or Improved: Monitor and assess patient's chronic conditions and comorbid symptoms for stability, deterioration, or improvement     Problem: ABCDS Injury Assessment  Goal: Absence of physical injury  Outcome: Progressing     Problem: Skin/Tissue Integrity  Goal: Absence of new skin breakdown  Description: 1. Monitor for areas of redness and/or skin breakdown  2. Assess vascular access sites hourly  3. Every 4-6 hours minimum:  Change oxygen saturation probe site  4. Every 4-6 hours:  If on nasal continuous positive airway pressure, respiratory therapy assess nares and determine need for appliance change or resting period. Outcome: Progressing     Problem: Confusion  Goal: Confusion, delirium, dementia, or psychosis is improved or at baseline  Description: INTERVENTIONS:  1. Assess for possible contributors to thought disturbance, including medications, impaired vision or hearing, underlying metabolic abnormalities, dehydration, psychiatric diagnoses, and notify attending LIP  2. Elizabeth high risk fall precautions, as indicated  3. Provide frequent short contacts to provide reality reorientation, refocusing and direction  4. Decrease environmental stimuli, including noise as appropriate  5. Monitor and intervene to maintain adequate nutrition, hydration, elimination, sleep and activity  6. If unable to ensure safety without constant attention obtain sitter and review sitter guidelines with assigned personnel  7.  Initiate Psychosocial CNS and Spiritual Care consult, as indicated  Outcome: Progressing  Flowsheets (Taken 11/20/2022 0857)  Effect of thought disturbance (confusion, delirium, dementia, or psychosis) are managed with adequate functional status:   Provide frequent short contacts to provide reality reorientation, refocusing and direction   Decrease environmental stimuli, including noise as appropriate   Elizabeth high risk fall precautions, as indicated     Problem: Musculoskeletal - Adult  Goal: Return mobility to safest level of function  Outcome: Progressing  Flowsheets (Taken 11/20/2022 0857)  Return Mobility to Safest Level of Function:   Assess patient stability and activity tolerance for standing, transferring and ambulating with or without assistive devices   Assist with transfers and ambulation using safe patient handling equipment as needed   Ensure adequate protection for wounds/incisions during mobilization  Goal: Return ADL status to a safe level of function  Outcome: Progressing  Flowsheets (Taken 11/20/2022 0857)  Return ADL Status to a Safe Level of Function: Assist and instruct patient to increase activity and self care as tolerated     Problem: Gastrointestinal - Adult  Goal: Maintains or returns to baseline bowel function  Outcome: Progressing  Flowsheets (Taken 11/20/2022 0857)  Maintains or returns to baseline bowel function:   Assess bowel function   Encourage oral fluids to ensure adequate hydration   Administer ordered medications as needed  Goal: Maintains adequate nutritional intake  Outcome: Progressing  Flowsheets (Taken 11/20/2022 0857)  Maintains adequate nutritional intake:   Monitor percentage of each meal consumed   Assist with meals as needed     Problem: Metabolic/Fluid and Electrolytes - Adult  Goal: Electrolytes maintained within normal limits  Outcome: Progressing  Flowsheets (Taken 11/20/2022 0857)  Electrolytes maintained within normal limits: Monitor labs and assess patient for signs and symptoms of electrolyte imbalances       Electronically signed by Jeb Shah on 11/20/2022 at 1:38 PM

## 2022-11-21 LAB
ALBUMIN SERPL-MCNC: 3.4 G/DL (ref 3.5–5.2)
ALP BLD-CCNC: 85 U/L (ref 35–104)
ALT SERPL-CCNC: 26 U/L (ref 5–33)
ANION GAP SERPL CALCULATED.3IONS-SCNC: 12 MMOL/L (ref 7–19)
AST SERPL-CCNC: 26 U/L (ref 5–32)
BASOPHILS ABSOLUTE: 0.1 K/UL (ref 0–0.2)
BASOPHILS RELATIVE PERCENT: 0.9 % (ref 0–1)
BILIRUB SERPL-MCNC: 0.3 MG/DL (ref 0.2–1.2)
BUN BLDV-MCNC: 19 MG/DL (ref 8–23)
CALCIUM SERPL-MCNC: 9.1 MG/DL (ref 8.8–10.2)
CHLORIDE BLD-SCNC: 105 MMOL/L (ref 98–111)
CO2: 23 MMOL/L (ref 22–29)
CREAT SERPL-MCNC: 0.5 MG/DL (ref 0.5–0.9)
EOSINOPHILS ABSOLUTE: 0.4 K/UL (ref 0–0.6)
EOSINOPHILS RELATIVE PERCENT: 3.6 % (ref 0–5)
GFR SERPL CREATININE-BSD FRML MDRD: >60 ML/MIN/{1.73_M2}
GLUCOSE BLD-MCNC: 88 MG/DL (ref 74–109)
HCT VFR BLD CALC: 39.7 % (ref 37–47)
HEMOGLOBIN: 11.8 G/DL (ref 12–16)
IMMATURE GRANULOCYTES #: 0.1 K/UL
LYMPHOCYTES ABSOLUTE: 2.1 K/UL (ref 1.1–4.5)
LYMPHOCYTES RELATIVE PERCENT: 18.9 % (ref 20–40)
MCH RBC QN AUTO: 24.2 PG (ref 27–31)
MCHC RBC AUTO-ENTMCNC: 29.7 G/DL (ref 33–37)
MCV RBC AUTO: 81.4 FL (ref 81–99)
MONOCYTES ABSOLUTE: 1 K/UL (ref 0–0.9)
MONOCYTES RELATIVE PERCENT: 9.2 % (ref 0–10)
NEUTROPHILS ABSOLUTE: 7.4 K/UL (ref 1.5–7.5)
NEUTROPHILS RELATIVE PERCENT: 66.5 % (ref 50–65)
PDW BLD-RTO: 17.6 % (ref 11.5–14.5)
PLATELET # BLD: 469 K/UL (ref 130–400)
PMV BLD AUTO: 9.9 FL (ref 9.4–12.3)
POTASSIUM REFLEX MAGNESIUM: 4.5 MMOL/L (ref 3.5–5)
RBC # BLD: 4.88 M/UL (ref 4.2–5.4)
SODIUM BLD-SCNC: 140 MMOL/L (ref 136–145)
TOTAL PROTEIN: 5.6 G/DL (ref 6.6–8.7)
WBC # BLD: 11.1 K/UL (ref 4.8–10.8)

## 2022-11-21 PROCEDURE — 97110 THERAPEUTIC EXERCISES: CPT

## 2022-11-21 PROCEDURE — 85025 COMPLETE CBC W/AUTO DIFF WBC: CPT

## 2022-11-21 PROCEDURE — 6360000002 HC RX W HCPCS: Performed by: PSYCHIATRY & NEUROLOGY

## 2022-11-21 PROCEDURE — 1180000000 HC REHAB R&B

## 2022-11-21 PROCEDURE — 99232 SBSQ HOSP IP/OBS MODERATE 35: CPT | Performed by: PSYCHIATRY & NEUROLOGY

## 2022-11-21 PROCEDURE — 6370000000 HC RX 637 (ALT 250 FOR IP): Performed by: PSYCHIATRY & NEUROLOGY

## 2022-11-21 PROCEDURE — 92526 ORAL FUNCTION THERAPY: CPT

## 2022-11-21 PROCEDURE — 36415 COLL VENOUS BLD VENIPUNCTURE: CPT

## 2022-11-21 PROCEDURE — 94760 N-INVAS EAR/PLS OXIMETRY 1: CPT

## 2022-11-21 PROCEDURE — 97130 THER IVNTJ EA ADDL 15 MIN: CPT

## 2022-11-21 PROCEDURE — 80053 COMPREHEN METABOLIC PANEL: CPT

## 2022-11-21 PROCEDURE — 97530 THERAPEUTIC ACTIVITIES: CPT

## 2022-11-21 PROCEDURE — 97129 THER IVNTJ 1ST 15 MIN: CPT

## 2022-11-21 RX ORDER — LACTULOSE 10 G/15ML
10 SOLUTION ORAL
Status: DISCONTINUED | OUTPATIENT
Start: 2022-11-21 | End: 2022-11-22

## 2022-11-21 RX ADMIN — ENOXAPARIN SODIUM 40 MG: 100 INJECTION SUBCUTANEOUS at 17:30

## 2022-11-21 RX ADMIN — GABAPENTIN 300 MG: 300 CAPSULE ORAL at 21:05

## 2022-11-21 RX ADMIN — LACTULOSE 10 G: 20 SOLUTION ORAL at 21:05

## 2022-11-21 RX ADMIN — FOLIC ACID 1 MG: 1 TABLET ORAL at 07:41

## 2022-11-21 RX ADMIN — ASPIRIN 81 MG 81 MG: 81 TABLET ORAL at 07:41

## 2022-11-21 RX ADMIN — HYDROXYCHLOROQUINE SULFATE 200 MG: 200 TABLET, FILM COATED ORAL at 07:41

## 2022-11-21 RX ADMIN — LACTULOSE 10 G: 20 SOLUTION ORAL at 15:58

## 2022-11-21 RX ADMIN — HYDROXYCHLOROQUINE SULFATE 200 MG: 200 TABLET, FILM COATED ORAL at 21:05

## 2022-11-21 RX ADMIN — ATORVASTATIN CALCIUM 40 MG: 40 TABLET, FILM COATED ORAL at 21:05

## 2022-11-21 RX ADMIN — AMLODIPINE BESYLATE 2.5 MG: 2.5 TABLET ORAL at 07:42

## 2022-11-21 RX ADMIN — THERA TABS 1 TABLET: TAB at 07:41

## 2022-11-21 NOTE — PLAN OF CARE
Problem: Discharge Planning  Goal: Discharge to home or other facility with appropriate resources  11/21/2022 1031 by Millie Benítez RN  Outcome: Progressing  Flowsheets (Taken 11/21/2022 1017)  Discharge to home or other facility with appropriate resources: Refer to discharge planning if patient needs post-hospital services based on physician order or complex needs related to functional status, cognitive ability or social support system  11/21/2022 0124 by Steven Martinez LPN  Outcome: Progressing

## 2022-11-21 NOTE — PROGRESS NOTES
11/21/22 0900   Vitals   Heart Rate 54   BP (!) 128/59   MAP (Calculated) 82   Bed mobility   Rolling to Left Maximum assistance; Moderate assistance  (WITH RAILS)   Rolling to Right Moderate assistance;Minimal assistance  (WITH RAILS, VCS FOR LOG ROLL)   Supine to Sit Maximum assistance; Moderate assistance  (FROM RIGHT SIDELYING)   Bed Mobility Comments MULTIPLE ROLLS FOR DONNING PANTS   Transfers   Sit to Stand Maximum Assistance  (PULL TO STAND PARALLEL BARS, BLOCKING OF RIGHT KNEE)   Stand to Sit Maximum Assistance; Moderate Assistance   Bed to Chair Maximum assistance; Moderate assistance  (SQUAT PIVOT TO LEFT, WC ARM REST REMOVED. DELAYED INITATION. COMPLETED 1/2 OF TF INITIALLY, THEN MINI SQUAT PIVOT TO GET FULLY IN WC)   Ambulation   Device Parallel Bars   Quality of Gait 3 BOUTS OF STANDING BALANCE IN BARS WITH MIRROR FEEDBACK. INITIALLY DIFFICULTY WTH LEFTWARD WEIGHT SHFIT. ABLE TO FULL SHIFT LEFT AND RETURN TO MIDLINE ON 3RD BOUT. Distance 0 FT   Comments MAXIMUM SUPPORT AT RIGHT ISCHIAL TUBEROSITY AND DISTAL QUAD TO PREVENT RIGHT LE BUCKLING   More Ambulation? Yes   Ambulation 2   Device 2 Parallel Bars   Other Apparatus 2 Wheelchair follow   Assistance 2 Maximum assistance; Moderate assistance   Quality of Gait 2 FIRST CYCLE PROPER LEFTWARD LEAN WITH THERAPIST MAXIMALLY ADVANCING RIGHT LE.  MULTIPLE SHORT STEPS TO GET LEFT EVEN WITH RIGHT. NEXT 2 CYCLES PATIENT DID NOT PROPERLY WEIGHT SHIFT TO LEFT RESTULTING IN DIFFICULTY ADVANCING RIGHT, WITH LAST ATTEMPT LEADING TO BUCKLE AND QUICK SIT. Distance 3 FT   Comments CONTINUED APPREHENSION WITH LEFTWARD LEAN. U   PT Exercises   Exercise Treatment SITTING BILAT LE EX. ABLE TO PROPERLY PERFORM LEFT HIP FLEXION AROM, PERFORMED PASSIVELY ON RIGHT WITH PATIENT ATTENDING. UNABLE TO UNDERSTAND INSTRUCTION FOR HIP EXTENSION EX. Assessment   Assessment IMPROVED PERFORMANCE VS EVAL.   WOULD BENEFIT FROMCONTINUED WEIGHT SHIFTING IN BARS WITH MIRROR FEEDBACK PRIOR TO GAIT. APHASIA MAKES COMMUNICATIO KB TIMES, BUT PATIENT CAN MAKE NEEDS KNOWN WITH PATIENCE ON PART OF THERAPIST DURING DIFFICULTY WITH COMMUNICATION.

## 2022-11-21 NOTE — PROGRESS NOTES
Patient:   Dufm Ganser  MR#:    [id number]   Room:    0826/826-01   YOB: 1960  Date of Progress Note: 11/21/2022  Time of Note                           8:19 AM  Consulting Physician:   Landon Serrato M.D. Attending Physician:  Landon eSrrato MD     Chief complaint Acute ischemic stroke    S:This 64 y.o. female  with history of anxiety, depression, COPD, atherosclerotic disease, colitis, COPD, CVA, DM,  LE edema, hyperlipidemia, HTN, morbid obesity, RA, CAD  and venous thromboembolism. She presented to [facility] ER on 11/9/22 after being found at home lying facedown in her feces. She was very weak, confused, not able to speak but moving purposefully and intermittently following commands. Her last well know was 3 a.m. when her  left for work. Imaging done revealed a large MCA stroke 7.7 x 5 cm. CTA head/neck revealed an acute M1 occlusion. She was outside of the window for TPA. CK on admit was 1100, consistent with rhabdomylosis. She was also noted to possibly Dens fracture. She was transferred to [facility] for a possible thrombectomy. Further imaging was done at [facility] and she was deemed not a candidate for thrombectomy. MRI done ruled out Dens fracture. Repeat CT of head on 11/11/22 showed evolving left MCA territory infarct with increasing edema/mass effect resulting in 4mm of  rightward midline shift(previously 2mm) a the level of the third ventricle. No hemorrhagic conversion or definite trapping of the right lateral ventricle, possible small acute right cerebellar infarct. Neurosurgery was consulted for possible hemicraniectomy. She was seen by Dr. Brittany Marmolejo, who didn't feel any surgical intervention was needed. Patient was found to have a UTI + for [facility] and was placed on Rocephin on 11/14/22. Patient had a PICC line placed. Patient was evaluated by SPT and initially made NPO d/t oropharyngeal dysphagia. NGT placed and feeding started.  She was also noted to have global aphasia but is improving. She was re-evaluated by SPT on 11/15/22 for swallow and was started on a dysphagia 1 diet with nectar thick liquids. Patient also continues to have right sided weakness and is participating in both PT/OT. Repeat CT head on 11/13/22 shows stable LMCA ischemic stroke with stable edema, 5 mm shift, no hemorrhage. She is felt to need a stay on Rehab for continued PT/OT/SPT and medical management to work towards her goal of returning home with her . She is now felt ready to start the Rehab program.  No new issues as per . Still constipated. REVIEW OF SYSTEMS:  Constitutional: No fevers No chills  Neck:No stiffness  Respiratory: No shortness of breath  Cardiovascular: No chest pain No palpitations  Gastrointestinal: No abdominal pain    Genitourinary: No Dysuria  Neurological: No headache, no confusion    Past Medical History:      Diagnosis Date    Anxiety     ASCVD (arteriosclerotic cardiovascular disease)     Carrier of group B Streptococcus     Cellulitis     Colitis     COPD (chronic obstructive pulmonary disease) (HCC)     Costochondritis     CVA (cerebral vascular accident) ([address]     Depression     Edema     Fracture of sternum     non union post surgery.      Gallstones     Grief reaction     H/O superior vena cava filter placement     Hyperlipidemia     Hypertension     MI (myocardial infarction) ([address]     MRSA (methicillin resistant Staphylococcus aureus)     Neuropathy     Obesity     Pseudarthrosis sternal    RA (rheumatoid arthritis) ([address]     Rosacea     Sinus bradycardia     TIA (transient ischemic attack)     Venous thromboembolism        Past Surgical History:      Procedure Laterality Date    ADENOIDECTOMY      APPENDECTOMY      CARDIAC CATHETERIZATION  3/2009    CARDIAC CATHETERIZATION  11/5/14  JDT    EF 50%, patent bypass grafts    CHOLECYSTECTOMY  2011    COLONOSCOPY  06/03/2010    Dr. Whitehead Aus: distal colon having ulcerative lesions c/w UC    COLONOSCOPY  2009 pancolitis    COLONOSCOPY      CORONARY ARTERY BYPASS GRAFT  3/2009    Dr Andrey Lara, LIMA to LAD, SVGs to OM & PDA    GASTRIC BYPASS SURGERY  2012    HIATAL HERNIA REPAIR  2011    HYSTERECTOMY (CERVIX STATUS UNKNOWN)      KNEE SURGERY      AK COLONOSCOPY FLX DX W/COLLJ SPEC WHEN PFRMD N/A 3/12/2018    Dr Pepe García rectal inflammation consistent with U.C.-Active proctitis--3 yr recall    [facility] ENDOSCOPY  2010    Dr. Whit Adam  2012       Medications in Hospital:      Current Facility-Administered Medications:     aspirin chewable tablet 81 mg, 81 mg, Oral, Daily, Morrell Alpers, MD, 81 mg at 11/21/22 0741    atorvastatin (LIPITOR) tablet 40 mg, 40 mg, Oral, Nightly, Morrell Alpers, MD, 40 mg at 11/20/22 2126    dulaglutide (TRULICITY) SC injection 1.5 mg (Patient Supplied), 1.5 mg, SubCUTAneous, Weekly, Morrell Alpers, MD    folic acid (FOLVITE) tablet 1 mg, 1 mg, Oral, Daily, Morrell Alpers, MD, 1 mg at 11/21/22 0741    gabapentin (NEURONTIN) capsule 300 mg, 300 mg, Oral, Nightly, Morrell Alpers, MD, 300 mg at 11/20/22 2126    hydroxychloroquine (PLAQUENIL) tablet 200 mg, 200 mg, Oral, BID, Morrell Alpers, MD, 200 mg at 11/21/22 0741    amLODIPine (NORVASC) tablet 2.5 mg, 2.5 mg, Oral, Daily, Morrell Alpers, MD, 2.5 mg at 11/21/22 0742    tiZANidine (ZANAFLEX) tablet 4 mg, 4 mg, Oral, BID PRN, Morrell Alpers, MD    multivitamin 1 tablet, 1 tablet, Oral, Daily, Morrell Alpers, MD, 1 tablet at 11/21/22 0741    acetaminophen (TYLENOL) tablet 650 mg, 650 mg, Oral, Q4H PRN, Morrell Alpers, MD, 650 mg at 11/20/22 2129    enoxaparin (LOVENOX) injection 40 mg, 40 mg, SubCUTAneous, Daily, Morrell Alpers, MD, 40 mg at 11/20/22 1745    polyethylene glycol (GLYCOLAX) packet 17 g, 17 g, Oral, Daily PRN, Morrell Alpers, MD    Allergies:  Methotrexate derivatives    Social History:   TOBACCO:   reports that she quit smoking about 13 years ago.  Her smoking use included cigarettes. She has a 64.00 pack-year smoking history. She has never used smokeless tobacco.  ETOH:   reports current alcohol use. Family History:       Problem Relation Age of Onset    Prostate Cancer Father     Heart Failure Father     Cancer Father     Colon Cancer Neg Hx     Colon Polyps Neg Hx     Liver Cancer Neg Hx     Liver Disease Neg Hx     Esophageal Cancer Neg Hx     Rectal Cancer Neg Hx     Stomach Cancer Neg Hx          PHYSICAL EXAM:  /64   Pulse (!) 42   Temp (!) 95.9 °F (35.5 °C) (Temporal)   Resp 16   Ht 5' 7\" (1.702 m)   Wt 183 lb 7 oz (83.2 kg)   SpO2 100%   BMI 28.73 kg/m²     Constitutional - well developed, well nourished. Eyes - conjunctiva normal.   Ear, nose, throat - No scars, masses, or lesions over external nose or ears, no atrophy of tongue  Neck-symmetric, no masses noted, no jugular vein distension  Respiration- chest wall appears symmetric, good expansion,   normal effort without use of accessory muscles  Musculoskeletal - no significant wasting of muscles noted, no bony deformities  Extremities-no clubbing, cyanosis or edema  Skin - warm, dry, and intact. No rash, erythema, or pallor. Psychiatric - mood, affect, and behavior appear normal.      Neurological exam  Awake, alert, global aphasia says \"yes\" to all questions asked   Attention and concentration appear appropriate  Recent and remote memory unable to tests     Cranial Nerve Exam     CN III, IV,VI-EOMI, No nystagmus, conjugate eye movements, no ptosis    CN VII-+ facial assymetry       Motor Exam  No movement on the right      Tremors- no tremors in hands or head noted     Gait  Not tested     Nursing/pcp notes, imaging,labs and vitals reviewed.      PT,OT and/or speech notes reviewed    Lab Results   Component Value Date    WBC 11.1 (H) 11/21/2022    HGB 11.8 (L) 11/21/2022    HCT 39.7 11/21/2022    MCV 81.4 11/21/2022     (H) 11/21/2022     Lab Results   Component Value Date  2022    K 4.5 2022     2022    CO2 23 2022    BUN 19 2022    CREATININE 0.5 2022    GLUCOSE 88 2022    CALCIUM 9.1 2022    PROT 5.6 (L) 2022    LABALBU 3.4 (L) 2022    BILITOT 0.3 2022    ALKPHOS 85 2022    AST 26 2022    ALT 26 2022    LABGLOM >60 2022   No results found for: INR, PROTIME      Rick Martinez, OT   Occupational Therapist   Occupational   Progress Notes      Signed   Date of Service:  2022  1:45 PM                 Signed                                                                                                                                                                                                                                                                                Occupational Therapy  Facility/Department: [facility]  Occupational Therapy Treatment Note     Name: Yayo Ivory  : 1960  MRN: [id number]  Date of Service: 2022     Discharge Recommendations:  Continue to assess pending progress        Patient Diagnosis(es): There were no encounter diagnoses. Past Medical History:  has a past medical history of Anxiety, ASCVD (arteriosclerotic cardiovascular disease), Carrier of group B Streptococcus, Cellulitis, Colitis, COPD (chronic obstructive pulmonary disease) ([address] Costochondritis, CVA (cerebral vascular accident) ([address] Depression, Edema, Fracture of sternum, Gallstones, Grief reaction, H/O superior vena cava filter placement, Hyperlipidemia, Hypertension, MI (myocardial infarction) ([address] MRSA (methicillin resistant Staphylococcus aureus), Neuropathy, Obesity, Pseudarthrosis, RA (rheumatoid arthritis) ([address] Rosacea, Sinus bradycardia, TIA (transient ischemic attack), and Venous thromboembolism. Past Surgical History:  has a past surgical history that includes Coronary artery bypass graft (3/2009); Tonsillectomy;  Hysterectomy; thoracotomy; knee surgery; Appendectomy; Colonoscopy (06/03/2010); Cardiac catheterization (3/2009); Adenoidectomy; Cholecystectomy (2011); Gastric bypass surgery (2012); hiatal hernia repair (2011); Cardiac catheterization (11/5/14  JDT); Colonoscopy (2009); Colonoscopy; Upper gastrointestinal endoscopy (2010); Upper gastrointestinal endoscopy (2012); and pr colonoscopy flx dx w/collj spec when pfrmd (N/A, 3/12/2018). Treatment Diagnosis: L MCA stroke        Assessment   Performance deficits / Impairments: Decreased functional mobility ; Decreased endurance;Decreased coordination;Decreased ADL status; Decreased sensation;Decreased posture;Decreased ROM; Decreased balance;Decreased strength;Decreased vision/visual deficit; Decreased safe awareness;Decreased high-level IADLs;Decreased cognition;Decreased fine motor control  Assessment: Pt tolerates passive shoulder flex 0-90 supine. Any attempts of active shoulder movement result in activating internal rotators. No active distal movement noted. Unable to accurately assess light touch sensation due to global aphasia. Ed pt/family in positioning R UE in bed, during mobility and in w/c with laptray.   Treatment Diagnosis: L MCA stroke            Plan   Occupational Therapy Plan  Current Treatment Recommendations: Strengthening, ROM, Balance training, Functional mobility training, Endurance training, Positioning, Modalities, Neuromuscular re-education, Cognitive reorientation, Safety education & training, Patient/Caregiver education & training, Equipment evaluation, education, & procurement, Self-Care / ADL, Cognitive/Perceptual training, Home management training, Sensory integraion      Restrictions  Restrictions/Precautions  Restrictions/Precautions: Modified Diet, Swallowing - Thickened Liquids, Fall Risk, Aspiration Risk  Required Braces or Orthoses?: No     Subjective   General  Patient assessed for rehabilitation services?: Yes  Social/Functional History  Social/Functional History  Lives With: Spouse  Type of Home: House  Home Layout: One level  Home Access:  (2 steps to enter)  Bathroom Shower/Tub: Tub/Shower unit  Bathroom Toilet: Standard  Has the patient had two or more falls in the past year or any fall with injury in the past year?: No  ADL Assistance: Independent  Homemaking Assistance: Independent  Ambulation Assistance: Independent  Transfer Assistance: Independent  Active : Yes  Leisure & Hobbies: volunteers at animal shelter, [employer], makes [employer], reading, cooking     Objective   Heart Rate: 51  Heart Rate Source: Monitor  BP: 112/70  BP Location: Right lower arm  Patient Position: Supine  MAP (Calculated): 84  Resp: 14  SpO2: 95 %  O2 Device: None (Room air)       Safety Devices  Type of Devices: All fall risk precautions in place; Bed alarm in place;Call light within reach;Gait belt;Left in bed;Nurse notified ( present in room)  AROM: Grossly decreased, non-functional (R UE, only slight internal shoulder rotation observed during attempted AROM)  PROM: Grossly decreased, non-functional  Strength: Grossly decreased, non-functional  Coordination: Grossly decreased, non-functional  Tone: Abnormal  Sensation: Impaired (difficult to assess due to aphasia)  Tone RUE  RUE Tone: Hypotonic  RUE Modified Robert Scale  RUE Modified Robert Scale Completed: Yes  RUE Modified Robert Scale  RUE Bicep Score: 1  Tone LUE  LUE Tone: Normotonic  Quality of Movement Other  Comment: pt demonstrates slight R internal rotation during attempts of shoulder flexion/abduction  Activity Tolerance  Activity Tolerance: Patient tolerated treatment well                 Goals  Short Term Goals  Time Frame for Short Term Goals: 2 weeks  Short Term Goal 1: Pt will bathe with mod A, AE prn  Short Term Goal 2: Pt will dress UB using hemitechnique, mod A and cues  Short Term Goal 3: Pt will dress LB, hemitechnique, mod A and cues  Short Term Goal 4: Pt will toilet with mod A  Short Term Goal 5: Toilet transfer with mod A  Additional Goals?: Yes  Short Term Goal 6: Pt will attend to R side during ADL/functional activities with moderate cues  Short Term Goal 7: Pt will perform standing tasks x 2-3 mins with L UE and mod A for balance to enhance ADL/IADL performance  Short Term Goal 8: Pt/family will demo understanding of R UE positioning/HEP/therapeutic activity recommendations with min A after ed  Long Term Goals  Time Frame for Long Term Goals : 4-5 weeks  Long Term Goal 1: Pt will bathe with min A, AE/DME prn  Long Term Goal 2: Pt will dress UB supervision  Long Term Goal 3: Pt will dress LB min A, AE prn  Long Term Goal 4: Pt will toilet with CGA  Long Term Goal 5: Pt will perform toilet transfer with CGA  Additional Goals?: Yes  Long Term Goal 6: Pt will perform simple home making task with min A  Long Term Goal 7: Pt/family will be independent in HEP/DME/therapeutic activity recommendations after ed  Long Term Goal 8: Pt will attend to R side during functional activities with occasional cueing     Therapy Time    Individual Concurrent Group Co-treatment   Time In 1345      Time Out 1410      Minutes 25      Timed Code Treatment Minutes: 25 Minutes     Ty Walker OT                  RECORD REVIEW: Previous medical records, medications were reviewed at today's visit    IMPRESSION:   1. Acute ischemic stroke-on aspirin/statin  2. Hypertension-on medications monitor-relatively normotensive  3. Hyperlipidemia-on statin  4. Diabetes-Trulicity-monitor blood sugar-well controlled  5. DVT prophylaxis-Lovenox  6. History of coronary artery disease-aspirin/statin  7. Neuropathy-on Neurontin  8. Rheumatoid arthritis-on Plaquenil  9. COPD-monitor  10. History of anxiety and depression-monitor  11. History of colitis/gallstones-monitor  12. History of bariatric surgery-on multivitamin/folic acid  13.   History of superior vena cava filter placement with venous thromboembolism-not on anticoagulation-monitor- indicates took herself off blood thinners as not like meds and was bruising   14.   PT/OT/speech    Try Lactulose for constipation     Continue current care as noted      Expected duration and frequency therapy: 180 minutes per day, 5 days per week    [address] [facility]  [phone] CELL  Dr Carmenza Simons

## 2022-11-21 NOTE — PATIENT CARE CONFERENCE
[facility] ACUTE INPATIENT REHABILITATION  TEAM CONFERENCE NOTE    Date: 2022  Patient Name: Olga Lidia Klein        MRN: [id number]    : 1960  (64 y.o.)  Gender: female      Diagnosis: CVA, R hemiparesis      PHYSICAL THERAPY  GROSS ASSESSMENT       BED MOBILITY  Bed mobility  Supine to Sit: Maximum assistance  Sit to Supine: Maximum assistance  Scooting: Moderate assistance (Able to follow command to inititate scooting, Mod A to fully execute)       TRANSFERS  Transfers  Sit to Stand: Maximum Assistance  Stand to Sit: Moderate Assistance  Bed to Chair: Maximum assistance  Squat Pivot Transfers: Maximum Assistance (Performed to patients L side)  Car Transfer: Unable to assess  WHEELCHAIR PROPULSION  Propulsion 1  Propulsion: Manual  Level: Level Tile  Method: LUE, LLE  Level of Assistance: Maximum assistance  Description/ Details: Patient unable to coordinate LUE/LLE together for propulsion, pt able to use LUE for strong forward propulsion. No turns.   Distance: 13' and then dependent for remainder of distance  AMBULATION     STAIRS     GOALS:  Short Term Goals  Time Frame for Short Term Goals: 2 weeks  Short Term Goal 1: Patient will perform bed mobility with Min A  Short Term Goal 2: Patient will transition supine <> sit with Min A  Short Term Goal 3: Patient will perform bed <> chair transfer with Min A  Short Term Goal 4: Patient propel wheelchair 50 ft with Min A  Short Term Goal 5: Patient will ambulate 10 ft with Mod A    Long Term Goals  Time Frame for Long Term Goals : 3 weeks  Long Term Goal 1: Patient will perform bed mobility independently  Long Term Goal 2: Patient with transition supine <> sit independently  Long Term Goal 3: Patient will perform bed <> chair transfer independently  Long Term Goal 4: Patient will perform car transfer with Min A  Long Term Goal 5: Patient will ambulate 10 ft with CGA    ASSESSMENT:  Assessment: Ms. Phu Jeffers tolerated evaluation well, she was very fatigued by end of session. She required Mod-Max A for all function mobility and was not appropriate for attempting ambulation this date due to onset of head pain after standing. She was able to follow one step commands, however inconsistent on appropriate response and action. When performing squat pivot from wheelchair, her R posterior hip became painful due to sitting on a part of wheelchair. PT assessed the area and no wound or skin changes observed. RN was notified. Overall she will greatly benefit from skilled therapy as she is at high risk of falling and uanble to perform functional mobiltiy skills without Mod-Max A.      SPEECH THERAPY    Swallowing reassessed during noon meal. Consistencies on noon meal tray include soft and bite sized consistencies with thin liquids. SLP completed P.O. trials of milk throughout noon meal. Pt did demonstrate difficulties with self feeding; however, gets aggravated when assistance is offered. Mastication not assessed on this date. Oral prep reveals poor labial seal around spoon. R labial loss is noted throughout P.O. trials. Pt is unaware of this. Oral transit timing ranges from 1-2 seconds with soft and bite sized consistencies. Minimal oral pocketing of the right observed. Minimal oral residue noted. Pocketing and residue are cleared with liquid wash. Oral transit is suspected to be 1 second or faster than 1 second with thin liquids. Laryngeal movement observed to be consistently sluggish and mild-moderately decreased for swallow airway protection. No overt s/s of aspiration/penetration observed with soft and bite sized consistencies and thin liquids throughout noon meal.      Recommend Modified Barium Swallow Study to ensure safest diet consistencies and to rule out penetration/aspiration. Please monitor/notify SLP for any spikes in temperature, changes in lung sounds, or difficulties with swallowing.       Daughter educated on general aspiration precautions, recommendation of MBSS, current diet, and outward s/s of aspiration/penetration. Automatic speech tasks completed. Pt sang ABC's and happy birthday independently with 100% accuracy. Pt was prompted to count 1-20, pt did require cues beginning approximately at 13. Pt would then say every other number, as therapist filled in the blanks. All songs were fluent. Pt completed puzzles on this date. Given a field of 2, 3, and 4, pt was prompted to put puzzle pieces together. Each puzzle piece set contained a 4 letter target word. One piece had two letters and the other piece had two letters of the word. On each piece, there was a photo of the target word. Pt was independently able to put each piece together with 100% accuracy. Confrontational naming task completed. Pt was required to identify the target object/photo on each puzzle piece. Task completed with 42% accuracy. Maximum semantic and phonemic cues provided. Pt did benefit from phonemic cues. Functional reading task complete. Daughter reports pt loves to read. Pt was able to read 33% of the 4 letter target words. Pt did require phonemic cues throughout this task. Pt does benefit from 2 verbal choices during therapeutic tasks; difficulties are noted with open ended questions. Daughter reports pt had a modified barium swallow study while at [facility]. Unable to obtain this report from electronic medical record or paper medical records. Will continue searching. SLP will continue to follow and treat. SHORT TERM GOAL #1:  Goal 1: Pt will complete y/n tasks with 80% accuracy and minimal verbal cues/modeling. SHORT TERM GOAL #2:  Goal 2: Pt will complete verbal expression tasks with 80% accuracy and minimal verbal cues/modeling. SHORT TERM GOAL #3:  Goal 3: Pt will complete receptive/expressive language tasks with 80% accuracy and minimal verbal cues/modeling.      SHORT TERM GOAL #4:  Goal 4: Pt will follow one step commands with with 90% accuracy and minimal verbal cues/modeling. SHORT TERM GOAL #5:  Goal 5: Pt will complete orientation tasks with 90% accuracy and minimal verbal cues/modeling. Swallowing Short Term Goals  Short-term Goals  Goal 1: Pt will tolerate soft & bite sized consistencies with mildly  (nectar) thick liquids with minimal overt s/s of aspiration/penetration during hospitalization. Goal 2: Pt will complete Modified Barium Swallow Study. Goal 3: Pt will demonstrate awareness of general aspiration precautions. Goal 4: Pt will participate in swallowing reassessments to ensure safest diet consistencies. Goal 5: Pt will allow staff to complete daily oral care. Goal 6: Pt will complete oral motor exercises for lingual and labial strengthening. Long Term Goals  GOAL 1: Pt will participate in skilled speech therapy services to ensure she is able to communicate her wants and needs. GOAL 2: Pt will participate in skilled speech therapy services to ensure she is able to complete ADLs. GOAL 3: Pt will tolerate least restrictive diet with minimal overt s/s of aspiration/penetration during hospitalization.     ELOS: 2-3 weeks  STGs MET: 0  LTGs MET: 0    OCCUPATIONAL THERAPY  Cognitive Patterns:     Cognitive Assessment Method (CAM):  Confusion Assessment Method (CAM)  Is there evidence of an acute change in mental status from the patient's baseline?: Yes  Inattention: Behavior present, fluctuates (comes and goes, changes in severity)  Disorganized thinking: Behavior present, fluctuates (comes and goes, changes in severity)  Altered level of consciousness: Behavior not present  Health Literacy:  How often do you need to have someone help you when you read instructions, pamphlets, or other written material from your doctor or pharmacy?: Never        CURRENT IRF-ARLEN SCORES  Eating: CARE Score: 3       Oral Hygiene: CARE Score: 3        Toileting: CARE Score: 1  Comment: pt incontinent, urine      Shower/Bathe: CARE Score: 2           Upper Body Dressing: CARE Score: 2         Lower Body Dressing: CARE Score: 2          Footwear: CARE Score: 1          Toilet Transfers: CARE Score: 2          Picking Up Object:  CARE Score: 88          UE Functioning:  RUE: passive ROM to 90 degrees, 2-/5 proximally, no AROM distally  LUE: AROM WFL     Pain Assessment:      0/10 pain     STGs:  Short Term Goals  Time Frame for Short Term Goals: 2 weeks  Short Term Goal 1: Pt will bathe with mod A, AE prn  Short Term Goal 2: Pt will dress UB using hemitechnique, mod A and cues  Short Term Goal 3: Pt will dress LB, hemitechnique, mod A and cues  Short Term Goal 4: Pt will toilet with mod A  Short Term Goal 5:  Toilet transfer with mod A  Additional Goals?: Yes  Short Term Goal 6: Pt will attend to R side during ADL/functional activities with moderate cues  Short Term Goal 7: Pt will perform standing tasks x 2-3 mins with L UE and mod A for balance to enhance ADL/IADL performance  Short Term Goal 8: Pt/family will demo understanding of R UE positioning/HEP/therapeutic activity recommendations with min A after ed    LTGs:  Long Term Goals  Time Frame for Long Term Goals : 4-5 weeks  Long Term Goal 1: Pt will bathe with min A, AE/DME prn  Long Term Goal 2: Pt will dress UB supervision  Long Term Goal 3: Pt will dress LB min A, AE prn  Long Term Goal 4: Pt will toilet with CGA  Long Term Goal 5: Pt will perform toilet transfer with CGA  Additional Goals?: Yes  Long Term Goal 6: Pt will perform simple home making task with min A  Long Term Goal 7: Pt/family will be independent in HEP/DME/therapeutic activity recommendations after ed  Long Term Goal 8: Pt will attend to R side during functional activities with occasional cueing    Assessment:  Performance deficits / Impairments: Decreased functional mobility , Decreased endurance, Decreased coordination, Decreased ADL status, Decreased sensation, Decreased posture, Decreased ROM, Decreased balance, Decreased strength, Decreased vision/visual deficit, Decreased safe awareness, Decreased high-level IADLs, Decreased cognition, Decreased fine motor control                 NUTRITION  Current Wt: Weight: 183 lb 7 oz (83.2 kg) / Body mass index is 28.73 kg/m². Admission Wt: Admission Body Weight: 201 lb (91.2 kg)  Pt. reflecting a % intake. Pt. upgraded to a CHO 4 diet, with soft and bite sized and nectar thick. Weight loss of 18#'s within two days, likely d/t inaccurate weight. Upon speaking with family, they stated that the weight was likely innacurate as well. Recommended to start ONS TID. Family agreed. Will start ONS TID and continue diet order. Will request a reweight for further nutrition assessment. Oral Nutrition Supplement (ONS) Orders:  ONS TID  Please see nutrition note for details. NURSING    Wounds/Incisions/Ulcers: No skin issues identified     Rickey Scale Score: 17    Pain: No pain concerns to address    Consultations/Labs/X-rays:     Family Education: Family available and participating in education     Fall Risk:  Pedro Bonds Total Score: 72    Fall in the last week? NONE      Other Nursing Issues: Alert and oriented x4, meds consumed whole with applesauce, assist x2, right sided weakness, expressive and receptive aphasia, Dysphagia diet, nectar thick liq, ASA, Lovenox,         SOCIAL WORK/CASE MANAGEMENT  Assessment: Has good family support from  and daughter ([place] Discharge Plan   Estimated Length of Stay: 4-6 weeks  Destination: discharge home with supervision    Pass: No    Services at Discharge: Day Rehab Physical Therapy, Occupational Therapy, Speech Therapy, and Nursing 3x week    Equipment at Discharge: Will determine closer to discharge. Progress made in the prior week:  Eval on 11/19/2022  2.  3.  4.  5.      Goals for following week:  Min A with UB dressing.    2.   3.   4.   5.     Factors facilitating achievement of predicted outcomes: Pleasant    Barriers to the achievement of predicted outcomes: Cognitive deficit, Communication deficit, Decreased endurance, Decreased sensation, Decreased proprioception, Upper extremity weakness, and Lower extremity weakness    Team Members Present at Conference:  : Cyndy Lozoya 23   Occupational Therapist: Franci Burger OTR/L  Physical Therapist: Lorrie Steen PT  Speech Therapist: Aleta Casper, SLP  Nurse: Bipin Schuster   Nurse Manager:  Av Graff, RN, BSN  Dietitian:  Ron Beaz MS RD, LD  Rehab Director:        I approve the established interdisciplinary plan of care as documented within the medical record of [address] [place] [address].

## 2022-11-21 NOTE — PROGRESS NOTES
Occupational Therapy     11/21/22 1515   General   Timed Code Treatment Minutes 40 Minutes   Restrictions/Precautions   Restrictions/Precautions Modified Diet;Swallowing - Thickened Liquids; Fall Risk;Aspiration Risk   General   Family / Caregiver Present Yes  (spouse/daughter)   Bed mobility   Sit to Supine Moderate assistance   Type of ROM/Therapeutic Exercise   Type of ROM/Therapeutic Exercise Self PROM;AROM;AAROM  (demonstrated 3 SROM exercises)   Comment comprehensive RUE Reeducation program   Exercises   Shoulder Flexion with vibration to facilitate/strengthen muscle contration   Shoulder Extension with vibration to facilitate/strengthen muscle contration   Elbow Flexion with vibration to facilitate/strengthen muscle contration   Elbow Extension with vibration to facilitate/strengthen muscle contration   Supination with vibration to facilitate/strengthen muscle contration   Pronation with vibration to facilitate/strengthen muscle contration   Wrist Flexion with vibration to facilitate/strengthen muscle contration   Wrist Extension with vibration to facilitate/strengthen muscle contration   Finger Flexion trace   Other patient able to demonstrate AROM in all planes with vibration, not able to demonstrate with quick stretch technique  (maintained visual contact during 90% of tasks, at times unable to tolerate high level of vibration)   Assessment   Performance deficits / Impairments Decreased functional mobility ; Decreased endurance;Decreased coordination;Decreased ADL status; Decreased sensation;Decreased posture;Decreased ROM; Decreased balance;Decreased strength;Decreased vision/visual deficit; Decreased safe awareness;Decreased high-level IADLs;Decreased cognition;Decreased fine motor control   Assessment Pt has underlying movement in RUE but is impeded by R inattention   Treatment Diagnosis L MCA stroke   Activity Tolerance   Activity Tolerance Patient Tolerated treatment well

## 2022-11-21 NOTE — PROGRESS NOTES
Occupational Therapy  Facility/Department: [facility] 8 REHAB UNIT      Name: Abel Fleming  : 1960  MRN: [id number]  Date of Service: 2022    Discharge Recommendations:  Continue to assess pending progress          Patient Diagnosis(es): There were no encounter diagnoses. Past Medical History:  has a past medical history of Anxiety, ASCVD (arteriosclerotic cardiovascular disease), Carrier of group B Streptococcus, Cellulitis, Colitis, COPD (chronic obstructive pulmonary disease) ([address] Costochondritis, CVA (cerebral vascular accident) ([address] Depression, Edema, Fracture of sternum, Gallstones, Grief reaction, H/O superior vena cava filter placement, Hyperlipidemia, Hypertension, MI (myocardial infarction) ([address] MRSA (methicillin resistant Staphylococcus aureus), Neuropathy, Obesity, Pseudarthrosis, RA (rheumatoid arthritis) ([address] Rosacea, Sinus bradycardia, TIA (transient ischemic attack), and Venous thromboembolism. Past Surgical History:  has a past surgical history that includes Coronary artery bypass graft (3/2009); Tonsillectomy; Hysterectomy; thoracotomy; knee surgery; Appendectomy; Colonoscopy (2010); Cardiac catheterization (3/2009); Adenoidectomy; Cholecystectomy (); Gastric bypass surgery (); hiatal hernia repair (); Cardiac catheterization (14  JDT); Colonoscopy (); Colonoscopy; Upper gastrointestinal endoscopy (); Upper gastrointestinal endoscopy (); and pr colonoscopy flx dx w/collj spec when pfrmd (N/A, 3/12/2018). Treatment Diagnosis: L MCA stroke      Assessment   Performance deficits / Impairments: Decreased functional mobility ; Decreased endurance;Decreased coordination;Decreased ADL status; Decreased sensation;Decreased posture;Decreased ROM; Decreased balance;Decreased strength;Decreased vision/visual deficit; Decreased safe awareness;Decreased high-level IADLs;Decreased cognition;Decreased fine motor control  Activity Tolerance  Activity Tolerance: Patient Tolerated treatment well        Plan   Occupational Therapy Plan  Specific Instructions for Next Treatment: UE function, Estim, transfers  Current Treatment Recommendations: Strengthening, ROM, Balance training, Functional mobility training, Endurance training, Positioning, Modalities, Neuromuscular re-education, Cognitive reorientation, Safety education & training, Patient/Caregiver education & training, Equipment evaluation, education, & procurement, Self-Care / ADL, Cognitive/Perceptual training, Home management training, Sensory integraion     Restrictions  Restrictions/Precautions  Restrictions/Precautions: Modified Diet, Swallowing - Thickened Liquids, Fall Risk, Aspiration Risk  Required Braces or Orthoses?: No    Subjective   General  Patient assessed for rehabilitation services?: Yes  Family / Caregiver Present: Yes (daughter at end of session)     Pre Treatment Pain Screening   Pain at present 0   Scale Used Numeric Score       Objective     Safety Devices  Type of Devices: Left in chair;Call light within reach; Chair alarm in place (daughter present)       Transfers  Sit Pivot Transfers: Maximum assistance (max/mod A w/c to recliner towards left side, cues, w/c arm flipped back)  Sit to stand: Minimal assistance  Stand to sit: Minimal assistance        11/21/22 1000   Other Specialty Interventions   Other Treatments/Modalities estim, Latvian setting, 20 mins, 20mAccs, 10/20--on/off cycle, 50% duty cycle, for shoulder elevation with poor results--unable to tolerate high setting at this time         PROM Exercises: introduced PROM HEP---hand over hand intially     LUE AROM (degrees)  LUE AROM : WNL  RUE AROM (degrees)  RUE General AROM: flaccid throughout  LUE Strength  Gross LUE Strength:  WNL  RUE Strength  RUE Strength Comment: 0/5 throughout      11/21/22 1000   Balance   Standing Balance Minimal assistance  (at vertical GBx 3 occasions)        Goals  Short Term Goals  Time Frame for Short Term Goals: 2 weeks  Short Term Goal 1: Pt will bathe with mod A, AE prn  Short Term Goal 2: Pt will dress UB using hemitechnique, mod A and cues  Short Term Goal 3: Pt will dress LB, hemitechnique, mod A and cues  Short Term Goal 4: Pt will toilet with mod A  Short Term Goal 5:  Toilet transfer with mod A  Additional Goals?: Yes  Short Term Goal 6: Pt will attend to R side during ADL/functional activities with moderate cues  Short Term Goal 7: Pt will perform standing tasks x 2-3 mins with L UE and mod A for balance to enhance ADL/IADL performance  Short Term Goal 8: Pt/family will demo understanding of R UE positioning/HEP/therapeutic activity recommendations with min A after ed  Long Term Goals  Time Frame for Long Term Goals : 4-5 weeks  Long Term Goal 1: Pt will bathe with min A, AE/DME prn  Long Term Goal 2: Pt will dress UB supervision  Long Term Goal 3: Pt will dress LB min A, AE prn  Long Term Goal 4: Pt will toilet with CGA  Long Term Goal 5: Pt will perform toilet transfer with CGA  Additional Goals?: Yes  Long Term Goal 6: Pt will perform simple home making task with min A  Long Term Goal 7: Pt/family will be independent in HEP/DME/therapeutic activity recommendations after ed  Long Term Goal 8: Pt will attend to R side during functional activities with occasional cueing       Therapy Time   Individual Concurrent Group Co-treatment   Time In 1000         Time Out 1100         Minutes 60         Timed Code Treatment Minutes: 60 Minutes       Xiomara Love OT

## 2022-11-21 NOTE — PROGRESS NOTES
[facility]  INPATIENT SPEECH THERAPY  Health system 8 [facility] UNIT  TIME   1100  4398  98 MINUTES    [x]Daily Note  []Progress Note    Date: 2022  Patient Name: Juan M Calderón        MRN: [id number]    Account #: [de-identified]  : 1960  (64 y.o.)  Gender: female   Primary Provider: Librado Hidalgo MD  Swallowing Status/Diet: Soft & Bite Sized Consistencies with Mildly (nectar) thick liquids      Subjective: Pt is upright in recliner. Daughter is present for tx. Patient exhibited many perseverations, paraphasias (semantic and phonemic), echolalia, and anomia. Pt is inconsistently aware of her communication deficits. Objective:    Swallowing reassessed during noon meal. Consistencies on noon meal tray include soft and bite sized consistencies with thin liquids. SLP completed P.O. trials of milk throughout noon meal. Pt did demonstrate difficulties with self feeding; however, gets aggravated when assistance is offered. Mastication not assessed on this date. Oral prep reveals poor labial seal around spoon. R labial loss is noted throughout P.O. trials. Pt is unaware of this. Oral transit timing ranges from 1-2 seconds with soft and bite sized consistencies. Minimal oral pocketing of the right observed. Minimal oral residue noted. Pocketing and residue are cleared with liquid wash. Oral transit is suspected to be 1 second or faster than 1 second with thin liquids. Laryngeal movement observed to be consistently sluggish and mild-moderately decreased for swallow airway protection. No overt s/s of aspiration/penetration observed with soft and bite sized consistencies and thin liquids throughout noon meal.     Recommend Modified Barium Swallow Study to ensure safest diet consistencies and to rule out penetration/aspiration. Please monitor/notify SLP for any spikes in temperature, changes in lung sounds, or difficulties with swallowing.      Daughter educated on general aspiration precautions, recommendation of MBSS, current diet, and outward s/s of aspiration/penetration. Automatic speech tasks completed. Pt sang ABC's and happy birthday independently with 100% accuracy. Pt was prompted to count 1-20, pt did require cues beginning approximately at 13. Pt would then say every other number, as therapist filled in the blanks. All songs were fluent. Pt completed puzzles on this date. Given a field of 2, 3, and 4, pt was prompted to put puzzle pieces together. Each puzzle piece set contained a 4 letter target word. One piece had two letters and the other piece had two letters of the word. On each piece, there was a photo of the target word. Pt was independently able to put each piece together with 100% accuracy. Confrontational naming task completed. Pt was required to identify the target object/photo on each puzzle piece. Task completed with 42% accuracy. Maximum semantic and phonemic cues provided. Pt did benefit from phonemic cues. Functional reading task complete. Daughter reports pt loves to read. Pt was able to read 33% of the 4 letter target words. Pt did require phonemic cues throughout this task. Pt does benefit from 2 verbal choices during therapeutic tasks; difficulties are noted with open ended questions. Daughter reports pt had a modified barium swallow study while at [facility]. Unable to obtain this report from electronic medical record or paper medical records. Will continue searching. SLP will continue to follow and treat. SHORT TERM GOAL #1:  Goal 1: Pt will complete y/n tasks with 80% accuracy and minimal verbal cues/modeling. SHORT TERM GOAL #2:  Goal 2: Pt will complete verbal expression tasks with 80% accuracy and minimal verbal cues/modeling. SHORT TERM GOAL #3:  Goal 3: Pt will complete receptive/expressive language tasks with 80% accuracy and minimal verbal cues/modeling.     SHORT TERM GOAL #4:  Goal 4: Pt will follow one step commands with with 90% accuracy and minimal verbal cues/modeling. SHORT TERM GOAL #5:  Goal 5: Pt will complete orientation tasks with 90% accuracy and minimal verbal cues/modeling. Swallowing Short Term Goals  Short-term Goals  Goal 1: Pt will tolerate soft & bite sized consistencies with mildly  (nectar) thick liquids with minimal overt s/s of aspiration/penetration during hospitalization. Goal 2: Pt will complete Modified Barium Swallow Study. Goal 3: Pt will demonstrate awareness of general aspiration precautions. Goal 4: Pt will participate in swallowing reassessments to ensure safest diet consistencies. Goal 5: Pt will allow staff to complete daily oral care. Goal 6: Pt will complete oral motor exercises for lingual and labial strengthening. ASSESSMENT:  Assessment: [x]Progressing towards goals          []Not Progressing towards goals    Patient Tolerance of Treatment:   [x]Tolerated well []Tolerated fair []Required rest breaks []Fatigued    Education:  Learner:  [x]Patient          []Significant Other          []Other  Education provided regarding:  [x]Goals and POC   [x]Diet and swallowing precautions    []Home Exercise Program  []Progress and/or discharge information  Method of Education:  [x]Discussion          []Demonstration          []Handout          []Other  Evaluation of Education:   []Verbalized understanding   []Demonstrates without assistance  []Demonstrates with assistance  [x]Needs further instruction     []No evidence of learning                  []No family present      Plan: [x]Continue with current plan of care    []Modify current plan of care as follows:    []Discharge patient    Discharge Location:    Services/Supervision Recommended:      []Patient continues to require treatment by a licensed therapist to address functional deficits as outlined in the established plan of care.       Electronically signed by MARCELINO Pop on 11/21/2022 at 12:56 PM

## 2022-11-21 NOTE — PROGRESS NOTES
Comprehensive Nutrition Assessment    Type and Reason for Visit:  Reassess    Nutrition Recommendations/Plan:   Continue POC. Start ONS TID. Malnutrition Assessment:  Malnutrition Status:  No malnutrition (11/21/22 1407)    Context:  Acute Illness     Findings of the 6 clinical characteristics of malnutrition:  Energy Intake:  No significant decrease in energy intake  Weight Loss:  No significant weight loss     Body Fat Loss:  No significant body fat loss     Muscle Mass Loss:  No significant muscle mass loss    Fluid Accumulation:  Mild Extremities   Strength:  Not Performed    Nutrition Assessment:    Pt. reflecting a % intake. Pt. upgraded to a CHO 4 diet, with soft and bite sized and nectar thick. Weight loss of 18#'s within two days, likely d/t inaccurate weight. Upon speaking with family, they stated that the weight was likely innacurate as well. Recommended to start ONS TID. Family agreed. Will start ONS TID and continue diet order. Will request a reweight for further nutrition assessment. Nutrition Related Findings:    aphasia Wound Type: None       Current Nutrition Intake & Therapies:    Average Meal Intake: %  Average Supplements Intake: Unable to assess  ADULT DIET; Dysphagia - Soft and Bite Sized; 4 carb choices (60 gm/meal); Mildly Thick (Nectar)    Anthropometric Measures:  Height: 5' 7\" (170.2 cm)  Ideal Body Weight (IBW): 135 lbs (61 kg)    Admission Body Weight: 201 lb (91.2 kg)  Current Body Weight: 201 lb (91.2 kg), 148.9 % IBW. Weight Source: Bed Scale  Current BMI (kg/m2): 31.5  Usual Body Weight: 201 lb (91.2 kg) (8/2022)  % Weight Change (Calculated): 0  Weight Adjustment For: No Adjustment    BMI Categories: Obese Class 1 (BMI 30.0-34. 9)    Estimated Daily Nutrient Needs:  Energy Requirements Based On: Kcal/kg  Weight Used for Energy Requirements: Current  Energy (kcal/day): 9,248-9,986  Weight Used for Protein Requirements: Ideal  Protein (g/day):   Method Used for Fluid Requirements: 1 ml/kcal  Fluid (ml/day): 5,287-8,876    Nutrition Diagnosis:   Biting/chewing (masticatory) difficulty, Swallowing difficulty related to acute injury/trauma as evidenced by swallow study results    Nutrition Interventions:   Food and/or Nutrient Delivery: Continue Current Diet, Start Oral Nutrition Supplement  Nutrition Education/Counseling: No recommendation at this time  Coordination of Nutrition Care: Continue to monitor while inpatient  Plan of Care discussed with: Pt. and Family    Goals:  Previous Goal Met: Progressing toward Goal(s)  Goals: Meet at least 75% of estimated needs, PO intake 75% or greater       Nutrition Monitoring and Evaluation:   Behavioral-Environmental Outcomes: None Identified  Food/Nutrient Intake Outcomes: Diet Advancement/Tolerance, Food and Nutrient Intake, Supplement Intake  Physical Signs/Symptoms Outcomes: Biochemical Data, Chewing or Swallowing, Weight, Skin, Nutrition Focused Physical Findings, Fluid Status or Edema    Discharge Planning:     Too soon to determine     Danuta Xie MS, RD, LD  Contact: [phone]

## 2022-11-22 PROCEDURE — 1180000000 HC REHAB R&B

## 2022-11-22 PROCEDURE — 92507 TX SP LANG VOICE COMM INDIV: CPT

## 2022-11-22 PROCEDURE — 97535 SELF CARE MNGMENT TRAINING: CPT

## 2022-11-22 PROCEDURE — 6360000002 HC RX W HCPCS: Performed by: PSYCHIATRY & NEUROLOGY

## 2022-11-22 PROCEDURE — 99233 SBSQ HOSP IP/OBS HIGH 50: CPT | Performed by: PSYCHIATRY & NEUROLOGY

## 2022-11-22 PROCEDURE — 94760 N-INVAS EAR/PLS OXIMETRY 1: CPT

## 2022-11-22 PROCEDURE — 92526 ORAL FUNCTION THERAPY: CPT

## 2022-11-22 PROCEDURE — 6370000000 HC RX 637 (ALT 250 FOR IP): Performed by: PSYCHIATRY & NEUROLOGY

## 2022-11-22 PROCEDURE — 97110 THERAPEUTIC EXERCISES: CPT

## 2022-11-22 PROCEDURE — 97530 THERAPEUTIC ACTIVITIES: CPT

## 2022-11-22 RX ADMIN — ASPIRIN 81 MG 81 MG: 81 TABLET ORAL at 08:15

## 2022-11-22 RX ADMIN — ATORVASTATIN CALCIUM 40 MG: 40 TABLET, FILM COATED ORAL at 20:59

## 2022-11-22 RX ADMIN — HYDROXYCHLOROQUINE SULFATE 200 MG: 200 TABLET, FILM COATED ORAL at 20:59

## 2022-11-22 RX ADMIN — GABAPENTIN 300 MG: 300 CAPSULE ORAL at 20:59

## 2022-11-22 RX ADMIN — HYDROXYCHLOROQUINE SULFATE 200 MG: 200 TABLET, FILM COATED ORAL at 08:15

## 2022-11-22 RX ADMIN — THERA TABS 1 TABLET: TAB at 08:15

## 2022-11-22 RX ADMIN — ENOXAPARIN SODIUM 40 MG: 100 INJECTION SUBCUTANEOUS at 18:10

## 2022-11-22 RX ADMIN — AMLODIPINE BESYLATE 2.5 MG: 2.5 TABLET ORAL at 08:15

## 2022-11-22 RX ADMIN — ACETAMINOPHEN 650 MG: 325 TABLET ORAL at 21:01

## 2022-11-22 RX ADMIN — FOLIC ACID 1 MG: 1 TABLET ORAL at 08:15

## 2022-11-22 ASSESSMENT — PAIN DESCRIPTION - DESCRIPTORS: DESCRIPTORS: ACHING

## 2022-11-22 ASSESSMENT — PAIN - FUNCTIONAL ASSESSMENT: PAIN_FUNCTIONAL_ASSESSMENT: ACTIVITIES ARE NOT PREVENTED

## 2022-11-22 ASSESSMENT — PAIN SCALES - GENERAL
PAINLEVEL_OUTOF10: 2
PAINLEVEL_OUTOF10: 7

## 2022-11-22 NOTE — PROGRESS NOTES
[facility]  INPATIENT SPEECH THERAPY  [facility] 8 REHAB UNIT  TIME   1100  1200  Minutes: 61          [x]Daily Note  []Progress Note    Date: 2022  Patient Name: Charis Peacock        MRN: [id number]    Account #: [de-identified]  : 1960  (62 y.o.)  Gender: female   Primary Provider: Carmenza Simons MD  Diet: Soft and bite sized, thin liquids      PATIENT DIAGNOSIS(ES):    Diagnosis: CVA, R hemiparesis     Additional Pertinent Hx: Anxiety, depression, COPD, CVA, DM, LE edema, hyperlipidemia, HTN, obesity, RA, CAD and venous thromboembolism    RESTRICTIONS/PRECAUTIONS:    Restrictions/Precautions  Restrictions/Precautions: Modified Diet, Swallowing - Thickened Liquids, Fall Risk, Aspiration Risk  Required Braces or Orthoses?: No      Subjective:  She was upright in her wheelchair. Her  was present for part of today's session. Objective:  Impulsivity is noted during transfers and during tray set up at lunch. She continued to reach for items that were not secured. Her chair alarm was in place. However, she continued to lean forward in her wheelchair. She will remain at a very high fall risk. Today she denied decreased facial sensation. She did confirm decreased oral sensation. Right labial asymmetry and right lingual deviation are both noted. Automatic speech tasks were completed. She was able to count 1-10 and state the days of the week in unison. Decreased prosody was noted. Flat affect is noted. Multiple perseverations were noted throughout the session. [place] is also noted. Per medical [facility] received [facility], she was NPO with an NGT. A bedside swallowing evaluation was attempted on 11/10/22 but she was not alert enough. On 22, her physician discussed the possibility of placing a PEG tube. On either 22 or 11/15/22 an MBSS was completed. [facility] has been contacted via telephone and fax request to obtain her medical records/MBSS report.      Her  confirmed that an MBSS was completed at [facility]. It was his understanding that gross aspiration did not occur. Venkat Gibbons is currently refusing thickened liquids. She will sometimes accept nectar thick orange juice but mostly refuses all other thickened liquids. She has accepted ice chips. SLP will upgrade her to thin liquids via cup and watch her carefully for changes in temp, congestion, and lung sounds. Her  is in agreement and understands the risk of laryngeal penetration, aspiration and aspiration pneumonia, however, she will not comply with thickened liquids. Did discuss that no radiologist is available until next Monday and that a repeat MBSS cannot be completed this week. Good hyolaryngeal elevation per palpation was noted. Mildly delayed pharyngeal swallow onset was noted. However, no overt s/s of aspiration was observed with thin liquids via cup or straw. Clear voicing was noted after all sips. She was able to pass the 3oz water swallow test. Recommend she upgrade to thin liquids at this time. Right buccal cavity pocketing was noted during lunch. She was able to use a lingual sweep to clear the material from the cavity (with verbal instructions and demonstration). No labial spillage was observed. She exhibited difficulty following one step commands (10%). She mostly repeated all instructions. Answering yes/no questions was at 0%. Identification of pictures in a field of 2 was at 0%. She was able to identify single numbers and letters in a field of 2 at 80%. She was able to read CVC words at 70%. Spontaneous phrases noted today were: \"Ok hang on\" and \"thank you. \"    She was able to repeat a few words this date. Recommended Diet and Intervention  Soft & Bite Sized consistencies   Thin liquids  Recommended Form of Meds: Crushed in puree as able  Recommendations: Modified barium swallow study; Dysphagia treatment  Therapeutic Interventions: Pharyngeal exercises;Diet tolerance monitoring;Oral care;Oral motor exercises; Patient/Family education; Therapeutic PO trials with SLP     Compensatory Swallowing Strategies  Compensatory Swallowing Strategies : Alternate solids and liquids;Eat/Feed slowly; No straws;Upright as possible for all oral intake;Remain upright for 30-45 minutes after meals;Small bites/sips; Check for pocketing of food on the Right; Check for pocketing of food on the Left; Set up assist;Assist feed               SHORT TERM GOAL #1:  Goal 1: Pt will complete y/n tasks with 80% accuracy and minimal verbal cues/modeling. SHORT TERM GOAL #2:  Goal 2: Pt will complete verbal expression tasks with 80% accuracy and minimal verbal cues/modeling. SHORT TERM GOAL #3:  Goal 3: Pt will complete receptive/expressive language tasks with 80% accuracy and minimal verbal cues/modeling. SHORT TERM GOAL #4:  Goal 4: Pt will follow one step commands with with 90% accuracy and minimal verbal cues/modeling. SHORT TERM GOAL #5:  Goal 5: Pt will complete orientation tasks with 90% accuracy and minimal verbal cues/modeling. Swallowing Short Term Goals  Short-term Goals  Goal 1: Pt will tolerate soft & bite sized consistencies with thin liquids with minimal overt s/s of aspiration/penetration during hospitalization. Goal 2: Pt will complete Modified Barium Swallow Study. Goal 3: Pt will demonstrate awareness of general aspiration precautions. Goal 4: Pt will participate in swallowing reassessments to ensure safest diet consistencies. Goal 5: Pt will allow staff to complete daily oral care. Goal 6: Pt will complete oral motor exercises for lingual and labial strengthening.          ASSESSMENT:  Assessment: [x]Progressing towards goals          []Not Progressing towards goals    Patient Tolerance of Treatment:   [x]Tolerated well []Tolerated fair []Required rest breaks []Fatigued    Education:  Learner:  [x]Patient          []Significant Other          []Other  Education provided regarding:  [x]Goals and POC   []Diet and swallowing precautions    []Home Exercise Program  []Progress and/or discharge information  Method of Education:  [x]Discussion          []Demonstration          []Handout          []Other  Evaluation of Education:   [x]Verbalized understanding   []Demonstrates without assistance  []Demonstrates with assistance  []Needs further instruction     []No evidence of learning                  []No family present      Plan: [x]Continue with current plan of care    []Modify current plan of care as follows:    []Discharge patient    Discharge Location:    Services/Supervision Recommended:      [x]Patient continues to require treatment by a licensed therapist to address functional deficits as outlined in the established plan of care.                Electronically Signed By:  Rick Farias M.S., CCC-SLP  11/22/2022,8:14 AM.

## 2022-11-22 NOTE — PROGRESS NOTES
Occupational Therapy  Facility/Department: [facility] 8 REHAB UNIT      Name: Mynor Rojas  : 1960  MRN: [id number]  Date of Service: 2022    Discharge Recommendations:  Continue to assess pending progress          Patient Diagnosis(es): There were no encounter diagnoses. Past Medical History:  has a past medical history of Anxiety, ASCVD (arteriosclerotic cardiovascular disease), Carrier of group B Streptococcus, Cellulitis, Colitis, COPD (chronic obstructive pulmonary disease) ([address] Costochondritis, CVA (cerebral vascular accident) ([address] Depression, Edema, Fracture of sternum, Gallstones, Grief reaction, H/O superior vena cava filter placement, Hyperlipidemia, Hypertension, MI (myocardial infarction) ([address] MRSA (methicillin resistant Staphylococcus aureus), Neuropathy, Obesity, Pseudarthrosis, RA (rheumatoid arthritis) ([address] Rosacea, Sinus bradycardia, TIA (transient ischemic attack), and Venous thromboembolism. Past Surgical History:  has a past surgical history that includes Coronary artery bypass graft (3/2009); Tonsillectomy; Hysterectomy; thoracotomy; knee surgery; Appendectomy; Colonoscopy (2010); Cardiac catheterization (3/2009); Adenoidectomy; Cholecystectomy (); Gastric bypass surgery (); hiatal hernia repair (); Cardiac catheterization (14  JDT); Colonoscopy (); Colonoscopy; Upper gastrointestinal endoscopy (); Upper gastrointestinal endoscopy (); and pr colonoscopy flx dx w/collj spec when pfrmd (N/A, 3/12/2018). Treatment Diagnosis: L MCA stroke      Assessment   Performance deficits / Impairments: Decreased functional mobility ; Decreased endurance;Decreased coordination;Decreased ADL status; Decreased sensation;Decreased posture;Decreased ROM; Decreased balance;Decreased strength;Decreased vision/visual deficit; Decreased safe awareness;Decreased high-level IADLs;Decreased cognition;Decreased fine motor control  Activity Tolerance  Activity Tolerance: Patient Tolerated treatment well        Plan   Occupational Therapy Plan  Specific Instructions for Next Treatment: UE function, Estim, transfers  Current Treatment Recommendations: Strengthening, ROM, Balance training, Functional mobility training, Endurance training, Positioning, Modalities, Neuromuscular re-education, Cognitive reorientation, Safety education & training, Patient/Caregiver education & training, Equipment evaluation, education, & procurement, Self-Care / ADL, Cognitive/Perceptual training, Home management training, Sensory integraion     Restrictions  Restrictions/Precautions  Restrictions/Precautions: Modified Diet, Swallowing - Thickened Liquids, Fall Risk, Aspiration Risk  Required Braces or Orthoses?: No    Subjective   General  Patient assessed for rehabilitation services?: Yes  Family / Caregiver Present: Yes (spouse)        11/22/22 1000   General   Family / Caregiver Present Yes  (spouse)   Pre Treatment Pain Screening   Pain at present 0   Scale Used Faces       Objective     Safety Devices  Type of Devices: Left in chair;Call light within reach; Chair alarm in place       Bed mobility  Rolling to Left: Maximum assistance  Rolling to Right: Minimal assistance  Supine to Sit: Maximum assistance  Transfers  Sit Pivot Transfers: Maximum assistance (bed to w/c towards left, w/c arm flipped back, max cues for hand placement)       Exercise Treatment: 1/2-shoulder extension, shoulder adduction---increased time and cues for attention to UE  PROM Exercises: R UE---SROM, PROM HEP     Balance   Sitting Balance Stand by assistance       Goals  Short Term Goals  Time Frame for Short Term Goals: 2 weeks  Short Term Goal 1: Pt will bathe with mod A, AE prn  Short Term Goal 2: Pt will dress UB using hemitechnique, mod A and cues  Short Term Goal 3: Pt will dress LB, hemitechnique, mod A and cues  Short Term Goal 4: Pt will toilet with mod A  Short Term Goal 5:  Toilet transfer with mod A  Additional Goals?: Yes  Short Term Goal 6: Pt will attend to R side during ADL/functional activities with moderate cues  Short Term Goal 7: Pt will perform standing tasks x 2-3 mins with L UE and mod A for balance to enhance ADL/IADL performance  Short Term Goal 8: Pt/family will demo understanding of R UE positioning/HEP/therapeutic activity recommendations with min A after ed  Long Term Goals  Time Frame for Long Term Goals : 4-5 weeks  Long Term Goal 1: Pt will bathe with min A, AE/DME prn  Long Term Goal 2: Pt will dress UB supervision  Long Term Goal 3: Pt will dress LB min A, AE prn  Long Term Goal 4: Pt will toilet with CGA  Long Term Goal 5: Pt will perform toilet transfer with CGA  Additional Goals?: Yes  Long Term Goal 6: Pt will perform simple home making task with min A  Long Term Goal 7: Pt/family will be independent in HEP/DME/therapeutic activity recommendations after ed  Long Term Goal 8: Pt will attend to R side during functional activities with occasional cueing       Therapy Time   Individual Concurrent Group Co-treatment   Time In 1000         Time Out 1100         Minutes 60         Timed Code Treatment Minutes: 60 Minutes       Sabina Sauer OT

## 2022-11-22 NOTE — PROGRESS NOTES
11/22/22 0900   Restrictions/Precautions   Restrictions/Precautions Modified Diet;Swallowing - Thickened Liquids; Fall Risk;Aspiration Risk   General   Additional Pertinent Hx Anxiety, depression, COPD, CVA, DM, LE edema, hyperlipidemia, HTN, obesity, RA, CAD and venous thromboembolism   Diagnosis CVA, R hemiparesis   General Comment   Comments PATIENT IN ROOM WITH  AND PCA   Subjective   Subjective PATIENT SITTING IN RECLINER   Bed mobility   Sit to Supine Maximum assistance; Moderate assistance  (PATIENT ON RIGHT SIDE OF BED, PATIENT ATTEMPTED TO TAKE OF BELT AND CAUSED HER TO LEAN TO AFFECTED SIDE, THERAPIST MAX ASSIST TO REPOSITION HER. INSTRUCTED FOR LOGROLL, BUT PATIENT USED TRIPLANAR TECHNIQUE)   Transfers   Sit to Stand Moderate Assistance  (SS, PATIENT ABLE TO GRAB ON TO RAIL AND PULL SELF UP WITH MOD A)   Stand to Sit Moderate Assistance;Maximum Assistance  (SS, PATIENT HAS DECREASED SAFETY AWARENESS AND WENT TO SIT ONTO BED BEFORE INSTRUCTED)   Bed to Chair Moderate assistance  (SEE STAND PIVOT)   Stand Pivot Transfers Moderate Assistance  (USE OF SS TO STAND PATIENT AND PIVOT TO BED)   Comment PATIENT REFUSED TO SIT DOWN ON SS, BUT HAS GOOD BALANCE CONTROL DURING TRANSFER FROM RECLINER TO BED   Ambulation   WB Status WBAT   PT Exercises   Exercise Treatment SITTING EX: MARCHES 10X, KNEE EXT 10X, PF/DF 15X, HIP EXT 10X, HIP ABD/ADD 15X; BED EX: QS 10X, GS 10X L SIDE ONLY.  (RLE PROM WITH PATIENT ACTIVELY ENGAGED AND COUNTING EACH REP; LLE AROM)   PROM Exercises BED EX: HEEL SLIDES 20X  (A/AROM ON LLE, PROM ON RLE)   Assessment   Assessment PATIENT ATTENTIVE DURING ALL EXERCISES, ABLE TO PERFORM HIP EXT WITH CUE TO STOMP ON THERAPIST FOOT, ACTIVELY COUNTING EACH REP ON BOTH SIDES. WHEN PATIENT WOULD GET TIRED, PATIENT WOULD BEGIN COUNTING BY 10'S FOR EACH REP OR SKIP A FEW NUMBERS. PATIENT HAS DECREASED SAFETY AWARENESS AND WOULD BEGIN TRANSFERS/MOVEMENTS BEFORE INSTRUCTED.    Safety Devices   Type of Devices Bed alarm in place;Sitter present; Left in bed;Call light within reach   PT Individual Minutes   Time In 0900   Time Out 1000   Minutes 60   Electronically sign by: MOLINA Quispe 11/22/2022, 12:23 PM

## 2022-11-22 NOTE — PROGRESS NOTES
Patient:   Naveen Cordero  MR#:    [id number]   Room:    0826/826-01   YOB: 1960  Date of Progress Note: 11/22/2022  Time of Note                           8:43 AM  Consulting Physician:   Chico Porter M.D. Attending Physician:  Chico Porter MD     Chief complaint Acute ischemic stroke    S:This 64 y.o. female  with history of anxiety, depression, COPD, atherosclerotic disease, colitis, COPD, CVA, DM,  LE edema, hyperlipidemia, HTN, morbid obesity, RA, CAD  and venous thromboembolism. She presented to [facility] ER on 11/9/22 after being found at home lying facedown in her feces. She was very weak, confused, not able to speak but moving purposefully and intermittently following commands. Her last well know was 3 a.m. when her  left for work. Imaging done revealed a large MCA stroke 7.7 x 5 cm. CTA head/neck revealed an acute M1 occlusion. She was outside of the window for TPA. CK on admit was 1100, consistent with rhabdomylosis. She was also noted to possibly Dens fracture. She was transferred to [facility] for a possible thrombectomy. Further imaging was done at [facility] and she was deemed not a candidate for thrombectomy. MRI done ruled out Dens fracture. Repeat CT of head on 11/11/22 showed evolving left MCA territory infarct with increasing edema/mass effect resulting in 4mm of  rightward midline shift(previously 2mm) a the level of the third ventricle. No hemorrhagic conversion or definite trapping of the right lateral ventricle, possible small acute right cerebellar infarct. Neurosurgery was consulted for possible hemicraniectomy. She was seen by Dr. Arash Mishra, who didn't feel any surgical intervention was needed. Patient was found to have a UTI + for [facility] and was placed on Rocephin on 11/14/22. Patient had a PICC line placed. Patient was evaluated by SPT and initially made NPO d/t oropharyngeal dysphagia. NGT placed and feeding started.  She was also noted to have global aphasia but is improving. She was re-evaluated by SPT on 11/15/22 for swallow and was started on a dysphagia 1 diet with nectar thick liquids. Patient also continues to have right sided weakness and is participating in both PT/OT. Repeat CT head on 11/13/22 shows stable LMCA ischemic stroke with stable edema, 5 mm shift, no hemorrhage. She is felt to need a stay on Rehab for continued PT/OT/SPT and medical management to work towards her goal of returning home with her . She is now felt ready to start the Rehab program.  More restless this am as per . REVIEW OF SYSTEMS:  Unreliable due to aphasia     Past Medical History:      Diagnosis Date    Anxiety     ASCVD (arteriosclerotic cardiovascular disease)     Carrier of group B Streptococcus     Cellulitis     Colitis     COPD (chronic obstructive pulmonary disease) ([facility])     Costochondritis     CVA (cerebral vascular accident) ([address]     Depression     Edema     Fracture of sternum     non union post surgery.      Gallstones     Grief reaction     H/O superior vena cava filter placement     Hyperlipidemia     Hypertension     MI (myocardial infarction) ([address]     MRSA (methicillin resistant Staphylococcus aureus)     Neuropathy     Obesity     Pseudarthrosis sternal    RA (rheumatoid arthritis) ([address]     Rosacea     Sinus bradycardia     TIA (transient ischemic attack)     Venous thromboembolism        Past Surgical History:      Procedure Laterality Date    ADENOIDECTOMY      APPENDECTOMY      CARDIAC CATHETERIZATION  3/2009    CARDIAC CATHETERIZATION  11/5/14  JDT    EF 50%, patent bypass grafts    CHOLECYSTECTOMY  2011    COLONOSCOPY  06/03/2010    Dr. Luna Comes: distal colon having ulcerative lesions c/w UC    COLONOSCOPY  2009    pancolitis    COLONOSCOPY      CORONARY ARTERY BYPASS GRAFT  3/2009    PHANI Proctor to LAD, SVGs to OM & PDA    GASTRIC BYPASS SURGERY  2012    HIATAL HERNIA REPAIR  2011    HYSTERECTOMY (CERVIX STATUS UNKNOWN)      KNEE SURGERY [place] COLONOSCOPY FLX DX W/COLLJ SPEC WHEN PFRMD N/A 3/12/2018    Dr Aminata Sandhu rectal inflammation consistent with U.C.-Active proctitis--3 yr recall    THORACOTOMY      TONSILLECTOMY      UPPER GASTROINTESTINAL ENDOSCOPY  2010    Dr. Som Edmondson  2012       Medications in Hospital:      Current Facility-Administered Medications:     lactulose (CHRONULAC) 10 GM/15ML solution 10 g, 10 g, Oral, 6 times per day, Kailee Morejon MD, 10 g at 11/21/22 2105    aspirin chewable tablet 81 mg, 81 mg, Oral, Daily, Kailee Morejon MD, 81 mg at 11/22/22 0815    atorvastatin (LIPITOR) tablet 40 mg, 40 mg, Oral, Nightly, Kailee Morejon MD, 40 mg at 11/21/22 2105    dulaglutide (TRULICITY) SC injection 1.5 mg (Patient Supplied), 1.5 mg, SubCUTAneous, Weekly, Kailee Morejon MD    folic acid (FOLVITE) tablet 1 mg, 1 mg, Oral, Daily, Kailee Morejon MD, 1 mg at 11/22/22 0815    gabapentin (NEURONTIN) capsule 300 mg, 300 mg, Oral, Nightly, Kailee Morejon MD, 300 mg at 11/21/22 2105    hydroxychloroquine (PLAQUENIL) tablet 200 mg, 200 mg, Oral, BID, Kailee Morejon MD, 200 mg at 11/22/22 0815    amLODIPine (NORVASC) tablet 2.5 mg, 2.5 mg, Oral, Daily, Kailee Morejon MD, 2.5 mg at 11/22/22 0815    tiZANidine (ZANAFLEX) tablet 4 mg, 4 mg, Oral, BID PRN, Kailee Morejon MD    multivitamin 1 tablet, 1 tablet, Oral, Daily, Kaliee Morejon MD, 1 tablet at 11/22/22 0815    acetaminophen (TYLENOL) tablet 650 mg, 650 mg, Oral, Q4H PRN, Kailee Morejon MD, 650 mg at 11/20/22 2129    enoxaparin (LOVENOX) injection 40 mg, 40 mg, SubCUTAneous, Daily, Kailee Morejon MD, 40 mg at 11/21/22 1730    polyethylene glycol (GLYCOLAX) packet 17 g, 17 g, Oral, Daily PRN, Kailee Morejon MD    Allergies:  Methotrexate derivatives    Social History:   TOBACCO:   reports that she quit smoking about 13 years ago. Her smoking use included cigarettes. She has a 64.00 pack-year smoking history.  She has never used smokeless tobacco.  ETOH:   reports current alcohol use. Family History:       Problem Relation Age of Onset    Prostate Cancer Father     Heart Failure Father     Cancer Father     Colon Cancer Neg Hx     Colon Polyps Neg Hx     Liver Cancer Neg Hx     Liver Disease Neg Hx     Esophageal Cancer Neg Hx     Rectal Cancer Neg Hx     Stomach Cancer Neg Hx          PHYSICAL EXAM:  BP (!) 146/72   Pulse (!) 46   Temp 96.8 °F (36 °C)   Resp 16   Ht 5' 7\" (1.702 m)   Wt 200 lb 4.8 oz (90.9 kg)   SpO2 97%   BMI 31.37 kg/m²     Constitutional - well developed, well nourished. Eyes - conjunctiva normal.   Ear, nose, throat - No scars, masses, or lesions over external nose or ears, no atrophy of tongue  Neck-symmetric, no masses noted, no jugular vein distension  Respiration- chest wall appears symmetric, good expansion,   normal effort without use of accessory muscles  Musculoskeletal - no significant wasting of muscles noted, no bony deformities  Extremities-no clubbing, cyanosis or edema  Skin - warm, dry, and intact. No rash, erythema, or pallor. Psychiatric - mood, affect, and behavior appear normal.      Neurological exam  Awake, alert, global aphasia says \"yes\" to all questions asked   Attention and concentration appear appropriate  Recent and remote memory unable to tests     Cranial Nerve Exam     CN III, IV,VI-EOMI, No nystagmus, conjugate eye movements, no ptosis    CN VII-+ facial assymetry       Motor Exam  No movement on the right      Tremors- no tremors in hands or head noted     Gait  Not tested     Nursing/pcp notes, imaging,labs and vitals reviewed.      PT,OT and/or speech notes reviewed    Lab Results   Component Value Date    WBC 11.1 (H) 11/21/2022    HGB 11.8 (L) 11/21/2022    HCT 39.7 11/21/2022    MCV 81.4 11/21/2022     (H) 11/21/2022     Lab Results   Component Value Date     11/21/2022    K 4.5 11/21/2022     11/21/2022    CO2 23 11/21/2022    BUN 19 11/21/2022    CREATININE 0.5 2022    GLUCOSE 88 2022    CALCIUM 9.1 2022    PROT 5.6 (L) 2022    LABALBU 3.4 (L) 2022    BILITOT 0.3 2022    ALKPHOS 85 2022    AST 26 2022    ALT 26 2022    LABGLOM >60 2022   No results found for: INR, PROTIME      Elizabeth Anguiano OT   Occupational Therapist   Occupational   Progress Notes      Signed   Date of Service:  2022  1:45 PM                 Signed                                                                                                                                                                                                                                                                                Occupational Therapy  Facility/Department: [facility] REHAB UN[facility]  Occupational Therapy Treatment Note     Name: Nerissa Le  : 1960  MRN: [id number]  Date of Service: 2022     Discharge Recommendations:  Continue to assess pending progress        Patient Diagnosis(es): There were no encounter diagnoses. Past Medical History:  has a past medical history of Anxiety, ASCVD (arteriosclerotic cardiovascular disease), Carrier of group B Streptococcus, Cellulitis, Colitis, COPD (chronic obstructive pulmonary disease) ([address] Costochondritis, CVA (cerebral vascular accident) ([address] Depression, Edema, Fracture of sternum, Gallstones, Grief reaction, H/O superior vena cava filter placement, Hyperlipidemia, Hypertension, MI (myocardial infarction) ([address] MRSA (methicillin resistant Staphylococcus aureus), Neuropathy, Obesity, Pseudarthrosis, RA (rheumatoid arthritis) ([address] Rosacea, Sinus bradycardia, TIA (transient ischemic attack), and Venous thromboembolism. Past Surgical History:  has a past surgical history that includes Coronary artery bypass graft (3/2009); Tonsillectomy; Hysterectomy; thoracotomy; knee surgery; Appendectomy; Colonoscopy (2010); Cardiac catheterization (3/2009); Adenoidectomy;  Cholecystectomy (2011); Gastric bypass surgery (2012); hiatal hernia repair (2011); Cardiac catheterization (11/5/14  JDT); Colonoscopy (2009); Colonoscopy; Upper gastrointestinal endoscopy (2010); Upper gastrointestinal endoscopy (2012); and pr colonoscopy flx dx w/collj spec when pfrmd (N/A, 3/12/2018). Treatment Diagnosis: L MCA stroke        Assessment   Performance deficits / Impairments: Decreased functional mobility ; Decreased endurance;Decreased coordination;Decreased ADL status; Decreased sensation;Decreased posture;Decreased ROM; Decreased balance;Decreased strength;Decreased vision/visual deficit; Decreased safe awareness;Decreased high-level IADLs;Decreased cognition;Decreased fine motor control  Assessment: Pt tolerates passive shoulder flex 0-90 supine. Any attempts of active shoulder movement result in activating internal rotators. No active distal movement noted. Unable to accurately assess light touch sensation due to global aphasia. Ed pt/family in positioning R UE in bed, during mobility and in w/c with laptray.   Treatment Diagnosis: L MCA stroke            Plan   Occupational Therapy Plan  Current Treatment Recommendations: Strengthening, ROM, Balance training, Functional mobility training, Endurance training, Positioning, Modalities, Neuromuscular re-education, Cognitive reorientation, Safety education & training, Patient/Caregiver education & training, Equipment evaluation, education, & procurement, Self-Care / ADL, Cognitive/Perceptual training, Home management training, Sensory integraion      Restrictions  Restrictions/Precautions  Restrictions/Precautions: Modified Diet, Swallowing - Thickened Liquids, Fall Risk, Aspiration Risk  Required Braces or Orthoses?: No     Subjective   General  Patient assessed for rehabilitation services?: Yes  Social/Functional History  Social/Functional History  Lives With: Spouse  Type of Home: House  Home Layout: One level  Home Access:  (2 steps to enter)  [place] Shower/Tub: Tub/Shower unit  Bathroom Toilet: Standard  Has the patient had two or more falls in the past year or any fall with injury in the past year?: No  ADL Assistance: Independent  Homemaking Assistance: Independent  Ambulation Assistance: Independent  Transfer Assistance: Independent  Active : Yes  Leisure & Hobbies: volunteers at animal shelter, [employer], makes [employer], reading, cooking     Objective   Heart Rate: 51  Heart Rate Source: Monitor  BP: 112/70  BP Location: Right lower arm  Patient Position: Supine  MAP (Calculated): 84  Resp: 14  SpO2: 95 %  O2 Device: None (Room air)       Safety Devices  Type of Devices: All fall risk precautions in place; Bed alarm in place;Call light within reach;Gait belt;Left in bed;Nurse notified ( present in room)  AROM: Grossly decreased, non-functional (R UE, only slight internal shoulder rotation observed during attempted AROM)  PROM: Grossly decreased, non-functional  Strength: Grossly decreased, non-functional  Coordination: Grossly decreased, non-functional  Tone: Abnormal  Sensation: Impaired (difficult to assess due to aphasia)  Tone RUE  RUE Tone: Hypotonic  RUE Modified Robert Scale  RUE Modified Robert Scale Completed: Yes  RUE Modified Robert Scale  RUE Bicep Score: 1  Tone LUE  LUE Tone: Normotonic  Quality of Movement Other  Comment: pt demonstrates slight R internal rotation during attempts of shoulder flexion/abduction  Activity Tolerance  Activity Tolerance: Patient tolerated treatment well                 Goals  Short Term Goals  Time Frame for Short Term Goals: 2 weeks  Short Term Goal 1: Pt will bathe with mod A, AE prn  Short Term Goal 2: Pt will dress UB using hemitechnique, mod A and cues  Short Term Goal 3: Pt will dress LB, hemitechnique, mod A and cues  Short Term Goal 4: Pt will toilet with mod A  Short Term Goal 5:  Toilet transfer with mod A  Additional Goals?: Yes  Short Term Goal 6: Pt will attend to R side during ADL/functional activities with moderate cues  Short Term Goal 7: Pt will perform standing tasks x 2-3 mins with L UE and mod A for balance to enhance ADL/IADL performance  Short Term Goal 8: Pt/family will demo understanding of R UE positioning/HEP/therapeutic activity recommendations with min A after ed  Long Term Goals  Time Frame for Long Term Goals : 4-5 weeks  Long Term Goal 1: Pt will bathe with min A, AE/DME prn  Long Term Goal 2: Pt will dress UB supervision  Long Term Goal 3: Pt will dress LB min A, AE prn  Long Term Goal 4: Pt will toilet with CGA  Long Term Goal 5: Pt will perform toilet transfer with CGA  Additional Goals?: Yes  Long Term Goal 6: Pt will perform simple home making task with min A  Long Term Goal 7: Pt/family will be independent in HEP/DME/therapeutic activity recommendations after ed  Long Term Goal 8: Pt will attend to R side during functional activities with occasional cueing     Therapy Time    Individual Concurrent Group Co-treatment   Time In 1345      Time Out 1410      Minutes 25      Timed Code Treatment Minutes: 25 Minutes     Baldo Taylor OT                  RECORD REVIEW: Previous medical records, medications were reviewed at today's visit    IMPRESSION:   1. Acute ischemic stroke-on aspirin/statin  2. Hypertension-on medications monitor-relatively normotensive  3. Hyperlipidemia-on statin  4. Diabetes-Trulicity-monitor blood sugar-well controlled  5. DVT prophylaxis-Lovenox  6. History of coronary artery disease-aspirin/statin  7. Neuropathy-on Neurontin  8. Rheumatoid arthritis-on Plaquenil  9. COPD-monitor  10. History of anxiety and depression-monitor  11. History of colitis/gallstones-monitor  12. History of bariatric surgery-on multivitamin/folic acid  13. History of superior vena cava filter placement with venous thromboembolism-not on anticoagulation-monitor- indicates took herself off blood thinners as not like meds and was bruising   14. PT/OT/speech    Team conference with PT/OT/speech/nursing/Care coordinator to review in depth patients care and discharge planning. At least 35 minutes spent coordinating care for patient >50% of time spent in coordination of care.       Ambulation   Max assist transfer/WC 15 ft  0 ambulation   0 steps    Continue current care as noted    ELOS pending     Expected duration and frequency therapy: 180 minutes per day, 5 days per week    [address] [facility]  [phone] CELL  Dr Yanelis Starkey

## 2022-11-23 PROCEDURE — 6360000002 HC RX W HCPCS: Performed by: PSYCHIATRY & NEUROLOGY

## 2022-11-23 PROCEDURE — 97535 SELF CARE MNGMENT TRAINING: CPT

## 2022-11-23 PROCEDURE — 97116 GAIT TRAINING THERAPY: CPT

## 2022-11-23 PROCEDURE — 92526 ORAL FUNCTION THERAPY: CPT

## 2022-11-23 PROCEDURE — 92507 TX SP LANG VOICE COMM INDIV: CPT

## 2022-11-23 PROCEDURE — 94760 N-INVAS EAR/PLS OXIMETRY 1: CPT

## 2022-11-23 PROCEDURE — 6370000000 HC RX 637 (ALT 250 FOR IP): Performed by: PSYCHIATRY & NEUROLOGY

## 2022-11-23 PROCEDURE — 97110 THERAPEUTIC EXERCISES: CPT

## 2022-11-23 PROCEDURE — 1180000000 HC REHAB R&B

## 2022-11-23 PROCEDURE — 97530 THERAPEUTIC ACTIVITIES: CPT

## 2022-11-23 PROCEDURE — 99232 SBSQ HOSP IP/OBS MODERATE 35: CPT | Performed by: PSYCHIATRY & NEUROLOGY

## 2022-11-23 RX ADMIN — GABAPENTIN 300 MG: 300 CAPSULE ORAL at 21:28

## 2022-11-23 RX ADMIN — AMLODIPINE BESYLATE 2.5 MG: 2.5 TABLET ORAL at 10:07

## 2022-11-23 RX ADMIN — ATORVASTATIN CALCIUM 40 MG: 40 TABLET, FILM COATED ORAL at 21:28

## 2022-11-23 RX ADMIN — THERA TABS 1 TABLET: TAB at 10:07

## 2022-11-23 RX ADMIN — FOLIC ACID 1 MG: 1 TABLET ORAL at 10:07

## 2022-11-23 RX ADMIN — HYDROXYCHLOROQUINE SULFATE 200 MG: 200 TABLET, FILM COATED ORAL at 10:07

## 2022-11-23 RX ADMIN — HYDROXYCHLOROQUINE SULFATE 200 MG: 200 TABLET, FILM COATED ORAL at 21:28

## 2022-11-23 RX ADMIN — ENOXAPARIN SODIUM 40 MG: 100 INJECTION SUBCUTANEOUS at 16:20

## 2022-11-23 RX ADMIN — ASPIRIN 81 MG 81 MG: 81 TABLET ORAL at 10:07

## 2022-11-23 NOTE — PROGRESS NOTES
[facility]  [facility]PAT[facility]ECH [facility]  [facility] REHAB UNIT  TIME   1100  1200  Minutes: 61      [x]Daily Note  []Progress Note    Date: 2022  Patient Name: Kennedy Holcomb        MRN: [id number]    Account #: [de-identified]  : 1960  (64 y.o.)  Gender: female   Primary Provider: Jaycob Esposito MD  Diet: Soft and bite sized, thin liquids        PATIENT DIAGNOSIS(ES):    Diagnosis: CVA, R hemiparesis     Additional Pertinent Hx: Anxiety, depression, COPD, CVA, DM, LE edema, hyperlipidemia, HTN, obesity, RA, CAD and venous thromboembolism     RESTRICTIONS/PRECAUTIONS:    Restrictions/Precautions  Restrictions/Precautions: Modified Diet, Swallowing - Thickened Liquids, Fall Risk, Aspiration Risk  Required Braces or Orthoses?: No     Subjective: The patient was sitting upright in her wheelchair. Her daughter and  were present for today's session. Objective:   Her daughter stated she likes to listen to 63's and 70's music. Melodic intonation therapy was utilized and she was able to sing to Sitting on the dock of [employer], dancing in the moonlight, as well a Stand by me. She was able to successfully produce most of the words. Her daughter also stated she enjoys Irine Lass, Pasty Sheeba Cumber, Three dog night, Phantom of the opera, and Foreigner. Her daughter stated she loves musicals. She did not eat large meals at baseline due to gastric bypass. She also did not drink during meals at baseline. Her MBSS report was received from [facility]. This was reviewed with her family. Her MBSS was completed on 11/15/22. Results were: silent aspiration occurred with sips of thin liquids via cup. The amount of aspiration was minimal. Flash silent laryngeal penetration was seen with pudding. This was ejected from the vestibule with swallow completion. No aspiration or penetration with nectar thick liquids. Vallecular residue was most significant with puree.  She did complete dry swallows independently which cleared pharyngeal residue. She was recommended to receive a Dysphagia 1 diet puree with nectar thick liquids. Meds crushed in applesauce. Today, she wrote single syllable words to dictation at 50%. She was able to copy single syllable words as well. She fed herself lunch and required cues to take small bites, use a lingual sweep, complete mastication prior to taking another bite, etc. She has also been using a finger sweep to clear oral residue. Oral swabs were provided for her to use for oral care after meals. No overt s/s of aspiration observed with thin liquids via cup and soft solids. She did have one episode of difficulty swallowing soft solids due to taking a large bite and not fully masticating. She also exhibited wet vocal quality at times. SLP will continue to reassess her swallow function. Recommended Diet and Intervention  Soft & Bite Sized consistencies   Thin liquids  Recommended Form of Meds: Crushed in puree as able  Recommendations: Modified barium swallow study; Dysphagia treatment  Therapeutic Interventions: Pharyngeal exercises;Diet tolerance monitoring;Oral care;Oral motor exercises; Patient/Family education; Therapeutic PO trials with SLP     Compensatory Swallowing Strategies  Compensatory Swallowing Strategies : Alternate solids and liquids;Eat/Feed slowly; No straws;Upright as possible for all oral intake;Remain upright for 30-45 minutes after meals;Small bites/sips; Check for pocketing of food on the Right; Check for pocketing of food on the Left; Set up assist;Assist feed                 SHORT TERM GOAL #1:  Goal 1: Pt will complete y/n tasks with 80% accuracy and minimal verbal cues/modeling. SHORT TERM GOAL #2:  Goal 2: Pt will complete verbal expression tasks with 80% accuracy and minimal verbal cues/modeling. SHORT TERM GOAL #3:  Goal 3: Pt will complete receptive/expressive language tasks with 80% accuracy and minimal verbal cues/modeling.     SHORT TERM GOAL #4:  Goal 4: Pt will follow one step commands with with 90% accuracy and minimal verbal cues/modeling. SHORT TERM GOAL #5:  Goal 5: Pt will complete orientation tasks with 90% accuracy and minimal verbal cues/modeling. Swallowing Short Term Goals  Short-term Goals  Goal 1: Pt will tolerate soft & bite sized consistencies with thin liquids with minimal overt s/s of aspiration/penetration during hospitalization. Goal 2: Pt will complete Modified Barium Swallow Study. Goal 3: Pt will demonstrate awareness of general aspiration precautions. Goal 4: Pt will participate in swallowing reassessments to ensure safest diet consistencies. Goal 5: Pt will allow staff to complete daily oral care. Goal 6: Pt will complete oral motor exercises for lingual and labial strengthening. ASSESSMENT:  Assessment: [x]Progressing towards goals          []Not Progressing towards goals    Patient Tolerance of Treatment:   [x]Tolerated well []Tolerated fair []Required rest breaks []Fatigued    Education:  Learner:  [x]Patient          []Significant Other          []Other  Education provided regarding:  [x]Goals and POC   []Diet and swallowing precautions    []Home Exercise Program  []Progress and/or discharge information  Method of Education:  [x]Discussion          []Demonstration          []Handout          []Other  Evaluation of Education:   [x]Verbalized understanding   []Demonstrates without assistance  []Demonstrates with assistance  []Needs further instruction     []No evidence of learning                  []No family present      Plan: [x]Continue with current plan of care    []Modify current plan of care as follows:    []Discharge patient    Discharge Location:    Services/Supervision Recommended:      [x]Patient continues to require treatment by a licensed therapist to address functional deficits as outlined in the established plan of care.        Electronically Signed By:  Jose Ryan M.S., CCC-SLP  11/23/2022,8:41 AM.

## 2022-11-23 NOTE — PLAN OF CARE
Problem: Discharge Planning  Goal: Discharge to home or other facility with appropriate resources  Outcome: Progressing  Flowsheets (Taken 11/23/2022 1007)  Discharge to home or other facility with appropriate resources: Refer to discharge planning if patient needs post-hospital services based on physician order or complex needs related to functional status, cognitive ability or social support system     Problem: Safety - Adult  Goal: Free from fall injury  Outcome: Progressing     Problem: Neurosensory - Adult  Goal: Achieves stable or improved neurological status  Outcome: Progressing  Flowsheets (Taken 11/23/2022 1007)  Achieves stable or improved neurological status: Assess for and report changes in neurological status  Goal: Achieves maximal functionality and self care  Outcome: Progressing  Flowsheets (Taken 11/23/2022 1007)  Achieves maximal functionality and self care: Monitor swallowing and airway patency with patient fatigue and changes in neurological status     Problem: Skin/Tissue Integrity - Adult  Goal: Skin integrity remains intact  Outcome: Progressing  Flowsheets (Taken 11/23/2022 1007)  Skin Integrity Remains Intact: Monitor for areas of redness and/or skin breakdown     Problem: Pain  Goal: Verbalizes/displays adequate comfort level or baseline comfort level  Outcome: Progressing     Problem: Chronic Conditions and Co-morbidities  Goal: Patient's chronic conditions and co-morbidity symptoms are monitored and maintained or improved  Outcome: Progressing  Flowsheets (Taken 11/23/2022 1007)  Care Plan - Patient's Chronic Conditions and Co-Morbidity Symptoms are Monitored and Maintained or Improved: Monitor and assess patient's chronic conditions and comorbid symptoms for stability, deterioration, or improvement     Problem: ABCDS Injury Assessment  Goal: Absence of physical injury  Outcome: Progressing     Problem: Skin/Tissue Integrity  Goal: Absence of new skin breakdown  Description: 1. Monitor for areas of redness and/or skin breakdown  2. Assess vascular access sites hourly  3. Every 4-6 hours minimum:  Change oxygen saturation probe site  4. Every 4-6 hours:  If on nasal continuous positive airway pressure, respiratory therapy assess nares and determine need for appliance change or resting period. Outcome: Progressing     Problem: Confusion  Goal: Confusion, delirium, dementia, or psychosis is improved or at baseline  Description: INTERVENTIONS:  1. Assess for possible contributors to thought disturbance, including medications, impaired vision or hearing, underlying metabolic abnormalities, dehydration, psychiatric diagnoses, and notify attending LIP  2. [place] high risk fall precautions, as indicated  3. Provide frequent short contacts to provide reality reorientation, refocusing and direction  4. Decrease environmental stimuli, including noise as appropriate  5. Monitor and intervene to maintain adequate nutrition, hydration, elimination, sleep and activity  6. If unable to ensure safety without constant attention obtain sitter and review sitter guidelines with assigned personnel  7.  Initiate Psychosocial CNS and Spiritual Care consult, as indicated  Outcome: Progressing  Flowsheets (Taken 11/23/2022 1007)  Effect of thought disturbance (confusion, delirium, dementia, or psychosis) are managed with adequate functional status:   Assess for contributors to thought disturbance, including medications, impaired vision or hearing, underlying metabolic abnormalities, dehydration, psychiatric diagnoses, notify [facility] high risk fall precautions, as indicated   Provide frequent short contacts to provide reality reorientation, refocusing and direction   Decrease environmental stimuli, including noise as appropriate     Problem: Musculoskeletal - Adult  Goal: Return mobility to safest level of function  Outcome: Progressing  Flowsheets (Taken 11/23/2022 1007)  Return Mob[facility] to [facility] Level [facility] Function:   Assess patient stability and activity tolerance for standing, transferring and ambulating with or without assistive devices   Assist with transfers and ambulation using safe patient handling equipment as needed   Ensure adequate protection for wounds/incisions during mobilization  Goal: Return ADL status to a safe level of function  Outcome: Progressing  Flowsheets (Taken 2022 1007)  Return ADL Status to a Safe Level of Function: Assist and instruct patient to increase activity and self care as tolerated     Problem: Gastrointestinal - Adult  Goal: Maintains or returns to baseline bowel function  Outcome: Progressing  Flowsheets (Taken 2022 1007)  Maintains or returns to baseline bowel function: Assess bowel function  Goal: Maintains adequate nutritional intake  Outcome: Progressing  Flowsheets (Taken 2022 1007)  Maintains adequate nutritional intake: Monitor percentage of each meal consumed     Problem: Metabolic/Fluid and Electrolytes - Adult  Goal: Electrolytes maintained within normal limits  Outcome: Progressing  Flowsheets (Taken 2022 1007)  Electrolytes maintained within normal limits: Monitor labs and assess patient for signs and symptoms of electrolyte imbalances     Problem: Nutrition Deficit:  Goal: Optimize nutritional status  Outcome: Progressing       Electronically signed by Chel Duvall on 2022 at 2:42 PM

## 2022-11-23 NOTE — PLAN OF CARE
Problem: Discharge Planning  Goal: Discharge to home or other facility with appropriate resources  Outcome: Progressing     Problem: Safety - Adult  Goal: Free from fall injury  Outcome: Progressing     Problem: Neurosensory - Adult  Goal: Achieves stable or improved neurological status  Outcome: Progressing  Goal: Achieves maximal functionality and self care  Outcome: Progressing     Problem: Neurosensory - Adult  Goal: Achieves maximal functionality and self care  Outcome: Progressing     Problem: Skin/Tissue Integrity - Adult  Goal: Skin integrity remains intact  Outcome: Progressing     Problem: Pain  Goal: Verbalizes/displays adequate comfort level or baseline comfort level  Outcome: Progressing     Problem: Chronic Conditions and Co-morbidities  Goal: Patient's chronic conditions and co-morbidity symptoms are monitored and maintained or improved  Outcome: Progressing     Problem: ABCDS Injury Assessment  Goal: Absence of physical injury  Outcome: Progressing     Problem: Skin/Tissue Integrity  Goal: Absence of new skin breakdown  Description: 1. Monitor for areas of redness and/or skin breakdown  2. Assess vascular access sites hourly  3. Every 4-6 hours minimum:  Change oxygen saturation probe site  4. Every 4-6 hours:  If on nasal continuous positive airway pressure, respiratory therapy assess nares and determine need for appliance change or resting period. Outcome: Progressing     Problem: Confusion  Goal: Confusion, delirium, dementia, or psychosis is improved or at baseline  Description: INTERVENTIONS:  1. Assess for possible contributors to thought disturbance, including medications, impaired vision or hearing, underlying metabolic abnormalities, dehydration, psychiatric diagnoses, and notify attending LIP  2. [place] high risk fall precautions, as indicated  3. Provide frequent short contacts to provide reality reorientation, refocusing and direction  4.  Decrease environmental stimuli, including noise as appropriate  5. Monitor and intervene to maintain adequate nutrition, hydration, elimination, sleep and activity  6. If unable to ensure safety without constant attention obtain sitter and review sitter guidelines with assigned personnel  7.  Initiate Psychosocial CNS and Spiritual Care consult, as indicated  Outcome: Progressing     Problem: Musculoskeletal - Adult  Goal: Return mobility to safest level of function  Outcome: Progressing  Goal: Return ADL status to a safe level of function  Outcome: Progressing     Problem: Gastrointestinal - Adult  Goal: Maintains or returns to baseline bowel function  Outcome: Progressing  Goal: Maintains adequate nutritional intake  Outcome: Progressing     Problem: Gastrointestinal - Adult  Goal: Maintains adequate nutritional intake  Outcome: Progressing     Problem: Metabolic/Fluid and Electrolytes - Adult  Goal: Electrolytes maintained within normal limits  Outcome: Progressing     Problem: Nutrition Deficit:  Goal: Optimize nutritional status  Outcome: Progressing

## 2022-11-23 NOTE — PROGRESS NOTES
Patient:   Yina Sanchez  MR#:    [id number]   Room:    0826/826-01   YOB: 1960  Date of Progress Note: 11/23/2022  Time of Note                           7:44 AM  Consulting Physician:   Pedro Tilley M.D. Attending Physician:  Pedro Tilley MD     Chief complaint Acute ischemic stroke    S:This 64 y.o. female  with history of anxiety, depression, COPD, atherosclerotic disease, colitis, COPD, CVA, DM,  LE edema, hyperlipidemia, HTN, morbid obesity, RA, CAD  and venous thromboembolism. She presented to [facility] ER on 11/9/22 after being found at home lying facedown in her feces. She was very weak, confused, not able to speak but moving purposefully and intermittently following commands. Her last well know was 3 a.m. when her  left for work. Imaging done revealed a large MCA stroke 7.7 x 5 cm. CTA head/neck revealed an acute M1 occlusion. She was outside of the window for TPA. CK on admit was 1100, consistent with rhabdomylosis. She was also noted to possibly Dens fracture. She was transferred to [facility] for a possible thrombectomy. Further imaging was done at [facility] and she was deemed not a candidate for thrombectomy. MRI done ruled out Dens fracture. Repeat CT of head on 11/11/22 showed evolving left MCA territory infarct with increasing edema/mass effect resulting in 4mm of  rightward midline shift(previously 2mm) a the level of the third ventricle. No hemorrhagic conversion or definite trapping of the right lateral ventricle, possible small acute right cerebellar infarct. Neurosurgery was consulted for possible hemicraniectomy. She was seen by Dr. Bertram Jha, who didn't feel any surgical intervention was needed. Patient was found to have a UTI + for [facility] and was placed on Rocephin on 11/14/22. Patient had a PICC line placed. Patient was evaluated by SPT and initially made NPO d/t oropharyngeal dysphagia. NGT placed and feeding started.  She was also noted to have global aphasia but is improving. She was re-evaluated by SPT on 11/15/22 for swallow and was started on a dysphagia 1 diet with nectar thick liquids. Patient also continues to have right sided weakness and is participating in both PT/OT. Repeat CT head on 11/13/22 shows stable LMCA ischemic stroke with stable edema, 5 mm shift, no hemorrhage. She is felt to need a stay on Rehab for continued PT/OT/SPT and medical management to work towards her goal of returning home with her . She is now felt ready to start the Rehab program.  No new issues overnight. Had a bowel movement yesterday. Sleeping comfortably this morning as per daughter. REVIEW OF SYSTEMS:  Unreliable due to aphasia     Past Medical History:      Diagnosis Date    Anxiety     ASCVD (arteriosclerotic cardiovascular disease)     Carrier of group B Streptococcus     Cellulitis     Colitis     COPD (chronic obstructive pulmonary disease) ([facility])     Costochondritis     CVA (cerebral vascular accident) ([address]     Depression     Edema     Fracture of sternum     non union post surgery.      Gallstones     Grief reaction     H/O superior vena cava filter placement     Hyperlipidemia     Hypertension     MI (myocardial infarction) ([address]     MRSA (methicillin resistant Staphylococcus aureus)     Neuropathy     Obesity     Pseudarthrosis sternal    RA (rheumatoid arthritis) ([address]     Rosacea     Sinus bradycardia     TIA (transient ischemic attack)     Venous thromboembolism        Past Surgical History:      Procedure Laterality Date    ADENOIDECTOMY      APPENDECTOMY      CARDIAC CATHETERIZATION  3/2009    CARDIAC CATHETERIZATION  11/5/14  JDT    EF 50%, patent bypass grafts    CHOLECYSTECTOMY  2011    COLONOSCOPY  06/03/2010    Dr. Danis Montgomery: distal colon having ulcerative lesions c/w UC    COLONOSCOPY  2009    pancolitis    COLONOSCOPY      CORONARY ARTERY BYPASS GRAFT  3/2009    PHANI Linares to LAD, SVGs to OM & PDA    GASTRIC BYPASS SURGERY  2012    HIATAL HERNIA REPAIR  2011    HYSTERECTOMY (CERVIX STATUS UNKNOWN)      KNEE SURGERY      WI COLONOSCOPY FLX DX W/COLLJ SPEC WHEN PFRMD N/A 3/12/2018    Dr Martínez Gibbons rectal inflammation consistent with U.C.-Active proctitis--3 yr recall    Gabriel Ocampo ENDOSCOPY  2010    Dr. Felipa Crocker  2012       Medications in Hospital:      Current Facility-Administered Medications:     aspirin chewable tablet 81 mg, 81 mg, Oral, Daily, Jimi Rodríguez MD, 81 mg at 11/22/22 0815    atorvastatin (LIPITOR) tablet 40 mg, 40 mg, Oral, Nightly, Jimi Rodríguez MD, 40 mg at 11/22/22 2059    dulaglutide (TRULICITY) SC injection 1.5 mg (Patient Supplied), 1.5 mg, SubCUTAneous, Weekly, Jimi Rodríguez MD    folic acid (FOLVITE) tablet 1 mg, 1 mg, Oral, Daily, Jimi Rodríguez MD, 1 mg at 11/22/22 0815    gabapentin (NEURONTIN) capsule 300 mg, 300 mg, Oral, Nightly, Jimi Rodríguez MD, 300 mg at 11/22/22 2059    hydroxychloroquine (PLAQUENIL) tablet 200 mg, 200 mg, Oral, BID, Jimi Rodríguez MD, 200 mg at 11/22/22 2059    amLODIPine (NORVASC) tablet 2.5 mg, 2.5 mg, Oral, Daily, Jimi Rodríguez MD, 2.5 mg at 11/22/22 0815    tiZANidine (ZANAFLEX) tablet 4 mg, 4 mg, Oral, BID PRN, Jimi Rodríguez MD    multivitamin 1 tablet, 1 tablet, Oral, Daily, Jimi Rodríguez MD, 1 tablet at 11/22/22 0815    acetaminophen (TYLENOL) tablet 650 mg, 650 mg, Oral, Q4H PRN, Jimi Rodríguez MD, 650 mg at 11/22/22 2101    enoxaparin (LOVENOX) injection 40 mg, 40 mg, SubCUTAneous, Daily, Jimi Rodríguez MD, 40 mg at 11/22/22 1810    polyethylene glycol (GLYCOLAX) packet 17 g, 17 g, Oral, Daily PRN, Jimi Rodríguez MD    Allergies:  Methotrexate derivatives    Social History:   TOBACCO:   reports that she quit smoking about 13 years ago. Her smoking use included cigarettes. She has a 64.00 pack-year smoking history. She has never used smokeless tobacco.  ETOH:   reports current alcohol use.     Family History:       Problem Relation Age of Onset    Prostate Cancer Father     Heart Failure Father     Cancer Father     Colon Cancer Neg Hx     Colon Polyps Neg Hx     Liver Cancer Neg Hx     Liver Disease Neg Hx     Esophageal Cancer Neg Hx     Rectal Cancer Neg Hx     Stomach Cancer Neg Hx          PHYSICAL EXAM:  BP (!) 145/73   Pulse (!) 49   Temp 97.3 °F (36.3 °C)   Resp 20   Ht 5' 7\" (1.702 m)   Wt 200 lb 4.8 oz (90.9 kg)   SpO2 97%   BMI 31.37 kg/m²     Constitutional - well developed, well nourished. Eyes - conjunctiva normal.   Ear, nose, throat - No scars, masses, or lesions over external nose or ears, no atrophy of tongue  Neck-symmetric, no masses noted, no jugular vein distension  Respiration- chest wall appears symmetric, good expansion,   normal effort without use of accessory muscles  Musculoskeletal - no significant wasting of muscles noted, no bony deformities  Extremities-no clubbing, cyanosis or edema  Skin - warm, dry, and intact. No rash, erythema, or pallor. Psychiatric - mood, affect, and behavior appear normal.      Neurological exam  Awake, alert, global aphasia says \"yes\" to all questions asked   Attention and concentration appear appropriate  Recent and remote memory unable to tests     Cranial Nerve Exam     CN III, IV,VI-EOMI, No nystagmus, conjugate eye movements, no ptosis    CN VII-+ facial assymetry       Motor Exam  No movement on the right      Tremors- no tremors in hands or head noted     Gait  Not tested     Nursing/pcp notes, imaging,labs and vitals reviewed.      PT,OT and/or speech notes reviewed    Lab Results   Component Value Date    WBC 11.1 (H) 11/21/2022    HGB 11.8 (L) 11/21/2022    HCT 39.7 11/21/2022    MCV 81.4 11/21/2022     (H) 11/21/2022     Lab Results   Component Value Date     11/21/2022    K 4.5 11/21/2022     11/21/2022    CO2 23 11/21/2022    BUN 19 11/21/2022    CREATININE 0.5 11/21/2022    GLUCOSE 88 11/21/2022    CALCIUM 9.1 11/21/2022    PROT 5.6 (L) 11/21/2022    LABALBU 3.4 (L) 11/21/2022    BILITOT 0.3 11/21/2022    ALKPHOS 85 11/21/2022    AST 26 11/21/2022    ALT 26 11/21/2022    LABGLOM >60 11/21/2022   No results found for: INR, PROTIME    [facility]   Student Physical Therapist   Physical Therapy   Progress Notes       Cosign Needed   Date of Service:  11/22/2022 12:23 PM                 Cosign Needed                                                             11/22/22 0900   Restrictions/Precautions   Restrictions/Precautions Modified Diet;Swallowing - Thickened Liquids; Fall Risk;Aspiration Risk   General   Additional Pertinent Hx Anxiety, depression, COPD, CVA, DM, LE edema, hyperlipidemia, HTN, obesity, RA, CAD and venous thromboembolism   Diagnosis CVA, R hemiparesis   General Comment   Comments PATIENT IN ROOM WITH  AND PCA   Subjective   Subjective PATIENT SITTING IN RECLINER   Bed mobility   Sit to Supine Maximum assistance; Moderate assistance  (PATIENT ON RIGHT SIDE OF BED, PATIENT ATTEMPTED TO TAKE OF BELT AND CAUSED HER TO LEAN TO AFFECTED SIDE, THERAPIST MAX ASSIST TO REPOSITION HER.  INSTRUCTED FOR LOGROLL, BUT PATIENT USED TRIPLANAR TECHNIQUE)   Transfers   Sit to Stand Moderate Assistance  (SS, PATIENT ABLE TO GRAB ON TO RAIL AND PULL SELF UP WITH MOD A)   Stand to Sit Moderate Assistance;Maximum Assistance  (SS, PATIENT HAS DECREASED SAFETY AWARENESS AND WENT TO SIT ONTO BED BEFORE INSTRUCTED)   Bed to Chair Moderate assistance  (SEE STAND PIVOT)   Stand Pivot Transfers Moderate Assistance  (USE OF SS TO STAND PATIENT AND PIVOT TO BED)   Comment PATIENT REFUSED TO SIT DOWN ON SS, BUT HAS GOOD BALANCE CONTROL DURING TRANSFER FROM RECLINER TO BED   Ambulation   WB Status WBAT   PT Exercises   Exercise Treatment SITTING EX: MARCHES 10X, KNEE EXT 10X, PF/DF 15X, HIP EXT 10X, HIP ABD/ADD 15X; BED EX: QS 10X, GS 10X L SIDE ONLY.  (RLE PROM WITH PATIENT ACTIVELY ENGAGED AND COUNTING EACH REP; LLE AROM) PROM Exercises BED EX: HEEL SLIDES 20X  (A/AROM ON LLE, PROM ON RLE)   Assessment   Assessment PATIENT ATTENTIVE DURING ALL EXERCISES, ABLE TO PERFORM HIP EXT WITH CUE TO STOMP ON THERAPIST FOOT, ACTIVELY COUNTING EACH REP ON BOTH SIDES. WHEN PATIENT WOULD GET TIRED, PATIENT WOULD BEGIN COUNTING BY 10'S FOR EACH REP OR SKIP A FEW NUMBERS. PATIENT HAS DECREASED SAFETY AWARENESS AND WOULD BEGIN TRANSFERS/MOVEMENTS BEFORE INSTRUCTED. Safety Devices   Type of Devices Bed alarm in place;Sitter present; Left in bed;Call light within reach   PT Individual Minutes   Time In 0900   Time Out 1000   Minutes 60   Electronically sign by: Lyndon Lu, MOLINA 11/22/2022, 12:23 PM                 RECORD REVIEW: Previous medical records, medications were reviewed at today's visit    IMPRESSION:   1. Acute ischemic stroke-on aspirin/statin  2. Hypertension-on medications monitor  3. Hyperlipidemia-on statin  4. Diabetes-Trulicity-monitor blood sugar  5. DVT prophylaxis-Lovenox  6. History of coronary artery disease-aspirin/statin  7. Neuropathy-on Neurontin  8. Rheumatoid arthritis-on Plaquenil  9. COPD-monitor  10. History of anxiety and depression-monitor  11. History of colitis/gallstones-monitor  12. History of bariatric surgery-on multivitamin/folic acid  13. History of superior vena cava filter placement with venous thromboembolism-not on anticoagulation-monitor- indicates took herself off blood thinners as not like meds and was bruising   14.   PT/OT/speech    Continue present care      ELOS pending     Expected duration and frequency therapy: 180 minutes per day, 5 days per week    [address] [facility]  [phone] CELL  Dr Jac Rivera

## 2022-11-23 NOTE — PROGRESS NOTES
Occupational Therapy  Facility/Department: [facility] 8 REHAB UNIT      Name: Richard Man  : 1960  MRN: [id number]  Date of Service: 2022    Discharge Recommendations:  Continue to assess pending progress          Patient Diagnosis(es): There were no encounter diagnoses. Past Medical History:  has a past medical history of Anxiety, ASCVD (arteriosclerotic cardiovascular disease), Carrier of group B Streptococcus, Cellulitis, Colitis, COPD (chronic obstructive pulmonary disease) ([facility] Costochondritis, CVA (cerebral vascular accident) ([facility] [address] [facility] Depression, Edema, Fracture of sternum, Gallstones, Grief reaction, H/O superior vena cava filter placement, Hyperlipidemia, Hypertension, MI (myocardial infarction) ([facility] [address] MRSA (methicillin resistant Staphylococcus aureus), Neuropathy, Obesity, Pseudarthrosis, RA (rheumatoid arthritis) ([facility] [address] Rosacea, Sinus bradycardia, TIA (transient ischemic attack), and Venous thromboembolism. Past Surgical History:  has a past surgical history that includes Coronary artery bypass graft (3/2009); Tonsillectomy; Hysterectomy; thoracotomy; knee surgery; Appendectomy; Colonoscopy (2010); Cardiac catheterization (3/2009); Adenoidectomy; Cholecystectomy (); Gastric bypass surgery (); hiatal hernia repair (); Cardiac catheterization (14  JDT); Colonoscopy (); Colonoscopy; Upper gastrointestinal endoscopy (); Upper gastrointestinal endoscopy (); and pr colonoscopy flx dx w/collj spec when pfrmd (N/A, 3/12/2018). Treatment Diagnosis: L MCA stroke      Assessment      22 1000   Assessment   Performance deficits / Impairments Decreased functional mobility ; Decreased endurance;Decreased coordination;Decreased ADL status; Decreased sensation;Decreased posture;Decreased ROM; Decreased balance;Decreased strength;Decreased vision/visual deficit; Decreased safe awareness;Decreased high-level IADLs;Decreased cognition;Decreased fine motor control       Plan Occupational Therapy Plan  Specific Instructions for Next Treatment: transfers  Current Treatment Recommendations: Strengthening, ROM, Balance training, Functional mobility training, Endurance training, Positioning, Modalities, Neuromuscular re-education, Cognitive reorientation, Safety education & training, Patient/Caregiver education & training, Equipment evaluation, education, & procurement, Self-Care / ADL, Cognitive/Perceptual training, Home management training, Sensory integraion     Restrictions  Restrictions/Precautions  Restrictions/Precautions: Modified Diet, Swallowing - Thickened Liquids, Fall Risk, Aspiration Risk  Required Braces or Orthoses?: No    Subjective   General  Patient assessed for rehabilitation services?: Yes  Family / Caregiver Present: Yes (daughter)      11/23/22 0800   General   Family / Caregiver Present Yes  (daughter)   Pre Treatment Pain Screening   Pain at present 0   Scale Used Numeric Score         Objective      11/23/22 0800   OT Exercises   Exercise Treatment SROM HEP with min A, PROM R UE all muscle planes      11/23/22 0800   Neuromuscular Education   Neuromuscular education Yes   NDT Treatment Upper extremity   Facilitation techniques scapular mobilization   Weight Bearing   Weight Bearing Technique Yes   RUE Weight Bearing Forearm seated; Extended arm seated      11/23/22 0800   Oral Hygiene   Assistance Needed Supervision or touching assistance   Comment vc for aspen tech after demonstration first   CARE Score 4   Shower/Bathe Self   Assistance Needed Substantial/maximal assistance   Comment sponge bath while in bed   CARE Score 2   Upper Body Dressing   Assistance Needed Substantial/maximal assistance   Comment pullover shirt, cues for aspen tech   CARE Score 2   Lower Body Dressing   Assistance Needed Substantial/maximal assistance   Comment pants only, completed in bed, pt able to roll with mod A to left, and cues for right while another assists with pant management CARE Score 2   Putting On/Taking Off Footwear   Assistance Needed Substantial/maximal assistance   Comment while in bed and supine, pt able to reach left foot partially   CARE Score 2     Goals  Short Term Goals  Time Frame for Short Term Goals: 2 weeks  Short Term Goal 1: Pt will bathe with mod A, AE prn  Short Term Goal 2: Pt will dress UB using hemitechnique, mod A and cues  Short Term Goal 3: Pt will dress LB, hemitechnique, mod A and cues  Short Term Goal 4: Pt will toilet with mod A  Short Term Goal 5:  Toilet transfer with mod A  Additional Goals?: Yes  Short Term Goal 6: Pt will attend to R side during ADL/functional activities with moderate cues  Short Term Goal 7: Pt will perform standing tasks x 2-3 mins with L UE and mod A for balance to enhance ADL/IADL performance  Short Term Goal 8: Pt/family will demo understanding of R UE positioning/HEP/therapeutic activity recommendations with min A after ed  Long Term Goals  Time Frame for Long Term Goals : 4-5 weeks  Long Term Goal 1: Pt will bathe with min A, AE/DME prn  Long Term Goal 2: Pt will dress UB supervision  Long Term Goal 3: Pt will dress LB min A, AE prn  Long Term Goal 4: Pt will toilet with CGA  Long Term Goal 5: Pt will perform toilet transfer with CGA  Additional Goals?: Yes  Long Term Goal 6: Pt will perform simple home making task with min A  Long Term Goal 7: Pt/family will be independent in HEP/DME/therapeutic activity recommendations after ed  Long Term Goal 8: Pt will attend to R side during functional activities with occasional cueing       Therapy Time   Individual Concurrent Group Co-treatment   Time In 0800         Time Out 0900         Minutes 60         Timed Code Treatment Minutes: 60 Minutes       Blas Fernandez, OT

## 2022-11-24 LAB
ALBUMIN SERPL-MCNC: 3.4 G/DL (ref 3.5–5.2)
ALP BLD-CCNC: 89 U/L (ref 35–104)
ALT SERPL-CCNC: 28 U/L (ref 5–33)
ANION GAP SERPL CALCULATED.3IONS-SCNC: 14 MMOL/L (ref 7–19)
AST SERPL-CCNC: 37 U/L (ref 5–32)
BASOPHILS ABSOLUTE: 0.1 K/UL (ref 0–0.2)
BASOPHILS RELATIVE PERCENT: 1.4 % (ref 0–1)
BILIRUB SERPL-MCNC: <0.2 MG/DL (ref 0.2–1.2)
BUN BLDV-MCNC: 17 MG/DL (ref 8–23)
CALCIUM SERPL-MCNC: 9 MG/DL (ref 8.8–10.2)
CHLORIDE BLD-SCNC: 103 MMOL/L (ref 98–111)
CO2: 21 MMOL/L (ref 22–29)
CREAT SERPL-MCNC: 0.6 MG/DL (ref 0.5–0.9)
EOSINOPHILS ABSOLUTE: 0.4 K/UL (ref 0–0.6)
EOSINOPHILS RELATIVE PERCENT: 4.9 % (ref 0–5)
GFR SERPL CREATININE-BSD FRML MDRD: >60 ML/MIN/{1.73_M2}
GLUCOSE BLD-MCNC: 86 MG/DL (ref 74–109)
HCT VFR BLD CALC: 43.1 % (ref 37–47)
HEMOGLOBIN: 12.2 G/DL (ref 12–16)
HYPOCHROMIA: ABNORMAL
IMMATURE GRANULOCYTES #: 0.1 K/UL
LYMPHOCYTES ABSOLUTE: 2.5 K/UL (ref 1.1–4.5)
LYMPHOCYTES RELATIVE PERCENT: 27.5 % (ref 20–40)
MCH RBC QN AUTO: 24.2 PG (ref 27–31)
MCHC RBC AUTO-ENTMCNC: 28.3 G/DL (ref 33–37)
MCV RBC AUTO: 85.3 FL (ref 81–99)
MONOCYTES ABSOLUTE: 0.8 K/UL (ref 0–0.9)
MONOCYTES RELATIVE PERCENT: 8.6 % (ref 0–10)
NEUTROPHILS ABSOLUTE: 5.1 K/UL (ref 1.5–7.5)
NEUTROPHILS RELATIVE PERCENT: 56.9 % (ref 50–65)
PDW BLD-RTO: 17.7 % (ref 11.5–14.5)
PLATELET # BLD: 402 K/UL (ref 130–400)
PLATELET SLIDE REVIEW: ABNORMAL
PMV BLD AUTO: 10.1 FL (ref 9.4–12.3)
POTASSIUM REFLEX MAGNESIUM: 5.2 MMOL/L (ref 3.5–5)
RBC # BLD: 5.05 M/UL (ref 4.2–5.4)
SODIUM BLD-SCNC: 138 MMOL/L (ref 136–145)
TOTAL PROTEIN: 5.6 G/DL (ref 6.6–8.7)
WBC # BLD: 9 K/UL (ref 4.8–10.8)

## 2022-11-24 PROCEDURE — 36415 COLL VENOUS BLD VENIPUNCTURE: CPT

## 2022-11-24 PROCEDURE — 6370000000 HC RX 637 (ALT 250 FOR IP): Performed by: PSYCHIATRY & NEUROLOGY

## 2022-11-24 PROCEDURE — 6360000002 HC RX W HCPCS: Performed by: PSYCHIATRY & NEUROLOGY

## 2022-11-24 PROCEDURE — 1180000000 HC REHAB R&B

## 2022-11-24 PROCEDURE — 99232 SBSQ HOSP IP/OBS MODERATE 35: CPT | Performed by: PSYCHIATRY & NEUROLOGY

## 2022-11-24 PROCEDURE — 80053 COMPREHEN METABOLIC PANEL: CPT

## 2022-11-24 PROCEDURE — 85025 COMPLETE CBC W/AUTO DIFF WBC: CPT

## 2022-11-24 RX ORDER — DOCUSATE SODIUM 100 MG/1
100 CAPSULE, LIQUID FILLED ORAL 2 TIMES DAILY
Status: DISCONTINUED | OUTPATIENT
Start: 2022-11-24 | End: 2022-12-29 | Stop reason: HOSPADM

## 2022-11-24 RX ADMIN — HYDROXYCHLOROQUINE SULFATE 200 MG: 200 TABLET, FILM COATED ORAL at 09:11

## 2022-11-24 RX ADMIN — FOLIC ACID 1 MG: 1 TABLET ORAL at 09:11

## 2022-11-24 RX ADMIN — THERA TABS 1 TABLET: TAB at 09:11

## 2022-11-24 RX ADMIN — ASPIRIN 81 MG 81 MG: 81 TABLET ORAL at 09:11

## 2022-11-24 RX ADMIN — GABAPENTIN 300 MG: 300 CAPSULE ORAL at 20:58

## 2022-11-24 RX ADMIN — AMLODIPINE BESYLATE 2.5 MG: 2.5 TABLET ORAL at 09:11

## 2022-11-24 RX ADMIN — POLYETHYLENE GLYCOL 3350 17 G: 17 POWDER, FOR SOLUTION ORAL at 17:29

## 2022-11-24 RX ADMIN — DOCUSATE SODIUM 100 MG: 100 CAPSULE, LIQUID FILLED ORAL at 20:58

## 2022-11-24 RX ADMIN — ATORVASTATIN CALCIUM 40 MG: 40 TABLET, FILM COATED ORAL at 20:58

## 2022-11-24 RX ADMIN — ENOXAPARIN SODIUM 40 MG: 100 INJECTION SUBCUTANEOUS at 17:29

## 2022-11-24 RX ADMIN — HYDROXYCHLOROQUINE SULFATE 200 MG: 200 TABLET, FILM COATED ORAL at 20:58

## 2022-11-24 NOTE — PROGRESS NOTES
Patient:   Yayo Ivory  MR#:    [id number]   Room:    0826/826-01   YOB: 1960  Date of Progress Note: 11/24/2022  Time of Note                           10:50 AM  Consulting Physician:   Jade Garcia M.D. Attending Physician:  Jade Garcia MD     Chief complaint Acute ischemic stroke    S:This 64 y.o. female  with history of anxiety, depression, COPD, atherosclerotic disease, colitis, COPD, CVA, DM,  LE edema, hyperlipidemia, HTN, morbid obesity, RA, CAD  and venous thromboembolism. She presented to [place] [facility] on 11/9/22 after being found at home lying facedown in her feces. She was very weak, confused, not able to speak but moving purposefully and intermittently following commands. Her last well know was 3 a.m. when her  left for work. Imaging done revealed a large MCA stroke 7.7 x 5 cm. CTA head/neck revealed an acute M1 occlusion. She was outside of the window for TPA. CK on admit was 1100, consistent with rhabdomylosis. She was also noted to possibly Dens fracture. She was transferred to [facility] for a possible thrombectomy. Further imaging was done at [facility] and she was deemed not a candidate for thrombectomy. MRI done ruled out Dens fracture. Repeat CT of head on 11/11/22 showed evolving left MCA territory infarct with increasing edema/mass effect resulting in 4mm of  rightward midline shift(previously 2mm) a the level of the third ventricle. No hemorrhagic conversion or definite trapping of the right lateral ventricle, possible small acute right cerebellar infarct. Neurosurgery was consulted for possible hemicraniectomy. She was seen by Dr. Duncan Conley, who didn't feel any surgical intervention was needed. Patient was found to have a UTI + for [facility] and was placed on Rocephin on 11/14/22. Patient had a PICC line placed. Patient was evaluated by SPT and initially made NPO d/t oropharyngeal dysphagia. NGT placed and feeding started.  She was also noted to have global aphasia but is improving. She was re-evaluated by SPT on 11/15/22 for swallow and was started on a dysphagia 1 diet with nectar thick liquids. Patient also continues to have right sided weakness and is participating in both PT/OT. Repeat CT head on 11/13/22 shows stable LMCA ischemic stroke with stable edema, 5 mm shift, no hemorrhage. She is felt to need a stay on Rehab for continued PT/OT/SPT and medical management to work towards her goal of returning home with her . She is now felt ready to start the Rehab program.  No acute issues as per  overnight. Doing better. Had a bowel movement. REVIEW OF SYSTEMS:  Unreliable due to aphasia     Past Medical History:      Diagnosis Date    Anxiety     ASCVD (arteriosclerotic cardiovascular disease)     Carrier of group B Streptococcus     Cellulitis     Colitis     COPD (chronic obstructive pulmonary disease) ([facility])     Costochondritis     CVA (cerebral vascular accident) ([address]     Depression     Edema     Fracture of sternum     non union post surgery.      Gallstones     Grief reaction     H/O superior vena cava filter placement     Hyperlipidemia     Hypertension     MI (myocardial infarction) ([address]     MRSA (methicillin resistant Staphylococcus aureus)     Neuropathy     Obesity     Pseudarthrosis sternal    RA (rheumatoid arthritis) ([address]     Rosacea     Sinus bradycardia     TIA (transient ischemic attack)     Venous thromboembolism        Past Surgical History:      Procedure Laterality Date    ADENOIDECTOMY      APPENDECTOMY      CARDIAC CATHETERIZATION  3/2009    CARDIAC CATHETERIZATION  11/5/14  JDT    EF 50%, patent bypass grafts    CHOLECYSTECTOMY  2011    COLONOSCOPY  06/03/2010    Dr. Violet Kenyon: distal colon having ulcerative lesions c/w UC    COLONOSCOPY  2009    pancolitis    COLONOSCOPY      CORONARY ARTERY BYPASS GRAFT  3/2009    PHANI Mendoza to LAD, SVGs to [address]  2011    HYSTERECTOMY (CERVIX STATUS UNKNOWN)      KNEE SURGERY      SD COLONOSCOPY FLX DX W/COLLJ SPEC WHEN PFRMD N/A 3/12/2018    Dr Camron Burrell rectal inflammation consistent with U.C.-Active proctitis--3 yr recall    [facility] ENDOSCOPY  2010    Dr. Lori Leiva  2012       Medications in Hospital:      Current Facility-Administered Medications:     aspirin chewable tablet 81 mg, 81 mg, Oral, Daily, Nathaly Aguilar MD, 81 mg at 11/24/22 0911    atorvastatin (LIPITOR) tablet 40 mg, 40 mg, Oral, Nightly, Nathaly Aguilar MD, 40 mg at 11/23/22 2128    dulaglutide (TRULICITY) SC injection 1.5 mg (Patient Supplied), 1.5 mg, SubCUTAneous, Weekly, Nathaly Aguilar MD    folic acid (FOLVITE) tablet 1 mg, 1 mg, Oral, Daily, Nathaly Aguilar MD, 1 mg at 11/24/22 0911    gabapentin (NEURONTIN) capsule 300 mg, 300 mg, Oral, Nightly, Nathaly Aguilar MD, 300 mg at 11/23/22 2128    hydroxychloroquine (PLAQUENIL) tablet 200 mg, 200 mg, Oral, BID, Nathaly Aguilar MD, 200 mg at 11/24/22 0911    amLODIPine (NORVASC) tablet 2.5 mg, 2.5 mg, Oral, Daily, Nathaly Aguilar MD, 2.5 mg at 11/24/22 0911    tiZANidine (ZANAFLEX) tablet 4 mg, 4 mg, Oral, BID PRN, Nathaly Aguilar MD    multivitamin 1 tablet, 1 tablet, Oral, Daily, Nathaly Aguilar MD, 1 tablet at 11/24/22 0359    acetaminophen (TYLENOL) tablet 650 mg, 650 mg, Oral, Q4H PRN, Nathaly Aguilar MD, 650 mg at 11/22/22 2101    enoxaparin (LOVENOX) injection 40 mg, 40 mg, SubCUTAneous, Daily, Nathaly Aguilar MD, 40 mg at 11/23/22 1620    polyethylene glycol (GLYCOLAX) packet 17 g, 17 g, Oral, Daily PRN, Nathaly Aguilar MD    Allergies:  Methotrexate derivatives    Social History:   TOBACCO:   reports that she quit smoking about 13 years ago. Her smoking use included cigarettes. She has a 64.00 pack-year smoking history. She has never used smokeless tobacco.  ETOH:   reports current alcohol use.     Family History:       Problem Relation Age of Onset    Prostate Cancer Father     Heart Failure Father     Cancer Father     Colon Cancer Neg Hx     Colon Polyps Neg Hx     Liver Cancer Neg Hx     Liver Disease Neg Hx     Esophageal Cancer Neg Hx     Rectal Cancer Neg Hx     Stomach Cancer Neg Hx          PHYSICAL EXAM:  BP (!) 152/67   Pulse 53   Temp (!) 96.6 °F (35.9 °C) (Temporal)   Resp 18   Ht 5' 7\" (1.702 m)   Wt 200 lb 4.8 oz (90.9 kg)   SpO2 100%   BMI 31.37 kg/m²     Constitutional - well developed, well nourished. Eyes - conjunctiva normal.   Ear, nose, throat - No scars, masses, or lesions over external nose or ears, no atrophy of tongue  Neck-symmetric, no masses noted, no jugular vein distension  Respiration- chest wall appears symmetric, good expansion,   normal effort without use of accessory muscles  Musculoskeletal - no significant wasting of muscles noted, no bony deformities  Extremities-no clubbing, cyanosis or edema  Skin - warm, dry, and intact. No rash, erythema, or pallor. Psychiatric - mood, affect, and behavior appear normal.      Neurological exam  Awake, alert, global aphasia says \"yes\" to all questions asked   Attention and concentration appear appropriate  Recent and remote memory unable to tests     Cranial Nerve Exam     CN III, IV,VI-EOMI, No nystagmus, conjugate eye movements, no ptosis    CN VII-+ facial assymetry       Motor Exam  No movement on the right      Tremors- no tremors in hands or head noted     Gait  Not tested     Nursing/pcp notes, imaging,labs and vitals reviewed.      PT,OT and/or speech notes reviewed    Lab Results   Component Value Date    WBC 9.0 11/24/2022    HGB 12.2 11/24/2022    HCT 43.1 11/24/2022    MCV 85.3 11/24/2022     (H) 11/24/2022     Lab Results   Component Value Date     11/24/2022    K 5.2 (H) 11/24/2022     11/24/2022    CO2 21 (L) 11/24/2022    BUN 17 11/24/2022    CREATININE 0.6 11/24/2022    GLUCOSE 86 11/24/2022    CALCIUM 9.0 11/24/2022    PROT 5.6 (L) 11/24/2022    LABALBU 3.4 (L) 11/24/2022    BILITOT <0.2 11/24/2022    ALKPHOS 89 11/24/2022    AST 37 (H) 11/24/2022    ALT 28 11/24/2022    LABGLOM >60 11/24/2022   No results found for: INR, PROTIME    Radha Saleem   Student Physical Therapist   Physical Therapy   Progress Notes       Cosign Needed   Date of Service:  11/22/2022 12:23 PM                 Cosign Needed                                                             11/22/22 0900   Restrictions/Precautions   Restrictions/Precautions Modified Diet;Swallowing - Thickened Liquids; Fall Risk;Aspiration Risk   General   Additional Pertinent Hx Anxiety, depression, COPD, CVA, DM, LE edema, hyperlipidemia, HTN, obesity, RA, CAD and venous thromboembolism   Diagnosis CVA, R hemiparesis   General Comment   Comments PATIENT IN ROOM WITH  AND PCA   Subjective   Subjective PATIENT SITTING IN RECLINER   Bed mobility   Sit to Supine Maximum assistance; Moderate assistance  (PATIENT ON RIGHT SIDE OF BED, PATIENT ATTEMPTED TO TAKE OF BELT AND CAUSED HER TO LEAN TO AFFECTED SIDE, THERAPIST MAX ASSIST TO REPOSITION HER.  INSTRUCTED FOR LOGROLL, BUT PATIENT USED TRIPLANAR TECHNIQUE)   Transfers   Sit to Stand Moderate Assistance  (SS, PATIENT ABLE TO GRAB ON TO RAIL AND PULL SELF UP WITH MOD A)   Stand to Sit Moderate Assistance;Maximum Assistance  (SS, PATIENT HAS DECREASED SAFETY AWARENESS AND WENT TO SIT ONTO BED BEFORE INSTRUCTED)   Bed to Chair Moderate assistance  (SEE STAND PIVOT)   Stand Pivot Transfers Moderate Assistance  (USE OF SS TO STAND PATIENT AND PIVOT TO BED)   Comment PATIENT REFUSED TO SIT DOWN ON SS, BUT HAS GOOD BALANCE CONTROL DURING TRANSFER FROM RECLINER TO BED   Ambulation   WB Status WBAT   PT Exercises   Exercise Treatment SITTING EX: MARCHES 10X, KNEE EXT 10X, PF/DF 15X, HIP EXT 10X, HIP ABD/ADD 15X; BED EX: QS 10X, GS 10X L SIDE ONLY.  (RLE PROM WITH PATIENT ACTIVELY ENGAGED AND COUNTING EACH REP; LLE AROM)   PROM Exercises BED EX: HEEL SLIDES 20X  (A/AROM ON LLE, PROM ON RLE)   Assessment   Assessment PATIENT ATTENTIVE DURING ALL EXERCISES, ABLE TO PERFORM HIP EXT WITH CUE TO STOMP ON THERAPIST FOOT, ACTIVELY COUNTING EACH REP ON BOTH SIDES. WHEN PATIENT WOULD GET TIRED, PATIENT WOULD BEGIN COUNTING BY 10'S FOR EACH REP OR SKIP A FEW NUMBERS. PATIENT HAS DECREASED SAFETY AWARENESS AND WOULD BEGIN TRANSFERS/MOVEMENTS BEFORE INSTRUCTED. Safety Devices   Type of Devices Bed alarm in place;Sitter present; Left in bed;Call light within reach   PT Individual Minutes   Time In 0900   Time Out 1000   Minutes 60   Electronically sign by: Anahi Duran, SPT 11/22/2022, 12:23 PM                 RECORD REVIEW: Previous medical records, medications were reviewed at today's visit    IMPRESSION:   1. Acute ischemic stroke-on aspirin/statin  2. Hypertension-on medications monitor  3. Hyperlipidemia-on statin  4. Diabetes-Trulicity-monitor blood sugar  5. DVT prophylaxis-Lovenox  6. History of coronary artery disease-aspirin/statin  7. Neuropathy-on Neurontin  8. Rheumatoid arthritis-on Plaquenil  9. COPD-monitor  10. History of anxiety and depression-monitor  11. History of colitis/gallstones-monitor  12. History of bariatric surgery-on multivitamin/folic acid  13. History of superior vena cava filter placement with venous thromboembolism-not on anticoagulation-monitor- indicates took herself off blood thinners as not like meds and was bruising   14.   PT/OT/speech    Continue current care      ELOS pending     Expected duration and frequency therapy: 180 minutes per day, 5 days per week    [address] [facility]  [phone] CELL  Dr Xavi Beltran

## 2022-11-24 NOTE — PLAN OF CARE
Problem: Discharge Planning  Goal: Discharge to home or other facility with appropriate resources  Outcome: Progressing  Flowsheets (Taken 11/24/2022 0911)  Discharge to home or other facility with appropriate resources: Refer to discharge planning if patient needs post-hospital services based on physician order or complex needs related to functional status, cognitive ability or social support system     Problem: Safety - Adult  Goal: Free from fall injury  Outcome: Progressing     Problem: Neurosensory - Adult  Goal: Achieves stable or improved neurological status  Outcome: Progressing  Flowsheets (Taken 11/24/2022 0911)  Achieves stable or improved neurological status: Assess for and report changes in neurological status  Goal: Achieves maximal functionality and self care  Outcome: Progressing  Flowsheets (Taken 11/24/2022 0911)  Achieves maximal functionality and self care: Monitor swallowing and airway patency with patient fatigue and changes in neurological status     Problem: Skin/Tissue Integrity - Adult  Goal: Skin integrity remains intact  Outcome: Progressing  Flowsheets (Taken 11/24/2022 0911)  Skin Integrity Remains Intact: Monitor for areas of redness and/or skin breakdown     Problem: Pain  Goal: Verbalizes/displays adequate comfort level or baseline comfort level  Outcome: Progressing     Problem: Chronic Conditions and Co-morbidities  Goal: Patient's chronic conditions and co-morbidity symptoms are monitored and maintained or improved  Outcome: Progressing  Flowsheets (Taken 11/24/2022 0911)  Care Plan - Patient's Chronic Conditions and Co-Morbidity Symptoms are Monitored and Maintained or Improved: Update acute care plan with appropriate goals if chronic or comorbid symptoms are exacerbated and prevent overall improvement and discharge     Problem: ABCDS Injury Assessment  Goal: Absence of physical injury  Outcome: Progressing     Problem: Skin/Tissue Integrity  Goal: Absence of new skin breakdown  Description: 1. Monitor for areas of redness and/or skin breakdown  2. Assess vascular access sites hourly  3. Every 4-6 hours minimum:  Change oxygen saturation probe site  4. Every 4-6 hours:  If on nasal continuous positive airway pressure, respiratory therapy assess nares and determine need for appliance change or resting period. Outcome: Progressing     Problem: Confusion  Goal: Confusion, delirium, dementia, or psychosis is improved or at baseline  Description: INTERVENTIONS:  1. Assess for possible contributors to thought disturbance, including medications, impaired vision or hearing, underlying metabolic abnormalities, dehydration, psychiatric diagnoses, and notify attending LIP  2. [place] high risk fall precautions, as indicated  3. Provide frequent short contacts to provide reality reorientation, refocusing and direction  4. Decrease environmental stimuli, including noise as appropriate  5. Monitor and intervene to maintain adequate nutrition, hydration, elimination, sleep and activity  6. If unable to ensure safety without constant attention obtain sitter and review sitter guidelines with assigned personnel  7.  Initiate Psychosocial CNS and Spiritual Care consult, as indicated  Outcome: Progressing  Flowsheets (Taken 11/24/2022 0911)  Effect of thought disturbance (confusion, delirium, dementia, or psychosis) are managed with adequate functional status:   Assess for contributors to thought disturbance, including medications, impaired vision or hearing, underlying metabolic abnormalities, dehydration, psychiatric diagnoses, notify [facility] high risk fall precautions, as indicated   Provide frequent short contacts to provide reality reorientation, refocusing and direction     Problem: Musculoskeletal - Adult  Goal: Return mobility to safest level of function  Outcome: Progressing  Flowsheets (Taken 11/24/2022 0911)  Return Mobility to Safest Level of Function:   Assess patient stability and activity tolerance for standing, transferring and ambulating with or without assistive devices   Assist with transfers and ambulation using safe patient handling equipment as needed   Ensure adequate protection for wounds/incisions during mobilization  Goal: Return ADL status to a safe level of function  Outcome: Progressing  Flowsheets (Taken 11/24/2022 0911)  Return ADL Status to a Safe Level of Function: Administer medication as ordered     Problem: Gastrointestinal - Adult  Goal: Maintains or returns to baseline bowel function  Outcome: Progressing  Flowsheets (Taken 11/24/2022 0911)  Maintains or returns to baseline bowel function: Assess bowel function  Goal: Maintains adequate nutritional intake  Outcome: Progressing  Flowsheets (Taken 11/24/2022 0911)  Maintains adequate nutritional intake: Monitor percentage of each meal consumed     Problem: Metabolic/Fluid and Electrolytes - Adult  Goal: Electrolytes maintained within normal limits  Outcome: Progressing  Flowsheets (Taken 11/24/2022 0911)  Electrolytes maintained within normal limits: Monitor labs and assess patient for signs and symptoms of electrolyte imbalances     Problem: Nutrition Deficit:  Goal: Optimize nutritional status  Outcome: Progressing       Electronically signed by Eddye Alpers. Jenine Ester on 11/24/2022 at 12:36 PM

## 2022-11-25 PROCEDURE — 92507 TX SP LANG VOICE COMM INDIV: CPT

## 2022-11-25 PROCEDURE — 97530 THERAPEUTIC ACTIVITIES: CPT

## 2022-11-25 PROCEDURE — 92526 ORAL FUNCTION THERAPY: CPT

## 2022-11-25 PROCEDURE — 99232 SBSQ HOSP IP/OBS MODERATE 35: CPT | Performed by: PSYCHIATRY & NEUROLOGY

## 2022-11-25 PROCEDURE — 97110 THERAPEUTIC EXERCISES: CPT

## 2022-11-25 PROCEDURE — 1180000000 HC REHAB R&B

## 2022-11-25 PROCEDURE — 97116 GAIT TRAINING THERAPY: CPT

## 2022-11-25 PROCEDURE — 6360000002 HC RX W HCPCS: Performed by: PSYCHIATRY & NEUROLOGY

## 2022-11-25 PROCEDURE — 6370000000 HC RX 637 (ALT 250 FOR IP): Performed by: PSYCHIATRY & NEUROLOGY

## 2022-11-25 PROCEDURE — 94760 N-INVAS EAR/PLS OXIMETRY 1: CPT

## 2022-11-25 RX ADMIN — ATORVASTATIN CALCIUM 40 MG: 40 TABLET, FILM COATED ORAL at 21:01

## 2022-11-25 RX ADMIN — THERA TABS 1 TABLET: TAB at 07:40

## 2022-11-25 RX ADMIN — DOCUSATE SODIUM 100 MG: 100 CAPSULE, LIQUID FILLED ORAL at 07:44

## 2022-11-25 RX ADMIN — GABAPENTIN 300 MG: 300 CAPSULE ORAL at 21:01

## 2022-11-25 RX ADMIN — AMLODIPINE BESYLATE 2.5 MG: 2.5 TABLET ORAL at 07:40

## 2022-11-25 RX ADMIN — HYDROXYCHLOROQUINE SULFATE 200 MG: 200 TABLET, FILM COATED ORAL at 07:40

## 2022-11-25 RX ADMIN — ENOXAPARIN SODIUM 40 MG: 100 INJECTION SUBCUTANEOUS at 17:30

## 2022-11-25 RX ADMIN — FOLIC ACID 1 MG: 1 TABLET ORAL at 07:41

## 2022-11-25 RX ADMIN — DOCUSATE SODIUM 100 MG: 100 CAPSULE, LIQUID FILLED ORAL at 21:01

## 2022-11-25 RX ADMIN — ASPIRIN 81 MG 81 MG: 81 TABLET ORAL at 07:41

## 2022-11-25 RX ADMIN — HYDROXYCHLOROQUINE SULFATE 200 MG: 200 TABLET, FILM COATED ORAL at 21:01

## 2022-11-25 NOTE — PROGRESS NOTES
[facility]  INPATIENT SPEECH THERAPY  [facility] REHAB UNIT      [x]Daily Note  []Progress Note    Date: 2022  Patient Name: Pradip Millan        MRN: [id number]    Account #: [de-identified]  : 1960  (64 y.o.)  Gender: female   Primary Provider: Jac Rivera MD  Diet: Dysphagia soft and bite sized, thin liquids         PATIENT DIAGNOSIS(ES):    Diagnosis: CVA, R hemiparesis     Additional Pertinent Hx: Anxiety, depression, COPD, CVA, DM, LE edema, hyperlipidemia, HTN, obesity, RA, CAD and venous thromboembolism     RESTRICTIONS/PRECAUTIONS:    Restrictions/Precautions  Restrictions/Precautions: Modified Diet, Swallowing - Thickened Liquids, Fall Risk, Aspiration Risk  Required Braces or Orthoses?: No    Subjective:  She was sitting upright in her chair. Her  and daughter were present for her session. Objective:  Cough response was noted after thin liquids via cup. Good hyolaryngeal elevation per palpation. Mildly delayed pharyngeal swallow onset at times. Automatic speech tasks were completed. Days of the week, counting 1-25, and months of the year were all completed with minimal cues. Writing words to dictation. She was able to write her name, her 's name and her grandson's name. Repetition of single syllable words and two syllable words was at 80% this date. Picture naming tasks were completed and she scored at 60%. She was stimulable to phonemic cues this date. Semantic paraphasias and perseverations were noted. Attempted divergent naming which proved challenging and the task was discontinued. Repetition of phrases and scoial greetings were completed. She was able to repeat: \"Hello, good bye, I'm hungry, I'm thirsty, I'm hurting, I'm in pain, so good, I'm cold, and I'm hot. She was asked to state her name and produced this in unison. She was able to state Gala Bouche with phonemic cues, and Lazara with phonemic cues.     Some right side labial spillage was noted with thin liquids via cup. SLP was present for her noon meal. She did recall to use her lingual sweep and would open her mouth for the therapist to check for pocketing after several bites. Verbal cues were provided for her to take small bites and sips. Verbal cues were also provided for her to use her lingual sweep when she did not do this indepedently. Her  and daughter are very involved in her care and have encouraged her to complete verbal expression tasks, receptive tasks and cognitive tasks when they are visiting in her room. She was able to complete some simple math with her . Recommended Diet and Intervention  Soft & Bite Sized consistencies   Thin liquids  Recommended Form of Meds: Crushed in puree as able  Recommendations: Modified barium swallow study; Dysphagia treatment  Therapeutic Interventions: Pharyngeal exercises;Diet tolerance monitoring;Oral care;Oral motor exercises; Patient/Family education; Therapeutic PO trials with SLP     Compensatory Swallowing Strategies  Compensatory Swallowing Strategies : Alternate solids and liquids;Eat/Feed slowly; No straws;Upright as possible for all oral intake;Remain upright for 30-45 minutes after meals;Small bites/sips; Check for pocketing of food on the Right; Check for pocketing of food on the Left; Set up assist;Assist feed       Recommend she continue speech therapy for dysphagia, expressive language, receptive language,cognition and dysarthria. SHORT TERM GOAL #1:  Goal 1: Pt will complete y/n tasks with 80% accuracy and minimal verbal cues/modeling. SHORT TERM GOAL #2:  Goal 2: Pt will complete verbal expression tasks with 80% accuracy and minimal verbal cues/modeling. SHORT TERM GOAL #3:  Goal 3: Pt will complete receptive/expressive language tasks with 80% accuracy and minimal verbal cues/modeling. SHORT TERM GOAL #4:  Goal 4: Pt will follow one step commands with with 90% accuracy and minimal verbal cues/modeling.     SHORT TERM GOAL #5:  Goal 5: Pt will complete orientation tasks with 90% accuracy and minimal verbal cues/modeling. Swallowing Short Term Goals  Short-term Goals  Goal 1: Pt will tolerate soft & bite sized consistencies with thin liquids with minimal overt s/s of aspiration/penetration during hospitalization. Goal 2: Pt will complete Modified Barium Swallow Study. Goal 3: Pt will demonstrate awareness of general aspiration precautions. Goal 4: Pt will participate in swallowing reassessments to ensure safest diet consistencies. Goal 5: Pt will allow staff to complete daily oral care. Goal 6: Pt will complete oral motor exercises for lingual and labial strengthening. ASSESSMENT:  Assessment: [x]Progressing towards goals          []Not Progressing towards goals    Patient Tolerance of Treatment:   [x]Tolerated well []Tolerated fair []Required rest breaks []Fatigued    Education:  Learner:  [x]Patient          [x]Significant Other          []Other  Education provided regarding:  [x]Goals and POC   []Diet and swallowing precautions    []Home Exercise Program  [x]Progress and/or discharge information  Method of Education:  [x]Discussion          [x]Demonstration          []Handout          []Other  Evaluation of Education:   []Verbalized understanding   []Demonstrates without assistance  []Demonstrates with assistance  [x]Needs further instruction     []No evidence of learning                  []No family present      Plan: [x]Continue with current plan of care    []Modify current plan of care as follows:    []Discharge patient    Discharge Location:    Services/Supervision Recommended:      [x]Patient continues to require treatment by a licensed therapist to address functional deficits as outlined in the established plan of care.            Electronically Signed By:  Metro Glassing M.S. CCC-SLP  11/25/2022,11:13 AM.

## 2022-11-25 NOTE — DISCHARGE INSTR - DIET
Good nutrition is important when healing from an illness, injury, or surgery. Follow any nutrition recommendations given to you during your hospital stay. If you were given an oral nutrition supplement while in the hospital, continue to take this supplement at home. You can take it with meals, in-between meals, and/or before bedtime. These supplements can be purchased at most local grocery stores, pharmacies, and chain [email or web address]. If you have any questions about your diet or nutrition, call the hospital and ask for the dietitian.   Regular, 4 carb choices

## 2022-11-25 NOTE — PROGRESS NOTES
11/25/22 1300   Restrictions/Precautions   Restrictions/Precautions Modified Diet;Swallowing - Thickened Liquids; Fall Risk;Aspiration Risk;Weight Bearing   Lower Extremity Weight Bearing Restrictions   Right Lower Extremity Weight Bearing Weight Bearing As Tolerated   Left Lower Extremity Weight Bearing Weight Bearing As Tolerated   General   Additional Pertinent Hx Anxiety, depression, COPD, CVA, DM, LE edema, hyperlipidemia, HTN, obesity, RA, CAD and venous thromboembolism   Diagnosis CVA, R hemiparesis   General Comment   Comments PATIENT WAS SHOPPING AT [employer] DOWNSTAIRS WITH DAUGHTER   Transfers   Sit to Stand Moderate Assistance  (LUE PULLING ON //)   Stand to Sit Moderate Assistance  (SLOW AND CONTROLLED MOVEMENT WB THROUGH LLE)   Squat Pivot Transfers Moderate Assistance;Maximum Assistance  (FROM RIGHT SIDE OF BED, TRANSFERRING TO LEFT. DIFFICULTY WITH DIRECTIONS PRIOR TO TRANSFER, ATTEMPTED TO WRAP ARMS AROUND THERAPIST RATHER THAN LEAN FWD AND REACH L HAND FOR BED.)   Ambulation   WB Status WBAT   Ambulation   Surface Level tile   Device Parallel Bars   Other Apparatus Wheelchair follow   Assistance Maximum assistance; Moderate assistance   Distance 0'   Comments ATTEMPTED TO STAND THREE TIMES. EACH TIME PATIENT WOULD STAND UPRIGHT, HAVE LOB, AND WOULD SAY STOP, THEN PROCEED TO SIT DOWN. More Ambulation? Yes   Ambulation 2   Surface - 2 level tile   Device 2 Parallel Bars   Other Apparatus 2 Wheelchair follow   Assistance 2 Maximum assistance; Moderate assistance   Quality of Gait 2 EXCESSIVE FWD LEAN, DIFFICULTY LEANING TO LEFT, UNABLE TO WB THROUGH RLE CONFIDENTLY, WOULD ADVANCE LUE ON RAIL AND THEN RETURN IT BEHIND SELF   Distance 3'   Comments PATIENT WANTING TO INITIATE MOVEMENTS BEFORE READY OR DIRECTED TO, CAUSING KNEE TO BUCKLE OR LOB   Ambulation 3   Surface - 3 level tile   Device 3 Parallel Bars   Other Apparatus 3 Wheelchair follow   Assistance 3 Maximum assistance   Quality of Gait 3 AS ABOVE   Distance 3'   Comments IMPROVED AMB. , PATIENT DID NOT ADVANCE BEFORE READY. THERAPIST ASSISTANCE AT RLE, NO FEELING OF RESISTANCE OR ASSISTANCE IN RLE. PATIENT UNABLE TO TAKE SUFFICENT STEP WITH LLE. PT Exercises   Exercise Treatment WS IN //; SEATED EX: MARCHES 15X, QS 10X, PF 15X  (RLE = PROM; LLE = AAROM)   Activity Tolerance   Activity Tolerance Patient tolerated treatment well   Assessment   Assessment PATIENT WAS UNABLE TO AMB. EFFICENTLY AS DONE IN PREVIOUS SESSIONS. INITIALLY HAD DIFFICULTY STANDING AND WORKED ON WS TO BEGIN. AFTERWARDS WHEN AMB. PATIENT WOULD BEGIN ADVANCING LUE OR LLE BEFORE STABLE OR PREPARED, THIS LEAD TO LOB AND KNEE GRADY DURING EACH AMB., MULTIPLE CUES TO SLOW DOWN. SEATED EXERCISES DONE AFTERWARDS, PATIENT BEGAN COUNTING REPITIONS WITH LETTERS RATHER THAN NUMBERS. DURING SQUAT PIVOT TRANSFER FROM W/C INTO BED, PATIENT ATTEMPTED TO WRAP LUE AROUND THERAPIST RATHER THAN PLACE HAND ON BED, OTHERWISE TRANSFER WAS VERY SUCCESSFUL AND ONLY REQUIRED MOD A. Discharge Recommendations Continue to assess pending progress   Safety Devices   Type of Devices Bed alarm in place; Left in bed;Sitter present;Call light within reach  (DAUGHTER AND FAMILY FRIEND IN ROOM)   PT Individual Minutes   Time In 1300   Time Out 1400   Minutes 60   Electronically sign by: Cheryl Ambrosio, MOLINA 11/25/2022, 3:30 PM

## 2022-11-25 NOTE — PROGRESS NOTES
Patient:   Starr Scherer  MR#:    [id number]   Room:    0826/826-01   YOB: 1960  Date of Progress Note: 11/25/2022  Time of Note                           9:11 AM  Consulting Physician:   Naz Rodríguez M.D. Attending Physician:  Naz Rodríguez MD     Chief complaint Acute ischemic stroke    S:This 64 y.o. female  with history of anxiety, depression, COPD, atherosclerotic disease, colitis, COPD, CVA, DM,  LE edema, hyperlipidemia, HTN, morbid obesity, RA, CAD  and venous thromboembolism. She presented to [facility] ER on 11/9/22 after being found at home lying facedown in her feces. She was very weak, confused, not able to speak but moving purposefully and intermittently following commands. Her last well know was 3 a.m. when her  left for work. Imaging done revealed a large MCA stroke 7.7 x 5 cm. CTA head/neck revealed an acute M1 occlusion. She was outside of the window for TPA. CK on admit was 1100, consistent with rhabdomylosis. She was also noted to possibly Dens fracture. She was transferred to [facility] for a possible thrombectomy. Further imaging was done at [facility] and she was deemed not a candidate for thrombectomy. MRI done ruled out Dens fracture. Repeat CT of head on 11/11/22 showed evolving left MCA territory infarct with increasing edema/mass effect resulting in 4mm of  rightward midline shift(previously 2mm) a the level of the third ventricle. No hemorrhagic conversion or definite trapping of the right lateral ventricle, possible small acute right cerebellar infarct. Neurosurgery was consulted for possible hemicraniectomy. She was seen by Dr. Diego Ramos, who didn't feel any surgical intervention was needed. Patient was found to have a UTI + for [facility] and was placed on Rocephin on 11/14/22. Patient had a PICC line placed. Patient was evaluated by SPT and initially made NPO d/t oropharyngeal dysphagia. NGT placed and feeding started.  She was also noted to have global aphasia but is improving. She was re-evaluated by SPT on 11/15/22 for swallow and was started on a dysphagia 1 diet with nectar thick liquids. Patient also continues to have right sided weakness and is participating in both PT/OT. Repeat CT head on 11/13/22 shows stable LMCA ischemic stroke with stable edema, 5 mm shift, no hemorrhage. She is felt to need a stay on Rehab for continued PT/OT/SPT and medical management to work towards her goal of returning home with her . She is now felt ready to start the Rehab program.  No new issues as per . Appears comfortable. REVIEW OF SYSTEMS:  Unreliable due to aphasia     Past Medical History:      Diagnosis Date    Anxiety     ASCVD (arteriosclerotic cardiovascular disease)     Carrier of group B Streptococcus     Cellulitis     Colitis     COPD (chronic obstructive pulmonary disease) ([facility])     Costochondritis     CVA (cerebral vascular accident) ([address]     Depression     Edema     Fracture of sternum     non union post surgery.      Gallstones     Grief reaction     H/O superior vena cava filter placement     Hyperlipidemia     Hypertension     MI (myocardial infarction) ([address]     MRSA (methicillin resistant Staphylococcus aureus)     Neuropathy     Obesity     Pseudarthrosis sternal    RA (rheumatoid arthritis) ([address]     Rosacea     Sinus bradycardia     TIA (transient ischemic attack)     Venous thromboembolism        Past Surgical History:      Procedure Laterality Date    ADENOIDECTOMY      APPENDECTOMY      CARDIAC CATHETERIZATION  3/2009    CARDIAC CATHETERIZATION  11/5/14  JDT    EF 50%, patent bypass grafts    CHOLECYSTECTOMY  2011    COLONOSCOPY  06/03/2010    Dr. Willem Richard: distal colon having ulcerative lesions c/w UC    COLONOSCOPY  2009    pancolitis    COLONOSCOPY      CORONARY ARTERY BYPASS GRAFT  3/2009    Dr Ileana Montejo, LIMA to LAD, SVGs to OM & PDA    GASTRIC BYPASS SURGERY  2012    HIATAL HERNIA REPAIR  2011    HYSTERECTOMY (CERVIX STATUS UNKNOWN) KNEE SURGERY      KS COLONOSCOPY FLX DX W/COLLJ SPEC WHEN PFRMD N/A 3/12/2018    Dr Vallejo Moder rectal inflammation consistent with U.C.-Active proctitis--3 yr recall    [id number] [place] [address] Dr ENDOSCOPY  2010    Dr. Fely Morin  2012       Medications in Hospital:      Current Facility-Administered Medications:     docusate sodium (COLACE) capsule 100 mg, 100 mg, Oral, BID, Nicolasa Truong MD, 100 mg at 11/25/22 0744    aspirin chewable tablet 81 mg, 81 mg, Oral, Daily, Nicolasa Truong MD, 81 mg at 11/25/22 0741    atorvastatin (LIPITOR) tablet 40 mg, 40 mg, Oral, Nightly, Nicolasa Truong MD, 40 mg at 11/24/22 2058    dulaglutide (TRULICITY) SC injection 1.5 mg (Patient Supplied), 1.5 mg, SubCUTAneous, Weekly, Nicolasa Truong MD    folic acid (FOLVITE) tablet 1 mg, 1 mg, Oral, Daily, Nicolasa Truong MD, 1 mg at 11/25/22 0741    gabapentin (NEURONTIN) capsule 300 mg, 300 mg, Oral, Nightly, Nicolasa Truong MD, 300 mg at 11/24/22 2058    hydroxychloroquine (PLAQUENIL) tablet 200 mg, 200 mg, Oral, BID, Nicolasa Truong MD, 200 mg at 11/25/22 0740    amLODIPine (NORVASC) tablet 2.5 mg, 2.5 mg, Oral, Daily, Nicolasa Truong MD, 2.5 mg at 11/25/22 0740    tiZANidine (ZANAFLEX) tablet 4 mg, 4 mg, Oral, BID PRN, Nicolasa Truong MD    multivitamin 1 tablet, 1 tablet, Oral, Daily, Nicolasa Truong MD, 1 tablet at 11/25/22 0740    acetaminophen (TYLENOL) tablet 650 mg, 650 mg, Oral, Q4H PRN, Nicolasa Truong MD, 650 mg at 11/22/22 2101    enoxaparin (LOVENOX) injection 40 mg, 40 mg, SubCUTAneous, Daily, Nicolasa Truong MD, 40 mg at 11/24/22 1729    polyethylene glycol (GLYCOLAX) packet 17 g, 17 g, Oral, Daily PRN, Nicolasa Truong MD, 17 g at 11/24/22 8599    Allergies:  Methotrexate derivatives    Social History:   TOBACCO:   reports that she quit smoking about 13 years ago. Her smoking use included cigarettes. She has a 64.00 pack-year smoking history.  She has never used smokeless tobacco.  ETOH:   reports current alcohol use. Family History:       Problem Relation Age of Onset    Prostate Cancer Father     Heart Failure Father     Cancer Father     Colon Cancer Neg Hx     Colon Polyps Neg Hx     Liver Cancer Neg Hx     Liver Disease Neg Hx     Esophageal Cancer Neg Hx     Rectal Cancer Neg Hx     Stomach Cancer Neg Hx          PHYSICAL EXAM:  BP (!) 143/79   Pulse 52   Temp (!) 96.6 °F (35.9 °C) (Temporal)   Resp 16   Ht 5' 7\" (1.702 m)   Wt 200 lb 4.8 oz (90.9 kg)   SpO2 97%   BMI 31.37 kg/m²     Constitutional - well developed, well nourished. Eyes - conjunctiva normal.   Ear, nose, throat - No scars, masses, or lesions over external nose or ears, no atrophy of tongue  Neck-symmetric, no masses noted, no jugular vein distension  Respiration- chest wall appears symmetric, good expansion,   normal effort without use of accessory muscles  Musculoskeletal - no significant wasting of muscles noted, no bony deformities  Extremities-no clubbing, cyanosis or edema  Skin - warm, dry, and intact. No rash, erythema, or pallor. Psychiatric - mood, affect, and behavior appear normal.      Neurological exam  Awake, alert, global aphasia says \"yes\" to all questions asked   Attention and concentration appear appropriate  Recent and remote memory unable to tests     Cranial Nerve Exam     CN III, IV,VI-EOMI, No nystagmus, conjugate eye movements, no ptosis    CN VII-+ facial assymetry       Motor Exam  No movement on the right      Tremors- no tremors in hands or head noted     Gait  Not tested     Nursing/pcp notes, imaging,labs and vitals reviewed.      PT,OT and/or speech notes reviewed    Lab Results   Component Value Date    WBC 9.0 11/24/2022    HGB 12.2 11/24/2022    HCT 43.1 11/24/2022    MCV 85.3 11/24/2022     (H) 11/24/2022     Lab Results   Component Value Date     11/24/2022    K 5.2 (H) 11/24/2022     11/24/2022    CO2 21 (L) 11/24/2022    BUN 17 2022    CREATININE 0.6 2022    GLUCOSE 86 2022    CALCIUM 9.0 2022    PROT 5.6 (L) 2022    LABALBU 3.4 (L) 2022    BILITOT <0.2 2022    ALKPHOS 89 2022    AST 37 (H) 2022    ALT 28 2022    LABGLOM >60 2022   No results found for: INR, PROTIME    Karen Magaña OT   Occupational Therapist   Occupational   Progress Notes      Signed   Date of Service:  2022  8:00 AM                 Signed                                                                                                                                                                                                                                                                        Occupational Therapy  Facility/Department: [facility]        Name: Rashad Russell  : 1960  MRN: [id number]  Date of Service: 2022     Discharge Recommendations:  Continue to assess pending progress        Patient Diagnosis(es): There were no encounter diagnoses. Past Medical History:  has a past medical history of Anxiety, ASCVD (arteriosclerotic cardiovascular disease), Carrier of group B Streptococcus, Cellulitis, Colitis, COPD (chronic obstructive pulmonary disease) ([address] Costochondritis, CVA (cerebral vascular accident) ([address] Depression, Edema, Fracture of sternum, Gallstones, Grief reaction, H/O superior vena cava filter placement, Hyperlipidemia, Hypertension, MI (myocardial infarction) ([address] MRSA (methicillin resistant Staphylococcus aureus), Neuropathy, Obesity, Pseudarthrosis, RA (rheumatoid arthritis) ([address] Rosacea, Sinus bradycardia, TIA (transient ischemic attack), and Venous thromboembolism. Past Surgical History:  has a past surgical history that includes Coronary artery bypass graft (3/2009); Tonsillectomy; Hysterectomy; thoracotomy; knee surgery; Appendectomy; Colonoscopy (2010); Cardiac catheterization (3/2009); Adenoidectomy; Cholecystectomy ();  Gastric bypass surgery (2012); hiatal hernia repair (2011); Cardiac catheterization (11/5/14  JDT); Colonoscopy (2009); Colonoscopy; Upper gastrointestinal endoscopy (2010); Upper gastrointestinal endoscopy (2012); and pr colonoscopy flx dx w/collj spec when pfrmd (N/A, 3/12/2018). Treatment Diagnosis: L MCA stroke        Assessment        11/22/22 1000   Assessment   Performance deficits / Impairments Decreased functional mobility ; Decreased endurance;Decreased coordination;Decreased ADL status; Decreased sensation;Decreased posture;Decreased ROM; Decreased balance;Decreased strength;Decreased vision/visual deficit; Decreased safe awareness;Decreased high-level IADLs;Decreased cognition;Decreased fine motor control         Plan   Occupational Therapy Plan  Specific Instructions for Next Treatment: transfers  Current Treatment Recommendations: Strengthening, ROM, Balance training, Functional mobility training, Endurance training, Positioning, Modalities, Neuromuscular re-education, Cognitive reorientation, Safety education & training, Patient/Caregiver education & training, Equipment evaluation, education, & procurement, Self-Care / ADL, Cognitive/Perceptual training, Home management training, Sensory integraion      Restrictions  Restrictions/Precautions  Restrictions/Precautions: Modified Diet, Swallowing - Thickened Liquids, Fall Risk, Aspiration Risk  Required Braces or Orthoses?: No     Subjective   General  Patient assessed for rehabilitation services?: Yes  Family / Caregiver Present: Yes (daughter)    11/23/22 0800   General   Family / Caregiver Present Yes  (daughter)   Pre Treatment Pain Screening   Pain at present 0   Scale Used Numeric Score            Objective        11/23/22 0800   OT Exercises   Exercise Treatment SROM HEP with min A, PROM R UE all muscle planes        11/23/22 0800   Neuromuscular Education   Neuromuscular education Yes   NDT Treatment Upper extremity   Facilitation techniques scapular mobilization   Weight Bearing   Weight Bearing Technique Yes   RUE Weight Bearing Forearm seated; Extended arm seated        11/23/22 0800   Oral Hygiene   Assistance Needed Supervision or touching assistance   Comment vc for aspen tech after demonstration first   CARE Score 4   Shower/Bathe Self   Assistance Needed Substantial/maximal assistance   Comment sponge bath while in bed   CARE Score 2   Upper Body Dressing   Assistance Needed Substantial/maximal assistance   Comment pullover shirt, cues for aspen tech   CARE Score 2   Lower Body Dressing   Assistance Needed Substantial/maximal assistance   Comment pants only, completed in bed, pt able to roll with mod A to left, and cues for right while another assists with pant management   CARE Score 2   Putting On/Taking Off Footwear   Assistance Needed Substantial/maximal assistance   Comment while in bed and supine, pt able to reach left foot partially   CARE Score 2      Goals  Short Term Goals  Time Frame for Short Term Goals: 2 weeks  Short Term Goal 1: Pt will bathe with mod A, AE prn  Short Term Goal 2: Pt will dress UB using hemitechnique, mod A and cues  Short Term Goal 3: Pt will dress LB, hemitechnique, mod A and cues  Short Term Goal 4: Pt will toilet with mod A  Short Term Goal 5:  Toilet transfer with mod A  Additional Goals?: Yes  Short Term Goal 6: Pt will attend to R side during ADL/functional activities with moderate cues  Short Term Goal 7: Pt will perform standing tasks x 2-3 mins with L UE and mod A for balance to enhance ADL/IADL performance  Short Term Goal 8: Pt/family will demo understanding of R UE positioning/HEP/therapeutic activity recommendations with min A after ed  Long Term Goals  Time Frame for Long Term Goals : 4-5 weeks  Long Term Goal 1: Pt will bathe with min A, AE/DME prn  Long Term Goal 2: Pt will dress UB supervision  Long Term Goal 3: Pt will dress LB min A, AE prn  Long Term Goal 4: Pt will toilet with CGA  Long Term Goal 5: Pt will perform toilet transfer with CGA  Additional Goals?: Yes  Long Term Goal 6: Pt will perform simple home making task with min A  Long Term Goal 7: Pt/family will be independent in HEP/DME/therapeutic activity recommendations after ed  Long Term Goal 8: Pt will attend to R side during functional activities with occasional cueing     Therapy Time    Individual Concurrent Group Co-treatment   Time In 0800      Time Out 0900      Minutes 60      Timed Code Treatment Minutes: 60 Minutes     Franci Burger OT                    RECORD REVIEW: Previous medical records, medications were reviewed at today's visit    IMPRESSION:   1. Acute ischemic stroke-on aspirin/statin  2. Hypertension-on medications monitor  3. Hyperlipidemia-on statin  4. Diabetes-Trulicity-monitor blood sugar  5. DVT prophylaxis-Lovenox  6. History of coronary artery disease-aspirin/statin  7. Neuropathy-on Neurontin  8. Rheumatoid arthritis-on Plaquenil  9. COPD-monitor  10. History of anxiety and depression-monitor  11. History of colitis/gallstones-monitor  12. History of bariatric surgery-on multivitamin/folic acid  13. History of superior vena cava filter placement with venous thromboembolism-not on anticoagulation-monitor- indicates took herself off blood thinners as not like meds and was bruising   14.   PT/OT/speech    Continue present care as noted      NAM pending     Expected duration and frequency therapy: 180 minutes per day, 5 days per week    [address] [facility]  [phone] CELL  Dr Carmenza Simons

## 2022-11-25 NOTE — PROGRESS NOTES
Comprehensive Nutrition Assessment    Type and Reason for Visit:  Reassess    Nutrition Recommendations/Plan:   Will continue POC. Malnutrition Assessment:  Malnutrition Status:  No malnutrition (11/25/22 0901)    Context:  Acute Illness     Findings of the 6 clinical characteristics of malnutrition:  Energy Intake:  No significant decrease in energy intake  Weight Loss:  No significant weight loss     Body Fat Loss:  No significant body fat loss     Muscle Mass Loss:  No significant muscle mass loss    Fluid Accumulation:  Mild Extremities   Strength:  Not Performed    Nutrition Assessment:    Pt. reflecting a % intake. Pt. reweight reflected that her weights have remained stable. Pt. was asleep upon visiting. Spoke with family. Family stated that her appetite has been great and that she has been consuming 100% of her ONS TID. They have brought her in lots of snacks and meals as well. Will continue POC. Nutrition Related Findings:    aphasia Wound Type: None       Current Nutrition Intake & Therapies:    Average Meal Intake: %  Average Supplements Intake: %  ADULT ORAL NUTRITION SUPPLEMENT; Breakfast, Lunch, Dinner; Other Oral Supplement; 4 oz. frozen milkshake (in freezer behind hot line)  ADULT DIET; Dysphagia - Soft and Bite Sized; 4 carb choices (60 gm/meal)    Anthropometric Measures:  Height: 5' 7\" (170.2 cm)  Ideal Body Weight (IBW): 135 lbs (61 kg)    Admission Body Weight: 201 lb (91.2 kg)  Current Body Weight: 200 lb (90.7 kg), 148.1 % IBW. Weight Source: Bed Scale  Current BMI (kg/m2): 31.3  Usual Body Weight: 201 lb (91.2 kg) (8/2022)  % Weight Change (Calculated): 0  Weight Adjustment For: No Adjustment    BMI Categories: Obese Class 1 (BMI 30.0-34. 9)    Estimated Daily Nutrient Needs:  Energy Requirements Based On: Kcal/kg  Weight Used for Energy Requirements: Current  Energy (kcal/day): 4,916-9,845  Weight Used for Protein Requirements: Ideal  Protein (g/day):   Method Used for Fluid Requirements: 1 ml/kcal  Fluid (ml/day): 0,459-5,974    Nutrition Diagnosis:   Biting/chewing (masticatory) difficulty, Swallowing difficulty related to acute injury/trauma as evidenced by swallow study results    Nutrition Interventions:   Food and/or Nutrient Delivery: Continue Current Diet, Continue Oral Nutrition Supplement  Nutrition Education/Counseling: No recommendation at this time  Coordination of Nutrition Care: Continue to monitor while inpatient  Plan of Care discussed with: Family    Goals:  Previous Goal Met: Progressing toward Goal(s)  Goals: Meet at least 75% of estimated needs, PO intake 75% or greater       Nutrition Monitoring and Evaluation:   Behavioral-Environmental Outcomes: None Identified  Food/Nutrient Intake Outcomes: Food and Nutrient Intake, Supplement Intake, Diet Advancement/Tolerance  Physical Signs/Symptoms Outcomes: Biochemical Data, Chewing or Swallowing, Weight, Skin, Nutrition Focused Physical Findings, Fluid Status or Edema    Discharge Planning:     Too soon to determine     Theresa Hassan MS, RD, LD  Contact: [phone]

## 2022-11-25 NOTE — PLAN OF CARE
Problem: Discharge Planning  Goal: Discharge to home or other facility with appropriate resources  Outcome: Progressing  Flowsheets (Taken 11/25/2022 0842)  Discharge to home or other facility with appropriate resources: Refer to discharge planning if patient needs post-hospital services based on physician order or complex needs related to functional status, cognitive ability or social support system

## 2022-11-26 PROCEDURE — 94760 N-INVAS EAR/PLS OXIMETRY 1: CPT

## 2022-11-26 PROCEDURE — 99232 SBSQ HOSP IP/OBS MODERATE 35: CPT | Performed by: PSYCHIATRY & NEUROLOGY

## 2022-11-26 PROCEDURE — 1180000000 HC REHAB R&B

## 2022-11-26 PROCEDURE — 6360000002 HC RX W HCPCS: Performed by: PSYCHIATRY & NEUROLOGY

## 2022-11-26 PROCEDURE — 6370000000 HC RX 637 (ALT 250 FOR IP): Performed by: PSYCHIATRY & NEUROLOGY

## 2022-11-26 RX ADMIN — POLYETHYLENE GLYCOL 3350 17 G: 17 POWDER, FOR SOLUTION ORAL at 17:16

## 2022-11-26 RX ADMIN — ATORVASTATIN CALCIUM 40 MG: 40 TABLET, FILM COATED ORAL at 21:16

## 2022-11-26 RX ADMIN — AMLODIPINE BESYLATE 2.5 MG: 2.5 TABLET ORAL at 08:21

## 2022-11-26 RX ADMIN — HYDROXYCHLOROQUINE SULFATE 200 MG: 200 TABLET, FILM COATED ORAL at 08:20

## 2022-11-26 RX ADMIN — HYDROXYCHLOROQUINE SULFATE 200 MG: 200 TABLET, FILM COATED ORAL at 21:16

## 2022-11-26 RX ADMIN — DOCUSATE SODIUM 100 MG: 100 CAPSULE, LIQUID FILLED ORAL at 08:21

## 2022-11-26 RX ADMIN — GABAPENTIN 300 MG: 300 CAPSULE ORAL at 21:16

## 2022-11-26 RX ADMIN — THERA TABS 1 TABLET: TAB at 08:20

## 2022-11-26 RX ADMIN — ACETAMINOPHEN 650 MG: 325 TABLET ORAL at 13:25

## 2022-11-26 RX ADMIN — FOLIC ACID 1 MG: 1 TABLET ORAL at 08:20

## 2022-11-26 RX ADMIN — ENOXAPARIN SODIUM 40 MG: 100 INJECTION SUBCUTANEOUS at 17:12

## 2022-11-26 RX ADMIN — DOCUSATE SODIUM 100 MG: 100 CAPSULE, LIQUID FILLED ORAL at 21:16

## 2022-11-26 RX ADMIN — ASPIRIN 81 MG 81 MG: 81 TABLET ORAL at 08:21

## 2022-11-26 ASSESSMENT — PAIN SCALES - WONG BAKER
WONGBAKER_NUMERICALRESPONSE: 0
WONGBAKER_NUMERICALRESPONSE: 0

## 2022-11-26 ASSESSMENT — PAIN SCALES - GENERAL: PAINLEVEL_OUTOF10: 0

## 2022-11-26 ASSESSMENT — PAIN DESCRIPTION - DESCRIPTORS: DESCRIPTORS: ACHING

## 2022-11-26 ASSESSMENT — PAIN DESCRIPTION - LOCATION: LOCATION: HEAD

## 2022-11-26 ASSESSMENT — PAIN - FUNCTIONAL ASSESSMENT: PAIN_FUNCTIONAL_ASSESSMENT: ACTIVITIES ARE NOT PREVENTED

## 2022-11-26 NOTE — PROGRESS NOTES
Occupational Therapy     11/25/22 0900   General   Timed Code Treatment Minutes 60 Minutes   Restrictions/Precautions   Restrictions/Precautions Modified Diet;Swallowing - Thickened Liquids; Fall Risk;Aspiration Risk   Subjective   Subjective spouse reports that the pt had been tearful after having a rough time with the [place]. States her leg was twisted. Balance   Standing Balance Contact guard assistance  (R knee blocked)   Standing Balance   Time 1 min x 3 occ   Activity static to 1 hand AROM act   Comment no c/o pain in RLE while standing   Transfers   Sit to stand Minimal assistance   Stand to sit Minimal assistance   Type of ROM/Therapeutic Exercise   Type of ROM/Therapeutic Exercise [place];Self PROM;PROM   Comment 10# LUE   Exercises   Grasp/Release able to complete partial range   Weight Bearing   Weight Bearing Technique Yes   RUE Weight Bearing Extended arm standing   Response To Weight Bearing Technique fair   Vision   Vision Comment attempted [place] cancellation on  BITs but pt unable to comprehend the purpose and instead named objects   Perception   Unilateral Attention Cues to attend to right side of body;Cues to attend right visual field  (cues for RUE hanging at times)   Additional Activities Comment   Additional Activities Language matching- pt did very well with concrete item matching to written name task. 100% (with breaks at times d/t perseveration). Pt very quickly able to match coins with both name and amount. Assessment   Performance deficits / Impairments Decreased functional mobility ; Decreased endurance;Decreased coordination;Decreased ADL status; Decreased sensation;Decreased posture;Decreased ROM; Decreased balance;Decreased strength;Decreased vision/visual deficit; Decreased safe awareness;Decreased high-level IADLs;Decreased cognition;Decreased fine motor control   Treatment Diagnosis L MCA stroke   Activity Tolerance   Activity Tolerance Patient Tolerated treatment well

## 2022-11-26 NOTE — PLAN OF CARE
Problem: Discharge Planning  Goal: Discharge to home or other facility with appropriate resources  Outcome: Progressing  Flowsheets (Taken 11/26/2022 0827)  Discharge to home or other facility with appropriate resources: Refer to discharge planning if patient needs post-hospital services based on physician order or complex needs related to functional status, cognitive ability or social support system     Problem: Safety - Adult  Goal: Free from fall injury  Outcome: Progressing     Problem: Neurosensory - Adult  Goal: Achieves stable or improved neurological status  Outcome: Progressing  Flowsheets (Taken 11/26/2022 0827)  Achieves stable or improved neurological status: Assess for and report changes in neurological status  Goal: Achieves maximal functionality and self care  Outcome: Progressing     Problem: Skin/Tissue Integrity - Adult  Goal: Skin integrity remains intact  Outcome: Progressing  Flowsheets (Taken 11/26/2022 1120)  Skin Integrity Remains Intact: Monitor for areas of redness and/or skin breakdown     Problem: Pain  Goal: Verbalizes/displays adequate comfort level or baseline comfort level  Outcome: Progressing     Problem: Chronic Conditions and Co-morbidities  Goal: Patient's chronic conditions and co-morbidity symptoms are monitored and maintained or improved  Outcome: Progressing  Flowsheets (Taken 11/26/2022 0827)  Care Plan - Patient's Chronic Conditions and Co-Morbidity Symptoms are Monitored and Maintained or Improved: Monitor and assess patient's chronic conditions and comorbid symptoms for stability, deterioration, or improvement     Problem: ABCDS Injury Assessment  Goal: Absence of physical injury  Outcome: Progressing     Problem: Skin/Tissue Integrity  Goal: Absence of new skin breakdown  Description: 1. Monitor for areas of redness and/or skin breakdown  2. Assess vascular access sites hourly  3. Every 4-6 hours minimum:  Change oxygen saturation probe site  4.   Every 4-6 hours: If on nasal continuous positive airway pressure, respiratory therapy assess nares and determine need for appliance change or resting period. Outcome: Progressing     Problem: Confusion  Goal: Confusion, delirium, dementia, or psychosis is improved or at baseline  Description: INTERVENTIONS:  1. Assess for possible contributors to thought disturbance, including medications, impaired vision or hearing, underlying metabolic abnormalities, dehydration, psychiatric diagnoses, and notify attending LIP  2. [place] high risk fall precautions, as indicated  3. Provide frequent short contacts to provide reality reorientation, refocusing and direction  4. Decrease environmental stimuli, including noise as appropriate  5. Monitor and intervene to maintain adequate nutrition, hydration, elimination, sleep and activity  6. If unable to ensure safety without constant attention obtain sitter and review sitter guidelines with assigned personnel  7.  Initiate Psychosocial CNS and Spiritual Care consult, as indicated  Outcome: Progressing     Problem: Musculoskeletal - Adult  Goal: Return mobility to safest level of function  Outcome: Progressing  Flowsheets (Taken 11/26/2022 0827)  Return Mobility to Safest Level of Function: Assess patient stability and activity tolerance for standing, transferring and ambulating with or without assistive devices  Goal: Return ADL status to a safe level of function  Outcome: Progressing  Flowsheets (Taken 11/26/2022 0827)  Return ADL Status to a Safe Level of Function: Assist and instruct patient to increase activity and self care as tolerated     Problem: Gastrointestinal - Adult  Goal: Maintains or returns to baseline bowel function  Outcome: Progressing  Flowsheets (Taken 11/26/2022 0827)  Maintains or returns to baseline bowel function: Assess bowel function  Goal: Maintains adequate nutritional intake  Outcome: Progressing  Flowsheets (Taken 11/26/2022 0827)  Maintains adequate nutritional intake: Monitor percentage of each meal consumed     Problem: Metabolic/Fluid and Electrolytes - Adult  Goal: Electrolytes maintained within normal limits  Outcome: Progressing  Flowsheets (Taken 11/26/2022 6195)  Electrolytes maintained within normal limits: Monitor labs and assess patient for signs and symptoms of electrolyte imbalances     Problem: Nutrition Deficit:  Goal: Optimize nutritional status  Outcome: Progressing

## 2022-11-26 NOTE — PROGRESS NOTES
Patient:   Yonny Ray  MR#:    [id number]   Room:    0826/826-01   YOB: 1960  Date of Progress Note: 11/26/2022  Time of Note                           10:32 AM  Consulting Physician:   Bruce Bernstein M.D. Attending Physician:  Bruce Bernstein MD     Chief complaint Acute ischemic stroke    S:This 64 y.o. female  with history of anxiety, depression, COPD, atherosclerotic disease, colitis, COPD, CVA, DM,  LE edema, hyperlipidemia, HTN, morbid obesity, RA, CAD  and venous thromboembolism. She presented to [place] [facility] on 11/9/22 after being found at home lying facedown in her feces. She was very weak, confused, not able to speak but moving purposefully and intermittently following commands. Her last well know was 3 a.m. when her  left for work. Imaging done revealed a large MCA stroke 7.7 x 5 cm. CTA head/neck revealed an acute M1 occlusion. She was outside of the window for TPA. CK on admit was 1100, consistent with rhabdomylosis. She was also noted to possibly Dens fracture. She was transferred to [facility] for a possible thrombectomy. Further imaging was done at [facility] and she was deemed not a candidate for thrombectomy. MRI done ruled out Dens fracture. Repeat CT of head on 11/11/22 showed evolving left MCA territory infarct with increasing edema/mass effect resulting in 4mm of  rightward midline shift(previously 2mm) a the level of the third ventricle. No hemorrhagic conversion or definite trapping of the right lateral ventricle, possible small acute right cerebellar infarct. Neurosurgery was consulted for possible hemicraniectomy. She was seen by Dr. Lizbeth Lake, who didn't feel any surgical intervention was needed. Patient was found to have a UTI + for [facility] and was placed on Rocephin on 11/14/22. Patient had a PICC line placed. Patient was evaluated by SPT and initially made NPO d/t oropharyngeal dysphagia. NGT placed and feeding started.  She was also noted to have global aphasia but is improving. She was re-evaluated by SPT on 11/15/22 for swallow and was started on a dysphagia 1 diet with nectar thick liquids. Patient also continues to have right sided weakness and is participating in both PT/OT. Repeat CT head on 11/13/22 shows stable LMCA ischemic stroke with stable edema, 5 mm shift, no hemorrhage. She is felt to need a stay on Rehab for continued PT/OT/SPT and medical management to work towards her goal of returning home with her . She is now felt ready to start the Rehab program.  No new complaints overnight as per . Appears comfortable. REVIEW OF SYSTEMS:  Unreliable due to aphasia     Past Medical History:      Diagnosis Date    Anxiety     ASCVD (arteriosclerotic cardiovascular disease)     Carrier of group B Streptococcus     Cellulitis     Colitis     COPD (chronic obstructive pulmonary disease) ([facility])     Costochondritis     CVA (cerebral vascular accident) ([address]     Depression     Edema     Fracture of sternum     non union post surgery.      Gallstones     Grief reaction     H/O superior vena cava filter placement     Hyperlipidemia     Hypertension     MI (myocardial infarction) ([address]     MRSA (methicillin resistant Staphylococcus aureus)     Neuropathy     Obesity     Pseudarthrosis sternal    RA (rheumatoid arthritis) ([address]     Rosacea     Sinus bradycardia     TIA (transient ischemic attack)     Venous thromboembolism        Past Surgical History:      Procedure Laterality Date    ADENOIDECTOMY      APPENDECTOMY      CARDIAC CATHETERIZATION  3/2009    CARDIAC CATHETERIZATION  11/5/14  JDT    EF 50%, patent bypass grafts    CHOLECYSTECTOMY  2011    COLONOSCOPY  06/03/2010    Dr. Noemi Gama: distal colon having ulcerative lesions c/w UC    COLONOSCOPY  2009    pancolitis    COLONOSCOPY      CORONARY ARTERY BYPASS GRAFT  3/2009    PHANI Escamilla to LAD, SVGs to OM & PDA    GASTRIC BYPASS SURGERY  2012    HIATAL HERNIA REPAIR  2011    HYSTERECTOMY (CERVIX STATUS has never used smokeless tobacco.  ETOH:   reports current alcohol use. Family History:       Problem Relation Age of Onset    Prostate Cancer Father     Heart Failure Father     Cancer Father     Colon Cancer Neg Hx     Colon Polyps Neg Hx     Liver Cancer Neg Hx     Liver Disease Neg Hx     Esophageal Cancer Neg Hx     Rectal Cancer Neg Hx     Stomach Cancer Neg Hx          PHYSICAL EXAM:  /64   Pulse 60   Temp 97.6 °F (36.4 °C) (Temporal)   Resp 16   Ht 5' 7\" (1.702 m)   Wt 204 lb (92.5 kg)   SpO2 97%   BMI 31.95 kg/m²     Constitutional - well developed, well nourished. Eyes - conjunctiva normal.   Ear, nose, throat - No scars, masses, or lesions over external nose or ears, no atrophy of tongue  Neck-symmetric, no masses noted, no jugular vein distension  Respiration- chest wall appears symmetric, good expansion,   normal effort without use of accessory muscles  Musculoskeletal - no significant wasting of muscles noted, no bony deformities  Extremities-no clubbing, cyanosis or edema  Skin - warm, dry, and intact. No rash, erythema, or pallor. Psychiatric - mood, affect, and behavior appear normal.      Neurological exam  Awake, alert, global aphasia says \"yes\" to all questions asked   Attention and concentration appear appropriate  Recent and remote memory unable to tests     Cranial Nerve Exam     CN III, IV,VI-EOMI, No nystagmus, conjugate eye movements, no ptosis    CN VII-+ facial assymetry       Motor Exam  No movement on the right      Tremors- no tremors in hands or head noted     Gait  Not tested     Nursing/pcp notes, imaging,labs and vitals reviewed.      PT,OT and/or speech notes reviewed    Lab Results   Component Value Date    WBC 9.0 11/24/2022    HGB 12.2 11/24/2022    HCT 43.1 11/24/2022    MCV 85.3 11/24/2022     (H) 11/24/2022     Lab Results   Component Value Date     11/24/2022    K 5.2 (H) 11/24/2022     11/24/2022    CO2 21 (L) 11/24/2022    BUN 17 2022    CREATININE 0.6 2022    GLUCOSE 86 2022    CALCIUM 9.0 2022    PROT 5.6 (L) 2022    LABALBU 3.4 (L) 2022    BILITOT <0.2 2022    ALKPHOS 89 2022    AST 37 (H) 2022    ALT 28 2022    LABGLOM >60 2022   No results found for: INR, PROTIME    Sumi Ro, OT   Occupational Therapist   Occupational   Progress Notes      Signed   Date of Service:  2022  8:00 AM                 Signed                                                                                                                                                                                                                                                                        Occupational Therapy  Facility/Department: [facility]        Name: Cynthia Thomas  : 1960  MRN: [id number]  Date of Service: 2022     Discharge Recommendations:  Continue to assess pending progress        Patient Diagnosis(es): There were no encounter diagnoses. Past Medical History:  has a past medical history of Anxiety, ASCVD (arteriosclerotic cardiovascular disease), Carrier of group B Streptococcus, Cellulitis, Colitis, COPD (chronic obstructive pulmonary disease) ([address] Costochondritis, CVA (cerebral vascular accident) ([address] Depression, Edema, Fracture of sternum, Gallstones, Grief reaction, H/O superior vena cava filter placement, Hyperlipidemia, Hypertension, MI (myocardial infarction) ([address] MRSA (methicillin resistant Staphylococcus aureus), Neuropathy, Obesity, Pseudarthrosis, RA (rheumatoid arthritis) ([address] Rosacea, Sinus bradycardia, TIA (transient ischemic attack), and Venous thromboembolism. Past Surgical History:  has a past surgical history that includes Coronary artery bypass graft (3/2009); Tonsillectomy; Hysterectomy; thoracotomy; knee surgery; Appendectomy; Colonoscopy (2010); Cardiac catheterization (3/2009); Adenoidectomy; Cholecystectomy ();  Gastric bypass surgery (2012); hiatal hernia repair (2011); Cardiac catheterization (11/5/14  JDT); Colonoscopy (2009); Colonoscopy; Upper gastrointestinal endoscopy (2010); Upper gastrointestinal endoscopy (2012); and pr colonoscopy flx dx w/collj spec when pfrmd (N/A, 3/12/2018). Treatment Diagnosis: L MCA stroke        Assessment        11/22/22 1000   Assessment   Performance deficits / Impairments Decreased functional mobility ; Decreased endurance;Decreased coordination;Decreased ADL status; Decreased sensation;Decreased posture;Decreased ROM; Decreased balance;Decreased strength;Decreased vision/visual deficit; Decreased safe awareness;Decreased high-level IADLs;Decreased cognition;Decreased fine motor control         Plan   Occupational Therapy Plan  Specific Instructions for Next Treatment: transfers  Current Treatment Recommendations: Strengthening, ROM, Balance training, Functional mobility training, Endurance training, Positioning, Modalities, Neuromuscular re-education, Cognitive reorientation, Safety education & training, Patient/Caregiver education & training, Equipment evaluation, education, & procurement, Self-Care / ADL, Cognitive/Perceptual training, Home management training, Sensory integraion      Restrictions  Restrictions/Precautions  Restrictions/Precautions: Modified Diet, Swallowing - Thickened Liquids, Fall Risk, Aspiration Risk  Required Braces or Orthoses?: No     Subjective   General  Patient assessed for rehabilitation services?: Yes  Family / Caregiver Present: Yes (daughter)    11/23/22 0800   General   Family / Caregiver Present Yes  (daughter)   Pre Treatment Pain Screening   Pain at present 0   Scale Used Numeric Score            Objective        11/23/22 0800   OT Exercises   Exercise Treatment SROM HEP with min A, PROM R UE all muscle planes        11/23/22 0800   Neuromuscular Education   Neuromuscular education Yes   NDT Treatment Upper extremity   Facilitation techniques scapular mobilization   Weight Bearing   Weight Bearing Technique Yes   RUE Weight Bearing Forearm seated; Extended arm seated        11/23/22 0800   Oral Hygiene   Assistance Needed Supervision or touching assistance   Comment vc for aspen tech after demonstration first   CARE Score 4   Shower/Bathe Self   Assistance Needed Substantial/maximal assistance   Comment sponge bath while in bed   CARE Score 2   Upper Body Dressing   Assistance Needed Substantial/maximal assistance   Comment pullover shirt, cues for aspen tech   CARE Score 2   Lower Body Dressing   Assistance Needed Substantial/maximal assistance   Comment pants only, completed in bed, pt able to roll with mod A to left, and cues for right while another assists with pant management   CARE Score 2   Putting On/Taking Off Footwear   Assistance Needed Substantial/maximal assistance   Comment while in bed and supine, pt able to reach left foot partially   CARE Score 2      Goals  Short Term Goals  Time Frame for Short Term Goals: 2 weeks  Short Term Goal 1: Pt will bathe with mod A, AE prn  Short Term Goal 2: Pt will dress UB using hemitechnique, mod A and cues  Short Term Goal 3: Pt will dress LB, hemitechnique, mod A and cues  Short Term Goal 4: Pt will toilet with mod A  Short Term Goal 5:  Toilet transfer with mod A  Additional Goals?: Yes  Short Term Goal 6: Pt will attend to R side during ADL/functional activities with moderate cues  Short Term Goal 7: Pt will perform standing tasks x 2-3 mins with L UE and mod A for balance to enhance ADL/IADL performance  Short Term Goal 8: Pt/family will demo understanding of R UE positioning/HEP/therapeutic activity recommendations with min A after ed  Long Term Goals  Time Frame for Long Term Goals : 4-5 weeks  Long Term Goal 1: Pt will bathe with min A, AE/DME prn  Long Term Goal 2: Pt will dress UB supervision  Long Term Goal 3: Pt will dress LB min A, AE prn  Long Term Goal 4: Pt will toilet with CGA  Long Term Goal 5: Pt will perform toilet transfer with CGA  Additional Goals?: Yes  Long Term Goal 6: Pt will perform simple home making task with min A  Long Term Goal 7: Pt/family will be independent in HEP/DME/therapeutic activity recommendations after ed  Long Term Goal 8: Pt will attend to R side during functional activities with occasional cueing     Therapy Time    Individual Concurrent Group Co-treatment   Time In 0800      Time Out 0900      Minutes 60      Timed Code Treatment Minutes: 60 Minutes     Farrukh Whitlock OT                    RECORD REVIEW: Previous medical records, medications were reviewed at today's visit    IMPRESSION:   1. Acute ischemic stroke-on aspirin/statin  2. Hypertension-on medications monitor  3. Hyperlipidemia-on statin  4. Diabetes-Trulicity-monitor blood sugar  5. DVT prophylaxis-Lovenox  6. History of coronary artery disease-aspirin/statin  7. Neuropathy-on Neurontin  8. Rheumatoid arthritis-on Plaquenil  9. COPD-monitor  10. History of anxiety and depression-monitor  11. History of colitis/gallstones-monitor  12. History of bariatric surgery-on multivitamin/folic acid  13. History of superior vena cava filter placement with venous thromboembolism-not on anticoagulation-monitor- indicates took herself off blood thinners as not like meds and was bruising   14.   PT/OT/speech    Continue current care      ELOS pending     Expected duration and frequency therapy: 180 minutes per day, 5 days per week    [address] [facility]  [phone] CELL  Dr Jaycob Esposito

## 2022-11-26 NOTE — PROGRESS NOTES
[address] Claudio Malik has been consulted to review medication profile for fall risk. Medications increasing risk of falling: Gabapentin,zanaflex (has not taken but ordered),amlodipine  Medications increasing risk of bleeding: Lovenox    Recommendations: Avoid use of medications that increase fall risk due to increased dizziness, drowsiness, sedation, etc when possible. If deemed medically necessary with no alternatives available, use the lowest effective dose. Continue to monitor and reassess to ensure the lowest effective dose is being used. Educate the patient on the increased risk of falling due to these medications and appropriate preventative measures. Monitor for signs and symptoms of bleeding.      Electronically signed by [facility] on 11/26/2022 at 11:27 AM

## 2022-11-26 NOTE — PROGRESS NOTES
Attempted to transfer patient from bed onto pebbles steady to transfer to bathroom when right foot fell off platform of pebbles steady and patient was lowered to floor. Assisted up into w/c and then returned to bed. No injuries noted. Dr. Chetna Larios, RN clinical supervisor, Tiffany Sesay, RN charge nurse, Lena Rinaldi, pharmacist notified of incident. Patient  in room at time of occurrence.

## 2022-11-26 NOTE — PROGRESS NOTES
Occupational Therapy     11/25/22 1430   General   Timed Code Treatment Minutes 40 Minutes   Restrictions/Precautions   Restrictions/Precautions Modified Diet;Swallowing - Thickened Liquids; Fall Risk;Aspiration Risk;Weight Bearing   Subjective   Subjective Pt declined pebbles hughes training with daughter   Bed mobility   Supine to Sit Minimal assistance   Sit to Supine Minimal assistance   Wheelchair Bed Transfers   Wheelchair/Bed - Technique Squat pivot   Equipment Used Bed; Wheelchair   Level of Asssistance Moderate assistance;Maximum assistance   Wheelchair Transfers Comments mod A to sound side, max to affected   Type of ROM/Therapeutic Exercise   Type of ROM/Therapeutic Exercise AROM;AAROM   Comment comprehensive reeducation with facilitation  (limited reps d/t poor tolerance of vibration)   Exercises   Shoulder Flexion 2- with facilitation   Shoulder Extension 2+ with facilitation   Elbow Flexion 2+ with facilitation   Elbow Extension 2- with facilitation   Supination 2+ with facilitation   Pronation 2- with facilitation   Wrist Flexion 2+ with facilitation   Wrist Extension 2- with facilitation   Finger Flexion 2-   Finger Extension 1   Other IR/ER 2- with facilitation   Assessment   Performance deficits / Impairments Decreased functional mobility ; Decreased endurance;Decreased coordination;Decreased ADL status; Decreased sensation;Decreased posture;Decreased ROM; Decreased balance;Decreased strength;Decreased vision/visual deficit; Decreased safe awareness;Decreased high-level IADLs;Decreased cognition;Decreased fine motor control   Treatment Diagnosis L MCA stroke   Activity Tolerance   Activity Tolerance Patient Tolerated treatment well

## 2022-11-27 ENCOUNTER — APPOINTMENT (OUTPATIENT)
Dept: GENERAL RADIOLOGY | Age: 62
DRG: 057 | End: 2022-11-27
Attending: PSYCHIATRY & NEUROLOGY
Payer: COMMERCIAL

## 2022-11-27 PROCEDURE — 73610 X-RAY EXAM OF ANKLE: CPT

## 2022-11-27 PROCEDURE — 6370000000 HC RX 637 (ALT 250 FOR IP): Performed by: PSYCHIATRY & NEUROLOGY

## 2022-11-27 PROCEDURE — 1180000000 HC REHAB R&B

## 2022-11-27 PROCEDURE — 99232 SBSQ HOSP IP/OBS MODERATE 35: CPT | Performed by: PSYCHIATRY & NEUROLOGY

## 2022-11-27 PROCEDURE — 6360000002 HC RX W HCPCS: Performed by: PSYCHIATRY & NEUROLOGY

## 2022-11-27 PROCEDURE — 94760 N-INVAS EAR/PLS OXIMETRY 1: CPT

## 2022-11-27 PROCEDURE — 93970 EXTREMITY STUDY: CPT

## 2022-11-27 RX ADMIN — ATORVASTATIN CALCIUM 40 MG: 40 TABLET, FILM COATED ORAL at 19:29

## 2022-11-27 RX ADMIN — AMLODIPINE BESYLATE 2.5 MG: 2.5 TABLET ORAL at 08:34

## 2022-11-27 RX ADMIN — GABAPENTIN 300 MG: 300 CAPSULE ORAL at 19:30

## 2022-11-27 RX ADMIN — ENOXAPARIN SODIUM 40 MG: 100 INJECTION SUBCUTANEOUS at 16:48

## 2022-11-27 RX ADMIN — HYDROXYCHLOROQUINE SULFATE 200 MG: 200 TABLET, FILM COATED ORAL at 08:34

## 2022-11-27 RX ADMIN — HYDROXYCHLOROQUINE SULFATE 200 MG: 200 TABLET, FILM COATED ORAL at 19:30

## 2022-11-27 RX ADMIN — THERA TABS 1 TABLET: TAB at 08:33

## 2022-11-27 RX ADMIN — DOCUSATE SODIUM 100 MG: 100 CAPSULE, LIQUID FILLED ORAL at 19:33

## 2022-11-27 RX ADMIN — ACETAMINOPHEN 650 MG: 325 TABLET ORAL at 09:43

## 2022-11-27 RX ADMIN — ASPIRIN 81 MG 81 MG: 81 TABLET ORAL at 08:34

## 2022-11-27 RX ADMIN — DOCUSATE SODIUM 100 MG: 100 CAPSULE, LIQUID FILLED ORAL at 08:33

## 2022-11-27 RX ADMIN — ACETAMINOPHEN 650 MG: 325 TABLET ORAL at 19:33

## 2022-11-27 RX ADMIN — FOLIC ACID 1 MG: 1 TABLET ORAL at 08:34

## 2022-11-27 ASSESSMENT — PAIN SCALES - GENERAL: PAINLEVEL_OUTOF10: 3

## 2022-11-27 ASSESSMENT — PAIN SCALES - WONG BAKER
WONGBAKER_NUMERICALRESPONSE: 4
WONGBAKER_NUMERICALRESPONSE: 0

## 2022-11-27 ASSESSMENT — PAIN DESCRIPTION - DESCRIPTORS: DESCRIPTORS: ACHING;DISCOMFORT

## 2022-11-27 ASSESSMENT — PAIN DESCRIPTION - LOCATION
LOCATION: ANKLE
LOCATION: ANKLE

## 2022-11-27 ASSESSMENT — PAIN DESCRIPTION - ORIENTATION
ORIENTATION: RIGHT
ORIENTATION: RIGHT

## 2022-11-27 NOTE — PROGRESS NOTES
Assisting LPN to get patient from bed onto Sara[employer] to transfer to bathroom. Patient's right side is flaccid, so I bent down and tried to get right leg onto platform. My hand slipped, and I accidentally hit her in the right side of the jaw with my elbow. We finished the transfer, and after she was back from the bathroom, I asked her if she was ok, she said \"OK\" and I didn't observe any redness or discoloration to the area.

## 2022-11-27 NOTE — PLAN OF CARE
Problem: Discharge Planning  Goal: Discharge to home or other facility with appropriate resources  Outcome: Progressing  Flowsheets  Taken 11/27/2022 0839 by Daron Huerta LPN  Discharge to home or other facility with appropriate resources: Refer to discharge planning if patient needs post-hospital services based on physician order or complex needs related to functional status, cognitive ability or social support system  Taken 11/26/2022 2115 by Radha Graham LPN  Discharge to home or other facility with appropriate resources: Refer to discharge planning if patient needs post-hospital services based on physician order or complex needs related to functional status, cognitive ability or social support system     Problem: Safety - Adult  Goal: Free from fall injury  Outcome: Progressing     Problem: Neurosensory - Adult  Goal: Achieves stable or improved neurological status  Outcome: Progressing  Flowsheets  Taken 11/27/2022 0839 by Daron Huerta LPN  Achieves stable or improved neurological status: Assess for and report changes in neurological status  Taken 11/26/2022 2115 by Radha Graham LPN  Achieves stable or improved neurological status: Assess for and report changes in neurological status  Goal: Achieves maximal functionality and self care  Outcome: Progressing  Flowsheets  Taken 11/27/2022 0839 by Daron Huerta LPN  Achieves maximal functionality and self care: Monitor swallowing and airway patency with patient fatigue and changes in neurological status  Taken 11/26/2022 2115 by Radha Graham LPN  Achieves maximal functionality and self care: Monitor swallowing and airway patency with patient fatigue and changes in neurological status     Problem: Skin/Tissue Integrity - Adult  Goal: Skin integrity remains intact  Outcome: Progressing  Flowsheets  Taken 11/27/2022 1049 by Daron Huerta LPN  Skin Integrity Remains Intact: Monitor for areas of redness and/or skin breakdown  Taken 11/26/2022 2115 by Tacho Phillips LPN  Skin Integrity Remains Intact: Monitor for areas of redness and/or skin breakdown     Problem: Pain  Goal: Verbalizes/displays adequate comfort level or baseline comfort level  Outcome: Progressing     Problem: Chronic Conditions and Co-morbidities  Goal: Patient's chronic conditions and co-morbidity symptoms are monitored and maintained or improved  Outcome: Progressing  Flowsheets  Taken 11/27/2022 0839 by Asia Abreu LPN  Care Plan - Patient's Chronic Conditions and Co-Morbidity Symptoms are Monitored and Maintained or Improved: Monitor and assess patient's chronic conditions and comorbid symptoms for stability, deterioration, or improvement  Taken 11/26/2022 2115 by Tacho Phillips LPN  Care Plan - Patient's Chronic Conditions and Co-Morbidity Symptoms are Monitored and Maintained or Improved: Monitor and assess patient's chronic conditions and comorbid symptoms for stability, deterioration, or improvement     Problem: ABCDS Injury Assessment  Goal: Absence of physical injury  Outcome: Progressing     Problem: Skin/Tissue Integrity  Goal: Absence of new skin breakdown  Description: 1. Monitor for areas of redness and/or skin breakdown  2. Assess vascular access sites hourly  3. Every 4-6 hours minimum:  Change oxygen saturation probe site  4. Every 4-6 hours:  If on nasal continuous positive airway pressure, respiratory therapy assess nares and determine need for appliance change or resting period. Outcome: Progressing     Problem: Confusion  Goal: Confusion, delirium, dementia, or psychosis is improved or at baseline  Description: INTERVENTIONS:  1. Assess for possible contributors to thought disturbance, including medications, impaired vision or hearing, underlying metabolic abnormalities, dehydration, psychiatric diagnoses, and notify attending LIP  2. [place] high risk fall precautions, as indicated  3.  Provide frequent short contacts to provide reality reorientation, refocusing and direction  4. Decrease environmental stimuli, including noise as appropriate  5. Monitor and intervene to maintain adequate nutrition, hydration, elimination, sleep and activity  6. If unable to ensure safety without constant attention obtain sitter and review sitter guidelines with assigned personnel  7.  Initiate Psychosocial CNS and Spiritual Care consult, as indicated  Outcome: Progressing  Flowsheets (Taken 11/26/2022 2115 by Reece Izaguirre LPN)  Effect of thought disturbance (confusion, delirium, dementia, or psychosis) are managed with adequate functional status: Assess for contributors to thought disturbance, including medications, impaired vision or hearing, underlying metabolic abnormalities, dehydration, psychiatric diagnoses, notify LIP     Problem: Musculoskeletal - Adult  Goal: Return mobility to safest level of function  Outcome: Progressing  Flowsheets  Taken 11/27/2022 0839 by Walt Saleh LPN  Return Mobility to Safest Level of Function: Assess patient stability and activity tolerance for standing, transferring and ambulating with or without assistive devices  Taken 11/26/2022 2115 by Reece Izaguirre LPN  Return Mobility to Safest Level of Function: Assess patient stability and activity tolerance for standing, transferring and ambulating with or without assistive devices  Goal: Return ADL status to a safe level of function  Outcome: Progressing  Flowsheets  Taken 11/27/2022 0839 by Walt Saleh LPN  Return ADL Status to a Safe Level of Function: Assess activities of daily living deficits and provide assistive devices as needed  Taken 11/26/2022 2115 by Reece Izaguirre LPN  Return ADL Status to a Safe Level of Function: Administer medication as ordered     Problem: Gastrointestinal - Adult  Goal: Maintains or returns to baseline bowel function  Outcome: Progressing  Flowsheets  Taken 11/27/2022 0839 by Walt Saleh LPN  Maintains or returns to baseline bowel function: Assess bowel function  Taken 11/26/2022 2115 by Tacho Phillips LPN  Maintains or returns to baseline bowel function: Assess bowel function  Goal: Maintains adequate nutritional intake  Outcome: Progressing  Flowsheets  Taken 11/27/2022 0839 by Asia Abreu LPN  Maintains adequate nutritional intake: Monitor percentage of each meal consumed  Taken 11/26/2022 2115 by Tacho Phillips LPN  Maintains adequate nutritional intake: Monitor percentage of each meal consumed     Problem: Metabolic/Fluid and Electrolytes - Adult  Goal: Electrolytes maintained within normal limits  Outcome: Progressing  Flowsheets  Taken 11/27/2022 0839 by Asia Abreu LPN  Electrolytes maintained within normal limits: Monitor labs and assess patient for signs and symptoms of electrolyte imbalances  Taken 11/26/2022 2115 by Tacho Phillips LPN  Electrolytes maintained within normal limits: Monitor labs and assess patient for signs and symptoms of electrolyte imbalances     Problem: Nutrition Deficit:  Goal: Optimize nutritional status  Outcome: Progressing

## 2022-11-27 NOTE — PROGRESS NOTES
Patient:   Mynor Rojas  MR#:    [id number]   Room:    0826/826-01   YOB: 1960  Date of Progress Note: 11/27/2022  Time of Note                           10:44 AM  Consulting Physician:   Elicia Weller M.D. Attending Physician:  Elicia Weller MD     Chief complaint Acute ischemic stroke    S:This 64 y.o. female  with history of anxiety, depression, COPD, atherosclerotic disease, colitis, COPD, CVA, DM,  LE edema, hyperlipidemia, HTN, morbid obesity, RA, CAD  and venous thromboembolism. She presented to [facility] ER on 11/9/22 after being found at home lying facedown in her feces. She was very weak, confused, not able to speak but moving purposefully and intermittently following commands. Her last well know was 3 a.m. when her  left for work. Imaging done revealed a large MCA stroke 7.7 x 5 cm. CTA head/neck revealed an acute M1 occlusion. She was outside of the window for TPA. CK on admit was 1100, consistent with rhabdomylosis. She was also noted to possibly Dens fracture. She was transferred to [facility] for a possible thrombectomy. Further imaging was done at [facility] and she was deemed not a candidate for thrombectomy. MRI done ruled out Dens fracture. Repeat CT of head on 11/11/22 showed evolving left MCA territory infarct with increasing edema/mass effect resulting in 4mm of  rightward midline shift(previously 2mm) a the level of the third ventricle. No hemorrhagic conversion or definite trapping of the right lateral ventricle, possible small acute right cerebellar infarct. Neurosurgery was consulted for possible hemicraniectomy. She was seen by Dr. Priscila Colon, who didn't feel any surgical intervention was needed. Patient was found to have a UTI + for [facility] and was placed on Rocephin on 11/14/22. Patient had a PICC line placed. Patient was evaluated by SPT and initially made NPO d/t oropharyngeal dysphagia. NGT placed and feeding started.  She was also noted to have global aphasia but is This note was copied from the mother's chart.  Inpatient Lactation Consult    Maternal history:  History reviewed. No pertinent past medical history.   History reviewed. No pertinent surgical history.   OB History    Para Term  AB Living   3 3 3 0 0 1   SAB TAB Ectopic Molar Multiple Live Births   0 0 0 0 0 1      Current Facility-Administered Medications   Medication Dose Route Frequency Provider Last Rate Last Dose   • AMPICILLIN SODIUM 2 G IJ SOLR Pyxis Override            • SODIUM CHLORIDE 0.9 % IV SOLN Pyxis Override            • FENTANYL 2 MCG/ML-BUPIVACAINE 0.125% EPIDURAL PREMIX Pyxis Override            • oxytocin (PITOCIN) 30 Units in sodium chloride 0.9% 500 mL  0-334 mL/hr Intravenous Continuous Tayo Petti, DO       • acetaminophen (TYLENOL) tablet 650 mg  650 mg Oral Q6H Tayo Petti, DO       • ibuprofen (MOTRIN) tablet 600 mg  600 mg Oral Q6H Tayo Petti, DO       • benzocaine/menthol (DERMOPLAST) 20-0.5 % topical spray 1 spray  1 spray Topical Q1H PRN Tayo Petti, DO       • hydroCORTisone (ANUSOL-HC) suppository 25 mg  25 mg Rectal Q8H PRN Tayo Petti, DO       • simethicone (MYLICON) tablet 80 mg  80 mg Oral 4x Daily PRN Tayo Petti, DO       • calcium carbonate (TUMS) chewable tablet 500 mg  500 mg Oral Q4H PRN Tayo Petti, DO       • multivitamin & mineral w/folic acid 1 mg-PRENATAL tablet 1 tablet  1 tablet Oral Daily Tayo Petti, DO             P - Knowledge deficit r/t breastfeeding    I- Met with dyad on rounds. Mother reports  is breastfeeding well.  States breast fed for ~ 1 hour after birth.  Denies c/o nipple pain with latch.    Discussed  typical  behavior,  stressed 8-12 feeding/pumping sessions in 24 hours with no time limits while breastfeeding, breast milk production, supply/demand, frequency/duration. Encouraged exclusive breast milk for , rooming in and to call for assistance as needed.   Education,  and Lactation Department  Helpline provided - encouraged to call  with any questions/concerns/need for assistance.  Reports having a breast pump for home use. No questions at this time. Moriah BOWIE updated.    E- Mother verbalized understanding of plan of care and instructions.    Plan - Will continue to follow as needed.   improving. She was re-evaluated by SPT on 11/15/22 for swallow and was started on a dysphagia 1 diet with nectar thick liquids. Patient also continues to have right sided weakness and is participating in both PT/OT. Repeat CT head on 11/13/22 shows stable LMCA ischemic stroke with stable edema, 5 mm shift, no hemorrhage. She is felt to need a stay on Rehab for continued PT/OT/SPT and medical management to work towards her goal of returning home with her . She is now felt ready to start the Rehab program.  Did slip. Complaining of right ankle pain and swelling. REVIEW OF SYSTEMS:  Unreliable due to aphasia     Past Medical History:      Diagnosis Date    Anxiety     ASCVD (arteriosclerotic cardiovascular disease)     Carrier of group B Streptococcus     Cellulitis     Colitis     COPD (chronic obstructive pulmonary disease) ([facility])     Costochondritis     CVA (cerebral vascular accident) ([address]     Depression     Edema     Fracture of sternum     non union post surgery.      Gallstones     Grief reaction     H/O superior vena cava filter placement     Hyperlipidemia     Hypertension     MI (myocardial infarction) ([address]     MRSA (methicillin resistant Staphylococcus aureus)     Neuropathy     Obesity     Pseudarthrosis sternal    RA (rheumatoid arthritis) ([address]     Rosacea     Sinus bradycardia     TIA (transient ischemic attack)     Venous thromboembolism        Past Surgical History:      Procedure Laterality Date    ADENOIDECTOMY      APPENDECTOMY      CARDIAC CATHETERIZATION  3/2009    CARDIAC CATHETERIZATION  11/5/14  JDT    EF 50%, patent bypass grafts    CHOLECYSTECTOMY  2011    COLONOSCOPY  06/03/2010    Dr. Yoni Cruz: distal colon having ulcerative lesions c/w UC    COLONOSCOPY  2009    pancolitis    COLONOSCOPY      CORONARY ARTERY BYPASS GRAFT  3/2009    PHANI Beltran to LAD, SVGs to OM & PDA    GASTRIC BYPASS SURGERY  2012    HIATAL HERNIA REPAIR  2011    HYSTERECTOMY (CERVIX STATUS UNKNOWN) KNEE SURGERY      AR COLONOSCOPY FLX DX W/COLLJ SPEC WHEN PFRMD N/A 3/12/2018    Dr Stephanie Sultana rectal inflammation consistent with U.C.-Active proctitis--3 yr recall    [id number] [place] [address]  ENDOSCOPY  2010    Dr. Karena Duffy  2012       Medications in Hospital:      Current Facility-Administered Medications:     docusate sodium (COLACE) capsule 100 mg, 100 mg, Oral, BID, Sandeep Montgomery MD, 100 mg at 11/27/22 4127    aspirin chewable tablet 81 mg, 81 mg, Oral, Daily, Sandeep Montgomery MD, 81 mg at 11/27/22 0834    atorvastatin (LIPITOR) tablet 40 mg, 40 mg, Oral, Nightly, Sandeep Montgomery MD, 40 mg at 11/26/22 2116    dulaglutide (TRULICITY) SC injection 1.5 mg (Patient Supplied), 1.5 mg, SubCUTAneous, Weekly, Sandeep Montgomery MD    folic acid (FOLVITE) tablet 1 mg, 1 mg, Oral, Daily, Sandeep Montgomery MD, 1 mg at 11/27/22 0834    gabapentin (NEURONTIN) capsule 300 mg, 300 mg, Oral, Nightly, Sandeep Montgomery MD, 300 mg at 11/26/22 2116    hydroxychloroquine (PLAQUENIL) tablet 200 mg, 200 mg, Oral, BID, Sandeep Montgomery MD, 200 mg at 11/27/22 0834    amLODIPine (NORVASC) tablet 2.5 mg, 2.5 mg, Oral, Daily, Sandeep Montgomery MD, 2.5 mg at 11/27/22 0834    tiZANidine (ZANAFLEX) tablet 4 mg, 4 mg, Oral, BID PRN, Sandeep Montgomery MD    multivitamin 1 tablet, 1 tablet, Oral, Daily, Sandeep Montgomery MD, 1 tablet at 11/27/22 7389    acetaminophen (TYLENOL) tablet 650 mg, 650 mg, Oral, Q4H PRN, Sandeep Montgomery MD, 650 mg at 11/27/22 0943    enoxaparin (LOVENOX) injection 40 mg, 40 mg, SubCUTAneous, Daily, Sandeep Montgomery MD, 40 mg at 11/26/22 1712    polyethylene glycol (GLYCOLAX) packet 17 g, 17 g, Oral, Daily PRN, Sandeep Montgomery MD, 17 g at 11/26/22 1716    Allergies:  Methotrexate derivatives    Social History:   TOBACCO:   reports that she quit smoking about 13 years ago. Her smoking use included cigarettes. She has a 64.00 pack-year smoking history.  She has never used smokeless tobacco.  ETOH:   reports current alcohol use. Family History:       Problem Relation Age of Onset    Prostate Cancer Father     Heart Failure Father     Cancer Father     Colon Cancer Neg Hx     Colon Polyps Neg Hx     Liver Cancer Neg Hx     Liver Disease Neg Hx     Esophageal Cancer Neg Hx     Rectal Cancer Neg Hx     Stomach Cancer Neg Hx          PHYSICAL EXAM:  BP (!) 144/80   Pulse 53   Temp (!) 96.6 °F (35.9 °C) (Temporal)   Resp 16   Ht 5' 7\" (1.702 m)   Wt 204 lb (92.5 kg)   SpO2 95%   BMI 31.95 kg/m²     Constitutional - well developed, well nourished. Eyes - conjunctiva normal.   Ear, nose, throat - No scars, masses, or lesions over external nose or ears, no atrophy of tongue  Neck-symmetric, no masses noted, no jugular vein distension  Respiration- chest wall appears symmetric, good expansion,   normal effort without use of accessory muscles  Musculoskeletal - no significant wasting of muscles noted, no bony deformities  Extremities-no clubbing, cyanosis or edema  Skin - warm, dry, and intact. No rash, erythema, or pallor. Psychiatric - mood, affect, and behavior appear normal.      Neurological exam  Awake, alert, global aphasia says \"yes\" to all questions asked   Attention and concentration appear appropriate  Recent and remote memory unable to tests     Cranial Nerve Exam     CN III, IV,VI-EOMI, No nystagmus, conjugate eye movements, no ptosis    CN VII-+ facial assymetry       Motor Exam  No movement on the right      Tremors- no tremors in hands or head noted     Gait  Not tested     Nursing/pcp notes, imaging,labs and vitals reviewed.      PT,OT and/or speech notes reviewed    Lab Results   Component Value Date    WBC 9.0 11/24/2022    HGB 12.2 11/24/2022    HCT 43.1 11/24/2022    MCV 85.3 11/24/2022     (H) 11/24/2022     Lab Results   Component Value Date     11/24/2022    K 5.2 (H) 11/24/2022     11/24/2022    CO2 21 (L) 11/24/2022    BUN 17 2022    CREATININE 0.6 2022    GLUCOSE 86 2022    CALCIUM 9.0 2022    PROT 5.6 (L) 2022    LABALBU 3.4 (L) 2022    BILITOT <0.2 2022    ALKPHOS 89 2022    AST 37 (H) 2022    ALT 28 2022    LABGLOM >60 2022   No results found for: INR, PROTIME    Denatasha Sheldon, OT   Occupational Therapist   Occupational   Progress Notes      Signed   Date of Service:  2022  8:00 AM                 Signed                                                                                                                                                                                                                                                                        Occupational Therapy  Facility/Department: [facility]        Name: Dufm Ganser  : 1960  MRN: [id number]  Date of Service: 2022     Discharge Recommendations:  Continue to assess pending progress        Patient Diagnosis(es): There were no encounter diagnoses. Past Medical History:  has a past medical history of Anxiety, ASCVD (arteriosclerotic cardiovascular disease), Carrier of group B Streptococcus, Cellulitis, Colitis, COPD (chronic obstructive pulmonary disease) ([address] Costochondritis, CVA (cerebral vascular accident) ([address] Depression, Edema, Fracture of sternum, Gallstones, Grief reaction, H/O superior vena cava filter placement, Hyperlipidemia, Hypertension, MI (myocardial infarction) ([address] MRSA (methicillin resistant Staphylococcus aureus), Neuropathy, Obesity, Pseudarthrosis, RA (rheumatoid arthritis) ([address] Rosacea, Sinus bradycardia, TIA (transient ischemic attack), and Venous thromboembolism. Past Surgical History:  has a past surgical history that includes Coronary artery bypass graft (3/2009); Tonsillectomy; Hysterectomy; thoracotomy; knee surgery; Appendectomy; Colonoscopy (2010); Cardiac catheterization (3/2009); Adenoidectomy; Cholecystectomy ();  Gastric bypass surgery (2012); hiatal hernia repair (2011); Cardiac catheterization (11/5/14  JDT); Colonoscopy (2009); Colonoscopy; Upper gastrointestinal endoscopy (2010); Upper gastrointestinal endoscopy (2012); and pr colonoscopy flx dx w/collj spec when pfrmd (N/A, 3/12/2018). Treatment Diagnosis: L MCA stroke        Assessment        11/22/22 1000   Assessment   Performance deficits / Impairments Decreased functional mobility ; Decreased endurance;Decreased coordination;Decreased ADL status; Decreased sensation;Decreased posture;Decreased ROM; Decreased balance;Decreased strength;Decreased vision/visual deficit; Decreased safe awareness;Decreased high-level IADLs;Decreased cognition;Decreased fine motor control         Plan   Occupational Therapy Plan  Specific Instructions for Next Treatment: transfers  Current Treatment Recommendations: Strengthening, ROM, Balance training, Functional mobility training, Endurance training, Positioning, Modalities, Neuromuscular re-education, Cognitive reorientation, Safety education & training, Patient/Caregiver education & training, Equipment evaluation, education, & procurement, Self-Care / ADL, Cognitive/Perceptual training, Home management training, Sensory integraion      Restrictions  Restrictions/Precautions  Restrictions/Precautions: Modified Diet, Swallowing - Thickened Liquids, Fall Risk, Aspiration Risk  Required Braces or Orthoses?: No     Subjective   General  Patient assessed for rehabilitation services?: Yes  Family / Caregiver Present: Yes (daughter)    11/23/22 0800   General   Family / Caregiver Present Yes  (daughter)   Pre Treatment Pain Screening   Pain at present 0   Scale Used Numeric Score            Objective        11/23/22 0800   OT Exercises   Exercise Treatment SROM HEP with min A, PROM R UE all muscle planes        11/23/22 0800   Neuromuscular Education   Neuromuscular education Yes   NDT Treatment Upper extremity   Facilitation techniques scapular mobilization   Weight Bearing   Weight Bearing Technique Yes   RUE Weight Bearing Forearm seated; Extended arm seated        11/23/22 0800   Oral Hygiene   Assistance Needed Supervision or touching assistance   Comment vc for aspen tech after demonstration first   CARE Score 4   Shower/Bathe Self   Assistance Needed Substantial/maximal assistance   Comment sponge bath while in bed   CARE Score 2   Upper Body Dressing   Assistance Needed Substantial/maximal assistance   Comment pullover shirt, cues for aspen tech   CARE Score 2   Lower Body Dressing   Assistance Needed Substantial/maximal assistance   Comment pants only, completed in bed, pt able to roll with mod A to left, and cues for right while another assists with pant management   CARE Score 2   Putting On/Taking Off Footwear   Assistance Needed Substantial/maximal assistance   Comment while in bed and supine, pt able to reach left foot partially   CARE Score 2      Goals  Short Term Goals  Time Frame for Short Term Goals: 2 weeks  Short Term Goal 1: Pt will bathe with mod A, AE prn  Short Term Goal 2: Pt will dress UB using hemitechnique, mod A and cues  Short Term Goal 3: Pt will dress LB, hemitechnique, mod A and cues  Short Term Goal 4: Pt will toilet with mod A  Short Term Goal 5:  Toilet transfer with mod A  Additional Goals?: Yes  Short Term Goal 6: Pt will attend to R side during ADL/functional activities with moderate cues  Short Term Goal 7: Pt will perform standing tasks x 2-3 mins with L UE and mod A for balance to enhance ADL/IADL performance  Short Term Goal 8: Pt/family will demo understanding of R UE positioning/HEP/therapeutic activity recommendations with min A after ed  Long Term Goals  Time Frame for Long Term Goals : 4-5 weeks  Long Term Goal 1: Pt will bathe with min A, AE/DME prn  Long Term Goal 2: Pt will dress UB supervision  Long Term Goal 3: Pt will dress LB min A, AE prn  Long Term Goal 4: Pt will toilet with CGA  Long Term Goal 5: Pt will perform toilet transfer with CGA  Additional Goals?: Yes  Long Term Goal 6: Pt will perform simple home making task with min A  Long Term Goal 7: Pt/family will be independent in HEP/DME/therapeutic activity recommendations after ed  Long Term Goal 8: Pt will attend to R side during functional activities with occasional cueing     Therapy Time    Individual Concurrent Group Co-treatment   Time In 0800      Time Out 0900      Minutes 60      Timed Code Treatment Minutes: 60 Minutes     Franci Burger OT                    RECORD REVIEW: Previous medical records, medications were reviewed at today's visit    IMPRESSION:   1. Acute ischemic stroke-on aspirin/statin  2. Hypertension-on medications monitor  3. Hyperlipidemia-on statin  4. Diabetes-Trulicity-monitor blood sugar  5. DVT prophylaxis-Lovenox  6. History of coronary artery disease-aspirin/statin  7. Neuropathy-on Neurontin  8. Rheumatoid arthritis-on Plaquenil  9. COPD-monitor  10. History of anxiety and depression-monitor  11. History of colitis/gallstones-monitor  12. History of bariatric surgery-on multivitamin/folic acid  13. History of superior vena cava filter placement with venous thromboembolism-not on anticoagulation-monitor- indicates took herself off blood thinners as not like meds and was bruising   14.   PT/OT/speech    Continue care as noted    Check x-ray of right ankle and venous ultrasound of leg    ELOS pending     Expected duration and frequency therapy: 180 minutes per day, 5 days per week    CALL WITH ANY QUESTIONS  [phone] CELL  Dr Carmenza Simons

## 2022-11-28 LAB
ALBUMIN SERPL-MCNC: 2.9 G/DL (ref 3.5–5.2)
ALP BLD-CCNC: 89 U/L (ref 35–104)
ALT SERPL-CCNC: 25 U/L (ref 5–33)
ANION GAP SERPL CALCULATED.3IONS-SCNC: 10 MMOL/L (ref 7–19)
AST SERPL-CCNC: 28 U/L (ref 5–32)
BASOPHILS ABSOLUTE: 0.1 K/UL (ref 0–0.2)
BASOPHILS RELATIVE PERCENT: 1.7 % (ref 0–1)
BILIRUB SERPL-MCNC: <0.2 MG/DL (ref 0.2–1.2)
BUN BLDV-MCNC: 14 MG/DL (ref 8–23)
CALCIUM SERPL-MCNC: 9.4 MG/DL (ref 8.8–10.2)
CHLORIDE BLD-SCNC: 106 MMOL/L (ref 98–111)
CO2: 26 MMOL/L (ref 22–29)
CREAT SERPL-MCNC: 0.6 MG/DL (ref 0.5–0.9)
EOSINOPHILS ABSOLUTE: 0.5 K/UL (ref 0–0.6)
EOSINOPHILS RELATIVE PERCENT: 7.8 % (ref 0–5)
GFR SERPL CREATININE-BSD FRML MDRD: >60 ML/MIN/{1.73_M2}
GLUCOSE BLD-MCNC: 83 MG/DL (ref 74–109)
HCT VFR BLD CALC: 34.4 % (ref 37–47)
HEMOGLOBIN: 10.6 G/DL (ref 12–16)
IMMATURE GRANULOCYTES #: 0 K/UL
LYMPHOCYTES ABSOLUTE: 2.3 K/UL (ref 1.1–4.5)
LYMPHOCYTES RELATIVE PERCENT: 32.5 % (ref 20–40)
MCH RBC QN AUTO: 24.3 PG (ref 27–31)
MCHC RBC AUTO-ENTMCNC: 30.8 G/DL (ref 33–37)
MCV RBC AUTO: 78.9 FL (ref 81–99)
MONOCYTES ABSOLUTE: 0.7 K/UL (ref 0–0.9)
MONOCYTES RELATIVE PERCENT: 10 % (ref 0–10)
NEUTROPHILS ABSOLUTE: 3.3 K/UL (ref 1.5–7.5)
NEUTROPHILS RELATIVE PERCENT: 47.4 % (ref 50–65)
PDW BLD-RTO: 17.4 % (ref 11.5–14.5)
PLATELET # BLD: 497 K/UL (ref 130–400)
PMV BLD AUTO: 10.2 FL (ref 9.4–12.3)
POTASSIUM REFLEX MAGNESIUM: 4.2 MMOL/L (ref 3.5–5)
RBC # BLD: 4.36 M/UL (ref 4.2–5.4)
SODIUM BLD-SCNC: 142 MMOL/L (ref 136–145)
TOTAL PROTEIN: 5 G/DL (ref 6.6–8.7)
WBC # BLD: 6.9 K/UL (ref 4.8–10.8)

## 2022-11-28 PROCEDURE — 99232 SBSQ HOSP IP/OBS MODERATE 35: CPT | Performed by: PSYCHIATRY & NEUROLOGY

## 2022-11-28 PROCEDURE — 85025 COMPLETE CBC W/AUTO DIFF WBC: CPT

## 2022-11-28 PROCEDURE — 36415 COLL VENOUS BLD VENIPUNCTURE: CPT

## 2022-11-28 PROCEDURE — 92526 ORAL FUNCTION THERAPY: CPT

## 2022-11-28 PROCEDURE — 1180000000 HC REHAB R&B

## 2022-11-28 PROCEDURE — 6370000000 HC RX 637 (ALT 250 FOR IP): Performed by: PSYCHIATRY & NEUROLOGY

## 2022-11-28 PROCEDURE — 97116 GAIT TRAINING THERAPY: CPT

## 2022-11-28 PROCEDURE — 92507 TX SP LANG VOICE COMM INDIV: CPT

## 2022-11-28 PROCEDURE — 80053 COMPREHEN METABOLIC PANEL: CPT

## 2022-11-28 PROCEDURE — 97535 SELF CARE MNGMENT TRAINING: CPT

## 2022-11-28 PROCEDURE — 6360000002 HC RX W HCPCS: Performed by: PSYCHIATRY & NEUROLOGY

## 2022-11-28 PROCEDURE — 97110 THERAPEUTIC EXERCISES: CPT

## 2022-11-28 PROCEDURE — 97530 THERAPEUTIC ACTIVITIES: CPT

## 2022-11-28 RX ADMIN — HYDROXYCHLOROQUINE SULFATE 200 MG: 200 TABLET, FILM COATED ORAL at 21:06

## 2022-11-28 RX ADMIN — AMLODIPINE BESYLATE 2.5 MG: 2.5 TABLET ORAL at 08:15

## 2022-11-28 RX ADMIN — DOCUSATE SODIUM 100 MG: 100 CAPSULE, LIQUID FILLED ORAL at 21:06

## 2022-11-28 RX ADMIN — ATORVASTATIN CALCIUM 40 MG: 40 TABLET, FILM COATED ORAL at 21:06

## 2022-11-28 RX ADMIN — DOCUSATE SODIUM 100 MG: 100 CAPSULE, LIQUID FILLED ORAL at 08:15

## 2022-11-28 RX ADMIN — GABAPENTIN 300 MG: 300 CAPSULE ORAL at 21:06

## 2022-11-28 RX ADMIN — ENOXAPARIN SODIUM 40 MG: 100 INJECTION SUBCUTANEOUS at 16:42

## 2022-11-28 RX ADMIN — THERA TABS 1 TABLET: TAB at 08:16

## 2022-11-28 RX ADMIN — HYDROXYCHLOROQUINE SULFATE 200 MG: 200 TABLET, FILM COATED ORAL at 08:15

## 2022-11-28 RX ADMIN — ASPIRIN 81 MG 81 MG: 81 TABLET ORAL at 08:15

## 2022-11-28 RX ADMIN — FOLIC ACID 1 MG: 1 TABLET ORAL at 08:15

## 2022-11-28 NOTE — PROGRESS NOTES
11/28/22 1000   Transfers   Sit to Stand Minimal Assistance  (PULL TO STAND PARALLEL BARS; THERAPIST BLOCKED RIGHT KNEE)   Bed to Chair Moderate assistance;Contact guard assistance;Stand by assistance  (TO LEFT WITH SLIDING BOARD. MULTIPLE CUES TO LEAN ANTERIOR RAHTER THAN LEFT PRIOR TO SCOOT. SECOND TF CGA/SBA)   Ambulation   Device Parallel Bars   Other Apparatus   (RIGHT SLING)   Assistance Moderate assistance;Minimal assistance   Quality of Gait THERAPIST POSITIONED TOO FAR TO LEFT INITIALLY IMPEDING LEFT LATERAL LEAN FOR RIGHT LE ADVANCEMENT. HOWEVER STILL ABLE TO WEIGHT SHIFT OFF RIGHT SUFFFICIENTLY TO ALLOW THERAPIST TO ADVANCE. SLIGHT EXTENSOR TONE EVIDENT WHEN THERAPIST ADVANCED RIGHT LE. THERAPIST PROVIDED SUPPORT AT RIGHT ISCHIAL TUBEROSITY AND DISTAL QUAD DURING LEFT LE ADVANCEMENT. DECREASED STANCE TIME LEFT   Distance 12 FT   Ambulation 2   Device 2 Parallel Bars   Other Apparatus 2   (SLING)   Assistance 2 Moderate assistance   Quality of Gait 2 FLEXED FORWARD POSTURE WHICH PATIENT CORRECTED WITH CUES. WHEN CUED FOR SLOW LEFT LE ADVANCEMENTPATIENT TOOK SHORTER STEP. Distance 12 FT   Ambulation 3   Device 3 Parallel Bars   Assistance 3   (SLING)   Quality of Gait 3 FIRST CYCLE PATIENT LEANED ANTERIOR TO ADVNACE RIGHT LE RATHER THAN WEIGHT SHIFTING LEFT. DIFFICULTY WITH ADVANCEMNET FOLLOWED BY PREMATURE ADVNACMENT OF LEFT LE CUASED INSTABILITY; HOWEVER PATIENT RIGHT LE DID NOT BUCKLE. REST OF GAIT MUCH IMPROVED LEFT LE ADVANCMENT. Distance 12 FT   PT Exercises   Exercise Treatment SEATED EX: SAQ 15X, ANKLE PUMPS 15X, HIP EXT 10X, MARCHES 10X  (RLE = PROM; LLE = A/AROM)   PROM Exercises PRACTICED SLIDING BOARD TF TO/FROM MAT   Assessment   Assessment SEE GAIT.   DAUGHTER OK'D FOR SB TF IN ROOM

## 2022-11-28 NOTE — PROGRESS NOTES
[facility]  [facility]PATIENT SPEECH THERAPY  [facility], [place] 8 REHAB UNIT  TIME   [id number]  1200  Minutes: 61        [x]Daily Note  []Progress Note    Date: 2022  Patient Name: Abel Fleming        MRN: [id number]    Account #: [de-identified]  : 1960  (62 y.o.)  Gender: female   Diet: Dysphagia soft and bite sized, thin liquids           PATIENT DIAGNOSIS(ES):    Diagnosis: CVA, R hemiparesis     Additional Pertinent Hx: Anxiety, depression, COPD, CVA, DM, LE edema, hyperlipidemia, HTN, obesity, RA, CAD and venous thromboembolism     RESTRICTIONS/PRECAUTIONS:    Restrictions/Precautions  Restrictions/Precautions: Modified Diet, Swallowing - Fall Risk, Aspiration Risk  Required Braces or Orthoses?: No       Subjective: The patient's daughter was present for today's session. The patient was emotional at times. She did attempt all therapy tasks. She is highly motivated to improve and has excellent family support. Objective:  She continues to exhibit severe expressive and receptive aphasia. Perseverations and semantic paraphasias are still noted. She is still having difficulty meeting immediate wants and needs as she cannot verbalize or write what she is wanting to communicate. She has not yet been able to use a communication or AAC board yet due to receptive deficits. Today she was unable to answer yes/no questions reliably. She was aware of her deficits and worked very hard to complete the task. She was able to repeat the questions with the correct yes or no answer and she did appear to begin to understand what was being asked of her. She was asked to identify pictures out of a field of 2. This was initially very difficult for her. After several examples were completed she was able to complete the task at 50%. Repetition of social greetings was at 100%. Repetition of common words/phrases for communicating wants/needs was at 100%. Reading single words required some minimal cueing.  Reading at sentence level was difficult for her. Naming pictures was at 50% with phonemic cues. Her daughter reported observed difficulty swallowing eggs. No overt s/s of aspiration observed this date with thin liquids via cup or with soft solids. She did require cues to take smaller bites and eat/drink slowly. She is using a lingual sweep and finger sweep to clear out the oral cavity. No buccal cavity pocketing was noted this date during her noon meal. Good hyolaryngeal elevation and minimally delayed swallow responses were noted. She is recommended to remain on dysphagia soft and bite sized food due to increased risk of pocketing. Recommended Diet and Intervention  Soft & Bite Sized consistencies   Thin liquids  Recommended Form of Meds: Crushed in puree as able  Recommendations: Modified barium swallow study; Dysphagia treatment  Therapeutic Interventions: Pharyngeal exercises;Diet tolerance monitoring;Oral care;Oral motor exercises; Patient/Family education; Therapeutic PO trials with SLP     Compensatory Swallowing Strategies  Compensatory Swallowing Strategies : Alternate solids and liquids;Eat/Feed slowly; No straws;Upright as possible for all oral intake;Remain upright for 30-45 minutes after meals;Small bites/sips; Check for pocketing of food on the Right; Check for pocketing of food on the Left; Set up assist;Assist feed        Recommend she continue speech therapy for dysphagia, expressive language, receptive language,cognition and dysarthria. SHORT TERM GOAL #1:  Goal 1: Pt will complete y/n tasks with 80% accuracy and minimal verbal cues/modeling. SHORT TERM GOAL #2:  Goal 2: Pt will complete verbal expression tasks with 80% accuracy and minimal verbal cues/modeling. SHORT TERM GOAL #3:  Goal 3: Pt will complete receptive/expressive language tasks with 80% accuracy and minimal verbal cues/modeling.     SHORT TERM GOAL #4:  Goal 4: Pt will follow one step commands with with 90% accuracy and minimal verbal cues/modeling. SHORT TERM GOAL #5:  Goal 5: Pt will complete orientation tasks with 90% accuracy and minimal verbal cues/modeling. Swallowing Short Term Goals  Short-term Goals  Goal 1:Pt will tolerate soft & bite sized consistencies with mildly  (nectar) thick liquids with minimal overt s/s of aspiration/penetration during hospitalization. Met. New Goal: Pt will tolerate soft and bite sized diet with thin liquids with minimal overt s/s of aspiration/penetration during hospitalization. Goal 2: Pt will complete Modified Barium Swallow Study. Goal 3: Pt will demonstrate awareness of general aspiration precautions. Goal 4: Pt will participate in swallowing reassessments to ensure safest diet consistencies. Goal 5: Pt will allow staff to complete daily oral care. Goal 6: Pt will complete oral motor exercises for lingual and labial strengthening. Long term goals:  Pt will participate in skilled speech therapy services to ensure she is able to communicate her wants and needs. Pt will tolerate least restrictive diet with minimal overt s/s of aspiration/penetration during hospitalization.     STGs Met: 1  LTGs Met: 0      ELOS: 2-3 weeks      ASSESSMENT:  Assessment: [x]Progressing towards goals          []Not Progressing towards goals    Patient Tolerance of Treatment:   [x]Tolerated well []Tolerated fair []Required rest breaks []Fatigued    Education:  Learner:  [x]Patient          []Significant Other          [x]Other  Education provided regarding:  [x]Goals and POC   [x]Diet and swallowing precautions    []Home Exercise Program  []Progress and/or discharge information  Method of Education:  [x]Discussion          [x]Demonstration          []Handout          []Other  Evaluation of Education:   [x]Verbalized understanding   []Demonstrates without assistance  []Demonstrates with assistance  []Needs further instruction     []No evidence of learning                  []No family present      Plan: [x]Continue with current plan of care    []Modify current plan of care as follows:    []Discharge patient    Discharge Location:    Services/Supervision Recommended:      [x]Patient continues to require treatment by a licensed therapist to address functional deficits as outlined in the established plan of care.                    Electronically Signed By:  Donley Meigs M.S. CCC-SLP  11/28/2022,11:45 AM.

## 2022-11-28 NOTE — PROGRESS NOTES
Patient:   Dufm Ganser  MR#:    [id number]   Room:    0826/826-01   YOB: 1960  Date of Progress Note: 11/28/2022  Time of Note                           10:29 AM  Consulting Physician:   Landon Serrato M.D. Attending Physician:  Landon Serrato MD     Chief complaint Acute ischemic stroke    S:This 64 y.o. female  with history of anxiety, depression, COPD, atherosclerotic disease, colitis, COPD, CVA, DM,  LE edema, hyperlipidemia, HTN, morbid obesity, RA, CAD  and venous thromboembolism. She presented to [facility] ER on 11/9/22 after being found at home lying facedown in her feces. She was very weak, confused, not able to speak but moving purposefully and intermittently following commands. Her last well know was 3 a.m. when her  left for work. Imaging done revealed a large MCA stroke 7.7 x 5 cm. CTA head/neck revealed an acute M1 occlusion. She was outside of the window for TPA. CK on admit was 1100, consistent with rhabdomylosis. She was also noted to possibly Dens fracture. She was transferred to [facility] for a possible thrombectomy. Further imaging was done at [facility] and she was deemed not a candidate for thrombectomy. MRI done ruled out Dens fracture. Repeat CT of head on 11/11/22 showed evolving left MCA territory infarct with increasing edema/mass effect resulting in 4mm of  rightward midline shift(previously 2mm) a the level of the third ventricle. No hemorrhagic conversion or definite trapping of the right lateral ventricle, possible small acute right cerebellar infarct. Neurosurgery was consulted for possible hemicraniectomy. She was seen by Dr. Brittany Marmolejo, who didn't feel any surgical intervention was needed. Patient was found to have a UTI + for [facility] and was placed on Rocephin on 11/14/22. Patient had a PICC line placed. Patient was evaluated by SPT and initially made NPO d/t oropharyngeal dysphagia. NGT placed and feeding started.  She was also noted to have global aphasia but is improving. She was re-evaluated by SPT on 11/15/22 for swallow and was started on a dysphagia 1 diet with nectar thick liquids. Patient also continues to have right sided weakness and is participating in both PT/OT. Repeat CT head on 11/13/22 shows stable LMCA ischemic stroke with stable edema, 5 mm shift, no hemorrhage. She is felt to need a stay on Rehab for continued PT/OT/SPT and medical management to work towards her goal of returning home with her . She is now felt ready to start the Rehab program.  Did slip. Complaining of right ankle pain and swelling. Improved. No acute issues overnight,       REVIEW OF SYSTEMS:  Unreliable due to aphasia     Past Medical History:      Diagnosis Date    Anxiety     ASCVD (arteriosclerotic cardiovascular disease)     Carrier of group B Streptococcus     Cellulitis     Colitis     COPD (chronic obstructive pulmonary disease) ([facility])     Costochondritis     CVA (cerebral vascular accident) ([address]     Depression     Edema     Fracture of sternum     non union post surgery.      Gallstones     Grief reaction     H/O superior vena cava filter placement     Hyperlipidemia     Hypertension     MI (myocardial infarction) ([address]     MRSA (methicillin resistant Staphylococcus aureus)     Neuropathy     Obesity     Pseudarthrosis sternal    RA (rheumatoid arthritis) ([address]     Rosacea     Sinus bradycardia     TIA (transient ischemic attack)     Venous thromboembolism        Past Surgical History:      Procedure Laterality Date    ADENOIDECTOMY      APPENDECTOMY      CARDIAC CATHETERIZATION  3/2009    CARDIAC CATHETERIZATION  11/5/14  JDT    EF 50%, patent bypass grafts    CHOLECYSTECTOMY  2011    COLONOSCOPY  06/03/2010    Dr. Shweta Simons: distal colon having ulcerative lesions c/w UC    COLONOSCOPY  2009    pancolitis    COLONOSCOPY      CORONARY ARTERY BYPASS GRAFT  3/2009    Dr Herbert Hernandez, [place] to LAD, SVGs to [address] 2011 HYSTERECTOMY (CERVIX STATUS UNKNOWN)      KNEE SURGERY      CA COLONOSCOPY FLX DX W/COLLJ SPEC WHEN PFRMD N/A 3/12/2018    Dr Vallejo Moder rectal inflammation consistent with U.C.-Active proctitis--3 yr recall    [id number] [place] [address] Dr ENDOSCOPY  2010    Dr. Fely Morin  2012       Medications in Hospital:      Current Facility-Administered Medications:     docusate sodium (COLACE) capsule 100 mg, 100 mg, Oral, BID, Nicolasa Truong MD, 100 mg at 11/28/22 0815    aspirin chewable tablet 81 mg, 81 mg, Oral, Daily, Nicolasa Truong MD, 81 mg at 11/28/22 0815    atorvastatin (LIPITOR) tablet 40 mg, 40 mg, Oral, Nightly, Nicolasa Truong MD, 40 mg at 11/27/22 1929    dulaglutide (TRULICITY) SC injection 1.5 mg (Patient Supplied), 1.5 mg, SubCUTAneous, Weekly, Nicolasa Truong MD    folic acid (FOLVITE) tablet 1 mg, 1 mg, Oral, Daily, Nicolasa Truong MD, 1 mg at 11/28/22 0815    gabapentin (NEURONTIN) capsule 300 mg, 300 mg, Oral, Nightly, Nicolasa Truong MD, 300 mg at 11/27/22 1930    hydroxychloroquine (PLAQUENIL) tablet 200 mg, 200 mg, Oral, BID, Nicolasa Truong MD, 200 mg at 11/28/22 0815    amLODIPine (NORVASC) tablet 2.5 mg, 2.5 mg, Oral, Daily, Nicolasa Truong MD, 2.5 mg at 11/28/22 0815    tiZANidine (ZANAFLEX) tablet 4 mg, 4 mg, Oral, BID PRN, Nicolasa Truong MD    multivitamin 1 tablet, 1 tablet, Oral, Daily, Nicolasa Truong MD, 1 tablet at 11/28/22 0816    acetaminophen (TYLENOL) tablet 650 mg, 650 mg, Oral, Q4H PRN, Nicolasa Truong MD, 650 mg at 11/27/22 1933    enoxaparin (LOVENOX) injection 40 mg, 40 mg, SubCUTAneous, Daily, Nicolasa Truong MD, 40 mg at 11/27/22 1648    polyethylene glycol (GLYCOLAX) packet 17 g, 17 g, Oral, Daily PRN, Nicolasa Truong MD, 17 g at 11/26/22 1716    Allergies:  Methotrexate derivatives    Social History:   TOBACCO:   reports that she quit smoking about 13 years ago. Her smoking use included cigarettes.  She has a 64.00 pack-year smoking history. She has never used smokeless tobacco.  ETOH:   reports current alcohol use. Family History:       Problem Relation Age of Onset    Prostate Cancer Father     Heart Failure Father     Cancer Father     Colon Cancer Neg Hx     Colon Polyps Neg Hx     Liver Cancer Neg Hx     Liver Disease Neg Hx     Esophageal Cancer Neg Hx     Rectal Cancer Neg Hx     Stomach Cancer Neg Hx          PHYSICAL EXAM:  /70   Pulse 56   Temp 97.8 °F (36.6 °C) (Temporal)   Resp 14   Ht 5' 7\" (1.702 m)   Wt 204 lb (92.5 kg)   SpO2 95%   BMI 31.95 kg/m²     Constitutional - well developed, well nourished. Eyes - conjunctiva normal.   Ear, nose, throat - No scars, masses, or lesions over external nose or ears, no atrophy of tongue  Neck-symmetric, no masses noted, no jugular vein distension  Respiration- chest wall appears symmetric, good expansion,   normal effort without use of accessory muscles  Musculoskeletal - no significant wasting of muscles noted, no bony deformities  Extremities-no clubbing, cyanosis or edema  Skin - warm, dry, and intact. No rash, erythema, or pallor. Psychiatric - mood, affect, and behavior appear normal.      Neurological exam  Awake, alert, global aphasia says \"yes\" to all questions asked   Attention and concentration appear appropriate  Recent and remote memory unable to tests     Cranial Nerve Exam     CN III, IV,VI-EOMI, No nystagmus, conjugate eye movements, no ptosis    CN VII-+ facial assymetry       Motor Exam  No movement on the right      Tremors- no tremors in hands or head noted     Gait  Not tested     Nursing/pcp notes, imaging,labs and vitals reviewed.      PT,OT and/or speech notes reviewed    Lab Results   Component Value Date    WBC 6.9 11/28/2022    HGB 10.6 (L) 11/28/2022    HCT 34.4 (L) 11/28/2022    MCV 78.9 (L) 11/28/2022     (H) 11/28/2022     Lab Results   Component Value Date     11/28/2022    K 4.2 11/28/2022     11/28/2022 CO2 26 11/28/2022    BUN 14 11/28/2022    CREATININE 0.6 11/28/2022    GLUCOSE 83 11/28/2022    CALCIUM 9.4 11/28/2022    PROT 5.0 (L) 11/28/2022    LABALBU 2.9 (L) 11/28/2022    BILITOT <0.2 11/28/2022    ALKPHOS 89 11/28/2022    AST 28 11/28/2022    ALT 25 11/28/2022    LABGLOM >60 11/28/2022   No results found for: INR, PROTIME    Ran Tate   Student Physical Therapist   Physical Therapy   Progress Notes      Signed   Date of Service:  11/25/2022  3:30 PM                 Signed                                                             11/25/22 1300   Restrictions/Precautions   Restrictions/Precautions Modified Diet;Swallowing - Thickened Liquids; Fall Risk;Aspiration Risk;Weight Bearing   Lower Extremity Weight Bearing Restrictions   Right Lower Extremity Weight Bearing Weight Bearing As Tolerated   Left Lower Extremity Weight Bearing Weight Bearing As Tolerated   General   Additional Pertinent Hx Anxiety, depression, COPD, CVA, DM, LE edema, hyperlipidemia, HTN, obesity, RA, CAD and venous thromboembolism   Diagnosis CVA, R hemiparesis   General Comment   Comments PATIENT WAS SHOPPING AT [place] [employer] WITH DAUGHTER   Transfers   Sit to Stand Moderate Assistance  (LUE PULLING ON //)   Stand to Sit Moderate Assistance  (SLOW AND CONTROLLED MOVEMENT WB THROUGH LLE)   Squat Pivot Transfers Moderate Assistance;Maximum Assistance  (FROM RIGHT SIDE OF BED, TRANSFERRING TO LEFT. DIFFICULTY WITH DIRECTIONS PRIOR TO TRANSFER, ATTEMPTED TO WRAP ARMS AROUND THERAPIST RATHER THAN LEAN FWD AND REACH L HAND FOR BED.)   Ambulation   WB Status WBAT   Ambulation   Surface Level tile   Device Parallel Bars   Other Apparatus Wheelchair follow   Assistance Maximum assistance; Moderate assistance   Distance 0'   Comments ATTEMPTED TO STAND THREE TIMES. EACH TIME PATIENT WOULD STAND UPRIGHT, HAVE LOB, AND WOULD SAY STOP, THEN PROCEED TO SIT DOWN. More Ambulation?  Yes   Ambulation 2   Surface - 2 level tile   Device 2 Parallel Bars   Other Apparatus 2 Wheelchair follow   Assistance 2 Maximum assistance; Moderate assistance   Quality of Gait 2 EXCESSIVE FWD LEAN, DIFFICULTY LEANING TO LEFT, UNABLE TO WB THROUGH RLE CONFIDENTLY, WOULD ADVANCE LUE ON RAIL AND THEN RETURN IT BEHIND SELF   Distance 3'   Comments PATIENT WANTING TO INITIATE MOVEMENTS BEFORE READY OR DIRECTED TO, CAUSING KNEE TO BUCKLE OR LOB   Ambulation 3   Surface - 3 level tile   Device 3 Parallel Bars   Other Apparatus 3 Wheelchair follow   Assistance 3 Maximum assistance   Quality of Gait 3 AS ABOVE   Distance 3'   Comments IMPROVED AMB. , PATIENT DID NOT ADVANCE BEFORE READY. THERAPIST ASSISTANCE AT RLE, NO FEELING OF RESISTANCE OR ASSISTANCE IN RLE. PATIENT UNABLE TO TAKE SUFFICENT STEP WITH LLE. PT Exercises   Exercise Treatment WS IN //; SEATED EX: MARCHES 15X, QS 10X, PF 15X  (RLE = PROM; LLE = AAROM)   Activity Tolerance   Activity Tolerance Patient tolerated treatment well   Assessment   Assessment PATIENT WAS UNABLE TO AMB. EFFICENTLY AS DONE IN PREVIOUS SESSIONS. INITIALLY HAD DIFFICULTY STANDING AND WORKED ON WS TO BEGIN. AFTERWARDS WHEN AMB. PATIENT WOULD BEGIN ADVANCING LUE OR LLE BEFORE STABLE OR PREPARED, THIS LEAD TO LOB AND KNEE GRADY DURING EACH AMB., MULTIPLE CUES TO SLOW DOWN. SEATED EXERCISES DONE AFTERWARDS, PATIENT BEGAN COUNTING REPITIONS WITH LETTERS RATHER THAN NUMBERS. DURING SQUAT PIVOT TRANSFER FROM W/C INTO BED, PATIENT ATTEMPTED TO WRAP LUE AROUND THERAPIST RATHER THAN PLACE HAND ON BED, OTHERWISE TRANSFER WAS VERY SUCCESSFUL AND ONLY REQUIRED MOD A. Discharge Recommendations Continue to assess pending progress   Safety Devices   Type of Devices Bed alarm in place; Left in bed;Sitter present;Call light within reach  (DAUGHTER AND FAMILY FRIEND IN ROOM)   PT Individual Minutes   Time In 1300   Time Out 1400   Minutes 60   Electronically sign by: Harper Foster, SPT 11/25/2022, 3:30 PM             Cosigned by:  Thomas Johnson, PT at 11/28/2022  8:41 AM     Revision History                        RECORD REVIEW: Previous medical records, medications were reviewed at today's visit    IMPRESSION:   1. Acute ischemic stroke-on aspirin/statin  2. Hypertension-on medications monitor  3. Hyperlipidemia-on statin  4. Diabetes-Trulicity-monitor blood sugar  5. DVT prophylaxis-Lovenox  6. History of coronary artery disease-aspirin/statin  7. Neuropathy-on Neurontin  8. Rheumatoid arthritis-on Plaquenil  9. COPD-monitor  10. History of anxiety and depression-monitor  11. History of colitis/gallstones-monitor  12. History of bariatric surgery-on multivitamin/folic acid  13. History of superior vena cava filter placement with venous thromboembolism-not on anticoagulation-monitor- indicates took herself off blood thinners as not like meds and was bruising   14.   PT/OT/speech    Continue present care as noted    x-ray of right ankle no acute changes  venous ultrasound of leg preliminarily no DVT    ELOS pending     Expected duration and frequency therapy: 180 minutes per day, 5 days per week    [address] CELL  Dr Tracie Guevara

## 2022-11-28 NOTE — PROGRESS NOTES
Nutrition Assessment     Type and Reason for Visit: Reassess    Nutrition Recommendations/Plan:   Decrease ONS to once daily. Malnutrition Assessment:  Malnutrition Status: No malnutrition    Nutrition Assessment:  Pt improving from a nutritional standpoint with PO intake >50% most meals. Wt increase 4 lbs documented as well. Family brings pt outside food at times. Will decrease ONS to once daily from TID as pt's meal intake is adequate. Continue current POC. Estimated Daily Nutrient Needs:  Energy (kcal):  7,547-5,297 Weight Used for Energy Requirements: Current     Protein (g):   Weight Used for Protein Requirements: Ideal        Fluid (ml/day):  4,144-8,983 Method Used for Fluid Requirements: 1 ml/kcal    Nutrition Related Findings:   aphasia Wound Type: None    Current Nutrition Therapies:    ADULT DIET; Dysphagia - Soft and Bite Sized; 4 carb choices (60 gm/meal)  ADULT ORAL NUTRITION SUPPLEMENT; Dinner;  Other Oral Supplement; 4 oz. frozen milkshake (in freezer behind hot line)    Anthropometric Measures:  Height: 5' 7\" (170.2 cm)  Current Body Wt: 204 lb (92.5 kg)   BMI: 31.9    Nutrition Diagnosis:   Biting/chewing (masticatory) difficulty, Swallowing difficulty related to acute injury/trauma as evidenced by swallow study results    Nutrition Interventions:   Food and/or Nutrient Delivery: Continue Current Diet, Modify Oral Nutrition Supplement  Nutrition Education/Counseling: No recommendation at this time  Coordination of Nutrition Care: Continue to monitor while inpatient  Plan of Care discussed with: Family    Goals:  Previous Goal Met: Progressing toward Goal(s)  Goals: Meet at least 75% of estimated needs, PO intake 75% or greater       Nutrition Monitoring and Evaluation:   Behavioral-Environmental Outcomes: None Identified  Food/Nutrient Intake Outcomes: Food and Nutrient Intake  Physical Signs/Symptoms Outcomes: Biochemical Data, Chewing or Swallowing, Weight, Skin, Nutrition Focused Physical Findings, Fluid Status or Edema    Discharge Planning:     Too soon to determine     Emerson Rondon MS, RDN, LD, CDCES  Contact: Luis Angel

## 2022-11-28 NOTE — PATIENT CARE CONFERENCE
[facility] ACUTE INPATIENT REHABILITATION  TEAM CONFERENCE NOTE    Date: 2022  Patient Name: Kennedy Holcomb        MRN: [id number]    : 1960  (64 y.o.)  Gender: female      Diagnosis: CVA, R hemiparesis      PHYSICAL THERAPY  GROSS ASSESSMENT       BED MOBILITY  Bed mobility  Rolling to Left: Maximum assistance  Rolling to Right: Moderate assistance (WITH RAIL)  Supine to Sit: Minimal assistance  Sit to Supine: Minimal assistance  Scooting: Moderate assistance (Able to follow command to inititate scooting, Mod A to fully execute)  Bed Mobility Comments: MULTIPLE ROLLS FOR DONNING PANTS       TRANSFERS  Transfers  Sit to Stand: Minimal Assistance (PULL TO STAND PARALLEL BARS; THERAPIST BLOCKED RIGHT KNEE)  Stand to Sit: Moderate Assistance (SLOW AND CONTROLLED MOVEMENT WB THROUGH LLE)  Bed to Chair: Moderate assistance, Contact guard assistance, Stand by assistance (TO LEFT WITH SLIDING BOARD. MULTIPLE CUES TO LEAN ANTERIOR RAHTER THAN LEFT PRIOR TO SCOOT. SECOND TF CGA/SBA)  Stand Pivot Transfers: Moderate Assistance (USE OF SS TO STAND PATIENT AND PIVOT TO BED)  Squat Pivot Transfers: Moderate Assistance, Maximum Assistance (FROM RIGHT SIDE OF BED, TRANSFERRING TO LEFT. DIFFICULTY WITH DIRECTIONS PRIOR TO TRANSFER, ATTEMPTED TO WRAP ARMS AROUND THERAPIST RATHER THAN LEAN FWD AND REACH L HAND FOR BED.)  Car Transfer: Unable to assess  Comment: PATIENT REFUSED TO SIT DOWN ON SS, BUT HAS GOOD BALANCE CONTROL DURING TRANSFER FROM RECLINER TO BED  WHEELCHAIR PROPULSION  Propulsion 1  Propulsion: Manual  Level: Level Tile  Method: LUE, LLE  Level of Assistance: Maximum assistance  Description/ Details: Patient unable to coordinate LUE/LLE together for propulsion, pt able to use LUE for strong forward propulsion. No turns. Distance: 13' and then dependent for remainder of distance  AMBULATION  Ambulation  Surface: Level tile  Device: Parallel Bars  Other Apparatus:  (RIGHT SLING)  Assistance:  Moderate assistance, Minimal assistance  Quality of Gait: THERAPIST POSITIONED TOO FAR TO LEFT INITIALLY IMPEDING LEFT LATERAL LEAN FOR RIGHT LE ADVANCEMENT. HOWEVER STILL ABLE TO WEIGHT SHIFT OFF RIGHT SUFFFICIENTLY TO ALLOW THERAPIST TO ADVANCE. SLIGHT EXTENSOR TONE EVIDENT WHEN THERAPIST ADVANCED RIGHT LE. THERAPIST PROVIDED SUPPORT AT RIGHT ISCHIAL TUBEROSITY AND DISTAL QUAD DURING LEFT LE ADVANCEMENT. DECREASED STANCE TIME LEFT  Distance: 12 FT  Comments: ATTEMPTED TO STAND THREE TIMES. EACH TIME PATIENT WOULD STAND UPRIGHT, HAVE LOB, AND WOULD SAY STOP, THEN PROCEED TO SIT DOWN. More Ambulation?: Yes  STAIRS     GOALS:  Short Term Goals  Time Frame for Short Term Goals: 2 weeks  Short Term Goal 1: Patient will perform bed mobility with Min A  Short Term Goal 2: Patient will transition supine <> sit with Min A  Short Term Goal 3: Patient will perform bed <> chair transfer with Min A  Short Term Goal 4: Patient propel wheelchair 50 ft with Min A  Short Term Goal 5: Patient will ambulate 10 ft with Mod A    Long Term Goals  Time Frame for Long Term Goals : 3 weeks  Long Term Goal 1: Patient will perform bed mobility independently  Long Term Goal 2: Patient with transition supine <> sit independently  Long Term Goal 3: Patient will perform bed <> chair transfer independently  Long Term Goal 4: Patient will perform car transfer with Min A  Long Term Goal 5: Patient will ambulate 10 ft with CGA    ASSESSMENT:  Assessment: SEE GAIT. DAUGHTER OK'D FOR SB TF IN ROOM      SPEECH THERAPY  She continues to exhibit severe expressive and receptive aphasia. Perseverations and semantic paraphasias are still noted. She is still having difficulty meeting immediate wants and needs as she cannot verbalize or write what she is wanting to communicate. She has not yet been able to use a communication or AAC board yet due to receptive deficits. Today she was unable to answer yes/no questions reliably.  She was aware of her deficits and worked very hard to complete the task. She was able to repeat the questions with the correct yes or no answer and she did appear to begin to understand what was being asked of her. She was asked to identify pictures out of a field of 2. This was initially very difficult for her. After several examples were completed she was able to complete the task at 50%. Repetition of social greetings was at 100%. Repetition of common words/phrases for communicating wants/needs was at 100%. Reading single words required some minimal cueing. Reading at sentence level was difficult for her. Naming pictures was at 50% with phonemic cues. Her daughter reported observed difficulty swallowing eggs. No overt s/s of aspiration observed this date with thin liquids via cup or with soft solids. She did require cues to take smaller bites and eat/drink slowly. She is using a lingual sweep and finger sweep to clear out the oral cavity. No buccal cavity pocketing was noted this date during her noon meal. Good hyolaryngeal elevation and minimally delayed swallow responses were noted. She is recommended to remain on dysphagia soft and bite sized food due to increased risk of pocketing. Recommended Diet and Intervention  Soft & Bite Sized consistencies   Thin liquids  Recommended Form of Meds: Crushed in puree as able  Recommendations: Modified barium swallow study; Dysphagia treatment  Therapeutic Interventions: Pharyngeal exercises;Diet tolerance monitoring;Oral care;Oral motor exercises; Patient/Family education; Therapeutic PO trials with SLP     Compensatory Swallowing Strategies  Compensatory Swallowing Strategies : Alternate solids and liquids;Eat/Feed slowly; No straws;Upright as possible for all oral intake;Remain upright for 30-45 minutes after meals;Small bites/sips; Check for pocketing of food on the Right; Check for pocketing of food on the Left; Set up assist;Assist feed        Recommend she continue speech therapy for dysphagia, expressive language, receptive language,cognition and dysarthria. SHORT TERM GOAL #1:  Goal 1: Pt will complete y/n tasks with 80% accuracy and minimal verbal cues/modeling. SHORT TERM GOAL #2:  Goal 2: Pt will complete verbal expression tasks with 80% accuracy and minimal verbal cues/modeling. SHORT TERM GOAL #3:  Goal 3: Pt will complete receptive/expressive language tasks with 80% accuracy and minimal verbal cues/modeling. SHORT TERM GOAL #4:  Goal 4: Pt will follow one step commands with with 90% accuracy and minimal verbal cues/modeling. SHORT TERM GOAL #5:  Goal 5: Pt will complete orientation tasks with 90% accuracy and minimal verbal cues/modeling. Swallowing Short Term Goals  Short-term Goals  Goal 1:Pt will tolerate soft & bite sized consistencies with mildly  (nectar) thick liquids with minimal overt s/s of aspiration/penetration during hospitalization. Met. New Goal: Pt will tolerate soft and bite sized diet with thin liquids with minimal overt s/s of aspiration/penetration during hospitalization. Goal 2: Pt will complete Modified Barium Swallow Study. Goal 3: Pt will demonstrate awareness of general aspiration precautions. Goal 4: Pt will participate in swallowing reassessments to ensure safest diet consistencies. Goal 5: Pt will allow staff to complete daily oral care. Goal 6: Pt will complete oral motor exercises for lingual and labial strengthening. Long term goals:  Pt will participate in skilled speech therapy services to ensure she is able to communicate her wants and needs. Pt will tolerate least restrictive diet with minimal overt s/s of aspiration/penetration during hospitalization.      STGs Met: 1  LTGs Met: 0        ELOS: 2-3 weeks     OCCUPATIONAL THERAPY  Cognitive Patterns:     Cognitive Assessment Method (CAM):  Confusion Assessment Method (CAM)  Is there evidence of an acute change in mental status from the patient's baseline?: Yes  Inattention: Behavior present, fluctuates (comes and goes, changes in severity)  Disorganized thinking: Behavior present, fluctuates (comes and goes, changes in severity)  Altered level of consciousness: Behavior not present  Health Literacy:  How often do you need to have someone help you when you read instructions, pamphlets, or other written material from your doctor or pharmacy?: Never        CURRENT IRF-ARLEN SCORES  Eating: CARE Score: 3       Oral Hygiene: CARE Score: 4  Comment: vc for aspen tech after demonstration first     Toileting: CARE Score: 1  Comment: pt incontinent, urine      Shower/Bathe: CARE Score: 3  Comment: shower        Upper Body Dressing: CARE Score: 3  Comment: cues      Lower Body Dressing: CARE Score: 2  Comment: pants only, completed in bed, pt able to roll with mod A to left, and cues for right while another assists with pant management       Footwear: CARE Score: 2  Comment: while in bed and supine, pt able to reach left foot partially       Toilet Transfers: CARE Score: 2          Picking Up Object:  CARE Score: 88          UE Functioning:  RUE: Flaccid  RUE: AROM WNL     Pain Assessment:   0/10 pain        STGs:  Short Term Goals  Time Frame for Short Term Goals: 2 weeks  Short Term Goal 1: Pt will bathe with mod A, AE prn  Short Term Goal 2: Pt will dress UB using hemitechnique, mod A and cues  Short Term Goal 3: Pt will dress LB, hemitechnique, mod A and cues  Short Term Goal 4: Pt will toilet with mod A  Short Term Goal 5:  Toilet transfer with mod A  Additional Goals?: Yes  Short Term Goal 6: Pt will attend to R side during ADL/functional activities with moderate cues  Short Term Goal 7: Pt will perform standing tasks x 2-3 mins with L UE and mod A for balance to enhance ADL/IADL performance  Short Term Goal 8: Pt/family will demo understanding of R UE positioning/HEP/therapeutic activity recommendations with min A after ed    LTGs:  Long Term Goals  Time Frame for Long Term Goals : 4-5 weeks  Long Term Goal 1: Pt will bathe with min A, AE/DME prn  Long Term Goal 2: Pt will dress UB supervision  Long Term Goal 3: Pt will dress LB min A, AE prn  Long Term Goal 4: Pt will toilet with CGA  Long Term Goal 5: Pt will perform toilet transfer with CGA  Additional Goals?: Yes  Long Term Goal 6: Pt will perform simple home making task with min A  Long Term Goal 7: Pt/family will be independent in HEP/DME/therapeutic activity recommendations after ed  Long Term Goal 8: Pt will attend to R side during functional activities with occasional cueing    Assessment:  Performance deficits / Impairments: Decreased functional mobility , Decreased endurance, Decreased coordination, Decreased ADL status, Decreased sensation, Decreased posture, Decreased ROM, Decreased balance, Decreased strength, Decreased vision/visual deficit, Decreased safe awareness, Decreased high-level IADLs, Decreased cognition, Decreased fine motor control                 NUTRITION  Current Wt: Weight: 204 lb (92.5 kg) / Body mass index is 31.95 kg/m². Admission Wt: Admission Body Weight: 201 lb (91.2 kg)  Oral Diet Orders:   Dysphagia-Soft and Bite Sized; 4 carb choices  Oral Nutrition Supplement (ONS) Orders:  Frozen Milkshake once daily  Pt improving from a nutritional standpoint with PO intake >50% most meals. Wt increase 4 lbs documented as well. Family brings pt outside food at times. Will decrease ONS to once daily from TID as pt's meal intake is adequate. Continue current POC. Glucose well controlled. Please see nutrition note for details. NURSING    Wounds/Incisions/Ulcers: redness to abdominal area and excoriation to right groin     Rickey Scale Score: 15    Pain: No pain concerns to address    Consultations/Labs/X-rays:     Family Education: Need to make contact with family to initiate education    Fall Risk:  Shayna Degree Total Score: 72    Fall in the last week?  Control fall     Other Nursing Issues: Alert, expressive and receptive aphasia, incont of bowel and bladder, meds whole with applesauce, right side faccid, assist x1 with pebbles mondragonJARETH jones 11/27/22, Lovenox        SOCIAL WORK/CASE MANAGEMENT  Assessment: Described by family as very independent and determined, Family is supportive,  works full-time, daughter is here from out of state assisting,  at bed-side    Discharge Plan   Estimated Length of Stay: 4-5 weeks  Destination: discharge home with supervision    Pass: No    Services at Discharge: [facility], Occupational Therapy, and Speech Therapy daily    Equipment at Discharge: Will determine closer to discharge. Progress made in the prior week:  Improved quality ambulation  2. Max A with UB dressing. 3.  4.  5.      Goals for following week:  Proper aspen-gait technique without cues  2. Mod A with UB dressing. 3.   4.   5.     Factors facilitating achievement of predicted outcomes: Family support, Motivated, and Cooperative    Barriers to the achievement of predicted outcomes: Pain, Impulsivity, Limited safety awareness, Communication deficit, Decreased endurance, Decreased sensation, Decreased proprioception, Upper extremity weakness, and Lower extremity weakness    Team Members Present at Conference:  : Cyndy Ramon 23   Occupational Therapist: Debi French, OTR/L  Physical Therapist: Alma Delia Tilley PT,DPT  Speech Therapist: Robby Pearson [address], [id number] [facility]  Nurse: Catalina Morejon   Nurse Manager:  Renetta Joel, RN, BSN  Dietitian:  Сергей Wilson, MS, RD, LD  Rehab Director:        I approve the established interdisciplinary plan of care as documented within the medical record of [address] [place] [address].

## 2022-11-28 NOTE — PROGRESS NOTES
[address] [facility]) has been cleared to assist [employer] with the following activities:    Bed Transfers:  Yes     Toilet Transfers:  No     W/C Transfers:  Yes (sliding board)    Walking:  No     Car Transfers:  No     Stairs:  No     Pass Training:  No     Electronically signed by Helio Sanchez PT on 11/28/2022 at 10:55 AM

## 2022-11-28 NOTE — PLAN OF CARE
Problem: Safety - Adult  Goal: Free from fall injury  11/28/2022 1031 by Jean Moise RN  Outcome: Progressing  11/27/2022 2122 by Marielos Knowles RN  Outcome: Progressing   Up with 2 assist with pebbles moody

## 2022-11-28 NOTE — PLAN OF CARE
Problem: Discharge Planning  Goal: Discharge to home or other facility with appropriate resources  11/27/2022 2122 by Miryam Kagn RN  Outcome: Progressing  11/27/2022 1106 by Adithya Avalos LPN  Outcome: Progressing  Flowsheets  Taken 11/27/2022 0839 by Adithya Avalos LPN  Discharge to home or other facility with appropriate resources: Refer to discharge planning if patient needs post-hospital services based on physician order or complex needs related to functional status, cognitive ability or social support system  Taken 11/26/2022 2115 by Roque Elam LPN  Discharge to home or other facility with appropriate resources: Refer to discharge planning if patient needs post-hospital services based on physician order or complex needs related to functional status, cognitive ability or social support system     Problem: Safety - Adult  Goal: Free from fall injury  11/27/2022 2122 by Miryam Kang RN  Outcome: Progressing  11/27/2022 1106 by Adithya Avalos LPN  Outcome: Progressing     Problem: Neurosensory - Adult  Goal: Achieves stable or improved neurological status  11/27/2022 2122 by Miryam Kang RN  Outcome: Progressing  11/27/2022 1106 by Adithya Avalos LPN  Outcome: Progressing  Flowsheets  Taken 11/27/2022 0839 by Adithya Avalos LPN  Achieves stable or improved neurological status: Assess for and report changes in neurological status  Taken 11/26/2022 2115 by Roque Elam LPN  Achieves stable or improved neurological status: Assess for and report changes in neurological status  Goal: Achieves maximal functionality and self care  11/27/2022 2122 by Miryam Kang RN  Outcome: Progressing  11/27/2022 1106 by Adithya Avalos LPN  Outcome: Progressing  Flowsheets  Taken 11/27/2022 0839 by Adithya Avalos LPN  Achieves maximal functionality and self care: Monitor swallowing and airway patency with patient fatigue and changes in neurological status  Taken 11/26/2022 2115 by Moisés Roth EDWIN Dawkins  Achieves maximal functionality and self care: Monitor swallowing and airway patency with patient fatigue and changes in neurological status     Problem: Skin/Tissue Integrity - Adult  Goal: Skin integrity remains intact  11/27/2022 2122 by Ivan Anguiano RN  Outcome: Progressing  11/27/2022 1106 by Marvin Linares LPN  Outcome: Progressing  Flowsheets  Taken 11/27/2022 1049 by Marvin Linares LPN  Skin Integrity Remains Intact: Monitor for areas of redness and/or skin breakdown  Taken 11/26/2022 2115 by Sascha Henson LPN  Skin Integrity Remains Intact: Monitor for areas of redness and/or skin breakdown     Problem: Pain  Goal: Verbalizes/displays adequate comfort level or baseline comfort level  11/27/2022 2122 by Ivan Anguiano RN  Outcome: Progressing  11/27/2022 1106 by Marvin Linares LPN  Outcome: Progressing     Problem: Chronic Conditions and Co-morbidities  Goal: Patient's chronic conditions and co-morbidity symptoms are monitored and maintained or improved  11/27/2022 2122 by Ivan Anguiano RN  Outcome: Progressing  11/27/2022 1106 by Marvin Linares LPN  Outcome: Progressing  Flowsheets  Taken 11/27/2022 0839 by Marvin Linaers LPN  Care Plan - Patient's Chronic Conditions and Co-Morbidity Symptoms are Monitored and Maintained or Improved: Monitor and assess patient's chronic conditions and comorbid symptoms for stability, deterioration, or improvement  Taken 11/26/2022 2115 by Sascha Henson LPN  Care Plan - Patient's Chronic Conditions and Co-Morbidity Symptoms are Monitored and Maintained or Improved: Monitor and assess patient's chronic conditions and comorbid symptoms for stability, deterioration, or improvement     Problem: ABCDS Injury Assessment  Goal: Absence of physical injury  11/27/2022 2122 by Ivan Anguiano RN  Outcome: Progressing  11/27/2022 1106 by Marvin Linares LPN  Outcome: Progressing     Problem: Skin/Tissue Integrity  Goal: Absence of new skin breakdown  Description: 1. Monitor for areas of redness and/or skin breakdown  2. Assess vascular access sites hourly  3. Every 4-6 hours minimum:  Change oxygen saturation probe site  4. Every 4-6 hours:  If on nasal continuous positive airway pressure, respiratory therapy assess nares and determine need for appliance change or resting period. 11/27/2022 2122 by Eric Pinzon RN  Outcome: Progressing  11/27/2022 1106 by Yumiko Marina LPN  Outcome: Progressing     Problem: Confusion  Goal: Confusion, delirium, dementia, or psychosis is improved or at baseline  Description: INTERVENTIONS:  1. Assess for possible contributors to thought disturbance, including medications, impaired vision or hearing, underlying metabolic abnormalities, dehydration, psychiatric diagnoses, and notify attending LIP  2. [place] high risk fall precautions, as indicated  3. Provide frequent short contacts to provide reality reorientation, refocusing and direction  4. Decrease environmental stimuli, including noise as appropriate  5. Monitor and intervene to maintain adequate nutrition, hydration, elimination, sleep and activity  6. If unable to ensure safety without constant attention obtain sitter and review sitter guidelines with assigned personnel  7.  Initiate Psychosocial CNS and Spiritual Care consult, as indicated  11/27/2022 2122 by Eric Pinzon RN  Outcome: Progressing  11/27/2022 1106 by Yumiko Marina LPN  Outcome: Progressing  Flowsheets (Taken 11/26/2022 2115 by Jenn Jiang LPN)  Effect of thought disturbance (confusion, delirium, dementia, or psychosis) are managed with adequate functional status: Assess for contributors to thought disturbance, including medications, impaired vision or hearing, underlying metabolic abnormalities, dehydration, psychiatric diagnoses, notify LIP     Problem: Musculoskeletal - Adult  Goal: Return mobility to safest level of function  11/27/2022 2122 by Eric Pinzon RN  Outcome: Progressing  11/27/2022 1106 by Darling Gonzalez LPN  Outcome: Progressing  Flowsheets  Taken 11/27/2022 0839 by Darling Gonzalez LPN  Return Mobility to Safest Level of Function: Assess patient stability and activity tolerance for standing, transferring and ambulating with or without assistive devices  Taken 11/26/2022 2115 by Dewayne Cordero LPN  Return Mobility to Safest Level of Function: Assess patient stability and activity tolerance for standing, transferring and ambulating with or without assistive devices  Goal: Return ADL status to a safe level of function  11/27/2022 2122 by Marielos Knowles RN  Outcome: Progressing  11/27/2022 1106 by Darling Gonzalez LPN  Outcome: Progressing  Flowsheets  Taken 11/27/2022 0839 by Darling Gonzalez LPN  Return ADL Status to a Safe Level of Function: Assess activities of daily living deficits and provide assistive devices as needed  Taken 11/26/2022 2115 by Dewayne Cordero LPN  Return ADL Status to a Safe Level of Function: Administer medication as ordered     Problem: Gastrointestinal - Adult  Goal: Maintains or returns to baseline bowel function  11/27/2022 2122 by Marielos Knowles RN  Outcome: Progressing  11/27/2022 1106 by Darling Gonzalez LPN  Outcome: Progressing  Flowsheets  Taken 11/27/2022 0839 by Darling Gonzalez LPN  Maintains or returns to baseline bowel function: Assess bowel function  Taken 11/26/2022 2115 by Dewayne Cordero LPN  Maintains or returns to baseline bowel function: Assess bowel function  Goal: Maintains adequate nutritional intake  11/27/2022 2122 by Marielos Knowles RN  Outcome: Progressing  11/27/2022 1106 by Darling Gonzalez LPN  Outcome: Progressing  Flowsheets  Taken 11/27/2022 0839 by Darling Gonzalez LPN  Maintains adequate nutritional intake: Monitor percentage of each meal consumed  Taken 11/26/2022 2115 by Dewayne Cordero LPN  Maintains adequate nutritional intake: Monitor percentage of each meal consumed     Problem: Metabolic/Fluid and Electrolytes - Adult  Goal: Electrolytes maintained within normal limits  11/27/2022 2122 by Tj Marie RN  Outcome: Progressing  11/27/2022 1106 by Mason Butcher LPN  Outcome: Progressing  Flowsheets  Taken 11/27/2022 0839 by Mason Butcher LPN  Electrolytes maintained within normal limits: Monitor labs and assess patient for signs and symptoms of electrolyte imbalances  Taken 11/26/2022 2115 by Loree Hoffman LPN  Electrolytes maintained within normal limits: Monitor labs and assess patient for signs and symptoms of electrolyte imbalances     Problem: Nutrition Deficit:  Goal: Optimize nutritional status  11/27/2022 2122 by Tj Marie RN  Outcome: Progressing  11/27/2022 1106 by Mason Butcher LPN  Outcome: Progressing   Electronically signed by Tj Marie RN on 11/27/2022 at 9:23 PM

## 2022-11-29 PROCEDURE — 6370000000 HC RX 637 (ALT 250 FOR IP): Performed by: PSYCHIATRY & NEUROLOGY

## 2022-11-29 PROCEDURE — 92507 TX SP LANG VOICE COMM INDIV: CPT

## 2022-11-29 PROCEDURE — 92526 ORAL FUNCTION THERAPY: CPT

## 2022-11-29 PROCEDURE — 6360000002 HC RX W HCPCS: Performed by: PSYCHIATRY & NEUROLOGY

## 2022-11-29 PROCEDURE — 1180000000 HC REHAB R&B

## 2022-11-29 PROCEDURE — 99233 SBSQ HOSP IP/OBS HIGH 50: CPT | Performed by: PSYCHIATRY & NEUROLOGY

## 2022-11-29 PROCEDURE — 97530 THERAPEUTIC ACTIVITIES: CPT

## 2022-11-29 RX ADMIN — HYDROXYCHLOROQUINE SULFATE 200 MG: 200 TABLET, FILM COATED ORAL at 08:24

## 2022-11-29 RX ADMIN — ENOXAPARIN SODIUM 40 MG: 100 INJECTION SUBCUTANEOUS at 16:45

## 2022-11-29 RX ADMIN — FOLIC ACID 1 MG: 1 TABLET ORAL at 08:24

## 2022-11-29 RX ADMIN — HYDROXYCHLOROQUINE SULFATE 200 MG: 200 TABLET, FILM COATED ORAL at 20:34

## 2022-11-29 RX ADMIN — DOCUSATE SODIUM 100 MG: 100 CAPSULE, LIQUID FILLED ORAL at 08:24

## 2022-11-29 RX ADMIN — THERA TABS 1 TABLET: TAB at 08:24

## 2022-11-29 RX ADMIN — GABAPENTIN 300 MG: 300 CAPSULE ORAL at 20:34

## 2022-11-29 RX ADMIN — ASPIRIN 81 MG 81 MG: 81 TABLET ORAL at 08:24

## 2022-11-29 RX ADMIN — DOCUSATE SODIUM 100 MG: 100 CAPSULE, LIQUID FILLED ORAL at 20:34

## 2022-11-29 RX ADMIN — AMLODIPINE BESYLATE 2.5 MG: 2.5 TABLET ORAL at 08:24

## 2022-11-29 RX ADMIN — ATORVASTATIN CALCIUM 40 MG: 40 TABLET, FILM COATED ORAL at 20:34

## 2022-11-29 NOTE — PROGRESS NOTES
[facility]  INPATIENT SPEECH THERAPY  [facility] 8 REHAB UNIT  TIME   1100  1200    [x]Daily Note  []Progress Note    Date: 2022  Patient Name: Rashad Russell        MRN: [id number]    Account #: [de-identified]  : 1960  (64 y.o.)  Gender: female   Primary Provider: Narendra Thomas MD  Diet: Dysphagia soft and bite sized, thin liquids          PATIENT DIAGNOSIS(ES):    Diagnosis: CVA, R hemiparesis     Additional Pertinent Hx: Anxiety, depression, COPD, CVA, DM, LE edema, hyperlipidemia, HTN, obesity, RA, CAD and venous thromboembolism     RESTRICTIONS/PRECAUTIONS:    Restrictions/Precautions  Restrictions/Precautions: Modified Diet, Swallowing - Fall Risk, Aspiration Risk  Required Braces or Orthoses?: No        Subjective:  She was upright in her wheelchair. Her  was present for today's session. Objective: Today she completed picture naming at 75%. She was stimulable to phonemic cues, phrase completion cues, and gestures. She answered yes/no questions at 50% this date. Following verbal directions was at 30%. When she was presented with a model she was at 60%. Following written directions was challenging and she scored at 0%. Today she was able to read single words for communication board use. She was unable to later identify which word would be used for specific needs. SLP will continue to try and establish an effective means of communication. She fed herself lunch and continues to take large bites at times. She did not require cueing to complete lingual and finger sweeps today to keep the oral cavity clean. No overt s/s of aspiration was observed with sips of thin liquids or with soft solids. No oral residue or buccal cavity pocketing was noted this date. Good hyolaryngeal elevation and minimally delayed swallow responses were noted. She is recommended to remain on dysphagia soft and bite sized food due to increased risk of pocketing.     She continues to exhibit severe expressive and receptive aphasia. Perseverations and semantic paraphasias are still noted. She is still having difficulty meeting immediate wants and needs as she cannot verbalize or write what she is wanting to communicate. She has not yet been able to use a communication or AAC board yet due to receptive deficits. Recommend she continue speech therapy for dysphagia, expressive language, receptive language,cognition and dysarthria. Recommended Diet and Intervention  Soft & Bite Sized consistencies   Thin liquids  Recommended Form of Meds: Crushed in puree as able  Recommendations: Modified barium swallow study; Dysphagia treatment  Therapeutic Interventions: Pharyngeal exercises;Diet tolerance monitoring;Oral care;Oral motor exercises; Patient/Family education; Therapeutic PO trials with SLP     Compensatory Swallowing Strategies  Compensatory Swallowing Strategies : Alternate solids and liquids;Eat/Feed slowly; No straws;Upright as possible for all oral intake;Remain upright for 30-45 minutes after meals;Small bites/sips; Check for pocketing of food on the Right; Check for pocketing of food on the Left; Set up assist;Assist feed          SHORT TERM GOAL #1:  Goal 1: Pt will complete y/n tasks with 80% accuracy and minimal verbal cues/modeling. SHORT TERM GOAL #2:  Goal 2: Pt will complete verbal expression tasks with 80% accuracy and minimal verbal cues/modeling. SHORT TERM GOAL #3:  Goal 3: Pt will complete receptive/expressive language tasks with 80% accuracy and minimal verbal cues/modeling. SHORT TERM GOAL #4:  Goal 4: Pt will follow one step commands with with 90% accuracy and minimal verbal cues/modeling. SHORT TERM GOAL #5:  Goal 5: Pt will complete orientation tasks with 90% accuracy and minimal verbal cues/modeling.      Swallowing Short Term Goals  Short-term Goals  Goal 1:Pt will tolerate soft & bite sized consistencies with mildly  (nectar) thick liquids with minimal overt s/s of M.S. [id number] [facility]  11/29/2022,7:39 AM.

## 2022-11-29 NOTE — PROGRESS NOTES
Occupational Therapy     11/29/22 1000   General   Timed Code Treatment Minutes 60 Minutes   Restrictions/Precautions   Restrictions/Precautions Modified Diet;Swallowing - Thickened Liquids; Fall Risk;Aspiration Risk;Weight Bearing   General   Family / Caregiver Present Yes  (spouse)   Subjective   Subjective Initial family instruction provided with future sessions planned as pt progresses   Balance   Standing Balance Contact guard assistance   Standing Balance   Time 1 min, 3 occ facing mat and with mat at sound side   Activity static and LUE dynamic act   Functional Mobility   Functional - Mobility Device Wheelchair   Activity To/from bathroom   Assist Level Minimal assistance   Functional Mobility Comments pt switched to 18\" width- able to propel from mat to bathroom with assist only to make a sharp turn   Transfers   Sit to stand Moderate assistance   Stand to sit Contact guard assistance   Transfer Comments Mod x 2 occ, Min x 1   Tub Transfers   Tub Transfers Comments pt has a garden tub in her bathroom, the is a regular tub in the spouse's bathroom   Neuromuscular Education   Joint Compression R UE   Weight Bearing   RUE Weight Bearing Extended arm standing   Response To Weight Bearing Technique fair   Positioning   Wheelchair Postion Comment pt/spouse educated in sling precautions   Additional Activities Comment   Additional Activities Object naming and matching- 100%   Assessment   Performance deficits / Impairments Decreased functional mobility ; Decreased endurance;Decreased coordination;Decreased ADL status; Decreased sensation;Decreased posture;Decreased ROM; Decreased balance;Decreased strength;Decreased vision/visual deficit; Decreased safe awareness;Decreased high-level IADLs;Decreased cognition;Decreased fine motor control   Assessment Pt/spouse shown aspen walker technique- hopefully pt will be able to progress to stand/pivot by d/c. Shown TTB and pt indicated that they have one.  Spouse believes they gave it away but will double check. Will need a BSC. Bed t/fs not addressed= has a tall bed and spouse plans to remove the box spring. Will need a bed rail. Space beween vanity and toilet floor width bewteen) may limit access to tub. Spouse instrated to take a w/c home and roll through the house to ID barriers/obstacles. Treatment Diagnosis L MCA stroke       11/29/22 1000   Patient Education   Education Given To Family; Patient   Education Provided Precautions;Transfer Training;Equipment; Family Education   Education Outcome Verbalized understanding;Demonstrated understanding; Unable to verbalize;Continued education needed  (pt unable to verbalize but nodded in aggreement, spouse stated vebalization)

## 2022-11-29 NOTE — PROGRESS NOTES
Patient:   Mynor Rojas  MR#:    [id number]   Room:    0826/826-01   YOB: 1960  Date of Progress Note: 11/29/2022  Time of Note                           8:31 AM  Consulting Physician:   Elicia Weller M.D. Attending Physician:  Elicia Weller MD     Chief complaint Acute ischemic stroke    S:This 64 y.o. female  with history of anxiety, depression, COPD, atherosclerotic disease, colitis, COPD, CVA, DM,  LE edema, hyperlipidemia, HTN, morbid obesity, RA, CAD  and venous thromboembolism. She presented to [facility] ER on 11/9/22 after being found at home lying facedown in her feces. She was very weak, confused, not able to speak but moving purposefully and intermittently following commands. Her last well know was 3 a.m. when her  left for work. Imaging done revealed a large MCA stroke 7.7 x 5 cm. CTA head/neck revealed an acute M1 occlusion. She was outside of the window for TPA. CK on admit was 1100, consistent with rhabdomylosis. She was also noted to possibly Dens fracture. She was transferred to [facility] for a possible thrombectomy. Further imaging was done at [facility] and she was deemed not a candidate for thrombectomy. MRI done ruled out Dens fracture. Repeat CT of head on 11/11/22 showed evolving left MCA territory infarct with increasing edema/mass effect resulting in 4mm of  rightward midline shift(previously 2mm) a the level of the third ventricle. No hemorrhagic conversion or definite trapping of the right lateral ventricle, possible small acute right cerebellar infarct. Neurosurgery was consulted for possible hemicraniectomy. She was seen by Dr. Priscila Colon, who didn't feel any surgical intervention was needed. Patient was found to have a UTI + for [facility] and was placed on Rocephin on 11/14/22. Patient had a PICC line placed. Patient was evaluated by SPT and initially made NPO d/t oropharyngeal dysphagia. NGT placed and feeding started.  She was also noted to have global aphasia but is improving. She was re-evaluated by SPT on 11/15/22 for swallow and was started on a dysphagia 1 diet with nectar thick liquids. Patient also continues to have right sided weakness and is participating in both PT/OT. Repeat CT head on 11/13/22 shows stable LMCA ischemic stroke with stable edema, 5 mm shift, no hemorrhage. She is felt to need a stay on Rehab for continued PT/OT/SPT and medical management to work towards her goal of returning home with her . She is now felt ready to start the Rehab program.  Did slip. Complaining of right ankle pain and swelling. Feels well overall. REVIEW OF SYSTEMS:  Unreliable due to aphasia     Past Medical History:      Diagnosis Date    Anxiety     ASCVD (arteriosclerotic cardiovascular disease)     Carrier of group B Streptococcus     Cellulitis     Colitis     COPD (chronic obstructive pulmonary disease) ([facility])     Costochondritis     CVA (cerebral vascular accident) ([address]     Depression     Edema     Fracture of sternum     non union post surgery.      Gallstones     Grief reaction     H/O superior vena cava filter placement     Hyperlipidemia     Hypertension     MI (myocardial infarction) ([address]     MRSA (methicillin resistant Staphylococcus aureus)     Neuropathy     Obesity     Pseudarthrosis sternal    RA (rheumatoid arthritis) ([address]     Rosacea     Sinus bradycardia     TIA (transient ischemic attack)     Venous thromboembolism        Past Surgical History:      Procedure Laterality Date    ADENOIDECTOMY      APPENDECTOMY      CARDIAC CATHETERIZATION  3/2009    CARDIAC CATHETERIZATION  11/5/14  JDT    EF 50%, patent bypass grafts    CHOLECYSTECTOMY  2011    COLONOSCOPY  06/03/2010    Dr. Christo Elam: distal colon having ulcerative lesions c/w UC    COLONOSCOPY  2009    pancolitis    COLONOSCOPY      CORONARY ARTERY BYPASS GRAFT  3/2009    PHANI Son to LAD, SVGs to [address]  2011    HYSTERECTOMY (CERVIX STATUS UNKNOWN)      KNEE SURGERY      OK COLONOSCOPY FLX DX W/COLLJ SPEC WHEN PFRMD N/A 3/12/2018    Dr Colletta Galla rectal inflammation consistent with U.C.-Active proctitis--3 yr recall    [id number] [place] [address] Dr ENDOSCOPY  2010    Dr. Damari Skinner  2012       Medications in Hospital:      Current Facility-Administered Medications:     docusate sodium (COLACE) capsule 100 mg, 100 mg, Oral, BID, Sweta Vasquez MD, 100 mg at 11/29/22 8406    aspirin chewable tablet 81 mg, 81 mg, Oral, Daily, Sweta Vasquez MD, 81 mg at 11/29/22 0824    atorvastatin (LIPITOR) tablet 40 mg, 40 mg, Oral, Nightly, Sweta Vasquez MD, 40 mg at 11/28/22 2106    dulaglutide (TRULICITY) SC injection 1.5 mg (Patient Supplied), 1.5 mg, SubCUTAneous, Weekly, Sweta Vasquez MD    folic acid (FOLVITE) tablet 1 mg, 1 mg, Oral, Daily, Sweta Vasquez MD, 1 mg at 11/29/22 0824    gabapentin (NEURONTIN) capsule 300 mg, 300 mg, Oral, Nightly, Sweta Vasquez MD, 300 mg at 11/28/22 2106    hydroxychloroquine (PLAQUENIL) tablet 200 mg, 200 mg, Oral, BID, Sweta Vasquez MD, 200 mg at 11/29/22 0824    amLODIPine (NORVASC) tablet 2.5 mg, 2.5 mg, Oral, Daily, Sweta Vasquez MD, 2.5 mg at 11/29/22 0824    tiZANidine (ZANAFLEX) tablet 4 mg, 4 mg, Oral, BID PRN, Sweta Vasquez MD    multivitamin 1 tablet, 1 tablet, Oral, Daily, Sweta Vasquez MD, 1 tablet at 11/29/22 5052    acetaminophen (TYLENOL) tablet 650 mg, 650 mg, Oral, Q4H PRN, Sweta Vasquez MD, 650 mg at 11/27/22 1933    enoxaparin (LOVENOX) injection 40 mg, 40 mg, SubCUTAneous, Daily, Sweta Vasquez MD, 40 mg at 11/28/22 1642    polyethylene glycol (GLYCOLAX) packet 17 g, 17 g, Oral, Daily PRN, Sweta Vasquez MD, 17 g at 11/26/22 9522    Allergies:  Methotrexate derivatives    Social History:   TOBACCO:   reports that she quit smoking about 13 years ago. Her smoking use included cigarettes.  She has a 64.00 pack-year smoking history. She has never used smokeless tobacco.  ETOH:   reports current alcohol use. Family History:       Problem Relation Age of Onset    Prostate Cancer Father     Heart Failure Father     Cancer Father     Colon Cancer Neg Hx     Colon Polyps Neg Hx     Liver Cancer Neg Hx     Liver Disease Neg Hx     Esophageal Cancer Neg Hx     Rectal Cancer Neg Hx     Stomach Cancer Neg Hx          PHYSICAL EXAM:  /78   Pulse 51   Temp (!) 96.4 °F (35.8 °C) (Temporal)   Resp 20   Ht 5' 7\" (1.702 m)   Wt 204 lb (92.5 kg)   SpO2 97%   BMI 31.95 kg/m²     Constitutional - well developed, well nourished. Eyes - conjunctiva normal.   Ear, nose, throat - No scars, masses, or lesions over external nose or ears, no atrophy of tongue  Neck-symmetric, no masses noted, no jugular vein distension  Respiration- chest wall appears symmetric, good expansion,   normal effort without use of accessory muscles  Musculoskeletal - no significant wasting of muscles noted, no bony deformities  Extremities-no clubbing, cyanosis or edema  Skin - warm, dry, and intact. No rash, erythema, or pallor. Psychiatric - mood, affect, and behavior appear normal.      Neurological exam  Awake, alert, global aphasia says \"yes\" to all questions asked   Attention and concentration appear appropriate  Recent and remote memory unable to tests     Cranial Nerve Exam     CN III, IV,VI-EOMI, No nystagmus, conjugate eye movements, no ptosis    CN VII-+ facial assymetry       Motor Exam  No movement on the right      Tremors- no tremors in hands or head noted     Gait  Not tested     Nursing/pcp notes, imaging,labs and vitals reviewed.      PT,OT and/or speech notes reviewed    Lab Results   Component Value Date    WBC 6.9 11/28/2022    HGB 10.6 (L) 11/28/2022    HCT 34.4 (L) 11/28/2022    MCV 78.9 (L) 11/28/2022     (H) 11/28/2022     Lab Results   Component Value Date     11/28/2022    K 4.2 11/28/2022     11/28/2022    CO2 26 11/28/2022    BUN 14 11/28/2022    CREATININE 0.6 11/28/2022    GLUCOSE 83 11/28/2022    CALCIUM 9.4 11/28/2022    PROT 5.0 (L) 11/28/2022    LABALBU 2.9 (L) 11/28/2022    BILITOT <0.2 11/28/2022    ALKPHOS 89 11/28/2022    AST 28 11/28/2022    ALT 25 11/28/2022    LABGLOM >60 11/28/2022   No results found for: INR, PROTIME    Wander Hankins   Student Physical Therapist   Physical Therapy   Progress Notes      Signed   Date of Service:  11/25/2022  3:30 PM                 Signed                                                             11/25/22 1300   Restrictions/Precautions   Restrictions/Precautions Modified Diet;Swallowing - Thickened Liquids; Fall Risk;Aspiration Risk;Weight Bearing   Lower Extremity Weight Bearing Restrictions   Right Lower Extremity Weight Bearing Weight Bearing As Tolerated   Left Lower Extremity Weight Bearing Weight Bearing As Tolerated   General   Additional Pertinent Hx Anxiety, depression, COPD, CVA, DM, LE edema, hyperlipidemia, HTN, obesity, RA, CAD and venous thromboembolism   Diagnosis CVA, R hemiparesis   General Comment   Comments PATIENT WAS SHOPPING AT [employer] DOWNSTAIRS WITH DAUGHTER   Transfers   Sit to Stand Moderate Assistance  (LUE PULLING ON //)   Stand to Sit Moderate Assistance  (SLOW AND CONTROLLED MOVEMENT WB THROUGH LLE)   Squat Pivot Transfers Moderate Assistance;Maximum Assistance  (FROM RIGHT SIDE OF BED, TRANSFERRING TO LEFT. DIFFICULTY WITH DIRECTIONS PRIOR TO TRANSFER, ATTEMPTED TO WRAP ARMS AROUND THERAPIST RATHER THAN LEAN FWD AND REACH L HAND FOR BED.)   Ambulation   WB Status WBAT   Ambulation   Surface Level tile   Device Parallel Bars   Other Apparatus Wheelchair follow   Assistance Maximum assistance; Moderate assistance   Distance 0'   Comments ATTEMPTED TO STAND THREE TIMES. EACH TIME PATIENT WOULD STAND UPRIGHT, HAVE LOB, AND WOULD SAY STOP, THEN PROCEED TO SIT DOWN. More Ambulation?  Yes   Ambulation 2   Surface - 2 level tile   Device 2 Parallel Bars   Other Apparatus 2 Wheelchair follow   Assistance 2 Maximum assistance; Moderate assistance   Quality of Gait 2 EXCESSIVE FWD LEAN, DIFFICULTY LEANING TO LEFT, UNABLE TO WB THROUGH RLE CONFIDENTLY, WOULD ADVANCE LUE ON RAIL AND THEN RETURN IT BEHIND SELF   Distance 3'   Comments PATIENT WANTING TO INITIATE MOVEMENTS BEFORE READY OR DIRECTED TO, CAUSING KNEE TO BUCKLE OR LOB   Ambulation 3   Surface - 3 level tile   Device 3 Parallel Bars   Other Apparatus 3 Wheelchair follow   Assistance 3 Maximum assistance   Quality of Gait 3 AS ABOVE   Distance 3'   Comments IMPROVED AMB. , PATIENT DID NOT ADVANCE BEFORE READY. THERAPIST ASSISTANCE AT RLE, NO FEELING OF RESISTANCE OR ASSISTANCE IN RLE. PATIENT UNABLE TO TAKE SUFFICENT STEP WITH LLE. PT Exercises   Exercise Treatment WS IN //; SEATED EX: MARCHES 15X, QS 10X, PF 15X  (RLE = PROM; LLE = AAROM)   Activity Tolerance   Activity Tolerance Patient tolerated treatment well   Assessment   Assessment PATIENT WAS UNABLE TO AMB. EFFICENTLY AS DONE IN PREVIOUS SESSIONS. INITIALLY HAD DIFFICULTY STANDING AND WORKED ON WS TO BEGIN. AFTERWARDS WHEN AMB. PATIENT WOULD BEGIN ADVANCING LUE OR LLE BEFORE STABLE OR PREPARED, THIS LEAD TO LOB AND KNEE GRADY DURING EACH AMB., MULTIPLE CUES TO SLOW DOWN. SEATED EXERCISES DONE AFTERWARDS, PATIENT BEGAN COUNTING REPITIONS WITH LETTERS RATHER THAN NUMBERS. DURING SQUAT PIVOT TRANSFER FROM W/C INTO BED, PATIENT ATTEMPTED TO WRAP LUE AROUND THERAPIST RATHER THAN PLACE HAND ON BED, OTHERWISE TRANSFER WAS VERY SUCCESSFUL AND ONLY REQUIRED MOD A. Discharge Recommendations Continue to assess pending progress   Safety Devices   Type of Devices Bed alarm in place; Left in bed;Sitter present;Call light within reach  (DAUGHTER AND FAMILY FRIEND IN ROOM)   PT Individual Minutes   Time In 1300   Time Out 1400   Minutes 60   Electronically sign by: Mac Hunter, SPT 11/25/2022, 3:30 PM             Cosigned by:  Komal Escamilla, PT at 11/28/2022  8:41 AM     Revision History                      VL Extremity Venous Bilateral [[id number]]    Collected: 11/27/22 1247    Updated: 11/28/22 1542    Narrative:     Vascular Lower Extremities DVT Study Procedure      Demographics        Patient Name     Chriss Brower Age                    64        Patient Number   [id number]       Gender                 Female        Visit Number     [id number]    Interpreting Physician Ulises Harrison MD        Date of Birth    1960   Referring Physician    Jimmy Huitron        Accession Number [id number]   FARHAT/ [address] RVT       Procedure   Type of Study:        Veins:Lower Extremities DVT Study, VL LOWER EXTREMITY BILATERAL VENOUS    DUPLEX . Indications for Study:Edema, bilateral lower extremity. Risk Factors   History of Disease   +-----------------------------+----+---------------------------------------+   ! Diagnosis                    ! Date! Comments                               ! +-----------------------------+----+---------------------------------------+   ! Neuro->TIA                   ! !FREQUENT HA                            !   +-----------------------------+----+---------------------------------------+   ! Circulatory->CAD             ! !HTN, PAST HX HYPERLIPIDEMIA, MI        !   +-----------------------------+----+---------------------------------------+   ! Peripheral vascular disease  ! ! HX DVT BILATERAL LOWER EXTREMITY       !   +-----------------------------+----+---------------------------------------+   ! Immuno-compromise            ! !CROHN'S DX. RHEUMATOID ARTHRITIS       !   +-----------------------------+----+---------------------------------------+   ! Gastrointestinal->Hiatal     !    !CROHNS DX,                             ! !Hernia                       !    !                                       !   +-----------------------------+----+---------------------------------------+   ! Liver disease !    ! CURRENTLY HAS ELEVATED LIVER ENZYMES   !   !                             !    !R/T MEDICATIONS.                       ! +-----------------------------+----+---------------------------------------+   ! Musculoskeletal->Arthritis   ! !RHEUMATOID ARTHRIS                     !   +-----------------------------+----+---------------------------------------+   ! Pain Management              ! !DEPRESSION & ANXIETY                   ! +-----------------------------+----+---------------------------------------+   ! Peripheral vascular disease  ! ! BLE CELLULITIS & BLE STREPT INFECTION  ! !                             ! !CURRENTLY                              !   +-----------------------------+----+---------------------------------------+   ! Neuro                        ! !PT SLIGHTLY HARD OF HEARING            !   +-----------------------------+----+---------------------------------------+     Allergies     - Other allergy:Reaction - Lungs ->tightnessSensitivity - Allergy       (METHOTREXATE). Impression        There is no evidence of deep venous thrombosis (DVT) in the bilateral    lower extremities. The exam is technically limited due to body habitus; therefore the    bilateral gastrocnemius, peroneal, and short saphenous veins were not well    visualized. Signature        ----------------------------------------------------------------    Electronically signed by Carmen Strickland MD(Interpreting    physician) on 11/28/2022 03:42 PM    ----------------------------------------------------------------          RECORD REVIEW: Previous medical records, medications were reviewed at today's visit    IMPRESSION:   1. Acute ischemic stroke-on aspirin/statin  2. Hypertension-on medications monitor  3. Hyperlipidemia-on statin  4. Diabetes-Trulicity-monitor blood sugar  5. DVT prophylaxis-Lovenox  6. History of coronary artery disease-aspirin/statin  7.   Neuropathy-on Neurontin  8. Rheumatoid arthritis-on Plaquenil  9. COPD-monitor  10. History of anxiety and depression-monitor  11. History of colitis/gallstones-monitor  12. History of bariatric surgery-on multivitamin/folic acid  13. History of superior vena cava filter placement with venous thromboembolism-not on anticoagulation-monitor- indicates took herself off blood thinners as not like meds and was bruising   14. PT/OT/speech    Continue present care as noted    x-ray of right ankle no acute changes  Venous ultrasound of leg no DVT    Team conference with PT/OT/speech/nursing/Care coordinator to review in depth patients care and discharge planning. At least 35 minutes spent coordinating care for patient >50% of time spent in coordination of care.       WC 15 ft max assist  Ambulation 12 ft parallel bars  Sliding board transfer did well    ELOS December pending 4 weeks    Expected duration and frequency therapy: 180 minutes per day, 5 days per week    Chanda Pleitez  [phone] CELL  Dr Nathaly Aguilar

## 2022-11-29 NOTE — PLAN OF CARE
Problem: Safety - Adult  Goal: Free from fall injury  11/29/2022 0944 by Tigist Watkins RN  Outcome: Progressing  11/28/2022 2306 by Farheen Braun LPN  Outcome: Progressing   Up with 2 assist with pebbles moody

## 2022-11-29 NOTE — PROGRESS NOTES
Occupational Therapy     11/29/22 1400   General   Timed Code Treatment Minutes 30 Minutes   Restrictions/Precautions   Restrictions/Precautions Modified Diet;Swallowing - Thickened Liquids; Fall Risk;Aspiration Risk;Weight Bearing   Type of ROM/Therapeutic Exercise   Comment Completed sh and elb  AAROM sidelying for GE progression of UE function. Pt able to complete at times without facilitation. Exercises   Scapular Protraction GE AAROM with facilitation   Scapular Retraction GE AAROM with facilitation   Shoulder Flexion GE AAROM with facilitation   Shoulder Extension GE AAROM with facilitation   Elbow Flexion GE AAROM with facilitation   Elbow Extension GE AAROM with facilitation   Supination GE AAROM with facilitation   Pronation GE AAROM with facilitation   Wrist Flexion PGAAROM with facilitation   Wrist Extension PG AAROM with facilitation   Assessment   Performance deficits / Impairments Decreased functional mobility ; Decreased endurance;Decreased coordination;Decreased ADL status; Decreased sensation;Decreased posture;Decreased ROM; Decreased balance;Decreased strength;Decreased vision/visual deficit; Decreased safe awareness;Decreased high-level IADLs;Decreased cognition;Decreased fine motor control   Treatment Diagnosis L MCA stroke   Activity Tolerance   Activity Tolerance Patient Tolerated treatment well

## 2022-11-29 NOTE — PLAN OF CARE
Problem: Discharge Planning  Goal: Discharge to home or other facility with appropriate resources  11/28/2022 2309 by Nakia Valenzuela LPN  Outcome: Progressing  11/28/2022 1031 by Yasmin Acuna RN  Outcome: Progressing  Flowsheets (Taken 11/28/2022 0815)  Discharge to home or other facility with appropriate resources: Refer to discharge planning if patient needs post-hospital services based on physician order or complex needs related to functional status, cognitive ability or social support system     Problem: Safety - Adult  Goal: Free from fall injury  11/28/2022 2309 by Nakia Valenzuela LPN  Outcome: Progressing  11/28/2022 1031 by Yasmin Acuna RN  Outcome: Progressing     Problem: Neurosensory - Adult  Goal: Achieves stable or improved neurological status  11/28/2022 2309 by Nakia Valenzuela LPN  Outcome: Progressing  11/28/2022 1031 by Yasmin Acuna RN  Outcome: Progressing  Goal: Achieves maximal functionality and self care  11/28/2022 2309 by Nakia Valenzuela LPN  Outcome: Progressing  11/28/2022 1031 by Yasmin Acuna RN  Outcome: Progressing     Problem: Skin/Tissue Integrity - Adult  Goal: Skin integrity remains intact  11/28/2022 2309 by Nakia Valenzuela LPN  Outcome: Progressing  11/28/2022 1031 by Yasmin Acuna RN  Outcome: Progressing  Flowsheets (Taken 11/28/2022 1030)  Skin Integrity Remains Intact: Monitor for areas of redness and/or skin breakdown     Problem: Pain  Goal: Verbalizes/displays adequate comfort level or baseline comfort level  11/28/2022 2309 by Nakia Valenzuela LPN  Outcome: Progressing  11/28/2022 1031 by Yasmin Acuna RN  Outcome: Progressing     Problem: Chronic Conditions and Co-morbidities  Goal: Patient's chronic conditions and co-morbidity symptoms are monitored and maintained or improved  11/28/2022 2309 by Nakia Valenzuela LPN  Outcome: Progressing  11/28/2022 1031 by Yasmin Acuna RN  Outcome: Progressing  Flowsheets (Taken 11/28/2022 0815)  Care Plan - Patient's Chronic Conditions and Co-Morbidity Symptoms are Monitored and Maintained or Improved: Monitor and assess patient's chronic conditions and comorbid symptoms for stability, deterioration, or improvement     Problem: ABCDS Injury Assessment  Goal: Absence of physical injury  11/28/2022 2309 by Keyshawn Chung LPN  Outcome: Progressing  11/28/2022 1031 by Naun Nino RN  Outcome: Progressing     Problem: Confusion  Goal: Confusion, delirium, dementia, or psychosis is improved or at baseline  Description: INTERVENTIONS:  1. Assess for possible contributors to thought disturbance, including medications, impaired vision or hearing, underlying metabolic abnormalities, dehydration, psychiatric diagnoses, and notify attending LIP  2. [place] high risk fall precautions, as indicated  3. Provide frequent short contacts to provide reality reorientation, refocusing and direction  4. Decrease environmental stimuli, including noise as appropriate  5. Monitor and intervene to maintain adequate nutrition, hydration, elimination, sleep and activity  6. If unable to ensure safety without constant attention obtain sitter and review sitter guidelines with assigned personnel  7. Initiate Psychosocial CNS and Spiritual Care consult, as indicated  11/28/2022 2309 by Keyshawn Chung LPN  Outcome: Progressing  11/28/2022 1031 by Naun Nino RN  Outcome: Progressing     Problem: Skin/Tissue Integrity  Goal: Absence of new skin breakdown  Description: 1. Monitor for areas of redness and/or skin breakdown  2. Assess vascular access sites hourly  3. Every 4-6 hours minimum:  Change oxygen saturation probe site  4. Every 4-6 hours:  If on nasal continuous positive airway pressure, respiratory therapy assess nares and determine need for appliance change or resting period.   11/28/2022 2309 by Keyshawn Chung LPN  Outcome: Progressing  11/28/2022 1031 by Naun Nino RN  Outcome: Progressing     Problem: Musculoskeletal - Adult  Goal: Return mobility to safest level of function  11/28/2022 2309 by Michelle Montgomery LPN  Outcome: Progressing  11/28/2022 1031 by Teri Nobles RN  Outcome: Progressing  Goal: Return ADL status to a safe level of function  11/28/2022 2309 by Michelle Montgomery LPN  Outcome: Progressing  11/28/2022 1031 by Teri Nobles RN  Outcome: Progressing     Problem: Metabolic/Fluid and Electrolytes - Adult  Goal: Electrolytes maintained within normal limits  11/28/2022 2309 by Michelle Montgomery LPN  Outcome: Progressing  11/28/2022 1031 by Teri Nobles RN  Outcome: Progressing  Flowsheets (Taken 11/28/2022 0815)  Electrolytes maintained within normal limits: Monitor labs and assess patient for signs and symptoms of electrolyte imbalances     Problem: Nutrition Deficit:  Goal: Optimize nutritional status  11/28/2022 2309 by Michelle Montgomery LPN  Outcome: Progressing  11/28/2022 1031 by Teri Nobles RN  Outcome: Progressing  Flowsheets (Taken 11/28/2022 0706 by Ronda Story, RD, LD)  Nutrient intake appropriate for improving, restoring, or maintaining nutritional needs:   Monitor oral intake, labs, and treatment plans   Recommend appropriate diets, oral nutritional supplements, and vitamin/mineral supplements

## 2022-11-30 PROCEDURE — 92526 ORAL FUNCTION THERAPY: CPT

## 2022-11-30 PROCEDURE — 6370000000 HC RX 637 (ALT 250 FOR IP): Performed by: PSYCHIATRY & NEUROLOGY

## 2022-11-30 PROCEDURE — 97116 GAIT TRAINING THERAPY: CPT

## 2022-11-30 PROCEDURE — 1180000000 HC REHAB R&B

## 2022-11-30 PROCEDURE — 94760 N-INVAS EAR/PLS OXIMETRY 1: CPT

## 2022-11-30 PROCEDURE — 97110 THERAPEUTIC EXERCISES: CPT

## 2022-11-30 PROCEDURE — 99232 SBSQ HOSP IP/OBS MODERATE 35: CPT | Performed by: PSYCHIATRY & NEUROLOGY

## 2022-11-30 PROCEDURE — 6360000002 HC RX W HCPCS: Performed by: PSYCHIATRY & NEUROLOGY

## 2022-11-30 PROCEDURE — 97530 THERAPEUTIC ACTIVITIES: CPT

## 2022-11-30 PROCEDURE — 92507 TX SP LANG VOICE COMM INDIV: CPT

## 2022-11-30 RX ADMIN — ASPIRIN 81 MG 81 MG: 81 TABLET ORAL at 10:28

## 2022-11-30 RX ADMIN — HYDROXYCHLOROQUINE SULFATE 200 MG: 200 TABLET, FILM COATED ORAL at 10:28

## 2022-11-30 RX ADMIN — THERA TABS 1 TABLET: TAB at 10:28

## 2022-11-30 RX ADMIN — GABAPENTIN 300 MG: 300 CAPSULE ORAL at 20:36

## 2022-11-30 RX ADMIN — FOLIC ACID 1 MG: 1 TABLET ORAL at 10:28

## 2022-11-30 RX ADMIN — DOCUSATE SODIUM 100 MG: 100 CAPSULE, LIQUID FILLED ORAL at 20:35

## 2022-11-30 RX ADMIN — AMLODIPINE BESYLATE 2.5 MG: 2.5 TABLET ORAL at 10:28

## 2022-11-30 RX ADMIN — ENOXAPARIN SODIUM 40 MG: 100 INJECTION SUBCUTANEOUS at 18:17

## 2022-11-30 RX ADMIN — DOCUSATE SODIUM 100 MG: 100 CAPSULE, LIQUID FILLED ORAL at 10:28

## 2022-11-30 RX ADMIN — HYDROXYCHLOROQUINE SULFATE 200 MG: 200 TABLET, FILM COATED ORAL at 20:35

## 2022-11-30 RX ADMIN — POLYETHYLENE GLYCOL 3350 17 G: 17 POWDER, FOR SOLUTION ORAL at 18:17

## 2022-11-30 RX ADMIN — ATORVASTATIN CALCIUM 40 MG: 40 TABLET, FILM COATED ORAL at 20:35

## 2022-11-30 ASSESSMENT — PAIN SCALES - GENERAL: PAINLEVEL_OUTOF10: 0

## 2022-11-30 NOTE — PLAN OF CARE
Problem: Discharge Planning  Goal: Discharge to home or other facility with appropriate resources  Outcome: Progressing  Flowsheets (Taken 11/30/2022 1028)  Discharge to home or other facility with appropriate resources: Refer to discharge planning if patient needs post-hospital services based on physician order or complex needs related to functional status, cognitive ability or social support system     Problem: Safety - Adult  Goal: Free from fall injury  Outcome: Progressing     Problem: Neurosensory - Adult  Goal: Achieves stable or improved neurological status  Outcome: Progressing  Flowsheets (Taken 11/30/2022 1028)  Achieves stable or improved neurological status: Assess for and report changes in neurological status  Goal: Achieves maximal functionality and self care  Outcome: Progressing  Flowsheets (Taken 11/30/2022 1028)  Achieves maximal functionality and self care: Monitor swallowing and airway patency with patient fatigue and changes in neurological status     Problem: Skin/Tissue Integrity - Adult  Goal: Skin integrity remains intact  Outcome: Progressing  Flowsheets (Taken 11/30/2022 1028)  Skin Integrity Remains Intact: Monitor for areas of redness and/or skin breakdown     Problem: Pain  Goal: Verbalizes/displays adequate comfort level or baseline comfort level  Outcome: Progressing     Problem: Chronic Conditions and Co-morbidities  Goal: Patient's chronic conditions and co-morbidity symptoms are monitored and maintained or improved  Outcome: Progressing  Flowsheets (Taken 11/30/2022 1028)  Care Plan - Patient's Chronic Conditions and Co-Morbidity Symptoms are Monitored and Maintained or Improved: Monitor and assess patient's chronic conditions and comorbid symptoms for stability, deterioration, or improvement     Problem: ABCDS Injury Assessment  Goal: Absence of physical injury  Outcome: Progressing     Problem: Skin/Tissue Integrity  Goal: Absence of new skin breakdown  Description: 1. Monitor for areas of redness and/or skin breakdown  2. Assess vascular access sites hourly  3. Every 4-6 hours minimum:  Change oxygen saturation probe site  4. Every 4-6 hours:  If on nasal continuous positive airway pressure, respiratory therapy assess nares and determine need for appliance change or resting period. Outcome: Progressing     Problem: Confusion  Goal: Confusion, delirium, dementia, or psychosis is improved or at baseline  Description: INTERVENTIONS:  1. Assess for possible contributors to thought disturbance, including medications, impaired vision or hearing, underlying metabolic abnormalities, dehydration, psychiatric diagnoses, and notify attending LIP  2. [place] high risk fall precautions, as indicated  3. Provide frequent short contacts to provide reality reorientation, refocusing and direction  4. Decrease environmental stimuli, including noise as appropriate  5. Monitor and intervene to maintain adequate nutrition, hydration, elimination, sleep and activity  6. If unable to ensure safety without constant attention obtain sitter and review sitter guidelines with assigned personnel  7.  Initiate Psychosocial CNS and Spiritual Care consult, as indicated  Outcome: Progressing  Flowsheets (Taken 11/30/2022 1028)  Effect of thought disturbance (confusion, delirium, dementia, or psychosis) are managed with adequate functional status:   Assess for contributors to thought disturbance, including medications, impaired vision or hearing, underlying metabolic abnormalities, dehydration, psychiatric diagnoses, notify [facility] high risk fall precautions, as indicated   Provide frequent short contacts to provide reality reorientation, refocusing and direction   Decrease environmental stimuli, including noise as appropriate     Problem: Musculoskeletal - Adult  Goal: Return mobility to safest level of function  Outcome: Progressing  Flowsheets (Taken 11/30/2022 1028)  [facility] Level [facility] Function:   Assess patient stability and activity tolerance for standing, transferring and ambulating with or without assistive devices   Assist with transfers and ambulation using safe patient handling equipment as needed   Ensure adequate protection for wounds/incisions during mobilization  Goal: Return ADL status to a safe level of function  Outcome: Progressing  Flowsheets (Taken 11/30/2022 1028)  Return ADL Status to a Safe Level of Function: Administer medication as ordered     Problem: Gastrointestinal - Adult  Goal: Maintains or returns to baseline bowel function  Outcome: Progressing  Flowsheets (Taken 11/30/2022 1028)  Maintains or returns to baseline bowel function:   Assess bowel function   Encourage oral fluids to ensure adequate hydration  Goal: Maintains adequate nutritional intake  Outcome: Progressing  Flowsheets (Taken 11/30/2022 1028)  Maintains adequate nutritional intake: Monitor percentage of each meal consumed     Problem: Metabolic/Fluid and Electrolytes - Adult  Goal: Electrolytes maintained within normal limits  Outcome: Progressing  Flowsheets (Taken 11/30/2022 1028)  Electrolytes maintained within normal limits: Monitor labs and assess patient for signs and symptoms of electrolyte imbalances     Problem: Nutrition Deficit:  Goal: Optimize nutritional status  Outcome: Progressing       Electronically signed by Quinn Tan on 11/30/2022 at 3:16 PM

## 2022-11-30 NOTE — PLAN OF CARE
Problem: Discharge Planning  Goal: Discharge to home or other facility with appropriate resources  11/29/2022 2138 by Gerson Zavala LPN  Outcome: Progressing  11/29/2022 0944 by Charlie Swanson RN  Outcome: Progressing     Problem: Safety - Adult  Goal: Free from fall injury  11/29/2022 2138 by Gerson Zavala LPN  Outcome: Progressing  11/29/2022 0944 by Charlie Swanson RN  Outcome: Progressing     Problem: Neurosensory - Adult  Goal: Achieves stable or improved neurological status  11/29/2022 2138 by Gerson Zavala LPN  Outcome: Progressing  11/29/2022 0944 by Charlie Swanson RN  Outcome: Progressing  Goal: Achieves maximal functionality and self care  11/29/2022 2138 by Gerson Zavala LPN  Outcome: Progressing  11/29/2022 0944 by Charlie Swanson RN  Outcome: Progressing     Problem: Skin/Tissue Integrity - Adult  Goal: Skin integrity remains intact  11/29/2022 2138 by Gerson Zavala LPN  Outcome: Progressing  11/29/2022 0944 by Charlie Swanson RN  Outcome: Progressing  Flowsheets (Taken 11/29/2022 6313)  Skin Integrity Remains Intact: Monitor for areas of redness and/or skin breakdown     Problem: Pain  Goal: Verbalizes/displays adequate comfort level or baseline comfort level  11/29/2022 2138 by Gerson Zavala LPN  Outcome: Progressing  11/29/2022 0944 by Charlie Swanson RN  Outcome: Progressing     Problem: Chronic Conditions and Co-morbidities  Goal: Patient's chronic conditions and co-morbidity symptoms are monitored and maintained or improved  11/29/2022 2138 by Gerson Zavala LPN  Outcome: Progressing  11/29/2022 0944 by Charlie Swanson RN  Outcome: Progressing     Problem: ABCDS Injury Assessment  Goal: Absence of physical injury  11/29/2022 2138 by Gerson Zavala LPN  Outcome: Progressing  11/29/2022 0944 by Charlie Swanson RN  Outcome: Progressing     Problem: Skin/Tissue Integrity  Goal: Absence of new skin breakdown  Description: 1.   Monitor for areas of redness and/or skin breakdown  2. Assess vascular access sites hourly  3. Every 4-6 hours minimum:  Change oxygen saturation probe site  4. Every 4-6 hours:  If on nasal continuous positive airway pressure, respiratory therapy assess nares and determine need for appliance change or resting period. 11/29/2022 2138 by Nhan Jacobs LPN  Outcome: Progressing  11/29/2022 0944 by Taylor Kohli RN  Outcome: Progressing     Problem: Confusion  Goal: Confusion, delirium, dementia, or psychosis is improved or at baseline  Description: INTERVENTIONS:  1. Assess for possible contributors to thought disturbance, including medications, impaired vision or hearing, underlying metabolic abnormalities, dehydration, psychiatric diagnoses, and notify attending LIP  2. [place] high risk fall precautions, as indicated  3. Provide frequent short contacts to provide reality reorientation, refocusing and direction  4. Decrease environmental stimuli, including noise as appropriate  5. Monitor and intervene to maintain adequate nutrition, hydration, elimination, sleep and activity  6. If unable to ensure safety without constant attention obtain sitter and review sitter guidelines with assigned personnel  7.  Initiate Psychosocial CNS and Spiritual Care consult, as indicated  11/29/2022 2138 by Nhan Jacobs LPN  Outcome: Progressing  11/29/2022 0944 by Taylor Kohli RN  Outcome: Progressing     Problem: Musculoskeletal - Adult  Goal: Return mobility to safest level of function  11/29/2022 2138 by Nhan Jacobs LPN  Outcome: Progressing  11/29/2022 0944 by Taylor Kohli RN  Outcome: Progressing  Goal: Return ADL status to a safe level of function  11/29/2022 2138 by Nhan Jacobs LPN  Outcome: Progressing  11/29/2022 0944 by Taylor Kohli RN  Outcome: Progressing     Problem: Gastrointestinal - Adult  Goal: Maintains or returns to baseline bowel function  11/29/2022 2138 by Nhan Jacobs LPN  Outcome: Progressing  11/29/2022 0944 by Naun Nino RN  Outcome: Progressing  Goal: Maintains adequate nutritional intake  11/29/2022 2138 by Keyshawn Chung LPN  Outcome: Progressing  11/29/2022 0944 by Naun Nino RN  Outcome: Progressing     Problem: Metabolic/Fluid and Electrolytes - Adult  Goal: Electrolytes maintained within normal limits  11/29/2022 2138 by Keyshawn Chung LPN  Outcome: Progressing  11/29/2022 0944 by Naun Nino RN  Outcome: Progressing     Problem: Nutrition Deficit:  Goal: Optimize nutritional status  11/29/2022 2138 by Keyshawn Chung LPN  Outcome: Progressing  11/29/2022 0944 by Naun Nino RN  Outcome: Progressing

## 2022-11-30 NOTE — PROGRESS NOTES
Patient:   Cynthia Thomas  MR#:    [id number]   Room:    0826/826-01   YOB: 1960  Date of Progress Note: 11/30/2022  Time of Note                           8:15 AM  Consulting Physician:   Chantal Jurado M.D. Attending Physician:  Chantal Jurado MD     Chief complaint Acute ischemic stroke    S:This 64 y.o. female  with history of anxiety, depression, COPD, atherosclerotic disease, colitis, COPD, CVA, DM,  LE edema, hyperlipidemia, HTN, morbid obesity, RA, CAD  and venous thromboembolism. She presented to [facility] on 11/9/22 after being found at home lying facedown in her feces. She was very weak, confused, not able to speak but moving purposefully and intermittently following commands. Her last well know was 3 a.m. when her  left for work. Imaging done revealed a large MCA stroke 7.7 x 5 cm. CTA head/neck revealed an acute M1 occlusion. She was outside of the window for TPA. CK on admit was 1100, consistent with rhabdomylosis. She was also noted to possibly Dens fracture. She was transferred to [facility] for a possible thrombectomy. Further imaging was done at [facility] and she was deemed not a candidate for thrombectomy. MRI done ruled out Dens fracture. Repeat CT of head on 11/11/22 showed evolving left MCA territory infarct with increasing edema/mass effect resulting in 4mm of  rightward midline shift(previously 2mm) a the level of the third ventricle. No hemorrhagic conversion or definite trapping of the right lateral ventricle, possible small acute right cerebellar infarct. Neurosurgery was consulted for possible hemicraniectomy. She was seen by Dr. Rosy Jeff, who didn't feel any surgical intervention was needed. Patient was found to have a UTI + for [facility] and was placed on Rocephin on 11/14/22. Patient had a PICC line placed. Patient was evaluated by SPT and initially made NPO d/t oropharyngeal dysphagia. NGT placed and feeding started.  She was also noted to have global aphasia but is improving. She was re-evaluated by SPT on 11/15/22 for swallow and was started on a dysphagia 1 diet with nectar thick liquids. Patient also continues to have right sided weakness and is participating in both PT/OT. Repeat CT head on 11/13/22 shows stable LMCA ischemic stroke with stable edema, 5 mm shift, no hemorrhage. She is felt to need a stay on Rehab for continued PT/OT/SPT and medical management to work towards her goal of returning home with her . She is now felt ready to start the Rehab program.  Did slip. Complaining of right ankle pain and swelling. No new complaints overnight. REVIEW OF SYSTEMS:  Unreliable due to aphasia     Past Medical History:      Diagnosis Date    Anxiety     ASCVD (arteriosclerotic cardiovascular disease)     Carrier of group B Streptococcus     Cellulitis     Colitis     COPD (chronic obstructive pulmonary disease) ([facility])     Costochondritis     CVA (cerebral vascular accident) ([address]     Depression     Edema     Fracture of sternum     non union post surgery.      Gallstones     Grief reaction     H/O superior vena cava filter placement     Hyperlipidemia     Hypertension     MI (myocardial infarction) ([address]     MRSA (methicillin resistant Staphylococcus aureus)     Neuropathy     Obesity     Pseudarthrosis sternal    RA (rheumatoid arthritis) ([address]     Rosacea     Sinus bradycardia     TIA (transient ischemic attack)     Venous thromboembolism        Past Surgical History:      Procedure Laterality Date    ADENOIDECTOMY      APPENDECTOMY      CARDIAC CATHETERIZATION  3/2009    CARDIAC CATHETERIZATION  11/5/14  JDT    EF 50%, patent bypass grafts    CHOLECYSTECTOMY  2011    COLONOSCOPY  06/03/2010    Dr. Aj Salvador: distal colon having ulcerative lesions c/w UC    COLONOSCOPY  2009    pancolitis    COLONOSCOPY      CORONARY ARTERY BYPASS GRAFT  3/2009    HPANI Wadsworth to LAD, SVGs to [address]  2011 HYSTERECTOMY (CERVIX STATUS UNKNOWN)      KNEE SURGERY      ID COLONOSCOPY FLX DX W/COLLJ SPEC WHEN PFRMD N/A 3/12/2018    Dr Crista Haynes rectal inflammation consistent with U.C.-Active proctitis--3 yr recall    THORACOTOMY      TONSILLECTOMY      UPPER GASTROINTESTINAL ENDOSCOPY  2010    Dr. Rebeca Medrano  2012       Medications in Hospital:      Current Facility-Administered Medications:     docusate sodium (COLACE) capsule 100 mg, 100 mg, Oral, BID, Narendra Thomas MD, 100 mg at 11/29/22 2034    aspirin chewable tablet 81 mg, 81 mg, Oral, Daily, Narendra Thomas MD, 81 mg at 11/29/22 0824    atorvastatin (LIPITOR) tablet 40 mg, 40 mg, Oral, Nightly, Narednra Thomas MD, 40 mg at 11/29/22 2034    dulaglutide (TRULICITY) SC injection 1.5 mg (Patient Supplied), 1.5 mg, SubCUTAneous, Weekly, Narendra Thomas MD    folic acid (FOLVITE) tablet 1 mg, 1 mg, Oral, Daily, Narendra Thomas MD, 1 mg at 11/29/22 0824    gabapentin (NEURONTIN) capsule 300 mg, 300 mg, Oral, Nightly, Narendra Thomas MD, 300 mg at 11/29/22 2034    hydroxychloroquine (PLAQUENIL) tablet 200 mg, 200 mg, Oral, BID, Narendra Thomas MD, 200 mg at 11/29/22 2034    amLODIPine (NORVASC) tablet 2.5 mg, 2.5 mg, Oral, Daily, Narendra Thomas MD, 2.5 mg at 11/29/22 0824    tiZANidine (ZANAFLEX) tablet 4 mg, 4 mg, Oral, BID PRN, Narendra Thomas MD    multivitamin 1 tablet, 1 tablet, Oral, Daily, Narendra Thomas MD, 1 tablet at 11/29/22 5098    acetaminophen (TYLENOL) tablet 650 mg, 650 mg, Oral, Q4H PRN, Narendra Thomas MD, 650 mg at 11/27/22 1933    enoxaparin (LOVENOX) injection 40 mg, 40 mg, SubCUTAneous, Daily, Narendra Thomas MD, 40 mg at 11/29/22 1645    polyethylene glycol (GLYCOLAX) packet 17 g, 17 g, Oral, Daily PRN, Narendra Thomas MD, 17 g at 11/26/22 1716    Allergies:  Methotrexate derivatives    Social History:   TOBACCO:   reports that she quit smoking about 13 years ago. Her smoking use included cigarettes.  She has a 64.00 pack-year smoking history. She has never used smokeless tobacco.  ETOH:   reports current alcohol use. Family History:       Problem Relation Age of Onset    Prostate Cancer Father     Heart Failure Father     Cancer Father     Colon Cancer Neg Hx     Colon Polyps Neg Hx     Liver Cancer Neg Hx     Liver Disease Neg Hx     Esophageal Cancer Neg Hx     Rectal Cancer Neg Hx     Stomach Cancer Neg Hx          PHYSICAL EXAM:  BP (!) 148/66   Pulse 54   Temp 97.9 °F (36.6 °C) (Temporal)   Resp 18   Ht 5' 7\" (1.702 m)   Wt 204 lb (92.5 kg)   SpO2 97%   BMI 31.95 kg/m²     Constitutional - well developed, well nourished. Eyes - conjunctiva normal.   Ear, nose, throat - No scars, masses, or lesions over external nose or ears, no atrophy of tongue  Neck-symmetric, no masses noted, no jugular vein distension  Respiration- chest wall appears symmetric, good expansion,   normal effort without use of accessory muscles  Musculoskeletal - no significant wasting of muscles noted, no bony deformities  Extremities-no clubbing, cyanosis or edema  Skin - warm, dry, and intact. No rash, erythema, or pallor. Psychiatric - mood, affect, and behavior appear normal.      Neurological exam  Awake, alert, global aphasia says \"yes\" to all questions asked   Attention and concentration appear appropriate  Recent and remote memory unable to tests     Cranial Nerve Exam     CN III, IV,VI-EOMI, No nystagmus, conjugate eye movements, no ptosis    CN VII-+ facial assymetry       Motor Exam  No movement on the right      Tremors- no tremors in hands or head noted     Gait  Not tested     Nursing/pcp notes, imaging,labs and vitals reviewed.      PT,OT and/or speech notes reviewed    Lab Results   Component Value Date    WBC 6.9 11/28/2022    HGB 10.6 (L) 11/28/2022    HCT 34.4 (L) 11/28/2022    MCV 78.9 (L) 11/28/2022     (H) 11/28/2022     Lab Results   Component Value Date     11/28/2022    K 4.2 11/28/2022     11/28/2022    CO2 26 11/28/2022    BUN 14 11/28/2022    CREATININE 0.6 11/28/2022    GLUCOSE 83 11/28/2022    CALCIUM 9.4 11/28/2022    PROT 5.0 (L) 11/28/2022    LABALBU 2.9 (L) 11/28/2022    BILITOT <0.2 11/28/2022    ALKPHOS 89 11/28/2022    AST 28 11/28/2022    ALT 25 11/28/2022    LABGLOM >60 11/28/2022   No results found for: INR, PROTIME    [address] [facility]   Occupational Therapist Assistant   Specialty:  Occupational Therapy   Progress Notes       Cosign Needed   Date of Service:  11/29/2022  3:51 PM                 Cosign Needed                                Occupational Therapy       11/29/22 1400   General   Timed Code Treatment Minutes 30 Minutes   Restrictions/Precautions   Restrictions/Precautions Modified Diet;Swallowing - Thickened Liquids; Fall Risk;Aspiration Risk;Weight Bearing   Type of ROM/Therapeutic Exercise   Comment Completed sh and elb  AAROM sidelying for GE progression of UE function. Pt able to complete at times without facilitation. Exercises   Scapular Protraction GE AAROM with facilitation   Scapular Retraction GE AAROM with facilitation   Shoulder Flexion GE AAROM with facilitation   Shoulder Extension GE AAROM with facilitation   Elbow Flexion GE AAROM with facilitation   Elbow Extension GE AAROM with facilitation   Supination GE AAROM with facilitation   Pronation GE AAROM with facilitation   Wrist Flexion PGAAROM with facilitation   Wrist Extension PG AAROM with facilitation   Assessment   Performance deficits / Impairments Decreased functional mobility ; Decreased endurance;Decreased coordination;Decreased ADL status; Decreased sensation;Decreased posture;Decreased ROM; Decreased balance;Decreased strength;Decreased vision/visual deficit; Decreased safe awareness;Decreased high-level IADLs;Decreased cognition;Decreased fine motor control   Treatment Diagnosis L MCA stroke   Activity Tolerance   Activity Tolerance Patient Tolerated treatment well                      RECORD REVIEW: Previous medical records, medications were reviewed at today's visit    IMPRESSION:   1. Acute ischemic stroke-on aspirin/statin  2. Hypertension-on medications monitor  3. Hyperlipidemia-on statin  4. Diabetes-Trulicity-monitor blood sugar  5. DVT prophylaxis-Lovenox  6. History of coronary artery disease-aspirin/statin  7. Neuropathy-on Neurontin  8. Rheumatoid arthritis-on Plaquenil  9. COPD-monitor  10. History of anxiety and depression-monitor  11. History of colitis/gallstones-monitor  12. History of bariatric surgery-on multivitamin/folic acid  13. History of superior vena cava filter placement with venous thromboembolism-not on anticoagulation-monitor- indicates took herself off blood thinners as not like meds and was bruising   14.   PT/OT/speech    Continue current care as noted    x-ray of right ankle no acute changes  Venous ultrasound of leg no DVT      ELOS December pending 4 weeks    Expected duration and frequency therapy: 180 minutes per day, 5 days per week    Ruth Zee  [phone] CELL  Dr Max Mcqueen

## 2022-11-30 NOTE — PROGRESS NOTES
[facility]  INPATIENT SPEECH THERAPY  [facility] 8 REHAB UNIT                  [x]Daily Note  []Progress Note    Date: 2022  Patient Name: Abel Felming        MRN: [id number]    Account #: [de-identified]  : 1960  (64 y.o.)  Gender: female   Primary Provider: Max Mcqueen MD  Diet: Dysphagia soft and bite sized, thin liquids           PATIENT DIAGNOSIS(ES):    Diagnosis: CVA, R hemiparesis     Additional Pertinent Hx: Anxiety, depression, COPD, CVA, DM, LE edema, hyperlipidemia, HTN, obesity, RA, CAD and venous thromboembolism     RESTRICTIONS/PRECAUTIONS:    Restrictions/Precautions  Restrictions/Precautions: Modified Diet, Swallowing - Fall Risk, Aspiration Risk  Required Braces or Orthoses?: No        Subjective:  She was cooperative with all therapy tasks. Her  was present for today's session. Objective:  No overt s/s of aspiration observed with thin liquids via cup. Clear voicing was noted after all sips. She is clearing the oral cavity and buccal cavity of food during and after meals with minimal cues. She answered yes/no questions at 10% this date. She followed one step directions at 30%. She had difficulty with body part identification, but was able to complete this when a model was provided. She was able to read single words without cueing. Naming pictures was at 75% this date. Phonemic cues and phrase completion cues were both effective. Paraphasias and perseverations were both observed during this task. Sentence completion was at 70% without cueing. She continues to exhibit severe expressive and receptive aphasia. Perseverations and semantic paraphasias are still noted. She is still having difficulty meeting immediate wants and needs as she cannot verbalize or write what she is wanting to communicate. She has not yet been able to use a communication or AAC board yet due to receptive deficits.      Recommend she continue speech therapy for dysphagia, expressive language, receptive language,cognition and dysarthria. Recommend she remain on a dysphagia soft diet at this time. SLP will upgrade to regular when she demonstrates consistent awareness of oral residue. Recommended Diet and Intervention  Soft & Bite Sized consistencies   Thin liquids  Recommended Form of Meds: Crushed in puree as able  Recommendations: Modified barium swallow study; Dysphagia treatment  Therapeutic Interventions: Pharyngeal exercises;Diet tolerance monitoring;Oral care;Oral motor exercises; Patient/Family education; Therapeutic PO trials with SLP     Compensatory Swallowing Strategies  Compensatory Swallowing Strategies : Alternate solids and liquids;Eat/Feed slowly; No straws;Upright as possible for all oral intake;Remain upright for 30-45 minutes after meals;Small bites/sips; Check for pocketing of food on the Right; Check for pocketing of food on the Left; Set up assist;Assist feed           SHORT TERM GOAL #1:  Goal 1: Pt will complete y/n tasks with 80% accuracy and minimal verbal cues/modeling. SHORT TERM GOAL #2:  Goal 2: Pt will complete verbal expression tasks with 80% accuracy and minimal verbal cues/modeling. SHORT TERM GOAL #3:  Goal 3: Pt will complete receptive/expressive language tasks with 80% accuracy and minimal verbal cues/modeling. SHORT TERM GOAL #4:  Goal 4: Pt will follow one step commands with with 90% accuracy and minimal verbal cues/modeling. SHORT TERM GOAL #5:  Goal 5: Pt will complete orientation tasks with 90% accuracy and minimal verbal cues/modeling. Swallowing Short Term Goals  Short-term Goals  Goal 1:Pt will tolerate soft & bite sized consistencies with mildly  (nectar) thick liquids with minimal overt s/s of aspiration/penetration during hospitalization. Met. New Goal: Pt will tolerate soft and bite sized diet with thin liquids with minimal overt s/s of aspiration/penetration during hospitalization.   Goal 2: Pt will complete Modified Barium Swallow Study.  Goal 3: Pt will demonstrate awareness of general aspiration precautions. Goal 4: Pt will participate in swallowing reassessments to ensure safest diet consistencies. Goal 5: Pt will allow staff to complete daily oral care. Goal 6: Pt will complete oral motor exercises for lingual and labial strengthening. Long term goals:  Pt will participate in skilled speech therapy services to ensure she is able to communicate her wants and needs. Pt will tolerate least restrictive diet with minimal overt s/s of aspiration/penetration during hospitalization. ASSESSMENT:  Assessment: [x]Progressing towards goals          []Not Progressing towards goals    Patient Tolerance of Treatment:   [x]Tolerated well []Tolerated fair []Required rest breaks []Fatigued    Education:  Learner:  [x]Patient          []Significant Other          [x]Other  Education provided regarding:  [x]Goals and POC   []Diet and swallowing precautions    []Home Exercise Program  []Progress and/or discharge information  Method of Education:  [x]Discussion          [x]Demonstration          []Handout          []Other  Evaluation of Education:   [x]Verbalized understanding   []Demonstrates without assistance  []Demonstrates with assistance  []Needs further instruction     []No evidence of learning                  []No family present      Plan: [x]Continue with current plan of care    []Modify current plan of care as follows:    []Discharge patient    Discharge Location:    Services/Supervision Recommended:      [x]Patient continues to require treatment by a licensed therapist to address functional deficits as outlined in the established plan of care.                          Electronically Signed By:  Mina Jimenez M.S., CCC-SLP  11/30/2022,9:06 AM.

## 2022-11-30 NOTE — PROGRESS NOTES
Occupational Therapy     11/30/22 1000   General   Timed Code Treatment Minutes 60 Minutes   Restrictions/Precautions   Restrictions/Precautions Modified Diet;Swallowing - Thickened Liquids; Fall Risk;Aspiration Risk;Weight Bearing   General   Family / Caregiver Present Yes  (spouse)   Balance   Sitting Balance Supervision  (unsupported EOM)   Standing Balance Contact guard assistance   Standing Balance   Time 1 min, 3 occ   Activity static at [facility]   Functional Mobility   Functional - Mobility Device Wheelchair   Assist Level Minimal assistance   Bed mobility   Scooting Minimal assistance  (down EOM- supervision to sound side and  min A to affected side)   Transfers   Sit to stand Moderate assistance   Stand to sit Contact guard assistance   Wheelchair Bed Transfers   Wheelchair/Bed - Technique Squat pivot  (mat <-> w/c)   Equipment Used Bed; Wheelchair   Level of Asssistance Minimal assistance   Type of ROM/Therapeutic Exercise   Type of ROM/Therapeutic Exercise AAROM;PROM   Comment RUE tabletop exercises with poor to fair - results   Assessment   Performance deficits / Impairments Decreased functional mobility ; Decreased endurance;Decreased coordination;Decreased ADL status; Decreased sensation;Decreased posture;Decreased ROM; Decreased balance;Decreased strength;Decreased vision/visual deficit; Decreased safe awareness;Decreased high-level IADLs;Decreased cognition;Decreased fine motor control   Treatment Diagnosis L MCA stroke   Activity Tolerance   Activity Tolerance Patient Tolerated treatment well

## 2022-11-30 NOTE — PROGRESS NOTES
Occupational Therapy  Facility/Department: [facility] 8 REHAB UNIT      Name: Cyndi Walter  : 1960  MRN: [id number]  Date of Service: 2022    Discharge Recommendations:  Continue to assess pending progress          Patient Diagnosis(es): There were no encounter diagnoses. Past Medical History:  has a past medical history of Anxiety, ASCVD (arteriosclerotic cardiovascular disease), Carrier of group B Streptococcus, Cellulitis, Colitis, COPD (chronic obstructive pulmonary disease) ([address] Costochondritis, CVA (cerebral vascular accident) ([address] Depression, Edema, Fracture of sternum, Gallstones, Grief reaction, H/O superior vena cava filter placement, Hyperlipidemia, Hypertension, MI (myocardial infarction) ([address] MRSA (methicillin resistant Staphylococcus aureus), Neuropathy, Obesity, Pseudarthrosis, RA (rheumatoid arthritis) ([facility] Rosacea, Sinus bradycardia, TIA (transient ischemic attack), and Venous thromboembolism. Past Surgical History:  has a past surgical history that includes Coronary artery bypass graft (3/2009); Tonsillectomy; Hysterectomy; thoracotomy; knee surgery; Appendectomy; Colonoscopy (2010); Cardiac catheterization (3/2009); Adenoidectomy; Cholecystectomy (); Gastric bypass surgery (); hiatal hernia repair (); Cardiac catheterization (14  JDT); Colonoscopy (); Colonoscopy; Upper gastrointestinal endoscopy (); Upper gastrointestinal endoscopy (); and pr colonoscopy flx dx w/collj spec when pfrmd (N/A, 3/12/2018).     Treatment Diagnosis: L MCA stroke      Plan   Occupational Therapy Plan  Specific Instructions for Next Treatment: transfers, UE  Current Treatment Recommendations: Strengthening, ROM, Balance training, Functional mobility training, Endurance training, Positioning, Modalities, Neuromuscular re-education, Cognitive reorientation, Safety education & training, Patient/Caregiver education & training, Equipment evaluation, education, & procurement, Self-Care / ADL, Cognitive/Perceptual training, Home management training, Sensory integraion     Restrictions  Restrictions/Precautions  Restrictions/Precautions: Modified Diet, Swallowing - Thickened Liquids, Fall Risk, Aspiration Risk, Weight Bearing  Required Braces or Orthoses?: No  Lower Extremity Weight Bearing Restrictions  Right Lower Extremity Weight Bearing: Weight Bearing As Tolerated  Left Lower Extremity Weight Bearing: Weight Bearing As Tolerated    Subjective   General  Patient assessed for rehabilitation services?: Yes  Family / Caregiver Present: Yes (spouse, daughter)  Pre Treatment Pain Screening   Pain at present 0   Scale Used Faces     Objective     PROM Exercises: R UE----all muscle groups, extensive PROM of wrist flexion/extension        11/30/22 1435   Modalities   Modalities Yes   Electrical Stimulation   Electrical Stimulation Location Right;Wrist   Electrical Stimulation Action wrist extension   Electrical Stimulation Parameters 22mAccs, Vietnamese setting, 50% duty cycle, 10/20 on/off cycle with fair results----the goal is to increase intensity upon tolerance level in order to facilitate increased movement---pt demonstrated improved tolerance of estim this session (big improvement)   Electrical Stimulation Goals Neuromuscular Facilitation;Strength   Goals  Short Term Goals  Time Frame for Short Term Goals: 2 weeks  Short Term Goal 1: Pt will bathe with mod A, AE prn  Short Term Goal 2: Pt will dress UB using hemitechnique, mod A and cues  Short Term Goal 3: Pt will dress LB, hemitechnique, mod A and cues  Short Term Goal 4: Pt will toilet with mod A  Short Term Goal 5:  Toilet transfer with mod A  Additional Goals?: Yes  Short Term Goal 6: Pt will attend to R side during ADL/functional activities with moderate cues  Short Term Goal 7: Pt will perform standing tasks x 2-3 mins with L UE and mod A for balance to enhance ADL/IADL performance  Short Term Goal 8: Pt/family will demo understanding of R UE positioning/HEP/therapeutic activity recommendations with min A after ed  Long Term Goals  Time Frame for Long Term Goals : 4-5 weeks  Long Term Goal 1: Pt will bathe with min A, AE/DME prn  Long Term Goal 2: Pt will dress UB supervision  Long Term Goal 3: Pt will dress LB min A, AE prn  Long Term Goal 4: Pt will toilet with CGA  Long Term Goal 5: Pt will perform toilet transfer with CGA  Additional Goals?: Yes  Long Term Goal 6: Pt will perform simple home making task with min A  Long Term Goal 7: Pt/family will be independent in HEP/DME/therapeutic activity recommendations after ed  Long Term Goal 8: Pt will attend to R side during functional activities with occasional cueing       Therapy Time   Individual Concurrent Group Co-treatment   Time In 1435         Time Out 1500         Minutes 25         Timed Code Treatment Minutes: 25 Minutes       Sabina Sauer, OT

## 2022-12-01 LAB
ALBUMIN SERPL-MCNC: 3.2 G/DL (ref 3.5–5.2)
ALP BLD-CCNC: 88 U/L (ref 35–104)
ALT SERPL-CCNC: 32 U/L (ref 5–33)
ANION GAP SERPL CALCULATED.3IONS-SCNC: 10 MMOL/L (ref 7–19)
AST SERPL-CCNC: 39 U/L (ref 5–32)
BASOPHILS ABSOLUTE: 0.1 K/UL (ref 0–0.2)
BASOPHILS RELATIVE PERCENT: 1.6 % (ref 0–1)
BILIRUB SERPL-MCNC: <0.2 MG/DL (ref 0.2–1.2)
BUN BLDV-MCNC: 13 MG/DL (ref 8–23)
CALCIUM SERPL-MCNC: 9.4 MG/DL (ref 8.8–10.2)
CHLORIDE BLD-SCNC: 106 MMOL/L (ref 98–111)
CO2: 24 MMOL/L (ref 22–29)
CREAT SERPL-MCNC: 0.6 MG/DL (ref 0.5–0.9)
EOSINOPHILS ABSOLUTE: 0.7 K/UL (ref 0–0.6)
EOSINOPHILS RELATIVE PERCENT: 10.1 % (ref 0–5)
GFR SERPL CREATININE-BSD FRML MDRD: >60 ML/MIN/{1.73_M2}
GLUCOSE BLD-MCNC: 77 MG/DL (ref 74–109)
HCT VFR BLD CALC: 38.4 % (ref 37–47)
HEMOGLOBIN: 11.6 G/DL (ref 12–16)
IMMATURE GRANULOCYTES #: 0 K/UL
LYMPHOCYTES ABSOLUTE: 2.1 K/UL (ref 1.1–4.5)
LYMPHOCYTES RELATIVE PERCENT: 29.1 % (ref 20–40)
MCH RBC QN AUTO: 24.1 PG (ref 27–31)
MCHC RBC AUTO-ENTMCNC: 30.2 G/DL (ref 33–37)
MCV RBC AUTO: 79.8 FL (ref 81–99)
MONOCYTES ABSOLUTE: 0.7 K/UL (ref 0–0.9)
MONOCYTES RELATIVE PERCENT: 10.2 % (ref 0–10)
NEUTROPHILS ABSOLUTE: 3.4 K/UL (ref 1.5–7.5)
NEUTROPHILS RELATIVE PERCENT: 48.7 % (ref 50–65)
PDW BLD-RTO: 17.4 % (ref 11.5–14.5)
PLATELET # BLD: 416 K/UL (ref 130–400)
PMV BLD AUTO: 9.7 FL (ref 9.4–12.3)
POTASSIUM REFLEX MAGNESIUM: 4.7 MMOL/L (ref 3.5–5)
RBC # BLD: 4.81 M/UL (ref 4.2–5.4)
SODIUM BLD-SCNC: 140 MMOL/L (ref 136–145)
TOTAL PROTEIN: 5.1 G/DL (ref 6.6–8.7)
WBC # BLD: 7 K/UL (ref 4.8–10.8)

## 2022-12-01 PROCEDURE — 92507 TX SP LANG VOICE COMM INDIV: CPT

## 2022-12-01 PROCEDURE — 6370000000 HC RX 637 (ALT 250 FOR IP): Performed by: PSYCHIATRY & NEUROLOGY

## 2022-12-01 PROCEDURE — 80053 COMPREHEN METABOLIC PANEL: CPT

## 2022-12-01 PROCEDURE — 97130 THER IVNTJ EA ADDL 15 MIN: CPT

## 2022-12-01 PROCEDURE — 36415 COLL VENOUS BLD VENIPUNCTURE: CPT

## 2022-12-01 PROCEDURE — 97116 GAIT TRAINING THERAPY: CPT

## 2022-12-01 PROCEDURE — 99232 SBSQ HOSP IP/OBS MODERATE 35: CPT | Performed by: PSYCHIATRY & NEUROLOGY

## 2022-12-01 PROCEDURE — 6360000002 HC RX W HCPCS: Performed by: PSYCHIATRY & NEUROLOGY

## 2022-12-01 PROCEDURE — 92526 ORAL FUNCTION THERAPY: CPT

## 2022-12-01 PROCEDURE — 1180000000 HC REHAB R&B

## 2022-12-01 PROCEDURE — 85025 COMPLETE CBC W/AUTO DIFF WBC: CPT

## 2022-12-01 PROCEDURE — 97129 THER IVNTJ 1ST 15 MIN: CPT

## 2022-12-01 PROCEDURE — 97530 THERAPEUTIC ACTIVITIES: CPT

## 2022-12-01 PROCEDURE — 97110 THERAPEUTIC EXERCISES: CPT

## 2022-12-01 RX ORDER — LACTULOSE 10 G/15ML
10 SOLUTION ORAL
Status: DISCONTINUED | OUTPATIENT
Start: 2022-12-01 | End: 2022-12-01

## 2022-12-01 RX ADMIN — FOLIC ACID 1 MG: 1 TABLET ORAL at 08:09

## 2022-12-01 RX ADMIN — LACTULOSE 6.67 G: 20 SOLUTION ORAL at 08:10

## 2022-12-01 RX ADMIN — GABAPENTIN 300 MG: 300 CAPSULE ORAL at 21:16

## 2022-12-01 RX ADMIN — DOCUSATE SODIUM 100 MG: 100 CAPSULE, LIQUID FILLED ORAL at 08:10

## 2022-12-01 RX ADMIN — THERA TABS 1 TABLET: TAB at 08:09

## 2022-12-01 RX ADMIN — HYDROXYCHLOROQUINE SULFATE 200 MG: 200 TABLET, FILM COATED ORAL at 21:16

## 2022-12-01 RX ADMIN — DOCUSATE SODIUM 100 MG: 100 CAPSULE, LIQUID FILLED ORAL at 21:16

## 2022-12-01 RX ADMIN — ASPIRIN 81 MG 81 MG: 81 TABLET ORAL at 08:09

## 2022-12-01 RX ADMIN — ATORVASTATIN CALCIUM 40 MG: 40 TABLET, FILM COATED ORAL at 21:16

## 2022-12-01 RX ADMIN — HYDROXYCHLOROQUINE SULFATE 200 MG: 200 TABLET, FILM COATED ORAL at 08:09

## 2022-12-01 RX ADMIN — AMLODIPINE BESYLATE 2.5 MG: 2.5 TABLET ORAL at 08:09

## 2022-12-01 RX ADMIN — ENOXAPARIN SODIUM 40 MG: 100 INJECTION SUBCUTANEOUS at 17:35

## 2022-12-01 NOTE — PROGRESS NOTES
12/01/22 1300   Restrictions/Precautions   Restrictions/Precautions Modified Diet;Swallowing - Thickened Liquids; Fall Risk;Aspiration Risk;Weight Bearing   Lower Extremity Weight Bearing Restrictions   Right Lower Extremity Weight Bearing Weight Bearing As Tolerated   Left Lower Extremity Weight Bearing Weight Bearing As Tolerated   General   Additional Pertinent Hx Anxiety, depression, COPD, CVA, DM, LE edema, hyperlipidemia, HTN, obesity, RA, CAD and venous thromboembolism   Diagnosis CVA, R hemiparesis   General Comment   Comments PATIENT SITTING IN W/C, IN ROOM, WITH DAUGHTER   Wheelchair Activities   Propulsion Yes   Propulsion 1   Propulsion Manual   Level Level Tile   Method LUE;LLE   Level of Assistance Moderate assistance;Supervision   Description/ Details THERAPIST INITIALLY PROVIDING MOD A AT FOOT OF LLE FOR FIRST 15', AFTERWARDS THERAPIST RELEASED AND PATIENT CONTINUED. ATTEMPT PROPULSION WITH TWO TURNS OR ' NEXT. Distance 48'   PT Exercises   Exercise Treatment SEATED EXERCISES: PF 20X, DF 20X; MARCHES 15X; HIP EXT 10X; SAQ 15X; QS 10X; HIP ABD 10X; HIP ADD 10X; HS CURL 10X  (RLE = PROM; LLE = A/AROM; MANUAL RESISTANCE APPLIED IF APPROPRIATE OR DOES NOT HINDER ROM.)   Assessment   Assessment PATIENT APPEARED EXCITED FOR THERAPY AND EXERCISES. DAUGHTER STATES PATIENT HAD ANOTHER SUCCESSFUL BOWEL MOVEMENT AND IS IN A GOOD MOOD. DAUGHTER VOICES CONCERN THAT PATIENT HAS ATTEMPTED TWICE TO USE BATHROOM WITH NURSE STAFF OR ASSISTANCE. SEATED EXERCISES WERE SUCCESSFUL AND ABLE TO APPLY MANUAL RESISTANCE TO MORE EXERCISES. MANUAL RESISTANCE REMOVED IF ROM DECREASES.  PATIENT W/C PROPULSION CONTINUES TO IMPROVE (SEE PROPULSION FOR DETAILS)   Safety Devices   Type of Devices Left in chair;Call light within reach;Gait belt  (DAUGHTER IN ROOM WITH PATIENT)   PT Individual Minutes   Time In 1300   Time Out 1345   Minutes 45

## 2022-12-01 NOTE — PROGRESS NOTES
11/30/22 0900   Bed mobility   Rolling to Left Moderate assistance  (HAND IN CAUDAL BED RAIL FOR PROPER ROLL)   Rolling to Right Contact guard assistance;Stand by assistance  (WITH RAIL)   Supine to Sit Moderate assistance  (FROM RIGHT AND LEFT)   Sit to Supine Moderate assistance  (FROM RIGHT AND LEFT)   Bed Mobility Comments SUPINT TO/FROM BILAT ASSIST WITH BILAT LES. ABLE TO BETTER PEROFRM SIDELYING TO SIT FORM LEFT SIDE WITH ASSIST OF LEFT UE   Transfers   Bed to Chair Moderate assistance;Contact guard assistance  (MOD A SB TO RIGHT, CGA SB TO LEFT)   PT Exercises   Exercise Treatment MULTIPLE SLIDING BOARD TF WITH THERAPIST AND PATIENT:  WC TO BED TO RIGHT ON RIGHT SIDE OF BED, SIT TO SUPINE, SUPINE TO SIT GOING LEFT, BED TO WC TO LEFT, WC TO BED GOING LEFT FROM LEFT SIDE OF BED, SIT TO SUPINE, SUPINE TO SIT TO LEFT, BED TO WC. (Simultaneous filing. User may not have seen previous data.)   PROM Exercises  AND WIFE PERFORMED SB TF GOING TO LEFT FROM LEFT SIDE OF BED. COMPLETE CYCLE FROM WC TO BED AND BACK. A/AROM Exercises SITTING BILAT LE HIP FLEX/EXT/ABD/ADD; KNEE EXT; PF/DF X10 EA   Assessment   Assessment FTD WITH  PRACTICIGN SB TF IN ROOM TO BED SET AT PERSONAL BED HEIGHT. SIGNIFICANT DIFFICULTY TRANSFERRING TO RIGHT WITH SLIDING BOARD. LACK OF PROPER ANTERIOR LEAN TO UNLOAD BUTTOCKS FOR PROPER SCOOT. CONTINUES TO REQUIRE SIGNIFICANT CUEING FOR ALL ASPECTS OF SLIDING BOARD TRANSFER AND BED MOBILITY. SIGNIFICANT FATIGUE WITH ACTIVITY, REQUIRING SIGNIFICNT RESTS BETWEEN BOUTS OF TRANSFERS. CONTINUED RECEPTIVE APHASIA, WITH MULTIPLE VERBAL AND TACTILE CUES TO PERFORM ISOLATED LE MOVEMENT.   TOMORROW WILL RESUME GAIT TRAINING WITH HOPES OF PROGRESSING TO HEMIWALKER FOR STAND PIVOT TRANSFERS TO INCREASE FUNCTOINALITY/SAFETY IN HOME

## 2022-12-01 NOTE — PLAN OF CARE
Problem: Discharge Planning  Goal: Discharge to home or other facility with appropriate resources  11/30/2022 2102 by Gemma Montemayor RN  Outcome: Progressing  11/30/2022 2059 by Gemma Montemayor RN  Outcome: Progressing  Flowsheets (Taken 11/30/2022 2054)  Discharge to home or other facility with appropriate resources: Refer to discharge planning if patient needs post-hospital services based on physician order or complex needs related to functional status, cognitive ability or social support system  11/30/2022 1516 by Fred Ratliff  Outcome: Progressing  Flowsheets (Taken 11/30/2022 1028)  Discharge to home or other facility with appropriate resources: Refer to discharge planning if patient needs post-hospital services based on physician order or complex needs related to functional status, cognitive ability or social support system     Problem: Safety - Adult  Goal: Free from fall injury  11/30/2022 2102 by Gemma Montemayor RN  Outcome: Progressing  11/30/2022 2059 by Gemma Montemayor RN  Outcome: Progressing  Flowsheets (Taken 11/30/2022 2058)  Free From Fall Injury: Instruct family/caregiver on patient safety  11/30/2022 1516 by Fred Ratliff  Outcome: Progressing     Problem: Neurosensory - Adult  Goal: Achieves stable or improved neurological status  11/30/2022 2102 by Gemma Montemayor RN  Outcome: Progressing  11/30/2022 2059 by Gemma Montemayor RN  Outcome: Progressing  Flowsheets (Taken 11/30/2022 2054)  Achieves stable or improved neurological status: Assess for and report changes in neurological status  11/30/2022 1516 by Fred Ratliff  Outcome: Progressing  Flowsheets (Taken 11/30/2022 1028)  Achieves stable or improved neurological status: Assess for and report changes in neurological status  Goal: Achieves maximal functionality and self care  11/30/2022 2102 by Gemma Montemayor RN  Outcome: Progressing  11/30/2022 2059 by Gemma Montemayor RN  Outcome: Progressing  Flowsheets (Taken 11/30/2022 2054)  Achieves maximal functionality and self care: Monitor swallowing and airway patency with patient fatigue and changes in neurological status  11/30/2022 1516 by Lisa Quigley  Outcome: Progressing  Flowsheets (Taken 11/30/2022 1028)  Achieves maximal functionality and self care: Monitor swallowing and airway patency with patient fatigue and changes in neurological status

## 2022-12-01 NOTE — PROGRESS NOTES
[facility]  INPATIENT SPEECH THERAPY  [facility] UNIT  TIME   1100  3397  05 MINUTES    [x]Daily Note  []Progress Note    Date: 2022  Patient Name: Pradip Millan        MRN: [id number]    Account #: [de-identified]  : 1960  (64 y.o.)  Gender: female   Primary Provider: Jac Rivera MD  Swallowing Status/Diet: Dysphagia soft and bite sized, thin liquids      Subjective:  Pt is upright in recliner. Daughter is present for tx. Pt is inconsistently aware of her communication deficits. Pt does significantly benefit from phonemic cues. Patient exhibited many perseverations, paraphasias (semantic and phonemic), echolalia, and anomia    Objective:    Automatic speech tasks completed. Pt sang ABC's, happy birthday, and \"I'll be home for Yolanda\" independently with 100% accuracy. Pt was prompted to count 1-20, pt did require cues beginning approximately at 17. Pt completed puzzles on this date. Given a field of 8 pt was prompted to put puzzle pieces together. Each puzzle piece set contained a 4 letter target word. One piece had two letters and the other piece had two letters of the word. On each piece, there was a photo of the target word. Pt was independently able to put each piece together with 100% accuracy. Confrontational naming task completed. Pt was required to identify the target object/photo on each puzzle piece. Task completed independently with 100% accuracy. Functional reading task complete. Daughter reports pt loves to read. Pt was able to read 50% of the 4 letter target words. Pt did require phonemic cues throughout this task. 2x2 low-tech AAC board utilized within therapy. Target words were yes, no, I don't know, and later. Pt is able to read target words; however, she is unable to inconsistently point to a target word. [place] and repetitions were utilized during this task. Minimal carry over noted with low-tech AAC device. 3x4 low-tech AAC board presented for basic wants/needs.  This grid size appears to be inappropriate for the pt at this time due to severe receptive and expressive language deficits. Letter board present. Pt is able to confrontationally name each letter if they are pointed to in order; however, she is unable to complete this task if the letters are out of order. Confrontational naming task completed. Pt is provided photos of common objects and is required to identify what the target object was. Pt independently identified 1/15. With moderate phonemic cues, pt was able to identify 10/15 images. Pt was unable to identify 4/15 common objects given maximum phonemic cues. Sentence completion task for verbal expression was completed. Pt was able to complete sentence completion tasks independently with 60% accuracy on this date. Delayed processing noted. Pt does benefit from 2 verbal choices during therapeutic tasks; difficulties are noted with open ended questions. Swallowing reassessed during noon meal. Consistencies on noon meal tray include soft and bite sized consistencies with thin liquids. Oral prep reveals decreased rotary mastication with soft and bite sized consistencies. Oral prep reveals poor labial seal around spoon. R labial loss is noted throughout P.O. trials. Pt is unaware of this. Oral transit timing ranges from 1-2 seconds with soft and bite sized consistencies. Minimal oral pocketing of the right observed; however, pt is aware of pocketing. Pt will independently clear with lingual sweep or utilize her finger. Oral transit is suspected to be 1 second or faster than 1 second with thin liquids. Laryngeal movement observed to be consistently sluggish and mild-moderately decreased for swallow airway protection. No overt s/s of aspiration/penetration observed with thin liquids throughout noon meal. 1x immediate cough response observed with soft and bite sized consistencies. Will continue to monitor current diet closely.      SLP will continue to follow and treat. Recommended Diet and Intervention  Soft & Bite Sized consistencies   Thin liquids  Recommended Form of Meds: Crushed in puree as able  Recommendations: Modified barium swallow study; Dysphagia treatment  Therapeutic Interventions: Pharyngeal exercises;Diet tolerance monitoring;Oral care;Oral motor exercises; Patient/Family education; Therapeutic PO trials with SLP     Compensatory Swallowing Strategies  Compensatory Swallowing Strategies : Alternate solids and liquids;Eat/Feed slowly; No straws;Upright as possible for all oral intake;Remain upright for 30-45 minutes after meals;Small bites/sips; Check for pocketing of food on the Right; Check for pocketing of food on the Left; Set up assist;Assist feed    SHORT TERM GOAL #1:  Goal 1: Pt will complete y/n tasks with 80% accuracy and minimal verbal cues/modeling. SHORT TERM GOAL #2:  Goal 2: Pt will complete verbal expression tasks with 80% accuracy and minimal verbal cues/modeling. SHORT TERM GOAL #3:  Goal 3: Pt will complete receptive/expressive language tasks with 80% accuracy and minimal verbal cues/modeling. SHORT TERM GOAL #4:  Goal 4: Pt will follow one step commands with with 90% accuracy and minimal verbal cues/modeling. SHORT TERM GOAL #5:  Goal 5: Pt will complete orientation tasks with 90% accuracy and minimal verbal cues/modeling. Swallowing Short Term Goals  Short-term Goals  Goal 1: Pt will tolerate soft & bite sized consistencies with mildly  (nectar) thick liquids with minimal overt s/s of aspiration/penetration during hospitalization. Goal 2: Pt will complete Modified Barium Swallow Study. Goal 3: Pt will demonstrate awareness of general aspiration precautions. Goal 4: Pt will participate in swallowing reassessments to ensure safest diet consistencies. Goal 5: Pt will allow staff to complete daily oral care. Goal 6: Pt will complete oral motor exercises for lingual and labial strengthening.     ASSESSMENT:  Assessment: [x]Progressing towards goals          []Not Progressing towards goals    Patient Tolerance of Treatment:   [x]Tolerated well []Tolerated fair []Required rest breaks []Fatigued    Education:  Learner:  [x]Patient          []Significant Other          [x]Daughter  Education provided regarding:  [x]Goals and POC   [x]Diet and swallowing precautions    []Home Exercise Program  []Progress and/or discharge information  Method of Education:  [x]Discussion          []Demonstration          []Handout          []Other  Evaluation of Education:   [x]Verbalized understanding   []Demonstrates without assistance  []Demonstrates with assistance  []Needs further instruction     []No evidence of learning                  []No family present      Plan: [x]Continue with current plan of care    []Modify current plan of care as follows:    []Discharge patient    Discharge Location:    Services/Supervision Recommended:      []Patient continues to require treatment by a licensed therapist to address functional deficits as outlined in the established plan of care.       Electronically signed by MARCELINO Blevins on 12/1/2022 at 2:59 PM

## 2022-12-01 NOTE — PROGRESS NOTES
Occupational Therapy  Facility/Department: [facility] 8 REHAB UNIT      Name: Cyndi Walter  : 1960  MRN: [id number]  Date of Service: 2022    Discharge Recommendations:  Continue to assess pending progress          Patient Diagnosis(es): There were no encounter diagnoses. Past Medical History:  has a past medical history of Anxiety, ASCVD (arteriosclerotic cardiovascular disease), Carrier of group B Streptococcus, Cellulitis, Colitis, COPD (chronic obstructive pulmonary disease) ([address] Costochondritis, CVA (cerebral vascular accident) ([address] Depression, Edema, Fracture of sternum, Gallstones, Grief reaction, H/O superior vena cava filter placement, Hyperlipidemia, Hypertension, MI (myocardial infarction) ([address] MRSA (methicillin resistant Staphylococcus aureus), Neuropathy, Obesity, Pseudarthrosis, RA (rheumatoid arthritis) ([facility] [address] Rosacea, Sinus bradycardia, TIA (transient ischemic attack), and Venous thromboembolism. Past Surgical History:  has a past surgical history that includes Coronary artery bypass graft (3/2009); Tonsillectomy; Hysterectomy; thoracotomy; knee surgery; Appendectomy; Colonoscopy (2010); Cardiac catheterization (3/2009); Adenoidectomy; Cholecystectomy (); Gastric bypass surgery (); hiatal hernia repair (); Cardiac catheterization (14  JDT); Colonoscopy (); Colonoscopy; Upper gastrointestinal endoscopy (); Upper gastrointestinal endoscopy (); and pr colonoscopy flx dx w/collj spec when pfrmd (N/A, 3/12/2018).     Treatment Diagnosis: L MCA stroke        Plan   Occupational Therapy Plan  Specific Instructions for Next Treatment: transfers, UE  Current Treatment Recommendations: Strengthening, ROM, Balance training, Functional mobility training, Endurance training, Positioning, Modalities, Neuromuscular re-education, Cognitive reorientation, Safety education & training, Patient/Caregiver education & training, Equipment evaluation, education, & procurement, Self-Care / ADL, Cognitive/Perceptual training, Home management training, Sensory integraion     Restrictions  Restrictions/Precautions  Restrictions/Precautions: Modified Diet, Swallowing - Thickened Liquids, Fall Risk, Aspiration Risk, Weight Bearing  Required Braces or Orthoses?: No  Lower Extremity Weight Bearing Restrictions  Right Lower Extremity Weight Bearing: Weight Bearing As Tolerated  Left Lower Extremity Weight Bearing: Weight Bearing As Tolerated          Objective     PROM Exercises: R UE----all muscle groups, extensive PROM of wrist flexion/extension        12/01/22 1520   Modalities   Modalities Yes   Electrical Stimulation   Electrical Stimulation Location Right;Wrist   Electrical Stimulation Action wrist extension   Electrical Stimulation Parameters 24mAccs, Burkinan setting, 50% duty cycle, 10/20 on/off cycle with fair results for 15 mins, able to tolerate 31mAccs for ~3 mins with good results   Electrical Stimulation Goals Neuromuscular Facilitation;Strength     Goals  Short Term Goals  Time Frame for Short Term Goals: 2 weeks  Short Term Goal 1: Pt will bathe with mod A, AE prn  Short Term Goal 2: Pt will dress UB using hemitechnique, mod A and cues  Short Term Goal 3: Pt will dress LB, hemitechnique, mod A and cues  Short Term Goal 4: Pt will toilet with mod A  Short Term Goal 5:  Toilet transfer with mod A  Additional Goals?: Yes  Short Term Goal 6: Pt will attend to R side during ADL/functional activities with moderate cues  Short Term Goal 7: Pt will perform standing tasks x 2-3 mins with L UE and mod A for balance to enhance ADL/IADL performance  Short Term Goal 8: Pt/family will demo understanding of R UE positioning/HEP/therapeutic activity recommendations with min A after ed  Long Term Goals  Time Frame for Long Term Goals : 4-5 weeks  Long Term Goal 1: Pt will bathe with min A, AE/DME prn  Long Term Goal 2: Pt will dress UB supervision  Long Term Goal 3: Pt will dress LB min A, AE prn  Long Term Goal 4: Pt will toilet with CGA  Long Term Goal 5: Pt will perform toilet transfer with CGA  Additional Goals?: Yes  Long Term Goal 6: Pt will perform simple home making task with min A  Long Term Goal 7: Pt/family will be independent in HEP/DME/therapeutic activity recommendations after ed  Long Term Goal 8: Pt will attend to R side during functional activities with occasional cueing       Therapy Time   Individual Concurrent Group Co-treatment   Time In 1520         Time Out 1550         Minutes 30         Timed Code Treatment Minutes: 30 Minutes       Ronnie Burgess, OT

## 2022-12-01 NOTE — PROGRESS NOTES
12/01/22 0900   Bed mobility   Rolling to Left Minimal assistance;Contact guard assistance  (WITH RAIL)   Supine to Sit Moderate assistance;Minimal assistance  (FROM LEFT SIDELYING)   Transfers   Sit to Stand Moderate Assistance;Minimal Assistance   Bed to Chair Moderate assistance;Minimal assistance  (WITH CASANDRA STEADY)   Comment CASANDRA STEADY TF TO TOILET FOR BM   Ambulation   Device Parallel Bars   Other Apparatus   (SLING)   Assistance Moderate assistance;Minimal assistance;Contact guard assistance   Quality of Gait CONTINUES TO REQUIRE ASSIST WITH RIGHT LE ADVANCEMENT. HOWEVER MUCH IMROVED STABILITY OF LEFT LE WITH RIGHT LE ADVANCEMENT; LESS ASSIST REQUIRED ON PART OF THERAPIST TO STABILIZE AT ISCHIAL TUBEROSITY AND DISTAL FEMUR. MORE CONTROLLED STEP WITH LEFT WITH APPRORIATE LEFT WEIGHT SHIFT PRIOR TO LEFT ADVANCMENT   Distance 12 FT   Ambulation 2   Device 2 Parallel Bars   Other Apparatus 2   (SLING AND SHOE VOER)   Assistance 2 Moderate assistance;Minimal assistance;Contact guard assistance   Quality of Gait 2 MORE STABILITY EVIDENT THIS AMBULATION. ONLY COMPLICATION IS WHEN PATIENT ATTEMPTS TO ADVANCE LEFT LE PRIOR TO RIGHT KNEE BEING FULLY EXTENDED. SHOE COVER THIS BOUT WITH PATIENT PERFORMING MORE OF RIGHT LE ADVANCEMENT   Distance 12 FT   Ambulation 3   Device 3 Parallel Bars   Other Apparatus 3   (SLING, SHOE COVER)   Assistance 3 Moderate assistance;Minimal assistance;Contact guard assistance   Quality of Gait 3 MOD A FOR STABILITY OF RRIGHT LE DURING LE ADVANCEMENT. THERAPIST ASSISTED MORE WITH RIGHT LE ADVANCEMEENT. SBA FOR LEFT HAND MOVEMENT AND WEIGHT SHIFTS. INCREASED SPEED WITH AMBULATION. ONE INSTANCE OF PATIENT ADVANCING LEFT LE BEFORE RIGHT LE LOCKED IN EXTENSION CAUSING SLIGHT POST LOB WHICH PATIENT SELF CORRECTED. Distance 12 FT   Assessment   Assessment SUCCESSFUL BOWEL MOVEMENT AT BEGINNING OF THERAPY. WAS EXHIBITING PAIN/DISCOMFORT PRIOR TO THIS.   EVENTUALLY POINTED TO RIGHT LOWER QUADRANT OF ABDOMEN. SIGNIFICNATLY IMPROVED QUALITY, SPEED, STABILITY WITH GAIT. SEE GAIT ASSESSMENTS FOR DETAILS. RECOMMEND CONTINUED USE OF SHOE COVER TO ALLOW FOR INCREASED ASSISTANCE WITH RIGHT LE ON PART OF PATIENT AS WELL AS IMPROVED TRANSITION BETWEEN PARTS OF GAIT CYCLE.

## 2022-12-01 NOTE — PROGRESS NOTES
Patient:   Dana Coello  MR#:    [id number]   Room:    0826/826-02   YOB: 1960  Date of Progress Note: 12/1/2022  Time of Note                           7:49 AM  Consulting Physician:   Mahala Bumpers, M.D. Attending Physician:  Mahala Bumpers, MD     Chief complaint Acute ischemic stroke    S:This 64 y.o. female  with history of anxiety, depression, COPD, atherosclerotic disease, colitis, COPD, CVA, DM,  LE edema, hyperlipidemia, HTN, morbid obesity, RA, CAD  and venous thromboembolism. She presented to [facility] ER on 11/9/22 after being found at home lying facedown in her feces. She was very weak, confused, not able to speak but moving purposefully and intermittently following commands. Her last well know was 3 a.m. when her  left for work. Imaging done revealed a large MCA stroke 7.7 x 5 cm. CTA head/neck revealed an acute M1 occlusion. She was outside of the window for TPA. CK on admit was 1100, consistent with rhabdomylosis. She was also noted to possibly Dens fracture. She was transferred to [facility] for a possible thrombectomy. Further imaging was done at [facility] and she was deemed not a candidate for thrombectomy. MRI done ruled out Dens fracture. Repeat CT of head on 11/11/22 showed evolving left MCA territory infarct with increasing edema/mass effect resulting in 4mm of  rightward midline shift(previously 2mm) a the level of the third ventricle. No hemorrhagic conversion or definite trapping of the right lateral ventricle, possible small acute right cerebellar infarct. Neurosurgery was consulted for possible hemicraniectomy. She was seen by Dr. Dang Sinha, who didn't feel any surgical intervention was needed. Patient was found to have a UTI + for [facility] and was placed on Rocephin on 11/14/22. Patient had a PICC line placed. Patient was evaluated by SPT and initially made NPO d/t oropharyngeal dysphagia. NGT placed and feeding started. She was also noted to have global aphasia but is improving. She was re-evaluated by SPT on 11/15/22 for swallow and was started on a dysphagia 1 diet with nectar thick liquids. Patient also continues to have right sided weakness and is participating in both PT/OT. Repeat CT head on 11/13/22 shows stable LMCA ischemic stroke with stable edema, 5 mm shift, no hemorrhage. She is felt to need a stay on Rehab for continued PT/OT/SPT and medical management to work towards her goal of returning home with her . She is now felt ready to start the Rehab program.  No acute issues overnight as per nursing. REVIEW OF SYSTEMS:  Unreliable due to aphasia     Past Medical History:      Diagnosis Date    Anxiety     ASCVD (arteriosclerotic cardiovascular disease)     Carrier of group B Streptococcus     Cellulitis     Colitis     COPD (chronic obstructive pulmonary disease) ([facility])     Costochondritis     CVA (cerebral vascular accident) ([address]     Depression     Edema     Fracture of sternum     non union post surgery.      Gallstones     Grief reaction     H/O superior vena cava filter placement     Hyperlipidemia     Hypertension     MI (myocardial infarction) ([address]     MRSA (methicillin resistant Staphylococcus aureus)     Neuropathy     Obesity     Pseudarthrosis sternal    RA (rheumatoid arthritis) ([address]     Rosacea     Sinus bradycardia     TIA (transient ischemic attack)     Venous thromboembolism        Past Surgical History:      Procedure Laterality Date    ADENOIDECTOMY      APPENDECTOMY      CARDIAC CATHETERIZATION  3/2009    CARDIAC CATHETERIZATION  11/5/14  JDT    EF 50%, patent bypass grafts    CHOLECYSTECTOMY  2011    COLONOSCOPY  06/03/2010    Dr. Abraham Bravo: distal colon having ulcerative lesions c/w UC    COLONOSCOPY  2009    pancolitis    COLONOSCOPY      CORONARY ARTERY BYPASS GRAFT  3/2009    PHANI Sosa to LAD, SVGs to OM & PDA    GASTRIC BYPASS SURGERY  2012    HIATAL HERNIA REPAIR  2011    HYSTERECTOMY (CERVIX STATUS UNKNOWN)      KNEE SURGERY      GA COLONOSCOPY FLX DX W/COLLJ SPEC WHEN PFRMD N/A 3/12/2018    Dr Camron Burrell rectal inflammation consistent with U.C.-Active proctitis--3 yr recall    THORACOTOMY      TONSILLECTOMY      UPPER GASTROINTESTINAL ENDOSCOPY  2010    Dr. Lori Leiva  2012       Medications in Hospital:      Current Facility-Administered Medications:     lactulose (CHRONULAC) 10 GM/15ML solution 6.6667 g, 10 mL, Oral, 6 times per day, Nathaly Aguilar MD    docusate sodium (COLACE) capsule 100 mg, 100 mg, Oral, BID, Nathaly Aguilar MD, 100 mg at 11/30/22 2035    aspirin chewable tablet 81 mg, 81 mg, Oral, Daily, Nathaly Aguilar MD, 81 mg at 11/30/22 1028    atorvastatin (LIPITOR) tablet 40 mg, 40 mg, Oral, Nightly, Nathaly Aguilar MD, 40 mg at 11/30/22 2035    dulaglutide (TRULICITY) SC injection 1.5 mg (Patient Supplied), 1.5 mg, SubCUTAneous, Weekly, Nathaly Aguilar MD    folic acid (FOLVITE) tablet 1 mg, 1 mg, Oral, Daily, Nathaly Aguilar MD, 1 mg at 11/30/22 1028    gabapentin (NEURONTIN) capsule 300 mg, 300 mg, Oral, Nightly, Nathaly Aguilar MD, 300 mg at 11/30/22 2036    hydroxychloroquine (PLAQUENIL) tablet 200 mg, 200 mg, Oral, BID, Nathaly Aguilar MD, 200 mg at 11/30/22 2035    amLODIPine (NORVASC) tablet 2.5 mg, 2.5 mg, Oral, Daily, Nathaly Aguilar MD, 2.5 mg at 11/30/22 1028    tiZANidine (ZANAFLEX) tablet 4 mg, 4 mg, Oral, BID PRN, Nathaly Aguilar MD    multivitamin 1 tablet, 1 tablet, Oral, Daily, Nathaly Aguilar MD, 1 tablet at 11/30/22 1028    acetaminophen (TYLENOL) tablet 650 mg, 650 mg, Oral, Q4H PRN, Nathaly Aguilar MD, 650 mg at 11/27/22 1933    enoxaparin (LOVENOX) injection 40 mg, 40 mg, SubCUTAneous, Daily, Nathaly Aguilar MD, 40 mg at 11/30/22 1817    polyethylene glycol (GLYCOLAX) packet 17 g, 17 g, Oral, Daily PRN, Nathaly Aguilar MD, 17 g at 11/30/22 1817    Allergies:  Methotrexate derivatives    Social History:   TOBACCO:   reports that she quit smoking about 13 years ago.  Her smoking use included cigarettes. She has a 64.00 pack-year smoking history. She has never used smokeless tobacco.  ETOH:   reports current alcohol use. Family History:       Problem Relation Age of Onset    Prostate Cancer Father     Heart Failure Father     Cancer Father     Colon Cancer Neg Hx     Colon Polyps Neg Hx     Liver Cancer Neg Hx     Liver Disease Neg Hx     Esophageal Cancer Neg Hx     Rectal Cancer Neg Hx     Stomach Cancer Neg Hx          PHYSICAL EXAM:  BP (!) 146/76   Pulse 66   Temp 97.1 °F (36.2 °C) (Temporal)   Resp 18   Ht 5' 7\" (1.702 m)   Wt 204 lb (92.5 kg)   SpO2 100%   BMI 31.95 kg/m²     Constitutional - well developed, well nourished. Eyes - conjunctiva normal.   Ear, nose, throat - No scars, masses, or lesions over external nose or ears, no atrophy of tongue  Neck-symmetric, no masses noted, no jugular vein distension  Respiration- chest wall appears symmetric, good expansion,   normal effort without use of accessory muscles  Musculoskeletal - no significant wasting of muscles noted, no bony deformities  Extremities-no clubbing, cyanosis or edema  Skin - warm, dry, and intact. No rash, erythema, or pallor. Psychiatric - mood, affect, and behavior appear normal.      Neurological exam  Awake, alert, global aphasia says \"yes\" to all questions asked   Attention and concentration appear appropriate  Recent and remote memory unable to tests     Cranial Nerve Exam     CN III, IV,VI-EOMI, No nystagmus, conjugate eye movements, no ptosis    CN VII-+ facial assymetry       Motor Exam  No movement on the right      Tremors- no tremors in hands or head noted     Gait  Not tested     Nursing/pcp notes, imaging,labs and vitals reviewed.      PT,OT and/or speech notes reviewed    Lab Results   Component Value Date    WBC 7.0 12/01/2022    HGB 11.6 (L) 12/01/2022    HCT 38.4 12/01/2022    MCV 79.8 (L) 12/01/2022     (H) 12/01/2022     Lab Results   Component Value Date     12/01/2022    K 4.7 12/01/2022     12/01/2022    CO2 24 12/01/2022    BUN 13 12/01/2022    CREATININE 0.6 12/01/2022    GLUCOSE 77 12/01/2022    CALCIUM 9.4 12/01/2022    PROT 5.1 (L) 12/01/2022    LABALBU 3.2 (L) 12/01/2022    BILITOT <0.2 12/01/2022    ALKPHOS 88 12/01/2022    AST 39 (H) 12/01/2022    ALT 32 12/01/2022    LABGLOM >60 12/01/2022   No results found for: INR, [place], ARYAN   Occupational Therapist Assistant   Specialty:  Occupational Therapy   Progress Notes       Cosign Needed   Date of Service:  11/29/2022  3:51 PM                 Cosign Needed                                Occupational Therapy       11/29/22 1400   General   Timed Code Treatment Minutes 30 Minutes   Restrictions/Precautions   Restrictions/Precautions Modified Diet;Swallowing - Thickened Liquids; Fall Risk;Aspiration Risk;Weight Bearing   Type of ROM/Therapeutic Exercise   Comment Completed sh and elb  AAROM sidelying for GE progression of UE function. Pt able to complete at times without facilitation. Exercises   Scapular Protraction GE AAROM with facilitation   Scapular Retraction GE AAROM with facilitation   Shoulder Flexion GE AAROM with facilitation   Shoulder Extension GE AAROM with facilitation   Elbow Flexion GE AAROM with facilitation   Elbow Extension GE AAROM with facilitation   Supination GE AAROM with facilitation   Pronation GE AAROM with facilitation   Wrist Flexion PGAAROM with facilitation   Wrist Extension PG AAROM with facilitation   Assessment   Performance deficits / Impairments Decreased functional mobility ; Decreased endurance;Decreased coordination;Decreased ADL status; Decreased sensation;Decreased posture;Decreased ROM; Decreased balance;Decreased strength;Decreased vision/visual deficit; Decreased safe awareness;Decreased high-level IADLs;Decreased cognition;Decreased fine motor control   Treatment Diagnosis L MCA stroke   Activity Tolerance   Activity Tolerance Patient Tolerated treatment well RECORD REVIEW: Previous medical records, medications were reviewed at today's visit    IMPRESSION:   1. Acute ischemic stroke-on aspirin/statin  2. Hypertension-on medications monitor  3. Hyperlipidemia-on statin  4. Diabetes-Trulicity-monitor blood sugar  5. DVT prophylaxis-Lovenox  6. History of coronary artery disease-aspirin/statin  7. Neuropathy-on Neurontin  8. Rheumatoid arthritis-on Plaquenil  9. COPD-monitor  10. History of anxiety and depression-monitor  11. History of colitis/gallstones-monitor  12. History of bariatric surgery-on multivitamin/folic acid  13. History of superior vena cava filter placement with venous thromboembolism-not on anticoagulation-monitor- indicates took herself off blood thinners as not like meds and was bruising   14.   PT/OT/speech    Continue present care    x-ray of right ankle no acute changes  Venous ultrasound of leg no DVT      ELOS December pending 4 weeks    Expected duration and frequency therapy: 180 minutes per day, 5 days per week    [address] [place]  Dr Yanelis Starkey

## 2022-12-01 NOTE — PROGRESS NOTES
[facility]  INPATIENT SPEECH THERAPY  [facility] 8 REHAB UNIT        [x]Daily Note  []Progress Note    Date: 2022  Patient Name: Vivi Gastelum        MRN: [id number]    Account #: [de-identified]  : 1960  (64 y.o.)  Gender: female   Primary Provider: Roly Merritt MD  Diet: Dysphagia soft and bite sized, thin liquids           PATIENT DIAGNOSIS(ES):    Diagnosis: CVA, R hemiparesis     Additional Pertinent Hx: Anxiety, depression, COPD, CVA, DM, LE edema, hyperlipidemia, HTN, obesity, RA, CAD and venous thromboembolism     RESTRICTIONS/PRECAUTIONS:    Restrictions/Precautions  Restrictions/Precautions: Modified Diet, Swallowing - Fall Risk, Aspiration Risk  Required Braces or Orthoses?: No        Subjective: The patient was upright in bed. Her right eye was very red and her nurse was notified. Objective: Today she completed a picture naming task. With both phonemic and phrase completion cues for each picture, she was able to name 80% correctly. AAC boards were attempted this date. She was able to identify pictures and words on the communication board. She was also able to correctly identify which picture or word she would point to to communicate an immediate need. However, this was still well under 50%. She is not yet ready to use an AAC board for communicating wants and needs. SLP will continue to target this during therapy. She continues to exhibit severe expressive and receptive aphasia. Perseverations and semantic paraphasias are still noted. She has exhibited improvements in producing increased words this week. She has also exhibited increase in spontaneous words/phrases. She is still having difficulty meeting immediate wants and needs as she cannot verbalize or write what she is wanting to communicate. She is still unable to use a communication or AAC board due to receptive deficits. During her afternoon session, she answered yes/no questions at 50% accuracy.  Following verbal commands with a model was at 100%. Without a model, she was at 10%. Body part identification was at 100% with a model. Without a model this was at 10%. Recommend she continue speech therapy for dysphagia, expressive language, receptive language,cognition and dysarthria. No overt s/s of aspiration observed this date with thin liquids via cup. Good hyolaryngeal elevation and mildly delayed pharyngeal swallow. Today she is afebrile. No audible congestion or wheezing is noted this date. Recommend she remain on a dysphagia soft diet at this time. SLP will upgrade to regular when she demonstrates consistent awareness of oral residue. Recommended Diet and Intervention  Soft & Bite Sized consistencies   Thin liquids  Recommended Form of Meds: Crushed in puree as able  Recommendations: Modified barium swallow study; Dysphagia treatment  Therapeutic Interventions: Pharyngeal exercises;Diet tolerance monitoring;Oral care;Oral motor exercises; Patient/Family education; Therapeutic PO trials with SLP     Compensatory Swallowing Strategies  Compensatory Swallowing Strategies : Alternate solids and liquids;Eat/Feed slowly; No straws;Upright as possible for all oral intake;Remain upright for 30-45 minutes after meals;Small bites/sips; Check for pocketing of food on the Right; Check for pocketing of food on the Left; Set up assist;Assist feed           SHORT TERM GOAL #1:  Goal 1: Pt will complete y/n tasks with 80% accuracy and minimal verbal cues/modeling. SHORT TERM GOAL #2:  Goal 2: Pt will complete verbal expression tasks with 80% accuracy and minimal verbal cues/modeling. SHORT TERM GOAL #3:  Goal 3: Pt will complete receptive/expressive language tasks with 80% accuracy and minimal verbal cues/modeling. SHORT TERM GOAL #4:  Goal 4: Pt will follow one step commands with with 90% accuracy and minimal verbal cues/modeling.      SHORT TERM GOAL #5:  Goal 5: Pt will complete orientation tasks with 90% accuracy and minimal verbal cues/modeling. Swallowing Short Term Goals  Short-term Goals  Goal 1:Pt will tolerate soft & bite sized consistencies with mildly  (nectar) thick liquids with minimal overt s/s of aspiration/penetration during hospitalization. Met. New Goal: Pt will tolerate soft and bite sized diet with thin liquids with minimal overt s/s of aspiration/penetration during hospitalization. Goal 2: Pt will complete Modified Barium Swallow Study. Goal 3: Pt will demonstrate awareness of general aspiration precautions. Goal 4: Pt will participate in swallowing reassessments to ensure safest diet consistencies. Goal 5: Pt will allow staff to complete daily oral care. Goal 6: Pt will complete oral motor exercises for lingual and labial strengthening. Long term goals:  Pt will participate in skilled speech therapy services to ensure she is able to communicate her wants and needs. Pt will tolerate least restrictive diet with minimal overt s/s of aspiration/penetration during hospitalization.              ASSESSMENT:  Assessment: [x]Progressing towards goals          []Not Progressing towards goals    Patient Tolerance of Treatment:   [x]Tolerated well []Tolerated fair []Required rest breaks []Fatigued    Education:  Learner:  [x]Patient          []Significant Other          [x]Other  Education provided regarding:  []Goals and POC   []Diet and swallowing precautions    []Home Exercise Program  []Progress and/or discharge information  Method of Education:  [x]Discussion          []Demonstration          []Handout          []Other  Evaluation of Education:   []Verbalized understanding   []Demonstrates without assistance  []Demonstrates with assistance  [x]Needs further instruction     []No evidence of learning                  []No family present      Plan: [x]Continue with current plan of care    []Modify current plan of care as follows:    []Discharge patient    Discharge Location:    Services/Supervision Recommended: [x]Patient continues to require treatment by a licensed therapist to address functional deficits as outlined in the established plan of care.     Electronically Signed By:  Jose Parmar  12/1/2022,7:36 AM.

## 2022-12-02 PROCEDURE — 97535 SELF CARE MNGMENT TRAINING: CPT

## 2022-12-02 PROCEDURE — 92507 TX SP LANG VOICE COMM INDIV: CPT

## 2022-12-02 PROCEDURE — 99232 SBSQ HOSP IP/OBS MODERATE 35: CPT | Performed by: PSYCHIATRY & NEUROLOGY

## 2022-12-02 PROCEDURE — 97116 GAIT TRAINING THERAPY: CPT

## 2022-12-02 PROCEDURE — 6360000002 HC RX W HCPCS: Performed by: PSYCHIATRY & NEUROLOGY

## 2022-12-02 PROCEDURE — 97110 THERAPEUTIC EXERCISES: CPT

## 2022-12-02 PROCEDURE — 94760 N-INVAS EAR/PLS OXIMETRY 1: CPT

## 2022-12-02 PROCEDURE — 97530 THERAPEUTIC ACTIVITIES: CPT

## 2022-12-02 PROCEDURE — 92526 ORAL FUNCTION THERAPY: CPT

## 2022-12-02 PROCEDURE — 6370000000 HC RX 637 (ALT 250 FOR IP): Performed by: PSYCHIATRY & NEUROLOGY

## 2022-12-02 PROCEDURE — 1180000000 HC REHAB R&B

## 2022-12-02 RX ADMIN — ENOXAPARIN SODIUM 40 MG: 100 INJECTION SUBCUTANEOUS at 17:14

## 2022-12-02 RX ADMIN — FOLIC ACID 1 MG: 1 TABLET ORAL at 08:13

## 2022-12-02 RX ADMIN — GABAPENTIN 300 MG: 300 CAPSULE ORAL at 21:36

## 2022-12-02 RX ADMIN — DOCUSATE SODIUM 100 MG: 100 CAPSULE, LIQUID FILLED ORAL at 08:13

## 2022-12-02 RX ADMIN — ATORVASTATIN CALCIUM 40 MG: 40 TABLET, FILM COATED ORAL at 21:36

## 2022-12-02 RX ADMIN — THERA TABS 1 TABLET: TAB at 08:13

## 2022-12-02 RX ADMIN — DOCUSATE SODIUM 100 MG: 100 CAPSULE, LIQUID FILLED ORAL at 21:36

## 2022-12-02 RX ADMIN — HYDROXYCHLOROQUINE SULFATE 200 MG: 200 TABLET, FILM COATED ORAL at 08:13

## 2022-12-02 RX ADMIN — AMLODIPINE BESYLATE 2.5 MG: 2.5 TABLET ORAL at 08:13

## 2022-12-02 RX ADMIN — HYDROXYCHLOROQUINE SULFATE 200 MG: 200 TABLET, FILM COATED ORAL at 21:36

## 2022-12-02 RX ADMIN — ASPIRIN 81 MG 81 MG: 81 TABLET ORAL at 08:13

## 2022-12-02 NOTE — PROGRESS NOTES
Occupational Therapy  Facility/Department: [facility] 8 REHAB UNIT  Occupational Therapy Treatment Note     Name: Nataliya Marr  : 1960  MRN: [id number]  Date of Service: 2022    Discharge Recommendations:  Continue to assess pending progress          Patient Diagnosis(es): There were no encounter diagnoses. Past Medical History:  has a past medical history of Anxiety, ASCVD (arteriosclerotic cardiovascular disease), Carrier of group B Streptococcus, Cellulitis, Colitis, COPD (chronic obstructive pulmonary disease) ([address] Costochondritis, CVA (cerebral vascular accident) ([address] Depression, Edema, Fracture of sternum, Gallstones, Grief reaction, H/O superior vena cava filter placement, Hyperlipidemia, Hypertension, MI (myocardial infarction) ([address] MRSA (methicillin resistant Staphylococcus aureus), Neuropathy, Obesity, Pseudarthrosis, RA (rheumatoid arthritis) ([address] Rosacea, Sinus bradycardia, TIA (transient ischemic attack), and Venous thromboembolism. Past Surgical History:  has a past surgical history that includes Coronary artery bypass graft (3/2009); Tonsillectomy; Hysterectomy; thoracotomy; knee surgery; Appendectomy; Colonoscopy (2010); Cardiac catheterization (3/2009); Adenoidectomy; Cholecystectomy (); Gastric bypass surgery (); hiatal hernia repair (); Cardiac catheterization (14  JDT); Colonoscopy (); Colonoscopy; Upper gastrointestinal endoscopy (); Upper gastrointestinal endoscopy (); and pr colonoscopy flx dx w/collj spec when pfrmd (N/A, 3/12/2018). Treatment Diagnosis: L MCA stroke      Assessment   Performance deficits / Impairments: Decreased functional mobility ; Decreased endurance;Decreased coordination;Decreased ADL status; Decreased sensation;Decreased posture;Decreased ROM; Decreased balance;Decreased strength;Decreased vision/visual deficit; Decreased safe awareness;Decreased high-level IADLs;Decreased cognition;Decreased fine motor control  Assessment: Patient is inconsistent with yes/no answers and becomes frustrated when she is unable to express her wants/needs. Does well with visual cues.   Treatment Diagnosis: L MCA stroke  Activity Tolerance  Activity Tolerance: Patient Tolerated treatment well        Plan   Occupational Therapy Plan  Specific Instructions for Next Treatment: transfers, UE  Current Treatment Recommendations: Strengthening, ROM, Balance training, Functional mobility training, Endurance training, Neuromuscular re-education, Safety education & training, Patient/Caregiver education & training, Equipment evaluation, education, & procurement, Positioning, Modalities, Self-Care / ADL, Home management training, Sensory integration     Restrictions  Restrictions/Precautions  Restrictions/Precautions: Modified Diet, Swallowing - Thickened Liquids, Fall Risk, Aspiration Risk, Weight Bearing  Required Braces or Orthoses?: No  Lower Extremity Weight Bearing Restrictions  Right Lower Extremity Weight Bearing: Weight Bearing As Tolerated  Left Lower Extremity Weight Bearing: Weight Bearing As Tolerated    Subjective   General  Patient assessed for rehabilitation services?: Yes  Family / Caregiver Present: No  Subjective  Subjective: Initial family instruction provided with future sessions planned as pt progresses     Social/Functional History  Social/Functional History  Lives With: Spouse  Type of Home: House  Home Layout: One level  Home Access:  (2 steps to enter)  Bathroom Shower/Tub: Tub/Shower unit  Bathroom Toilet: Standard  Has the patient had two or more falls in the past year or any fall with injury in the past year?: No  ADL Assistance: Independent  Homemaking Assistance: Independent  Ambulation Assistance: Independent  Transfer Assistance: Independent  Active : Yes  Leisure & Hobbies: volunteers at animal shelter, Soukboarding, makes jewlery, reading, cooking       Objective   Heart Rate: 59  Heart Rate Source: Monitor  BP: (!) 161/69  MAP (Calculated): 100  Resp: 16  SpO2: 100 %  O2 Device: None (Room air)             Safety Devices  Type of Devices: Call light within reach; Chair alarm in place; Left in chair (Left with daughter)         11/30/22 1000   Balance   Sitting Balance Supervision  (unsupported EOM)   Standing Balance Contact guard assistance   Standing Balance   Time 1 min, 3 occ   Activity static at [employer]   Functional Mobility   Functional - Mobility Device Wheelchair   Assist Level Minimal assistance         12/02/22 1000   Transfers   Sit Pivot Transfers Moderate assistance   Sit to stand Moderate assistance   Stand to sit Contact guard assistance                    OutComes Score   Eating  Assistance Needed: Partial/moderate assistance  CARE Score: 3  Discharge Goal: Independent    Oral Hygiene  Assistance Needed: Supervision or touching assistance  Comment: vc for aspen tech after demonstration first  CARE Score: 4  Discharge Goal: [address] needed: Dependent  Comment: pt incontinent, urine  CARE Score: 1  Discharge Goal: Supervision or touching assistance     Shower/Bathe Self  Assistance Needed: Partial/moderate assistance  Comment: Mod A with shower  CARE Score: 3  Discharge Goal: Partial/moderate assistance     Upper Body Dressing  Assistance Needed: Partial/moderate assistance  Comment: Cues to put RUE in first, but patient becomes frustrated and wants to put LUE in first and then requires assistance.   CARE Score: 3  Discharge Goal: Supervision or touching assistance     Lower Body Dressing  Assistance Needed: Substantial/maximal assistance  Comment: pants only, completed in bed, pt able to roll with mod A to left, and cues for right while another assists with pant management  CARE Score: 2  Discharge Goal: Partial/moderate assistance     Putting On/Taking Off Footwear  Assistance Needed: Substantial/maximal assistance  Comment: while in bed and supine, pt able to reach left foot partially  CARE Score: 2  Discharge Goal: Partial/moderate assistance     Toilet Transfer  Assistance needed: Substantial/maximal assistance  CARE Score: 2  Discharge Goal: Supervision or touching assistance           Goals  Short Term Goals  Time Frame for Short Term Goals: 2 weeks  Short Term Goal 1: MET  Short Term Goal 2: Pt will dress UB using hemitechnique, mod A and cues  Short Term Goal 3: Pt will dress LB, hemitechnique, mod A and cues  Short Term Goal 4: Pt will toilet with mod A  Short Term Goal 5:  Toilet transfer with mod A  Additional Goals?: Yes  Short Term Goal 6: Pt will attend to R side during ADL/functional activities with moderate cues  Short Term Goal 7: Pt will perform standing tasks x 2-3 mins with L UE and mod A for balance to enhance ADL/IADL performance  Short Term Goal 8: Pt/family will demo understanding of R UE positioning/HEP/therapeutic activity recommendations with min A after ed  Long Term Goals  Time Frame for Long Term Goals : 4-5 weeks  Long Term Goal 1: Pt will bathe with min A, AE/DME prn  Long Term Goal 2: Pt will dress UB supervision  Long Term Goal 3: Pt will dress LB min A, AE prn  Long Term Goal 4: Pt will toilet with CGA  Long Term Goal 5: Pt will perform toilet transfer with CGA  Additional Goals?: Yes  Long Term Goal 6: Pt will perform simple home making task with min A  Long Term Goal 7: Pt/family will be independent in HEP/DME/therapeutic activity recommendations after ed  Long Term Goal 8: Pt will attend to R side during functional activities with occasional cueing       Therapy Time   Individual Concurrent Group Co-treatment   Time In 1000         Time Out 1100         Minutes 60         Timed Code Treatment Minutes: [address], OT

## 2022-12-02 NOTE — PROGRESS NOTES
Occupational Therapy  Facility/Department: [facility] 8 REHAB UNIT  Occupational Therapy Initial Assessment    Name: Blaire Baker  : 1960  MRN: [id number]  Date of Service: 2022    Discharge Recommendations:            Patient Diagnosis(es): There were no encounter diagnoses. Past Medical History:  has a past medical history of Anxiety, ASCVD (arteriosclerotic cardiovascular disease), Carrier of group B Streptococcus, Cellulitis, Colitis, COPD (chronic obstructive pulmonary disease) ([address] Costochondritis, CVA (cerebral vascular accident) ([address] Depression, Edema, Fracture of sternum, Gallstones, Grief reaction, H/O superior vena cava filter placement, Hyperlipidemia, Hypertension, MI (myocardial infarction) ([address] MRSA (methicillin resistant Staphylococcus aureus), Neuropathy, Obesity, Pseudarthrosis, RA (rheumatoid arthritis) ([address] Rosacea, Sinus bradycardia, TIA (transient ischemic attack), and Venous thromboembolism. Past Surgical History:  has a past surgical history that includes Coronary artery bypass graft (3/2009); Tonsillectomy; Hysterectomy; thoracotomy; knee surgery; Appendectomy; Colonoscopy (2010); Cardiac catheterization (3/2009); Adenoidectomy; Cholecystectomy (); Gastric bypass surgery (); hiatal hernia repair (); Cardiac catheterization (14  JDT); Colonoscopy (); Colonoscopy; Upper gastrointestinal endoscopy (); Upper gastrointestinal endoscopy (); and pr colonoscopy flx dx w/collj spec when pfrmd (N/A, 3/12/2018).     Treatment Diagnosis: L MCA stroke        Plan   Occupational Therapy Plan  Specific Instructions for Next Treatment: transfers, UE  Current Treatment Recommendations: Strengthening, ROM, Balance training, Functional mobility training, Endurance training, Positioning, Modalities, Neuromuscular re-education, Cognitive reorientation, Safety education & training, Patient/Caregiver education & training, Equipment evaluation, education, & procurement, Self-Care / ADL, Cognitive/Perceptual training, Home management training, Sensory integraion     Restrictions  Restrictions/Precautions  Restrictions/Precautions: Modified Diet, Swallowing - Thickened Liquids, Fall Risk, Aspiration Risk, Weight Bearing  Required Braces or Orthoses?: No  Lower Extremity Weight Bearing Restrictions  Right Lower Extremity Weight Bearing: Weight Bearing As Tolerated  Left Lower Extremity Weight Bearing: Weight Bearing As Tolerated      Objective        12/01/22 1000   OT Exercises   Exercise Treatment comprehensive AAROM shoulder extension, adducation tabletop tasks GE planes   PROM Exercises R UE----all muscle groups      12/01/22 1000   Neuromuscular Education   Neuromuscular education Yes   NDT Treatment Upper extremity   Vibration elbow flexion/extenson 2-/5   Weight Bearing   Weight Bearing Technique Yes   RUE Weight Bearing Forearm seated; Extended arm seated   Response To Weight Bearing Technique fair       Goals  Short Term Goals  Time Frame for Short Term Goals: 2 weeks  Short Term Goal 1: Pt will bathe with mod A, AE prn  Short Term Goal 2: Pt will dress UB using hemitechnique, mod A and cues  Short Term Goal 3: Pt will dress LB, hemitechnique, mod A and cues  Short Term Goal 4: Pt will toilet with mod A  Short Term Goal 5:  Toilet transfer with mod A  Additional Goals?: Yes  Short Term Goal 6: Pt will attend to R side during ADL/functional activities with moderate cues  Short Term Goal 7: Pt will perform standing tasks x 2-3 mins with L UE and mod A for balance to enhance ADL/IADL performance  Short Term Goal 8: Pt/family will demo understanding of R UE positioning/HEP/therapeutic activity recommendations with min A after ed  Long Term Goals  Time Frame for Long Term Goals : 4-5 weeks  Long Term Goal 1: Pt will bathe with min A, AE/DME prn  Long Term Goal 2: Pt will dress UB supervision  Long Term Goal 3: Pt will dress LB min A, AE prn  Long Term Goal 4: Pt will toilet with CGA  Long Term Goal 5: Pt will perform toilet transfer with CGA  Additional Goals?: Yes  Long Term Goal 6: Pt will perform simple home making task with min A  Long Term Goal 7: Pt/family will be independent in HEP/DME/therapeutic activity recommendations after ed  Long Term Goal 8: Pt will attend to R side during functional activities with occasional cueing      12/01/22 1000   OT Individual Minutes   Time In 1000   Time Out 1100   Minutes 60   Time Code Minutes    Timed Code Treatment Minutes 60 Minutes       Electronically signed by Hansa Fox OT on 12/1/2022 at 10:00 AM

## 2022-12-02 NOTE — PROGRESS NOTES
for specific wants/ needs at 50%. She was provided with her schedule in bold text. She was also provided with a sheet for orientation in bold text (included the date, her location, her room number, her floor, and city/state). This afternoon she followed simple one step directions at 100% with a model and at 0% without a model. Yes/no questions this afternoon were at 30% with cueing. Sentence completion was at 80% without cueing and 100% with phonemic cueing. Naming pictures was at 100% with cueing and at 40% without cueing. Recommend she continue speech therapy for dysphagia, expressive language, receptive language,cognition and dysarthria. Recommend she remain on a dysphagia soft diet with thin liquids at this time. SLP will upgrade to regular when she demonstrates consistent awareness of oral residue. Recommended Diet and Intervention  Soft & Bite Sized consistencies   Thin liquids  Recommended Form of Meds: Crushed in puree as able  Recommendations: Modified barium swallow study; Dysphagia treatment  Therapeutic Interventions: Pharyngeal exercises;Diet tolerance monitoring;Oral care;Oral motor exercises; Patient/Family education; Therapeutic PO trials with SLP     Compensatory Swallowing Strategies  Compensatory Swallowing Strategies : Alternate solids and liquids;Eat/Feed slowly; No straws;Upright as possible for all oral intake;Remain upright for 30-45 minutes after meals;Small bites/sips; Check for pocketing of food on the Right; Check for pocketing of food on the Left; Set up assist;Assist feed           SHORT TERM GOAL #1:  Goal 1: Pt will complete y/n tasks with 80% accuracy and minimal verbal cues/modeling. SHORT TERM GOAL #2:  Goal 2: Pt will complete verbal expression tasks with 80% accuracy and minimal verbal cues/modeling. SHORT TERM GOAL #3:  Goal 3: Pt will complete receptive/expressive language tasks with 80% accuracy and minimal verbal cues/modeling.      SHORT TERM GOAL #4:  Goal 4: Pt will follow one step commands with with 90% accuracy and minimal verbal cues/modeling. SHORT TERM GOAL #5:  Goal 5: Pt will complete orientation tasks with 90% accuracy and minimal verbal cues/modeling. Swallowing Short Term Goals  Short-term Goals  Goal 1:Pt will tolerate soft & bite sized consistencies with mildly  (nectar) thick liquids with minimal overt s/s of aspiration/penetration during hospitalization. Met. New Goal: Pt will tolerate soft and bite sized diet with thin liquids with minimal overt s/s of aspiration/penetration during hospitalization. Goal 2: Pt will complete Modified Barium Swallow Study. Goal 3: Pt will demonstrate awareness of general aspiration precautions. Goal 4: Pt will participate in swallowing reassessments to ensure safest diet consistencies. Goal 5: Pt will allow staff to complete daily oral care. Goal 6: Pt will complete oral motor exercises for lingual and labial strengthening. Long term goals:  Pt will participate in skilled speech therapy services to ensure she is able to communicate her wants and needs. Pt will tolerate least restrictive diet with minimal overt s/s of aspiration/penetration during hospitalization.                                     ASSESSMENT:  Assessment: [x]Progressing towards goals          []Not Progressing towards goals    Patient Tolerance of Treatment:   [x]Tolerated well []Tolerated fair []Required rest breaks []Fatigued    Education:  Learner:  [x]Patient          []Significant Other          []Other  Education provided regarding:  [x]Goals and POC   []Diet and swallowing precautions    []Home Exercise Program  []Progress and/or discharge information  Method of Education:  [x]Discussion          [x]Demonstration          []Handout          []Other  Evaluation of Education:   []Verbalized understanding   []Demonstrates without assistance  []Demonstrates with assistance  [x]Needs further instruction     []No evidence of learning                  []No family present      Plan: [x]Continue with current plan of care    []Modify current plan of care as follows:    []Discharge patient    Discharge Location:    Services/Supervision Recommended:      [x]Patient continues to require treatment by a licensed therapist to address functional deficits as outlined in the established plan of care.          Electronically Signed By:  Wilton Britton  12/2/2022,7:30 AM.

## 2022-12-02 NOTE — PROGRESS NOTES
Patient:   Rashad Russell  MR#:    [id number]   Room:    0826/826-02   YOB: 1960  Date of Progress Note: 12/2/2022  Time of Note                           8:02 AM  Consulting Physician:   Narendra Thomas M.D. Attending Physician:  Narendra Thomas MD     Chief complaint Acute ischemic stroke    S:This 64 y.o. female  with history of anxiety, depression, COPD, atherosclerotic disease, colitis, COPD, CVA, DM,  LE edema, hyperlipidemia, HTN, morbid obesity, RA, CAD  and venous thromboembolism. She presented to [facility] ER on 11/9/22 after being found at home lying facedown in her feces. She was very weak, confused, not able to speak but moving purposefully and intermittently following commands. Her last well know was 3 a.m. when her  left for work. Imaging done revealed a large MCA stroke 7.7 x 5 cm. CTA head/neck revealed an acute M1 occlusion. She was outside of the window for TPA. CK on admit was 1100, consistent with rhabdomylosis. She was also noted to possibly Dens fracture. She was transferred to [facility] for a possible thrombectomy. Further imaging was done at [facility] and she was deemed not a candidate for thrombectomy. MRI done ruled out Dens fracture. Repeat CT of head on 11/11/22 showed evolving left MCA territory infarct with increasing edema/mass effect resulting in 4mm of  rightward midline shift(previously 2mm) a the level of the third ventricle. No hemorrhagic conversion or definite trapping of the right lateral ventricle, possible small acute right cerebellar infarct. Neurosurgery was consulted for possible hemicraniectomy. She was seen by Dr. Arlin Perez, who didn't feel any surgical intervention was needed. Patient was found to have a UTI + for [facility] and was placed on Rocephin on 11/14/22. Patient had a PICC line placed. Patient was evaluated by SPT and initially made NPO d/t oropharyngeal dysphagia. NGT placed and feeding started. She was also noted to have global aphasia but is improving. She was re-evaluated by SPT on 11/15/22 for swallow and was started on a dysphagia 1 diet with nectar thick liquids. Patient also continues to have right sided weakness and is participating in both PT/OT. Repeat CT head on 11/13/22 shows stable LMCA ischemic stroke with stable edema, 5 mm shift, no hemorrhage. She is felt to need a stay on Rehab for continued PT/OT/SPT and medical management to work towards her goal of returning home with her . She is now felt ready to start the Rehab program.  No new issues overnight. REVIEW OF SYSTEMS:  Unreliable due to aphasia     Past Medical History:      Diagnosis Date    Anxiety     ASCVD (arteriosclerotic cardiovascular disease)     Carrier of group B Streptococcus     Cellulitis     Colitis     COPD (chronic obstructive pulmonary disease) ([facility])     Costochondritis     CVA (cerebral vascular accident) ([address]     Depression     Edema     Fracture of sternum     non union post surgery.      Gallstones     Grief reaction     H/O superior vena cava filter placement     Hyperlipidemia     Hypertension     MI (myocardial infarction) ([address]     MRSA (methicillin resistant Staphylococcus aureus)     Neuropathy     Obesity     Pseudarthrosis sternal    RA (rheumatoid arthritis) ([address]     Rosacea     Sinus bradycardia     TIA (transient ischemic attack)     Venous thromboembolism        Past Surgical History:      Procedure Laterality Date    ADENOIDECTOMY      APPENDECTOMY      CARDIAC CATHETERIZATION  3/2009    CARDIAC CATHETERIZATION  11/5/14  JDT    EF 50%, patent bypass grafts    CHOLECYSTECTOMY  2011    COLONOSCOPY  06/03/2010    Dr. Roswell Leyden: distal colon having ulcerative lesions c/w UC    COLONOSCOPY  2009    pancolitis    COLONOSCOPY      CORONARY ARTERY BYPASS GRAFT  3/2009    Dr Willi Luna, LIMA to LAD, SVGs to OM & PDA    GASTRIC BYPASS SURGERY  2012    HIATAL HERNIA REPAIR  2011    HYSTERECTOMY (CERVIX STATUS UNKNOWN)      KNEE SURGERY      [place] COLONOSCOPY FLX DX W/COLLJ SPEC WHEN PFRMD N/A 3/12/2018    Dr Stephanie Sulatna rectal inflammation consistent with U.C.-Active proctitis--3 yr recall    [id number] [place] [address]  ENDOSCOPY  2010    Dr. Karena Duffy  2012       Medications in Hospital:      Current Facility-Administered Medications:     docusate sodium (COLACE) capsule 100 mg, 100 mg, Oral, BID, Sandeep Montgomery MD, 100 mg at 12/01/22 2116    aspirin chewable tablet 81 mg, 81 mg, Oral, Daily, Sandeep Montgomery MD, 81 mg at 12/01/22 0809    atorvastatin (LIPITOR) tablet 40 mg, 40 mg, Oral, Nightly, Sandeep Montgomery MD, 40 mg at 12/01/22 2116    dulaglutide (TRULICITY) SC injection 1.5 mg (Patient Supplied), 1.5 mg, SubCUTAneous, Weekly, Sandeep Montgomery MD    folic acid (FOLVITE) tablet 1 mg, 1 mg, Oral, Daily, Sandeep Montgomery MD, 1 mg at 12/01/22 0809    gabapentin (NEURONTIN) capsule 300 mg, 300 mg, Oral, Nightly, Sandeep Montgomrey MD, 300 mg at 12/01/22 2116    hydroxychloroquine (PLAQUENIL) tablet 200 mg, 200 mg, Oral, BID, Sandeep Montgomery MD, 200 mg at 12/01/22 2116    amLODIPine (NORVASC) tablet 2.5 mg, 2.5 mg, Oral, Daily, Sandeep Montgomery MD, 2.5 mg at 12/01/22 0809    tiZANidine (ZANAFLEX) tablet 4 mg, 4 mg, Oral, BID PRN, Sandeep Montgomery MD    multivitamin 1 tablet, 1 tablet, Oral, Daily, Sandeep Montgomery MD, 1 tablet at 12/01/22 0809    acetaminophen (TYLENOL) tablet 650 mg, 650 mg, Oral, Q4H PRN, Sandeep Montgomery MD, 650 mg at 11/27/22 1933    enoxaparin (LOVENOX) injection 40 mg, 40 mg, SubCUTAneous, Daily, Sandeep Montgomery MD, 40 mg at 12/01/22 1735    polyethylene glycol (GLYCOLAX) packet 17 g, 17 g, Oral, Daily PRN, Sandeep Montgomery MD, 17 g at 11/30/22 1817    Allergies:  Methotrexate derivatives    Social History:   TOBACCO:   reports that she quit smoking about 13 years ago. Her smoking use included cigarettes. She has a 64.00 pack-year smoking history.  She has never used smokeless tobacco.  ETOH:   reports current alcohol use. Family History:       Problem Relation Age of Onset    Prostate Cancer Father     Heart Failure Father     Cancer Father     Colon Cancer Neg Hx     Colon Polyps Neg Hx     Liver Cancer Neg Hx     Liver Disease Neg Hx     Esophageal Cancer Neg Hx     Rectal Cancer Neg Hx     Stomach Cancer Neg Hx          PHYSICAL EXAM:  BP (!) 161/69   Pulse 59   Temp (!) 95.9 °F (35.5 °C) (Temporal)   Resp 16   Ht 5' 7\" (1.702 m)   Wt 204 lb (92.5 kg)   SpO2 100%   BMI 31.95 kg/m²     Constitutional - well developed, well nourished. Eyes - conjunctiva normal.   Ear, nose, throat - No scars, masses, or lesions over external nose or ears, no atrophy of tongue  Neck-symmetric, no masses noted, no jugular vein distension  Respiration- chest wall appears symmetric, good expansion,   normal effort without use of accessory muscles  Musculoskeletal - no significant wasting of muscles noted, no bony deformities  Extremities-no clubbing, cyanosis or edema  Skin - warm, dry, and intact. No rash, erythema, or pallor. Psychiatric - mood, affect, and behavior appear normal.      Neurological exam  Awake, alert, global aphasia says \"yes\" to all questions asked   Attention and concentration appear appropriate  Recent and remote memory unable to tests     Cranial Nerve Exam     CN III, IV,VI-EOMI, No nystagmus, conjugate eye movements, no ptosis    CN VII-+ facial assymetry       Motor Exam  No movement on the right      Tremors- no tremors in hands or head noted     Gait  Not tested     Nursing/pcp notes, imaging,labs and vitals reviewed.      PT,OT and/or speech notes reviewed    Lab Results   Component Value Date    WBC 7.0 12/01/2022    HGB 11.6 (L) 12/01/2022    HCT 38.4 12/01/2022    MCV 79.8 (L) 12/01/2022     (H) 12/01/2022     Lab Results   Component Value Date     12/01/2022    K 4.7 12/01/2022     12/01/2022    CO2 24 12/01/2022    BUN 13 12/01/2022    CREATININE 0.6 12/01/2022 GLUCOSE 77 12/01/2022    CALCIUM 9.4 12/01/2022    PROT 5.1 (L) 12/01/2022    LABALBU 3.2 (L) 12/01/2022    BILITOT <0.2 12/01/2022    ALKPHOS 88 12/01/2022    AST 39 (H) 12/01/2022    ALT 32 12/01/2022    LABGLOM >60 12/01/2022   No results found for: INR, PROTIME  Andres Benitez   Student Physical Therapist   Physical Therapy   Progress Notes       Cosign Needed   Date of Service:  12/1/2022  3:57 PM                 Cosign Needed                         12/01/22 1300   Restrictions/Precautions   Restrictions/Precautions Modified Diet;Swallowing - Thickened Liquids; Fall Risk;Aspiration Risk;Weight Bearing   Lower Extremity Weight Bearing Restrictions   Right Lower Extremity Weight Bearing Weight Bearing As Tolerated   Left Lower Extremity Weight Bearing Weight Bearing As Tolerated   General   Additional Pertinent Hx Anxiety, depression, COPD, CVA, DM, LE edema, hyperlipidemia, HTN, obesity, RA, CAD and venous thromboembolism   Diagnosis CVA, R hemiparesis   General Comment   Comments PATIENT SITTING IN W/C, IN ROOM, WITH DAUGHTER   Wheelchair Activities   Propulsion Yes   Propulsion 1   Propulsion Manual   Level Level Tile   Method LUE;LLE   Level of Assistance Moderate assistance;Supervision   Description/ Details THERAPIST INITIALLY PROVIDING MOD A AT FOOT OF LLE FOR FIRST 15', AFTERWARDS THERAPIST RELEASED AND PATIENT CONTINUED. ATTEMPT PROPULSION WITH TWO TURNS OR ' NEXT. Distance 48'   PT Exercises   Exercise Treatment SEATED EXERCISES: PF 20X, DF 20X; MARCHES 15X; HIP EXT 10X; SAQ 15X; QS 10X; HIP ABD 10X; HIP ADD 10X; HS CURL 10X  (RLE = PROM; LLE = A/AROM; MANUAL RESISTANCE APPLIED IF APPROPRIATE OR DOES NOT HINDER ROM.)   Assessment   Assessment PATIENT APPEARED EXCITED FOR THERAPY AND EXERCISES. DAUGHTER STATES PATIENT HAD ANOTHER SUCCESSFUL BOWEL MOVEMENT AND IS IN A GOOD MOOD. DAUGHTER VOICES CONCERN THAT PATIENT HAS ATTEMPTED TWICE TO USE BATHROOM WITH NURSE STAFF OR ASSISTANCE.  SEATED EXERCISES WERE SUCCESSFUL AND ABLE TO APPLY MANUAL RESISTANCE TO MORE EXERCISES. MANUAL RESISTANCE REMOVED IF ROM DECREASES. PATIENT W/C PROPULSION CONTINUES TO IMPROVE (SEE PROPULSION FOR DETAILS)   Safety Devices   Type of Devices Left in chair;Call light within reach;Gait belt  (DAUGHTER IN ROOM WITH PATIENT)   PT Individual Minutes   Time In 1300   Time Out 1345   Minutes 45                    RECORD REVIEW: Previous medical records, medications were reviewed at today's visit    IMPRESSION:   1. Acute ischemic stroke-on aspirin/statin  2. Hypertension-on medications monitor  3. Hyperlipidemia-on statin  4. Diabetes-Trulicity-monitor blood sugar  5. DVT prophylaxis-Lovenox  6. History of coronary artery disease-aspirin/statin  7. Neuropathy-on Neurontin  8. Rheumatoid arthritis-on Plaquenil  9. COPD-monitor  10. History of anxiety and depression-monitor  11. History of colitis/gallstones-monitor  12. History of bariatric surgery-on multivitamin/folic acid  13. History of superior vena cava filter placement with venous thromboembolism-not on anticoagulation-monitor- indicates took herself off blood thinners as not like meds and was bruising   14.   PT/OT/speech    Continue current care    x-ray of right ankle no acute changes  Venous ultrasound of leg no DVT      ELOS December pending 4 weeks    Expected duration and frequency therapy: 180 minutes per day, 5 days per week    [facility]  [phone] CELL  Dr Julian Burleson

## 2022-12-02 NOTE — PROGRESS NOTES
12/02/22 0900   Bed mobility   Rolling to Left Contact guard assistance  (with rail)   Rolling to Right Moderate assistance;Minimal assistance  (with rail)   Supine to Sit Moderate assistance;Minimal assistance  (FROM LEFT SIDELYING)   Transfers   Sit to Stand Moderate Assistance;Minimal Assistance  (PULL TOS TAND PARLALEL BARS)   Bed to Chair Moderate assistance;Minimal assistance  (SQUAT PIVOT TO LEFT. DELAYED INITATION)   Car Transfer Maximum Assistance; Moderate Assistance;Minimal Assistance  (MOD/MIN INTO CARE; MAX A CAR TO WC. LACK OF ANTERIOR LEAN)   Ambulation   Device Parallel Bars   Other Apparatus   (SLING, SHOE COVER)   Assistance Moderate assistance;Minimal assistance   Quality of Gait ADVANCED LEFT LE PRIOR TO RIGHT BEING LOCKED IN EXTENSION. INCREASED TONE RIGHT HIP ADDUCTORS EXTENSOR   Distance 12 FT   Ambulation 2   Device 2 Parallel Bars   Other Apparatus 2   (SLING, SHOE COVER)   Assistance 2 Moderate assistance;Minimal assistance   Quality of Gait 2 ALLOWED PATIENT TO ASSIST MORE WITH ADVANCING RIGHT LE.  INTERMITTENT PREMATURE LEFT ADVANCEMENT PRIOR TO RIGHT KNEE LOCKED IN EXTENSION   Distance 12 FT   Ambulation 3   Device 3 Baez rail   Other Apparatus 3   (AFO, SLING)   Assistance 3 Moderate assistance;Minimal assistance   Quality of Gait 3 SIMILAR TO AMBULATION 2.  RIGHT LE TONE PREVENTED LEG FROM BUCKLING WHEN REMATURELY ADVANCING LEFT PRIOR TO RIGHT LOCKED IN EXTENSION   Propulsion 1   Level Level Tile   Method LUE;LLE   Level of Assistance Moderate assistance;Minimal assistance;Stand by assistance  (MOD/MIN FOR DEMO WITH ARM AND LEG; SBA AFTERWORDS)   Distance 180 FT   Assessment   Assessment SKIN BREAKDOWN EVIDENT RIGHT INGUINAL FOLD. DAUGHTER STATES WORSE THAN 2 DAYS AGO. NURSING NOTIFIED. LIBERALLY APPLIED ZINC OXIDE CREAM.  SEE GAIT FOR DETAILS. MUCH IMPROVED MUSCULAR ENDURANCE OF RIGHT LE AS WELL AS GLOBAL ENDURANCE OVERALL.

## 2022-12-03 PROCEDURE — 6360000002 HC RX W HCPCS: Performed by: PSYCHIATRY & NEUROLOGY

## 2022-12-03 PROCEDURE — 2500000003 HC RX 250 WO HCPCS: Performed by: PSYCHIATRY & NEUROLOGY

## 2022-12-03 PROCEDURE — 6370000000 HC RX 637 (ALT 250 FOR IP): Performed by: PSYCHIATRY & NEUROLOGY

## 2022-12-03 PROCEDURE — 1180000000 HC REHAB R&B

## 2022-12-03 PROCEDURE — 99232 SBSQ HOSP IP/OBS MODERATE 35: CPT | Performed by: PSYCHIATRY & NEUROLOGY

## 2022-12-03 PROCEDURE — 97535 SELF CARE MNGMENT TRAINING: CPT

## 2022-12-03 PROCEDURE — 97530 THERAPEUTIC ACTIVITIES: CPT

## 2022-12-03 RX ADMIN — ATORVASTATIN CALCIUM 40 MG: 40 TABLET, FILM COATED ORAL at 20:16

## 2022-12-03 RX ADMIN — ENOXAPARIN SODIUM 40 MG: 100 INJECTION SUBCUTANEOUS at 17:07

## 2022-12-03 RX ADMIN — DOCUSATE SODIUM 100 MG: 100 CAPSULE, LIQUID FILLED ORAL at 08:54

## 2022-12-03 RX ADMIN — THERA TABS 1 TABLET: TAB at 08:54

## 2022-12-03 RX ADMIN — MICONAZOLE NITRATE: 2 POWDER TOPICAL at 17:09

## 2022-12-03 RX ADMIN — AMLODIPINE BESYLATE 2.5 MG: 2.5 TABLET ORAL at 08:54

## 2022-12-03 RX ADMIN — HYDROXYCHLOROQUINE SULFATE 200 MG: 200 TABLET, FILM COATED ORAL at 08:54

## 2022-12-03 RX ADMIN — GABAPENTIN 300 MG: 300 CAPSULE ORAL at 20:16

## 2022-12-03 RX ADMIN — ASPIRIN 81 MG 81 MG: 81 TABLET ORAL at 08:54

## 2022-12-03 RX ADMIN — FOLIC ACID 1 MG: 1 TABLET ORAL at 08:54

## 2022-12-03 RX ADMIN — HYDROXYCHLOROQUINE SULFATE 200 MG: 200 TABLET, FILM COATED ORAL at 20:16

## 2022-12-03 RX ADMIN — DOCUSATE SODIUM 100 MG: 100 CAPSULE, LIQUID FILLED ORAL at 20:16

## 2022-12-03 NOTE — PLAN OF CARE
Problem: Discharge Planning  Goal: Discharge to home or other facility with appropriate resources  12/2/2022 2333 by Ty Kern RN  Outcome: Progressing  12/2/2022 1018 by Kaitlyn Fournier RN  Outcome: Progressing     Problem: Safety - Adult  Goal: Free from fall injury  12/2/2022 2333 by Ty Kern RN  Outcome: Progressing  Flowsheets (Taken 12/2/2022 2332)  Free From Fall Injury: Instruct family/caregiver on patient safety  12/2/2022 1018 by Kaitlyn Fournier RN  Outcome: Progressing     Problem: Neurosensory - Adult  Goal: Achieves stable or improved neurological status  12/2/2022 2333 by Ty Kern RN  Outcome: Progressing  12/2/2022 1018 by Kaitlyn Fournier RN  Outcome: Progressing  Goal: Achieves maximal functionality and self care  12/2/2022 2333 by Ty Kern RN  Outcome: Progressing  12/2/2022 1018 by Kaitlyn Fournier RN  Outcome: Progressing     Problem: Skin/Tissue Integrity - Adult  Goal: Skin integrity remains intact  12/2/2022 2333 by Ty Kern RN  Outcome: Progressing  Flowsheets (Taken 12/2/2022 2332)  Skin Integrity Remains Intact: Monitor for areas of redness and/or skin breakdown  12/2/2022 1018 by Kaitlyn oFurnier RN  Outcome: Progressing

## 2022-12-03 NOTE — PROGRESS NOTES
Occupational Therapy  Facility/Department: [facility] 8 REHAB UNIT      Name: Starr Scherer  : 1960  MRN: [id number]  Date of Service: 2022    Discharge Recommendations:  Continue to assess pending progress          Patient Diagnosis(es): There were no encounter diagnoses. Past Medical History:  has a past medical history of Anxiety, ASCVD (arteriosclerotic cardiovascular disease), Carrier of group B Streptococcus, Cellulitis, Colitis, COPD (chronic obstructive pulmonary disease) ([address] Costochondritis, CVA (cerebral vascular accident) ([address] Depression, Edema, Fracture of sternum, Gallstones, Grief reaction, H/O superior vena cava filter placement, Hyperlipidemia, Hypertension, MI (myocardial infarction) ([address] MRSA (methicillin resistant Staphylococcus aureus), Neuropathy, Obesity, Pseudarthrosis, RA (rheumatoid arthritis) ([address] Rosacea, Sinus bradycardia, TIA (transient ischemic attack), and Venous thromboembolism. Past Surgical History:  has a past surgical history that includes Coronary artery bypass graft (3/2009); Tonsillectomy; Hysterectomy; thoracotomy; knee surgery; Appendectomy; Colonoscopy (2010); Cardiac catheterization (3/2009); Adenoidectomy; Cholecystectomy (); Gastric bypass surgery (); hiatal hernia repair (); Cardiac catheterization (14  JDT); Colonoscopy (); Colonoscopy; Upper gastrointestinal endoscopy (); Upper gastrointestinal endoscopy (); and pr colonoscopy flx dx w/collj spec when pfrmd (N/A, 3/12/2018).     Treatment Diagnosis: L MCA stroke        Plan   Occupational Therapy Plan  Specific Instructions for Next Treatment: transfers, UE  Current Treatment Recommendations: Strengthening, ROM, Balance training, Functional mobility training, Endurance training, Neuromuscular re-education, Safety education & training, Patient/Caregiver education & training, Equipment evaluation, education, & procurement, Positioning, Modalities, Self-Care / ADL, Home management training, Sensory integration     Restrictions  Restrictions/Precautions  Restrictions/Precautions: Modified Diet, Swallowing - Thickened Liquids, Fall Risk, Aspiration Risk, Weight Bearing  Required Braces or Orthoses?: No  Lower Extremity Weight Bearing Restrictions  Right Lower Extremity Weight Bearing: Weight Bearing As Tolerated  Left Lower Extremity Weight Bearing: Weight Bearing As Tolerated       12/02/22 1500   General   Family / Caregiver Present Yes  (daughter)   Pre Treatment Pain Screening   Pain at present 0   Scale Used Numeric Score     Objective        12/02/22 1500   OT Exercises   PROM Exercises R UE----all muscle groups      12/02/22 1500   Neuromuscular Education   Neuromuscular education Yes   NDT Treatment Upper extremity   Facilitation techniques scapular mobilization   Joint Compression R UE   Weight Bearing   Weight Bearing Technique Yes      12/02/22 1500   Modalities   Modalities Yes   Electrical Stimulation   Electrical Stimulation Location Right;Wrist   Electrical Stimulation Action wrist extension   Electrical Stimulation Parameters 5 mins at 31mAccs with good results, 15 mins at 24mAccs with fair results---[facility] setting, 10/20 on/off cycle, 50% duty kenzie     Goals  Short Term Goals  Time Frame for Short Term Goals: 2 weeks  Short Term Goal 1: MET  Short Term Goal 2: Pt will dress UB using hemitechnique, mod A and cues  Short Term Goal 3: Pt will dress LB, hemitechnique, mod A and cues  Short Term Goal 4: Pt will toilet with mod A  Short Term Goal 5:  Toilet transfer with mod A  Additional Goals?: Yes  Short Term Goal 6: Pt will attend to R side during ADL/functional activities with moderate cues  Short Term Goal 7: Pt will perform standing tasks x 2-3 mins with L UE and mod A for balance to enhance ADL/IADL performance  Short Term Goal 8: Pt/family will demo understanding of R UE positioning/HEP/therapeutic activity recommendations with min A after ed  Long Term Goals  Time Frame for Long Term Goals : 4-5 weeks  Long Term Goal 1: Pt will bathe with min A, AE/DME prn  Long Term Goal 2: Pt will dress UB supervision  Long Term Goal 3: Pt will dress LB min A, AE prn  Long Term Goal 4: Pt will toilet with CGA  Long Term Goal 5: Pt will perform toilet transfer with CGA  Additional Goals?: Yes  Long Term Goal 6: Pt will perform simple home making task with min A  Long Term Goal 7: Pt/family will be independent in HEP/DME/therapeutic activity recommendations after ed  Long Term Goal 8: Pt will attend to R side during functional activities with occasional cueing      12/02/22 1500   OT Individual Minutes   Time In 1500   Time Out 1525   Minutes 25   Time Code Minutes    Timed Code Treatment Minutes 25 Minutes         Electronically signed by Harman Washington OT on 12/2/2022 at 3:00 PM

## 2022-12-03 NOTE — PLAN OF CARE
Problem: SELF HARM/SUICIDALITY  Goal: Will have no self-injury during hospital stay  Description  INTERVENTIONS:  - Q 15 MINUTES: Routine safety checks  - Q WAKING SHIFT & PRN: Assess risk to determine if routine checks are adequate to maintain patient safety  - Encourage patient to participate actively in care by formulating a plan to combat response to suicidal ideation, identify supports and resources  Outcome: Progressing     Problem: DEPRESSION  Goal: Will be euthymic at discharge  Description  INTERVENTIONS:  - Administer medication as ordered  - Provide emotional support via 1:1 interaction with staff  - Encourage involvement in milieu/groups/activities  - Monitor for social isolation  Outcome: Progressing     Problem: ANXIETY  Goal: Will report anxiety at manageable levels  Description  INTERVENTIONS:  - Administer medication as ordered  - Teach and encourage coping skills  - Provide emotional support  - Assess patient/family for anxiety and ability to cope  Outcome: Progressing     Problem: DISCHARGE PLANNING  Goal: Discharge to home or other facility with appropriate resources  Description  INTERVENTIONS:  - Identify barriers to discharge w/patient and caregiver  - Arrange for needed discharge resources and transportation as appropriate  - Identify discharge learning needs (meds, wound care, etc )  - Arrange for interpretive services to assist at discharge as needed  - Refer to Case Management Department for coordinating discharge planning if the patient needs post-hospital services based on physician/advanced practitioner order or complex needs related to functional status, cognitive ability, or social support system  Outcome: Progressing     Problem: Ineffective Coping  Goal: Participates in unit activities  Description  Interventions:  - Provide therapeutic environment   - Provide required programming   - Redirect inappropriate behaviors   Outcome: Progressing Problem: Safety - Adult  Goal: Free from fall injury  12/3/2022 1158 by Boyd White RN  Outcome: Progressing  12/2/2022 2333 by Bradley Guzman RN  Outcome: Progressing  Flowsheets (Taken 12/2/2022 2332)  Free From Fall Injury: Instruct family/caregiver on patient safety   Up with 2 assist with pebbles moody

## 2022-12-03 NOTE — PROGRESS NOTES
Occupational Therapy  Facility/Department: [facility] 8 REHAB UNIT      Name: Abel Fleming  : 1960  MRN: [id number]  Date of Service: 12/3/2022    Discharge Recommendations:  Continue to assess pending progress          Patient Diagnosis(es): There were no encounter diagnoses. Past Medical History:  has a past medical history of Anxiety, ASCVD (arteriosclerotic cardiovascular disease), Carrier of group B Streptococcus, Cellulitis, Colitis, COPD (chronic obstructive pulmonary disease) ([address] Costochondritis, CVA (cerebral vascular accident) ([address] Depression, Edema, Fracture of sternum, Gallstones, Grief reaction, H/O superior vena cava filter placement, Hyperlipidemia, Hypertension, MI (myocardial infarction) ([address] MRSA (methicillin resistant Staphylococcus aureus), Neuropathy, Obesity, Pseudarthrosis, RA (rheumatoid arthritis) ([address] Rosacea, Sinus bradycardia, TIA (transient ischemic attack), and Venous thromboembolism. Past Surgical History:  has a past surgical history that includes Coronary artery bypass graft (3/2009); Tonsillectomy; Hysterectomy; thoracotomy; knee surgery; Appendectomy; Colonoscopy (2010); Cardiac catheterization (3/2009); Adenoidectomy; Cholecystectomy (); Gastric bypass surgery (); hiatal hernia repair (); Cardiac catheterization (14  JDT); Colonoscopy (); Colonoscopy; Upper gastrointestinal endoscopy (); Upper gastrointestinal endoscopy (); and pr colonoscopy flx dx w/collj spec when pfrmd (N/A, 3/12/2018). Treatment Diagnosis: L MCA stroke      Assessment   Performance deficits / Impairments: Decreased functional mobility ; Decreased endurance;Decreased coordination;Decreased ADL status; Decreased sensation;Decreased posture;Decreased ROM; Decreased balance;Decreased strength;Decreased vision/visual deficit; Decreased safe awareness;Decreased high-level IADLs;Decreased cognition;Decreased fine motor control  Activity Tolerance  Activity Tolerance: Patient Tolerated treatment well        Plan   Occupational Therapy Plan  Specific Instructions for Next Treatment: transfers, UE  Current Treatment Recommendations: Strengthening, ROM, Balance training, Functional mobility training, Endurance training, Neuromuscular re-education, Safety education & training, Patient/Caregiver education & training, Equipment evaluation, education, & procurement, Positioning, Modalities, Self-Care / ADL, Home management training, Sensory integration     Restrictions  Restrictions/Precautions  Restrictions/Precautions: Modified Diet, Swallowing - Thickened Liquids, Fall Risk, Aspiration Risk, Weight Bearing  Required Braces or Orthoses?: No  Lower Extremity Weight Bearing Restrictions  Right Lower Extremity Weight Bearing: Weight Bearing As Tolerated  Left Lower Extremity Weight Bearing: Weight Bearing As Tolerated    Subjective   General  Patient assessed for rehabilitation services?: Yes  Family / Caregiver Present: Yes (spouse)  Subjective  Subjective: Initial family instruction provided with future sessions planned as pt progresses  General Comment  Comments: emotional at start of session but willing to participate---pt demonstrated improved mood by the end of session     Pre Treatment Pain Screening   Pain at present 0   Scale Used Faces   Intervention List Patient able to continue with treatment   General Comment   Comments emotional at start of session but willing to participate---pt demonstrated improved mood by the end of session       Objective     Safety Devices  Type of Devices: Left in chair;Call light within reach;Gait belt (left with daughter and spouse)    Bed mobility  Supine to Sit: Moderate assistance (mod/min A)  Transfers  Slide Board: Contact guard assistance (bed to w/c, towards left, CGA/SBA with cues)    Exercise Treatment: extensive AAROM HEP---GE planes from tabletop---demonstrated increased strength proximally  PROM Exercises: R UE----all muscle groups 12/03/22 1400   Neuromuscular Education   Neuromuscular education Yes   NDT Treatment Upper extremity   Facilitation techniques scapular mobilization   Vibration elbow flexion/extension, wrist extension----2-/5---good response to vibration   Weight Bearing   Weight Bearing Technique Yes   RUE Weight Bearing Forearm seated; Extended arm seated   Response To Weight Bearing Technique good       Goals  Short Term Goals  Time Frame for Short Term Goals: 2 weeks  Short Term Goal 1: MET  Short Term Goal 2: Pt will dress UB using hemitechnique, mod A and cues  Short Term Goal 3: Pt will dress LB, hemitechnique, mod A and cues  Short Term Goal 4: Pt will toilet with mod A  Short Term Goal 5:  Toilet transfer with mod A  Additional Goals?: Yes  Short Term Goal 6: Pt will attend to R side during ADL/functional activities with moderate cues  Short Term Goal 7: Pt will perform standing tasks x 2-3 mins with L UE and mod A for balance to enhance ADL/IADL performance  Short Term Goal 8: Pt/family will demo understanding of R UE positioning/HEP/therapeutic activity recommendations with min A after ed  Long Term Goals  Time Frame for Long Term Goals : 4-5 weeks  Long Term Goal 1: Pt will bathe with min A, AE/DME prn  Long Term Goal 2: Pt will dress UB supervision  Long Term Goal 3: Pt will dress LB min A, AE prn  Long Term Goal 4: Pt will toilet with CGA  Long Term Goal 5: Pt will perform toilet transfer with CGA  Additional Goals?: Yes  Long Term Goal 6: Pt will perform simple home making task with min A  Long Term Goal 7: Pt/family will be independent in HEP/DME/therapeutic activity recommendations after ed  Long Term Goal 8: Pt will attend to R side during functional activities with occasional cueing       Therapy Time   Individual Concurrent Group Co-treatment   Time In 1400         Time Out 1500         Minutes 60         Timed Code Treatment Minutes: 60 Minutes       Samreen Pringle OT

## 2022-12-03 NOTE — PROGRESS NOTES
Patient:   Olga Lidia Klein  MR#:    [id number]   Room:    0826/826-02   YOB: 1960  Date of Progress Note: 12/3/2022  Time of Note                           8:38 AM  Consulting Physician:   Yanelis Starkey M.D. Attending Physician:  Yanelis Starkey MD     Chief complaint Acute ischemic stroke    S:This 64 y.o. female  with history of anxiety, depression, COPD, atherosclerotic disease, colitis, COPD, CVA, DM,  LE edema, hyperlipidemia, HTN, morbid obesity, RA, CAD  and venous thromboembolism. She presented to [facility] ER on 11/9/22 after being found at home lying facedown in her feces. She was very weak, confused, not able to speak but moving purposefully and intermittently following commands. Her last well know was 3 a.m. when her  left for work. Imaging done revealed a large MCA stroke 7.7 x 5 cm. CTA head/neck revealed an acute M1 occlusion. She was outside of the window for TPA. CK on admit was 1100, consistent with rhabdomylosis. She was also noted to possibly Dens fracture. She was transferred to [facility] for a possible thrombectomy. Further imaging was done at [facility] and she was deemed not a candidate for thrombectomy. MRI done ruled out Dens fracture. Repeat CT of head on 11/11/22 showed evolving left MCA territory infarct with increasing edema/mass effect resulting in 4mm of  rightward midline shift(previously 2mm) a the level of the third ventricle. No hemorrhagic conversion or definite trapping of the right lateral ventricle, possible small acute right cerebellar infarct. Neurosurgery was consulted for possible hemicraniectomy. She was seen by Dr. Ron Asif, who didn't feel any surgical intervention was needed. Patient was found to have a UTI + for [facility] and was placed on Rocephin on 11/14/22. Patient had a PICC line placed. Patient was evaluated by SPT and initially made NPO d/t oropharyngeal dysphagia. NGT placed and feeding started. She was also noted to have global aphasia but is improving. She was re-evaluated by SPT on 11/15/22 for swallow and was started on a dysphagia 1 diet with nectar thick liquids. Patient also continues to have right sided weakness and is participating in both PT/OT. Repeat CT head on 11/13/22 shows stable LMCA ischemic stroke with stable edema, 5 mm shift, no hemorrhage. She is felt to need a stay on Rehab for continued PT/OT/SPT and medical management to work towards her goal of returning home with her . She is now felt ready to start the Rehab program.  No acute issues overnight. REVIEW OF SYSTEMS:  Unreliable due to aphasia     Past Medical History:      Diagnosis Date    Anxiety     ASCVD (arteriosclerotic cardiovascular disease)     Carrier of group B Streptococcus     Cellulitis     Colitis     COPD (chronic obstructive pulmonary disease) ([facility])     Costochondritis     CVA (cerebral vascular accident) ([address]     Depression     Edema     Fracture of sternum     non union post surgery.      Gallstones     Grief reaction     H/O superior vena cava filter placement     Hyperlipidemia     Hypertension     MI (myocardial infarction) ([address]     MRSA (methicillin resistant Staphylococcus aureus)     Neuropathy     Obesity     Pseudarthrosis sternal    RA (rheumatoid arthritis) ([address]     Rosacea     Sinus bradycardia     TIA (transient ischemic attack)     Venous thromboembolism        Past Surgical History:      Procedure Laterality Date    ADENOIDECTOMY      APPENDECTOMY      CARDIAC CATHETERIZATION  3/2009    CARDIAC CATHETERIZATION  11/5/14  JDT    EF 50%, patent bypass grafts    CHOLECYSTECTOMY  2011    COLONOSCOPY  06/03/2010    Dr. Gerhard Carpenter: distal colon having ulcerative lesions c/w UC    COLONOSCOPY  2009    pancolitis    COLONOSCOPY      CORONARY ARTERY BYPASS GRAFT  3/2009    Dr Natalie Nuñez, LIMA to LAD, SVGs to OM & PDA    GASTRIC BYPASS SURGERY  2012    HIATAL HERNIA REPAIR  2011    HYSTERECTOMY (CERVIX STATUS UNKNOWN)      KNEE SURGERY      UT COLONOSCOPY FLX DX W/COLLJ SPEC WHEN PFRMD N/A 3/12/2018    Dr Bevely Libman rectal inflammation consistent with U.C.-Active proctitis--3 yr recall    [id number] [place] [address] Dr ENDOSCOPY  2010    Dr. Raven Coyle  2012       Medications in Hospital:      Current Facility-Administered Medications:     docusate sodium (COLACE) capsule 100 mg, 100 mg, Oral, BID, Ankit Bender MD, 100 mg at 12/02/22 2136    aspirin chewable tablet 81 mg, 81 mg, Oral, Daily, Ankit Bender MD, 81 mg at 12/02/22 0813    atorvastatin (LIPITOR) tablet 40 mg, 40 mg, Oral, Nightly, Ankit Bender MD, 40 mg at 12/02/22 2136    dulaglutide (TRULICITY) SC injection 1.5 mg (Patient Supplied), 1.5 mg, SubCUTAneous, Weekly, Ankit Bender MD    folic acid (FOLVITE) tablet 1 mg, 1 mg, Oral, Daily, Ankit Bender MD, 1 mg at 12/02/22 0813    gabapentin (NEURONTIN) capsule 300 mg, 300 mg, Oral, Nightly, Ankit Bender MD, 300 mg at 12/02/22 2136    hydroxychloroquine (PLAQUENIL) tablet 200 mg, 200 mg, Oral, BID, Ankit Bender MD, 200 mg at 12/02/22 2136    amLODIPine (NORVASC) tablet 2.5 mg, 2.5 mg, Oral, Daily, Ankit Bender MD, 2.5 mg at 12/02/22 0813    tiZANidine (ZANAFLEX) tablet 4 mg, 4 mg, Oral, BID PRN, Ankit Bender MD    multivitamin 1 tablet, 1 tablet, Oral, Daily, Ankit Bender MD, 1 tablet at 12/02/22 0813    acetaminophen (TYLENOL) tablet 650 mg, 650 mg, Oral, Q4H PRN, Ankit Bender MD, 650 mg at 11/27/22 1933    enoxaparin (LOVENOX) injection 40 mg, 40 mg, SubCUTAneous, Daily, Ankit Bender MD, 40 mg at 12/02/22 1714    polyethylene glycol (GLYCOLAX) packet 17 g, 17 g, Oral, Daily PRN, Ankit Bender MD, 17 g at 11/30/22 1817    Allergies:  Methotrexate derivatives    Social History:   TOBACCO:   reports that she quit smoking about 13 years ago. Her smoking use included cigarettes. She has a 64.00 pack-year smoking history.  She has never used smokeless tobacco.  ETOH:   reports current alcohol use. Family History:       Problem Relation Age of Onset    Prostate Cancer Father     Heart Failure Father     Cancer Father     Colon Cancer Neg Hx     Colon Polyps Neg Hx     Liver Cancer Neg Hx     Liver Disease Neg Hx     Esophageal Cancer Neg Hx     Rectal Cancer Neg Hx     Stomach Cancer Neg Hx          PHYSICAL EXAM:  /63   Pulse 56   Temp (!) 95.9 °F (35.5 °C) (Temporal)   Resp 16   Ht 5' 7\" (1.702 m)   Wt 203 lb (92.1 kg)   SpO2 94%   BMI 31.79 kg/m²     Constitutional - well developed, well nourished. Eyes - conjunctiva normal.   Ear, nose, throat - No scars, masses, or lesions over external nose or ears, no atrophy of tongue  Neck-symmetric, no masses noted, no jugular vein distension  Respiration- chest wall appears symmetric, good expansion,   normal effort without use of accessory muscles  Musculoskeletal - no significant wasting of muscles noted, no bony deformities  Extremities-no clubbing, cyanosis or edema  Skin - warm, dry, and intact. No rash, erythema, or pallor. Psychiatric - mood, affect, and behavior appear normal.      Neurological exam  Awake, alert, global aphasia says \"yes\" to all questions asked   Attention and concentration appear appropriate  Recent and remote memory unable to tests     Cranial Nerve Exam     CN III, IV,VI-EOMI, No nystagmus, conjugate eye movements, no ptosis    CN VII-+ facial assymetry       Motor Exam  No movement on the right      Tremors- no tremors in hands or head noted     Gait  Not tested     Nursing/pcp notes, imaging,labs and vitals reviewed.      PT,OT and/or speech notes reviewed    Lab Results   Component Value Date    WBC 7.0 12/01/2022    HGB 11.6 (L) 12/01/2022    HCT 38.4 12/01/2022    MCV 79.8 (L) 12/01/2022     (H) 12/01/2022     Lab Results   Component Value Date     12/01/2022    K 4.7 12/01/2022     12/01/2022    CO2 24 12/01/2022    BUN 13 12/01/2022    CREATININE 0.6 12/01/2022    GLUCOSE 77 12/01/2022    CALCIUM 9.4 12/01/2022    PROT 5.1 (L) 12/01/2022    LABALBU 3.2 (L) 12/01/2022    BILITOT <0.2 12/01/2022    ALKPHOS 88 12/01/2022    AST 39 (H) 12/01/2022    ALT 32 12/01/2022    LABGLOM >60 12/01/2022   No results found for: INR, PROTIME  Albaro Whelan   Student Physical Therapist   Physical Therapy   Progress Notes       Cosign Needed   Date of Service:  12/1/2022  3:57 PM                 Cosign Needed                         12/01/22 1300   Restrictions/Precautions   Restrictions/Precautions Modified Diet;Swallowing - Thickened Liquids; Fall Risk;Aspiration Risk;Weight Bearing   Lower Extremity Weight Bearing Restrictions   Right Lower Extremity Weight Bearing Weight Bearing As Tolerated   Left Lower Extremity Weight Bearing Weight Bearing As Tolerated   General   Additional Pertinent Hx Anxiety, depression, COPD, CVA, DM, LE edema, hyperlipidemia, HTN, obesity, RA, CAD and venous thromboembolism   Diagnosis CVA, R hemiparesis   General Comment   Comments PATIENT SITTING IN W/C, IN ROOM, WITH DAUGHTER   Wheelchair Activities   Propulsion Yes   Propulsion 1   Propulsion Manual   Level Level Tile   Method LUE;LLE   Level of Assistance Moderate assistance;Supervision   Description/ Details THERAPIST INITIALLY PROVIDING MOD A AT FOOT OF LLE FOR FIRST 15', AFTERWARDS THERAPIST RELEASED AND PATIENT CONTINUED. ATTEMPT PROPULSION WITH TWO TURNS OR ' NEXT. Distance 48'   PT Exercises   Exercise Treatment SEATED EXERCISES: PF 20X, DF 20X; MARCHES 15X; HIP EXT 10X; SAQ 15X; QS 10X; HIP ABD 10X; HIP ADD 10X; HS CURL 10X  (RLE = PROM; LLE = A/AROM; MANUAL RESISTANCE APPLIED IF APPROPRIATE OR DOES NOT HINDER ROM.)   Assessment   Assessment PATIENT APPEARED EXCITED FOR THERAPY AND EXERCISES. DAUGHTER STATES PATIENT HAD ANOTHER SUCCESSFUL BOWEL MOVEMENT AND IS IN A GOOD MOOD. DAUGHTER VOICES CONCERN THAT PATIENT HAS ATTEMPTED TWICE TO USE BATHROOM WITH NURSE STAFF OR ASSISTANCE.  SEATED EXERCISES WERE SUCCESSFUL AND ABLE TO APPLY MANUAL RESISTANCE TO MORE EXERCISES. MANUAL RESISTANCE REMOVED IF ROM DECREASES. PATIENT W/C PROPULSION CONTINUES TO IMPROVE (SEE PROPULSION FOR DETAILS)   Safety Devices   Type of Devices Left in chair;Call light within reach;Gait belt  (DAUGHTER IN ROOM WITH PATIENT)   PT Individual Minutes   Time In 1300   Time Out 1345   Minutes 45                    RECORD REVIEW: Previous medical records, medications were reviewed at today's visit    IMPRESSION:   1. Acute ischemic stroke-on aspirin/statin  2. Hypertension-on medications monitor  3. Hyperlipidemia-on statin  4. Diabetes-Trulicity-monitor blood sugar  5. DVT prophylaxis-Lovenox  6. History of coronary artery disease-aspirin/statin  7. Neuropathy-on Neurontin  8. Rheumatoid arthritis-on Plaquenil  9. COPD-monitor  10. History of anxiety and depression-monitor  11. History of colitis/gallstones-monitor  12. History of bariatric surgery-on multivitamin/folic acid  13. History of superior vena cava filter placement with venous thromboembolism-not on anticoagulation-monitor- indicates took herself off blood thinners as not like meds and was bruising   14.   PT/OT/speech    Continue current care as noted    x-ray of right ankle no acute changes  Venous ultrasound of leg no DVT      ELOS December pending 4 weeks    Expected duration and frequency therapy: 180 minutes per day, 5 days per week    [facility] [place]  [phone] CELL  Dr Chico Porter

## 2022-12-04 PROCEDURE — 1180000000 HC REHAB R&B

## 2022-12-04 PROCEDURE — 99232 SBSQ HOSP IP/OBS MODERATE 35: CPT | Performed by: PSYCHIATRY & NEUROLOGY

## 2022-12-04 PROCEDURE — 6370000000 HC RX 637 (ALT 250 FOR IP): Performed by: PSYCHIATRY & NEUROLOGY

## 2022-12-04 PROCEDURE — 6360000002 HC RX W HCPCS: Performed by: PSYCHIATRY & NEUROLOGY

## 2022-12-04 RX ADMIN — HYDROXYCHLOROQUINE SULFATE 200 MG: 200 TABLET, FILM COATED ORAL at 08:38

## 2022-12-04 RX ADMIN — FOLIC ACID 1 MG: 1 TABLET ORAL at 08:38

## 2022-12-04 RX ADMIN — AMLODIPINE BESYLATE 2.5 MG: 2.5 TABLET ORAL at 08:38

## 2022-12-04 RX ADMIN — ATORVASTATIN CALCIUM 40 MG: 40 TABLET, FILM COATED ORAL at 20:54

## 2022-12-04 RX ADMIN — DOCUSATE SODIUM 100 MG: 100 CAPSULE, LIQUID FILLED ORAL at 20:54

## 2022-12-04 RX ADMIN — THERA TABS 1 TABLET: TAB at 08:38

## 2022-12-04 RX ADMIN — HYDROXYCHLOROQUINE SULFATE 200 MG: 200 TABLET, FILM COATED ORAL at 20:54

## 2022-12-04 RX ADMIN — DOCUSATE SODIUM 100 MG: 100 CAPSULE, LIQUID FILLED ORAL at 08:38

## 2022-12-04 RX ADMIN — MICONAZOLE NITRATE: 2 POWDER TOPICAL at 20:54

## 2022-12-04 RX ADMIN — MICONAZOLE NITRATE: 2 POWDER TOPICAL at 08:41

## 2022-12-04 RX ADMIN — ASPIRIN 81 MG 81 MG: 81 TABLET ORAL at 08:38

## 2022-12-04 RX ADMIN — ENOXAPARIN SODIUM 40 MG: 100 INJECTION SUBCUTANEOUS at 16:39

## 2022-12-04 RX ADMIN — GABAPENTIN 300 MG: 300 CAPSULE ORAL at 20:54

## 2022-12-04 NOTE — PLAN OF CARE
Problem: Discharge Planning  Goal: Discharge to home or other facility with appropriate resources  12/3/2022 2313 by Drake Toscano RN  Outcome: Progressing  12/3/2022 1158 by Sepideh Galvin RN  Outcome: Progressing  Flowsheets (Taken 12/3/2022 9337)  Discharge to home or other facility with appropriate resources: Refer to discharge planning if patient needs post-hospital services based on physician order or complex needs related to functional status, cognitive ability or social support system     Problem: Safety - Adult  Goal: Free from fall injury  12/3/2022 2313 by Drake Toscano RN  Outcome: Progressing  Flowsheets (Taken 12/3/2022 2311)  Free From Fall Injury: Instruct family/caregiver on patient safety  12/3/2022 1158 by Sepideh Galvin RN  Outcome: Progressing     Problem: Neurosensory - Adult  Goal: Achieves stable or improved neurological status  12/3/2022 2313 by Drake Toscano RN  Outcome: Progressing  12/3/2022 1158 by Sepideh Galvin RN  Outcome: Progressing  Goal: Achieves maximal functionality and self care  12/3/2022 2313 by Drake Toscano RN  Outcome: Progressing  12/3/2022 1158 by Sepideh Galvin RN  Outcome: Progressing     Problem: Skin/Tissue Integrity - Adult  Goal: Skin integrity remains intact  12/3/2022 2313 by Drake Toscano RN  Outcome: Progressing  Flowsheets (Taken 12/3/2022 2311)  Skin Integrity Remains Intact: Monitor for areas of redness and/or skin breakdown  12/3/2022 1158 by Sepideh Galvin RN  Outcome: Progressing  Flowsheets  Taken 12/3/2022 1157  Skin Integrity Remains Intact: Monitor for areas of redness and/or skin breakdown  Taken 12/3/2022 0855  Skin Integrity Remains Intact: Monitor for areas of redness and/or skin breakdown

## 2022-12-04 NOTE — PLAN OF CARE
Problem: Safety - Adult  Goal: Free from fall injury  12/4/2022 1049 by Tigist Watkins RN  Outcome: Progressing  12/3/2022 2313 by Edith Marcelo RN  Outcome: Progressing  Flowsheets (Taken 12/3/2022 2311)  Free From Fall Injury: Instruct family/caregiver on patient safety   Up with 2 assist with pebbles moody

## 2022-12-04 NOTE — PROGRESS NOTES
Patient Education        Headache: Care Instructions  Your Care Instructions    Headaches have many possible causes. Most headaches aren't a sign of a more serious problem, and they will get better on their own. Home treatment may help you feel better faster. The doctor has checked you carefully, but problems can develop later. If you notice any problems or new symptoms, get medical treatment right away. Follow-up care is a key part of your treatment and safety. Be sure to make and go to all appointments, and call your doctor if you are having problems. It's also a good idea to know your test results and keep a list of the medicines you take. How can you care for yourself at home? · Do not drive if you have taken a prescription pain medicine. · Rest in a quiet, dark room until your headache is gone. Close your eyes and try to relax or go to sleep. Don't watch TV or read. · Put a cold, moist cloth or cold pack on the painful area for 10 to 20 minutes at a time. Put a thin cloth between the cold pack and your skin. · Use a warm, moist towel or a heating pad set on low to relax tight shoulder and neck muscles. · Have someone gently massage your neck and shoulders. · Take pain medicines exactly as directed. ? If the doctor gave you a prescription medicine for pain, take it as prescribed. ? If you are not taking a prescription pain medicine, ask your doctor if you can take an over-the-counter medicine. · Be careful not to take pain medicine more often than the instructions allow, because you may get worse or more frequent headaches when the medicine wears off. · Do not ignore new symptoms that occur with a headache, such as a fever, weakness or numbness, vision changes, or confusion. These may be signs of a more serious problem. To prevent headaches  · Keep a headache diary so you can figure out what triggers your headaches. Avoiding triggers may help you prevent headaches.  Record when each headache began, Patient:   Rashad Russell  MR#:    [id number]   Room:    0826/826-02   YOB: 1960  Date of Progress Note: 12/4/2022  Time of Note                           9:07 AM  Consulting Physician:   Narendra Thomas M.D. Attending Physician:  Narendra Thomas MD     Chief complaint Acute ischemic stroke    S:This 64 y.o. female  with history of anxiety, depression, COPD, atherosclerotic disease, colitis, COPD, CVA, DM,  LE edema, hyperlipidemia, HTN, morbid obesity, RA, CAD  and venous thromboembolism. She presented to [facility] ER on 11/9/22 after being found at home lying facedown in her feces. She was very weak, confused, not able to speak but moving purposefully and intermittently following commands. Her last well know was 3 a.m. when her  left for work. Imaging done revealed a large MCA stroke 7.7 x 5 cm. CTA head/neck revealed an acute M1 occlusion. She was outside of the window for TPA. CK on admit was 1100, consistent with rhabdomylosis. She was also noted to possibly Dens fracture. She was transferred to [facility] for a possible thrombectomy. Further imaging was done at [facility] and she was deemed not a candidate for thrombectomy. MRI done ruled out Dens fracture. Repeat CT of head on 11/11/22 showed evolving left MCA territory infarct with increasing edema/mass effect resulting in 4mm of  rightward midline shift(previously 2mm) a the level of the third ventricle. No hemorrhagic conversion or definite trapping of the right lateral ventricle, possible small acute right cerebellar infarct. Neurosurgery was consulted for possible hemicraniectomy. She was seen by Dr. Arlin Perez, who didn't feel any surgical intervention was needed. Patient was found to have a UTI + for [facility] and was placed on Rocephin on 11/14/22. Patient had a PICC line placed. Patient was evaluated by SPT and initially made NPO d/t oropharyngeal dysphagia. NGT placed and feeding started. She was also noted to have global aphasia but is improving. She was re-evaluated by SPT on 11/15/22 for swallow and was started on a dysphagia 1 diet with nectar thick liquids. Patient also continues to have right sided weakness and is participating in both PT/OT. Repeat CT head on 11/13/22 shows stable LMCA ischemic stroke with stable edema, 5 mm shift, no hemorrhage. She is felt to need a stay on Rehab for continued PT/OT/SPT and medical management to work towards her goal of returning home with her . She is now felt ready to start the Rehab program.  No new issues overnight. REVIEW OF SYSTEMS:  Unreliable due to aphasia     Past Medical History:      Diagnosis Date    Anxiety     ASCVD (arteriosclerotic cardiovascular disease)     Carrier of group B Streptococcus     Cellulitis     Colitis     COPD (chronic obstructive pulmonary disease) ([facility])     Costochondritis     CVA (cerebral vascular accident) ([address]     Depression     Edema     Fracture of sternum     non union post surgery.      Gallstones     Grief reaction     H/O superior vena cava filter placement     Hyperlipidemia     Hypertension     MI (myocardial infarction) ([address]     MRSA (methicillin resistant Staphylococcus aureus)     Neuropathy     Obesity     Pseudarthrosis sternal    RA (rheumatoid arthritis) ([address]     Rosacea     Sinus bradycardia     TIA (transient ischemic attack)     Venous thromboembolism        Past Surgical History:      Procedure Laterality Date    ADENOIDECTOMY      APPENDECTOMY      CARDIAC CATHETERIZATION  3/2009    CARDIAC CATHETERIZATION  11/5/14  JDT    EF 50%, patent bypass grafts    CHOLECYSTECTOMY  2011    COLONOSCOPY  06/03/2010    Dr. Iris Damon: distal colon having ulcerative lesions c/w UC    COLONOSCOPY  2009    pancolitis    COLONOSCOPY      CORONARY ARTERY BYPASS GRAFT  3/2009    Dr Tad Chang, LIMA to LAD, SVGs to OM & PDA    GASTRIC BYPASS SURGERY  2012    HIATAL HERNIA REPAIR  2011    HYSTERECTOMY (CERVIX STATUS UNKNOWN)      KNEE SURGERY      AK COLONOSCOPY FLX DX how long it lasted, and what the pain was like (throbbing, aching, stabbing, or dull). Write down any other symptoms you had with the headache, such as nausea, flashing lights or dark spots, or sensitivity to bright light or loud noise. Note if the headache occurred near your period. List anything that might have triggered the headache, such as certain foods (chocolate, cheese, wine) or odors, smoke, bright light, stress, or lack of sleep. · Find healthy ways to deal with stress. Headaches are most common during or right after stressful times. Take time to relax before and after you do something that has caused a headache in the past.  · Try to keep your muscles relaxed by keeping good posture. Check your jaw, face, neck, and shoulder muscles for tension, and try relaxing them. When sitting at a desk, change positions often, and stretch for 30 seconds each hour. · Get plenty of sleep and exercise. · Eat regularly and well. Long periods without food can trigger a headache. · Treat yourself to a massage. Some people find that regular massages are very helpful in relieving tension. · Limit caffeine by not drinking too much coffee, tea, or soda. But don't quit caffeine suddenly, because that can also give you headaches. · Reduce eyestrain from computers by blinking frequently and looking away from the computer screen every so often. Make sure you have proper eyewear and that your monitor is set up properly, about an arm's length away. · Seek help if you have depression or anxiety. Your headaches may be linked to these conditions. Treatment can both prevent headaches and help with symptoms of anxiety or depression. When should you call for help? Call 911 anytime you think you may need emergency care. For example, call if:    · You have signs of a stroke. These may include:  ? Sudden numbness, paralysis, or weakness in your face, arm, or leg, especially on only one side of your body.   ? Sudden vision W/COLLJ SPEC WHEN PFRMD N/A 3/12/2018    Dr Sammie Ruiz rectal inflammation consistent with U.C.-Active proctitis--3 yr recall    Zachariah Patiño ENDOSCOPY  2010    Dr. Yocasta Rodriguez  2012       Medications in Hospital:      Current Facility-Administered Medications:     miconazole (MICOTIN) 2 % powder, , Topical, BID, Chantal Jurado MD, Given at 12/04/22 0841    docusate sodium (COLACE) capsule 100 mg, 100 mg, Oral, BID, Chantal Jurado MD, 100 mg at 12/04/22 1113    aspirin chewable tablet 81 mg, 81 mg, Oral, Daily, Chantal Jurado MD, 81 mg at 12/04/22 1129    atorvastatin (LIPITOR) tablet 40 mg, 40 mg, Oral, Nightly, Chantal Jurado MD, 40 mg at 12/03/22 2016    dulaglutide (TRULICITY) SC injection 1.5 mg (Patient Supplied), 1.5 mg, SubCUTAneous, Weekly, Chantal Jurado MD    folic acid (FOLVITE) tablet 1 mg, 1 mg, Oral, Daily, Chantal Jurado MD, 1 mg at 12/04/22 4721    gabapentin (NEURONTIN) capsule 300 mg, 300 mg, Oral, Nightly, Chantal Jurado MD, 300 mg at 12/03/22 2016    hydroxychloroquine (PLAQUENIL) tablet 200 mg, 200 mg, Oral, BID, Chantal Jurado MD, 200 mg at 12/04/22 0838    amLODIPine (NORVASC) tablet 2.5 mg, 2.5 mg, Oral, Daily, Chantal Jurado MD, 2.5 mg at 12/04/22 8082    tiZANidine (ZANAFLEX) tablet 4 mg, 4 mg, Oral, BID PRN, Chantal Jurado MD    multivitamin 1 tablet, 1 tablet, Oral, Daily, Chantal Jurado MD, 1 tablet at 12/04/22 5668    acetaminophen (TYLENOL) tablet 650 mg, 650 mg, Oral, Q4H PRN, Chantal Jurado MD, 650 mg at 11/27/22 1933    enoxaparin (LOVENOX) injection 40 mg, 40 mg, SubCUTAneous, Daily, Chantal Jurado MD, 40 mg at 12/03/22 1707    polyethylene glycol (GLYCOLAX) packet 17 g, 17 g, Oral, Daily PRN, Chantal Jurado MD, 17 g at 11/30/22 1817    Allergies:  Methotrexate derivatives    Social History:   TOBACCO:   reports that she quit smoking about 13 years ago. Her smoking use included cigarettes.  She has changes. ? Sudden trouble speaking. ? Sudden confusion or trouble understanding simple statements. ? Sudden problems with walking or balance. ? A sudden, severe headache that is different from past headaches.    Call your doctor now or seek immediate medical care if:    · You have a new or worse headache.     · Your headache gets much worse. Where can you learn more? Go to http://britni-hernandez.info/. Enter M271 in the search box to learn more about \"Headache: Care Instructions. \"  Current as of: Marisa 3, 2018  Content Version: 11.9  © 8830-8692 [employer]. Care instructions adapted under license by [employer] (which disclaims liability or warranty for this information). If you have questions about a medical condition or this instruction, always ask your healthcare professional. [place] [address] any warranty or liability for your use of this information. Patient Education        Numbness and Tingling: Care Instructions  Your Care Instructions    Many things can cause numbness or tingling. Swelling may put pressure on a nerve. This could cause you to lose feeling or have a pins-and-needles sensation on part of your body. Nerves may be damaged from trauma, toxins, or diseases, such as diabetes or multiple sclerosis (MS). Sometimes, though, the cause is not clear. If there is no clear reason for your symptoms, and you are not having any other symptoms, your doctor may suggest watching and waiting for a while to see if the numbness or tingling goes away on its own. Your doctor may want you to have blood or nerve tests to find the cause of your symptoms. Follow-up care is a key part of your treatment and safety. Be sure to make and go to all appointments, and call your doctor if you are having problems. It's also a good idea to know your test results and keep a list of the medicines you take. How can you care for yourself at home?   · If your a 64.00 pack-year smoking history. She has never used smokeless tobacco.  ETOH:   reports current alcohol use. Family History:       Problem Relation Age of Onset    Prostate Cancer Father     Heart Failure Father     Cancer Father     Colon Cancer Neg Hx     Colon Polyps Neg Hx     Liver Cancer Neg Hx     Liver Disease Neg Hx     Esophageal Cancer Neg Hx     Rectal Cancer Neg Hx     Stomach Cancer Neg Hx          PHYSICAL EXAM:  BP (!) 150/75   Pulse 59   Temp (!) 96.6 °F (35.9 °C) (Temporal)   Resp 20   Ht 5' 7\" (1.702 m)   Wt 203 lb (92.1 kg)   SpO2 100%   BMI 31.79 kg/m²     Constitutional - well developed, well nourished. Eyes - conjunctiva normal.   Ear, nose, throat - No scars, masses, or lesions over external nose or ears, no atrophy of tongue  Neck-symmetric, no masses noted, no jugular vein distension  Respiration- chest wall appears symmetric, good expansion,   normal effort without use of accessory muscles  Musculoskeletal - no significant wasting of muscles noted, no bony deformities  Extremities-no clubbing, cyanosis or edema  Skin - warm, dry, and intact. No rash, erythema, or pallor. Psychiatric - mood, affect, and behavior appear normal.      Neurological exam  Awake, alert, global aphasia says \"yes\" to all questions asked   Attention and concentration appear appropriate  Recent and remote memory unable to tests     Cranial Nerve Exam     CN III, IV,VI-EOMI, No nystagmus, conjugate eye movements, no ptosis    CN VII-+ facial assymetry       Motor Exam  No movement on the right      Tremors- no tremors in hands or head noted     Gait  Not tested     Nursing/pcp notes, imaging,labs and vitals reviewed.      PT,OT and/or speech notes reviewed    Lab Results   Component Value Date    WBC 7.0 12/01/2022    HGB 11.6 (L) 12/01/2022    HCT 38.4 12/01/2022    MCV 79.8 (L) 12/01/2022     (H) 12/01/2022     Lab Results   Component Value Date     12/01/2022    K 4.7 12/01/2022     doctor prescribes medicine, take it exactly as directed. Call your doctor if you think you are having a problem with your medicine. · If you have any swelling, put ice or a cold pack on the area for 10 to 20 minutes at a time. Put a thin cloth between the ice and your skin. When should you call for help? Call 911 anytime you think you may need emergency care. For example, call if:    · You have weakness, numbness, or tingling in both legs.     · You lose bowel or bladder control.     · You have symptoms of a stroke. These may include:  ? Sudden numbness, tingling, weakness, or loss of movement in your face, arm, or leg, especially on only one side of your body. ? Sudden vision changes. ? Sudden trouble speaking. ? Sudden confusion or trouble understanding simple statements. ? Sudden problems with walking or balance. ? A sudden, severe headache that is different from past headaches.    Watch closely for changes in your health, and be sure to contact your doctor if you have any problems, or if:    · You do not get better as expected. Where can you learn more? Go to http://britni-hernandez.info/. Enter J031 in the search box to learn more about \"Numbness and Tingling: Care Instructions. \"  Current as of: Marisa 3, 2018  Content Version: 11.9  © 2888-5535 [employer]. Care instructions adapted under license by [employer] (which disclaims liability or warranty for this information). If you have questions about a medical condition or this instruction, always ask your healthcare professional. [place] [address] any warranty or liability for your use of this information. Patient Education        Threatened Miscarriage: Care Instructions  Your Care Instructions    Some women have light spotting or bleeding during the first 12 weeks of pregnancy.  In some cases this is normal. Light spotting or bleeding can also be a sign of a possible loss of the 12/01/2022    CO2 24 12/01/2022    BUN 13 12/01/2022    CREATININE 0.6 12/01/2022    GLUCOSE 77 12/01/2022    CALCIUM 9.4 12/01/2022    PROT 5.1 (L) 12/01/2022    LABALBU 3.2 (L) 12/01/2022    BILITOT <0.2 12/01/2022    ALKPHOS 88 12/01/2022    AST 39 (H) 12/01/2022    ALT 32 12/01/2022    LABGLOM >60 12/01/2022   No results found for: INR, PROTIME  Harper Foster   Student Physical Therapist   Physical Therapy   Progress Notes       Cosign Needed   Date of Service:  12/1/2022  3:57 PM                 Cosign Needed                         12/01/22 1300   Restrictions/Precautions   Restrictions/Precautions Modified Diet;Swallowing - Thickened Liquids; Fall Risk;Aspiration Risk;Weight Bearing   Lower Extremity Weight Bearing Restrictions   Right Lower Extremity Weight Bearing Weight Bearing As Tolerated   Left Lower Extremity Weight Bearing Weight Bearing As Tolerated   General   Additional Pertinent Hx Anxiety, depression, COPD, CVA, DM, LE edema, hyperlipidemia, HTN, obesity, RA, CAD and venous thromboembolism   Diagnosis CVA, R hemiparesis   General Comment   Comments PATIENT SITTING IN W/C, IN ROOM, WITH DAUGHTER   Wheelchair Activities   Propulsion Yes   Propulsion 1   Propulsion Manual   Level Level Tile   Method LUE;LLE   Level of Assistance Moderate assistance;Supervision   Description/ Details THERAPIST INITIALLY PROVIDING MOD A AT FOOT OF LLE FOR FIRST 15', AFTERWARDS THERAPIST RELEASED AND PATIENT CONTINUED. ATTEMPT PROPULSION WITH TWO TURNS OR ' NEXT. Distance 48'   PT Exercises   Exercise Treatment SEATED EXERCISES: PF 20X, DF 20X; MARCHES 15X; HIP EXT 10X; SAQ 15X; QS 10X; HIP ABD 10X; HIP ADD 10X; HS CURL 10X  (RLE = PROM; LLE = A/AROM; MANUAL RESISTANCE APPLIED IF APPROPRIATE OR DOES NOT HINDER ROM.)   Assessment   Assessment PATIENT APPEARED EXCITED FOR THERAPY AND EXERCISES. DAUGHTER STATES PATIENT HAD ANOTHER SUCCESSFUL BOWEL MOVEMENT AND IS IN A GOOD MOOD.  DAUGHTER VOICES CONCERN THAT pregnancy. This is called a threatened miscarriage. At this point, the doctor may not be able to tell if your vaginal bleeding is normal or is a sign of a miscarriage. In early pregnancy, things such as stress, exercise, and sex do not cause miscarriage. You may be worried or upset about the possibility of losing your pregnancy. But do not blame yourself. There is no treatment to stop a threatened miscarriage. If you do have a miscarriage, there was nothing you could have done to prevent it. A miscarriage usually means that the pregnancy is not developing normally. The doctor has checked you carefully, but problems can develop later. If you notice any problems or new symptoms, get medical treatment right away. Follow-up care is a key part of your treatment and safety. Be sure to make and go to all appointments, and call your doctor if you are having problems. It's also a good idea to know your test results and keep a list of the medicines you take. How can you care for yourself at home? · If you do have a miscarriage, you will probably have some vaginal bleeding for 1 to 2 weeks. Use pads instead of tampons. · Take acetaminophen (Tylenol) for cramps. Read and follow all instructions on the label. · Do not take two or more pain medicines at the same time unless the doctor told you to. Many pain medicines have acetaminophen, which is Tylenol. Too much acetaminophen (Tylenol) can be harmful. · Do not have sex until your doctor says it is okay. · Get lots of rest over the next several days. · You may do your normal activities if you feel well enough to do them. But do not do any heavy exercise until your doctor says it is okay. · Eat a balanced diet that is high in iron and vitamin C. Foods rich in iron include red meat, shellfish, eggs, beans, and leafy green vegetables. Foods high in vitamin C include citrus fruits, tomatoes, and broccoli.  Talk to your doctor about whether you need to take iron pills or a PATIENT HAS ATTEMPTED TWICE TO USE BATHROOM WITH NURSE STAFF OR ASSISTANCE. SEATED EXERCISES WERE SUCCESSFUL AND ABLE TO APPLY MANUAL RESISTANCE TO MORE EXERCISES. MANUAL RESISTANCE REMOVED IF ROM DECREASES. PATIENT W/C PROPULSION CONTINUES TO IMPROVE (SEE PROPULSION FOR DETAILS)   Safety Devices   Type of Devices Left in chair;Call light within reach;Gait belt  (DAUGHTER IN ROOM WITH PATIENT)   PT Individual Minutes   Time In 1300   Time Out 1345   Minutes 45                    RECORD REVIEW: Previous medical records, medications were reviewed at today's visit    IMPRESSION:   1. Acute ischemic stroke-on aspirin/statin  2. Hypertension-on medications monitor  3. Hyperlipidemia-on statin  4. Diabetes-Trulicity-monitor blood sugar  5. DVT prophylaxis-Lovenox  6. History of coronary artery disease-aspirin/statin  7. Neuropathy-on Neurontin  8. Rheumatoid arthritis-on Plaquenil  9. COPD-monitor  10. History of anxiety and depression-monitor  11. History of colitis/gallstones-monitor  12. History of bariatric surgery-on multivitamin/folic acid  13. History of superior vena cava filter placement with venous thromboembolism-not on anticoagulation-monitor- indicates took herself off blood thinners as not like meds and was bruising   14.   PT/OT/speech    Continue present care as noted    x-ray of right ankle no acute changes  Venous ultrasound of leg no DVT      ELOS December pending 4 weeks    Expected duration and frequency therapy: 180 minutes per day, 5 days per week    Taylor Mei  [phone] CELL  Dr Jaycob Esposito multivitamin. · Do not drink alcohol or use tobacco or illegal drugs. · Do not smoke. If you need help quitting, talk to your doctor about stop-smoking programs and medicines. These can increase your chances of quitting for good. When should you call for help? Call 911 anytime you think you may need emergency care. For example, call if:    · You passed out (lost consciousness).    Call your doctor now or seek immediate medical care if:    · You have severe vaginal bleeding.     · You are dizzy or lightheaded, or you feel like you may faint.     · You have new or worse pain in your belly or pelvis.     · You have a fever.     · You have vaginal discharge that smells bad.    Watch closely for changes in your health, and be sure to contact your doctor if:    · You do not get better as expected. Where can you learn more? Go to http://britni-hernandez.info/. Enter A444 in the search box to learn more about \"Threatened Miscarriage: Care Instructions. \"  Current as of: September 5, 2018  Content Version: 11.9  © 3825-6516 [employer]. Care instructions adapted under license by [employer] (which disclaims liability or warranty for this information). If you have questions about a medical condition or this instruction, always ask your healthcare professional. [address] any warranty or liability for your use of this information.

## 2022-12-05 LAB
ALBUMIN SERPL-MCNC: 2.9 G/DL (ref 3.5–5.2)
ALP BLD-CCNC: 84 U/L (ref 35–104)
ALT SERPL-CCNC: 25 U/L (ref 5–33)
ANION GAP SERPL CALCULATED.3IONS-SCNC: 9 MMOL/L (ref 7–19)
AST SERPL-CCNC: 29 U/L (ref 5–32)
BASOPHILS ABSOLUTE: 0.1 K/UL (ref 0–0.2)
BASOPHILS RELATIVE PERCENT: 1.1 % (ref 0–1)
BILIRUB SERPL-MCNC: <0.2 MG/DL (ref 0.2–1.2)
BUN BLDV-MCNC: 9 MG/DL (ref 8–23)
CALCIUM SERPL-MCNC: 9 MG/DL (ref 8.8–10.2)
CHLORIDE BLD-SCNC: 106 MMOL/L (ref 98–111)
CO2: 25 MMOL/L (ref 22–29)
CREAT SERPL-MCNC: 0.5 MG/DL (ref 0.5–0.9)
EOSINOPHILS ABSOLUTE: 0.9 K/UL (ref 0–0.6)
EOSINOPHILS RELATIVE PERCENT: 13.7 % (ref 0–5)
GFR SERPL CREATININE-BSD FRML MDRD: >60 ML/MIN/{1.73_M2}
GLUCOSE BLD-MCNC: 78 MG/DL (ref 74–109)
HCT VFR BLD CALC: 39.1 % (ref 37–47)
HEMOGLOBIN: 11.7 G/DL (ref 12–16)
IMMATURE GRANULOCYTES #: 0 K/UL
LYMPHOCYTES ABSOLUTE: 1.8 K/UL (ref 1.1–4.5)
LYMPHOCYTES RELATIVE PERCENT: 28.4 % (ref 20–40)
MCH RBC QN AUTO: 24 PG (ref 27–31)
MCHC RBC AUTO-ENTMCNC: 29.9 G/DL (ref 33–37)
MCV RBC AUTO: 80.1 FL (ref 81–99)
MONOCYTES ABSOLUTE: 0.7 K/UL (ref 0–0.9)
MONOCYTES RELATIVE PERCENT: 10.4 % (ref 0–10)
NEUTROPHILS ABSOLUTE: 2.9 K/UL (ref 1.5–7.5)
NEUTROPHILS RELATIVE PERCENT: 45.9 % (ref 50–65)
PDW BLD-RTO: 17.4 % (ref 11.5–14.5)
PLATELET # BLD: 335 K/UL (ref 130–400)
PMV BLD AUTO: 9.8 FL (ref 9.4–12.3)
POTASSIUM REFLEX MAGNESIUM: 4 MMOL/L (ref 3.5–5)
RBC # BLD: 4.88 M/UL (ref 4.2–5.4)
SODIUM BLD-SCNC: 140 MMOL/L (ref 136–145)
TOTAL PROTEIN: 5.4 G/DL (ref 6.6–8.7)
WBC # BLD: 6.3 K/UL (ref 4.8–10.8)

## 2022-12-05 PROCEDURE — 6370000000 HC RX 637 (ALT 250 FOR IP): Performed by: PSYCHIATRY & NEUROLOGY

## 2022-12-05 PROCEDURE — 80053 COMPREHEN METABOLIC PANEL: CPT

## 2022-12-05 PROCEDURE — 97116 GAIT TRAINING THERAPY: CPT

## 2022-12-05 PROCEDURE — 97110 THERAPEUTIC EXERCISES: CPT

## 2022-12-05 PROCEDURE — 6360000002 HC RX W HCPCS: Performed by: PSYCHIATRY & NEUROLOGY

## 2022-12-05 PROCEDURE — 97530 THERAPEUTIC ACTIVITIES: CPT

## 2022-12-05 PROCEDURE — 36415 COLL VENOUS BLD VENIPUNCTURE: CPT

## 2022-12-05 PROCEDURE — 1180000000 HC REHAB R&B

## 2022-12-05 PROCEDURE — 92507 TX SP LANG VOICE COMM INDIV: CPT

## 2022-12-05 PROCEDURE — 97535 SELF CARE MNGMENT TRAINING: CPT

## 2022-12-05 PROCEDURE — 85025 COMPLETE CBC W/AUTO DIFF WBC: CPT

## 2022-12-05 PROCEDURE — 99232 SBSQ HOSP IP/OBS MODERATE 35: CPT | Performed by: PSYCHIATRY & NEUROLOGY

## 2022-12-05 PROCEDURE — 92526 ORAL FUNCTION THERAPY: CPT

## 2022-12-05 RX ADMIN — HYDROXYCHLOROQUINE SULFATE 200 MG: 200 TABLET, FILM COATED ORAL at 08:31

## 2022-12-05 RX ADMIN — ASPIRIN 81 MG 81 MG: 81 TABLET ORAL at 08:31

## 2022-12-05 RX ADMIN — FOLIC ACID 1 MG: 1 TABLET ORAL at 08:31

## 2022-12-05 RX ADMIN — AMLODIPINE BESYLATE 2.5 MG: 2.5 TABLET ORAL at 08:31

## 2022-12-05 RX ADMIN — ATORVASTATIN CALCIUM 40 MG: 40 TABLET, FILM COATED ORAL at 21:10

## 2022-12-05 RX ADMIN — DOCUSATE SODIUM 100 MG: 100 CAPSULE, LIQUID FILLED ORAL at 08:31

## 2022-12-05 RX ADMIN — THERA TABS 1 TABLET: TAB at 08:31

## 2022-12-05 RX ADMIN — DOCUSATE SODIUM 100 MG: 100 CAPSULE, LIQUID FILLED ORAL at 21:10

## 2022-12-05 RX ADMIN — MICONAZOLE NITRATE: 2 POWDER TOPICAL at 21:11

## 2022-12-05 RX ADMIN — ENOXAPARIN SODIUM 40 MG: 100 INJECTION SUBCUTANEOUS at 17:30

## 2022-12-05 RX ADMIN — GABAPENTIN 300 MG: 300 CAPSULE ORAL at 21:10

## 2022-12-05 RX ADMIN — HYDROXYCHLOROQUINE SULFATE 200 MG: 200 TABLET, FILM COATED ORAL at 21:11

## 2022-12-05 NOTE — PROGRESS NOTES
[facility]  INPATIENT SPEECH THERAPY  [facility] 8 REHAB UNIT      [x]Daily Note  []Progress Note    Date: 2022  Patient Name: Tru Lee        MRN: [id number]    Account #: [de-identified]  : 1960  (64 y.o.)  Gender: female   Primary Provider: Ankit Bender MD  Diet: Dysphagia soft and bite sized, thin liquids           PATIENT DIAGNOSIS(ES):    Diagnosis: CVA, R hemiparesis     Additional Pertinent Hx: Anxiety, depression, COPD, CVA, DM, LE edema, hyperlipidemia, HTN, obesity, RA, CAD and venous thromboembolism     RESTRICTIONS/PRECAUTIONS:    Restrictions/Precautions  Restrictions/Precautions: Modified Diet, Swallowing - Fall Risk, Aspiration Risk  Required Braces or Orthoses?: No           Subjective:  She was cooperative with all therapy tasks. She was seen for morning and afternoon sessions this date. Objective:  Her affect continues to improve. She is smiling more and is using social greetings. Yes/no questions were at 75% then 100% during her second session. Following one step commands was at 30% without cueing. With a model, she was at 100%. Naming pictures was at 80% with phonemic cues. Body part identification was at 100% with a model. She could not complete the task without a model. Today a simple AAC board was attempted. She was able to identify correct pictures (four per page), with minimal cues at 100%. She was presented with her schedule for the day. She was also provided with a sheet for orientation which included (the day of the week, date, location, room, floor, and city/state). Repetition of 2, 3, 4 and 5 syllable words was completed. Increased slurring was noted with 4 and 5 syllable words. Diadochokinetic tasks were completed. She demonstrated good accuracy and rate. Answering open ended questions was at  20% without cues. Single letter recognition was at 20% this date. During her second session, she was reading two syllable words at 100%.  She was able Interventions: Pharyngeal exercises;Diet tolerance monitoring;Oral care;Oral motor exercises; Patient/Family education; Therapeutic PO trials with SLP     Compensatory Swallowing Strategies  Compensatory Swallowing Strategies : Alternate solids and liquids;Eat/Feed slowly; No straws;Upright as possible for all oral intake;Remain upright for 30-45 minutes after meals;Small bites/sips; Check for pocketing of food on the Right; Check for pocketing of food on the Left; Set up assist;Assist feed              SHORT TERM GOAL #1:  Goal 1: Pt will complete y/n tasks with 80% accuracy and minimal verbal cues/modeling. SHORT TERM GOAL #2:  Goal 2: Pt will complete verbal expression tasks with 80% accuracy and minimal verbal cues/modeling. SHORT TERM GOAL #3:  Goal 3: Pt will complete receptive/expressive language tasks with 80% accuracy and minimal verbal cues/modeling. SHORT TERM GOAL #4:  Goal 4: Pt will follow one step commands with with 90% accuracy and minimal verbal cues/modeling. SHORT TERM GOAL #5:  Goal 5: Pt will complete orientation tasks with 90% accuracy and minimal verbal cues/modeling. Swallowing Short Term Goals  Short-term Goals  Goal 1: Pt will tolerate soft & bite sized consistencies with mildly  (nectar) thick liquids with minimal overt s/s of aspiration/penetration during hospitalization. New Goal: Pt will tolerate soft and bite sized diet with thin liquids with minimal overt s/s of aspiration/penetration during hospitalization. Goal 2: Pt will complete Modified Barium Swallow Study. Goal 3: Pt will demonstrate awareness of general aspiration precautions. Goal 4: Pt will participate in swallowing reassessments to ensure safest diet consistencies. Goal 5: Pt will allow staff to complete daily oral care. Goal 6: Pt will complete oral motor exercises for lingual and labial strengthening.     Long term goals:  Pt will participate in skilled speech therapy services to ensure she is able to communicate her wants and needs. Pt will participate in skilled speech therapy services to ensure she is able to complete ADLs. Pt will tolerate least restrictive diet with minimal overt s/s of aspiration/penetration during hospitalization. STGs Met: 1   LTGs Met: 0    ELOS: 2-3 weeks     ASSESSMENT:  Assessment: [x]Progressing towards goals          []Not Progressing towards goals    Patient Tolerance of Treatment:   [x]Tolerated well []Tolerated fair []Required rest breaks []Fatigued    Education:  Learner:  [x]Patient          []Significant Other          []Other  Education provided regarding:  [x]Goals and POC   []Diet and swallowing precautions    []Home Exercise Program  []Progress and/or discharge information  Method of Education:  [x]Discussion          []Demonstration          []Handout          []Other  Evaluation of Education:   [x]Verbalized understanding   []Demonstrates without assistance  []Demonstrates with assistance  []Needs further instruction     []No evidence of learning                  []No family present      Plan: [x]Continue with current plan of care    []Modify current plan of care as follows:    []Discharge patient    Discharge Location:    Services/Supervision Recommended:      [x]Patient continues to require treatment by a licensed therapist to address functional deficits as outlined in the established plan of care.                Electronically Signed By:  Zabrina Julien M.S., CCC-SLP  12/5/2022,9:25 AM.

## 2022-12-05 NOTE — PROGRESS NOTES
12/05/22 1000   Transfers   Bed to Chair Maximum assistance; Moderate assistance;Minimal assistance  (SEE ASSESSMENT)   Ambulation   Device Parallel Bars   Assistance Moderate assistance;Minimal assistance   Quality of Gait ADVANCED LEFT LE PRIOR TO RIGHT KNEE BEING LOCKED. DESPITE LACK OF TERMINAL EXTENSION RIGHT LE DID NOT COMPLETELY BUCKLE. ATTEMPTED TO ALLOW PATIENT TO STRUGGLE TO ADVANCE RIGHT LE, HOWEVER CONTINUED TO PREMATURELY ADVANCE LEFT   Distance 12 FT   Ambulation 2   Device 2 Parallel Bars   Other Apparatus 2 AFO  (SHOE COVER)   Assistance 2 Moderate assistance   Quality of Gait 2 CONTIUED PREMATURE ADVANCEMENT LEFT LE PRIOR TO RIGHT LE BEING LOCKED IN EXTENSION. THERAPIST HAD DIFFICULTY KEEPING UP WITH SPEED OF PATIENT. MAINTAINED SPEED DESPTIE CUES. Distance 12 FT   Propulsion 1   Method LUE;LLE   Level of Assistance Moderate assistance;Minimal assistance  (VERS RIGHT)   Distance 150 FT   PT Exercises   Exercise Treatment STAND STEP TF TRAINING X6   Assessment   Assessment ATTEMPTED INSTRUCTION IN PARTIAL STAND STEP TF TO LEFT. PATIENT CONTINUED TO ASSUME SQUAT PIVOT POSITION, PLACING HAND ON FAR SURFACE RATHER THAN PROXIMAL ARM REST. ALLOWED PATIENT TO PERFORM TECHNIQUE SHE FELT MOST COMFORTABLE. PATIENT WOULD FULLY STAND, GRAB ONTO THERAPIST, TAKE A SMALL RIGHT STEP, AND THEN REACH AND TURN TO SIT ON SURFACE. REQUIRED LESS ASSISTANCE WITH REPS. CONTINUED DELAYED INITIATION, HOWEVER ONCE INSTANDING PATIENT KEPT WEIGHT SHFITED TO LEFT; ONLY MINOR RIGHT KNEE INSTABILITY WITHOUT FULL BUCKLE DURING LEFT STEP. SEE GAIT FOR DETAILS OF AMBULATION TODAY.

## 2022-12-05 NOTE — PLAN OF CARE
Problem: Discharge Planning  Goal: Discharge to home or other facility with appropriate resources  12/4/2022 2326 by Maria L Menjivar RN  Outcome: Progressing  Flowsheets (Taken 12/4/2022 2100)  Discharge to home or other facility with appropriate resources: Refer to discharge planning if patient needs post-hospital services based on physician order or complex needs related to functional status, cognitive ability or social support system  12/4/2022 1049 by Yumiko Bonner RN  Outcome: Progressing  Flowsheets (Taken 12/4/2022 1217)  Discharge to home or other facility with appropriate resources: Refer to discharge planning if patient needs post-hospital services based on physician order or complex needs related to functional status, cognitive ability or social support system     Problem: Safety - Adult  Goal: Free from fall injury  12/4/2022 2326 by Maria L Menjivar RN  Outcome: Progressing  12/4/2022 1049 by Yumiko Bonner RN  Outcome: Progressing     Problem: Neurosensory - Adult  Goal: Achieves stable or improved neurological status  12/4/2022 2326 by Maria L Menjivar RN  Outcome: Progressing  12/4/2022 1049 by Yumiko Bonner RN  Outcome: Progressing  Goal: Achieves maximal functionality and self care  12/4/2022 2326 by Maria L Menjivar RN  Outcome: Progressing  12/4/2022 1049 by Yumiko Bonner RN  Outcome: Progressing     Problem: Skin/Tissue Integrity - Adult  Goal: Skin integrity remains intact  12/4/2022 2326 by Maria L Menjivar RN  Outcome: Progressing  Flowsheets (Taken 12/4/2022 2100)  Skin Integrity Remains Intact: Monitor for areas of redness and/or skin breakdown  12/4/2022 1049 by Yumiko Bonner RN  Outcome: Progressing  Flowsheets (Taken 12/4/2022 1048)  Skin Integrity Remains Intact: Monitor for areas of redness and/or skin breakdown     Problem: Pain  Goal: Verbalizes/displays adequate comfort level or baseline comfort level  12/4/2022 2326 by Maria L Menjivar RN  Outcome: Progressing  12/4/2022 1049 by Boyd White RN  Outcome: Progressing     Problem: Chronic Conditions and Co-morbidities  Goal: Patient's chronic conditions and co-morbidity symptoms are monitored and maintained or improved  12/4/2022 2326 by Yariel Da Silva RN  Outcome: Progressing  Flowsheets (Taken 12/4/2022 2100)  Care Plan - Patient's Chronic Conditions and Co-Morbidity Symptoms are Monitored and Maintained or Improved: Monitor and assess patient's chronic conditions and comorbid symptoms for stability, deterioration, or improvement  12/4/2022 1049 by Boyd White RN  Outcome: Progressing  Flowsheets (Taken 12/4/2022 3749)  Care Plan - Patient's Chronic Conditions and Co-Morbidity Symptoms are Monitored and Maintained or Improved: Monitor and assess patient's chronic conditions and comorbid symptoms for stability, deterioration, or improvement     Problem: ABCDS Injury Assessment  Goal: Absence of physical injury  12/4/2022 2326 by Yariel Da Silva RN  Outcome: Progressing  12/4/2022 1049 by Boyd White RN  Outcome: Progressing     Problem: Skin/Tissue Integrity  Goal: Absence of new skin breakdown  Description: 1. Monitor for areas of redness and/or skin breakdown  2. Assess vascular access sites hourly  3. Every 4-6 hours minimum:  Change oxygen saturation probe site  4. Every 4-6 hours:  If on nasal continuous positive airway pressure, respiratory therapy assess nares and determine need for appliance change or resting period. 12/4/2022 2326 by Yariel Da Silva RN  Outcome: Progressing  12/4/2022 1049 by Boyd White RN  Outcome: Progressing     Problem: Confusion  Goal: Confusion, delirium, dementia, or psychosis is improved or at baseline  Description: INTERVENTIONS:  1. Assess for possible contributors to thought disturbance, including medications, impaired vision or hearing, underlying metabolic abnormalities, dehydration, psychiatric diagnoses, and notify attending LIP  2.  [place] high risk fall precautions, as indicated  3. Provide frequent short contacts to provide reality reorientation, refocusing and direction  4. Decrease environmental stimuli, including noise as appropriate  5. Monitor and intervene to maintain adequate nutrition, hydration, elimination, sleep and activity  6. If unable to ensure safety without constant attention obtain sitter and review sitter guidelines with assigned personnel  7.  Initiate Psychosocial CNS and Spiritual Care consult, as indicated  12/4/2022 2326 by Jimi Valles RN  Outcome: Progressing  12/4/2022 1049 by Rolanda Hoffman RN  Outcome: Progressing     Problem: Musculoskeletal - Adult  Goal: Return mobility to safest level of function  12/4/2022 2326 by Jimi Valles RN  Outcome: Progressing  Flowsheets (Taken 12/4/2022 2100)  Return Mobility to Safest Level of Function: Assess patient stability and activity tolerance for standing, transferring and ambulating with or without assistive devices  12/4/2022 1049 by Rolanda Hoffman RN  Outcome: Progressing  Goal: Return ADL status to a safe level of function  12/4/2022 2326 by Jimi Valles RN  Outcome: Progressing  12/4/2022 1049 by Rolanda Hoffman RN  Outcome: Progressing     Problem: Gastrointestinal - Adult  Goal: Maintains or returns to baseline bowel function  12/4/2022 2326 by Jimi Valles RN  Outcome: Progressing  [address] (Taken 12/4/2022 2100)  Maintains or returns to baseline bowel function: Assess bowel function  12/4/2022 1049 by Rolanda Hoffman RN  Outcome: Progressing  Goal: Maintains adequate nutritional intake  12/4/2022 2326 by Jimi Valles RN  Outcome: Progressing  12/4/2022 1049 by Rolanda Hoffman RN  Outcome: Progressing     Problem: Metabolic/Fluid and Electrolytes - Adult  Goal: Electrolytes maintained within normal limits  12/4/2022 2326 by Jimi Valles RN  Outcome: Progressing  Flowsheets (Taken 12/4/2022 2100)  Electrolytes maintained within normal limits: Monitor labs and assess patient for signs and symptoms of electrolyte imbalances  12/4/2022 1049 by Taylor Kohli RN  Outcome: Progressing  Flowsheets (Taken 12/4/2022 1551)  Electrolytes maintained within normal limits: Monitor labs and assess patient for signs and symptoms of electrolyte imbalances     Problem: Nutrition Deficit:  Goal: Optimize nutritional status  12/4/2022 2326 by Gregoria De Luna RN  Outcome: Progressing  12/4/2022 1049 by Taylor Kohli RN  Outcome: Progressing

## 2022-12-05 NOTE — PATIENT CARE CONFERENCE
[facility] ACUTE INPATIENT REHABILITATION  TEAM CONFERENCE NOTE    Date: 2022  Patient Name: Jovany Mart        MRN: [id number]    : 1960  (64 y.o.)  Gender: female      Diagnosis: CVA, R hemiparesis      PHYSICAL THERAPY  GROSS ASSESSMENT       BED MOBILITY  Bed mobility  Rolling to Left: Contact guard assistance (with rail)  Rolling to Right: Moderate assistance, Minimal assistance (with rail)  Supine to Sit: Moderate assistance  Sit to Supine: Moderate assistance  Scooting: Minimal assistance (down EOM- supervision to sound side and  min A to affected side)  Bed Mobility Comments: SUPINT TO/FROM BILAT ASSIST WITH BILAT LES. ABLE TO BETTER PEROFRM SIDELYING TO SIT FORM LEFT SIDE WITH ASSIST OF LEFT UE       TRANSFERS  Transfers  Sit to Stand: Moderate Assistance, Minimal Assistance (PULL TOS TAND PARLALEL BARS)  Stand to Sit: Moderate Assistance (SLOW AND CONTROLLED MOVEMENT WB THROUGH LLE)  Bed to Chair: Maximum assistance, Moderate assistance, Minimal assistance (SEE ASSESSMENT)  Stand Pivot Transfers: Moderate Assistance (USE OF SS TO STAND PATIENT AND PIVOT TO BED)  Squat Pivot Transfers: Moderate Assistance, Maximum Assistance (FROM RIGHT SIDE OF BED, TRANSFERRING TO LEFT. DIFFICULTY WITH DIRECTIONS PRIOR TO TRANSFER, ATTEMPTED TO WRAP ARMS AROUND THERAPIST RATHER THAN LEAN FWD AND REACH L HAND FOR BED.)  Lateral Transfers: Minimal Assistance, Contact guard assistance (SLIDING BOARD TO LEFT)  Car Transfer: Maximum Assistance, Moderate Assistance, Minimal Assistance (MOD/MIN INTO CARE; MAX A CAR TO WC. LACK OF ANTERIOR LEAN)  Comment: CASANDRA STEADY TF TO TOILET FOR BM  WHEELCHAIR PROPULSION  Propulsion 1  Propulsion: Manual  Level: Level Tile  Method: LUE, LLE  Level of Assistance: Moderate assistance, Minimal assistance (VERS RIGHT)  Description/ Details: THERAPIST INITIALLY PROVIDING MOD A AT FOOT OF LLE FOR FIRST 15', AFTERWARDS THERAPIST RELEASED AND PATIENT CONTINUED.  ATTEMPT PROPULSION WITH TWO TURNS OR ' NEXT. Distance: 150 FT  AMBULATION  Ambulation  Surface: Level tile  Device: Parallel Bars  Other Apparatus:  (SLING, SHOE COVER)  Assistance: Moderate assistance, Minimal assistance  Quality of Gait: ADVANCED LEFT LE PRIOR TO RIGHT KNEE BEING LOCKED. DESPITE LACK OF TERMINAL EXTENSION RIGHT LE DID NOT COMPLETELY BUCKLE. ATTEMPTED TO ALLOW PATIENT TO STRUGGLE TO ADVANCE RIGHT LE, HOWEVER CONTINUED TO PREMATURELY ADVANCE LEFT  Distance: 12 FT  Comments: ATTEMPTED TO STAND THREE TIMES. EACH TIME PATIENT WOULD STAND UPRIGHT, HAVE LOB, AND WOULD SAY STOP, THEN PROCEED TO SIT DOWN. More Ambulation?: Yes  STAIRS     GOALS:  Short Term Goals  Time Frame for Short Term Goals: 2 weeks  Short Term Goal 1: Patient will perform bed mobility with Min A  Short Term Goal 2: Patient will transition supine <> sit with Min A  Short Term Goal 3: Patient will perform bed <> chair transfer with Min A  Short Term Goal 4: Patient propel wheelchair 50 ft with Min A  Short Term Goal 5: Patient will ambulate 10 ft with Mod A    Long Term Goals  Time Frame for Long Term Goals : 3 weeks  Long Term Goal 1: Patient will perform bed mobility independently  Long Term Goal 2: Patient with transition supine <> sit independently  Long Term Goal 3: Patient will perform bed <> chair transfer independently  Long Term Goal 4: Patient will perform car transfer with Min A  Long Term Goal 5: Patient will ambulate 10 ft with CGA    ASSESSMENT:  Assessment: ATTEMPTED INSTRUCTION IN PARTIAL STAND STEP TF TO LEFT. PATIENT CONTINUED TO ASSUME SQUAT PIVOT POSITION, PLACING HAND ON FAR SURFACE RATHER THAN PROXIMAL ARM REST. ALLOWED PATIENT TO PERFORM TECHNIQUE SHE FELT MOST COMFORTABLE. PATIENT WOULD FULLY STAND, GRAB ONTO THERAPIST, TAKE A SMALL RIGHT STEP, AND THEN REACH AND TURN TO SIT ON SURFACE. REQUIRED LESS ASSISTANCE WITH REPS.   CONTINUED DELAYED INITIATION, HOWEVER ONCE INSTANDING PATIENT KEPT WEIGHT SHFITED TO LEFT; ONLY MINOR RIGHT KNEE INSTABILITY WITHOUT FULL BUCKLE DURING LEFT STEP. SEE GAIT FOR DETAILS OF AMBULATION TODAY. SPEECH THERAPY  Her affect continues to improve. She is smiling more and is using social greetings. Yes/no questions were at 75% then 100% during her second session. Following one step commands was at 30% without cueing. With a model, she was at 100%. Naming pictures was at 80% with phonemic cues. Body part identification was at 100% with a model. She could not complete the task without a model. Today a simple AAC board was attempted. She was able to identify correct pictures (four per page), with minimal cues at 100%. She was presented with her schedule for the day. She was also provided with a sheet for orientation which included (the day of the week, date, location, room, floor, and city/state). Repetition of 2, 3, 4 and 5 syllable words was completed. Increased slurring was noted with 4 and 5 syllable words. Diadochokinetic tasks were completed. She demonstrated good accuracy and rate. Answering open ended questions was at  20% without cues. Single letter recognition was at 20% this date. During her second session, she was reading two syllable words at 100%. She was able to read sentences (three and four word sentences) when mod to max cues were provided. She would omit or substitute words during the task. Reading single words at 70% with phonemic cues. Reading two syllable words with phonemic cues was at 60%. On Friday she recognized a change had been made to her therapy schedule. She continued to look at her schedule and the clock. She was visibly upset and answered yes when asked if she was upset by the schedule change. She has been able to read the clock and follow her schedule each day. No oral residue was noted with her noon meal. She still needs reminders to take small bites and eat slowly.  She is using the lingual sweep to clear out the oral cavity. No overt s/s of aspiration with thin liquids via straw. Neologisms, semantic paraphasis, and perseverations are all still noted. Melodic intonation was used during therapy and was beneficial.               She continues to demonstrate poor safety awareness and impulsivity. She was able to propel her wheelchair to the opposite side of the bed. She was positioning herself to get back in the bed without assistance. Therapist arrived and her physical therapist was able to help transfer her back to the bed. Her chair alarm was in place. Her bed alarm was turned on once she returned to bed. Therapist reviewed her current physical deficits and that she is not allowed to transfer without assistance. Reviewed risk of fall and injury and she verbalized understanding. Recommend she continue speech therapy for dysphagia, expressive language, receptive language,cognition and dysarthria. Recommend she remain on a dysphagia soft diet with thin liquids at this time. SLP will upgrade to regular when she demonstrates consistent awareness of oral residue. Recommended Diet and Intervention  Soft & Bite Sized consistencies   Thin liquids  Recommended Form of Meds: Crushed in puree as able  Dysphagia treatment  Therapeutic Interventions: Pharyngeal exercises;Diet tolerance monitoring;Oral care;Oral motor exercises; Patient/Family education; Therapeutic PO trials with SLP     Compensatory Swallowing Strategies  Compensatory Swallowing Strategies : Alternate solids and liquids;Eat/Feed slowly; No straws;Upright as possible for all oral intake;Remain upright for 30-45 minutes after meals;Small bites/sips; Check for pocketing of food on the Right; Check for pocketing of food on the Left; Set up assist;Assist feed                 SHORT TERM GOAL #1:  Goal 1: Pt will complete y/n tasks with 80% accuracy and minimal verbal cues/modeling.      SHORT TERM GOAL #2:  Goal 2: Pt will complete verbal expression tasks with 80% accuracy and minimal verbal cues/modeling. SHORT TERM GOAL #3:  Goal 3: Pt will complete receptive/expressive language tasks with 80% accuracy and minimal verbal cues/modeling. SHORT TERM GOAL #4:  Goal 4: Pt will follow one step commands with with 90% accuracy and minimal verbal cues/modeling. SHORT TERM GOAL #5:  Goal 5: Pt will complete orientation tasks with 90% accuracy and minimal verbal cues/modeling. Swallowing Short Term Goals  Short-term Goals  Goal 1: Pt will tolerate soft & bite sized consistencies with mildly  (nectar) thick liquids with minimal overt s/s of aspiration/penetration during hospitalization. New Goal: Pt will tolerate soft and bite sized diet with thin liquids with minimal overt s/s of aspiration/penetration during hospitalization. Goal 2: Pt will complete Modified Barium Swallow Study. Goal 3: Pt will demonstrate awareness of general aspiration precautions. Goal 4: Pt will participate in swallowing reassessments to ensure safest diet consistencies. Goal 5: Pt will allow staff to complete daily oral care. Goal 6: Pt will complete oral motor exercises for lingual and labial strengthening. Long term goals:  Pt will participate in skilled speech therapy services to ensure she is able to communicate her wants and needs. Pt will participate in skilled speech therapy services to ensure she is able to complete ADLs. Pt will tolerate least restrictive diet with minimal overt s/s of aspiration/penetration during hospitalization.      STGs Met: 1   LTGs Met: 0     ELOS: 2-3 weeks    OCCUPATIONAL THERAPY  Cognitive Patterns:     Cognitive Assessment Method (CAM):  Confusion Assessment Method (CAM)  Is there evidence of an acute change in mental status from the patient's baseline?: Yes  Inattention: Behavior present, fluctuates (comes and goes, changes in severity)  Disorganized thinking: Behavior present, fluctuates (comes and goes, changes in severity)  Altered level of consciousness: Behavior not present  Health Literacy:  How often do you need to have someone help you when you read instructions, pamphlets, or other written material from your doctor or pharmacy?: Never        CURRENT IRF-ARLEN SCORES  Eating: CARE Score: 4       Oral Hygiene: CARE Score: 4  Comment: vc for aspen tech after demonstration first     Toileting: CARE Score: 2  Comment: bed level      Shower/Bathe: CARE Score: 3  Comment: Mod A with shower        Upper Body Dressing: CARE Score: 3  Comment: cues for aspen tech      Lower Body Dressing: CARE Score: 3  Comment: pants in bed, able to to bridge with mod/min A while another manages pants on right side, able to roll to manage clothing on left       Footwear: CARE Score: 2  Comment: while in bed and supine, pt able to reach left foot partially       Toilet Transfers: CARE Score: 2          Picking Up Object:  CARE Score: 88          UE Functionin/21/22 1000   LUE AROM (degrees)   LUE AROM  WNL   RUE AROM (degrees)   RUE General AROM flaccid throughout       Pain Assessment:      0/10 pain     STGs:  Short Term Goals  Time Frame for Short Term Goals: 2 weeks  Short Term Goal 1: MET  Short Term Goal 2: Pt will dress UB using hemitechnique, mod A and cues  Short Term Goal 3: Pt will dress LB, hemitechnique, mod A and cues  Short Term Goal 4: Pt will toilet with mod A  Short Term Goal 5:  Toilet transfer with mod A  Additional Goals?: Yes  Short Term Goal 6: Pt will attend to R side during ADL/functional activities with moderate cues  Short Term Goal 7: Pt will perform standing tasks x 2-3 mins with L UE and mod A for balance to enhance ADL/IADL performance  Short Term Goal 8: Pt/family will demo understanding of R UE positioning/HEP/therapeutic activity recommendations with min A after ed    LTGs:  Long Term Goals  Time Frame for Long Term Goals : 4-5 weeks  Long Term Goal 1: Pt will bathe with min A, AE/DME prn  Long Term Goal 2: Pt will dress UB supervision  Long Term Goal 3: Pt will dress LB min A, AE prn  Long Term Goal 4: Pt will toilet with CGA  Long Term Goal 5: Pt will perform toilet transfer with CGA  Additional Goals?: Yes  Long Term Goal 6: Pt will perform simple home making task with min A  Long Term Goal 7: Pt/family will be independent in HEP/DME/therapeutic activity recommendations after ed  Long Term Goal 8: Pt will attend to R side during functional activities with occasional cueing    Assessment:  Performance deficits / Impairments: Decreased functional mobility , Decreased endurance, Decreased coordination, Decreased ADL status, Decreased sensation, Decreased posture, Decreased ROM, Decreased balance, Decreased strength, Decreased vision/visual deficit, Decreased safe awareness, Decreased high-level IADLs, Decreased cognition, Decreased fine motor control                 NUTRITION  Current Wt: Weight: 203 lb (92.1 kg) / Body mass index is 31.79 kg/m². Admission Wt: Admission Body Weight: 201 lb (91.2 kg)  Oral Diet Orders:     Pt. improving from a nutritional standpoint AEB a PO intake 50% at most meals. Wt. fluctuations likely d/t fluid accumulation. Electrolytes WNL. Family no longer brings in food and drink for pt. Will continue POC. NURSING    Wounds/Incisions/Ulcers:  Intradry abd folds/redness. Rickey Scale Score: 15    Pain: No pain concerns to address    Consultations/Labs/X-rays:     Family Education: Family available and participating in education     Fall Risk:  Warden Payton Total Score: 61    Fall in the last week?no      Other Nursing Issues: R side flaccid. Aphasic. Up 2 pebbles steady. Pills whole. BM 1. Purewick HS. Lovenox. Dysphagia diet. SOCIAL WORK/CASE MANAGEMENT  Assessment: Has devoted family, Is observed her frustration with communication easily turns to anger toward . Daughter resides out of state, plans to return- able to assist only short term.     Discharge Plan   Estimated Length of Stay: 3-5 weeks  Destination: discharge home with supervision    Pass: No    Services at Discharge: [address], Occupational Therapy, Speech Therapy, and Nursing per evaluations    Equipment at Discharge: Will determine closer to discharge. Progress made in the prior week: Mod A with bathing. 2.  3.  4.  5.      Goals for following week:  Supervision with UB dressing. 2.   3.   4.   5.     Factors facilitating achievement of predicted outcomes: Family support, Motivated, and Pleasant    Barriers to the achievement of predicted outcomes: Impulsivity, Limited safety awareness, Communication deficit, Decreased endurance, Decreased sensation, Decreased proprioception, Upper extremity weakness, and Lower extremity weakness    Team Members Present at Conference:  : Cyndy Hollins 23 /Electronically Signed by Herber ManeSonoma Speciality Hospitalsowmya Coordinator 12/6/2022 10:51 AM    Occupational Therapist: Jonathon Hanson, OTR/L  Physical Therapist: Kamaljit Knutson PT,DPT  Speech Therapist: Robby Goff [address], Lendel Ser  Nurse: Guanaco Bateman, RN   Nurse Manager:  Destini Membreno, RN, BSN  Dietitian:  Rafat Agosto, MS, RD, LD  Rehab Director:        I approve the established interdisciplinary plan of care as documented within the medical record of Antonette Iglesias.

## 2022-12-05 NOTE — PROGRESS NOTES
Patient:   Wei Tubbs  MR#:    [id number]   Room:    0826/826-02   YOB: 1960  Date of Progress Note: 12/5/2022  Time of Note                           7:49 AM  Consulting Physician:   Nathaly Aguilar M.D. Attending Physician:  Nathaly Aguilar MD     Chief complaint Acute ischemic stroke    S:This 64 y.o. female  with history of anxiety, depression, COPD, atherosclerotic disease, colitis, COPD, CVA, DM,  LE edema, hyperlipidemia, HTN, morbid obesity, RA, CAD  and venous thromboembolism. She presented to [facility] ER on 11/9/22 after being found at home lying facedown in her feces. She was very weak, confused, not able to speak but moving purposefully and intermittently following commands. Her last well know was 3 a.m. when her  left for work. Imaging done revealed a large MCA stroke 7.7 x 5 cm. CTA head/neck revealed an acute M1 occlusion. She was outside of the window for TPA. CK on admit was 1100, consistent with rhabdomylosis. She was also noted to possibly Dens fracture. She was transferred to [facility] for a possible thrombectomy. Further imaging was done at [facility] and she was deemed not a candidate for thrombectomy. MRI done ruled out Dens fracture. Repeat CT of head on 11/11/22 showed evolving left MCA territory infarct with increasing edema/mass effect resulting in 4mm of  rightward midline shift(previously 2mm) a the level of the third ventricle. No hemorrhagic conversion or definite trapping of the right lateral ventricle, possible small acute right cerebellar infarct. Neurosurgery was consulted for possible hemicraniectomy. She was seen by Dr. Waqar Roberts, who didn't feel any surgical intervention was needed. Patient was found to have a UTI + for [facility] and was placed on Rocephin on 11/14/22. Patient had a PICC line placed. Patient was evaluated by SPT and initially made NPO d/t oropharyngeal dysphagia. NGT placed and feeding started. She was also noted to have global aphasia but is improving. She was re-evaluated by SPT on 11/15/22 for swallow and was started on a dysphagia 1 diet with nectar thick liquids. Patient also continues to have right sided weakness and is participating in both PT/OT. Repeat CT head on 11/13/22 shows stable LMCA ischemic stroke with stable edema, 5 mm shift, no hemorrhage. She is felt to need a stay on Rehab for continued PT/OT/SPT and medical management to work towards her goal of returning home with her . She is now felt ready to start the Rehab program.  No acute issues overnight. REVIEW OF SYSTEMS:  Unreliable due to aphasia     Past Medical History:      Diagnosis Date    Anxiety     ASCVD (arteriosclerotic cardiovascular disease)     Carrier of group B Streptococcus     Cellulitis     Colitis     COPD (chronic obstructive pulmonary disease) ([facility])     Costochondritis     CVA (cerebral vascular accident) ([address]     Depression     Edema     Fracture of sternum     non union post surgery.      Gallstones     Grief reaction     H/O superior vena cava filter placement     Hyperlipidemia     Hypertension     MI (myocardial infarction) ([address]     MRSA (methicillin resistant Staphylococcus aureus)     Neuropathy     Obesity     Pseudarthrosis sternal    RA (rheumatoid arthritis) ([address]     Rosacea     Sinus bradycardia     TIA (transient ischemic attack)     Venous thromboembolism        Past Surgical History:      Procedure Laterality Date    ADENOIDECTOMY      APPENDECTOMY      CARDIAC CATHETERIZATION  3/2009    CARDIAC CATHETERIZATION  11/5/14  JDT    EF 50%, patent bypass grafts    CHOLECYSTECTOMY  2011    COLONOSCOPY  06/03/2010    Dr. Christo Elam: distal colon having ulcerative lesions c/w UC    COLONOSCOPY  2009    pancolitis    COLONOSCOPY      CORONARY ARTERY BYPASS GRAFT  3/2009    PHANI Son to LAD, SVGs to OM & PDA    GASTRIC BYPASS SURGERY  2012    HIATAL HERNIA REPAIR  2011    HYSTERECTOMY (CERVIX STATUS UNKNOWN)      KNEE SURGERY      MS COLONOSCOPY FLX DX W/COLLJ SPEC WHEN PFRMD N/A 3/12/2018    Dr Martínez Gibbons rectal inflammation consistent with U.C.-Active proctitis--3 yr recall    Gabriel Ocampo ENDOSCOPY  2010    Dr. Felipa Crocker  2012       Medications in Hospital:      Current Facility-Administered Medications:     miconazole (MICOTIN) 2 % powder, , Topical, BID, Jimi Rodríguez MD, Given at 12/04/22 2054    docusate sodium (COLACE) capsule 100 mg, 100 mg, Oral, BID, Jimi Rodríguez MD, 100 mg at 12/04/22 2054    aspirin chewable tablet 81 mg, 81 mg, Oral, Daily, Jimi Rodríguez MD, 81 mg at 12/04/22 5039    atorvastatin (LIPITOR) tablet 40 mg, 40 mg, Oral, Nightly, Jimi Rodríguez MD, 40 mg at 12/04/22 2054    dulaglutide (TRULICITY) SC injection 1.5 mg (Patient Supplied), 1.5 mg, SubCUTAneous, Weekly, Jimi Rodríguez MD    folic acid (FOLVITE) tablet 1 mg, 1 mg, Oral, Daily, Jimi Rodríguez MD, 1 mg at 12/04/22 6759    gabapentin (NEURONTIN) capsule 300 mg, 300 mg, Oral, Nightly, Jimi Rodríguez MD, 300 mg at 12/04/22 2054    hydroxychloroquine (PLAQUENIL) tablet 200 mg, 200 mg, Oral, BID, Jimi Rodríguez MD, 200 mg at 12/04/22 2054    amLODIPine (NORVASC) tablet 2.5 mg, 2.5 mg, Oral, Daily, Jimi Rodríguez MD, 2.5 mg at 12/04/22 0838    tiZANidine (ZANAFLEX) tablet 4 mg, 4 mg, Oral, BID PRN, Jimi Rodríguez MD    multivitamin 1 tablet, 1 tablet, Oral, Daily, Jimi Rodríguez MD, 1 tablet at 12/04/22 0296    acetaminophen (TYLENOL) tablet 650 mg, 650 mg, Oral, Q4H PRN, Jimi Rodríguez MD, 650 mg at 11/27/22 1933    enoxaparin (LOVENOX) injection 40 mg, 40 mg, SubCUTAneous, Daily, Jimi Rodríguez MD, 40 mg at 12/04/22 1639    polyethylene glycol (GLYCOLAX) packet 17 g, 17 g, Oral, Daily PRN, Jimi Rodríguez MD, 17 g at 11/30/22 1817    Allergies:  Methotrexate derivatives    Social History:   TOBACCO:   reports that she quit smoking about 13 years ago. Her smoking use included cigarettes.  She has a 64.00 pack-year smoking history. She has never used smokeless tobacco.  ETOH:   reports current alcohol use. Family History:       Problem Relation Age of Onset    Prostate Cancer Father     Heart Failure Father     Cancer Father     Colon Cancer Neg Hx     Colon Polyps Neg Hx     Liver Cancer Neg Hx     Liver Disease Neg Hx     Esophageal Cancer Neg Hx     Rectal Cancer Neg Hx     Stomach Cancer Neg Hx          PHYSICAL EXAM:  /68   Pulse 61   Temp 96.9 °F (36.1 °C) (Temporal)   Resp 18   Ht 5' 7\" (1.702 m)   Wt 203 lb (92.1 kg)   SpO2 96%   BMI 31.79 kg/m²     Constitutional - well developed, well nourished. Eyes - conjunctiva normal.   Ear, nose, throat - No scars, masses, or lesions over external nose or ears, no atrophy of tongue  Neck-symmetric, no masses noted, no jugular vein distension  Respiration- chest wall appears symmetric, good expansion,   normal effort without use of accessory muscles  Musculoskeletal - no significant wasting of muscles noted, no bony deformities  Extremities-no clubbing, cyanosis or edema  Skin - warm, dry, and intact. No rash, erythema, or pallor. Psychiatric - mood, affect, and behavior appear normal.      Neurological exam  Awake, alert, global aphasia says \"yes\" to all questions asked   Attention and concentration appear appropriate  Recent and remote memory unable to tests     Cranial Nerve Exam     CN III, IV,VI-EOMI, No nystagmus, conjugate eye movements, no ptosis    CN VII-+ facial assymetry       Motor Exam  No movement on the right      Tremors- no tremors in hands or head noted     Gait  Not tested     Nursing/pcp notes, imaging,labs and vitals reviewed.      PT,OT and/or speech notes reviewed    Lab Results   Component Value Date    WBC 6.3 12/05/2022    HGB 11.7 (L) 12/05/2022    HCT 39.1 12/05/2022    MCV 80.1 (L) 12/05/2022     12/05/2022     Lab Results   Component Value Date     12/05/2022    K 4.0 12/05/2022     12/05/2022 CO2 25 2022    BUN 9 2022    CREATININE 0.5 2022    GLUCOSE 78 2022    CALCIUM 9.0 2022    PROT 5.4 (L) 2022    LABALBU 2.9 (L) 2022    BILITOT <0.2 2022    ALKPHOS 84 2022    AST 29 2022    ALT 25 2022    LABGLOM >60 2022   No results found for: INR, PROTIME      Farrukh Whitlock, OT   Occupational Therapist   Occupational   Progress Notes      Signed   Date of Service:  12/3/2022  2:00 PM                 Signed                                                                                                                                                                                            Occupational Therapy  Facility/Department: [facility]        Name: Kennedy Holcomb  : 1960  MRN: [id number]  Date of Service: 12/3/2022     Discharge Recommendations:  Continue to assess pending progress        Patient Diagnosis(es): There were no encounter diagnoses. Past Medical History:  has a past medical history of Anxiety, ASCVD (arteriosclerotic cardiovascular disease), Carrier of group B Streptococcus, Cellulitis, Colitis, COPD (chronic obstructive pulmonary disease) ([address] Costochondritis, CVA (cerebral vascular accident) ([address] Depression, Edema, Fracture of sternum, Gallstones, Grief reaction, H/O superior vena cava filter placement, Hyperlipidemia, Hypertension, MI (myocardial infarction) ([address] MRSA (methicillin resistant Staphylococcus aureus), Neuropathy, Obesity, Pseudarthrosis, RA (rheumatoid arthritis) ([address] Rosacea, Sinus bradycardia, TIA (transient ischemic attack), and Venous thromboembolism. Past Surgical History:  has a past surgical history that includes Coronary artery bypass graft (3/2009); Tonsillectomy; Hysterectomy; thoracotomy; knee surgery; Appendectomy; Colonoscopy (2010); Cardiac catheterization (3/2009); Adenoidectomy; Cholecystectomy ();  Gastric bypass surgery (); hiatal hernia repair (); Cardiac catheterization (11/5/14  JDT); Colonoscopy (2009); Colonoscopy; Upper gastrointestinal endoscopy (2010); Upper gastrointestinal endoscopy (2012); and pr colonoscopy flx dx w/collj spec when pfrmd (N/A, 3/12/2018). Treatment Diagnosis: L MCA stroke        Assessment   Performance deficits / Impairments: Decreased functional mobility ; Decreased endurance;Decreased coordination;Decreased ADL status; Decreased sensation;Decreased posture;Decreased ROM; Decreased balance;Decreased strength;Decreased vision/visual deficit; Decreased safe awareness;Decreased high-level IADLs;Decreased cognition;Decreased fine motor control  Activity Tolerance  Activity Tolerance: Patient Tolerated treatment well         Plan   Occupational Therapy Plan  Specific Instructions for Next Treatment: transfers, UE  Current Treatment Recommendations: Strengthening, ROM, Balance training, Functional mobility training, Endurance training, Neuromuscular re-education, Safety education & training, Patient/Caregiver education & training, Equipment evaluation, education, & procurement, Positioning, Modalities, Self-Care / ADL, Home management training, Sensory integration      Restrictions  Restrictions/Precautions  Restrictions/Precautions: Modified Diet, Swallowing - Thickened Liquids, Fall Risk, Aspiration Risk, Weight Bearing  Required Braces or Orthoses?: No  Lower Extremity Weight Bearing Restrictions  Right Lower Extremity Weight Bearing: Weight Bearing As Tolerated  Left Lower Extremity Weight Bearing: Weight Bearing As Tolerated     Subjective   General  Patient assessed for rehabilitation services?: Yes  Family / Caregiver Present: Yes (spouse)  Subjective  Subjective: Initial family instruction provided with future sessions planned as pt progresses  General Comment  Comments: emotional at start of session but willing to participate---pt demonstrated improved mood by the end of session      Pre Treatment Pain Screening   Pain at present 0   Scale Used Faces   Intervention List Patient able to continue with treatment   General Comment   Comments emotional at start of session but willing to participate---pt demonstrated improved mood by the end of session         Objective      Safety Devices  Type of Devices: Left in chair;Call light within reach;Gait belt (left with daughter and spouse)     Bed mobility  Supine to Sit: Moderate assistance (mod/min A)  Transfers  Slide Board: Contact guard assistance (bed to w/c, towards left, CGA/SBA with cues)     Exercise Treatment: extensive AAROM HEP---GE planes from tabletop---demonstrated increased strength proximally  PROM Exercises: R UE----all muscle groups    12/03/22 1400   Neuromuscular Education   Neuromuscular education Yes   NDT Treatment Upper extremity   Facilitation techniques scapular mobilization   Vibration elbow flexion/extension, wrist extension----2-/5---good response to vibration   Weight Bearing   Weight Bearing Technique Yes   RUE Weight Bearing Forearm seated; Extended arm seated   Response To Weight Bearing Technique good         Goals  Short Term Goals  Time Frame for Short Term Goals: 2 weeks  Short Term Goal 1: MET  Short Term Goal 2: Pt will dress UB using hemitechnique, mod A and cues  Short Term Goal 3: Pt will dress LB, hemitechnique, mod A and cues  Short Term Goal 4: Pt will toilet with mod A  Short Term Goal 5:  Toilet transfer with mod A  Additional Goals?: Yes  Short Term Goal 6: Pt will attend to R side during ADL/functional activities with moderate cues  Short Term Goal 7: Pt will perform standing tasks x 2-3 mins with L UE and mod A for balance to enhance ADL/IADL performance  Short Term Goal 8: Pt/family will demo understanding of R UE positioning/HEP/therapeutic activity recommendations with min A after ed  Long Term Goals  Time Frame for Long Term Goals : 4-5 weeks  Long Term Goal 1: Pt will bathe with min A, AE/DME prn  Long Term Goal 2: Pt will dress UB supervision  Long Term Goal 3: Pt will dress LB min A, AE prn  Long Term Goal 4: Pt will toilet with CGA  Long Term Goal 5: Pt will perform toilet transfer with CGA  Additional Goals?: Yes  Long Term Goal 6: Pt will perform simple home making task with min A  Long Term Goal 7: Pt/family will be independent in HEP/DME/therapeutic activity recommendations after ed  Long Term Goal 8: Pt will attend to R side during functional activities with occasional cueing     Therapy Time    Individual Concurrent Group Co-treatment   Time In 1400      Time Out 1500      Minutes 60      Timed Code Treatment Minutes: 60 Minutes     Harman Washington OT                  RECORD REVIEW: Previous medical records, medications were reviewed at today's visit    IMPRESSION:   1. Acute ischemic stroke-on aspirin/statin  2. Hypertension-on medications monitor  3. Hyperlipidemia-on statin  4. Diabetes-Trulicity-monitor blood sugar  5. DVT prophylaxis-Lovenox  6. History of coronary artery disease-aspirin/statin  7. Neuropathy-on Neurontin  8. Rheumatoid arthritis-on Plaquenil  9. COPD-monitor  10. History of anxiety and depression-monitor  11. History of colitis/gallstones-monitor  12. History of bariatric surgery-on multivitamin/folic acid  13. History of superior vena cava filter placement with venous thromboembolism-not on anticoagulation-monitor- indicates took herself off blood thinners as not like meds and was bruising   14.   PT/OT/speech    Continue current care    x-ray of right ankle no acute changes  Venous ultrasound of leg no DVT      ELOS December pending 4 weeks    Expected duration and frequency therapy: 180 minutes per day, 5 days per week    Jewell Garcia  [phone] CELL  Dr Jade Garcia

## 2022-12-05 NOTE — PROGRESS NOTES
Comprehensive Nutrition Assessment    Type and Reason for Visit:  Reassess    Nutrition Recommendations/Plan:   Will continue POC. Malnutrition Assessment:  Malnutrition Status:  No malnutrition (12/05/22 1346)    Context:  Acute Illness     Findings of the 6 clinical characteristics of malnutrition:  Energy Intake:  No significant decrease in energy intake  Weight Loss:  No significant weight loss     Body Fat Loss:  No significant body fat loss     Muscle Mass Loss:  No significant muscle mass loss    Fluid Accumulation:  Mild Extremities   Strength:  Not Performed    Nutrition Assessment:    Pt. improving from a nutritional standpoint AEB a PO intake 50% at most meals. Wt. fluctuations likely d/t fluid accumulation. Electrolytes WNL. Family no longer brings in food and drink for pt. Will continue POC. Nutrition Related Findings:    aphasia Wound Type: None       Current Nutrition Intake & Therapies:    Average Meal Intake: 51-75%  Average Supplements Intake: %  ADULT DIET; Dysphagia - Soft and Bite Sized; 4 carb choices (60 gm/meal)  ADULT ORAL NUTRITION SUPPLEMENT; Dinner; Other Oral Supplement; 4 oz. frozen milkshake (in freezer behind hot line)    Anthropometric Measures:  Height: 5' 7\" (170.2 cm)  Ideal Body Weight (IBW): 135 lbs (61 kg)    Admission Body Weight: 201 lb (91.2 kg)  Current Body Weight: 203 lb (92.1 kg), 150.4 % IBW. Weight Source: Bed Scale  Current BMI (kg/m2): 31.8  Usual Body Weight: 201 lb (91.2 kg)  % Weight Change (Calculated): 1  Weight Adjustment For: No Adjustment    BMI Categories: Obese Class 1 (BMI 30.0-34. 9)    Estimated Daily Nutrient Needs:  Energy Requirements Based On: Kcal/kg  Weight Used for Energy Requirements: Current  Energy (kcal/day): 3,067-7,469  Weight Used for Protein Requirements: Ideal  Protein (g/day):   Method Used for Fluid Requirements: 1 ml/kcal  Fluid (ml/day): 2,522-4,902    Nutrition Diagnosis:   Biting/chewing (masticatory) difficulty, Swallowing difficulty related to acute injury/trauma as evidenced by swallow study results    Nutrition Interventions:   Food and/or Nutrient Delivery: Continue Current Diet, Continue Oral Nutrition Supplement  Nutrition Education/Counseling: No recommendation at this time  Coordination of Nutrition Care: Continue to monitor while inpatient  Plan of Care discussed with: pt    Goals:  Previous Goal Met: Progressing toward Goal(s)  Goals: Meet at least 75% of estimated needs, PO intake 75% or greater       Nutrition Monitoring and Evaluation:   Behavioral-Environmental Outcomes: None Identified  Food/Nutrient Intake Outcomes: Food and Nutrient Intake, Supplement Intake, Diet Advancement/Tolerance  Physical Signs/Symptoms Outcomes: Biochemical Data, Chewing or Swallowing, Skin, Nutrition Focused Physical Findings, Fluid Status or Edema    Discharge Planning:     Too soon to determine     Robby Ribeiro [address], RD, LD  Contact: [phone]

## 2022-12-06 PROCEDURE — 1180000000 HC REHAB R&B

## 2022-12-06 PROCEDURE — 92526 ORAL FUNCTION THERAPY: CPT

## 2022-12-06 PROCEDURE — 6360000002 HC RX W HCPCS: Performed by: PSYCHIATRY & NEUROLOGY

## 2022-12-06 PROCEDURE — 92507 TX SP LANG VOICE COMM INDIV: CPT

## 2022-12-06 PROCEDURE — 97116 GAIT TRAINING THERAPY: CPT

## 2022-12-06 PROCEDURE — 94760 N-INVAS EAR/PLS OXIMETRY 1: CPT

## 2022-12-06 PROCEDURE — 99233 SBSQ HOSP IP/OBS HIGH 50: CPT | Performed by: PSYCHIATRY & NEUROLOGY

## 2022-12-06 PROCEDURE — 97530 THERAPEUTIC ACTIVITIES: CPT

## 2022-12-06 PROCEDURE — 97110 THERAPEUTIC EXERCISES: CPT

## 2022-12-06 PROCEDURE — 6370000000 HC RX 637 (ALT 250 FOR IP): Performed by: PSYCHIATRY & NEUROLOGY

## 2022-12-06 RX ADMIN — ASPIRIN 81 MG 81 MG: 81 TABLET ORAL at 08:40

## 2022-12-06 RX ADMIN — HYDROXYCHLOROQUINE SULFATE 200 MG: 200 TABLET, FILM COATED ORAL at 08:40

## 2022-12-06 RX ADMIN — HYDROXYCHLOROQUINE SULFATE 200 MG: 200 TABLET, FILM COATED ORAL at 20:35

## 2022-12-06 RX ADMIN — AMLODIPINE BESYLATE 2.5 MG: 2.5 TABLET ORAL at 08:40

## 2022-12-06 RX ADMIN — MICONAZOLE NITRATE: 2 POWDER TOPICAL at 20:36

## 2022-12-06 RX ADMIN — GABAPENTIN 300 MG: 300 CAPSULE ORAL at 20:35

## 2022-12-06 RX ADMIN — DOCUSATE SODIUM 100 MG: 100 CAPSULE, LIQUID FILLED ORAL at 20:35

## 2022-12-06 RX ADMIN — ENOXAPARIN SODIUM 40 MG: 100 INJECTION SUBCUTANEOUS at 16:59

## 2022-12-06 RX ADMIN — FOLIC ACID 1 MG: 1 TABLET ORAL at 08:40

## 2022-12-06 RX ADMIN — THERA TABS 1 TABLET: TAB at 08:40

## 2022-12-06 RX ADMIN — ATORVASTATIN CALCIUM 40 MG: 40 TABLET, FILM COATED ORAL at 20:35

## 2022-12-06 RX ADMIN — DOCUSATE SODIUM 100 MG: 100 CAPSULE, LIQUID FILLED ORAL at 08:40

## 2022-12-06 NOTE — PROGRESS NOTES
[facility]  INPATIENT SPEECH THERAPY  [facility] 8 REHAB UNIT  TIME   0830  0900  30 MINUTES    [x]Daily Note  []Progress Note    Date: 2022  Patient Name: Olga Lidia Klein        MRN: [id number]    Account #: [de-identified]  : 1960  (64 y.o.)  Gender: female   Primary Provider: Yanelis Starkey MD  Swallowing Status/Diet: Dysphagia soft and bite sized, thin liquids      Subjective: Pt alert, cooperative, and upright in bed. Patient exhibited many instances of perseveration, paraphasias (semantic and phonemic), echolalia, and anomia. Objective:    Pt observed taking am medications whole with applesauce. No difficulties observed with this task. Swallowing reassessed during tx. Consistencies on noon meal tray include regular solids with thin liquids via consecutive sips side of cup. Oral prep reveals decreased rotary mastication with regular solid consistencies. Oral transit timing ranges from 2-3 seconds with regular solid consistencies. No significant oral pocketing/residue observed. Oral transit is suspected to be 1 second or faster than 1 second with thin liquids. Laryngeal movement observed to be consistently sluggish and mild-moderately decreased for swallow airway protection. No overt s/s of aspiration/penetration observed with regular solids or thin liquids on this date. Continue current diet of soft and bite sized with thin liquids at this time. Orientation task completed with 10% accuracy. Pt was able to verbalize her name; however, she is unable to verbalize , age, address, phone number, day/month/year, floor number, room number, current location, and current city and state. A visual cue with orientation answers were provided on a piece of paper. Pt was unable to utilize this resource to answer orientation questions. Functional reading task completed. Pt was unable to independently read orientation paper or single words on this date. Words were read aloud for pt.  Pt does significantly benefit from phonemic cues while reading; however, will frequently verbalize a phonemic paraphasia. Following 1 step directions given a verbal and visual model were completed through a game of [address]. Task completed with 10% accuracy. Maximum visual and verbal cues required to complete this task. Sentence completion task completed for verbal expression. Pt was provided the beginning of a 4-5 word sentence and is prompted to finish the sentence utilizing one word. Task completed with 50% accuracy. Maximum semantic and phonemic cues required to complete this task. Automatic speech tasks completed. With a verbal cue of beginning the automatic speech task, pt was able to verbalize the days of the week, months of the year, count to 25, and say the pledge of allegiance. All tasks were unable to be completed independently, as pt did require the initial cue and cues throughout tasks to finish the task. Matching/object identification task completed. Pt is provided 1 target photo beside 3 general photos. Pt is required to scan the 3 photos and match the target photo. Task completed with 90% accuracy. Minimal phonemic and semantic cues required to complete this task. SLP will continue to follow and treat. Recommended Diet and Intervention  Soft & Bite Sized consistencies   Thin liquids  Recommended Form of Meds: Crushed in puree as able  Dysphagia treatment  Therapeutic Interventions: Pharyngeal exercises;Diet tolerance monitoring;Oral care;Oral motor exercises; Patient/Family education; Therapeutic PO trials with SLP     Compensatory Swallowing Strategies  Compensatory Swallowing Strategies : Alternate solids and liquids;Eat/Feed slowly; No straws;Upright as possible for all oral intake;Remain upright for 30-45 minutes after meals;Small bites/sips; Check for pocketing of food on the Right; Check for pocketing of food on the Left; Set up assist;Assist feed    SHORT TERM GOAL #1:  Goal 1: Pt will complete y/n tasks with 80% accuracy and minimal verbal cues/modeling. SHORT TERM GOAL #2:  Goal 2: Pt will complete verbal expression tasks with 80% accuracy and minimal verbal cues/modeling. SHORT TERM GOAL #3:  Goal 3: Pt will complete receptive/expressive language tasks with 80% accuracy and minimal verbal cues/modeling. SHORT TERM GOAL #4:  Goal 4: Pt will follow one step commands with with 90% accuracy and minimal verbal cues/modeling. SHORT TERM GOAL #5:  Goal 5: Pt will complete orientation tasks with 90% accuracy and minimal verbal cues/modeling. Swallowing Short Term Goals  Short-term Goals  Goal 1: Pt will tolerate soft & bite sized consistencies with thin liquids with minimal overt s/s of aspiration/penetration during hospitalization. Goal 2: Pt will complete Modified Barium Swallow Study. Goal 3: Pt will demonstrate awareness of general aspiration precautions. Goal 4: Pt will participate in swallowing reassessments to ensure safest diet consistencies. Goal 5: Pt will allow staff to complete daily oral care. Goal 6: Pt will complete oral motor exercises for lingual and labial strengthening.     ASSESSMENT:  Assessment: [x]Progressing towards goals          []Not Progressing towards goals    Patient Tolerance of Treatment:   [x]Tolerated well []Tolerated fair []Required rest breaks []Fatigued    Education:  Learner:  [x]Patient          []Significant Other          []Other  Education provided regarding:  [x]Goals and POC   []Diet and swallowing precautions    []Home Exercise Program  []Progress and/or discharge information  Method of Education:  [x]Discussion          []Demonstration          []Handout          []Other  Evaluation of Education:   [x]Verbalized understanding   []Demonstrates without assistance  []Demonstrates with assistance  []Needs further instruction     []No evidence of learning                  []No family present      Plan: [x]Continue with current plan of care    []Modify current

## 2022-12-06 NOTE — PROGRESS NOTES
Occupational Therapy     12/06/22 0900   General   Timed Code Treatment Minutes 60 Minutes   Restrictions/Precautions   Restrictions/Precautions Modified Diet;Swallowing - Thickened Liquids; Fall Risk;Aspiration Risk;Weight Bearing   Functional Mobility   Functional - Mobility Device Wheelchair   Activity Other   Assist Level Stand by assistance   Functional Mobility Comments OT to PT gym   Bed mobility   Supine to Sit Moderate assistance  (coming up on affected side)   Transfers   Sit Pivot Transfers Minimal assistance   Sit to stand Moderate assistance   Transfer Comments improved squat pivot t/f   Type of ROM/Therapeutic Exercise   Type of ROM/Therapeutic Exercise PROM   Comment RUE tone   Cognition   Overall Cognitive Status Impaired   Following Directions Follows one step commands   Attention Span Appears intact   Additional Comments Pt able to I'ly sequence: numbers 1-10, months, and days of the week. Also able to match objects with written words, colors with words, and numbers with amounts- with extra time only to complete. 50% with family members and body naming. Not able to follow written commands. Assessment   Performance deficits / Impairments Decreased functional mobility ; Decreased endurance;Decreased coordination;Decreased ADL status; Decreased sensation;Decreased posture;Decreased ROM; Decreased balance;Decreased strength;Decreased vision/visual deficit; Decreased safe awareness;Decreased high-level IADLs;Decreased cognition;Decreased fine motor control   Assessment Improved ability to follow commands this session compared to yesterday   Treatment Diagnosis L MCA stroke   Activity Tolerance   Activity Tolerance Patient Tolerated treatment well

## 2022-12-06 NOTE — PROGRESS NOTES
[facility]  INPATIENT SPEECH THERAPY  [facility] 8 [facility] UNIT  TIME   [id number]  75 MINUTES    [x]Daily Note  []Progress Note    Date: 2022  Patient Name: Pradip Millan        MRN: [id number]    Account #: [de-identified]  : 1960  (64 y.o.)  Gender: female   Primary Provider: Jac Rivera MD  Swallowing Status/Diet: Dysphagia soft and bite sized, thin liquids      Subjective: Pt alert, cooperative, and upright in wheelchair. Objective:    Oral cavity checked for pocking. No oral pocketing observed after noon meal.     No overt s/s of aspiration/penetration observed with thin liquids via consecutive sips side of cup. Education provided on utilizing lingual sweep frequently during and after meals/all P.O. trials. Pt will require reinforcement of this strategy. Question if strategy is utilized independently. Matching/identification task completed. Pt is provided 5 written words and 5 images of the written words. Pt is required to point to the image of the target word. Task completed with 60% accuracy. Moderate phonemic cues provided. Body part identification task completed. Pt is unable to complete this task independently. Maximum visual and verbal cues provided throughout task. Matching/identification task completed. Pt is provided a target image and two words. Pt is required to select the target image from the two words. Task completed with 63% accuracy. Pt would often times verbalize both choices and conclude verbalization with accurate choice. Functional reading task completed. Pt is required to read a variety of words. Pt is able to complete one word reading task with approximately 50% accuracy at this time. Moderate phonemic cues provided. Phrase completion task completed. Pt is provided 2 words at the beginning of a common phrase and is required to finish the phrase. Task completed with 55% accuracy. Moderate-maximum phonemic cues provided. Simple y/n questions completed.  Pt is provided a variety of simple y/n questions and is required to accurately/consistently answer all questions. Inconsistent y/n responses noted. Task completed with 50% accuracy. Pt is inconsistently aware of receptive/expressive language deficits. SLP will continue to follow and treat. Recommended Diet and Intervention  Soft & Bite Sized consistencies   Thin liquids  Recommended Form of Meds: Crushed in puree as able  Dysphagia treatment  Therapeutic Interventions: Pharyngeal exercises;Diet tolerance monitoring;Oral care;Oral motor exercises; Patient/Family education; Therapeutic PO trials with SLP     Compensatory Swallowing Strategies  Compensatory Swallowing Strategies : Alternate solids and liquids;Eat/Feed slowly; No straws;Upright as possible for all oral intake;Remain upright for 30-45 minutes after meals;Small bites/sips; Check for pocketing of food on the Right; Check for pocketing of food on the Left; Set up assist;Assist feed    SHORT TERM GOAL #1:  Goal 1: Pt will complete y/n tasks with 80% accuracy and minimal verbal cues/modeling. SHORT TERM GOAL #2:  Goal 2: Pt will complete verbal expression tasks with 80% accuracy and minimal verbal cues/modeling. SHORT TERM GOAL #3:  Goal 3: Pt will complete receptive/expressive language tasks with 80% accuracy and minimal verbal cues/modeling. SHORT TERM GOAL #4:  Goal 4: Pt will follow one step commands with with 90% accuracy and minimal verbal cues/modeling. SHORT TERM GOAL #5:  Goal 5: Pt will complete orientation tasks with 90% accuracy and minimal verbal cues/modeling. Swallowing Short Term Goals  Short-term Goals  Goal 1: Pt will tolerate soft & bite sized consistencies with thin liquids with minimal overt s/s of aspiration/penetration during hospitalization. Goal 2: Pt will complete Modified Barium Swallow Study. Goal 3: Pt will demonstrate awareness of general aspiration precautions.   Goal 4: Pt will participate in swallowing reassessments to ensure safest diet consistencies. Goal 5: Pt will allow staff to complete daily oral care. Goal 6: Pt will complete oral motor exercises for lingual and labial strengthening. ASSESSMENT:  Assessment: [x]Progressing towards goals          []Not Progressing towards goals    Patient Tolerance of Treatment:   []Tolerated well [x]Tolerated fair []Required rest breaks []Fatigued    Education:  Learner:  [x]Patient          []Significant Other          []Other  Education provided regarding:  [x]Goals and POC   []Diet and swallowing precautions    []Home Exercise Program  []Progress and/or discharge information  Method of Education:  [x]Discussion          []Demonstration          []Handout          []Other  Evaluation of Education:   [x]Verbalized understanding   []Demonstrates without assistance  []Demonstrates with assistance  []Needs further instruction     []No evidence of learning                  []No family present      Plan: [x]Continue with current plan of care    []Modify current plan of care as follows:    []Discharge patient    Discharge Location:    Services/Supervision Recommended:      []Patient continues to require treatment by a licensed therapist to address functional deficits as outlined in the established plan of care.       Electronically signed by MARCELINO Lopez on 12/6/2022 at 1:41 PM

## 2022-12-06 NOTE — PROGRESS NOTES
[facility]  INPATIENT SPEECH THERAPY  [facility] 8 REHAB UNIT        [x]Daily Note  []Progress Note    Date: 2022  Patient Name: Fabian Short        MRN: [id number]    Account #: [de-identified]  : 1960  (64 y.o.)  Gender: female   Primary Provider: Trisha Thomson MD  Diet: Dysphagia soft and bite sized, thin liquids        PATIENT DIAGNOSIS(ES):    Diagnosis: CVA, R hemiparesis     Additional Pertinent Hx: Anxiety, depression, COPD, CVA, DM, LE edema, hyperlipidemia, HTN, obesity, RA, CAD and venous thromboembolism     RESTRICTIONS/PRECAUTIONS:    Restrictions/Precautions  Restrictions/Precautions: Modified Diet, Swallowing - Fall Risk, Aspiration Risk  Required Braces or Orthoses?: No           Subjective:  She was cooperative with all therapy tasks. She was seen for morning and afternoon sessions this date. Objective:  She was presented with her schedule for the day. She was also provided with a sheet for orientation which included (the day of the week, date, location, room, floor, and city/state). In unison and with [facility], she was able to state all of the orientation information. Diadochokinetic tasks were completed. She demonstrated good accuracy and rate. Tasks for expressive language were completed. Naming pictures with phonemic and phrase completion cues was at 90%. Automatic speech tasks were completed in unison. She was able to recite the Lord's prayer in unison. Repetition of three and four syllable words was at 70% this date. She was able to read and repeat all of her family members' names this date. ([facility] was used for part of this task). Receptive language tasks were completed. Body part identification was at 100% with a model. Following one step commands was at 100% with a model. Reading tasks were completed. Single letter recognition was at 75%. Reading single syllable words was at 100% with phonemic cues. She was able to read single word cards which were placed to form sentences.  With [facility], she was able to read all of the sentences that were presented. AAC boards have been practiced in therapy to determine if she can utilize these as a method of communication. She was able to correctly identify pictures on an AAC board (simple AAC board with 4 pictures per page) at 100%. And, with a complex AAC board with 15 pictures per page at 100%. SLP will continue to practice with these during therapy. She has not yet been able to use these independently to communicate her wants/needs. Neologisms, semantic paraphasis, and perseverations are all still noted. She continues to demonstrate poor safety awareness and impulsivity. Therapist has reviewed rehab rules and she has expressed understanding. Today, she did lean over the side of her wheelchair to reach for items on the floor. Reviewed wheelchair safety rules. This afternoon, she propelled her wheelchair to the other side of the bed and was attempting to transfer herself to the bed without assistance. Therapist again reviewed the rehab rules and she is not yet allowed to transfer herself. Three staff members were present for transfer from the wheelchair to the bed. Smiley was verbalizing that she is now walking. SLP did discuss this with her physical therapist and she is ambulating in the parallel bars. However, she is not safe to transfer herself. She appeared discouraged but again expressed understanding. Today she tolerated thin liquids via cup and straw without any overt s/s of aspiration. Clear voicing was noted after all sips. Good hyolaryngeal elevation and mildly delayed pharyngeal swallow responses were noted. She was able to complete oral motor exercises with a model. She was able to use her tray table mirror while completing the OMEs. She also completed the effortful swallow after instructions and demonstration were provided.  Reviewed swallowing precautions and the importance of continuing to use a lingual search/sweep during and after meals. No oral residue or buccal cavity pocketing was noted after meals this date. She continues to exhibit severe expressive and receptive aphasia, dysarthria,cognitive deficits, deficits in executive function and dysphagia. Recommend she continue speech therapy services to address these deficits. Recommend she remain on a dysphagia soft diet with thin liquids at this time. SLP will upgrade to regular when she demonstrates consistent awareness of oral residue. Recommended Diet and Intervention  Soft & Bite Sized consistencies   Thin liquids  Recommended Form of Meds: Crushed in puree as able  Dysphagia treatment  Therapeutic Interventions: Pharyngeal exercises;Diet tolerance monitoring;Oral care;Oral motor exercises; Patient/Family education; Therapeutic PO trials with SLP     Compensatory Swallowing Strategies  Compensatory Swallowing Strategies : Alternate solids and liquids;Eat/Feed slowly; No straws;Upright as possible for all oral intake;Remain upright for 30-45 minutes after meals;Small bites/sips; Check for pocketing of food on the Right; Check for pocketing of food on the Left; Set up assist;Assist feed                 SHORT TERM GOAL #1:  Goal 1: Pt will complete y/n tasks with 80% accuracy and minimal verbal cues/modeling. SHORT TERM GOAL #2:  Goal 2: Pt will complete verbal expression tasks with 80% accuracy and minimal verbal cues/modeling. SHORT TERM GOAL #3:  Goal 3: Pt will complete receptive/expressive language tasks with 80% accuracy and minimal verbal cues/modeling. SHORT TERM GOAL #4:  Goal 4: Pt will follow one step commands with with 90% accuracy and minimal verbal cues/modeling. SHORT TERM GOAL #5:  Goal 5: Pt will complete orientation tasks with 90% accuracy and minimal verbal cues/modeling.      Swallowing Short Term Goals  Short-term Goals  Goal 1: Pt will tolerate soft & bite sized consistencies with mildly  (nectar) thick liquids with minimal overt s/s of aspiration/penetration during hospitalization. New Goal: Pt will tolerate soft and bite sized diet with thin liquids with minimal overt s/s of aspiration/penetration during hospitalization. Goal 2: Pt will complete Modified Barium Swallow Study. Goal 3: Pt will demonstrate awareness of general aspiration precautions. Goal 4: Pt will participate in swallowing reassessments to ensure safest diet consistencies. Goal 5: Pt will allow staff to complete daily oral care. Goal 6: Pt will complete oral motor exercises for lingual and labial strengthening. Long term goals:  Pt will participate in skilled speech therapy services to ensure she is able to communicate her wants and needs. Pt will participate in skilled speech therapy services to ensure she is able to complete ADLs. Pt will tolerate least restrictive diet with minimal overt s/s of aspiration/penetration during hospitalization.            ASSESSMENT:  Assessment: [x]Progressing towards goals          []Not Progressing towards goals    Patient Tolerance of Treatment:   [x]Tolerated well []Tolerated fair []Required rest breaks []Fatigued    Education:  Learner:  []Patient          []Significant Other          []Other  Education provided regarding:  [x]Goals and POC   []Diet and swallowing precautions    []Home Exercise Program  []Progress and/or discharge information  Method of Education:  [x]Discussion          []Demonstration          []Handout          []Other  Evaluation of Education:   [x]Verbalized understanding   []Demonstrates without assistance  []Demonstrates with assistance  []Needs further instruction     []No evidence of learning                  []No family present      Plan: [x]Continue with current plan of care    []Modify current plan of care as follows:    []Discharge patient    Discharge Location:    Services/Supervision Recommended:      [x]Patient continues to require treatment by a licensed therapist to address functional deficits as outlined in the established plan of care.                      Electronically Signed By:  Elle Zafar M.S., CCC-SLP  12/6/2022,8:26 AM.

## 2022-12-06 NOTE — PROGRESS NOTES
Patient:   Elaine Montalvo  MR#:    [id number]   Room:    0826/826-02   YOB: 1960  Date of Progress Note: 12/6/2022  Time of Note                           8:26 AM  Consulting Physician:   Jimi Rodríguez M.D. Attending Physician:  Jimi Rodríguez MD     Chief complaint Acute ischemic stroke    S:This 64 y.o. female  with history of anxiety, depression, COPD, atherosclerotic disease, colitis, COPD, CVA, DM,  LE edema, hyperlipidemia, HTN, morbid obesity, RA, CAD  and venous thromboembolism. She presented to [facility] ER on 11/9/22 after being found at home lying facedown in her feces. She was very weak, confused, not able to speak but moving purposefully and intermittently following commands. Her last well know was 3 a.m. when her  left for work. Imaging done revealed a large MCA stroke 7.7 x 5 cm. CTA head/neck revealed an acute M1 occlusion. She was outside of the window for TPA. CK on admit was 1100, consistent with rhabdomylosis. She was also noted to possibly Dens fracture. She was transferred to [facility] for a possible thrombectomy. Further imaging was done at [facility] and she was deemed not a candidate for thrombectomy. MRI done ruled out Dens fracture. Repeat CT of head on 11/11/22 showed evolving left MCA territory infarct with increasing edema/mass effect resulting in 4mm of  rightward midline shift(previously 2mm) a the level of the third ventricle. No hemorrhagic conversion or definite trapping of the right lateral ventricle, possible small acute right cerebellar infarct. Neurosurgery was consulted for possible hemicraniectomy. She was seen by Dr. Wade Adames, who didn't feel any surgical intervention was needed. Patient was found to have a UTI + for [facility] and was placed on Rocephin on 11/14/22. Patient had a PICC line placed. Patient was evaluated by SPT and initially made NPO d/t oropharyngeal dysphagia. NGT placed and feeding started. She was also noted to have global aphasia but is improving. She was re-evaluated by SPT on 11/15/22 for swallow and was started on a dysphagia 1 diet with nectar thick liquids. Patient also continues to have right sided weakness and is participating in both PT/OT. Repeat CT head on 11/13/22 shows stable LMCA ischemic stroke with stable edema, 5 mm shift, no hemorrhage. She is felt to need a stay on Rehab for continued PT/OT/SPT and medical management to work towards her goal of returning home with her . She is now felt ready to start the Rehab program.  No new issues overnight. REVIEW OF SYSTEMS:  Unreliable due to aphasia     Past Medical History:      Diagnosis Date    Anxiety     ASCVD (arteriosclerotic cardiovascular disease)     Carrier of group B Streptococcus     Cellulitis     Colitis     COPD (chronic obstructive pulmonary disease) ([facility])     Costochondritis     CVA (cerebral vascular accident) ([address]     Depression     Edema     Fracture of sternum     non union post surgery.      Gallstones     Grief reaction     H/O superior vena cava filter placement     Hyperlipidemia     Hypertension     MI (myocardial infarction) ([address]     MRSA (methicillin resistant Staphylococcus aureus)     Neuropathy     Obesity     Pseudarthrosis sternal    RA (rheumatoid arthritis) ([address]     Rosacea     Sinus bradycardia     TIA (transient ischemic attack)     Venous thromboembolism        Past Surgical History:      Procedure Laterality Date    ADENOIDECTOMY      APPENDECTOMY      CARDIAC CATHETERIZATION  3/2009    CARDIAC CATHETERIZATION  11/5/14  JDT    EF 50%, patent bypass grafts    CHOLECYSTECTOMY  2011    COLONOSCOPY  06/03/2010    Dr. Adolph Lopez: distal colon having ulcerative lesions c/w UC    COLONOSCOPY  2009    pancolitis    COLONOSCOPY      CORONARY ARTERY BYPASS GRAFT  3/2009    Dr Beau Matta, LIMA to LAD, SVGs to OM & PDA    GASTRIC BYPASS SURGERY  2012    HIATAL HERNIA REPAIR  2011    HYSTERECTOMY (CERVIX STATUS UNKNOWN)      KNEE SURGERY      [place] COLONOSCOPY FLX DX W/COLLJ SPEC WHEN PFRMD N/A 3/12/2018    Dr Shelby Mars rectal inflammation consistent with U.C.-Active proctitis--3 yr recall    [facility] ENDOSCOPY  2010    Dr. Reanna Church  2012       Medications in Hospital:      Current Facility-Administered Medications:     miconazole (MICOTIN) 2 % powder, , Topical, BID, Monalisa Mcrae MD, Given at 12/05/22 2111    docusate sodium (COLACE) capsule 100 mg, 100 mg, Oral, BID, Monalisa Mcrae MD, 100 mg at 12/05/22 2110    aspirin chewable tablet 81 mg, 81 mg, Oral, Daily, Monalisa Mcrea MD, 81 mg at 12/05/22 0831    atorvastatin (LIPITOR) tablet 40 mg, 40 mg, Oral, Nightly, Monalisa Mcrae MD, 40 mg at 12/05/22 2110    dulaglutide (TRULICITY) SC injection 1.5 mg (Patient Supplied), 1.5 mg, SubCUTAneous, Weekly, Monalisa Mcrae MD    folic acid (FOLVITE) tablet 1 mg, 1 mg, Oral, Daily, Monalisa Mcrae MD, 1 mg at 12/05/22 0831    gabapentin (NEURONTIN) capsule 300 mg, 300 mg, Oral, Nightly, Monalisa Mcrae MD, 300 mg at 12/05/22 2110    hydroxychloroquine (PLAQUENIL) tablet 200 mg, 200 mg, Oral, BID, Monalisa Mcrae MD, 200 mg at 12/05/22 2111    amLODIPine (NORVASC) tablet 2.5 mg, 2.5 mg, Oral, Daily, Monalisa Mcrae MD, 2.5 mg at 12/05/22 0831    tiZANidine (ZANAFLEX) tablet 4 mg, 4 mg, Oral, BID PRN, Monalisa Mcrae MD    multivitamin 1 tablet, 1 tablet, Oral, Daily, Monalisa Mcrae MD, 1 tablet at 12/05/22 0831    acetaminophen (TYLENOL) tablet 650 mg, 650 mg, Oral, Q4H PRN, Monalisa Mcrae MD, 650 mg at 11/27/22 1933    enoxaparin (LOVENOX) injection 40 mg, 40 mg, SubCUTAneous, Daily, Monalisa Mcrae MD, 40 mg at 12/05/22 1730    polyethylene glycol (GLYCOLAX) packet 17 g, 17 g, Oral, Daily PRN, Monalisa Mcrae MD, 17 g at 11/30/22 1817    Allergies:  Methotrexate derivatives    Social History:   TOBACCO:   reports that she quit smoking about 13 years ago. Her smoking use included cigarettes.  She has a 64.00 pack-year smoking history. She has never used smokeless tobacco.  ETOH:   reports current alcohol use. Family History:       Problem Relation Age of Onset    Prostate Cancer Father     Heart Failure Father     Cancer Father     Colon Cancer Neg Hx     Colon Polyps Neg Hx     Liver Cancer Neg Hx     Liver Disease Neg Hx     Esophageal Cancer Neg Hx     Rectal Cancer Neg Hx     Stomach Cancer Neg Hx          PHYSICAL EXAM:  BP (!) 155/71   Pulse 56   Temp 97 °F (36.1 °C) (Temporal)   Resp 16   Ht 5' 7\" (1.702 m)   Wt 203 lb (92.1 kg)   SpO2 98%   BMI 31.79 kg/m²     Constitutional - well developed, well nourished. Eyes - conjunctiva normal.   Ear, nose, throat - No scars, masses, or lesions over external nose or ears, no atrophy of tongue  Neck-symmetric, no masses noted, no jugular vein distension  Respiration- chest wall appears symmetric, good expansion,   normal effort without use of accessory muscles  Musculoskeletal - no significant wasting of muscles noted, no bony deformities  Extremities-no clubbing, cyanosis or edema  Skin - warm, dry, and intact. No rash, erythema, or pallor. Psychiatric - mood, affect, and behavior appear normal.      Neurological exam  Awake, alert, global aphasia says \"yes\" to all questions asked   Attention and concentration appear appropriate  Recent and remote memory unable to tests     Cranial Nerve Exam     CN III, IV,VI-EOMI, No nystagmus, conjugate eye movements, no ptosis    CN VII-+ facial assymetry       Motor Exam  No movement on the right      Tremors- no tremors in hands or head noted     Gait  Not tested     Nursing/pcp notes, imaging,labs and vitals reviewed.      PT,OT and/or speech notes reviewed    Lab Results   Component Value Date    WBC 6.3 12/05/2022    HGB 11.7 (L) 12/05/2022    HCT 39.1 12/05/2022    MCV 80.1 (L) 12/05/2022     12/05/2022     Lab Results   Component Value Date     12/05/2022    K 4.0 12/05/2022     12/05/2022 CO2 25 2022    BUN 9 2022    CREATININE 0.5 2022    GLUCOSE 78 2022    CALCIUM 9.0 2022    PROT 5.4 (L) 2022    LABALBU 2.9 (L) 2022    BILITOT <0.2 2022    ALKPHOS 84 2022    AST 29 2022    ALT 25 2022    LABGLOM >60 2022   No results found for: INR, PROTIME      Franci Burger, OT   Occupational Therapist   Occupational   Progress Notes      Signed   Date of Service:  12/3/2022  2:00 PM                 Signed                                                                                                                                                                                            Occupational Therapy  Facility/Department: [facility] REHAB UNIT        Name: Charis Peacock  : 1960  MRN: [id number]  Date of Service: 12/3/2022     Discharge Recommendations:  Continue to assess pending progress        Patient Diagnosis(es): There were no encounter diagnoses. Past Medical History:  has a past medical history of Anxiety, ASCVD (arteriosclerotic cardiovascular disease), Carrier of group B Streptococcus, Cellulitis, Colitis, COPD (chronic obstructive pulmonary disease) ([address] Costochondritis, CVA (cerebral vascular accident) ([address] Depression, Edema, Fracture of sternum, Gallstones, Grief reaction, H/O superior vena cava filter placement, Hyperlipidemia, Hypertension, MI (myocardial infarction) ([address] MRSA (methicillin resistant Staphylococcus aureus), Neuropathy, Obesity, Pseudarthrosis, RA (rheumatoid arthritis) ([address] Rosacea, Sinus bradycardia, TIA (transient ischemic attack), and Venous thromboembolism. Past Surgical History:  has a past surgical history that includes Coronary artery bypass graft (3/2009); Tonsillectomy; Hysterectomy; thoracotomy; knee surgery; Appendectomy; Colonoscopy (2010); Cardiac catheterization (3/2009); Adenoidectomy; Cholecystectomy ();  Gastric bypass surgery (); hiatal hernia repair (); Cardiac catheterization (11/5/14  JDT); Colonoscopy (2009); Colonoscopy; Upper gastrointestinal endoscopy (2010); Upper gastrointestinal endoscopy (2012); and pr colonoscopy flx dx w/collj spec when pfrmd (N/A, 3/12/2018). Treatment Diagnosis: L MCA stroke        Assessment   Performance deficits / Impairments: Decreased functional mobility ; Decreased endurance;Decreased coordination;Decreased ADL status; Decreased sensation;Decreased posture;Decreased ROM; Decreased balance;Decreased strength;Decreased vision/visual deficit; Decreased safe awareness;Decreased high-level IADLs;Decreased cognition;Decreased fine motor control  Activity Tolerance  Activity Tolerance: Patient Tolerated treatment well         Plan   Occupational Therapy Plan  Specific Instructions for Next Treatment: transfers, UE  Current Treatment Recommendations: Strengthening, ROM, Balance training, Functional mobility training, Endurance training, Neuromuscular re-education, Safety education & training, Patient/Caregiver education & training, Equipment evaluation, education, & procurement, Positioning, Modalities, Self-Care / ADL, Home management training, Sensory integration      Restrictions  Restrictions/Precautions  Restrictions/Precautions: Modified Diet, Swallowing - Thickened Liquids, Fall Risk, Aspiration Risk, Weight Bearing  Required Braces or Orthoses?: No  Lower Extremity Weight Bearing Restrictions  Right Lower Extremity Weight Bearing: Weight Bearing As Tolerated  Left Lower Extremity Weight Bearing: Weight Bearing As Tolerated     Subjective   General  Patient assessed for rehabilitation services?: Yes  Family / Caregiver Present: Yes (spouse)  Subjective  Subjective: Initial family instruction provided with future sessions planned as pt progresses  General Comment  Comments: emotional at start of session but willing to participate---pt demonstrated improved mood by the end of session      Pre Treatment Pain Screening   Pain at present 0   Scale Used Faces   Intervention List Patient able to continue with treatment   General Comment   Comments emotional at start of session but willing to participate---pt demonstrated improved mood by the end of session         Objective      Safety Devices  Type of Devices: Left in chair;Call light within reach;Gait belt (left with daughter and spouse)     Bed mobility  Supine to Sit: Moderate assistance (mod/min A)  Transfers  Slide Board: Contact guard assistance (bed to w/c, towards left, CGA/SBA with cues)     Exercise Treatment: extensive AAROM HEP---GE planes from tabletop---demonstrated increased strength proximally  PROM Exercises: R UE----all muscle groups    12/03/22 1400   Neuromuscular Education   Neuromuscular education Yes   NDT Treatment Upper extremity   Facilitation techniques scapular mobilization   Vibration elbow flexion/extension, wrist extension----2-/5---good response to vibration   Weight Bearing   Weight Bearing Technique Yes   RUE Weight Bearing Forearm seated; Extended arm seated   Response To Weight Bearing Technique good         Goals  Short Term Goals  Time Frame for Short Term Goals: 2 weeks  Short Term Goal 1: MET  Short Term Goal 2: Pt will dress UB using hemitechnique, mod A and cues  Short Term Goal 3: Pt will dress LB, hemitechnique, mod A and cues  Short Term Goal 4: Pt will toilet with mod A  Short Term Goal 5:  Toilet transfer with mod A  Additional Goals?: Yes  Short Term Goal 6: Pt will attend to R side during ADL/functional activities with moderate cues  Short Term Goal 7: Pt will perform standing tasks x 2-3 mins with L UE and mod A for balance to enhance ADL/IADL performance  Short Term Goal 8: Pt/family will demo understanding of R UE positioning/HEP/therapeutic activity recommendations with min A after ed  Long Term Goals  Time Frame for Long Term Goals : 4-5 weeks  Long Term Goal 1: Pt will bathe with min A, AE/DME prn  Long Term Goal 2: Pt will dress UB supervision  Long Term Goal 3: Pt will dress LB min A, AE prn  Long Term Goal 4: Pt will toilet with CGA  Long Term Goal 5: Pt will perform toilet transfer with CGA  Additional Goals?: Yes  Long Term Goal 6: Pt will perform simple home making task with min A  Long Term Goal 7: Pt/family will be independent in HEP/DME/therapeutic activity recommendations after ed  Long Term Goal 8: Pt will attend to R side during functional activities with occasional cueing     Therapy Time    Individual Concurrent Group Co-treatment   Time In 1400      Time Out 1500      Minutes 60      Timed Code Treatment Minutes: 60 Minutes     Tk Reinoso OT                  RECORD REVIEW: Previous medical records, medications were reviewed at today's visit    IMPRESSION:   1. Acute ischemic stroke-on aspirin/statin  2. Hypertension-on medications monitor  3. Hyperlipidemia-on statin  4. Diabetes-Trulicity-monitor blood sugar  5. DVT prophylaxis-Lovenox  6. History of coronary artery disease-aspirin/statin  7. Neuropathy-on Neurontin  8. Rheumatoid arthritis-on Plaquenil  9. COPD-monitor  10. History of anxiety and depression-monitor  11. History of colitis/gallstones-monitor  12. History of bariatric surgery-on multivitamin/folic acid  13. History of superior vena cava filter placement with venous thromboembolism-not on anticoagulation-monitor- indicates took herself off blood thinners as not like meds and was bruising   14. PT/OT/speech    Continue current care    x-ray of right ankle no acute changes  Venous ultrasound of leg no DVT    Team conference with PT/OT/speech/nursing/Care coordinator to review in depth patients care and discharge planning. At least 35 minutes spent coordinating care for patient >50% of time spent in coordination of care.       Max assist toilet transfer  Max to mod transfer   ft mod assist  Ambulation 12 ft parallel bars mod assist       ELOS pending 3 weeks     Expected duration and frequency therapy: 180 minutes per day, 5 days per week    [address] [facility]  [phone] CELL  Dr Elicia Weller

## 2022-12-06 NOTE — PLAN OF CARE
Problem: Safety - Adult  Goal: Free from fall injury  12/5/2022 1914 by Lisbeth Jones RN  Outcome: Progressing  12/5/2022 1138 by Cathi Alatorre RN  Outcome: Progressing

## 2022-12-06 NOTE — PROGRESS NOTES
12/06/22 1000   Restrictions/Precautions   Restrictions/Precautions Modified Diet;Swallowing - Thickened Liquids; Fall Risk;Aspiration Risk;Weight Bearing   Lower Extremity Weight Bearing Restrictions   Right Lower Extremity Weight Bearing Weight Bearing As Tolerated   Left Lower Extremity Weight Bearing Weight Bearing As Tolerated   General   Additional Pertinent Hx Anxiety, depression, COPD, CVA, DM, LE edema, hyperlipidemia, HTN, obesity, RA, CAD and venous thromboembolism   Diagnosis CVA, R hemiparesis   General Comment   Comments PATIENT BROUGHT TO PT GYM BY OT   Subjective   Subjective PATIENT APPEARS TO BE DOING WELL, OT COMMUNICATES SHE IS ABLE TO FOLLOW VC BETTER TODAY. Transfers   Sit to Stand Moderate Assistance;Minimal Assistance  (FROM W/C IN //)   Stand to Sit Minimal Assistance;Contact guard assistance  (IN //)   Comment SS TO EXCHANGE PANTS DUE TO WET SPOT ON PATIENT, NOT FROM INCONTINENCE. Ambulation   WB Status WBAT   Ambulation   Surface Level tile   Device Parallel Bars   Other Apparatus AFO; Wheelchair follow  (SHOE COVER)   Assistance Moderate assistance   Quality of Gait PATIENT PREMATURELY ADVANCES LLE PRIOR TO RLE BEING IN FULL EXTENSION. CUES GIVEN BEFORE AND DURING AMB. PAUSE/STOP AND WAIT TILL RLE IS LOCKED OUT BY THERAPIST. Distance 3',12'   Comments INTIAL WALKED D/C DUE TO WET AREA ON PATIENTS PANTS. AFTERWARDS REATTEMPTED AMB., FEW INCIDENTS OF KNEE BUCKLING DUE TO PATIENT ADVANCING LLE BEFORE RLE WAS IN FULL EXT. AS WALK CONTINUED PATIENT WAS ABLE TO FOLLOW VC AND WAIT FOR THERAPIST TO ESTABLISH TERMINAL KNEE EXT BEFORE ADVANCING LLE. More Ambulation? Yes   Ambulation 2   Surface - 2 level tile   Device 2 Parallel Bars   Other Apparatus 2 AFO; Wheelchair follow   Assistance 2 Moderate assistance   Quality of Gait 2 AS ABOVE   Distance 12 FT   Comments PATIENT CONTINUED TO PROGRESS WITH LLE BEFORE RLE WAS SET AND LOCKED OUT.  EVEN WHEN TOLD TO STOP OR PAUSE PATIENT WOULD TRY TO CONTINUE FWD. AS WALK PROGRESS, PATIENT WILL WAIT TILL LEG IS LOCKED   Ambulation 3   Surface - 3 level tile   Device 3 Parallel Bars   Other Apparatus 3 AFO; Wheelchair follow   Assistance 3 Moderate assistance;Minimal assistance   Quality of Gait 3 BEST AMB. OF THE THREE ATTEMPTS IN //   Distance 12 FT   Comments ONLY ONE INCIDENT OF PREMATURE ADVANCEMENT OF LLE, OTHERWISE AMB. WAS FLUID. THERAPIST PROVIDE MOD A AT MEDIAL GASTROC AND ISCHIAL TUBEROSITY FOR ALL AMB. PT Exercises   Exercise Treatment SEATED EX: MARCHES 15X, HIP EXT 10X, PF/DF 15X, HIP ABD 15X, HIP ADD 15X  (MANUAL RESISTANCE APPLIED TO LLE, RLE = PROM)   Assessment   Assessment PATIENT WAS ABLE TO TOLERATE MORNING THERAPY SESSION. DURING FIRST AND SECOND AMB. PATIENT HAD DIFFICULTY FOLLOWING VC AND BEGAN INITATING MOVEMENTS BEFORE THERAPIST WAS ABLE TO PROVIDE ASSISTANCE TO RLE LEADING TO UNSTEADINESS AND LLE KNEE BUCKLING. DURING THIRD AMB. PATIENT DID MUCH BETTER WITH DIRECTIONS AND ONLY ONE MOMENT OF KNEE BUCKLING (SEE AMB FOR DETAILS). RETURNED TO ROOM AFTER AMB. TO CHANGE PANTS, USED SS. AFTERWARDS FINISHED SESSION WITH SEATED EXERCISES.    Safety Devices   Type of Devices Left in chair;Call light within reach;Gait belt   PT Individual Minutes   Time In 1000   Time Out 1100   Minutes 60

## 2022-12-07 PROCEDURE — 97530 THERAPEUTIC ACTIVITIES: CPT

## 2022-12-07 PROCEDURE — 1180000000 HC REHAB R&B

## 2022-12-07 PROCEDURE — 6360000002 HC RX W HCPCS: Performed by: PSYCHIATRY & NEUROLOGY

## 2022-12-07 PROCEDURE — 97535 SELF CARE MNGMENT TRAINING: CPT

## 2022-12-07 PROCEDURE — 97116 GAIT TRAINING THERAPY: CPT

## 2022-12-07 PROCEDURE — 6370000000 HC RX 637 (ALT 250 FOR IP): Performed by: PSYCHIATRY & NEUROLOGY

## 2022-12-07 PROCEDURE — 92526 ORAL FUNCTION THERAPY: CPT

## 2022-12-07 PROCEDURE — 92507 TX SP LANG VOICE COMM INDIV: CPT

## 2022-12-07 PROCEDURE — 97110 THERAPEUTIC EXERCISES: CPT

## 2022-12-07 PROCEDURE — 94760 N-INVAS EAR/PLS OXIMETRY 1: CPT

## 2022-12-07 PROCEDURE — 99232 SBSQ HOSP IP/OBS MODERATE 35: CPT | Performed by: PSYCHIATRY & NEUROLOGY

## 2022-12-07 RX ORDER — LACTULOSE 10 G/15ML
20 SOLUTION ORAL
Status: DISCONTINUED | OUTPATIENT
Start: 2022-12-07 | End: 2022-12-09

## 2022-12-07 RX ADMIN — DOCUSATE SODIUM 100 MG: 100 CAPSULE, LIQUID FILLED ORAL at 20:21

## 2022-12-07 RX ADMIN — AMLODIPINE BESYLATE 2.5 MG: 2.5 TABLET ORAL at 08:44

## 2022-12-07 RX ADMIN — DOCUSATE SODIUM 100 MG: 100 CAPSULE, LIQUID FILLED ORAL at 08:44

## 2022-12-07 RX ADMIN — HYDROXYCHLOROQUINE SULFATE 200 MG: 200 TABLET, FILM COATED ORAL at 20:22

## 2022-12-07 RX ADMIN — THERA TABS 1 TABLET: TAB at 08:44

## 2022-12-07 RX ADMIN — FOLIC ACID 1 MG: 1 TABLET ORAL at 08:44

## 2022-12-07 RX ADMIN — ASPIRIN 81 MG 81 MG: 81 TABLET ORAL at 08:44

## 2022-12-07 RX ADMIN — MICONAZOLE NITRATE: 2 POWDER TOPICAL at 21:17

## 2022-12-07 RX ADMIN — ATORVASTATIN CALCIUM 40 MG: 40 TABLET, FILM COATED ORAL at 20:22

## 2022-12-07 RX ADMIN — GABAPENTIN 300 MG: 300 CAPSULE ORAL at 20:22

## 2022-12-07 RX ADMIN — ENOXAPARIN SODIUM 40 MG: 100 INJECTION SUBCUTANEOUS at 17:19

## 2022-12-07 RX ADMIN — HYDROXYCHLOROQUINE SULFATE 200 MG: 200 TABLET, FILM COATED ORAL at 08:44

## 2022-12-07 RX ADMIN — BISACODYL 5 MG: 5 TABLET, COATED ORAL at 08:44

## 2022-12-07 NOTE — PROGRESS NOTES
Occupational Therapy  Facility/Department: [facility] REHAB UNIT      Name: Dennise Snyder  : 1960  MRN: [id number]  Date of Service: 2022    Discharge Recommendations:  Continue to assess pending progress          Patient Diagnosis(es): There were no encounter diagnoses. Past Medical History:  has a past medical history of Anxiety, ASCVD (arteriosclerotic cardiovascular disease), Carrier of group B Streptococcus, Cellulitis, Colitis, COPD (chronic obstructive pulmonary disease) ([address] Costochondritis, CVA (cerebral vascular accident) ([address] Depression, Edema, Fracture of sternum, Gallstones, Grief reaction, H/O superior vena cava filter placement, Hyperlipidemia, Hypertension, MI (myocardial infarction) ([address] MRSA (methicillin resistant Staphylococcus aureus), Neuropathy, Obesity, Pseudarthrosis, RA (rheumatoid arthritis) [facility] Rosacea, Sinus bradycardia, TIA (transient ischemic attack), and Venous thromboembolism. Past Surgical History:  has a past surgical history that includes Coronary artery bypass graft (3/2009); Tonsillectomy; Hysterectomy; thoracotomy; knee surgery; Appendectomy; Colonoscopy (2010); Cardiac catheterization (3/2009); Adenoidectomy; Cholecystectomy (); Gastric bypass surgery (); hiatal hernia repair (); Cardiac catheterization (14  JDT); Colonoscopy (); Colonoscopy; Upper gastrointestinal endoscopy (); Upper gastrointestinal endoscopy (); and pr colonoscopy flx dx w/collj spec when pfrmd (N/A, 3/12/2018).     Treatment Diagnosis: L MCA stroke      Plan   Occupational Therapy Plan  Specific Instructions for Next Treatment: transfers, UE  Current Treatment Recommendations: Strengthening, ROM, Balance training, Functional mobility training, Endurance training, Neuromuscular re-education, Safety education & training, Patient/Caregiver education & training, Equipment evaluation, education, & procurement, Positioning, Modalities, Self-Care / ADL, Home management training, Sensory integration     Restrictions  Restrictions/Precautions  Restrictions/Precautions: Modified Diet, Swallowing - Thickened Liquids, Fall Risk, Aspiration Risk, Weight Bearing  Required Braces or Orthoses?: No  Lower Extremity Weight Bearing Restrictions  Right Lower Extremity Weight Bearing: Weight Bearing As Tolerated  Left Lower Extremity Weight Bearing: Weight Bearing As Tolerated     12/07/22 0900   General   Family / Caregiver Present Yes  (spouse)   Pre Treatment Pain Screening   Pain at present 4   Scale Used Numeric Score   Comments / Details unable to identify pain location       Objective     Transfers  Slide Board: Minimal assistance (bed to w/c)       PROM Exercises: R UE----all muscle groups        12/07/22 0900   Weight Bearing   Weight Bearing Technique Yes   RUE Weight Bearing Forearm seated; Extended arm seated      12/07/22 0900   Cognition   Additional Comments able to verbalize simple objects with about 25% accuracy--apple, banana, lemon, orange       Goals  Short Term Goals  Time Frame for Short Term Goals: 2 weeks  Short Term Goal 1: MET  Short Term Goal 2: Pt will dress UB using hemitechnique, mod A and cues  Short Term Goal 3: Pt will dress LB, hemitechnique, mod A and cues  Short Term Goal 4: Pt will toilet with mod A  Short Term Goal 5:  Toilet transfer with mod A  Additional Goals?: Yes  Short Term Goal 6: Pt will attend to R side during ADL/functional activities with moderate cues  Short Term Goal 7: Pt will perform standing tasks x 2-3 mins with L UE and mod A for balance to enhance ADL/IADL performance  Short Term Goal 8: Pt/family will demo understanding of R UE positioning/HEP/therapeutic activity recommendations with min A after ed  Long Term Goals  Time Frame for Long Term Goals : 4-5 weeks  Long Term Goal 1: Pt will bathe with min A, AE/DME prn  Long Term Goal 2: Pt will dress UB supervision  Long Term Goal 3: Pt will dress LB min A, AE prn  Long Term Goal 4: Pt will toilet with CGA  Long Term Goal 5: Pt will perform toilet transfer with CGA  Additional Goals?: Yes  Long Term Goal 6: Pt will perform simple home making task with min A  Long Term Goal 7: Pt/family will be independent in HEP/DME/therapeutic activity recommendations after ed  Long Term Goal 8: Pt will attend to R side during functional activities with occasional cueing       Therapy Time   Individual Concurrent Group Co-treatment   Time In       0900 (cotx with speech for problem solving and safety during ADLs)   Time Out       1000   Minutes       60   Timed Code Treatment Minutes: 60 Minutes       Asia Whelan, OT

## 2022-12-07 NOTE — PROGRESS NOTES
Occupational Therapy   12/07/22 1330   General   Timed Code Treatment Minutes 25 Minutes   Restrictions/Precautions   Restrictions/Precautions Modified Diet;Swallowing - Thickened Liquids; Fall Risk;Aspiration Risk;Weight Bearing   General   Family / Caregiver Present Yes   Assessment   Performance deficits / Impairments Decreased functional mobility ; Decreased endurance;Decreased coordination;Decreased ADL status; Decreased sensation;Decreased posture;Decreased ROM; Decreased balance;Decreased strength;Decreased vision/visual deficit; Decreased safe awareness;Decreased high-level IADLs;Decreased cognition;Decreased fine motor control   Assessment Discussion with pt and spouse regarding progress and home modifications. Spouse states the ramp is up and the house has been assessed by a family friend who is a PT. The PT is also aware of a family that may sell a sit -> stand device. Spouse was debating the purchase of a reclining w/c- pros and cons discussed. A standard w/c is  rec at this time along with a BSC, SB, and TTB. Now that pt has some tone in her R LE pt will hopefully progress to a stand pivot (potentially with a aspen walker). Tone has also increased in elbow and pt is experiencing pain at end range of extension. Spouse states she has also indicated possible sh pain. For this reason tx focus will not include facilitation measures for flexors such as vobration and muscle tapping. Spouse encouraged to continue gentle stretching. Treatment Diagnosis L MCA stroke   Activity Tolerance       Occupational Therapy Plan   Current Treatment Recommendations Strengthening;ROM;Balance training;Functional mobility training; Endurance training;Neuromuscular re-education; Safety education & training;Patient/Caregiver education & training;Equipment evaluation, education, & procurement;Positioning;Modalities; Self-Care / ADL; Home management training;Sensory integration; Wheelchair mobility training

## 2022-12-07 NOTE — PROGRESS NOTES
[facility]  INPATIENT SPEECH THERAPY  [facility] 8 REHAB UNIT  TIME   Time In: 1100  Time Out: 36  Minutes: 30       [x]Daily Note  []Progress Note    Date: 2022  Patient Name: Donna Betancur        MRN: [id number]    Account #: [de-identified]  : 1960  (64 y.o.)  Gender: female   Primary Provider: Carissa Loredo MD  Diet: Dysphagia soft and bite sized, thin liquids        PATIENT DIAGNOSIS(ES):    Diagnosis: CVA, R hemiparesis     Additional Pertinent Hx: Anxiety, depression, COPD, CVA, DM, LE edema, hyperlipidemia, HTN, obesity, RA, CAD and venous thromboembolism     RESTRICTIONS/PRECAUTIONS:    Restrictions/Precautions  Restrictions/Precautions: Modified Diet, Swallowing - Fall Risk, Aspiration Risk  Required Braces or Orthoses?: No           Subjective:  She was upright in her wheelchair. She continued to express pain. She looked at her feet and her  stated she has LE edema at home and takes Lasix. Her feet, ankles and toes did appear swollen and red this date. Her shoes and socks were removed and she expressed some relief. Objective:  Receptive language tasks were targeted. She followed one step commands without cues at 50%. When a model was presented she was at 100%. Body part identification was at 0% without cues and at 100% when a model was provided. Tasks for expressive language were completed. Naming pictures with phrase completion and phonemic cues was at 60% this date. Sentence completion without cues was at 40%. Reading tasks were targeted in therapy. Reading single words placed into sentences while utilizing OLIVIA was at 80%. Reading single syllable words without cues was at 80%. She continues to exhibit impulsivity, poor awareness of deficits and poor problem solving. This was all discussed with her . Did discuss her trying to transfer herself back to bed without assistance yesterday. Her  stated he will also review safety rules with her today.      Neologisms, semantic paraphasis, and perseverations are all still noted. Today she tolerated thin liquids via cup and straw without any overt s/s of aspiration. Clear voicing was noted after all sips. Good hyolaryngeal elevation and mildly delayed pharyngeal swallow responses were noted. No oral residue or buccal cavity pocketing was noted this morning. She continues to exhibit severe expressive and receptive aphasia, dysarthria,cognitive deficits, deficits in executive function and dysphagia. Recommend she continue speech therapy services to address these deficits. Recommend she remain on a dysphagia soft diet with thin liquids at this time. SLP will upgrade to regular when she demonstrates consistent awareness of oral residue. Recommended Diet and Intervention  Soft & Bite Sized consistencies   Thin liquids  Recommended Form of Meds: Crushed in puree as able  Dysphagia treatment  Therapeutic Interventions: Pharyngeal exercises;Diet tolerance monitoring;Oral care;Oral motor exercises; Patient/Family education; Therapeutic PO trials with SLP     Compensatory Swallowing Strategies  Compensatory Swallowing Strategies : Alternate solids and liquids;Eat/Feed slowly; No straws;Upright as possible for all oral intake;Remain upright for 30-45 minutes after meals;Small bites/sips; Check for pocketing of food on the Right; Check for pocketing of food on the Left; Set up assist;Assist feed                    SHORT TERM GOAL #1:  Goal 1: Pt will complete y/n tasks with 80% accuracy and minimal verbal cues/modeling. SHORT TERM GOAL #2:  Goal 2: Pt will complete verbal expression tasks with 80% accuracy and minimal verbal cues/modeling. SHORT TERM GOAL #3:  Goal 3: Pt will complete receptive/expressive language tasks with 80% accuracy and minimal verbal cues/modeling. SHORT TERM GOAL #4:  Goal 4: Pt will follow one step commands with with 90% accuracy and minimal verbal cues/modeling.     SHORT TERM GOAL #5:  Goal 5: Pt will complete orientation tasks with 90% accuracy and minimal verbal cues/modeling. Swallowing Short Term Goals  Short-term Goals  Goal 1: Pt will tolerate soft & bite sized consistencies with mildly  (nectar) thick liquids with minimal overt s/s of aspiration/penetration during hospitalization. New Goal: Pt will tolerate soft and bite sized diet with thin liquids with minimal overt s/s of aspiration/penetration during hospitalization  Goal 2: Pt will complete Modified Barium Swallow Study. Goal 3: Pt will demonstrate awareness of general aspiration precautions. Goal 4: Pt will participate in swallowing reassessments to ensure safest diet consistencies. Goal 5: Pt will allow staff to complete daily oral care. Goal 6: Pt will complete oral motor exercises for lingual and labial strengthening. ASSESSMENT:  Assessment: [x]Progressing towards goals          []Not Progressing towards goals    Patient Tolerance of Treatment:   []Tolerated well []Tolerated fair []Required rest breaks []Fatigued    Education:  Learner:  [x]Patient          [x]Significant Other          []Other  Education provided regarding:  [x]Goals and POC   [x]Diet and swallowing precautions    []Home Exercise Program  []Progress and/or discharge information  Method of Education:  [x]Discussion          [x]Demonstration          []Handout          []Other  Evaluation of Education:   []Verbalized understanding   []Demonstrates without assistance  []Demonstrates with assistance  [x]Needs further instruction     []No evidence of learning                  []No family present      Plan: [x]Continue with current plan of care    []Modify current plan of care as follows:    []Discharge patient    Discharge Location:    Services/Supervision Recommended:      [x]Patient continues to require treatment by a licensed therapist to address functional deficits as outlined in the established plan of care.     Electronically Signed By:  Henrietta Mart SUSIE Schrader  12/7/2022,11:49 AM.

## 2022-12-07 NOTE — PLAN OF CARE
Problem: Discharge Planning  Goal: Discharge to home or other facility with appropriate resources  12/6/2022 2310 by Opal Al RN  Outcome: Progressing  Flowsheets (Taken 12/6/2022 2035)  Discharge to home or other facility with appropriate resources: Refer to discharge planning if patient needs post-hospital services based on physician order or complex needs related to functional status, cognitive ability or social support system  12/6/2022 0924 by Ernst Gunn RN  Outcome: Progressing     Problem: Safety - Adult  Goal: Free from fall injury  12/6/2022 2310 by Opal Al RN  Outcome: Progressing  12/6/2022 0924 by Ernst Gunn RN  Outcome: Progressing     Problem: Neurosensory - Adult  Goal: Achieves stable or improved neurological status  12/6/2022 2310 by Opal Al RN  Outcome: Progressing  Flowsheets (Taken 12/6/2022 2035)  Achieves stable or improved neurological status: Assess for and report changes in neurological status  12/6/2022 0924 by Ernst Gunn RN  Outcome: Progressing  Goal: Achieves maximal functionality and self care  12/6/2022 2310 by Opal Al RN  Outcome: Progressing  12/6/2022 0924 by Ernst Gunn RN  Outcome: Progressing     Problem: Skin/Tissue Integrity - Adult  Goal: Skin integrity remains intact  12/6/2022 2310 by Opal Al RN  Outcome: Progressing  Flowsheets (Taken 12/6/2022 2035)  Skin Integrity Remains Intact: Monitor for areas of redness and/or skin breakdown  12/6/2022 0924 by Ernst Gunn RN  Outcome: Progressing     Problem: Pain  Goal: Verbalizes/displays adequate comfort level or baseline comfort level  12/6/2022 2310 by Opal Al RN  Outcome: Progressing  12/6/2022 0924 by Ernst Gunn RN  Outcome: Progressing     Problem: Chronic Conditions and Co-morbidities  Goal: Patient's chronic conditions and co-morbidity symptoms are monitored and maintained or improved  12/6/2022 2310 by Opal Al RN  Outcome: Progressing  Flowsheets (Taken 12/6/2022 2035)  Care Plan - Patient's Chronic Conditions and Co-Morbidity Symptoms are Monitored and Maintained or Improved: Monitor and assess patient's chronic conditions and comorbid symptoms for stability, deterioration, or improvement  12/6/2022 0924 by Mike Mathis RN  Outcome: Progressing     Problem: ABCDS Injury Assessment  Goal: Absence of physical injury  12/6/2022 2310 by Jose Carlos Whiting RN  Outcome: Progressing  12/6/2022 0924 by Mike Mathis RN  Outcome: Progressing     Problem: Skin/Tissue Integrity  Goal: Absence of new skin breakdown  Description: 1. Monitor for areas of redness and/or skin breakdown  2. Assess vascular access sites hourly  3. Every 4-6 hours minimum:  Change oxygen saturation probe site  4. Every 4-6 hours:  If on nasal continuous positive airway pressure, respiratory therapy assess nares and determine need for appliance change or resting period. 12/6/2022 2310 by Jose Carlos Whiting RN  Outcome: Progressing  12/6/2022 0924 by Mike Mathis RN  Outcome: Progressing     Problem: Confusion  Goal: Confusion, delirium, dementia, or psychosis is improved or at baseline  Description: INTERVENTIONS:  1. Assess for possible contributors to thought disturbance, including medications, impaired vision or hearing, underlying metabolic abnormalities, dehydration, psychiatric diagnoses, and notify attending LIP  2. [place] high risk fall precautions, as indicated  3. Provide frequent short contacts to provide reality reorientation, refocusing and direction  4. Decrease environmental stimuli, including noise as appropriate  5. Monitor and intervene to maintain adequate nutrition, hydration, elimination, sleep and activity  6. If unable to ensure safety without constant attention obtain sitter and review sitter guidelines with assigned personnel  7.  Initiate Psychosocial CNS and Spiritual Care consult, as indicated  12/6/2022 2310 by Jose Carlos Whiting RN  Outcome: Progressing  12/6/2022 0924 by Preet Wesley Karley Manzano RN  Outcome: Progressing     Problem: Musculoskeletal - Adult  Goal: Return mobility to safest level of function  12/6/2022 2310 by Sami Ornelas RN  Outcome: Progressing  Flowsheets (Taken 12/6/2022 2035)  Return Mobility to Safest Level of Function: Assess patient stability and activity tolerance for standing, transferring and ambulating with or without assistive devices  12/6/2022 0924 by Jesús Johnson RN  Outcome: Progressing  Goal: Return ADL status to a safe level of function  12/6/2022 2310 by Sami Ornelas RN  Outcome: Progressing  12/6/2022 0924 by Jesús Johnson RN  Outcome: Progressing     Problem: Gastrointestinal - Adult  Goal: Maintains or returns to baseline bowel function  12/6/2022 2310 by Sami Ornelas RN  Outcome: Progressing  [address] (Taken 12/6/2022 2035)  Maintains or returns to baseline bowel function: Assess bowel function  12/6/2022 0924 by Jesús Johnson RN  Outcome: Progressing  Goal: Maintains adequate nutritional intake  12/6/2022 2310 by Sami Ornelas RN  Outcome: Progressing  12/6/2022 0924 by Jesús Johnson RN  Outcome: Progressing     Problem: Metabolic/Fluid and Electrolytes - Adult  Goal: Electrolytes maintained within normal limits  12/6/2022 2310 by Sami Ornelas RN  Outcome: Progressing  [address] (Taken 12/6/2022 2035)  Electrolytes maintained within normal limits: Monitor labs and assess patient for signs and symptoms of electrolyte imbalances  12/6/2022 0924 by Jesús Johnson RN  Outcome: Progressing     Problem: Nutrition Deficit:  Goal: Optimize nutritional status  12/6/2022 2310 by Sami Ornelas RN  Outcome: Progressing  12/6/2022 0924 by Jesús Johnsno RN  Outcome: Progressing

## 2022-12-07 NOTE — PROGRESS NOTES
[facility]  INPATIENT SPEECH THERAPY  [facility] 8 REHAB UNIT  TIME   0900  1000  60 MINUTES  COTX WITH OT    [x]Daily Note  []Progress Note    Date: 2022  Patient Name: Yonny Ray        MRN: [id number]    Account #: [de-identified]  : 1960  (64 y.o.)  Gender: female   Primary Provider: Bruce Bernstein MD  Swallowing Status/Diet: Dysphagia soft and bite sized, thin liquids      Subjective: Pt alert, cooperative, and upright in wheelchair. Tx session completed in OT gym. Pt appears to be in pain (evidneced by: facial grimace, closing eyes, grunting, tearful); however, she is unable to communicate to therapists what is giving her pain. Body diagram presented to her. Pt is instructed to point to the body part that is in pain. Pt is unable to complete this task given maximum visual and verbal cues, as well as [place]. Unsure what was causing pt pain. Objective:    Swallowing reassessed during tx. Consistencies presented regular solids with thin liquids via consecutive sips side of cup. Oral prep reveals decreased rotary mastication with regular solid consistencies. Oral transit timing ranges from 2-3 seconds with regular solid consistencies. No significant oral pocketing/residue observed. Oral transit is suspected to be 1 second or faster than 1 second with thin liquids. Laryngeal movement observed to be consistently sluggish and mild-moderately decreased for swallow airway protection. No overt s/s of aspiration/penetration observed with regular solids or thin liquids on this date. Continue current diet of soft and bite sized with thin liquids at this time until pt consistently is aware of risks of oral pocketing/residue. Education provided on utilizing lingual sweep frequently during and after meals/all P.O. trials. Strategy practiced after regular solid P.O. trials in therapy. Pt will require reinforcement of this strategy. Question if strategy is utilized independently.      Matching/identification task completed. Pt is provided 2 written words and 2 objects . Pt is required to match the object to the word. This was completed in a field of 2 and a field of 4. Task completed with approximately 60% accuracy given a field of 2. Moderate phonemic cues provided. Task completed with 100% accuracy given a field of 4 with multiple repetitions. While in a field of 4, pt was accurately matching the object to the word; however, was inaccurately confrontationally naming the object/reading the word. Perseveration noted. Confrontational naming task completed. Pt is provided a common object and is required to elicit the name of the object. Task completed with 25% accuracy independently. Given moderate phonemic cues, pt was able to complete the task with 100% accuracy. Functional reading task completed. Pt is required to read at the word level. Pt is able to complete one word reading task with approximately 36% accuracy at this time. Moderate phonemic cues provided. Pt does significantly benefit from phonemic cues; however, phonemic paraphasias will occur often. Body part identification task completed. Pt is unable to complete this task independently. Maximum visual and verbal cues provided throughout task. Automatic speech tasks completed. With a verbal cue of beginning the automatic speech task, pt was able to verbalize the days of the week, months of the year, count to 25, and say the pledge of allegiance. All tasks were unable to be completed independently, as pt did require the initial cue and cues throughout tasks to finish the task. Pt was unable to elicit the alphabet and the happy birthday song without the verbal cue of the tune. Pt completed puzzles on this date. Given a field of 8 pt was prompted to put puzzle pieces together. Each puzzle piece set contained a 4 letter target word. One piece had two letters and the other piece had two letters of the word.  On each piece, there was a photo of the target oral care. Goal 6: Pt will complete oral motor exercises for lingual and labial strengthening. ASSESSMENT:  Assessment: [x]Progressing towards goals          []Not Progressing towards goals    Patient Tolerance of Treatment:   [x]Tolerated well []Tolerated fair []Required rest breaks []Fatigued    Education:  Learner:  [x]Patient          []Significant Other          []Other  Education provided regarding:  [x]Goals and POC   []Diet and swallowing precautions    []Home Exercise Program  []Progress and/or discharge information  Method of Education:  [x]Discussion          []Demonstration          []Handout          []Other  Evaluation of Education:   [x]Verbalized understanding   []Demonstrates without assistance  []Demonstrates with assistance  []Needs further instruction     []No evidence of learning                  []No family present      Plan: [x]Continue with current plan of care    []Modify current plan of care as follows:    []Discharge patient    Discharge Location:    Services/Supervision Recommended:      []Patient continues to require treatment by a licensed therapist to address functional deficits as outlined in the established plan of care.         Electronically signed by MARCELINO Dial on 12/7/2022 at 10:26 AM

## 2022-12-07 NOTE — PROGRESS NOTES
Patient:   Juan M Calderón  MR#:    [id number]   Room:    0826/826-02   YOB: 1960  Date of Progress Note: 12/7/2022  Time of Note                           8:19 AM  Consulting Physician:   Librado Hidalgo M.D. Attending Physician:  Librado Hidalgo MD     Chief complaint Acute ischemic stroke    S:This 64 y.o. female  with history of anxiety, depression, COPD, atherosclerotic disease, colitis, COPD, CVA, DM,  LE edema, hyperlipidemia, HTN, morbid obesity, RA, CAD  and venous thromboembolism. She presented to [facility] ER on 11/9/22 after being found at home lying facedown in her feces. She was very weak, confused, not able to speak but moving purposefully and intermittently following commands. Her last well know was 3 a.m. when her  left for work. Imaging done revealed a large MCA stroke 7.7 x 5 cm. CTA head/neck revealed an acute M1 occlusion. She was outside of the window for TPA. CK on admit was 1100, consistent with rhabdomylosis. She was also noted to possibly Dens fracture. She was transferred to [facility] for a possible thrombectomy. Further imaging was done at [facility] and she was deemed not a candidate for thrombectomy. MRI done ruled out Dens fracture. Repeat CT of head on 11/11/22 showed evolving left MCA territory infarct with increasing edema/mass effect resulting in 4mm of  rightward midline shift(previously 2mm) a the level of the third ventricle. No hemorrhagic conversion or definite trapping of the right lateral ventricle, possible small acute right cerebellar infarct. Neurosurgery was consulted for possible hemicraniectomy. She was seen by Dr. Marcie Roland, who didn't feel any surgical intervention was needed. Patient was found to have a UTI + for [facility] and was placed on Rocephin on 11/14/22. Patient had a PICC line placed. Patient was evaluated by SPT and initially made NPO d/t oropharyngeal dysphagia. NGT placed and feeding started. She was also noted to have global aphasia but is improving. She was re-evaluated by SPT on 11/15/22 for swallow and was started on a dysphagia 1 diet with nectar thick liquids. Patient also continues to have right sided weakness and is participating in both PT/OT. Repeat CT head on 11/13/22 shows stable LMCA ischemic stroke with stable edema, 5 mm shift, no hemorrhage. She is felt to need a stay on Rehab for continued PT/OT/SPT and medical management to work towards her goal of returning home with her . She is now felt ready to start the Rehab program.  No acute issues overnight. REVIEW OF SYSTEMS:  Unreliable due to aphasia     Past Medical History:      Diagnosis Date    Anxiety     ASCVD (arteriosclerotic cardiovascular disease)     Carrier of group B Streptococcus     Cellulitis     Colitis     COPD (chronic obstructive pulmonary disease) ([facility])     Costochondritis     CVA (cerebral vascular accident) ([address]     Depression     Edema     Fracture of sternum     non union post surgery.      Gallstones     Grief reaction     H/O superior vena cava filter placement     Hyperlipidemia     Hypertension     MI (myocardial infarction) ([address]     MRSA (methicillin resistant Staphylococcus aureus)     Neuropathy     Obesity     Pseudarthrosis sternal    RA (rheumatoid arthritis) ([address]     Rosacea     Sinus bradycardia     TIA (transient ischemic attack)     Venous thromboembolism        Past Surgical History:      Procedure Laterality Date    ADENOIDECTOMY      APPENDECTOMY      CARDIAC CATHETERIZATION  3/2009    CARDIAC CATHETERIZATION  11/5/14  JDT    EF 50%, patent bypass grafts    CHOLECYSTECTOMY  2011    COLONOSCOPY  06/03/2010    Dr. Gerhard Carpenter: distal colon having ulcerative lesions c/w UC    COLONOSCOPY  2009    pancolitis    COLONOSCOPY      CORONARY ARTERY BYPASS GRAFT  3/2009    PHANI Sosa to LAD, SVGs to OM & PDA    GASTRIC BYPASS SURGERY  2012    HIATAL HERNIA REPAIR  2011    HYSTERECTOMY (CERVIX STATUS UNKNOWN)      KNEE SURGERY      AL COLONOSCOPY FLX DX W/COLLJ SPEC WHEN PFRMD N/A 3/12/2018    Dr Brianna Holliday rectal inflammation consistent with U.C.-Active proctitis--3 yr recall    [facility] ENDOSCOPY  2010    Dr. Bev Rider  2012       Medications in Hospital:      Current Facility-Administered Medications:     bisacodyl (DULCOLAX) EC tablet 5 mg, 5 mg, Oral, Daily, Tracie Guevara MD    miconazole (MICOTIN) 2 % powder, , Topical, BID, Tracie Guevara MD, Given at 12/06/22 2036    docusate sodium (COLACE) capsule 100 mg, 100 mg, Oral, BID, Tracie Guevara MD, 100 mg at 12/06/22 2035    aspirin chewable tablet 81 mg, 81 mg, Oral, Daily, Tracie Guevara MD, 81 mg at 12/06/22 0840    atorvastatin (LIPITOR) tablet 40 mg, 40 mg, Oral, Nightly, Tracie Guevara MD, 40 mg at 12/06/22 2035    dulaglutide (TRULICITY) SC injection 1.5 mg (Patient Supplied), 1.5 mg, SubCUTAneous, Weekly, Tracie Guevara MD    folic acid (FOLVITE) tablet 1 mg, 1 mg, Oral, Daily, Tracie Guevara MD, 1 mg at 12/06/22 0840    gabapentin (NEURONTIN) capsule 300 mg, 300 mg, Oral, Nightly, Tracie Guevara MD, 300 mg at 12/06/22 2035    hydroxychloroquine (PLAQUENIL) tablet 200 mg, 200 mg, Oral, BID, Tracie Guevara MD, 200 mg at 12/06/22 2035    amLODIPine (NORVASC) tablet 2.5 mg, 2.5 mg, Oral, Daily, Tracie Guevara MD, 2.5 mg at 12/06/22 0840    tiZANidine (ZANAFLEX) tablet 4 mg, 4 mg, Oral, BID PRN, Tracie Guevara MD    multivitamin 1 tablet, 1 tablet, Oral, Daily, Tracie Guevara MD, 1 tablet at 12/06/22 0840    acetaminophen (TYLENOL) tablet 650 mg, 650 mg, Oral, Q4H PRN, Tracie Guevara MD, 650 mg at 11/27/22 1933    enoxaparin (LOVENOX) injection 40 mg, 40 mg, SubCUTAneous, Daily, Tracie Guevara MD, 40 mg at 12/06/22 1659    polyethylene glycol (GLYCOLAX) packet 17 g, 17 g, Oral, Daily PRN, Tracie Guevara MD, 17 g at 11/30/22 1817    Allergies:  Methotrexate derivatives    Social History:   TOBACCO:   reports that she quit smoking about 13 years ago. Her smoking use included cigarettes. She has a 64.00 pack-year smoking history. She has never used smokeless tobacco.  ETOH:   reports current alcohol use. Family History:       Problem Relation Age of Onset    Prostate Cancer Father     Heart Failure Father     Cancer Father     Colon Cancer Neg Hx     Colon Polyps Neg Hx     Liver Cancer Neg Hx     Liver Disease Neg Hx     Esophageal Cancer Neg Hx     Rectal Cancer Neg Hx     Stomach Cancer Neg Hx          PHYSICAL EXAM:  /69   Pulse 63   Temp 97.7 °F (36.5 °C) (Oral)   Resp 18   Ht 5' 7\" (1.702 m)   Wt 203 lb (92.1 kg)   SpO2 96%   BMI 31.79 kg/m²     Constitutional - well developed, well nourished. Eyes - conjunctiva normal.   Ear, nose, throat - No scars, masses, or lesions over external nose or ears, no atrophy of tongue  Neck-symmetric, no masses noted, no jugular vein distension  Respiration- chest wall appears symmetric, good expansion,   normal effort without use of accessory muscles  Musculoskeletal - no significant wasting of muscles noted, no bony deformities  Extremities-no clubbing, cyanosis or edema  Skin - warm, dry, and intact. No rash, erythema, or pallor. Psychiatric - mood, affect, and behavior appear normal.      Neurological exam  Awake, alert, global aphasia says \"yes\" to all questions asked   Attention and concentration appear appropriate  Recent and remote memory unable to tests     Cranial Nerve Exam     CN III, IV,VI-EOMI, No nystagmus, conjugate eye movements, no ptosis    CN VII-+ facial assymetry       Motor Exam  No movement on the right      Tremors- no tremors in hands or head noted     Gait  Not tested     Nursing/pcp notes, imaging,labs and vitals reviewed.      PT,OT and/or speech notes reviewed    Lab Results   Component Value Date    WBC 6.3 12/05/2022    HGB 11.7 (L) 12/05/2022    HCT 39.1 12/05/2022    MCV 80.1 (L) 12/05/2022     12/05/2022     Lab Results   Component Value Date     12/05/2022    K 4.0 12/05/2022     12/05/2022    CO2 25 12/05/2022    BUN 9 12/05/2022    CREATININE 0.5 12/05/2022    GLUCOSE 78 12/05/2022    CALCIUM 9.0 12/05/2022    PROT 5.4 (L) 12/05/2022    LABALBU 2.9 (L) 12/05/2022    BILITOT <0.2 12/05/2022    ALKPHOS 84 12/05/2022    AST 29 12/05/2022    ALT 25 12/05/2022    LABGLOM >60 12/05/2022   No results found for: INR, PROTIME    Wander Hankins   Student Physical Therapist   Physical Therapy   Progress Notes       Cosign Needed   Date of Service:  12/6/2022  3:55 PM                 Cosign Needed                         12/06/22 1000   Restrictions/Precautions   Restrictions/Precautions Modified Diet;Swallowing - Thickened Liquids; Fall Risk;Aspiration Risk;Weight Bearing   Lower Extremity Weight Bearing Restrictions   Right Lower Extremity Weight Bearing Weight Bearing As Tolerated   Left Lower Extremity Weight Bearing Weight Bearing As Tolerated   General   Additional Pertinent Hx Anxiety, depression, COPD, CVA, DM, LE edema, hyperlipidemia, HTN, obesity, RA, CAD and venous thromboembolism   Diagnosis CVA, R hemiparesis   General Comment   Comments PATIENT BROUGHT TO PT GYM BY OT   Subjective   Subjective PATIENT APPEARS TO BE DOING WELL, OT COMMUNICATES SHE IS ABLE TO FOLLOW VC BETTER TODAY. Transfers   Sit to Stand Moderate Assistance;Minimal Assistance  (FROM W/C IN //)   Stand to Sit Minimal Assistance;Contact guard assistance  (IN //)   Comment SS TO EXCHANGE PANTS DUE TO WET SPOT ON PATIENT, NOT FROM INCONTINENCE. Ambulation   WB Status WBAT   Ambulation   Surface Level tile   Device Parallel Bars   Other Apparatus AFO; Wheelchair follow  (SHOE COVER)   Assistance Moderate assistance   Quality of Gait PATIENT PREMATURELY ADVANCES LLE PRIOR TO RLE BEING IN FULL EXTENSION. CUES GIVEN BEFORE AND DURING AMB. PAUSE/STOP AND WAIT TILL RLE IS LOCKED OUT BY THERAPIST.    Distance 3',12'   Comments INTIAL WALKED D/C DUE TO WET AREA ON PATIENTS PANTS. AFTERWARDS REATTEMPTED AMB., FEW INCIDENTS OF KNEE BUCKLING DUE TO PATIENT ADVANCING LLE BEFORE RLE WAS IN FULL EXT. AS WALK CONTINUED PATIENT WAS ABLE TO FOLLOW VC AND WAIT FOR THERAPIST TO ESTABLISH TERMINAL KNEE EXT BEFORE ADVANCING LLE. More Ambulation? Yes   Ambulation 2   Surface - 2 level tile   Device 2 Parallel Bars   Other Apparatus 2 AFO; Wheelchair follow   Assistance 2 Moderate assistance   Quality of Gait 2 AS ABOVE   Distance 12 FT   Comments PATIENT CONTINUED TO PROGRESS WITH LLE BEFORE RLE WAS SET AND LOCKED OUT. EVEN WHEN TOLD TO STOP OR PAUSE PATIENT WOULD TRY TO CONTINUE FWD. AS WALK PROGRESS, PATIENT WILL WAIT TILL LEG IS LOCKED   Ambulation 3   Surface - 3 level tile   Device 3 Parallel Bars   Other Apparatus 3 AFO; Wheelchair follow   Assistance 3 Moderate assistance;Minimal assistance   Quality of Gait 3 BEST AMB. OF THE THREE ATTEMPTS IN //   Distance 12 FT   Comments ONLY ONE INCIDENT OF PREMATURE ADVANCEMENT OF LLE, OTHERWISE AMB. WAS FLUID. THERAPIST PROVIDE MOD A AT MEDIAL GASTROC AND ISCHIAL TUBEROSITY FOR ALL AMB. PT Exercises   Exercise Treatment SEATED EX: MARCHES 15X, HIP EXT 10X, PF/DF 15X, HIP ABD 15X, HIP ADD 15X  (MANUAL RESISTANCE APPLIED TO LLE, RLE = PROM)   Assessment   Assessment PATIENT WAS ABLE TO TOLERATE MORNING THERAPY SESSION. DURING FIRST AND SECOND AMB. PATIENT HAD DIFFICULTY FOLLOWING VC AND BEGAN INITATING MOVEMENTS BEFORE THERAPIST WAS ABLE TO PROVIDE ASSISTANCE TO RLE LEADING TO UNSTEADINESS AND LLE KNEE BUCKLING. DURING THIRD AMB. PATIENT DID MUCH BETTER WITH DIRECTIONS AND ONLY ONE MOMENT OF KNEE BUCKLING (SEE AMB FOR DETAILS). RETURNED TO ROOM AFTER AMB. TO CHANGE PANTS, USED SS. AFTERWARDS FINISHED SESSION WITH SEATED EXERCISES.    Safety Devices   Type of Devices Left in chair;Call light within reach;Gait belt   PT Individual Minutes   Time In 1000   Time Out 1100   Minutes 60                    RECORD REVIEW: Previous medical records, medications were reviewed at today's visit    IMPRESSION:   1. Acute ischemic stroke-on aspirin/statin  2. Hypertension-on medications monitor  3. Hyperlipidemia-on statin  4. Diabetes-Trulicity-monitor blood sugar  5. DVT prophylaxis-Lovenox  6. History of coronary artery disease-aspirin/statin  7. Neuropathy-on Neurontin  8. Rheumatoid arthritis-on Plaquenil  9. COPD-monitor  10. History of anxiety and depression-monitor  11. History of colitis/gallstones-monitor  12. History of bariatric surgery-on multivitamin/folic acid  13. History of superior vena cava filter placement with venous thromboembolism-not on anticoagulation-monitor- indicates took herself off blood thinners as not like meds and was bruising   14.   PT/OT/speech    Continue supportive care    x-ray of right ankle no acute changes  Venous ultrasound of leg no DVT        ELOS pending 3 weeks     Expected duration and frequency therapy: 180 minutes per day, 5 days per week    CALL WITH ANY QUESTIONS  [phone] CELL  Dr Narendra Thomas

## 2022-12-07 NOTE — PROGRESS NOTES
12/07/22 1000   Restrictions/Precautions   Restrictions/Precautions Modified Diet;Swallowing - Thickened Liquids; Fall Risk;Aspiration Risk;Weight Bearing   Lower Extremity Weight Bearing Restrictions   Right Lower Extremity Weight Bearing Weight Bearing As Tolerated   Left Lower Extremity Weight Bearing Weight Bearing As Tolerated   General   Additional Pertinent Hx Anxiety, depression, COPD, CVA, DM, LE edema, hyperlipidemia, HTN, obesity, RA, CAD and venous thromboembolism   Diagnosis CVA, R hemiparesis   General Comment   Comments PATIENT IN ROOM WITH OT, TAKE FROM OT TO PT GYM   Subjective   Subjective PATIENT APPEARS TO BE IN GOOD MORNING AFTER MORNING TREATMENT WITH OT AND SPEECH   Transfers   Sit to Stand Moderate Assistance;Minimal Assistance  (FROM W/C TO STANDING IN //)   Stand to Sit Minimal Assistance;Contact guard assistance  (STANDING IN // TO W/C)   Ambulation   WB Status WBAT   Ambulation   Surface Level tile   Device Parallel Bars   Other Apparatus AFO; Wheelchair follow  (AFO ON RLE)   Assistance Moderate assistance   Quality of Gait MINOR EPISODES OF PREMATURE ADVANCEMENT WITH LLE BUT NO SIGNIFICANT LOB OR KNEE BUCKLING   Distance 12'   Comments EXTENSOR TONE INCREASING IN RLE, AT TIMES ABLE TO KEEP RLE IN FULL EXTENSION AS PATIENT ADVANCES RLE FWD. More Ambulation? Yes   Ambulation 2   Surface - 2 level tile   Device 2 Parallel Bars   Other Apparatus 2 AFO; Wheelchair follow  (AFO ON RLE)   Assistance 2 Moderate assistance   Quality of Gait 2 SLOW AND CONTROLLED, FLUID, FOLLOWED ALL VCS   Distance 12 FT   Comments PATIENT ABLE TO CONTROL AND SLOW DOWN AMB. FOLLOWED VC WHEN GIVE, ONE INCIDENT OF PREMATURE ADVANCEMENT W/ LLE. AMB. 3 PROVIDE PATIENT ENOUGH TIME TO ATTEMPT AND MOVE RLE FWD ON OWN. Ambulation 3   Surface - 3 level tile   Device 3 Parallel Bars   Other Apparatus 3 AFO; Wheelchair follow   Assistance 3 Moderate assistance;Minimal assistance   Quality of Gait 3 MOD TO MIN A AT MEDIAL KNEE WHEN ADVANCING. PATIENT HAS ENOUGH EXTENSOR TONE AND NO LONGER REQUIRES BLOCK AT ISCHIAL TUBEROSITY   Distance 8',4'   Comments PATIENT AMBULATES WELL, DUE TO INCREASED CONTRIBUTION IN GAIT CYCLE PATIENT REQUESTED BREAK DURING AMBULATION BUT INSISTED ON FINISHING WALK TO THE END OF //.   PT Exercises   Exercise Treatment SEATED EX: MARCHES 2X15; BALL SQUEEZES 2X15; HIP ABD 2X15; HIP EXT 2X15; PF/DF 15X; SAQ 15X  (RLE PROM; LLE A/AROM)   Assessment   Assessment PATIENT IN GOOD MOOD FOLLOWING OT/SLP TREATMENT.  REQUESTED TO OBSERVE PATIENT/WIFE AMB. PATIENTS GAIT AND RLE TONE IMPROVING. WITH EACH WALK NEW PROGRESSIONS ARE ADDED. LAST AMB. PATIENT WAS ASKED TO ATTEMPT TO ADVANCE RLE ON THEIR OWN, THERAPIST PROVIDED MOD TO MIN A AT MEDIAL GASTROC. PATIENT REQUESTRED BREAK DURING AMB. BUT WAS ABLE TO COMPLETE FULL 12'. FINISHED WITH SEATED EXERCISES, INCREASED FROM ONE SET TO TWO SETS.    Safety Devices   Type of Devices Left in chair;Call light within reach;Gait belt;Sitter present  ( IN ROOM WITH PATIENT)   PT Individual Minutes   Time In 1000   Time Out 1100   Minutes 60   Electronically sign by: MOLINA Tinsley 12/7/2022, 12:58 PM

## 2022-12-07 NOTE — PROGRESS NOTES
is utilized independently. Confrontational naming task completed. Pt is required to name various common objects on her noon meal tray. Task completed with 60% accuracy. Moderate phonemic cues provided. [id number]:     Sentence completion task completed for verbal expression. Pt was provided the beginning of a 4-5 word sentence and is prompted to finish the sentence utilizing one word. Task completed with 10% accuracy. Maximum semantic and phonemic cues required to complete this task. Matching/object identification task completed. Pt is provided 1 target photo beside 3 general photos. Pt is required to scan the 3 photos and match the target photo. Task completed with 88% accuracy. Minimal phonemic and semantic cues required to complete this task. Functional reading task completed. Pt is required to read at the word level. Pt is able to complete one word reading task with approximately 40% accuracy at this time. Moderate phonemic cues provided. Pt does significantly benefit from phonemic cues. Matching/identification task completed. Pt is provided 5 written words and 5 images of the written words. Pt is required to point to the image of the target word. Task completed with 40% accuracy. Maximum phonemic cues provided. Matching/identification task completed. Pt is provided a target image and two words. Pt is required to select the target image from the two words. Task completed with 25% accuracy. Inconsistent answers noted. SLP will continue to follow and treat. Recommended Diet and Intervention  Soft & Bite Sized consistencies   Thin liquids  Recommended Form of Meds: Crushed in puree as able  Dysphagia treatment  Therapeutic Interventions: Pharyngeal exercises;Diet tolerance monitoring;Oral care;Oral motor exercises; Patient/Family education; Therapeutic PO trials with SLP     Compensatory Swallowing Strategies  Compensatory Swallowing Strategies :  Alternate solids and liquids;Eat/Feed slowly; No straws;Upright as possible for all oral intake;Remain upright for 30-45 minutes after meals;Small bites/sips; Check for pocketing of food on the Right; Check for pocketing of food on the Left; Set up assist;Assist feed    SHORT TERM GOAL #1:  Goal 1: Pt will complete y/n tasks with 80% accuracy and minimal verbal cues/modeling. SHORT TERM GOAL #2:  Goal 2: Pt will complete verbal expression tasks with 80% accuracy and minimal verbal cues/modeling. SHORT TERM GOAL #3:  Goal 3: Pt will complete receptive/expressive language tasks with 80% accuracy and minimal verbal cues/modeling. SHORT TERM GOAL #4:  Goal 4: Pt will follow one step commands with with 90% accuracy and minimal verbal cues/modeling. SHORT TERM GOAL #5:  Goal 5: Pt will complete orientation tasks with 90% accuracy and minimal verbal cues/modeling. Swallowing Short Term Goals  Short-term Goals  Goal 1: Pt will tolerate soft & bite sized consistencies with mildly  (nectar) thick liquids with minimal overt s/s of aspiration/penetration during hospitalization. Goal 2: Pt will complete Modified Barium Swallow Study. Goal 3: Pt will demonstrate awareness of general aspiration precautions. Goal 4: Pt will participate in swallowing reassessments to ensure safest diet consistencies. Goal 5: Pt will allow staff to complete daily oral care. Goal 6: Pt will complete oral motor exercises for lingual and labial strengthening.     ASSESSMENT:  Assessment: [x]Progressing towards goals          []Not Progressing towards goals    Patient Tolerance of Treatment:   [x]Tolerated well []Tolerated fair []Required rest breaks []Fatigued    Education:  Learner:  [x]Patient          [x]Significant Other          []Other  Education provided regarding:  [x]Goals and POC   []Diet and swallowing precautions    []Home Exercise Program  []Progress and/or discharge information  Method of Education:  []Discussion          []Demonstration          []Handout []Other  Evaluation of Education:   [x]Verbalized understanding   []Demonstrates without assistance  []Demonstrates with assistance  []Needs further instruction     []No evidence of learning                  []No family present      Plan: [x]Continue with current plan of care    []Modify current plan of care as follows:    []Discharge patient    Discharge Location:    Services/Supervision Recommended:      []Patient continues to require treatment by a licensed therapist to address functional deficits as outlined in the established plan of care.       Electronically signed by MARCELINO Choi on 12/7/2022 at 1:41 PM

## 2022-12-08 LAB
ALBUMIN SERPL-MCNC: 3.1 G/DL (ref 3.5–5.2)
ALP BLD-CCNC: 85 U/L (ref 35–104)
ALT SERPL-CCNC: 24 U/L (ref 5–33)
ANION GAP SERPL CALCULATED.3IONS-SCNC: 9 MMOL/L (ref 7–19)
AST SERPL-CCNC: 24 U/L (ref 5–32)
BASOPHILS ABSOLUTE: 0.1 K/UL (ref 0–0.2)
BASOPHILS RELATIVE PERCENT: 1.1 % (ref 0–1)
BILIRUB SERPL-MCNC: <0.2 MG/DL (ref 0.2–1.2)
BUN BLDV-MCNC: 13 MG/DL (ref 8–23)
CALCIUM SERPL-MCNC: 8.9 MG/DL (ref 8.8–10.2)
CHLORIDE BLD-SCNC: 105 MMOL/L (ref 98–111)
CO2: 26 MMOL/L (ref 22–29)
CREAT SERPL-MCNC: 0.5 MG/DL (ref 0.5–0.9)
EOSINOPHILS ABSOLUTE: 0.9 K/UL (ref 0–0.6)
EOSINOPHILS RELATIVE PERCENT: 11.3 % (ref 0–5)
GFR SERPL CREATININE-BSD FRML MDRD: >60 ML/MIN/{1.73_M2}
GLUCOSE BLD-MCNC: 79 MG/DL (ref 74–109)
HCT VFR BLD CALC: 34.1 % (ref 37–47)
HEMOGLOBIN: 10.6 G/DL (ref 12–16)
IMMATURE GRANULOCYTES #: 0 K/UL
LYMPHOCYTES ABSOLUTE: 2 K/UL (ref 1.1–4.5)
LYMPHOCYTES RELATIVE PERCENT: 26.3 % (ref 20–40)
MCH RBC QN AUTO: 24.5 PG (ref 27–31)
MCHC RBC AUTO-ENTMCNC: 31.1 G/DL (ref 33–37)
MCV RBC AUTO: 78.8 FL (ref 81–99)
MONOCYTES ABSOLUTE: 0.8 K/UL (ref 0–0.9)
MONOCYTES RELATIVE PERCENT: 10.8 % (ref 0–10)
NEUTROPHILS ABSOLUTE: 3.8 K/UL (ref 1.5–7.5)
NEUTROPHILS RELATIVE PERCENT: 50.2 % (ref 50–65)
PDW BLD-RTO: 16.8 % (ref 11.5–14.5)
PLATELET # BLD: 308 K/UL (ref 130–400)
PMV BLD AUTO: 9.7 FL (ref 9.4–12.3)
POTASSIUM REFLEX MAGNESIUM: 3.7 MMOL/L (ref 3.5–5)
RBC # BLD: 4.33 M/UL (ref 4.2–5.4)
SODIUM BLD-SCNC: 140 MMOL/L (ref 136–145)
TOTAL PROTEIN: 5 G/DL (ref 6.6–8.7)
WBC # BLD: 7.6 K/UL (ref 4.8–10.8)

## 2022-12-08 PROCEDURE — 1180000000 HC REHAB R&B

## 2022-12-08 PROCEDURE — 6370000000 HC RX 637 (ALT 250 FOR IP): Performed by: PSYCHIATRY & NEUROLOGY

## 2022-12-08 PROCEDURE — 97116 GAIT TRAINING THERAPY: CPT

## 2022-12-08 PROCEDURE — 6360000002 HC RX W HCPCS: Performed by: PSYCHIATRY & NEUROLOGY

## 2022-12-08 PROCEDURE — 97530 THERAPEUTIC ACTIVITIES: CPT

## 2022-12-08 PROCEDURE — 36415 COLL VENOUS BLD VENIPUNCTURE: CPT

## 2022-12-08 PROCEDURE — 80053 COMPREHEN METABOLIC PANEL: CPT

## 2022-12-08 PROCEDURE — 99232 SBSQ HOSP IP/OBS MODERATE 35: CPT | Performed by: PSYCHIATRY & NEUROLOGY

## 2022-12-08 PROCEDURE — 97110 THERAPEUTIC EXERCISES: CPT

## 2022-12-08 PROCEDURE — 92526 ORAL FUNCTION THERAPY: CPT

## 2022-12-08 PROCEDURE — 92507 TX SP LANG VOICE COMM INDIV: CPT

## 2022-12-08 PROCEDURE — 85025 COMPLETE CBC W/AUTO DIFF WBC: CPT

## 2022-12-08 PROCEDURE — 97535 SELF CARE MNGMENT TRAINING: CPT

## 2022-12-08 RX ADMIN — HYDROXYCHLOROQUINE SULFATE 200 MG: 200 TABLET, FILM COATED ORAL at 20:26

## 2022-12-08 RX ADMIN — MICONAZOLE NITRATE: 2 POWDER TOPICAL at 08:09

## 2022-12-08 RX ADMIN — FOLIC ACID 1 MG: 1 TABLET ORAL at 08:07

## 2022-12-08 RX ADMIN — HYDROXYCHLOROQUINE SULFATE 200 MG: 200 TABLET, FILM COATED ORAL at 08:06

## 2022-12-08 RX ADMIN — ASPIRIN 81 MG 81 MG: 81 TABLET ORAL at 08:11

## 2022-12-08 RX ADMIN — THERA TABS 1 TABLET: TAB at 08:07

## 2022-12-08 RX ADMIN — ATORVASTATIN CALCIUM 40 MG: 40 TABLET, FILM COATED ORAL at 20:26

## 2022-12-08 RX ADMIN — DOCUSATE SODIUM 100 MG: 100 CAPSULE, LIQUID FILLED ORAL at 08:06

## 2022-12-08 RX ADMIN — ENOXAPARIN SODIUM 40 MG: 100 INJECTION SUBCUTANEOUS at 17:30

## 2022-12-08 RX ADMIN — BISACODYL 5 MG: 5 TABLET, COATED ORAL at 08:06

## 2022-12-08 RX ADMIN — GABAPENTIN 300 MG: 300 CAPSULE ORAL at 20:26

## 2022-12-08 RX ADMIN — AMLODIPINE BESYLATE 2.5 MG: 2.5 TABLET ORAL at 08:07

## 2022-12-08 RX ADMIN — DOCUSATE SODIUM 100 MG: 100 CAPSULE, LIQUID FILLED ORAL at 20:27

## 2022-12-08 NOTE — PROGRESS NOTES
12/08/22 1300   Restrictions/Precautions   Restrictions/Precautions Modified Diet;Swallowing - Thickened Liquids; Fall Risk;Aspiration Risk;Weight Bearing   Lower Extremity Weight Bearing Restrictions   Right Lower Extremity Weight Bearing Weight Bearing As Tolerated   Left Lower Extremity Weight Bearing Weight Bearing As Tolerated   General   Additional Pertinent Hx Anxiety, depression, COPD, CVA, DM, LE edema, hyperlipidemia, HTN, obesity, RA, CAD and venous thromboembolism   Diagnosis CVA, R hemiparesis   General Comment   Comments PATIENT IN ROOM WITH SISTER AND SLP FINISHING SESSION   Subjective   Subjective PATIENT IS IN GOOD SPIRITS   Bed mobility   Sit to Supine Moderate assistance;Minimal assistance  (PATIENT WENT IMMEDIATELY INTO TRIPLANAR TO GO INTO SUPINE. MOD A AT RLE)   Transfers   Squat Pivot Transfers Moderate Assistance  (FROM W/C TO RIGHT SIDE OF BED,)   Ambulation   WB Status WBAT   PT Exercises   Exercise Treatment SEATED EXERCISES: SAQ WITH 2.5 LBS 3X15, ANKLE PF WITH GREEN BAND 3X15, ANKLE DF WITH RTB 3X15   Motor Control/Coordination VOLLEYING WITH BALLOON LUE ONLY 20X  (GOOD COORDINATION AND REACTION TIME)   Activity Tolerance   Activity Tolerance Patient tolerated treatment well   Assessment   Assessment AFTERNOON SESSION FOCUS ON STRENGTHENING. WHEN USING MANUAL RESISTANCE PATIENT TENDS TO NOT USE FULL CONTRACTILE FORCE OF MUSCLE, EVEN IF RESISTANCE APPLIED IS LIGHT. UTILIZED WEIGHTS AND RESISTANCE BANDS, PATIENT SEEMS TO BE RESPONDING WELL AND USING FULL CAPABILITY.    Safety Devices   Type of Devices Left in bed;Call light within reach  (SISTER IN ROOM WITH PATIENT)   PT Individual Minutes   Time In 1345   Time Out 1426   Minutes 41   Electronically sign by: MOLINA Quispe 12/8/2022, 2:31 PM

## 2022-12-08 NOTE — PROGRESS NOTES
[facility]  INPATIENT SPEECH THERAPY  [facility] 8 REHAB UNIT    [x]Daily Note  []Progress Note    Date: 2022  Patient Name: Starr Scherer        MRN: [id number]    Account #: [de-identified]  : 1960  (62 y.o.)  Gender: female   Diet: Dysphagia soft and bite sized, thin liquids        PATIENT DIAGNOSIS(ES):    Diagnosis: CVA, R hemiparesis     Additional Pertinent Hx: Anxiety, depression, COPD, CVA, DM, LE edema, hyperlipidemia, HTN, obesity, RA, CAD and venous thromboembolism     RESTRICTIONS/PRECAUTIONS:    Restrictions/Precautions  Restrictions/Precautions: Modified Diet, Swallowing - Fall Risk, Aspiration Risk  Required Braces or Orthoses?: No           Subjective:  She was upright in her wheelchair. She completed morning and afternoon sessions this date. Objective:  Receptive language tasks were targeted. She completed body part identification tasks more easily this date with simplified instructions. She scored at 40%. She followed simple one step commands at 70% (simplified instructions were also used for this task). Tasks for expressive language were completed. Melodic intonation therapy was utilized for repetition of phrases and sentences. She was able to complete these at 80%. Sentence completion tasks were at 60% this date with minimal cues. Neologisms, semantic paraphasis, and perseverations are all still noted. Today she completed oral motor exercises when provided with demonstration. She was also able to complete the effortful swallow with minimal instruction and demonstration. Today she tolerated thin liquids via straw without any overt s/s of aspiration. Clear voicing was noted after all sips. Good hyolaryngeal elevation and mildly delayed pharyngeal swallow responses were noted. No oral residue or buccal cavity pocketing was noted this morning after breakfast. No oral residue or pocketing was noted after lunch.       She continues to exhibit severe expressive and receptive aphasia, dysarthria,cognitive deficits, deficits in executive function and dysphagia. Recommend she continue speech therapy services to address these deficits. Recommend she remain on a dysphagia soft diet with thin liquids at this time. SLP will upgrade to regular when she demonstrates consistent awareness of oral residue. Recommended Diet and Intervention  Soft & Bite Sized consistencies   Thin liquids  Recommended Form of Meds: Crushed in puree as able  Dysphagia treatment  Therapeutic Interventions: Pharyngeal exercises;Diet tolerance monitoring;Oral care;Oral motor exercises; Patient/Family education; Therapeutic PO trials with SLP     Compensatory Swallowing Strategies  Compensatory Swallowing Strategies : Alternate solids and liquids;Eat/Feed slowly; No straws;Upright as possible for all oral intake;Remain upright for 30-45 minutes after meals;Small bites/sips; Check for pocketing of food on the Right; Check for pocketing of food on the Left; Set up assist;Assist feed             SHORT TERM GOAL #1:  Goal 1: Pt will complete y/n tasks with 80% accuracy and minimal verbal cues/modeling. SHORT TERM GOAL #2:  Goal 2: Pt will complete verbal expression tasks with 80% accuracy and minimal verbal cues/modeling. SHORT TERM GOAL #3:  Goal 3: Pt will complete receptive/expressive language tasks with 80% accuracy and minimal verbal cues/modeling. SHORT TERM GOAL #4:  Goal 4: Pt will follow one step commands with with 90% accuracy and minimal verbal cues/modeling. SHORT TERM GOAL #5:  Goal 5: Pt will complete orientation tasks with 90% accuracy and minimal verbal cues/modeling. Swallowing Short Term Goals  Short-term Goals  Goal 1: Pt will tolerate soft & bite sized consistencies with mildly  (nectar) thick liquids with minimal overt s/s of aspiration/penetration during hospitalization. New Goal: Pt will tolerate soft and bite sized diet with thin liquids with minimal overt s/s of aspiration/penetration during hospitalization  Goal 2: Pt will complete Modified Barium Swallow Study. Discontinue goal  Goal 3: Pt will demonstrate awareness of general aspiration precautions. Goal 4: Pt will participate in swallowing reassessments to ensure safest diet consistencies. Goal 5: Pt will allow staff to complete daily oral care. Goal 6: Pt will complete oral motor exercises for lingual and labial strengthening. ASSESSMENT:  Assessment: [x]Progressing towards goals          []Not Progressing towards goals    Patient Tolerance of Treatment:   [x]Tolerated well []Tolerated fair []Required rest breaks []Fatigued    Education:  Learner:  [x]Patient          []Significant Other          []Other  Education provided regarding:  [x]Goals and POC   []Diet and swallowing precautions    []Home Exercise Program  []Progress and/or discharge information  Method of Education:  [x]Discussion          []Demonstration          []Handout          []Other  Evaluation of Education:   [x]Verbalized understanding   []Demonstrates without assistance  []Demonstrates with assistance  []Needs further instruction     []No evidence of learning                  []No family present      Plan: [x]Continue with current plan of care    []Modify current plan of care as follows:    []Discharge patient    Discharge Location:    Services/Supervision Recommended:      [x]Patient continues to require treatment by a licensed therapist to address functional deficits as outlined in the established plan of care.     Electronically Signed By:  Leigha Gresham  12/8/2022,4:37 PM.

## 2022-12-08 NOTE — PROGRESS NOTES
Patient:   Kennedy Holcomb  MR#:    [id number]   Room:    0826/826-02   YOB: 1960  Date of Progress Note: 12/8/2022  Time of Note                           7:45 AM  Consulting Physician:   Jaycob Esposito M.D. Attending Physician:  Jaycob Esposito MD     Chief complaint Acute ischemic stroke    S:This 64 y.o. female  with history of anxiety, depression, COPD, atherosclerotic disease, colitis, COPD, CVA, DM,  LE edema, hyperlipidemia, HTN, morbid obesity, RA, CAD  and venous thromboembolism. She presented to [facility] on 11/9/22 after being found at home lying facedown in her feces. She was very weak, confused, not able to speak but moving purposefully and intermittently following commands. Her last well know was 3 a.m. when her  left for work. Imaging done revealed a large MCA stroke 7.7 x 5 cm. CTA head/neck revealed an acute M1 occlusion. She was outside of the window for TPA. CK on admit was 1100, consistent with rhabdomylosis. She was also noted to possibly Dens fracture. She was transferred to [facility] for a possible thrombectomy. Further imaging was done at [facility] and she was deemed not a candidate for thrombectomy. MRI done ruled out Dens fracture. Repeat CT of head on 11/11/22 showed evolving left MCA territory infarct with increasing edema/mass effect resulting in 4mm of  rightward midline shift(previously 2mm) a the level of the third ventricle. No hemorrhagic conversion or definite trapping of the right lateral ventricle, possible small acute right cerebellar infarct. Neurosurgery was consulted for possible hemicraniectomy. She was seen by Dr. BRETT Fajardo, who didn't feel any surgical intervention was needed. Patient was found to have a UTI + for [facility] and was placed on Rocephin on 11/14/22. Patient had a PICC line placed. Patient was evaluated by SPT and initially made NPO d/t oropharyngeal dysphagia. NGT placed and feeding started. She was also noted to have global aphasia but is improving. She was re-evaluated by SPT on 11/15/22 for swallow and was started on a dysphagia 1 diet with nectar thick liquids. Patient also continues to have right sided weakness and is participating in both PT/OT. Repeat CT head on 11/13/22 shows stable LMCA ischemic stroke with stable edema, 5 mm shift, no hemorrhage. She is felt to need a stay on Rehab for continued PT/OT/SPT and medical management to work towards her goal of returning home with her . She is now felt ready to start the Rehab program.  No new issues overnight. REVIEW OF SYSTEMS:  Unreliable due to aphasia     Past Medical History:      Diagnosis Date    Anxiety     ASCVD (arteriosclerotic cardiovascular disease)     Carrier of group B Streptococcus     Cellulitis     Colitis     COPD (chronic obstructive pulmonary disease) ([facility])     Costochondritis     CVA (cerebral vascular accident) ([address]     Depression     Edema     Fracture of sternum     non union post surgery.      Gallstones     Grief reaction     H/O superior vena cava filter placement     Hyperlipidemia     Hypertension     MI (myocardial infarction) ([address]     MRSA (methicillin resistant Staphylococcus aureus)     Neuropathy     Obesity     Pseudarthrosis sternal    RA (rheumatoid arthritis) ([address]     Rosacea     Sinus bradycardia     TIA (transient ischemic attack)     Venous thromboembolism        Past Surgical History:      Procedure Laterality Date    ADENOIDECTOMY      APPENDECTOMY      CARDIAC CATHETERIZATION  3/2009    CARDIAC CATHETERIZATION  11/5/14  JDT    EF 50%, patent bypass grafts    CHOLECYSTECTOMY  2011    COLONOSCOPY  06/03/2010    Dr. Yoni Cruz: distal colon having ulcerative lesions c/w UC    COLONOSCOPY  2009    pancolitis    COLONOSCOPY      CORONARY ARTERY BYPASS GRAFT  3/2009    PHANI Beltran to LAD, SVGs to OM & PDA    GASTRIC BYPASS SURGERY  2012    HIATAL HERNIA REPAIR  2011    HYSTERECTOMY (CERVIX STATUS UNKNOWN)      KNEE SURGERY      SD COLONOSCOPY FLX DX W/COLLJ SPEC WHEN PFRMD N/A 3/12/2018    Dr Manuel Islas rectal inflammation consistent with U.C.-Active proctitis--3 yr recall    Bonysusanne Velásquez ENDOSCOPY  2010    Dr. Asuncion Ahn  2012       Medications in Hospital:      Current Facility-Administered Medications:     bisacodyl (DULCOLAX) EC tablet 5 mg, 5 mg, Oral, Daily, Librado Hidalgo MD, 5 mg at 12/07/22 0844    lactulose (CHRONULAC) 10 GM/15ML solution 20 g, 20 g, Oral, 6 times per day, Librado Hidalgo MD    miconazole (MICOTIN) 2 % powder, , Topical, BID, Librado Hidalgo MD, Given at 12/07/22 2117    docusate sodium (COLACE) capsule 100 mg, 100 mg, Oral, BID, Librado Hidalgo MD, 100 mg at 12/07/22 2021    aspirin chewable tablet 81 mg, 81 mg, Oral, Daily, Librado Hidalgo MD, 81 mg at 12/07/22 0844    atorvastatin (LIPITOR) tablet 40 mg, 40 mg, Oral, Nightly, Librado Hidalgo MD, 40 mg at 12/07/22 2022    dulaglutide (TRULICITY) SC injection 1.5 mg (Patient Supplied), 1.5 mg, SubCUTAneous, Weekly, Librado Hidalgo MD    folic acid (FOLVITE) tablet 1 mg, 1 mg, Oral, Daily, Librado Hidalgo MD, 1 mg at 12/07/22 0844    gabapentin (NEURONTIN) capsule 300 mg, 300 mg, Oral, Nightly, Librado Hidalgo MD, 300 mg at 12/07/22 2022    hydroxychloroquine (PLAQUENIL) tablet 200 mg, 200 mg, Oral, BID, Librado Hidalgo MD, 200 mg at 12/07/22 2022    amLODIPine (NORVASC) tablet 2.5 mg, 2.5 mg, Oral, Daily, Librado Hidalgo MD, 2.5 mg at 12/07/22 0844    tiZANidine (ZANAFLEX) tablet 4 mg, 4 mg, Oral, BID PRN, Librado Hidalgo MD    multivitamin 1 tablet, 1 tablet, Oral, Daily, Librado Hidalgo MD, 1 tablet at 12/07/22 0844    acetaminophen (TYLENOL) tablet 650 mg, 650 mg, Oral, Q4H PRN, Librado Hidalgo MD, 650 mg at 11/27/22 1933    enoxaparin (LOVENOX) injection 40 mg, 40 mg, SubCUTAneous, Daily, Librado Hidalgo MD, 40 mg at 12/07/22 1719    polyethylene glycol (GLYCOLAX) packet 17 g, 17 g, Oral, Daily PRN, Grace Batista MD Tenzin, 17 g at 11/30/22 1817    Allergies:  Methotrexate derivatives    Social History:   TOBACCO:   reports that she quit smoking about 13 years ago. Her smoking use included cigarettes. She has a 64.00 pack-year smoking history. She has never used smokeless tobacco.  ETOH:   reports current alcohol use. Family History:       Problem Relation Age of Onset    Prostate Cancer Father     Heart Failure Father     Cancer Father     Colon Cancer Neg Hx     Colon Polyps Neg Hx     Liver Cancer Neg Hx     Liver Disease Neg Hx     Esophageal Cancer Neg Hx     Rectal Cancer Neg Hx     Stomach Cancer Neg Hx          PHYSICAL EXAM:  BP (!) 150/65   Pulse 55   Temp 97.3 °F (36.3 °C)   Resp 15   Ht 5' 7\" (1.702 m)   Wt 203 lb (92.1 kg)   SpO2 96%   BMI 31.79 kg/m²     Constitutional - well developed, well nourished. Eyes - conjunctiva normal.   Ear, nose, throat - No scars, masses, or lesions over external nose or ears, no atrophy of tongue  Neck-symmetric, no masses noted, no jugular vein distension  Respiration- chest wall appears symmetric, good expansion,   normal effort without use of accessory muscles  Musculoskeletal - no significant wasting of muscles noted, no bony deformities  Extremities-no clubbing, cyanosis or edema  Skin - warm, dry, and intact. No rash, erythema, or pallor. Psychiatric - mood, affect, and behavior appear normal.      Neurological exam  Awake, alert, global aphasia says \"yes\" to all questions asked   Attention and concentration appear appropriate  Recent and remote memory unable to tests     Cranial Nerve Exam     CN III, IV,VI-EOMI, No nystagmus, conjugate eye movements, no ptosis    CN VII-+ facial assymetry       Motor Exam  No movement on the right      Tremors- no tremors in hands or head noted     Gait  Not tested     Nursing/pcp notes, imaging,labs and vitals reviewed.      PT,OT and/or speech notes reviewed    Lab Results   Component Value Date    WBC 7.6 12/08/2022    HGB 10.6 (L) 12/08/2022    HCT 34.1 (L) 12/08/2022    MCV 78.8 (L) 12/08/2022     12/08/2022     Lab Results   Component Value Date     12/08/2022    K 3.7 12/08/2022     12/08/2022    CO2 26 12/08/2022    BUN 13 12/08/2022    CREATININE 0.5 12/08/2022    GLUCOSE 79 12/08/2022    CALCIUM 8.9 12/08/2022    PROT 5.0 (L) 12/08/2022    LABALBU 3.1 (L) 12/08/2022    BILITOT <0.2 12/08/2022    ALKPHOS 85 12/08/2022    AST 24 12/08/2022    ALT 24 12/08/2022    LABGLOM >60 12/08/2022   No results found for: INR, PROTIME    Nakul Lim   Student Physical Therapist   Physical Therapy   Progress Notes       Cosign Needed   Date of Service:  12/6/2022  3:55 PM                 Cosign Needed                         12/06/22 1000   Restrictions/Precautions   Restrictions/Precautions Modified Diet;Swallowing - Thickened Liquids; Fall Risk;Aspiration Risk;Weight Bearing   Lower Extremity Weight Bearing Restrictions   Right Lower Extremity Weight Bearing Weight Bearing As Tolerated   Left Lower Extremity Weight Bearing Weight Bearing As Tolerated   General   Additional Pertinent Hx Anxiety, depression, COPD, CVA, DM, LE edema, hyperlipidemia, HTN, obesity, RA, CAD and venous thromboembolism   Diagnosis CVA, R hemiparesis   General Comment   Comments PATIENT BROUGHT TO PT GYM BY OT   Subjective   Subjective PATIENT APPEARS TO BE DOING WELL, OT COMMUNICATES SHE IS ABLE TO FOLLOW VC BETTER TODAY. Transfers   Sit to Stand Moderate Assistance;Minimal Assistance  (FROM W/C IN //)   Stand to Sit Minimal Assistance;Contact guard assistance  (IN //)   Comment SS TO EXCHANGE PANTS DUE TO WET SPOT ON PATIENT, NOT FROM INCONTINENCE. Ambulation   WB Status WBAT   Ambulation   Surface Level tile   Device Parallel Bars   Other Apparatus AFO; Wheelchair follow  (SHOE COVER)   Assistance Moderate assistance   Quality of Gait PATIENT PREMATURELY ADVANCES LLE PRIOR TO RLE BEING IN FULL EXTENSION. CUES GIVEN BEFORE AND DURING AMB. PAUSE/STOP AND WAIT TILL RLE IS LOCKED OUT BY THERAPIST. Distance 3',12'   Comments INTIAL WALKED D/C DUE TO WET AREA ON PATIENTS PANTS. AFTERWARDS REATTEMPTED AMB., FEW INCIDENTS OF KNEE BUCKLING DUE TO PATIENT ADVANCING LLE BEFORE RLE WAS IN FULL EXT. AS WALK CONTINUED PATIENT WAS ABLE TO FOLLOW VC AND WAIT FOR THERAPIST TO ESTABLISH TERMINAL KNEE EXT BEFORE ADVANCING LLE. More Ambulation? Yes   Ambulation 2   Surface - 2 level tile   Device 2 Parallel Bars   Other Apparatus 2 AFO; Wheelchair follow   Assistance 2 Moderate assistance   Quality of Gait 2 AS ABOVE   Distance 12 FT   Comments PATIENT CONTINUED TO PROGRESS WITH LLE BEFORE RLE WAS SET AND LOCKED OUT. EVEN WHEN TOLD TO STOP OR PAUSE PATIENT WOULD TRY TO CONTINUE FWD. AS WALK PROGRESS, PATIENT WILL WAIT TILL LEG IS LOCKED   Ambulation 3   Surface - 3 level tile   Device 3 Parallel Bars   Other Apparatus 3 AFO; Wheelchair follow   Assistance 3 Moderate assistance;Minimal assistance   Quality of Gait 3 BEST AMB. OF THE THREE ATTEMPTS IN //   Distance 12 FT   Comments ONLY ONE INCIDENT OF PREMATURE ADVANCEMENT OF LLE, OTHERWISE AMB. WAS FLUID. THERAPIST PROVIDE MOD A AT MEDIAL GASTROC AND ISCHIAL TUBEROSITY FOR ALL AMB. PT Exercises   Exercise Treatment SEATED EX: MARCHES 15X, HIP EXT 10X, PF/DF 15X, HIP ABD 15X, HIP ADD 15X  (MANUAL RESISTANCE APPLIED TO LLE, RLE = PROM)   Assessment   Assessment PATIENT WAS ABLE TO TOLERATE MORNING THERAPY SESSION. DURING FIRST AND SECOND AMB. PATIENT HAD DIFFICULTY FOLLOWING VC AND BEGAN INITATING MOVEMENTS BEFORE THERAPIST WAS ABLE TO PROVIDE ASSISTANCE TO RLE LEADING TO UNSTEADINESS AND LLE KNEE BUCKLING. DURING THIRD AMB. PATIENT DID MUCH BETTER WITH DIRECTIONS AND ONLY ONE MOMENT OF KNEE BUCKLING (SEE AMB FOR DETAILS). RETURNED TO ROOM AFTER AMB. TO CHANGE PANTS, USED SS. AFTERWARDS FINISHED SESSION WITH SEATED EXERCISES.    Safety Devices   Type of Devices Left in chair;Call light within reach;Gait belt   PT Individual Minutes   Time In 1000   Time Out 1100   Minutes 60                    RECORD REVIEW: Previous medical records, medications were reviewed at today's visit    IMPRESSION:   1. Acute ischemic stroke-on aspirin/statin  2. Hypertension-on medications monitor  3. Hyperlipidemia-on statin  4. Diabetes-Trulicity-monitor blood sugar  5. DVT prophylaxis-Lovenox  6. History of coronary artery disease-aspirin/statin  7. Neuropathy-on Neurontin  8. Rheumatoid arthritis-on Plaquenil  9. COPD-monitor  10. History of anxiety and depression-monitor  11. History of colitis/gallstones-monitor  12. History of bariatric surgery-on multivitamin/folic acid  13. History of superior vena cava filter placement with venous thromboembolism-not on anticoagulation-monitor- indicates took herself off blood thinners as not like meds and was bruising   14.   PT/OT/speech    Continue current care    x-ray of right ankle no acute changes  Venous ultrasound of leg no DVT        ELOS pending 3 weeks     Expected duration and frequency therapy: 180 minutes per day, 5 days per week    CALL WITH ANY QUESTIONS  [phone] CELL  Dr Krause Mess

## 2022-12-08 NOTE — PROGRESS NOTES
[facility]  INPATIENT SPEECH THERAPY  [facility] UNIT  TIME   [id number]    65 MINUTES     [x]Daily Note  []Progress Note     Date: 2022  Patient Name: Adrianna Godfrey        MRN:   [id number]    Account #: [de-identified]  : 1960  (64 y.o.)  Gender: female   Primary Provider: Tracie Guevara MD  Diet: Dysphagia soft and bite sized, thin liquids        PATIENT DIAGNOSIS(ES):    Diagnosis: CVA, R hemiparesis     Additional Pertinent Hx: Anxiety, depression, COPD, CVA, DM, LE edema, hyperlipidemia, HTN, obesity, RA, CAD and venous thromboembolism     RESTRICTIONS/PRECAUTIONS:    Restrictions/Precautions  Restrictions/Precautions: Modified Diet, Swallowing - Fall Risk, Aspiration Risk  Required Braces or Orthoses?: No       Subjective:  Patient was sitting upright in wheelchair. No pain reported this session. Objective:  Patient presented with perseveration, primarily noted with \"thank you\". Patient demonstrated clear moments of automatic speech. In answering y/n questions, patient benefited from verbal choices and visual of hands for each choice. Confrontational naming task completed with objects in the room. Patient was 57% accurate independently. With verbal cues/prompts, multiple attempts, and initial sound cue for one trial and phrase completion for one trial, patient increased accuracy to 86%. Semantic paraphasia noted in this task. Patient demonstrated good repetition skills 1/1 trials in this task. Verbal expression task of phrase completion with opposites (hot and ____, black and ____) was completed with 80% accuracy independently. On missed trial, patient perseverated on previous information from an earlier trial. With visual cues/prompts as well as phrase and sentence completion, accuracy increased to 100%. Patient did not provide accurate answer to wh question regarding decoration in room. Patient verbally produced \"[place]\", however, question asked was a \"who\" question. Patient did not demonstrate independent ability to utilize low tech alphabet board to answer question appropriately. With low tech alphabet board, patient was tasked to spell first name. Patient accurate in initial letter independently, but then pointed to incorrect letter. Patient verbally produced name when use of alphabet board was demonstrated with her first name. When prompted, patient demonstrated ability to accurately use alphabet board to spell first name with self correction of last letter following clinician model (3/4 letters accurate, 4/4 letters accurate with self correction). Patient noted to incorrectly say letters in this task. In verbal expression task to provide last name, patient initially inaccurate. With verbal prompt of first name, patient produced last name accurately. When tasked to use low tech alphabet board, patient used it to spell \"daxa\" again. Patient demonstrated ability to verbally complete spelling of last name as clinician verbally spelled last name. In orientation task, patient successfully produced name and month. Patient did not demonstrate ability to accurately name year, DANIEL, or  with verbal cues/prompts. Patient observed during portion of noon meal. Transit appeared timely of foods (mashed potatoes). Min oral residue noted. However, oral cavity was noted to be clear when finished with meal with no pocketing observed. Question if transit of thin liquids was sometimes faster than 1 second, however, patient presented with clear voicing. Patient presented with dislike of noon meal and was noted to only eat small portion. Addressed some safe swallow strategies. Recommended Diet and Intervention  Soft & Bite Sized consistencies   Thin liquids  Recommended Form of Meds: Crushed in puree as able  Dysphagia treatment  Therapeutic Interventions: Pharyngeal exercises;Diet tolerance monitoring;Oral care;Oral motor exercises; Patient/Family education; Therapeutic PO trials with SLP     Compensatory Swallowing Strategies  Compensatory Swallowing Strategies : Alternate solids and liquids;Eat/Feed slowly; No straws;Upright as possible for all oral intake;Remain upright for 30-45 minutes after meals;Small bites/sips; Check for pocketing of food on the Right; Check for pocketing of food on the Left; Set up assist;Assist feed        SHORT TERM GOAL #1:  Goal 1: Pt will complete y/n tasks with 80% accuracy and minimal verbal cues/modeling. SHORT TERM GOAL #2:  Goal 2: Pt will complete verbal expression tasks with 80% accuracy and minimal verbal cues/modeling. SHORT TERM GOAL #3:  Goal 3: Pt will complete receptive/expressive language tasks with 80% accuracy and minimal verbal cues/modeling. SHORT TERM GOAL #4:  Goal 4: Pt will follow one step commands with with 90% accuracy and minimal verbal cues/modeling. SHORT TERM GOAL #5:  Goal 5: Pt will complete orientation tasks with 90% accuracy and minimal verbal cues/modeling. Swallowing Short Term Goals  Short-term Goals  Goal 1: Pt will tolerate soft & bite sized consistencies with mildly  (nectar) thick liquids with minimal overt s/s of aspiration/penetration during hospitalization. New Goal: Pt will tolerate soft and bite sized diet with thin liquids with minimal overt s/s of aspiration/penetration during hospitalization  Goal 2: Pt will complete Modified Barium Swallow Study. Goal 3: Pt will demonstrate awareness of general aspiration precautions. Goal 4: Pt will participate in swallowing reassessments to ensure safest diet consistencies. Goal 5: Pt will allow staff to complete daily oral care. Goal 6: Pt will complete oral motor exercises for lingual and labial strengthening.         ASSESSMENT:  Assessment: [x]Progressing towards goals          []Not Progressing towards goals     Patient Tolerance of Treatment:       [x]Tolerated well         []Tolerated fair          []Required rest breaks          []Fatigued Plan:   [x]Continue with current plan of care               []Modify current plan of care as follows:               []Discharge patient                          Discharge Location:                          Services/Supervision Recommended:       [x]Patient continues to require treatment by a licensed therapist to address functional deficits as outlined in the established plan of care.     Electronically signed by MARCELINO Bella on 12/8/2022 at 12:29 PM

## 2022-12-08 NOTE — PROGRESS NOTES
Occupational Therapy     12/08/22 1430   General   Timed Code Treatment Minutes 30 Minutes   Pre Treatment Pain Screening   Pain at present 0   Scale Used Faces   Intervention List Patient able to continue with treatment   General   Family / Caregiver Present Yes  (Pt. sister.)   Type of ROM/Therapeutic Exercise   Type of ROM/Therapeutic Exercise Cane/Wand   Comment BUE dowel kevon supine exercises using auxillary hand brace on RUE, focus on extension, 2 sets x 10 reps. Assessment   Performance deficits / Impairments Decreased functional mobility ; Decreased endurance;Decreased coordination;Decreased ADL status; Decreased sensation;Decreased posture;Decreased ROM; Decreased balance;Decreased strength;Decreased vision/visual deficit; Decreased safe awareness;Decreased high-level IADLs;Decreased cognition;Decreased fine motor control   Treatment Diagnosis L MCA stroke   Activity Tolerance   Activity Tolerance Patient Tolerated treatment well   Occupational Therapy Plan   Current Treatment Recommendations Strengthening;ROM;Balance training;Functional mobility training; Endurance training;Neuromuscular re-education; Safety education & training;Patient/Caregiver education & training;Equipment evaluation, education, & procurement;Positioning;Modalities; Self-Care / ADL; Home management training;Sensory integration; Wheelchair mobility training

## 2022-12-08 NOTE — PLAN OF CARE
Problem: Discharge Planning  Goal: Discharge to home or other facility with appropriate resources  12/7/2022 2315 by Lary Carter RN  Outcome: Progressing  Flowsheets (Taken 12/7/2022 2020)  Discharge to home or other facility with appropriate resources: Refer to discharge planning if patient needs post-hospital services based on physician order or complex needs related to functional status, cognitive ability or social support system  12/7/2022 0933 by Missael Bartholomew RN  Outcome: Progressing     Problem: Safety - Adult  Goal: Free from fall injury  12/7/2022 2315 by Lary Carter RN  Outcome: Progressing  12/7/2022 0933 by Missael Bartholomew RN  Outcome: Progressing     Problem: Neurosensory - Adult  Goal: Achieves stable or improved neurological status  12/7/2022 2315 by Lary Carter RN  Outcome: Progressing  Flowsheets (Taken 12/7/2022 2020)  Achieves stable or improved neurological status: Assess for and report changes in neurological status  12/7/2022 0933 by Missael Bartholomew RN  Outcome: Progressing  Goal: Achieves maximal functionality and self care  12/7/2022 2315 by Lary Carter RN  Outcome: Progressing  12/7/2022 0933 by Missael Bartholomew RN  Outcome: Progressing     Problem: Skin/Tissue Integrity - Adult  Goal: Skin integrity remains intact  12/7/2022 2315 by Lary Carter RN  Outcome: Progressing  12/7/2022 0933 by Missael Bartholomew RN  Outcome: Progressing     Problem: Pain  Goal: Verbalizes/displays adequate comfort level or baseline comfort level  12/7/2022 2315 by Lary Carter RN  Outcome: Progressing  12/7/2022 0933 by Missael Bartholomew RN  Outcome: Progressing     Problem: Chronic Conditions and Co-morbidities  Goal: Patient's chronic conditions and co-morbidity symptoms are monitored and maintained or improved  12/7/2022 2315 by Lary Carter RN  Outcome: Progressing  Flowsheets (Taken 12/7/2022 2020)  Care Plan - Patient's Chronic Conditions and Co-Morbidity Symptoms are Monitored and Maintained or Improved: Monitor and assess patient's chronic conditions and comorbid symptoms for stability, deterioration, or improvement  12/7/2022 0933 by Jhonny Cortez RN  Outcome: Progressing     Problem: ABCDS Injury Assessment  Goal: Absence of physical injury  12/7/2022 2315 by Sudha Currie RN  Outcome: Progressing  12/7/2022 0933 by Jhonny Cortez RN  Outcome: Progressing     Problem: Skin/Tissue Integrity  Goal: Absence of new skin breakdown  Description: 1. Monitor for areas of redness and/or skin breakdown  2. Assess vascular access sites hourly  3. Every 4-6 hours minimum:  Change oxygen saturation probe site  4. Every 4-6 hours:  If on nasal continuous positive airway pressure, respiratory therapy assess nares and determine need for appliance change or resting period. 12/7/2022 2315 by Sudha Currie RN  Outcome: Progressing  12/7/2022 0933 by Jhonny Cortez RN  Outcome: Progressing     Problem: Confusion  Goal: Confusion, delirium, dementia, or psychosis is improved or at baseline  Description: INTERVENTIONS:  1. Assess for possible contributors to thought disturbance, including medications, impaired vision or hearing, underlying metabolic abnormalities, dehydration, psychiatric diagnoses, and notify attending LIP  2. [place] high risk fall precautions, as indicated  3. Provide frequent short contacts to provide reality reorientation, refocusing and direction  4. Decrease environmental stimuli, including noise as appropriate  5. Monitor and intervene to maintain adequate nutrition, hydration, elimination, sleep and activity  6. If unable to ensure safety without constant attention obtain sitter and review sitter guidelines with assigned personnel  7.  Initiate Psychosocial CNS and Spiritual Care consult, as indicated  12/7/2022 2315 by Sudha Currie RN  Outcome: Progressing  12/7/2022 0933 by Jhonny Cortez RN  Outcome: Progressing     Problem: Musculoskeletal - Adult  Goal: Return mobility to safest level of function  12/7/2022 2315 by Cecilia Mendes RN  Outcome: Progressing  Flowsheets (Taken 12/7/2022 2020)  Return Mobility to Safest Level of Function: Assess patient stability and activity tolerance for standing, transferring and ambulating with or without assistive devices  12/7/2022 0933 by Kimberly Taylor RN  Outcome: Progressing  Goal: Return ADL status to a safe level of function  12/7/2022 2315 by Cecilia Mendes RN  Outcome: Progressing  12/7/2022 0933 by Kimberly Taylor RN  Outcome: Progressing     Problem: Gastrointestinal - Adult  Goal: Maintains or returns to baseline bowel function  12/7/2022 2315 by Cecilia Mendes RN  Outcome: Progressing  [address] (Taken 12/7/2022 2020)  Maintains or returns to baseline bowel function: Assess bowel function  12/7/2022 0933 by Kimberly Taylor RN  Outcome: Progressing  Goal: Maintains adequate nutritional intake  12/7/2022 2315 by Cecilia Mendes RN  Outcome: Progressing  12/7/2022 0933 by Kimberly Taylor RN  Outcome: Progressing     Problem: Metabolic/Fluid and Electrolytes - Adult  Goal: Electrolytes maintained within normal limits  12/7/2022 2315 by Cecilia Mendes RN  Outcome: Progressing  Flowsheets (Taken 12/7/2022 2020)  Electrolytes maintained within normal limits: Monitor labs and assess patient for signs and symptoms of electrolyte imbalances  12/7/2022 0933 by Kimberly Taylor RN  Outcome: Progressing     Problem: Nutrition Deficit:  Goal: Optimize nutritional status  12/7/2022 2315 by Cecilia Mendes RN  Outcome: Progressing  12/7/2022 0933 by Kimberly Taylor RN  Outcome: Progressing

## 2022-12-08 NOTE — PROGRESS NOTES
12/08/22 1000   Restrictions/Precautions   Restrictions/Precautions Modified Diet;Swallowing - Thickened Liquids; Fall Risk;Aspiration Risk;Weight Bearing   Required Braces or Orthoses? Yes  (R AFO FOR AMB. ONLY)   Lower Extremity Weight Bearing Restrictions   Right Lower Extremity Weight Bearing Weight Bearing As Tolerated   Left Lower Extremity Weight Bearing Weight Bearing As Tolerated   General   Additional Pertinent Hx Anxiety, depression, COPD, CVA, DM, LE edema, hyperlipidemia, HTN, obesity, RA, CAD and venous thromboembolism   Diagnosis CVA, R hemiparesis   General Comment   Comments PATIENT SHARMAINE TO GYM BY OT   Subjective   Subjective PATIENT APPEARS TO BE DOING WELL AND EXCITED FOR THERAPY   Transfers   Sit to Stand Moderate Assistance;Minimal Assistance  (IN // FROM W/C, R KNEE BLOCK)   Stand to Sit Minimal Assistance;Contact guard assistance  (FROM // TO W/C)   Ambulation   WB Status WBAT   Ambulation   Surface Level tile   Device Parallel Bars   Other Apparatus AFO; Wheelchair follow  (AFO RLE)   Assistance Moderate assistance;Minimal assistance   Quality of Gait ON MOMENT OF PREMATURE ADVANCEMENT OF LLE WITHOUT LOB, ONE EPISODE OF LLE BUCKLING. Distance 12'   Comments EXTENSOR TONE STILL FELT ON POSTERIOR SIDE OF RLE   More Ambulation? Yes   Ambulation 2   Surface - 2 level tile   Device 2 Parallel Bars   Other Apparatus 2 AFO; Wheelchair follow   Assistance 2 Moderate assistance   Quality of Gait 2 COUPLE OF VC TO SLOW DOWN/WAIT BEFORE PROGRESSING WITH LLE. INCREASED DEMAND PLACED ON PATIENT CAUSING EARLY FATIGUE AND LEFT KNEE TO FLEX SLIGHTLY. Distance 12'   Comments PATIENT ABLE TO FOCUS AND INTIATE MOVEMENT OF RLE WHEN ADVANCING LIMB FWD BUT REQUIRES MOD TO MIN A FROM THERAPIST TO FINISH MOVEMENT. Ambulation 3   Surface - 3 level tile   Device 3 Parallel Bars   Other Apparatus 3 AFO; Wheelchair follow   Assistance 3 Moderate assistance;Minimal assistance   Distance 12'   Comments SMOOTH CONTROLLED AND FLUID MOVEMENTS, NO INCIDENTS OF LOB OR KNEE BUCKLING. PATIENT ABLE TO COMPLETE 3 FULL 12' WALKS IN //   Propulsion 1   Propulsion Manual   Level Level Tile   Method LUE;LLE   Level of Assistance Minimal assistance;Contact guard assistance;Supervision   Description/ Details PATIENT ABLE TO BEGIN PROPULSION BUT ON TWO OCCASIONS VEER RIGHT INTO WALL. PATIENT REPOSITIONED IN MIDDLE OF HALLWAY, THERAPIST PROVIDED MIN A TO CGA IN ORDER TO PREVENT EXCESSIVE VEERING BUT ONCE PATIENT GAINED MOMENTUM, NO LONGER NEED ASSISTANCE   Distance 160'   PT Exercises   Exercise Treatment SEATED EXERCISES 2X15: BALL SQUEEZES, HIP ABD, HIP EXT, MARCHES, SAQ, PF/DF.  (RLE = PROM; LLE =A/AROM)   Assessment   Assessment PATIENT AMBULATION IS SLOWLY PROGRESSING. TODAY IN // PATIENT IS BEGINNING TO INITIATE FWD MVMT OF RLE, BUT STILL REQUIRES ASSISTANCE FROM THERAPIST AT [facility] IN ORDER TO COMPLETE CYCLE AND REQUIRES KNEE BLOCK ON RLE. PATIENTS FINAL WALK HAD NO INCIDENTS OF LOB, BUCKLING, OR PREMATURE ADVANCEMENT OF LLE. PATIENT DEMONSTRATES INCREASED ENDURANCE, ABLE TO COMPLETE ALL THREE WALKS WHILE ATTEMPTING TO MOVE RLE. FINISHED WITH SEATED EX AND W/C PROPULSION (SEE PROPULSION FOR DETAILS).    Safety Devices   Type of Devices Left in chair;Chair alarm in place;Call light within reach   PT Individual Minutes   Time In 1000   Time Out 1100   Minutes 60   Electronically sign by: MOLINA Barone 12/8/2022, 12:27 PM

## 2022-12-09 PROCEDURE — 6360000002 HC RX W HCPCS: Performed by: PSYCHIATRY & NEUROLOGY

## 2022-12-09 PROCEDURE — 94760 N-INVAS EAR/PLS OXIMETRY 1: CPT

## 2022-12-09 PROCEDURE — 97530 THERAPEUTIC ACTIVITIES: CPT

## 2022-12-09 PROCEDURE — 97535 SELF CARE MNGMENT TRAINING: CPT

## 2022-12-09 PROCEDURE — 1180000000 HC REHAB R&B

## 2022-12-09 PROCEDURE — 97116 GAIT TRAINING THERAPY: CPT

## 2022-12-09 PROCEDURE — 97110 THERAPEUTIC EXERCISES: CPT

## 2022-12-09 PROCEDURE — 99232 SBSQ HOSP IP/OBS MODERATE 35: CPT | Performed by: PSYCHIATRY & NEUROLOGY

## 2022-12-09 PROCEDURE — 6370000000 HC RX 637 (ALT 250 FOR IP): Performed by: PSYCHIATRY & NEUROLOGY

## 2022-12-09 PROCEDURE — 92507 TX SP LANG VOICE COMM INDIV: CPT

## 2022-12-09 PROCEDURE — 92526 ORAL FUNCTION THERAPY: CPT

## 2022-12-09 RX ADMIN — BISACODYL 5 MG: 5 TABLET, COATED ORAL at 07:48

## 2022-12-09 RX ADMIN — GABAPENTIN 300 MG: 300 CAPSULE ORAL at 20:41

## 2022-12-09 RX ADMIN — ENOXAPARIN SODIUM 40 MG: 100 INJECTION SUBCUTANEOUS at 17:14

## 2022-12-09 RX ADMIN — FOLIC ACID 1 MG: 1 TABLET ORAL at 07:47

## 2022-12-09 RX ADMIN — HYDROXYCHLOROQUINE SULFATE 200 MG: 200 TABLET, FILM COATED ORAL at 20:41

## 2022-12-09 RX ADMIN — ASPIRIN 81 MG 81 MG: 81 TABLET ORAL at 07:47

## 2022-12-09 RX ADMIN — DOCUSATE SODIUM 100 MG: 100 CAPSULE, LIQUID FILLED ORAL at 07:48

## 2022-12-09 RX ADMIN — MICONAZOLE NITRATE: 2 POWDER TOPICAL at 07:49

## 2022-12-09 RX ADMIN — AMLODIPINE BESYLATE 2.5 MG: 2.5 TABLET ORAL at 07:48

## 2022-12-09 RX ADMIN — THERA TABS 1 TABLET: TAB at 07:47

## 2022-12-09 RX ADMIN — ATORVASTATIN CALCIUM 40 MG: 40 TABLET, FILM COATED ORAL at 20:41

## 2022-12-09 RX ADMIN — HYDROXYCHLOROQUINE SULFATE 200 MG: 200 TABLET, FILM COATED ORAL at 07:47

## 2022-12-09 NOTE — PROGRESS NOTES
12/09/22 1000   Restrictions/Precautions   Restrictions/Precautions Modified Diet;Swallowing - Thickened Liquids; Fall Risk;Aspiration Risk;Weight Bearing   Required Braces or Orthoses? Yes  (RLE AFO WHEN AMB.)   Lower Extremity Weight Bearing Restrictions   Right Lower Extremity Weight Bearing Weight Bearing As Tolerated   Left Lower Extremity Weight Bearing Weight Bearing As Tolerated   General   Additional Pertinent Hx Anxiety, depression, COPD, CVA, DM, LE edema, hyperlipidemia, HTN, obesity, RA, CAD and venous thromboembolism   Diagnosis CVA, R hemiparesis   General Comment   Comments SEATED IN W/C, PATIENT IN OT WORKING ON COUNTING MONEY. OT COMMUNICATED THAT PATIENT HAS BEEN EXPERIENCING CRAMPS AND BEGINS TO BENDS FWD, INDICATING PATIENT NEEDS TO GO TO THE BATHROOM   Subjective   Subjective PATIENT APPEARS READY FOR THERAPY. Transfers   Sit to Stand Moderate Assistance;Minimal Assistance  (THERAPIST PROVIDING 25% OR LESS)   Stand to Sit Minimal Assistance;Contact guard assistance  (IN // TO W/C)   Ambulation   WB Status WBAT   Ambulation   Surface Level tile   Device Parallel Bars   Other Apparatus AFO; Wheelchair follow  (RIGHT AFO)   Assistance Moderate assistance;Minimal assistance   Quality of Gait SEE COMMENT   Distance 12'   Comments NO DEVIATIONS OR INCIDENCES OF LOB/UNSTEADINESS, GREAT JOSEMANUEL AND STEP LENGTH WITH LLE   More Ambulation? Yes   Ambulation 2   Surface - 2 level tile   Device 2 Parallel Bars   Other Apparatus 2 AFO; Wheelchair follow  (RLE AFO)   Assistance 2 Moderate assistance;Minimal assistance   Quality of Gait 2 PATIENT ABLE TO CONTINUALLY INITIATE MOVEMENT BY DISENGAGING EXTENSORS BUT REQUIRES MOD A TO PULL RLE THROUGH. Distance 12'   Comments NO DEVIATIONS OR INCIDENCES OF LOB/UNSTEADINESS, GREAT JOSEMANUEL AND STEP LENGTH WITH LLE   Ambulation 3   Surface - 3 level tile   Device 3 Baez rail   Other Apparatus 3 AFO; Wheelchair follow   Assistance 3 Moderate assistance;Minimal assistance   Quality of Gait 3 GOOD STEP LENGTH AND JOSEMANUEL WITH LLE, SMOOTH AND CONTROLLED WITH LLE, GOOD RECOVERY IF LOB WAS EXPERIENCED   Distance 25'   Comments COUPLE OF INCIDENCES WHERE PATIENT WAS UNABLE TO USE EXTENSOR TONE ON RIGHT SIDE TO TIGHTEN HIP OR LOCK OUT LLE, BUT PATIENT WAS ABLE TO CONTROL MOVEMENT/SELF CORRECT. PT Exercises   Exercise Treatment SEATED EXERCISES: SAQ WITH 2.5 LBS 3X15, ANKLE PF WITH GREEN BAND 3X15, ANKLE DF WITH RTB 3X15; HIP EXT WITH MANUAL RESISTNACE 15X   Assessment   Assessment PATIENT HAS LEARNED AMBULATION ROUTINE AND NO LONGER REQUIRES DEMONSTRATION PRIOR TO WALKS. WHEN AMBULATING IN // NO SIGNS OF UNSTEADINESS OR LOB. PATIENT BROUGHT OUT TO JASON RAILS TO INCREASE DISTANCE AND DEMAND ON PATIENT (SEE AMB. 3 FOR DETAILS).    Safety Devices   Type of Devices Left in chair;Gait belt;Sitter present;Call light within reach  (IN ROOM WITH SISTER)   PT Individual Minutes   Time In 1000   Time Out 1100   Minutes 60   Electronically sign by: MOLINA Blancas 12/9/2022, 12:26 PM

## 2022-12-09 NOTE — PLAN OF CARE
Problem: Discharge Planning  Goal: Discharge to home or other facility with appropriate resources  Outcome: Progressing  Flowsheets (Taken 12/8/2022 0834 by Tonja Ibarra RN)  Discharge to home or other facility with appropriate resources: Refer to discharge planning if patient needs post-hospital services based on physician order or complex needs related to functional status, cognitive ability or social support system     Problem: Safety - Adult  Goal: Free from fall injury  Outcome: Progressing  Flowsheets (Taken 12/8/2022 2122)  Free From Fall Injury: Instruct family/caregiver on patient safety     Problem: Neurosensory - Adult  Goal: Achieves stable or improved neurological status  Outcome: Progressing  Flowsheets  Taken 12/8/2022 2119 by Camila Schaeffer RN  Achieves stable or improved neurological status: Assess for and report changes in neurological status  Taken 12/8/2022 0834 by Tonja Ibarra RN  Achieves stable or improved neurological status: Assess for and report changes in neurological status  Goal: Achieves maximal functionality and self care  Outcome: Progressing  Flowsheets  Taken 12/8/2022 2119 by Camila Schaeffer RN  Achieves maximal functionality and self care: Monitor swallowing and airway patency with patient fatigue and changes in neurological status  Taken 12/8/2022 0834 by Tonja Ibarra RN  Achieves maximal functionality and self care: Monitor swallowing and airway patency with patient fatigue and changes in neurological status

## 2022-12-09 NOTE — PLAN OF CARE
Problem: Discharge Planning  Goal: Discharge to home or other facility with appropriate resources  12/9/2022 0948 by Addie Collazo, RN  Outcome: Progressing  Flowsheets (Taken 12/9/2022 0825)  Discharge to home or other facility with appropriate resources: Refer to discharge planning if patient needs post-hospital services based on physician order or complex needs related to functional status, cognitive ability or social support system  12/8/2022 2123 by Alistair Sorenson RN  Outcome: Vessie Arielle (Taken 12/8/2022 0834 by Addie Collazo, RN)  Discharge to home or other facility with appropriate resources: Refer to discharge planning if patient needs post-hospital services based on physician order or complex needs related to functional status, cognitive ability or social support system

## 2022-12-09 NOTE — PROGRESS NOTES
[facility]  INPATIENT SPEECH THERAPY  [facility] 8 REHAB UNIT  TIME   830  1225  1  60 MINUTES    [x]Daily Note  []Progress Note    Date: 2022  Patient Name: Richard Man        MRN: [id number]    Account #: [de-identified]  : 1960  (58 y.o.)  Gender: female   Primary Provider: Roly Merritt MD  Swallowing Status/Diet: Dysphagia soft and bite sized, thin liquids      Subjective:     - Pt alert, cooperative, and upright in bed. Sister Meghan present for tx session on this date. [id number]- Pt alert, cooperative, and upright in wheelchair. Sister and brother present for tx session. Objective:    -   Upon entry, pt verbalized \"birthday\". Pt observed independently communicating to therapist that today is her birthday. Positive reinforcement provided. Pt and therapist sang the happy birthday song. Automatic speech task completed with 100% accuracy. Sister Meghan educated on therapy tasks and communication strategies that have been effective. Sister Jennifer Barrera verbalized understanding. [facility] reports that pt was able to verbalize that she was in pain last night, as well as point to the pain location. Pt has been unable to complete this task previously. Pt was asked to name her sister. Pt verbalized, \"Smiley, Meghan\". Pt was able to identify sister's name. Pt was also requried to finish the following phrase, \"Meghan is your. ... \". Pt was able to independently finish the phrase by verbalizing the word \"sister\". Confrontational naming task completed. Pt is provided a variety of photos of common objects and is required to identify what the objects are. Task completed with 40% accuracy. Moderate-maximum semantic and phonemic cues provided. Pt benefits more from phonemic cues. Matching/object identification task completed. Pt is provided 1 target number beside 3 numbers. All numbers are 1 diit Pt is required to scan the 3 numbers and match the target number. Task completed with 95% accuracy. Minimal phonemic cues required to complete this task. Phrase completion task completed. Pt is provided 3-4 words at the beginning of a common phrase and is required to finish the phrase. Task completed with 82% accuracy. Minimal-moderate phonemic cues provided. Swallowing reassessed during tx. Consistencies presented regular solids with thin liquids via consecutive sips side of cup. Oral prep reveals decreased rotary mastication with regular solid consistencies. Oral transit timing ranges from 2-3 seconds with regular solid consistencies. No significant oral pocketing/residue observed. Oral transit is suspected to be 1 second or faster than 1 second with thin liquids. Laryngeal movement observed to be consistently sluggish and mild-moderately decreased for swallow airway protection. No overt s/s of aspiration/penetration observed with regular solids or thin liquids on this date. Continue current diet of soft and bite sized with thin liquids at this time until pt consistently is aware of risks of oral pocketing/residue. Oral cavity checked for pocking. No oral pocketing observed after am meal.      Education provided on utilizing lingual sweep frequently during and after meals/all P.O. trials. [id number]-    Family members observed bringing pt in regular solids/thin liquids. Noon meal observed to be taken P.O. is a cheeseburger and french fries. Oral cavity checked for pocking. No oral pocketing observed after noon  meal.      Education provided on utilizing lingual sweep frequently during and after meals/all P.O. trials. Sister reports pt completes this independently. Matching/object identification task completed. Pt is provided 1 target photo beside 3 general photos. Pt is required to scan the 3 photos and match the target photo. Task completed independently with 100% accuracy (matching portion). Pt was unable to name any of the target photos.     Sentence completion task completed for verbal expression. Pt was provided the beginning of a 4-5 word sentence and is prompted to finish the sentence utilizing one word. Task completed with 40% accuracy. Maximum semantic and phonemic cues required to complete this task. SLP will continue to follow and treat    Recommended Diet and Intervention  Soft & Bite Sized consistencies   Thin liquids  Recommended Form of Meds: Crushed in puree as able  Dysphagia treatment  Therapeutic Interventions: Pharyngeal exercises;Diet tolerance monitoring;Oral care;Oral motor exercises; Patient/Family education; Therapeutic PO trials with SLP     Compensatory Swallowing Strategies  Compensatory Swallowing Strategies : Alternate solids and liquids;Eat/Feed slowly; No straws;Upright as possible for all oral intake;Remain upright for 30-45 minutes after meals;Small bites/sips; Check for pocketing of food on the Right; Check for pocketing of food on the Left; Set up assist;Assist feed    SHORT TERM GOAL #1:  Goal 1: Pt will complete y/n tasks with 80% accuracy and minimal verbal cues/modeling. SHORT TERM GOAL #2:  Goal 2: Pt will complete verbal expression tasks with 80% accuracy and minimal verbal cues/modeling. SHORT TERM GOAL #3:  Goal 3: Pt will complete receptive/expressive language tasks with 80% accuracy and minimal verbal cues/modeling. SHORT TERM GOAL #4:  Goal 4: Pt will follow one step commands with with 90% accuracy and minimal verbal cues/modeling. SHORT TERM GOAL #5:  Goal 5: Pt will complete orientation tasks with 90% accuracy and minimal verbal cues/modeling. Swallowing Short Term Goals  Short-term Goals  Goal 1: Pt will tolerate soft & bite sized consistencies with thin liquids with minimal overt s/s of aspiration/penetration during hospitalization. Goal 2: Pt will complete Modified Barium Swallow Study. Goal 3: Pt will demonstrate awareness of general aspiration precautions.   Goal 4: Pt will participate in swallowing reassessments to ensure safest diet consistencies. Goal 5: Pt will allow staff to complete daily oral care. Goal 6: Pt will complete oral motor exercises for lingual and labial strengthening. ASSESSMENT:  Assessment: [x]Progressing towards goals          []Not Progressing towards goals    Patient Tolerance of Treatment:   [x]Tolerated well []Tolerated fair []Required rest breaks []Fatigued    Education:  Learner:  [x]Patient          []Sister          []Other  Education provided regarding:  [x]Goals and POC   [x]Diet and swallowing precautions    []Home Exercise Program  []Progress and/or discharge information  Method of Education:  [x]Discussion          []Demonstration          []Handout          []Other  Evaluation of Education:   [x]Verbalized understanding   []Demonstrates without assistance  []Demonstrates with assistance  []Needs further instruction     []No evidence of learning                  []No family present      Plan: [x]Continue with current plan of care    []Modify current plan of care as follows:    []Discharge patient    Discharge Location:    Services/Supervision Recommended:      []Patient continues to require treatment by a licensed therapist to address functional deficits as outlined in the established plan of care.     Electronically signed by MARCELINO Bhatia on 12/9/2022 at 12:58 PM

## 2022-12-09 NOTE — PROGRESS NOTES
Occupational Therapy     12/09/22 0900   General   Timed Code Treatment Minutes 60 Minutes   Restrictions/Precautions   Restrictions/Precautions Modified Diet;Swallowing - Thickened Liquids; Fall Risk;Aspiration Risk;Weight Bearing   General   Family / Caregiver Present Yes  (sister)   ADL   Toileting Maximum assistance   Additional Comments pt indicated she was having stomach cramping and stated \"restroom, yes\" after prompted   Balance   Standing Balance Contact guard assistance   Standing Balance   Time up to 3 min increments- 6 occ   Activity toileting and other static acts   Bed mobility   Supine to Sit Moderate assistance  (going up on affected side)   Transfers   Stand Pivot Transfers Minimal assistance   Sit to stand Moderate assistance   Stand to sit Contact guard assistance   Toilet Transfers   Toilet - Technique Stand step   Equipment Used   (OT toilet going to affected side)   Toilet Transfer Minimal assistance   Type of ROM/Therapeutic Exercise   Type of ROM/Therapeutic Exercise PROM   Comment RUE   Weight Bearing   RUE Weight Bearing Extended arm standing   Response To Weight Bearing Technique fair   Cognition   Additional Comments able to match action/verb pictures to correct words 80% after demonstration. Able to choose 100# person/place orientation cards. Assessment   Performance deficits / Impairments Decreased functional mobility ; Decreased endurance;Decreased coordination;Decreased ADL status; Decreased sensation;Decreased posture;Decreased ROM; Decreased balance;Decreased strength;Decreased vision/visual deficit; Decreased safe awareness;Decreased high-level IADLs;Decreased cognition;Decreased fine motor control   Treatment Diagnosis L MCA stroke   Activity Tolerance   Activity Tolerance Patient Tolerated treatment well

## 2022-12-09 NOTE — PROGRESS NOTES
Patient:   Abel Fleming  MR#:    [id number]   Room:    0826/826-02   YOB: 1960  Date of Progress Note: 12/9/2022  Time of Note                           7:57 AM  Consulting Physician:   Max Mcqueen M.D. Attending Physician:  Max Mcqueen MD     Chief complaint Acute ischemic stroke    S:This 58 y.o. female  with history of anxiety, depression, COPD, atherosclerotic disease, colitis, COPD, CVA, DM,  LE edema, hyperlipidemia, HTN, morbid obesity, RA, CAD  and venous thromboembolism. She presented to [facility] ER on 11/9/22 after being found at home lying facedown in her feces. She was very weak, confused, not able to speak but moving purposefully and intermittently following commands. Her last well know was 3 a.m. when her  left for work. Imaging done revealed a large MCA stroke 7.7 x 5 cm. CTA head/neck revealed an acute M1 occlusion. She was outside of the window for TPA. CK on admit was 1100, consistent with rhabdomylosis. She was also noted to possibly Dens fracture. She was transferred to [facility] for a possible thrombectomy. Further imaging was done at [facility] and she was deemed not a candidate for thrombectomy. MRI done ruled out Dens fracture. Repeat CT of head on 11/11/22 showed evolving left MCA territory infarct with increasing edema/mass effect resulting in 4mm of  rightward midline shift(previously 2mm) a the level of the third ventricle. No hemorrhagic conversion or definite trapping of the right lateral ventricle, possible small acute right cerebellar infarct. Neurosurgery was consulted for possible hemicraniectomy. She was seen by Dr. Brent Cevallos, who didn't feel any surgical intervention was needed. Patient was found to have a UTI + for [facility] and was placed on Rocephin on 11/14/22. Patient had a PICC line placed. Patient was evaluated by SPT and initially made NPO d/t oropharyngeal dysphagia. NGT placed and feeding started. She was also noted to have global aphasia but is improving. She was re-evaluated by SPT on 11/15/22 for swallow and was started on a dysphagia 1 diet with nectar thick liquids. Patient also continues to have right sided weakness and is participating in both PT/OT. Repeat CT head on 11/13/22 shows stable LMCA ischemic stroke with stable edema, 5 mm shift, no hemorrhage. She is felt to need a stay on Rehab for continued PT/OT/SPT and medical management to work towards her goal of returning home with her . She is now felt ready to start the Rehab program.  No acute issues overnight. REVIEW OF SYSTEMS:  Unreliable due to aphasia     Past Medical History:      Diagnosis Date    Anxiety     ASCVD (arteriosclerotic cardiovascular disease)     Carrier of group B Streptococcus     Cellulitis     Colitis     COPD (chronic obstructive pulmonary disease) ([facility])     Costochondritis     CVA (cerebral vascular accident) ([address]     Depression     Edema     Fracture of sternum     non union post surgery.      Gallstones     Grief reaction     H/O superior vena cava filter placement     Hyperlipidemia     Hypertension     MI (myocardial infarction) ([address]     MRSA (methicillin resistant Staphylococcus aureus)     Neuropathy     Obesity     Pseudarthrosis sternal    RA (rheumatoid arthritis) ([address]     Rosacea     Sinus bradycardia     TIA (transient ischemic attack)     Venous thromboembolism        Past Surgical History:      Procedure Laterality Date    ADENOIDECTOMY      APPENDECTOMY      CARDIAC CATHETERIZATION  3/2009    CARDIAC CATHETERIZATION  11/5/14  JDT    EF 50%, patent bypass grafts    CHOLECYSTECTOMY  2011    COLONOSCOPY  06/03/2010    Dr. Iris Damon: distal colon having ulcerative lesions c/w UC    COLONOSCOPY  2009    pancolitis    COLONOSCOPY      CORONARY ARTERY BYPASS GRAFT  3/2009    Dr Tad Chang, LIMA to LAD, SVGs to OM & PDA    GASTRIC BYPASS SURGERY  2012    HIATAL HERNIA REPAIR  2011    HYSTERECTOMY (CERVIX STATUS UNKNOWN)      KNEE SURGERY      ME COLONOSCOPY FLX DX W/COLLJ SPEC WHEN PFRMD N/A 3/12/2018    Dr Rodrick Koyanagi rectal inflammation consistent with U.C.-Active proctitis--3 yr recall    [address] [place] [address]  ENDOSCOPY  2010    Dr. Belinda Desir  2012       Medications in Hospital:      Current Facility-Administered Medications:     bisacodyl (DULCOLAX) EC tablet 5 mg, 5 mg, Oral, Daily, Mirian Waterman MD, 5 mg at 12/09/22 0748    miconazole (MICOTIN) 2 % powder, , Topical, BID, Mirian Waterman MD, Given at 12/09/22 0749    docusate sodium (COLACE) capsule 100 mg, 100 mg, Oral, BID, Mirian Waterman MD, 100 mg at 12/09/22 0748    aspirin chewable tablet 81 mg, 81 mg, Oral, Daily, Mirian Waterman MD, 81 mg at 12/09/22 0747    atorvastatin (LIPITOR) tablet 40 mg, 40 mg, Oral, Nightly, Mirian Waterman MD, 40 mg at 12/08/22 2026    dulaglutide (TRULICITY) SC injection 1.5 mg (Patient Supplied), 1.5 mg, SubCUTAneous, Weekly, Mirian Waterman MD    folic acid (FOLVITE) tablet 1 mg, 1 mg, Oral, Daily, Mirian Waterman MD, 1 mg at 12/09/22 0747    gabapentin (NEURONTIN) capsule 300 mg, 300 mg, Oral, Nightly, Mirian Waterman MD, 300 mg at 12/08/22 2026    hydroxychloroquine (PLAQUENIL) tablet 200 mg, 200 mg, Oral, BID, Mirian Waterman MD, 200 mg at 12/09/22 0747    amLODIPine (NORVASC) tablet 2.5 mg, 2.5 mg, Oral, Daily, Mirian Waterman MD, 2.5 mg at 12/09/22 0748    tiZANidine (ZANAFLEX) tablet 4 mg, 4 mg, Oral, BID PRN, Mirian Waterman MD    multivitamin 1 tablet, 1 tablet, Oral, Daily, Mirian Waterman MD, 1 tablet at 12/09/22 0747    acetaminophen (TYLENOL) tablet 650 mg, 650 mg, Oral, Q4H PRN, Mirian Waterman MD, 650 mg at 11/27/22 1933    enoxaparin (LOVENOX) injection 40 mg, 40 mg, SubCUTAneous, Daily, Mirian Waterman MD, 40 mg at 12/08/22 1730    polyethylene glycol (GLYCOLAX) packet 17 g, 17 g, Oral, Daily PRN, Mirian Waterman MD, 17 g at 11/30/22 1817    Allergies:  Methotrexate derivatives    Social History: TOBACCO:   reports that she quit smoking about 13 years ago. Her smoking use included cigarettes. She has a 64.00 pack-year smoking history. She has never used smokeless tobacco.  ETOH:   reports current alcohol use. Family History:       Problem Relation Age of Onset    Prostate Cancer Father     Heart Failure Father     Cancer Father     Colon Cancer Neg Hx     Colon Polyps Neg Hx     Liver Cancer Neg Hx     Liver Disease Neg Hx     Esophageal Cancer Neg Hx     Rectal Cancer Neg Hx     Stomach Cancer Neg Hx          PHYSICAL EXAM:  BP (!) 147/68   Pulse 56   Temp 97.3 °F (36.3 °C)   Resp 16   Ht 5' 7\" (1.702 m)   Wt 203 lb (92.1 kg)   SpO2 96%   BMI 31.79 kg/m²     Constitutional - well developed, well nourished. Eyes - conjunctiva normal.   Ear, nose, throat - No scars, masses, or lesions over external nose or ears, no atrophy of tongue  Neck-symmetric, no masses noted, no jugular vein distension  Respiration- chest wall appears symmetric, good expansion,   normal effort without use of accessory muscles  Musculoskeletal - no significant wasting of muscles noted, no bony deformities  Extremities-no clubbing, cyanosis or edema  Skin - warm, dry, and intact. No rash, erythema, or pallor. Psychiatric - mood, affect, and behavior appear normal.      Neurological exam  Awake, alert, global aphasia says \"yes\" to all questions asked   Attention and concentration appear appropriate  Recent and remote memory unable to tests     Cranial Nerve Exam     CN III, IV,VI-EOMI, No nystagmus, conjugate eye movements, no ptosis    CN VII-+ facial assymetry       Motor Exam  No movement on the right      Tremors- no tremors in hands or head noted     Gait  Not tested     Nursing/pcp notes, imaging,labs and vitals reviewed.      PT,OT and/or speech notes reviewed    Lab Results   Component Value Date    WBC 7.6 12/08/2022    HGB 10.6 (L) 12/08/2022    HCT 34.1 (L) 12/08/2022    MCV 78.8 (L) 12/08/2022     12/08/2022     Lab Results   Component Value Date     12/08/2022    K 3.7 12/08/2022     12/08/2022    CO2 26 12/08/2022    BUN 13 12/08/2022    CREATININE 0.5 12/08/2022    GLUCOSE 79 12/08/2022    CALCIUM 8.9 12/08/2022    PROT 5.0 (L) 12/08/2022    LABALBU 3.1 (L) 12/08/2022    BILITOT <0.2 12/08/2022    ALKPHOS 85 12/08/2022    AST 24 12/08/2022    ALT 24 12/08/2022    LABGLOM >60 12/08/2022   No results found for: INR, PROTIME    David Kaur   Student Physical Therapist   Physical Therapy   Progress Notes       Cosign Needed   Date of Service:  12/8/2022  2:31 PM                 Cosign Needed                                                             12/08/22 1300   Restrictions/Precautions   Restrictions/Precautions Modified Diet;Swallowing - Thickened Liquids; Fall Risk;Aspiration Risk;Weight Bearing   Lower Extremity Weight Bearing Restrictions   Right Lower Extremity Weight Bearing Weight Bearing As Tolerated   Left Lower Extremity Weight Bearing Weight Bearing As Tolerated   General   Additional Pertinent Hx Anxiety, depression, COPD, CVA, DM, LE edema, hyperlipidemia, HTN, obesity, RA, CAD and venous thromboembolism   Diagnosis CVA, R hemiparesis   General Comment   Comments PATIENT IN ROOM WITH SISTER AND SLP FINISHING SESSION   Subjective   Subjective PATIENT IS IN GOOD SPIRITS   Bed mobility   Sit to Supine Moderate assistance;Minimal assistance  (PATIENT WENT IMMEDIATELY INTO TRIPLANAR TO GO INTO SUPINE.  MOD A AT RLE)   Transfers   Squat Pivot Transfers Moderate Assistance  (FROM W/C TO RIGHT SIDE OF BED,)   Ambulation   WB Status WBAT   PT Exercises   Exercise Treatment SEATED EXERCISES: SAQ WITH 2.5 LBS 3X15, ANKLE PF WITH GREEN BAND 3X15, ANKLE DF WITH RTB 3X15   Motor Control/Coordination VOLLEYING WITH BALLOON LUE ONLY 20X  (GOOD COORDINATION AND REACTION TIME)   Activity Tolerance   Activity Tolerance Patient tolerated treatment well   Assessment   Assessment AFTERNOON SESSION FOCUS ON STRENGTHENING. WHEN USING MANUAL RESISTANCE PATIENT TENDS TO NOT USE FULL CONTRACTILE FORCE OF MUSCLE, EVEN IF RESISTANCE APPLIED IS LIGHT. UTILIZED WEIGHTS AND RESISTANCE BANDS, PATIENT SEEMS TO BE RESPONDING WELL AND USING FULL CAPABILITY. Safety Devices   Type of Devices Left in bed;Call light within reach  (SISTER IN ROOM WITH PATIENT)   PT Individual Minutes   Time In 2294   Time Out 1426   Minutes 41   Electronically sign by: MOLINA Pedro 12/8/2022, 2:31 PM                 RECORD REVIEW: Previous medical records, medications were reviewed at today's visit    IMPRESSION:   1. Acute ischemic stroke-on aspirin/statin  2. Hypertension-on medications monitor  3. Hyperlipidemia-on statin  4. Diabetes-Trulicity-monitor blood sugar  5. DVT prophylaxis-Lovenox  6. History of coronary artery disease-aspirin/statin  7. Neuropathy-on Neurontin  8. Rheumatoid arthritis-on Plaquenil  9. COPD-monitor  10. History of anxiety and depression-monitor  11. History of colitis/gallstones-monitor  12. History of bariatric surgery-on multivitamin/folic acid  13. History of superior vena cava filter placement with venous thromboembolism-not on anticoagulation-monitor- indicates took herself off blood thinners as not like meds and was bruising   14.   PT/OT/speech    x-ray of right ankle no acute changes  Venous ultrasound of leg no DVT      Continue current care as noted  NAM pending 3 weeks     Expected duration and frequency therapy: 180 minutes per day, 5 days per week    [address] [facility]  Dr Nicolasa Truong

## 2022-12-10 PROCEDURE — 6370000000 HC RX 637 (ALT 250 FOR IP): Performed by: PSYCHIATRY & NEUROLOGY

## 2022-12-10 PROCEDURE — 1180000000 HC REHAB R&B

## 2022-12-10 PROCEDURE — 99232 SBSQ HOSP IP/OBS MODERATE 35: CPT | Performed by: PSYCHIATRY & NEUROLOGY

## 2022-12-10 PROCEDURE — 6360000002 HC RX W HCPCS: Performed by: PSYCHIATRY & NEUROLOGY

## 2022-12-10 PROCEDURE — 97110 THERAPEUTIC EXERCISES: CPT

## 2022-12-10 PROCEDURE — 94760 N-INVAS EAR/PLS OXIMETRY 1: CPT

## 2022-12-10 RX ADMIN — ASPIRIN 81 MG 81 MG: 81 TABLET ORAL at 07:48

## 2022-12-10 RX ADMIN — AMLODIPINE BESYLATE 2.5 MG: 2.5 TABLET ORAL at 07:48

## 2022-12-10 RX ADMIN — MICONAZOLE NITRATE: 2 POWDER TOPICAL at 07:53

## 2022-12-10 RX ADMIN — HYDROXYCHLOROQUINE SULFATE 200 MG: 200 TABLET, FILM COATED ORAL at 07:48

## 2022-12-10 RX ADMIN — ENOXAPARIN SODIUM 40 MG: 100 INJECTION SUBCUTANEOUS at 17:30

## 2022-12-10 RX ADMIN — HYDROXYCHLOROQUINE SULFATE 200 MG: 200 TABLET, FILM COATED ORAL at 20:31

## 2022-12-10 RX ADMIN — ATORVASTATIN CALCIUM 40 MG: 40 TABLET, FILM COATED ORAL at 20:31

## 2022-12-10 RX ADMIN — GABAPENTIN 300 MG: 300 CAPSULE ORAL at 20:31

## 2022-12-10 RX ADMIN — THERA TABS 1 TABLET: TAB at 07:47

## 2022-12-10 RX ADMIN — FOLIC ACID 1 MG: 1 TABLET ORAL at 07:48

## 2022-12-10 NOTE — PROGRESS NOTES
Patient:   Cynthia Thomas  MR#:    [id number]   Room:    0826/826-02   YOB: 1960  Date of Progress Note: 12/10/2022  Time of Note                           9:06 AM  Consulting Physician:   Chantal Jurado M.D. Attending Physician:  Chantal Jurado MD     Chief complaint Acute ischemic stroke    S:This 58 y.o. female  with history of anxiety, depression, COPD, atherosclerotic disease, colitis, COPD, CVA, DM,  LE edema, hyperlipidemia, HTN, morbid obesity, RA, CAD  and venous thromboembolism. She presented to [facility] ER on 11/9/22 after being found at home lying facedown in her feces. She was very weak, confused, not able to speak but moving purposefully and intermittently following commands. Her last well know was 3 a.m. when her  left for work. Imaging done revealed a large MCA stroke 7.7 x 5 cm. CTA head/neck revealed an acute M1 occlusion. She was outside of the window for TPA. CK on admit was 1100, consistent with rhabdomylosis. She was also noted to possibly Dens fracture. She was transferred to [facility] for a possible thrombectomy. Further imaging was done at [facility] and she was deemed not a candidate for thrombectomy. MRI done ruled out Dens fracture. Repeat CT of head on 11/11/22 showed evolving left MCA territory infarct with increasing edema/mass effect resulting in 4mm of  rightward midline shift(previously 2mm) a the level of the third ventricle. No hemorrhagic conversion or definite trapping of the right lateral ventricle, possible small acute right cerebellar infarct. Neurosurgery was consulted for possible hemicraniectomy. She was seen by Dr. Phoenix, who didn't feel any surgical intervention was needed. Patient was found to have a UTI + for [facility] and was placed on Rocephin on 11/14/22. Patient had a PICC line placed. Patient was evaluated by SPT and initially made NPO d/t oropharyngeal dysphagia. NGT placed and feeding started.  She was also noted to have global aphasia but is improving. She was re-evaluated by SPT on 11/15/22 for swallow and was started on a dysphagia 1 diet with nectar thick liquids. Patient also continues to have right sided weakness and is participating in both PT/OT. Repeat CT head on 11/13/22 shows stable LMCA ischemic stroke with stable edema, 5 mm shift, no hemorrhage. She is felt to need a stay on Rehab for continued PT/OT/SPT and medical management to work towards her goal of returning home with her . She is now felt ready to start the Rehab program.  No new issues overnight. REVIEW OF SYSTEMS:  Unreliable due to aphasia     Past Medical History:      Diagnosis Date    Anxiety     ASCVD (arteriosclerotic cardiovascular disease)     Carrier of group B Streptococcus     Cellulitis     Colitis     COPD (chronic obstructive pulmonary disease) ([facility])     Costochondritis     CVA (cerebral vascular accident) ([address]     Depression     Edema     Fracture of sternum     non union post surgery.      Gallstones     Grief reaction     H/O superior vena cava filter placement     Hyperlipidemia     Hypertension     MI (myocardial infarction) ([address]     MRSA (methicillin resistant Staphylococcus aureus)     Neuropathy     Obesity     Pseudarthrosis sternal    RA (rheumatoid arthritis) ([address]     Rosacea     Sinus bradycardia     TIA (transient ischemic attack)     Venous thromboembolism        Past Surgical History:      Procedure Laterality Date    ADENOIDECTOMY      APPENDECTOMY      CARDIAC CATHETERIZATION  3/2009    CARDIAC CATHETERIZATION  11/5/14  JDT    EF 50%, patent bypass grafts    CHOLECYSTECTOMY  2011    COLONOSCOPY  06/03/2010    Dr. Noemi Gama: distal colon having ulcerative lesions c/w UC    COLONOSCOPY  2009    pancolitis    COLONOSCOPY      CORONARY ARTERY BYPASS GRAFT  3/2009    PHANI Escamilla to LAD, SVGs to OM & PDA    GASTRIC BYPASS SURGERY  2012    HIATAL HERNIA REPAIR  2011    HYSTERECTOMY (CERVIX STATUS UNKNOWN)      KNEE SURGERY      AR COLONOSCOPY FLX DX W/COLLJ SPEC WHEN PFRMD N/A 3/12/2018    Dr Rush Rodriguez rectal inflammation consistent with U.C.-Active proctitis--3 yr recall    [facility] ENDOSCOPY  2010    Dr. Rio Moreno  2012       Medications in Hospital:      Current Facility-Administered Medications:     bisacodyl (DULCOLAX) EC tablet 5 mg, 5 mg, Oral, Daily, Pedro Tilley MD, 5 mg at 12/09/22 0748    miconazole (MICOTIN) 2 % powder, , Topical, BID, Pedro Tilley MD, Given at 12/10/22 0753    docusate sodium (COLACE) capsule 100 mg, 100 mg, Oral, BID, Pedro Tilley MD, 100 mg at 12/09/22 0748    aspirin chewable tablet 81 mg, 81 mg, Oral, Daily, Pedro Tilley MD, 81 mg at 12/10/22 0748    atorvastatin (LIPITOR) tablet 40 mg, 40 mg, Oral, Nightly, Pedro Tilley MD, 40 mg at 12/09/22 2041    dulaglutide (TRULICITY) SC injection 1.5 mg (Patient Supplied), 1.5 mg, SubCUTAneous, Weekly, Pedro Tilley MD    folic acid (FOLVITE) tablet 1 mg, 1 mg, Oral, Daily, Pedro Tilley MD, 1 mg at 12/10/22 0748    gabapentin (NEURONTIN) capsule 300 mg, 300 mg, Oral, Nightly, Pedro Tilley MD, 300 mg at 12/09/22 2041    hydroxychloroquine (PLAQUENIL) tablet 200 mg, 200 mg, Oral, BID, Pedro Tilley MD, 200 mg at 12/10/22 0748    amLODIPine (NORVASC) tablet 2.5 mg, 2.5 mg, Oral, Daily, Pedro Tilley MD, 2.5 mg at 12/10/22 0748    tiZANidine (ZANAFLEX) tablet 4 mg, 4 mg, Oral, BID PRN, Pedro Tilley MD    multivitamin 1 tablet, 1 tablet, Oral, Daily, Pedro Tilley MD, 1 tablet at 12/10/22 0747    acetaminophen (TYLENOL) tablet 650 mg, 650 mg, Oral, Q4H PRN, Pedro Tilley MD, 650 mg at 11/27/22 1933    enoxaparin (LOVENOX) injection 40 mg, 40 mg, SubCUTAneous, Daily, Pedro Tilley MD, 40 mg at 12/09/22 1714    polyethylene glycol (GLYCOLAX) packet 17 g, 17 g, Oral, Daily PRN, Pedro Tilley MD, 17 g at 11/30/22 1817    Allergies:  Methotrexate derivatives    Social History:   TOBACCO:   reports that she quit smoking about 13 years ago. Her smoking use included cigarettes. She has a 64.00 pack-year smoking history. She has never used smokeless tobacco.  ETOH:   reports current alcohol use. Family History:       Problem Relation Age of Onset    Prostate Cancer Father     Heart Failure Father     Cancer Father     Colon Cancer Neg Hx     Colon Polyps Neg Hx     Liver Cancer Neg Hx     Liver Disease Neg Hx     Esophageal Cancer Neg Hx     Rectal Cancer Neg Hx     Stomach Cancer Neg Hx          PHYSICAL EXAM:  BP (!) 142/66   Pulse 62   Temp 97.5 °F (36.4 °C) (Temporal)   Resp 16   Ht 5' 7\" (1.702 m)   Wt 205 lb (93 kg)   SpO2 98%   BMI 32.11 kg/m²     Constitutional - well developed, well nourished. Eyes - conjunctiva normal.   Ear, nose, throat - No scars, masses, or lesions over external nose or ears, no atrophy of tongue  Neck-symmetric, no masses noted, no jugular vein distension  Respiration- chest wall appears symmetric, good expansion,   normal effort without use of accessory muscles  Musculoskeletal - no significant wasting of muscles noted, no bony deformities  Extremities-no clubbing, cyanosis or edema  Skin - warm, dry, and intact. No rash, erythema, or pallor. Psychiatric - mood, affect, and behavior appear normal.      Neurological exam  Awake, alert, global aphasia says \"yes\" to all questions asked   Attention and concentration appear appropriate  Recent and remote memory unable to tests     Cranial Nerve Exam     CN III, IV,VI-EOMI, No nystagmus, conjugate eye movements, no ptosis    CN VII-+ facial assymetry       Motor Exam  No movement on the right      Tremors- no tremors in hands or head noted     Gait  Not tested     Nursing/pcp notes, imaging,labs and vitals reviewed.      PT,OT and/or speech notes reviewed    Lab Results   Component Value Date    WBC 7.6 12/08/2022    HGB 10.6 (L) 12/08/2022    HCT 34.1 (L) 12/08/2022    MCV 78.8 (L) 12/08/2022     12/08/2022     Lab Results   Component Value Date     12/08/2022    K 3.7 12/08/2022     12/08/2022    CO2 26 12/08/2022    BUN 13 12/08/2022    CREATININE 0.5 12/08/2022    GLUCOSE 79 12/08/2022    CALCIUM 8.9 12/08/2022    PROT 5.0 (L) 12/08/2022    LABALBU 3.1 (L) 12/08/2022    BILITOT <0.2 12/08/2022    ALKPHOS 85 12/08/2022    AST 24 12/08/2022    ALT 24 12/08/2022    LABGLOM >60 12/08/2022   No results found for: INR, PROTIME    Mac Hunter   Student Physical Therapist   Physical Therapy   Progress Notes       Cosign Needed   Date of Service:  12/8/2022  2:31 PM                 Cosign Needed                                                             12/08/22 1300   Restrictions/Precautions   Restrictions/Precautions Modified Diet;Swallowing - Thickened Liquids; Fall Risk;Aspiration Risk;Weight Bearing   Lower Extremity Weight Bearing Restrictions   Right Lower Extremity Weight Bearing Weight Bearing As Tolerated   Left Lower Extremity Weight Bearing Weight Bearing As Tolerated   General   Additional Pertinent Hx Anxiety, depression, COPD, CVA, DM, LE edema, hyperlipidemia, HTN, obesity, RA, CAD and venous thromboembolism   Diagnosis CVA, R hemiparesis   General Comment   Comments PATIENT IN ROOM WITH SISTER AND SLP FINISHING SESSION   Subjective   Subjective PATIENT IS IN GOOD SPIRITS   Bed mobility   Sit to Supine Moderate assistance;Minimal assistance  (PATIENT WENT IMMEDIATELY INTO TRIPLANAR TO GO INTO SUPINE.  MOD A AT RLE)   Transfers   Squat Pivot Transfers Moderate Assistance  (FROM W/C TO RIGHT SIDE OF BED,)   Ambulation   WB Status WBAT   PT Exercises   Exercise Treatment SEATED EXERCISES: SAQ WITH 2.5 LBS 3X15, ANKLE PF WITH GREEN BAND 3X15, ANKLE DF WITH RTB 3X15   Motor Control/Coordination VOLLEYING WITH BALLOON LUE ONLY 20X  (GOOD COORDINATION AND REACTION TIME)   Activity Tolerance   Activity Tolerance Patient tolerated treatment well   Assessment Assessment AFTERNOON SESSION FOCUS ON STRENGTHENING. WHEN USING MANUAL RESISTANCE PATIENT TENDS TO NOT USE FULL CONTRACTILE FORCE OF MUSCLE, EVEN IF RESISTANCE APPLIED IS LIGHT. UTILIZED WEIGHTS AND RESISTANCE BANDS, PATIENT SEEMS TO BE RESPONDING WELL AND USING FULL CAPABILITY. Safety Devices   Type of Devices Left in bed;Call light within reach  (SISTER IN ROOM WITH PATIENT)   PT Individual Minutes   Time In 2011   Time Out 1426   Minutes 41   Electronically sign by: John Valencia, SPT 12/8/2022, 2:31 PM                 RECORD REVIEW: Previous medical records, medications were reviewed at today's visit    IMPRESSION:   1. Acute ischemic stroke-on aspirin/statin  2. Hypertension-on medications monitor  3. Hyperlipidemia-on statin  4. Diabetes-Trulicity-monitor blood sugar  5. DVT prophylaxis-Lovenox  6. History of coronary artery disease-aspirin/statin  7. Neuropathy-on Neurontin  8. Rheumatoid arthritis-on Plaquenil  9. COPD-monitor  10. History of anxiety and depression-monitor  11. History of colitis/gallstones-monitor  12. History of bariatric surgery-on multivitamin/folic acid  13. History of superior vena cava filter placement with venous thromboembolism-not on anticoagulation-monitor- indicates took herself off blood thinners as not like meds and was bruising   14.   PT/OT/speech    x-ray of right ankle no acute changes  Venous ultrasound of leg no DVT    Continue present care  ELOS pending 3 weeks     Expected duration and frequency therapy: 180 minutes per day, 5 days per week    [address]  [place]  Dr Morrell Alpers

## 2022-12-10 NOTE — PLAN OF CARE
Problem: Discharge Planning  Goal: Discharge to home or other facility with appropriate resources  12/9/2022 2228 by Greg Esparza RN  Outcome: Progressing  12/9/2022 0948 by Kaity Mares RN  Outcome: Progressing  Flowsheets (Taken 12/9/2022 0825)  Discharge to home or other facility with appropriate resources: Refer to discharge planning if patient needs post-hospital services based on physician order or complex needs related to functional status, cognitive ability or social support system     Problem: Safety - Adult  Goal: Free from fall injury  12/9/2022 2228 by Greg Esparza RN  Outcome: Progressing  Flowsheets (Taken 12/9/2022 2227)  Free From Fall Injury: Instruct family/caregiver on patient safety  12/9/2022 0948 by Kaity Mares RN  Outcome: Progressing     Problem: Neurosensory - Adult  Goal: Achieves stable or improved neurological status  12/9/2022 2228 by Greg Esparza RN  Outcome: Progressing  Flowsheets (Taken 12/9/2022 2216)  Achieves stable or improved neurological status: Assess for and report changes in neurological status  12/9/2022 0948 by Kaity Mares RN  Outcome: Progressing  Flowsheets (Taken 12/9/2022 0825)  Achieves stable or improved neurological status: Assess for and report changes in neurological status  Goal: Achieves maximal functionality and self care  12/9/2022 2228 by Greg Esparza RN  Outcome: Progressing  Flowsheets (Taken 12/9/2022 2216)  Achieves maximal functionality and self care: Monitor swallowing and airway patency with patient fatigue and changes in neurological status  12/9/2022 0948 by Kaity Mares RN  Outcome: Progressing  Flowsheets (Taken 12/9/2022 0825)  Achieves maximal functionality and self care: Monitor swallowing and airway patency with patient fatigue and changes in neurological status

## 2022-12-10 NOTE — PROGRESS NOTES
12/10/22 1030   Restrictions/Precautions   Restrictions/Precautions Modified Diet;Swallowing - Thickened Liquids; Fall Risk;Aspiration Risk;Weight Bearing   Required Braces or Orthoses? Yes   Lower Extremity Weight Bearing Restrictions   Right Lower Extremity Weight Bearing Weight Bearing As Tolerated   Left Lower Extremity Weight Bearing Weight Bearing As Tolerated   Subjective   Subjective Patient appeared ready and motivated to participate with therpay. Vitals   O2 Device None (Room air)   Bed mobility   Rolling to Left Contact guard assistance  (with rail)   Rolling to Right Moderate assistance  (with rail, carry over noted with log roll technique)   Supine to Sit Moderate assistance;Minimal assistance  (Assist with RLE, patient able to perform without VC)   Bed Mobility Comments multiple repetitions of rolling L/R to change patient's brief   Transfers   Squat Pivot Transfers Moderate Assistance  (performed to patient's L side)   PT Exercises   Exercise Treatment Seated hip flexion, knee LAQ, manual assist ot perform on RLE   Activity Tolerance   Activity Tolerance Patient tolerated treatment well   Assessment   Assessment Patient appeared have carry over with technique for bed mobility and transfers. VC required for attention to RUE/RLE. Safety Devices   Type of Devices Call light within reach; Chair alarm in place; Left in chair

## 2022-12-11 PROCEDURE — 1180000000 HC REHAB R&B

## 2022-12-11 PROCEDURE — 94760 N-INVAS EAR/PLS OXIMETRY 1: CPT

## 2022-12-11 PROCEDURE — 6370000000 HC RX 637 (ALT 250 FOR IP): Performed by: PSYCHIATRY & NEUROLOGY

## 2022-12-11 PROCEDURE — 99232 SBSQ HOSP IP/OBS MODERATE 35: CPT | Performed by: PSYCHIATRY & NEUROLOGY

## 2022-12-11 PROCEDURE — 6360000002 HC RX W HCPCS: Performed by: PSYCHIATRY & NEUROLOGY

## 2022-12-11 RX ADMIN — ENOXAPARIN SODIUM 40 MG: 100 INJECTION SUBCUTANEOUS at 16:38

## 2022-12-11 RX ADMIN — AMLODIPINE BESYLATE 2.5 MG: 2.5 TABLET ORAL at 08:39

## 2022-12-11 RX ADMIN — HYDROXYCHLOROQUINE SULFATE 200 MG: 200 TABLET, FILM COATED ORAL at 08:39

## 2022-12-11 RX ADMIN — THERA TABS 1 TABLET: TAB at 08:39

## 2022-12-11 RX ADMIN — HYDROXYCHLOROQUINE SULFATE 200 MG: 200 TABLET, FILM COATED ORAL at 21:10

## 2022-12-11 RX ADMIN — MICONAZOLE NITRATE: 2 POWDER TOPICAL at 21:10

## 2022-12-11 RX ADMIN — FOLIC ACID 1 MG: 1 TABLET ORAL at 08:39

## 2022-12-11 RX ADMIN — ASPIRIN 81 MG 81 MG: 81 TABLET ORAL at 08:39

## 2022-12-11 RX ADMIN — DOCUSATE SODIUM 100 MG: 100 CAPSULE, LIQUID FILLED ORAL at 21:10

## 2022-12-11 RX ADMIN — ATORVASTATIN CALCIUM 40 MG: 40 TABLET, FILM COATED ORAL at 21:10

## 2022-12-11 RX ADMIN — GABAPENTIN 300 MG: 300 CAPSULE ORAL at 21:10

## 2022-12-11 NOTE — PLAN OF CARE
Problem: Discharge Planning  Goal: Discharge to home or other facility with appropriate resources  12/10/2022 2336 by Bryon Rothman RN  Outcome: Progressing  12/10/2022 0951 by Ian Torres RN  Outcome: Progressing  Flowsheets (Taken 12/10/2022 0815)  Discharge to home or other facility with appropriate resources: Refer to discharge planning if patient needs post-hospital services based on physician order or complex needs related to functional status, cognitive ability or social support system     Problem: Safety - Adult  Goal: Free from fall injury  12/10/2022 2336 by Bryon Rothman RN  Outcome: Progressing  Flowsheets (Taken 12/10/2022 2335)  Free From Fall Injury: Instruct family/caregiver on patient safety  12/10/2022 0951 by Ian Torres RN  Outcome: Progressing     Problem: Neurosensory - Adult  Goal: Achieves stable or improved neurological status  12/10/2022 2336 by Byron Rothman RN  Outcome: Progressing  12/10/2022 0951 by Ian Torres RN  Outcome: Progressing  Flowsheets (Taken 12/10/2022 0815)  Achieves stable or improved neurological status: Assess for and report changes in neurological status  Goal: Achieves maximal functionality and self care  12/10/2022 2336 by Bryon Rothman RN  Outcome: Progressing  12/10/2022 0951 by Ian Torres RN  Outcome: Progressing  Flowsheets (Taken 12/10/2022 0815)  Achieves maximal functionality and self care: Monitor swallowing and airway patency with patient fatigue and changes in neurological status     Problem: Skin/Tissue Integrity - Adult  Goal: Skin integrity remains intact  12/10/2022 2336 by Bryon Rothman RN  Outcome: Progressing  Flowsheets  Taken 12/10/2022 2335  Skin Integrity Remains Intact: Monitor for areas of redness and/or skin breakdown  Taken 12/10/2022 2331  Skin Integrity Remains Intact: Monitor for areas of redness and/or skin breakdown  12/10/2022 0951 by Ian Torres RN  Outcome: Progressing  Flowsheets (Taken 12/10/2022 0950)  Skin Integrity Remains Intact: Monitor for areas of redness and/or skin breakdown

## 2022-12-11 NOTE — PROGRESS NOTES
Patient:   Nataliya Marr  MR#:    [id number]   Room:    26/826-02   YOB: 1960  Date of Progress Note: 12/11/2022  Time of Note                           11:07 AM  Consulting Physician:   Morrell Alpers, M.D. Attending Physician:  Morrell Alpers, MD     Chief complaint Acute ischemic stroke    S:This 58 y.o. female  with history of anxiety, depression, COPD, atherosclerotic disease, colitis, COPD, CVA, DM,  LE edema, hyperlipidemia, HTN, morbid obesity, RA, CAD  and venous thromboembolism. She presented to [facility] ER on 11/9/22 after being found at home lying facedown in her feces. She was very weak, confused, not able to speak but moving purposefully and intermittently following commands. Her last well know was 3 a.m. when her  left for work. Imaging done revealed a large MCA stroke 7.7 x 5 cm. CTA head/neck revealed an acute M1 occlusion. She was outside of the window for TPA. CK on admit was 1100, consistent with rhabdomylosis. She was also noted to possibly Dens fracture. She was transferred to [facility] for a possible thrombectomy. Further imaging was done at [facility] and she was deemed not a candidate for thrombectomy. MRI done ruled out Dens fracture. Repeat CT of head on 11/11/22 showed evolving left MCA territory infarct with increasing edema/mass effect resulting in 4mm of  rightward midline shift(previously 2mm) a the level of the third ventricle. No hemorrhagic conversion or definite trapping of the right lateral ventricle, possible small acute right cerebellar infarct. Neurosurgery was consulted for possible hemicraniectomy. She was seen by Dr. Malathi Lamas, who didn't feel any surgical intervention was needed. Patient was found to have a UTI + for [facility] and was placed on Rocephin on 11/14/22. Patient had a PICC line placed. Patient was evaluated by SPT and initially made NPO d/t oropharyngeal dysphagia. NGT placed and feeding started.  She was also noted to have global aphasia but is improving. She was re-evaluated by SPT on 11/15/22 for swallow and was started on a dysphagia 1 diet with nectar thick liquids. Patient also continues to have right sided weakness and is participating in both PT/OT. Repeat CT head on 11/13/22 shows stable LMCA ischemic stroke with stable edema, 5 mm shift, no hemorrhage. She is felt to need a stay on Rehab for continued PT/OT/SPT and medical management to work towards her goal of returning home with her . She is now felt ready to start the Rehab program.  Doing well this morning as per . REVIEW OF SYSTEMS:  Unreliable due to aphasia     Past Medical History:      Diagnosis Date    Anxiety     ASCVD (arteriosclerotic cardiovascular disease)     Carrier of group B Streptococcus     Cellulitis     Colitis     COPD (chronic obstructive pulmonary disease) ([facility])     Costochondritis     CVA (cerebral vascular accident) ([address]     Depression     Edema     Fracture of sternum     non union post surgery.      Gallstones     Grief reaction     H/O superior vena cava filter placement     Hyperlipidemia     Hypertension     MI (myocardial infarction) ([address]     MRSA (methicillin resistant Staphylococcus aureus)     Neuropathy     Obesity     Pseudarthrosis sternal    RA (rheumatoid arthritis) ([address]     Rosacea     Sinus bradycardia     TIA (transient ischemic attack)     Venous thromboembolism        Past Surgical History:      Procedure Laterality Date    ADENOIDECTOMY      APPENDECTOMY      CARDIAC CATHETERIZATION  3/2009    CARDIAC CATHETERIZATION  11/5/14  JDT    EF 50%, patent bypass grafts    CHOLECYSTECTOMY  2011    COLONOSCOPY  06/03/2010    Dr. Danis Montgomery: distal colon having ulcerative lesions c/w UC    COLONOSCOPY  2009    pancolitis    COLONOSCOPY      CORONARY ARTERY BYPASS GRAFT  3/2009    PHANI Linares to LAD, SVGs to OM & PDA    GASTRIC BYPASS SURGERY  2012    HIATAL HERNIA REPAIR  2011    HYSTERECTOMY (CERVIX STATUS UNKNOWN)      KNEE SURGERY HI COLONOSCOPY FLX DX W/COLLJ SPEC WHEN PFRMD N/A 3/12/2018    Dr Amanda Fowler rectal inflammation consistent with U.C.-Active proctitis--3 yr recall    THORACOTOMY      TONSILLECTOMY      UPPER GASTROINTESTINAL ENDOSCOPY  2010    Dr. Cm Marte  2012       Medications in Hospital:      Current Facility-Administered Medications:     bisacodyl (DULCOLAX) EC tablet 5 mg, 5 mg, Oral, Daily, Julian Burleson MD, 5 mg at 12/09/22 0748    miconazole (MICOTIN) 2 % powder, , Topical, BID, Julian Burleson MD, Given at 12/10/22 0753    docusate sodium (COLACE) capsule 100 mg, 100 mg, Oral, BID, Julian Burleson MD, 100 mg at 12/09/22 0748    aspirin chewable tablet 81 mg, 81 mg, Oral, Daily, Julian Burleson MD, 81 mg at 12/11/22 0839    atorvastatin (LIPITOR) tablet 40 mg, 40 mg, Oral, Nightly, Julian Burleson MD, 40 mg at 12/10/22 2031    dulaglutide (TRULICITY) SC injection 1.5 mg (Patient Supplied), 1.5 mg, SubCUTAneous, Weekly, Julian Burleson MD    folic acid (FOLVITE) tablet 1 mg, 1 mg, Oral, Daily, Julian Burleson MD, 1 mg at 12/11/22 0839    gabapentin (NEURONTIN) capsule 300 mg, 300 mg, Oral, Nightly, Julian Burleson MD, 300 mg at 12/10/22 2031    hydroxychloroquine (PLAQUENIL) tablet 200 mg, 200 mg, Oral, BID, Julian Burleson MD, 200 mg at 12/11/22 0839    amLODIPine (NORVASC) tablet 2.5 mg, 2.5 mg, Oral, Daily, Julian Burleson MD, 2.5 mg at 12/11/22 0839    tiZANidine (ZANAFLEX) tablet 4 mg, 4 mg, Oral, BID PRN, Julian Burleson MD    multivitamin 1 tablet, 1 tablet, Oral, Daily, Julian Burleson MD, 1 tablet at 12/11/22 0839    acetaminophen (TYLENOL) tablet 650 mg, 650 mg, Oral, Q4H PRN, Julian Burleson MD, 650 mg at 11/27/22 1933    enoxaparin (LOVENOX) injection 40 mg, 40 mg, SubCUTAneous, Daily, Julian Burleson MD, 40 mg at 12/10/22 1730    polyethylene glycol (GLYCOLAX) packet 17 g, 17 g, Oral, Daily PRN, Julian Burleson MD, 17 g at 11/30/22 1817    Allergies:  Methotrexate derivatives    Social History:   TOBACCO:   reports that she quit smoking about 13 years ago. Her smoking use included cigarettes. She has a 64.00 pack-year smoking history. She has never used smokeless tobacco.  ETOH:   reports current alcohol use. Family History:       Problem Relation Age of Onset    Prostate Cancer Father     Heart Failure Father     Cancer Father     Colon Cancer Neg Hx     Colon Polyps Neg Hx     Liver Cancer Neg Hx     Liver Disease Neg Hx     Esophageal Cancer Neg Hx     Rectal Cancer Neg Hx     Stomach Cancer Neg Hx          PHYSICAL EXAM:  /66   Pulse 53   Temp 96.8 °F (36 °C) (Temporal)   Resp 16   Ht 5' 7\" (1.702 m)   Wt 205 lb (93 kg)   SpO2 94%   BMI 32.11 kg/m²     Constitutional - well developed, well nourished. Eyes - conjunctiva normal.   Ear, nose, throat - No scars, masses, or lesions over external nose or ears, no atrophy of tongue  Neck-symmetric, no masses noted, no jugular vein distension  Respiration- chest wall appears symmetric, good expansion,   normal effort without use of accessory muscles  Musculoskeletal - no significant wasting of muscles noted, no bony deformities  Extremities-no clubbing, cyanosis or edema  Skin - warm, dry, and intact. No rash, erythema, or pallor. Psychiatric - mood, affect, and behavior appear normal.      Neurological exam  Awake, alert, global aphasia says \"yes\" to all questions asked   Attention and concentration appear appropriate  Recent and remote memory unable to tests     Cranial Nerve Exam     CN III, IV,VI-EOMI, No nystagmus, conjugate eye movements, no ptosis    CN VII-+ facial assymetry       Motor Exam  No movement on the right      Tremors- no tremors in hands or head noted     Gait  Not tested     Nursing/pcp notes, imaging,labs and vitals reviewed.      PT,OT and/or speech notes reviewed    Lab Results   Component Value Date    WBC 7.6 12/08/2022    HGB 10.6 (L) 12/08/2022    HCT 34.1 (L) 12/08/2022    MCV 78.8 (L) 12/08/2022     12/08/2022     Lab Results   Component Value Date     12/08/2022    K 3.7 12/08/2022     12/08/2022    CO2 26 12/08/2022    BUN 13 12/08/2022    CREATININE 0.5 12/08/2022    GLUCOSE 79 12/08/2022    CALCIUM 8.9 12/08/2022    PROT 5.0 (L) 12/08/2022    LABALBU 3.1 (L) 12/08/2022    BILITOT <0.2 12/08/2022    ALKPHOS 85 12/08/2022    AST 24 12/08/2022    ALT 24 12/08/2022    LABGLOM >60 12/08/2022   No results found for: INR, PROTIME    Roldan Santoro   Student Physical Therapist   Physical Therapy   Progress Notes       Cosign Needed   Date of Service:  12/8/2022  2:31 PM                 Cosign Needed                                                             12/08/22 1300   Restrictions/Precautions   Restrictions/Precautions Modified Diet;Swallowing - Thickened Liquids; Fall Risk;Aspiration Risk;Weight Bearing   Lower Extremity Weight Bearing Restrictions   Right Lower Extremity Weight Bearing Weight Bearing As Tolerated   Left Lower Extremity Weight Bearing Weight Bearing As Tolerated   General   Additional Pertinent Hx Anxiety, depression, COPD, CVA, DM, LE edema, hyperlipidemia, HTN, obesity, RA, CAD and venous thromboembolism   Diagnosis CVA, R hemiparesis   General Comment   Comments PATIENT IN ROOM WITH SISTER AND SLP FINISHING SESSION   Subjective   Subjective PATIENT IS IN GOOD SPIRITS   Bed mobility   Sit to Supine Moderate assistance;Minimal assistance  (PATIENT WENT IMMEDIATELY INTO TRIPLANAR TO GO INTO SUPINE.  MOD A AT RLE)   Transfers   Squat Pivot Transfers Moderate Assistance  (FROM W/C TO RIGHT SIDE OF BED,)   Ambulation   WB Status WBAT   PT Exercises   Exercise Treatment SEATED EXERCISES: SAQ WITH 2.5 LBS 3X15, ANKLE PF WITH GREEN BAND 3X15, ANKLE DF WITH RTB 3X15   Motor Control/Coordination VOLLEYING WITH BALLOON LUE ONLY 20X  (GOOD COORDINATION AND REACTION TIME)   Activity Tolerance   Activity Tolerance Patient tolerated treatment well   Assessment Assessment AFTERNOON SESSION FOCUS ON STRENGTHENING. WHEN USING MANUAL RESISTANCE PATIENT TENDS TO NOT USE FULL CONTRACTILE FORCE OF MUSCLE, EVEN IF RESISTANCE APPLIED IS LIGHT. UTILIZED WEIGHTS AND RESISTANCE BANDS, PATIENT SEEMS TO BE RESPONDING WELL AND USING FULL CAPABILITY. Safety Devices   Type of Devices Left in bed;Call light within reach  (SISTER IN ROOM WITH PATIENT)   PT Individual Minutes   Time In 6598   Time Out 1426   Minutes 41   Electronically sign by: MOLINA Falcon 12/8/2022, 2:31 PM                 RECORD REVIEW: Previous medical records, medications were reviewed at today's visit    IMPRESSION:   1. Acute ischemic stroke-on aspirin/statin  2. Hypertension-on medications monitor  3. Hyperlipidemia-on statin  4. Diabetes-Trulicity-monitor blood sugar  5. DVT prophylaxis-Lovenox  6. History of coronary artery disease-aspirin/statin  7. Neuropathy-on Neurontin  8. Rheumatoid arthritis-on Plaquenil  9. COPD-monitor  10. History of anxiety and depression-monitor  11. History of colitis/gallstones-monitor  12. History of bariatric surgery-on multivitamin/folic acid  13. History of superior vena cava filter placement with venous thromboembolism-not on anticoagulation-monitor- indicates took herself off blood thinners as not like meds and was bruising   14.   PT/OT/speech    x-ray of right ankle no acute changes  Venous ultrasound of leg no DVT      Continue current care  ELOS pending 3 weeks     Expected duration and frequency therapy: 180 minutes per day, 5 days per week    [address] [place]  Dr Bekah Phillips

## 2022-12-12 LAB
ALBUMIN SERPL-MCNC: 3.1 G/DL (ref 3.5–5.2)
ALP BLD-CCNC: 80 U/L (ref 35–104)
ALT SERPL-CCNC: 18 U/L (ref 5–33)
ANION GAP SERPL CALCULATED.3IONS-SCNC: 12 MMOL/L (ref 7–19)
AST SERPL-CCNC: 21 U/L (ref 5–32)
BASOPHILS ABSOLUTE: 0.1 K/UL (ref 0–0.2)
BASOPHILS RELATIVE PERCENT: 1.1 % (ref 0–1)
BILIRUB SERPL-MCNC: <0.2 MG/DL (ref 0.2–1.2)
BUN BLDV-MCNC: 13 MG/DL (ref 8–23)
CALCIUM SERPL-MCNC: 8.8 MG/DL (ref 8.8–10.2)
CHLORIDE BLD-SCNC: 107 MMOL/L (ref 98–111)
CO2: 24 MMOL/L (ref 22–29)
CREAT SERPL-MCNC: 0.5 MG/DL (ref 0.5–0.9)
EOSINOPHILS ABSOLUTE: 0.7 K/UL (ref 0–0.6)
EOSINOPHILS RELATIVE PERCENT: 9.9 % (ref 0–5)
GFR SERPL CREATININE-BSD FRML MDRD: >60 ML/MIN/{1.73_M2}
GLUCOSE BLD-MCNC: 76 MG/DL (ref 74–109)
HCT VFR BLD CALC: 34.6 % (ref 37–47)
HEMOGLOBIN: 10.6 G/DL (ref 12–16)
IMMATURE GRANULOCYTES #: 0 K/UL
LYMPHOCYTES ABSOLUTE: 1.9 K/UL (ref 1.1–4.5)
LYMPHOCYTES RELATIVE PERCENT: 26.9 % (ref 20–40)
MCH RBC QN AUTO: 24.4 PG (ref 27–31)
MCHC RBC AUTO-ENTMCNC: 30.6 G/DL (ref 33–37)
MCV RBC AUTO: 79.7 FL (ref 81–99)
MONOCYTES ABSOLUTE: 0.8 K/UL (ref 0–0.9)
MONOCYTES RELATIVE PERCENT: 10.5 % (ref 0–10)
NEUTROPHILS ABSOLUTE: 3.7 K/UL (ref 1.5–7.5)
NEUTROPHILS RELATIVE PERCENT: 51 % (ref 50–65)
PDW BLD-RTO: 16.7 % (ref 11.5–14.5)
PLATELET # BLD: 329 K/UL (ref 130–400)
PMV BLD AUTO: 9.9 FL (ref 9.4–12.3)
POTASSIUM REFLEX MAGNESIUM: 3.9 MMOL/L (ref 3.5–5)
RBC # BLD: 4.34 M/UL (ref 4.2–5.4)
SODIUM BLD-SCNC: 143 MMOL/L (ref 136–145)
TOTAL PROTEIN: 5.1 G/DL (ref 6.6–8.7)
WBC # BLD: 7.2 K/UL (ref 4.8–10.8)

## 2022-12-12 PROCEDURE — 1180000000 HC REHAB R&B

## 2022-12-12 PROCEDURE — 97110 THERAPEUTIC EXERCISES: CPT

## 2022-12-12 PROCEDURE — 92507 TX SP LANG VOICE COMM INDIV: CPT

## 2022-12-12 PROCEDURE — 99232 SBSQ HOSP IP/OBS MODERATE 35: CPT | Performed by: PSYCHIATRY & NEUROLOGY

## 2022-12-12 PROCEDURE — 97116 GAIT TRAINING THERAPY: CPT

## 2022-12-12 PROCEDURE — 6370000000 HC RX 637 (ALT 250 FOR IP): Performed by: PSYCHIATRY & NEUROLOGY

## 2022-12-12 PROCEDURE — 85025 COMPLETE CBC W/AUTO DIFF WBC: CPT

## 2022-12-12 PROCEDURE — 36415 COLL VENOUS BLD VENIPUNCTURE: CPT

## 2022-12-12 PROCEDURE — 94760 N-INVAS EAR/PLS OXIMETRY 1: CPT

## 2022-12-12 PROCEDURE — 97530 THERAPEUTIC ACTIVITIES: CPT

## 2022-12-12 PROCEDURE — 80053 COMPREHEN METABOLIC PANEL: CPT

## 2022-12-12 PROCEDURE — 92526 ORAL FUNCTION THERAPY: CPT

## 2022-12-12 PROCEDURE — 6360000002 HC RX W HCPCS: Performed by: PSYCHIATRY & NEUROLOGY

## 2022-12-12 RX ADMIN — DOCUSATE SODIUM 100 MG: 100 CAPSULE, LIQUID FILLED ORAL at 20:55

## 2022-12-12 RX ADMIN — HYDROXYCHLOROQUINE SULFATE 200 MG: 200 TABLET, FILM COATED ORAL at 08:25

## 2022-12-12 RX ADMIN — THERA TABS 1 TABLET: TAB at 08:25

## 2022-12-12 RX ADMIN — FOLIC ACID 1 MG: 1 TABLET ORAL at 08:25

## 2022-12-12 RX ADMIN — ENOXAPARIN SODIUM 40 MG: 100 INJECTION SUBCUTANEOUS at 16:38

## 2022-12-12 RX ADMIN — DOCUSATE SODIUM 100 MG: 100 CAPSULE, LIQUID FILLED ORAL at 08:25

## 2022-12-12 RX ADMIN — HYDROXYCHLOROQUINE SULFATE 200 MG: 200 TABLET, FILM COATED ORAL at 20:55

## 2022-12-12 RX ADMIN — ASPIRIN 81 MG 81 MG: 81 TABLET ORAL at 08:25

## 2022-12-12 RX ADMIN — AMLODIPINE BESYLATE 2.5 MG: 2.5 TABLET ORAL at 08:25

## 2022-12-12 RX ADMIN — ATORVASTATIN CALCIUM 40 MG: 40 TABLET, FILM COATED ORAL at 20:55

## 2022-12-12 RX ADMIN — MICONAZOLE NITRATE: 2 POWDER TOPICAL at 20:58

## 2022-12-12 RX ADMIN — GABAPENTIN 300 MG: 300 CAPSULE ORAL at 20:56

## 2022-12-12 NOTE — PROGRESS NOTES
Occupational Therapy     12/12/22 1000   General   Timed Code Treatment Minutes 60 Minutes   Restrictions/Precautions   Restrictions/Precautions Modified Diet;Swallowing - Thickened Liquids; Fall Risk;Aspiration Risk;Weight Bearing   Balance   Sitting Balance Supervision   Standing Balance Contact guard assistance   Standing Balance   Activity static   Comment attempted  standing task at aspen walker but pt kept repositioning resulting in LOB   Functional Mobility   Functional - Mobility Device Wheelchair   Assist Level Minimal assistance   Functional Mobility Comments SBA OT gym to PT gym in hallway but assist to make turns through doorway   Transfers   Sit to stand Moderate assistance   Wheelchair Bed Transfers   Wheelchair/Bed - Technique Stand pivot;Stand step   Equipment Used Bed; Wheelchair   Level of Asssistance Minimal assistance   Type of ROM/Therapeutic Exercise   Type of ROM/Therapeutic Exercise PROM;AAROM   Comment RUE   Weight Bearing   RUE Weight Bearing Extended arm seated;Forearm seated   Response To Weight Bearing Technique good   Additional Activities Comment   Additional Activities pt able to slect items by name and descriptive contexts with 100% acucracy however unable to relate body part names to self or model   Assessment   Performance deficits / Impairments Decreased functional mobility ; Decreased endurance;Decreased coordination;Decreased ADL status; Decreased sensation;Decreased posture;Decreased ROM; Decreased balance;Decreased strength;Decreased vision/visual deficit; Decreased safe awareness;Decreased high-level IADLs;Decreased cognition;Decreased fine motor control   Assessment UE tone has resolved   Treatment Diagnosis L MCA stroke   Activity Tolerance   Activity Tolerance Patient Tolerated treatment well

## 2022-12-12 NOTE — PROGRESS NOTES
[facility]  [facility]PAT[facility]ECH [facility]  [facility] 8 REHAB UNIT  TIME   0930  1000      [x]Daily Note  []Progress Note    Date: 2022  Patient Name: Chaitanya Gaitan        MRN: [id number]    Account #: [de-identified]  : 1960  (64 y.o.)  Gender: female   Primary Provider: Julian Burleson MD  Diet: Dysphagia soft and bite sized, thin liquids        PATIENT DIAGNOSIS(ES):    Diagnosis: CVA, R hemiparesis     Additional Pertinent Hx: Anxiety, depression, COPD, CVA, DM, LE edema, hyperlipidemia, HTN, obesity, RA, CAD and venous thromboembolism     RESTRICTIONS/PRECAUTIONS:    Restrictions/Precautions  Restrictions/Precautions: Modified Diet, Swallowing - Fall Risk, Aspiration Risk  Required Braces or Orthoses?: No           Subjective:  She was upright in her bed and her bed alarm was turned on. She attempted all therapy tasks. Objective:  Her daily schedule was reviewed and provided to her. Tasks for expressive language were targeted. Sentence completion tasks were at 70% without cueing. Naming pictures was at 42% independently and at 100% with phonemic cues or sentence completion cues. Naming opposites was at 100% without cueing. Repetition of four and five syllable words was at 100%. Receptive language tasks were also targeted. Asnwering yes/no questions was at 75%. Following written commands 20% without cues and 40% with cues. Following verbal commands was at 33% without any cues. With models she was at 60%. Identifying pictures on an AAC board (4 per page) was at 50% this date. Identifying pictures on an AAC board (15 per page) was at 40% this date. She is still able to use her daily and monthly schedule. No overt s/s of aspiration was observed with thin liquids via cup. No oral residue was observed after her morning meal. She did require cues to not talk while drinking thin liquids.     She continues to exhibit severe expressive and receptive aphasia, dysarthria,cognitive deficits, deficits in executive function and dysphagia. Recommend she continue speech therapy services to address these deficits. Recommend she remain on a dysphagia soft diet with thin liquids at this time. SLP will upgrade to regular when she demonstrates consistent awareness of oral residue. Recommended Diet and Intervention  Soft & Bite Sized consistencies   Thin liquids  Recommended Form of Meds: Crushed in puree as able  Dysphagia treatment  Therapeutic Interventions: Pharyngeal exercises;Diet tolerance monitoring;Oral care;Oral motor exercises; Patient/Family education; Therapeutic PO trials with SLP     Compensatory Swallowing Strategies  Compensatory Swallowing Strategies : Alternate solids and liquids;Eat/Feed slowly; No straws;Upright as possible for all oral intake;Remain upright for 30-45 minutes after meals;Small bites/sips; Check for pocketing of food on the Right; Check for pocketing of food on the Left; Set up assist;Assist feed              SHORT TERM GOAL #1:  Goal 1: Pt will complete y/n tasks with 80% accuracy and minimal verbal cues/modeling. SHORT TERM GOAL #2:  Goal 2: Pt will complete verbal expression tasks with 80% accuracy and minimal verbal cues/modeling. SHORT TERM GOAL #3:  Goal 3: Pt will complete receptive/expressive language tasks with 80% accuracy and minimal verbal cues/modeling. SHORT TERM GOAL #4:  Goal 4: Pt will follow one step commands with with 90% accuracy and minimal verbal cues/modeling. SHORT TERM GOAL #5:  Goal 5: Pt will complete orientation tasks with 90% accuracy and minimal verbal cues/modeling. Swallowing Short Term Goals  Short-term Goals  Goal 1: Pt will tolerate soft & bite sized consistencies with mildly  (nectar) thick liquids with minimal overt s/s of aspiration/penetration during hospitalization. New Goal: Pt will tolerate soft and bite sized diet with thin liquids with minimal overt s/s of aspiration/penetration during hospitalization  Goal 2: Pt will

## 2022-12-12 NOTE — PROGRESS NOTES
[facility]  INPATIENT SPEECH THERAPY  [facility] 8 REHAB UNIT  TIME   0830  0900  30 MINUTES    [x]Daily Note  []Progress Note    Date: 2022  Patient Name: Amanda Paulino        MRN: [id number]    Account #: [de-identified]  : 1960  (58 y.o.)  Gender: female   Primary Provider: Sandeep Montgomery MD  Swallowing Status/Diet: Dysphagia soft and bite sized, thin liquids      Subjective: Pt alert, cooperative, and upright in bed. Objective:    Pt observed pointing to her requests on this date. Ex. Pt pointed to the ceiling to communicate that she wanted her light on. Pt was required to verbalize \"light on\", given a model, to fulfil her request.     Functional reading task completed. Pt is required to read her daily therapy schedule aloud. Pt is unable to complete this task independently on this date. Pt required maximum semantic and phonemic cues to complete this. Identifying opposites task completed. Pt is provided 1 word and is required to elicit the opposite. Pt did require maximum semantic and phonemic cues throughout this task. Task completed with 30% accuracy. Phrase/sentence completion task completed for verbal expression. Pt was provided the beginning of a 4-5 word phrase/sentence and is prompted to finish the sentence utilizing one word. Task completed with 14% accuracy. Maximum semantic and phonemic cues required to complete this task. Swallowing reassessed during tx. Consistencies presented regular solids with thin liquids via consecutive sips side of cup. Oral prep reveals decreased rotary mastication with regular solid consistencies. Oral transit timing ranges from 2-3 seconds with regular solid consistencies. No significant oral pocketing/residue observed. Oral transit is suspected to be 1 second or faster than 1 second with thin liquids. Laryngeal movement observed to be consistently sluggish and mild-moderately decreased for swallow airway protection.  No overt s/s of aspiration/penetration observed with regular solids or thin liquids on this date. Continue current diet of soft and bite sized with thin liquids at this time until pt consistently is aware of risks of oral pocketing/residue. SLP will continue to follow and treat     Recommended Diet and Intervention  Soft & Bite Sized consistencies   Thin liquids  Recommended Form of Meds: Crushed in puree as able  Dysphagia treatment  Therapeutic Interventions: Pharyngeal exercises;Diet tolerance monitoring;Oral care;Oral motor exercises; Patient/Family education; Therapeutic PO trials with SLP     Compensatory Swallowing Strategies  Compensatory Swallowing Strategies : Alternate solids and liquids;Eat/Feed slowly; No straws;Upright as possible for all oral intake;Remain upright for 30-45 minutes after meals;Small bites/sips; Check for pocketing of food on the Right; Check for pocketing of food on the Left; Set up assist;Assist feed    SHORT TERM GOAL #1:  Goal 1: Pt will complete y/n tasks with 80% accuracy and minimal verbal cues/modeling. SHORT TERM GOAL #2:  Goal 2: Pt will complete verbal expression tasks with 80% accuracy and minimal verbal cues/modeling. SHORT TERM GOAL #3:  Goal 3: Pt will complete receptive/expressive language tasks with 80% accuracy and minimal verbal cues/modeling. SHORT TERM GOAL #4:  Goal 4: Pt will follow one step commands with with 90% accuracy and minimal verbal cues/modeling. SHORT TERM GOAL #5:  Goal 5: Pt will complete orientation tasks with 90% accuracy and minimal verbal cues/modeling. Swallowing Short Term Goals  Short-term Goals  Goal 1: Pt will tolerate soft & bite sized consistencies with thin liquids with minimal overt s/s of aspiration/penetration during hospitalization. Goal 2: Pt will complete Modified Barium Swallow Study. Goal 3: Pt will demonstrate awareness of general aspiration precautions. Goal 4: Pt will participate in swallowing reassessments to ensure safest diet consistencies.   Goal 5: Pt will allow staff to complete daily oral care. Goal 6: Pt will complete oral motor exercises for lingual and labial strengthening. ASSESSMENT:  Assessment: [x]Progressing towards goals          []Not Progressing towards goals    Patient Tolerance of Treatment:   [x]Tolerated well []Tolerated fair []Required rest breaks []Fatigued    Education:  Learner:  [x]Patient          []Significant Other          []Other  Education provided regarding:  [x]Goals and POC   []Diet and swallowing precautions    []Home Exercise Program  []Progress and/or discharge information  Method of Education:  [x]Discussion          []Demonstration          []Handout          []Other  Evaluation of Education:   [x]Verbalized understanding   []Demonstrates without assistance  []Demonstrates with assistance  []Needs further instruction     []No evidence of learning                  []No family present      Plan: [x]Continue with current plan of care    []Modify current plan of care as follows:    []Discharge patient    Discharge Location:    Services/Supervision Recommended:      []Patient continues to require treatment by a licensed therapist to address functional deficits as outlined in the established plan of care.     Electronically signed by MARCELINO Maynard on 12/12/2022 at 9:47 AM

## 2022-12-12 NOTE — PROGRESS NOTES
Patient:   Erasmo Nevarez  MR#:    [id number]   Room:    0826/826-02   YOB: 1960  Date of Progress Note: 12/12/2022  Time of Note                           11:00 AM  Consulting Physician:   Gisel Rollins M.D. Attending Physician:  Gisel Rollins MD     Chief complaint Acute ischemic stroke    S:This 58 y.o. female  with history of anxiety, depression, COPD, atherosclerotic disease, colitis, COPD, CVA, DM,  LE edema, hyperlipidemia, HTN, morbid obesity, RA, CAD  and venous thromboembolism. She presented to [facility] ER on 11/9/22 after being found at home lying facedown in her feces. She was very weak, confused, not able to speak but moving purposefully and intermittently following commands. Her last well know was 3 a.m. when her  left for work. Imaging done revealed a large MCA stroke 7.7 x 5 cm. CTA head/neck revealed an acute M1 occlusion. She was outside of the window for TPA. CK on admit was 1100, consistent with rhabdomylosis. She was also noted to possibly Dens fracture. She was transferred to [facility] for a possible thrombectomy. Further imaging was done at [facility] and she was deemed not a candidate for thrombectomy. MRI done ruled out Dens fracture. Repeat CT of head on 11/11/22 showed evolving left MCA territory infarct with increasing edema/mass effect resulting in 4mm of  rightward midline shift(previously 2mm) a the level of the third ventricle. No hemorrhagic conversion or definite trapping of the right lateral ventricle, possible small acute right cerebellar infarct. Neurosurgery was consulted for possible hemicraniectomy. She was seen by Dr. Pita Linares, who didn't feel any surgical intervention was needed. Patient was found to have a UTI + for [facility] and was placed on Rocephin on 11/14/22. Patient had a PICC line placed. Patient was evaluated by SPT and initially made NPO d/t oropharyngeal dysphagia. NGT placed and feeding started.  She was also noted to have global aphasia but is improving. She was re-evaluated by SPT on 11/15/22 for swallow and was started on a dysphagia 1 diet with nectar thick liquids. Patient also continues to have right sided weakness and is participating in both PT/OT. Repeat CT head on 11/13/22 shows stable LMCA ischemic stroke with stable edema, 5 mm shift, no hemorrhage. She is felt to need a stay on Rehab for continued PT/OT/SPT and medical management to work towards her goal of returning home with her . She is now felt ready to start the Rehab program.  No new issues overnight. REVIEW OF SYSTEMS:  Unreliable due to aphasia     Past Medical History:      Diagnosis Date    Anxiety     ASCVD (arteriosclerotic cardiovascular disease)     Carrier of group B Streptococcus     Cellulitis     Colitis     COPD (chronic obstructive pulmonary disease) ([facility])     Costochondritis     CVA (cerebral vascular accident) ([address]     Depression     Edema     Fracture of sternum     non union post surgery.      Gallstones     Grief reaction     H/O superior vena cava filter placement     Hyperlipidemia     Hypertension     MI (myocardial infarction) ([address]     MRSA (methicillin resistant Staphylococcus aureus)     Neuropathy     Obesity     Pseudarthrosis sternal    RA (rheumatoid arthritis) ([address]     Rosacea     Sinus bradycardia     TIA (transient ischemic attack)     Venous thromboembolism        Past Surgical History:      Procedure Laterality Date    ADENOIDECTOMY      APPENDECTOMY      CARDIAC CATHETERIZATION  3/2009    CARDIAC CATHETERIZATION  11/5/14  JDT    EF 50%, patent bypass grafts    CHOLECYSTECTOMY  2011    COLONOSCOPY  06/03/2010    Dr. Adarsh South: distal colon having ulcerative lesions c/w UC    COLONOSCOPY  2009    pancolitis    COLONOSCOPY      CORONARY ARTERY BYPASS GRAFT  3/2009    PHANI Monet to LAD, SVGs to OM & PDA    GASTRIC BYPASS SURGERY  2012    HIATAL HERNIA REPAIR  2011    HYSTERECTOMY (CERVIX STATUS UNKNOWN)      KNEE SURGERY      MA COLONOSCOPY FLX DX W/COLLJ SPEC WHEN PFRMD N/A 3/12/2018    Dr Miko Martinez rectal inflammation consistent with U.C.-Active proctitis--3 yr recall    Mario Gorman ENDOSCOPY  2010    Dr. Selma Blancas  2012       Medications in Hospital:      Current Facility-Administered Medications:     bisacodyl (DULCOLAX) EC tablet 5 mg, 5 mg, Oral, Daily, Roly Merritt MD, 5 mg at 12/09/22 0748    miconazole (MICOTIN) 2 % powder, , Topical, BID, Roly Merritt MD, Given at 12/11/22 2110    docusate sodium (COLACE) capsule 100 mg, 100 mg, Oral, BID, Roly Merritt MD, 100 mg at 12/12/22 0825    aspirin chewable tablet 81 mg, 81 mg, Oral, Daily, Roly Merritt MD, 81 mg at 12/12/22 0825    atorvastatin (LIPITOR) tablet 40 mg, 40 mg, Oral, Nightly, Roly Merritt MD, 40 mg at 12/11/22 2110    dulaglutide (TRULICITY) SC injection 1.5 mg (Patient Supplied), 1.5 mg, SubCUTAneous, Weekly, Roly Merritt MD    folic acid (FOLVITE) tablet 1 mg, 1 mg, Oral, Daily, Roly Merritt MD, 1 mg at 12/12/22 0825    gabapentin (NEURONTIN) capsule 300 mg, 300 mg, Oral, Nightly, Roly Merritt MD, 300 mg at 12/11/22 2110    hydroxychloroquine (PLAQUENIL) tablet 200 mg, 200 mg, Oral, BID, Roly Merritt MD, 200 mg at 12/12/22 0825    amLODIPine (NORVASC) tablet 2.5 mg, 2.5 mg, Oral, Daily, Roly Merritt MD, 2.5 mg at 12/12/22 0825    tiZANidine (ZANAFLEX) tablet 4 mg, 4 mg, Oral, BID PRN, Roly Merritt MD    multivitamin 1 tablet, 1 tablet, Oral, Daily, Roly Merritt MD, 1 tablet at 12/12/22 0825    acetaminophen (TYLENOL) tablet 650 mg, 650 mg, Oral, Q4H PRN, Roly Merritt MD, 650 mg at 11/27/22 1933    enoxaparin (LOVENOX) injection 40 mg, 40 mg, SubCUTAneous, Daily, Roly Merritt MD, 40 mg at 12/11/22 1638    polyethylene glycol (GLYCOLAX) packet 17 g, 17 g, Oral, Daily PRN, Roly Merritt MD, 17 g at 11/30/22 1817    Allergies:  Methotrexate derivatives    Social History:   TOBACCO:   reports that she quit smoking about 13 years ago. Her smoking use included cigarettes. She has a 64.00 pack-year smoking history. She has never used smokeless tobacco.  ETOH:   reports current alcohol use. Family History:       Problem Relation Age of Onset    Prostate Cancer Father     Heart Failure Father     Cancer Father     Colon Cancer Neg Hx     Colon Polyps Neg Hx     Liver Cancer Neg Hx     Liver Disease Neg Hx     Esophageal Cancer Neg Hx     Rectal Cancer Neg Hx     Stomach Cancer Neg Hx          PHYSICAL EXAM:  /66   Pulse 57   Temp 97.3 °F (36.3 °C) (Oral)   Resp 20   Ht 5' 7\" (1.702 m)   Wt 205 lb (93 kg)   SpO2 93%   BMI 32.11 kg/m²     Constitutional - well developed, well nourished. Eyes - conjunctiva normal.   Ear, nose, throat - No scars, masses, or lesions over external nose or ears, no atrophy of tongue  Neck-symmetric, no masses noted, no jugular vein distension  Respiration- chest wall appears symmetric, good expansion,   normal effort without use of accessory muscles  Musculoskeletal - no significant wasting of muscles noted, no bony deformities  Extremities-no clubbing, cyanosis or edema  Skin - warm, dry, and intact. No rash, erythema, or pallor. Psychiatric - mood, affect, and behavior appear normal.      Neurological exam  Awake, alert, global aphasia says \"yes\" to all questions asked   Attention and concentration appear appropriate  Recent and remote memory unable to tests     Cranial Nerve Exam     CN III, IV,VI-EOMI, No nystagmus, conjugate eye movements, no ptosis    CN VII-+ facial assymetry       Motor Exam  No movement on the right      Tremors- no tremors in hands or head noted     Gait  Not tested     Nursing/pcp notes, imaging,labs and vitals reviewed.      PT,OT and/or speech notes reviewed    Lab Results   Component Value Date    WBC 7.2 12/12/2022    HGB 10.6 (L) 12/12/2022    HCT 34.6 (L) 12/12/2022    MCV 79.7 (L) 12/12/2022     12/12/2022     Lab Results   Component Value Date     12/12/2022    K 3.9 12/12/2022     12/12/2022    CO2 24 12/12/2022    BUN 13 12/12/2022    CREATININE 0.5 12/12/2022    GLUCOSE 76 12/12/2022    CALCIUM 8.8 12/12/2022    PROT 5.1 (L) 12/12/2022    LABALBU 3.1 (L) 12/12/2022    BILITOT <0.2 12/12/2022    ALKPHOS 80 12/12/2022    AST 21 12/12/2022    ALT 18 12/12/2022    LABGLOM >60 12/12/2022   No results found for: INR, PROTIME    [facility]   Student Physical Therapist   Physical Therapy   Progress Notes       Cosign Needed   Date of Service:  12/8/2022  2:31 PM                 Cosign Needed                                                             12/08/22 1300   Restrictions/Precautions   Restrictions/Precautions Modified Diet;Swallowing - Thickened Liquids; Fall Risk;Aspiration Risk;Weight Bearing   Lower Extremity Weight Bearing Restrictions   Right Lower Extremity Weight Bearing Weight Bearing As Tolerated   Left Lower Extremity Weight Bearing Weight Bearing As Tolerated   General   Additional Pertinent Hx Anxiety, depression, COPD, CVA, DM, LE edema, hyperlipidemia, HTN, obesity, RA, CAD and venous thromboembolism   Diagnosis CVA, R hemiparesis   General Comment   Comments PATIENT IN ROOM WITH SISTER AND SLP FINISHING SESSION   Subjective   Subjective PATIENT IS IN [employer]   Bed mobility   Sit to Supine Moderate assistance;Minimal assistance  (PATIENT WENT IMMEDIATELY INTO TRIPLANAR TO GO INTO SUPINE.  MOD A AT RLE)   Transfers   Squat Pivot Transfers Moderate Assistance  (FROM W/C TO RIGHT SIDE OF BED,)   Ambulation   WB Status WBAT   PT Exercises   Exercise Treatment SEATED EXERCISES: SAQ WITH 2.5 LBS 3X15, ANKLE PF WITH GREEN BAND 3X15, ANKLE DF WITH RTB 3X15   Motor Control/Coordination VOLLEYING WITH BALLOON LUE ONLY 20X  (GOOD COORDINATION AND REACTION TIME)   Activity Tolerance   Activity Tolerance Patient tolerated treatment well   Assessment Assessment AFTERNOON SESSION FOCUS ON STRENGTHENING. WHEN USING MANUAL RESISTANCE PATIENT TENDS TO NOT USE FULL CONTRACTILE FORCE OF MUSCLE, EVEN IF RESISTANCE APPLIED IS LIGHT. UTILIZED WEIGHTS AND RESISTANCE BANDS, PATIENT SEEMS TO BE RESPONDING WELL AND USING FULL CAPABILITY. Safety Devices   Type of Devices Left in bed;Call light within reach  (SISTER IN ROOM WITH PATIENT)   PT Individual Minutes   Time In 0921   Time Out 1426   Minutes 41   Electronically sign by: MOLINA Sandhu 12/8/2022, 2:31 PM                 RECORD REVIEW: Previous medical records, medications were reviewed at today's visit    IMPRESSION:   1. Acute ischemic stroke-on aspirin/statin  2. Hypertension-on medications monitor  3. Hyperlipidemia-on statin  4. Diabetes-Trulicity-monitor blood sugar  5. DVT prophylaxis-Lovenox  6. History of coronary artery disease-aspirin/statin  7. Neuropathy-on Neurontin  8. Rheumatoid arthritis-on Plaquenil  9. COPD-monitor  10. History of anxiety and depression-monitor  11. History of colitis/gallstones-monitor  12. History of bariatric surgery-on multivitamin/folic acid  13. History of superior vena cava filter placement with venous thromboembolism-not on anticoagulation-monitor- indicates took herself off blood thinners as not like meds and was bruising   14.   PT/OT/speech    x-ray of right ankle no acute changes  Venous ultrasound of leg no DVT      Continue present care  ELOS pending 3 weeks     Expected duration and frequency therapy: 180 minutes per day, 5 days per week    [address] CELL  Dr Librado Hidalgo

## 2022-12-12 NOTE — PATIENT CARE CONFERENCE
[facility] ACUTE INPATIENT REHABILITATION  TEAM CONFERENCE NOTE    Date: 2022  Patient Name: Cyndi Walter        MRN: [id number]    : 1960  (58 y.o.)  Gender: female      Diagnosis: CVA, R hemiparesis      PHYSICAL THERAPY    GROSS ASSESSMENT       BED MOBILITY  Bed mobility  Rolling to Left: Contact guard assistance (with rail)  Rolling to Right: Moderate assistance (with rail, carry over noted with log roll technique)  Supine to Sit: Moderate assistance, Minimal assistance (Assist with RLE, patient able to perform without VC)  Sit to Supine: Moderate assistance, Minimal assistance (PATIENT WENT IMMEDIATELY INTO TRIPLANAR TO GO INTO SUPINE. MOD A AT RLE)  Scooting: Minimal assistance (down EOM- supervision to sound side and  min A to affected side)  Bed Mobility Comments: multiple repetitions of rolling L/R to change patient's brief       TRANSFERS  Transfers  Sit to Stand: Moderate Assistance, Minimal Assistance (STS from [facility] to [facility]; STS from [facility] to // [facility] pulling up with min-mod@)  Stand to Sit: Moderate Assistance, Minimal Assistance, Contact guard assistance (min-cg@ when sitting down from // bars, min-mod@ standing with HW)  Bed to Chair: Moderate assistance (stand pivot with HW to R side, sat prematurely)  Stand Pivot Transfers: Moderate Assistance (USE OF SS TO STAND PATIENT AND PIVOT TO BED)  Squat Pivot Transfers: Moderate Assistance (performed to patient's L side)  Lateral Transfers: Contact guard assistance (performed level transfer (WC<>mat table) towards L side scooting w/o SB, PTA stabilizing WC only)  Car Transfer: Maximum Assistance, Moderate Assistance, Minimal Assistance (MOD/MIN INTO CARE; MAX A CAR TO WC. LACK OF ANTERIOR LEAN)  Comment: SS TO EXCHANGE PANTS DUE TO WET SPOT ON PATIENT, NOT FROM INCONTINENCE.   WHEELCHAIR PROPULSION  Propulsion 1  Propulsion: Manual  Level: Level Tile  Method: JOBY REESE  Level of Assistance: Minimal assistance, Contact guard assistance, Supervision  Description/ Details: PATIENT ABLE TO BEGIN PROPULSION BUT ON TWO OCCASIONS VEER RIGHT INTO WALL. PATIENT REPOSITIONED IN MIDDLE OF HALLWAY, THERAPIST PROVIDED MIN A TO CGA IN ORDER TO PREVENT EXCESSIVE VEERING BUT ONCE PATIENT GAINED MOMENTUM, NO LONGER NEED ASSISTANCE  Distance: 160'  AMBULATION  Ambulation  Surface: Level tile  Device: Parallel Bars  Other Apparatus: AFO, Wheelchair follow, Right (R AFO and shoe cover)  Assistance: Moderate assistance, Maximum assistance (requires max@ to advance R LE)  Quality of Gait: insufficient wt shift to L side with inability to advance R LE or lock knee to accept wt, blocking to knee during wt bearing to allow advancement of L LE  Gait Deviations: Decreased step length, Decreased step height, Slow Tamia, Increased BAYLEE  Distance: 8'  Comments: pt initially frustrated with staff due to use of gt belt (her L side) and cont to say \"stop\" until hand moved to R side gt belt  More Ambulation?: Yes  STAIRS     GOALS:  Short Term Goals  Time Frame for Short Term Goals: 2 weeks  Short Term Goal 1: Patient will perform bed mobility with Min A  Short Term Goal 2: Patient will transition supine <> sit with Min A  Short Term Goal 3: Patient will perform bed <> chair transfer with Min A  Short Term Goal 4: Patient propel wheelchair 50 ft with Min A  Short Term Goal 5: Patient will ambulate 10 ft with Mod A    Long Term Goals  Time Frame for Long Term Goals : 3 weeks  Long Term Goal 1: Patient will perform bed mobility independently  Long Term Goal 2: Patient with transition supine <> sit independently  Long Term Goal 3: Patient will perform bed <> chair transfer independently  Long Term Goal 4: Patient will perform car transfer with Min A  Long Term Goal 5: Patient will ambulate 10 ft with CGA    ASSESSMENT:  Assessment: Pt initially frustrated as we were not familiar with one another and she has difficulty communicating.  Once issue with  on gt belt resolved, performance improved. When ambing, pt requires blocking R knee with loading and assist to advance R LE. Demonstrated transfer WC<>level mat table in scooting fashion w/o sliding board and only cg@ when transferring to strong L side. Introduced some standing with HW and performed stand pivot using HW but sat prematurely and required mod@. Some active R hip abd and add demonstrated in hooklying position. SPEECH THERAPY  Her daily schedule was reviewed and provided to her. Tasks for expressive language were targeted. Sentence completion tasks were at 70% without cueing. Naming pictures was at 42% independently and at 100% with phonemic cues or sentence completion cues. Naming opposites was at 100% without cueing. Repetition of four and five syllable words was at 100%. Receptive language tasks were also targeted. Asnwering yes/no questions was at 75%. Following written commands 20% without cues and 40% with cues. Following verbal commands was at 33% without any cues. With models she was at 60%. Identifying pictures on an AAC board (4 per page) was at 50% this date. Identifying pictures on an AAC board (15 per page) was at 40% this date. She is still able to use her daily and monthly schedule. No overt s/s of aspiration was observed with thin liquids via cup. No oral residue was observed after her morning meal. She did require cues to not talk while drinking thin liquids. She continues to exhibit severe expressive and receptive aphasia, dysarthria,cognitive deficits, deficits in executive function and dysphagia. Recommend she continue speech therapy services to address these deficits. Recommend she remain on a dysphagia soft diet with thin liquids at this time. SLP will upgrade to regular when she demonstrates consistent awareness of oral residue.      Recommended Diet and Intervention  Soft & Bite Sized consistencies   Thin liquids  Recommended Form of Meds: Crushed in puree as able  Dysphagia treatment  Therapeutic Interventions: Pharyngeal exercises;Diet tolerance monitoring;Oral care;Oral motor exercises; Patient/Family education; Therapeutic PO trials with SLP     Compensatory Swallowing Strategies  Compensatory Swallowing Strategies : Alternate solids and liquids;Eat/Feed slowly; No straws;Upright as possible for all oral intake;Remain upright for 30-45 minutes after meals;Small bites/sips; Check for pocketing of food on the Right; Check for pocketing of food on the Left; Set up assist;Assist feed              SHORT TERM GOAL #1:  Goal 1: Pt will complete y/n tasks with 80% accuracy and minimal verbal cues/modeling. SHORT TERM GOAL #2:  Goal 2: Pt will complete verbal expression tasks with 80% accuracy and minimal verbal cues/modeling. SHORT TERM GOAL #3:  Goal 3: Pt will complete receptive/expressive language tasks with 80% accuracy and minimal verbal cues/modeling. SHORT TERM GOAL #4:  Goal 4: Pt will follow one step commands with with 90% accuracy and minimal verbal cues/modeling. SHORT TERM GOAL #5:  Goal 5: Pt will complete orientation tasks with 90% accuracy and minimal verbal cues/modeling. Swallowing Short Term Goals  Short-term Goals  Goal 1: Pt will tolerate soft & bite sized consistencies with mildly  (nectar) thick liquids with minimal overt s/s of aspiration/penetration during hospitalization. New Goal: Pt will tolerate soft and bite sized diet with thin liquids with minimal overt s/s of aspiration/penetration during hospitalization  Goal 2: Pt will complete Modified Barium Swallow Study. Discontinue goal  Goal 3: Pt will demonstrate awareness of general aspiration precautions. Goal 4: Pt will participate in swallowing reassessments to ensure safest diet consistencies. Goal 5: Pt will allow staff to complete daily oral care. Goal 6: Pt will complete oral motor exercises for lingual and labial strengthening.      Long term goals  Pt will participate in skilled speech therapy services to ensure she is able to communicate her wants and needs. Pt will participate in skilled speech therapy services to ensure she is able to complete ADLs. Pt will tolerate least restrictive diet with minimal overt s/s of aspiration/penetration during hospitalization. STGs Met: 1  LTGs Met: 0    ELOS: 2-3 weeks      OCCUPATIONAL THERAPY  Cognitive Patterns:      11/19/22 1208   Cognitive Patterns   Cognitive Pattern Assessment Used BIMS   Repetition of Three Words (First Attempt) 3   Temporal Orientation: Year Missed by > 5 years or no answer   Temporal Orientation: Month Missed by > 1 month or no answer   Temporal Orientation: Day Incorrect or no answer   Able to recall \"sock No, could not recall   Able to recall \"blue\" No, could not recall   Able to recall \"bed\" No, could not recall   BIMS Summary Score 3     Cognitive Assessment Method (CAM):  Confusion Assessment Method (CAM)  Is there evidence of an acute change in mental status from the patient's baseline?: Yes  Inattention: Behavior present, fluctuates (comes and goes, changes in severity)  Disorganized thinking: Behavior present, fluctuates (comes and goes, changes in severity)  Altered level of consciousness: Behavior not present  Health Literacy:  How often do you need to have someone help you when you read instructions, pamphlets, or other written material from your doctor or pharmacy?: Never        CURRENT IRF-ARLEN SCORES  Eating: CARE Score: 4       Oral Hygiene: CARE Score: 4  Comment: vc for aspen tech after demonstration first     Toileting: CARE Score: 2  Comment: bed level      Shower/Bathe: CARE Score: 3  Comment:  Mod A with shower        Upper Body Dressing: CARE Score: 3  Comment: cues for aspen tech      Lower Body Dressing: CARE Score: 3  Comment: pants in bed, able to to bridge with mod/min A while another manages pants on right side, able to roll to manage clothing on left       Footwear: CARE Score: 2  Comment: while in bed and supine, pt able to reach left foot partially       Toilet Transfers: CARE Score: 3  Comment: w/c to toilet with GB---cues for tech       Picking Up Object:  CARE Score: 88          UE Functioning:  RUE: No AROM  LUE: AROM WNL     Pain Assessment:   0/10 pain        STGs:  Short Term Goals  Time Frame for Short Term Goals: 2 weeks  Short Term Goal 1: MET  Short Term Goal 2: Pt will dress UB using hemitechnique, mod A and cues  Short Term Goal 3: Pt will dress LB, hemitechnique, mod A and cues  Short Term Goal 4: Pt will toilet with mod A  Short Term Goal 5:  Toilet transfer with mod A  Additional Goals?: Yes  Short Term Goal 6: Pt will attend to R side during ADL/functional activities with moderate cues  Short Term Goal 7: Pt will perform standing tasks x 2-3 mins with L UE and mod A for balance to enhance ADL/IADL performance  Short Term Goal 8: Pt/family will demo understanding of R UE positioning/HEP/therapeutic activity recommendations with min A after ed    LTGs:  Long Term Goals  Time Frame for Long Term Goals : 4-5 weeks  Long Term Goal 1: Pt will bathe with min A, AE/DME prn  Long Term Goal 2: Pt will dress UB supervision  Long Term Goal 3: Pt will dress LB min A, AE prn  Long Term Goal 4: Pt will toilet with CGA  Long Term Goal 5: Pt will perform toilet transfer with CGA  Additional Goals?: Yes  Long Term Goal 6: Pt will perform simple home making task with min A  Long Term Goal 7: Pt/family will be independent in HEP/DME/therapeutic activity recommendations after ed  Long Term Goal 8: Pt will attend to R side during functional activities with occasional cueing    Assessment:  Performance deficits / Impairments: Decreased functional mobility , Decreased endurance, Decreased coordination, Decreased ADL status, Decreased sensation, Decreased posture, Decreased ROM, Decreased balance, Decreased strength, Decreased vision/visual deficit, Decreased safe awareness, Decreased high-level IADLs, Decreased cognition, Decreased fine motor control                 NUTRITION  Current Wt: Weight: 205 lb (93 kg) / Body mass index is 32.11 kg/m². Admission Wt: Admission Body Weight: 201 lb (91.2 kg)  Oral Diet Orders:     Pt. improving from a nutritional standpoint AEB a PO intake of 50-75% & %. Family does bring in meals and snacks on daily basis. Electrolytes WNL. Some wt. flunctuations likely d/t fluid accumulation. NURSING    Wounds/Incisions/Ulcers:  redness and excoriation to bilateral abdominal folds     Rickey Scale Score: 17    Pain: Patient's pain is currently controlled with tizanidine 4 mg and Tylenol 650 mg q 4 hrs PRN    Consultations/Labs/X-rays:     Family Education: Family available and participating in education     Fall Risk:  Kraig Sharma Total Score: 40    Fall in the last week? NONE      Other Nursing Issues: Alert and oriented, able to follow commands, global aphasia, incont of bowel and bladder, wears briefs, meds consumed whole with applesauce, Dysphagia diet, assist x1 with pebbles ellen, right sided weakness, ASA, Lovenox, LBM 12/12/22        SOCIAL WORK/CASE MANAGEMENT  Assessment: Family very supportive- retiring from work to participate in care. Daughter has returned to her home and job briefly. Ms. Alejandra Merchant, previously very independent despite numerous health problems/disability-personality is expressed now as well in what she will/will not accept. Discharge Plan   Estimated Length of Stay:  2 weeks  Destination: discharge home with supervision    Pass: No    Services at Discharge: [id number], Occupational Therapy, and Speech Therapy Other per evaluations    Equipment at Discharge: Will determine closer to discharge. Progress made in the prior week: Mod A with bathing. 2.   Mod-max@ bed to chair  3.  4.  5.      Goals for following week:  Min A with toilet transfers.    2.   Min@ bed to chair  3.   4.   5.     Factors facilitating achievement of predicted outcomes: Family support    Barriers to the achievement of predicted outcomes: Communication deficit, Upper extremity weakness, and Lower extremity weakness    Team Members Present at Conference:  : Cyndy Nguyen 23   Occupational Therapist: Aleksandar Cleveland, OTR/L  Physical Therapist: Clovis Graham PT  Speech Therapist: Robby Paez 87, Beth Sinha  Nurse: Elza Medina   Nurse Manager:  Delroy Churchill, RN, BSN  Dietitian:  Kayla Salinas  Rehab Director:        I approve the established interdisciplinary plan of care as documented within the medical record of [address] [place] [address].

## 2022-12-12 NOTE — PLAN OF CARE
Problem: Discharge Planning  Goal: Discharge to home or other facility with appropriate resources  Outcome: Progressing     Problem: Safety - Adult  Goal: Free from fall injury  Outcome: Progressing     Problem: Neurosensory - Adult  Goal: Achieves stable or improved neurological status  Outcome: Progressing  Goal: Achieves maximal functionality and self care  Outcome: Progressing     Problem: Skin/Tissue Integrity - Adult  Goal: Skin integrity remains intact  Outcome: Progressing     Problem: Pain  Goal: Verbalizes/displays adequate comfort level or baseline comfort level  Outcome: Progressing     Problem: Chronic Conditions and Co-morbidities  Goal: Patient's chronic conditions and co-morbidity symptoms are monitored and maintained or improved  Outcome: Progressing     Problem: ABCDS Injury Assessment  Goal: Absence of physical injury  Outcome: Progressing     Problem: Skin/Tissue Integrity  Goal: Absence of new skin breakdown  Description: 1. Monitor for areas of redness and/or skin breakdown  2. Assess vascular access sites hourly  3. Every 4-6 hours minimum:  Change oxygen saturation probe site  4. Every 4-6 hours:  If on nasal continuous positive airway pressure, respiratory therapy assess nares and determine need for appliance change or resting period. Outcome: Progressing     Problem: Confusion  Goal: Confusion, delirium, dementia, or psychosis is improved or at baseline  Description: INTERVENTIONS:  1. Assess for possible contributors to thought disturbance, including medications, impaired vision or hearing, underlying metabolic abnormalities, dehydration, psychiatric diagnoses, and notify attending LIP  2. [place] high risk fall precautions, as indicated  3. Provide frequent short contacts to provide reality reorientation, refocusing and direction  4. Decrease environmental stimuli, including noise as appropriate  5.  Monitor and intervene to maintain adequate nutrition, hydration, elimination, sleep and activity  6. If unable to ensure safety without constant attention obtain sitter and review sitter guidelines with assigned personnel  7.  Initiate Psychosocial CNS and Spiritual Care consult, as indicated  Outcome: Progressing     Problem: Gastrointestinal - Adult  Goal: Maintains or returns to baseline bowel function  Outcome: Progressing  Goal: Maintains adequate nutritional intake  Outcome: Progressing     Problem: Nutrition Deficit:  Goal: Optimize nutritional status  Outcome: Progressing

## 2022-12-12 NOTE — PROGRESS NOTES
Comprehensive Nutrition Assessment    Type and Reason for Visit:  Reassess    Nutrition Recommendations/Plan:   Will continue POC. Malnutrition Assessment:  Malnutrition Status:  No malnutrition (12/12/22 1303)    Context:  Acute Illness     Findings of the 6 clinical characteristics of malnutrition:  Energy Intake:  No significant decrease in energy intake  Weight Loss:  No significant weight loss     Body Fat Loss:  No significant body fat loss     Muscle Mass Loss:  No significant muscle mass loss    Fluid Accumulation:  Mild Extremities   Strength:  Not Performed    Nutrition Assessment:    Pt. improving from a nutritional standpoint AEB a PO intake of 50-75% & %. Family does bring in meals and snacks on daily basis. Electrolytes WNL. Some wt. flunctuations likely d/t fluid accumulation. Nutrition Related Findings:    aphasia Wound Type: None       Current Nutrition Intake & Therapies:    Average Meal Intake: %, 51-75%  Average Supplements Intake: %  ADULT DIET; Dysphagia - Soft and Bite Sized; 4 carb choices (60 gm/meal)  ADULT ORAL NUTRITION SUPPLEMENT; Dinner; Other Oral Supplement; 4 oz. frozen milkshake (in freezer behind hot line)    Anthropometric Measures:  Height: 5' 7\" (170.2 cm)  Ideal Body Weight (IBW): 135 lbs (61 kg)    Admission Body Weight: 201 lb (91.2 kg)  Current Body Weight: 205 lb (93 kg), 150.4 % IBW. Weight Source: Bed Scale  Current BMI (kg/m2): 32.1  Usual Body Weight: 201 lb (91.2 kg)  % Weight Change (Calculated): 1  Weight Adjustment For: No Adjustment       BMI Categories: Obese Class 1 (BMI 30.0-34. 9)    Estimated Daily Nutrient Needs:  Energy Requirements Based On: Kcal/kg  Weight Used for Energy Requirements: Current  Energy (kcal/day): 5157-1391  Weight Used for Protein Requirements: Ideal  Protein (g/day):   Method Used for Fluid Requirements: 1 ml/kcal  Fluid (ml/day): 5128-9712    Nutrition Diagnosis:   Biting/chewing (masticatory) difficulty, Swallowing difficulty related to acute injury/trauma as evidenced by swallow study results    Nutrition Interventions:   Food and/or Nutrient Delivery: Continue Current Diet, Continue Oral Nutrition Supplement  Nutrition Education/Counseling: No recommendation at this time  Coordination of Nutrition Care: Continue to monitor while inpatient  Plan of Care discussed with: pt    Goals:  Previous Goal Met: Progressing toward Goal(s)  Goals: Meet at least 75% of estimated needs, PO intake 75% or greater       Nutrition Monitoring and Evaluation:   Behavioral-Environmental Outcomes: None Identified  Food/Nutrient Intake Outcomes: Food and Nutrient Intake, Supplement Intake, Diet Advancement/Tolerance  Physical Signs/Symptoms Outcomes: Biochemical Data, Chewing or Swallowing, Skin, Nutrition Focused Physical Findings, Fluid Status or Edema    Discharge Planning:     Too soon to determine     Robby Perry Oneil [address], RD, LD  Contact: [phone]

## 2022-12-12 NOTE — PROGRESS NOTES
Physical Therapy  Name: Mynor Rojas  MRN:  [id number]  Date of service:  12/12/2022 12/12/22 1100   Restrictions/Precautions   Restrictions/Precautions Modified Diet;Swallowing - Thickened Liquids; Fall Risk;Aspiration Risk;Weight Bearing   Lower Extremity Weight Bearing Restrictions   Right Lower Extremity Weight Bearing Weight Bearing As Tolerated   Left Lower Extremity Weight Bearing Weight Bearing As Tolerated   General   Diagnosis CVA, R hemiparesis   General Comment   Comments in [facility] post OT session   Subjective   Subjective Pt seems in in good spirits. Ready for PT   Transfers   Sit to Stand Moderate Assistance;Minimal Assistance  (STS from [facility] to [facility]; STS from [facility] to // [facility] pulling up with min-mod@)   Stand to Sit Moderate Assistance;Minimal Assistance;Contact guard assistance  (min-cg@ when sitting down from // bars, min-mod@ standing with HW)   Bed to Chair Moderate assistance  (stand pivot with HW to R side, sat prematurely)   Lateral Transfers Contact guard assistance  (performed level transfer (WC<>mat table) towards L side scooting w/o SB, PTA stabilizing WC only)   Ambulation   WB Status WBAT   Ambulation   Surface Level tile   Device Parallel Bars   Other Apparatus AFO; Wheelchair follow;Right  (R AFO and shoe cover)   Assistance Moderate assistance;Maximum assistance  (requires max@ to advance R LE)   Quality of Gait insufficient wt shift to L side with inability to advance R LE or lock knee to accept wt, blocking to knee during wt bearing to allow advancement of L LE   Gait Deviations Decreased step length;Decreased step height;Slow Tamia; Increased BAYLEE   Distance 8'   Comments pt initially frustrated with staff due to use of gt belt (her L side) and cont to say \"stop\" until hand moved to R side gt belt   Ambulation 2   Surface - 2 level tile   Device 2 Parallel Bars   Other Apparatus 2 Right; Wheelchair follow;AFO  (shoe cover R LE)   Assistance 2 Moderate assistance;Minimal assistance  (mod@ to advance R LE)   Quality of Gait 2 improved wt shift to L with less physical assist required to advance R LE, but cont blocking of R knee to allow wbing during L LE advancement   Distance 12' x 2   PT Exercises   PROM Exercises mat ex R LE with facilitation techniques with no active motion noted: saq, hs curls, hs, leg press   A/AROM Exercises A marching L LE in hooklying while passively marching R LE x 15   Resistive Exercises L LE mat ex with manual resist x 15: hs, saq, hs curls from saq position, leg press; hooklying: hip abd/add x 15 with manual resist on L side to promote carryover on R with active motion noted; hooklying LTR B x 10 working on keep LE's adducted AA-A on R   Assessment   Assessment Pt initially frustrated as we were not familiar with one another and she has difficulty communicating. Once issue with  on gt belt resolved, performance improved. When ambing, pt requires blocking R knee with loading and assist to advance R LE. Demonstrated transfer WC<>level mat table in scooting fashion w/o sliding board and only cg@ when transferring to strong L side. Introduced some standing with HW and performed stand pivot using HW but sat prematurely and required mod@. Some active R hip abd and add demonstrated in hooklying position. Safety Devices   Type of Devices Call light within reach; Chair alarm in place; Left in chair   PT Individual Minutes   Time In 1100   Time Out 1200   Minutes 60       Electronically signed by Chino Villareal PTA on 12/12/2022 at 12:18 PM

## 2022-12-13 PROCEDURE — 6360000002 HC RX W HCPCS: Performed by: PSYCHIATRY & NEUROLOGY

## 2022-12-13 PROCEDURE — 92507 TX SP LANG VOICE COMM INDIV: CPT

## 2022-12-13 PROCEDURE — 97110 THERAPEUTIC EXERCISES: CPT

## 2022-12-13 PROCEDURE — 99233 SBSQ HOSP IP/OBS HIGH 50: CPT | Performed by: PSYCHIATRY & NEUROLOGY

## 2022-12-13 PROCEDURE — 94760 N-INVAS EAR/PLS OXIMETRY 1: CPT

## 2022-12-13 PROCEDURE — 92526 ORAL FUNCTION THERAPY: CPT

## 2022-12-13 PROCEDURE — 97530 THERAPEUTIC ACTIVITIES: CPT

## 2022-12-13 PROCEDURE — 1180000000 HC REHAB R&B

## 2022-12-13 PROCEDURE — 97116 GAIT TRAINING THERAPY: CPT

## 2022-12-13 PROCEDURE — 6370000000 HC RX 637 (ALT 250 FOR IP): Performed by: PSYCHIATRY & NEUROLOGY

## 2022-12-13 RX ADMIN — FOLIC ACID 1 MG: 1 TABLET ORAL at 08:20

## 2022-12-13 RX ADMIN — DOCUSATE SODIUM 100 MG: 100 CAPSULE, LIQUID FILLED ORAL at 08:20

## 2022-12-13 RX ADMIN — HYDROXYCHLOROQUINE SULFATE 200 MG: 200 TABLET, FILM COATED ORAL at 08:20

## 2022-12-13 RX ADMIN — ASPIRIN 81 MG 81 MG: 81 TABLET ORAL at 08:20

## 2022-12-13 RX ADMIN — ENOXAPARIN SODIUM 40 MG: 100 INJECTION SUBCUTANEOUS at 16:53

## 2022-12-13 RX ADMIN — ATORVASTATIN CALCIUM 40 MG: 40 TABLET, FILM COATED ORAL at 20:40

## 2022-12-13 RX ADMIN — THERA TABS 1 TABLET: TAB at 08:20

## 2022-12-13 RX ADMIN — AMLODIPINE BESYLATE 2.5 MG: 2.5 TABLET ORAL at 08:20

## 2022-12-13 RX ADMIN — DOCUSATE SODIUM 100 MG: 100 CAPSULE, LIQUID FILLED ORAL at 20:39

## 2022-12-13 RX ADMIN — HYDROXYCHLOROQUINE SULFATE 200 MG: 200 TABLET, FILM COATED ORAL at 20:39

## 2022-12-13 RX ADMIN — GABAPENTIN 300 MG: 300 CAPSULE ORAL at 20:40

## 2022-12-13 RX ADMIN — MICONAZOLE NITRATE: 2 POWDER TOPICAL at 20:40

## 2022-12-13 NOTE — PLAN OF CARE
Problem: Discharge Planning  Goal: Discharge to home or other facility with appropriate resources  Outcome: Progressing     Problem: Safety - Adult  Goal: Free from fall injury  Outcome: Progressing  Flowsheets (Taken 12/12/2022 4113)  Free From Fall Injury: Instruct family/caregiver on patient safety     Problem: Neurosensory - Adult  Goal: Achieves stable or improved neurological status  Outcome: Progressing  Goal: Achieves maximal functionality and self care  Outcome: Progressing     Problem: Skin/Tissue Integrity - Adult  Goal: Skin integrity remains intact  Outcome: Progressing     Problem: Pain  Goal: Verbalizes/displays adequate comfort level or baseline comfort level  Outcome: Progressing     Problem: Chronic Conditions and Co-morbidities  Goal: Patient's chronic conditions and co-morbidity symptoms are monitored and maintained or improved  Outcome: Progressing     Problem: ABCDS Injury Assessment  Goal: Absence of physical injury  Outcome: Progressing     Problem: Skin/Tissue Integrity  Goal: Absence of new skin breakdown  Description: 1. Monitor for areas of redness and/or skin breakdown  2. Assess vascular access sites hourly  3. Every 4-6 hours minimum:  Change oxygen saturation probe site  4. Every 4-6 hours:  If on nasal continuous positive airway pressure, respiratory therapy assess nares and determine need for appliance change or resting period. Outcome: Progressing     Problem: Confusion  Goal: Confusion, delirium, dementia, or psychosis is improved or at baseline  Description: INTERVENTIONS:  1. Assess for possible contributors to thought disturbance, including medications, impaired vision or hearing, underlying metabolic abnormalities, dehydration, psychiatric diagnoses, and notify attending LIP  2. [place] high risk fall precautions, as indicated  3. Provide frequent short contacts to provide reality reorientation, refocusing and direction  4.  Decrease environmental stimuli, including noise as appropriate  5. Monitor and intervene to maintain adequate nutrition, hydration, elimination, sleep and activity  6. If unable to ensure safety without constant attention obtain sitter and review sitter guidelines with assigned personnel  7.  Initiate Psychosocial CNS and Spiritual Care consult, as indicated  Outcome: Progressing     Problem: Musculoskeletal - Adult  Goal: Return mobility to safest level of function  Outcome: Progressing  Goal: Return ADL status to a safe level of function  Outcome: Progressing     Problem: Metabolic/Fluid and Electrolytes - Adult  Goal: Electrolytes maintained within normal limits  Outcome: Progressing     Problem: Nutrition Deficit:  Goal: Optimize nutritional status  Outcome: Progressing

## 2022-12-13 NOTE — PROGRESS NOTES
improving. She was re-evaluated by SPT on 11/15/22 for swallow and was started on a dysphagia 1 diet with nectar thick liquids. Patient also continues to have right sided weakness and is participating in both PT/OT. Repeat CT head on 11/13/22 shows stable LMCA ischemic stroke with stable edema, 5 mm shift, no hemorrhage. She is felt to need a stay on Rehab for continued PT/OT/SPT and medical management to work towards her goal of returning home with her . She is now felt ready to start the Rehab program.  No acute issues. REVIEW OF SYSTEMS:  Unreliable due to aphasia     Past Medical History:      Diagnosis Date    Anxiety     ASCVD (arteriosclerotic cardiovascular disease)     Carrier of group B Streptococcus     Cellulitis     Colitis     COPD (chronic obstructive pulmonary disease) ([facility])     Costochondritis     CVA (cerebral vascular accident) ([address]     Depression     Edema     Fracture of sternum     non union post surgery.      Gallstones     Grief reaction     H/O superior vena cava filter placement     Hyperlipidemia     Hypertension     MI (myocardial infarction) ([address]     MRSA (methicillin resistant Staphylococcus aureus)     Neuropathy     Obesity     Pseudarthrosis sternal    RA (rheumatoid arthritis) ([address]     Rosacea     Sinus bradycardia     TIA (transient ischemic attack)     Venous thromboembolism        Past Surgical History:      Procedure Laterality Date    ADENOIDECTOMY      APPENDECTOMY      CARDIAC CATHETERIZATION  3/2009    CARDIAC CATHETERIZATION  11/5/14  JDT    EF 50%, patent bypass grafts    CHOLECYSTECTOMY  2011    COLONOSCOPY  06/03/2010    Dr. Gerhard Carpenter: distal colon having ulcerative lesions c/w UC    COLONOSCOPY  2009    pancolitis    COLONOSCOPY      CORONARY ARTERY BYPASS GRAFT  3/2009    PHANI Wahl to LAD, SVGs to OM & PDA    GASTRIC BYPASS SURGERY  2012    HIATAL HERNIA REPAIR  2011    HYSTERECTOMY (CERVIX STATUS UNKNOWN)      KNEE SURGERY      [place] COLONOSCOPY FLX DX W/COLLJ SPEC WHEN PFRMD N/A 3/12/2018    Dr Bjorn Agarwal rectal inflammation consistent with U.C.-Active proctitis--3 yr recall    [id number] [place] [address]  ENDOSCOPY  2010    Dr. Masha Monroe  2012       Medications in Hospital:      Current Facility-Administered Medications:     bisacodyl (DULCOLAX) EC tablet 5 mg, 5 mg, Oral, Daily, Brisa Casper MD, 5 mg at 12/09/22 0748    miconazole (MICOTIN) 2 % powder, , Topical, BID, Brisa Casper MD, Given at 12/12/22 2058    docusate sodium (COLACE) capsule 100 mg, 100 mg, Oral, BID, Brisa Casper MD, 100 mg at 12/13/22 0820    aspirin chewable tablet 81 mg, 81 mg, Oral, Daily, Brisa Casper MD, 81 mg at 12/13/22 0820    atorvastatin (LIPITOR) tablet 40 mg, 40 mg, Oral, Nightly, Brisa Casper MD, 40 mg at 12/12/22 2055    dulaglutide (TRULICITY) SC injection 1.5 mg (Patient Supplied), 1.5 mg, SubCUTAneous, Weekly, Brisa Casper MD    folic acid (FOLVITE) tablet 1 mg, 1 mg, Oral, Daily, Brisa Casper MD, 1 mg at 12/13/22 0820    gabapentin (NEURONTIN) capsule 300 mg, 300 mg, Oral, Nightly, Brisa Casper MD, 300 mg at 12/12/22 2056    hydroxychloroquine (PLAQUENIL) tablet 200 mg, 200 mg, Oral, BID, Brisa Casper MD, 200 mg at 12/13/22 0820    amLODIPine (NORVASC) tablet 2.5 mg, 2.5 mg, Oral, Daily, Brisa Casper MD, 2.5 mg at 12/13/22 0820    tiZANidine (ZANAFLEX) tablet 4 mg, 4 mg, Oral, BID PRN, Brisa Casper MD    multivitamin 1 tablet, 1 tablet, Oral, Daily, Brisa Casper MD, 1 tablet at 12/13/22 0820    acetaminophen (TYLENOL) tablet 650 mg, 650 mg, Oral, Q4H PRN, Brisa Casper MD, 650 mg at 11/27/22 1933    enoxaparin (LOVENOX) injection 40 mg, 40 mg, SubCUTAneous, Daily, Brisa Casper MD, 40 mg at 12/12/22 1638    polyethylene glycol (GLYCOLAX) packet 17 g, 17 g, Oral, Daily PRN, Brisa Casper MD, 17 g at 11/30/22 1817    Allergies:  Methotrexate derivatives    Social History: TOBACCO:   reports that she quit smoking about 13 years ago. Her smoking use included cigarettes. She has a 64.00 pack-year smoking history. She has never used smokeless tobacco.  ETOH:   reports current alcohol use. Family History:       Problem Relation Age of Onset    Prostate Cancer Father     Heart Failure Father     Cancer Father     Colon Cancer Neg Hx     Colon Polyps Neg Hx     Liver Cancer Neg Hx     Liver Disease Neg Hx     Esophageal Cancer Neg Hx     Rectal Cancer Neg Hx     Stomach Cancer Neg Hx          PHYSICAL EXAM:  /63   Pulse 58   Temp (!) 96.3 °F (35.7 °C) (Temporal)   Resp 18   Ht 5' 7\" (1.702 m)   Wt 205 lb (93 kg)   SpO2 96%   BMI 32.11 kg/m²     Constitutional - well developed, well nourished. Eyes - conjunctiva normal.   Ear, nose, throat - No scars, masses, or lesions over external nose or ears, no atrophy of tongue  Neck-symmetric, no masses noted, no jugular vein distension  Respiration- chest wall appears symmetric, good expansion,   normal effort without use of accessory muscles  Musculoskeletal - no significant wasting of muscles noted, no bony deformities  Extremities-no clubbing, cyanosis or edema  Skin - warm, dry, and intact. No rash, erythema, or pallor. Psychiatric - mood, affect, and behavior appear normal.      Neurological exam  Awake, alert, global aphasia says \"yes\" to all questions asked   Attention and concentration appear appropriate  Recent and remote memory unable to tests     Cranial Nerve Exam     CN III, IV,VI-EOMI, No nystagmus, conjugate eye movements, no ptosis    CN VII-+ facial assymetry       Motor Exam  No movement on the right      Tremors- no tremors in hands or head noted     Gait  Not tested     Nursing/pcp notes, imaging,labs and vitals reviewed.      PT,OT and/or speech notes reviewed    Lab Results   Component Value Date    WBC 7.2 12/12/2022    HGB 10.6 (L) 12/12/2022    HCT 34.6 (L) 12/12/2022    MCV 79.7 (L) 12/12/2022     12/12/2022     Lab Results   Component Value Date     12/12/2022    K 3.9 12/12/2022     12/12/2022    CO2 24 12/12/2022    BUN 13 12/12/2022    CREATININE 0.5 12/12/2022    GLUCOSE 76 12/12/2022    CALCIUM 8.8 12/12/2022    PROT 5.1 (L) 12/12/2022    LABALBU 3.1 (L) 12/12/2022    BILITOT <0.2 12/12/2022    ALKPHOS 80 12/12/2022    AST 21 12/12/2022    ALT 18 12/12/2022    LABGLOM >60 12/12/2022   No results found for: INR, PROTIME    Albaro Whelan   Student Physical Therapist   Physical Therapy   Progress Notes       Cosign Needed   Date of Service:  12/8/2022  2:31 PM                 Cosign Needed                                                             12/08/22 1300   Restrictions/Precautions   Restrictions/Precautions Modified Diet;Swallowing - Thickened Liquids; Fall Risk;Aspiration Risk;Weight Bearing   Lower Extremity Weight Bearing Restrictions   Right Lower Extremity Weight Bearing Weight Bearing As Tolerated   Left Lower Extremity Weight Bearing Weight Bearing As Tolerated   General   Additional Pertinent Hx Anxiety, depression, COPD, CVA, DM, LE edema, hyperlipidemia, HTN, obesity, RA, CAD and venous thromboembolism   Diagnosis CVA, R hemiparesis   General Comment   Comments PATIENT IN ROOM WITH SISTER AND SLP FINISHING SESSION   Subjective   Subjective PATIENT IS IN GOOD SPIRITS   Bed mobility   Sit to Supine Moderate assistance;Minimal assistance  (PATIENT WENT IMMEDIATELY INTO TRIPLANAR TO GO INTO SUPINE.  MOD A AT RLE)   Transfers   Squat Pivot Transfers Moderate Assistance  (FROM W/C TO RIGHT SIDE OF BED,)   Ambulation   WB Status WBAT   PT Exercises   Exercise Treatment SEATED EXERCISES: SAQ WITH 2.5 LBS 3X15, ANKLE PF WITH GREEN BAND 3X15, ANKLE DF WITH RTB 3X15   Motor Control/Coordination VOLLEYING WITH BALLOON LUE ONLY 20X  (GOOD COORDINATION AND REACTION TIME)   Activity Tolerance   Activity Tolerance Patient tolerated treatment well   Assessment   Assessment AFTERNOON SESSION FOCUS ON STRENGTHENING. WHEN USING MANUAL RESISTANCE PATIENT TENDS TO NOT USE FULL CONTRACTILE FORCE OF MUSCLE, EVEN IF RESISTANCE APPLIED IS LIGHT. UTILIZED WEIGHTS AND RESISTANCE BANDS, PATIENT SEEMS TO BE RESPONDING WELL AND USING FULL CAPABILITY. Safety Devices   Type of Devices Left in bed;Call light within reach  (SISTER IN ROOM WITH PATIENT)   PT Individual Minutes   Time In 9889   Time Out 1426   Minutes 41   Electronically sign by: MOLINA Eduardo 12/8/2022, 2:31 PM                 RECORD REVIEW: Previous medical records, medications were reviewed at today's visit    IMPRESSION:   1. Acute ischemic stroke-on aspirin/statin  2. Hypertension-on medications monitor  3. Hyperlipidemia-on statin  4. Diabetes-Trulicity-monitor blood sugar  5. DVT prophylaxis-Lovenox  6. History of coronary artery disease-aspirin/statin  7. Neuropathy-on Neurontin  8. Rheumatoid arthritis-on Plaquenil  9. COPD-monitor  10. History of anxiety and depression-monitor  11. History of colitis/gallstones-monitor  12. History of bariatric surgery-on multivitamin/folic acid  13. History of superior vena cava filter placement with venous thromboembolism-not on anticoagulation-monitor- indicates took herself off blood thinners as not like meds and was bruising   14. PT/OT/speech    x-ray of right ankle no acute changes  Venous ultrasound of leg no DVT    Continue present care    Team conference with PT/OT/speech/nursing/Care coordinator to review in depth patients care and discharge planning. At least 35 minutes spent coordinating care for patient >50% of time spent in coordination of care.        ft mid assist  Ambulation 8 ft parallel bars  Still severe receptive and expressive aphasia     ELOS pending 2 weeks       Expected duration and frequency therapy: 180 minutes per day, 5 days per week    Padmini Almas  [phone] CELL  Dr Jac Rivera

## 2022-12-13 NOTE — PROGRESS NOTES
Name: Yina Sanchez  MRN: [id number]  Date of Service:  12/13/2022 12/13/22 1000   Restrictions/Precautions   Restrictions/Precautions Modified Diet;Swallowing - Thickened Liquids; Fall Risk;Aspiration Risk;Weight Bearing   Required Braces or Orthoses? Yes   Lower Extremity Weight Bearing Restrictions   Right Lower Extremity Weight Bearing Weight Bearing As Tolerated   Left Lower Extremity Weight Bearing Weight Bearing As Tolerated   General   Chart Reviewed Yes   Patient assessed for rehabilitation services? Yes   Additional Pertinent Hx Anxiety, depression, COPD, CVA, DM, LE edema, hyperlipidemia, HTN, obesity, RA, CAD and venous thromboembolism   Diagnosis CVA, R hemiparesis   General Comment   Comments CO-TX with PARTIDA   Subjective   Subjective Pt laying in bed, agrees to participat in therapy. Bed mobility   Supine to Sit Minimal assistance;Contact guard assistance   Scooting Contact guard assistance  (On EOB)   Bed Mobility Comments On L side of bed- use of L bedrail   Transfers   Sit to Stand Contact guard assistance;Stand by assistance  (In //)   Stand to Sit Contact guard assistance  (In //)   Lateral Transfers Contact guard assistance  (EOB<w/c w/ SB from pt L)   Ambulation   WB Status WBAT   Ambulation   Surface Level tile   Device Parallel Bars   Other Apparatus AFO; Wheelchair follow;Right   Assistance Moderate assistance;Maximum assistance  (Max A for advancing RLE)   Quality of Gait insufficient wt shift to L side with inability to advance R LE or lock knee to accept wt, blocking to knee during wt bearing to allow advancement of L LE   Gait Deviations Decreased step length;Decreased step height   Distance 2', 8'   Comments Pt presents with agitation and does not want staff holding onto gait belt. Pt gait quality decreased due to atttempt to amb. fast (at times leaving RLE behind) and agitation.    PT Exercises   Exercise Treatment Sitting in w/c: RLE PROM DF/PF X 20 and LLE AROM DF/PF X 20 Assessment   Assessment Pt presents with agitation and frustrated due to difficulty communicating. She sometimes becomes emotional, repeating \"birthday\" (possibly someone from patient's past). Pt able to  // with LUE pulling requiring CGA/SBA and able to amb up to 2' then 8'.    Safety Devices   Type of Devices Patient at risk for falls;Gait belt;Left in chair;Chair alarm in place;Call light within reach  (In w/c)         Electronically signed by Sheila Chase PTA on 12/13/2022 at 11:27 AM

## 2022-12-13 NOTE — PROGRESS NOTES
[facility]  [facility]  [facility] 8 REHAB UNIT  TIME   0930  1000  Minutes: 27  1330  1400  Minutes: 30        [x]Daily Note  []Progress Note    Date: 2022  Patient Name: Elaine Montalvo        MRN: [id number]    Account #: [de-identified]  : 1960  (64 y.o.)  Gender: female   Primary Provider: Jimi Rodríguez MD  Diet: Dysphagia soft and bite sized, thin liquids        PATIENT DIAGNOSIS(ES):    Diagnosis: CVA, R hemiparesis     Additional Pertinent Hx: Anxiety, depression, COPD, CVA, DM, LE edema, hyperlipidemia, HTN, obesity, RA, CAD and venous thromboembolism     RESTRICTIONS/PRECAUTIONS:    Restrictions/Precautions  Restrictions/Precautions: Modified Diet, Swallowing - Fall Risk, Aspiration Risk  Required Braces or Orthoses?: No           Subjective: The patient was upright in her wheelchair. Her  was present for her second session this date. Objective: Today she was able to identify pictures on an AAC board (4 pics to a page) at 80%. She was also able to identify pictures on another board with 15 pictures per page at 20%. She is still not able to use the AAC boards to communicate immediate wants and needs. She was able to read 2 syllable words at 70% and sentences at 30%. Perseverations were noted during this task. Following written commands with cues and/or model was at 100%    Naming pictures was at 30% independently and at 100% with phonemic cues. During the second session she was stimulable to part word cues, phonemic cues, and phrase completion cues. Melodic intonation therapy was utilized and she was able to complete phrases and sentences for immediate wants and needs at 80%. She was also able to sing happy birthday and produced 90% of the words correctly. She was able to recite the Lords Prayer at approximately 70% and the Pledge of Allegiance at 70%. No overt s/s of aspiration observed with thin liquids via cup.  Good hyolaryngeal elevation and mildly delayed pharyngeal swallow response. She was able to complete oral motor exercises and the effortful swallow when demonstration was provided. She continues to exhibit severe expressive and receptive aphasia, dysarthria,cognitive deficits, deficits in executive function and dysphagia. Recommend she continue speech therapy services to address these deficits. Recommend she remain on a dysphagia soft diet with thin liquids at this time. SLP will upgrade to regular when she demonstrates consistent awareness of oral residue. Recommended Diet and Intervention  Soft & Bite Sized consistencies   Thin liquids  Recommended Form of Meds: Crushed in puree as able  Dysphagia treatment  Therapeutic Interventions: Pharyngeal exercises;Diet tolerance monitoring;Oral care;Oral motor exercises; Patient/Family education; Therapeutic PO trials with SLP     Compensatory Swallowing Strategies  Compensatory Swallowing Strategies : Alternate solids and liquids;Eat/Feed slowly; No straws;Upright as possible for all oral intake;Remain upright for 30-45 minutes after meals;Small bites/sips; Check for pocketing of food on the Right; Check for pocketing of food on the Left; Set up assist;Assist feed              SHORT TERM GOAL #1:  Goal 1: Pt will complete y/n tasks with 80% accuracy and minimal verbal cues/modeling. SHORT TERM GOAL #2:  Goal 2: Pt will complete verbal expression tasks with 80% accuracy and minimal verbal cues/modeling. SHORT TERM GOAL #3:  Goal 3: Pt will complete receptive/expressive language tasks with 80% accuracy and minimal verbal cues/modeling. SHORT TERM GOAL #4:  Goal 4: Pt will follow one step commands with with 90% accuracy and minimal verbal cues/modeling. SHORT TERM GOAL #5:  Goal 5: Pt will complete orientation tasks with 90% accuracy and minimal verbal cues/modeling.      Swallowing Short Term Goals  Short-term Goals  Goal 1: Pt will tolerate soft & bite sized consistencies with mildly (nectar) thick liquids with minimal overt s/s of aspiration/penetration during hospitalization. New Goal: Pt will tolerate soft and bite sized diet with thin liquids with minimal overt s/s of aspiration/penetration during hospitalization  Goal 2: Pt will complete Modified Barium Swallow Study. Discontinue goal  Goal 3: Pt will demonstrate awareness of general aspiration precautions. Goal 4: Pt will participate in swallowing reassessments to ensure safest diet consistencies. Goal 5: Pt will allow staff to complete daily oral care. Goal 6: Pt will complete oral motor exercises for lingual and labial strengthening. Long term goals  Pt will participate in skilled speech therapy services to ensure she is able to communicate her wants and needs. Pt will participate in skilled speech therapy services to ensure she is able to complete ADLs. Pt will tolerate least restrictive diet with minimal overt s/s of aspiration/penetration during hospitalization.                            ASSESSMENT:  Assessment: [x]Progressing towards goals          []Not Progressing towards goals    Patient Tolerance of Treatment:   [x]Tolerated well []Tolerated fair []Required rest breaks []Fatigued    Education:  Learner:  [x]Patient          [x]Significant Other          []Other  Education provided regarding:  [x]Goals and POC   []Diet and swallowing precautions    []Home Exercise Program  []Progress and/or discharge information  Method of Education:  [x]Discussion          [x]Demonstration          []Handout          []Other  Evaluation of Education:   []Verbalized understanding   []Demonstrates without assistance  []Demonstrates with assistance  [x]Needs further instruction     []No evidence of learning                  []No family present      Plan: [x]Continue with current plan of care    []Modify current plan of care as follows:    []Discharge patient    Discharge Location:    Services/Supervision Recommended:      [x]Patient continues to require treatment by a licensed therapist to address functional deficits as outlined in the established plan of care.              Electronically Signed By:  Rick Farias M.S., CCC-SLP  12/13/2022,8:44 AM.

## 2022-12-13 NOTE — PROGRESS NOTES
Occupational Therapy     12/13/22 1515   General   Timed Code Treatment Minutes 40 Minutes   Restrictions/Precautions   Restrictions/Precautions Modified Diet;Swallowing - Thickened Liquids; Fall Risk;Aspiration Risk;Weight Bearing   Type of ROM/Therapeutic Exercise   Type of ROM/Therapeutic Exercise PROM;AAROM   Comment RUE comprehensive   Exercises   Shoulder ADduction 2- (5 reps. GE and without facilitation)   Neuromuscular Education   Joint Compression R UE   Vibration elbow and wrist flexion/extension   Assessment   Performance deficits / Impairments Decreased functional mobility ; Decreased endurance;Decreased coordination;Decreased ADL status; Decreased sensation;Decreased posture;Decreased ROM; Decreased balance;Decreased strength;Decreased vision/visual deficit; Decreased safe awareness;Decreased high-level IADLs;Decreased cognition;Decreased fine motor control   Treatment Diagnosis L MCA stroke   Activity Tolerance   Activity Tolerance Patient Tolerated treatment well

## 2022-12-13 NOTE — PROGRESS NOTES
Occupational Therapy  Facility/Department: [facility] 8 REHAB UNIT      Name: Yayo Ivory  : 1960  MRN: [id number]  Date of Service: 2022    Discharge Recommendations:  Continue to assess pending progress          Patient Diagnosis(es): There were no encounter diagnoses. Past Medical History:  has a past medical history of Anxiety, ASCVD (arteriosclerotic cardiovascular disease), Carrier of group B Streptococcus, Cellulitis, Colitis, COPD (chronic obstructive pulmonary disease) ([address] Costochondritis, CVA (cerebral vascular accident) ([address] Depression, Edema, Fracture of sternum, Gallstones, Grief reaction, H/O superior vena cava filter placement, Hyperlipidemia, Hypertension, MI (myocardial infarction) ([address] MRSA (methicillin resistant Staphylococcus aureus), Neuropathy, Obesity, Pseudarthrosis, RA (rheumatoid arthritis) ([address] Rosacea, Sinus bradycardia, TIA (transient ischemic attack), and Venous thromboembolism. Past Surgical History:  has a past surgical history that includes Coronary artery bypass graft (3/2009); Tonsillectomy; Hysterectomy; thoracotomy; knee surgery; Appendectomy; Colonoscopy (2010); Cardiac catheterization (3/2009); Adenoidectomy; Cholecystectomy (); Gastric bypass surgery (); hiatal hernia repair (); Cardiac catheterization (14  JDT); Colonoscopy (); Colonoscopy; Upper gastrointestinal endoscopy (); Upper gastrointestinal endoscopy (); and pr colonoscopy flx dx w/collj spec when pfrmd (N/A, 3/12/2018). Treatment Diagnosis: (P) L MCA stroke      Assessment   Performance deficits / Impairments: (P) Decreased functional mobility ; Decreased endurance;Decreased coordination;Decreased ADL status; Decreased sensation;Decreased posture;Decreased ROM; Decreased balance;Decreased strength;Decreased vision/visual deficit; Decreased safe awareness;Decreased high-level IADLs;Decreased cognition;Decreased fine motor control  Treatment Diagnosis: (P) L MCA stroke  Activity Tolerance  Activity Tolerance: (P) Patient Tolerated treatment well        Plan   Occupational Therapy Plan  Specific Instructions for Next Treatment: transfers, UE  Current Treatment Recommendations: Strengthening, ROM, Balance training, Functional mobility training, Endurance training, Neuromuscular re-education, Safety education & training, Patient/Caregiver education & training, Equipment evaluation, education, & procurement, Positioning, Modalities, Self-Care / ADL, Home management training, Sensory integration, Wheelchair mobility training     Restrictions  Restrictions/Precautions  Restrictions/Precautions: (P) Modified Diet, Swallowing - Thickened Liquids, Fall Risk, Aspiration Risk, Weight Bearing  Required Braces or Orthoses?: Yes  Lower Extremity Weight Bearing Restrictions  Right Lower Extremity Weight Bearing: Weight Bearing As Tolerated  Left Lower Extremity Weight Bearing: Weight Bearing As Tolerated       12/13/22 1100   General   Family / Caregiver Present No   Pre Treatment Pain Screening   Pain at present 0   Scale Used Faces       Objective                          12/13/22 1000   Transfers   Slide Board Contact guard assistance  (bed to w/c)      12/13/22 1100   Neuromuscular Education   Neuromuscular education Yes   NDT Treatment Upper extremity   Facilitation techniques scapular mobilization   Joint Compression R UE   Vibration wrist extension/flexion, elbow extension/flexion   Weight Bearing   Weight Bearing Technique Yes   RUE Weight Bearing Extended arm seated;Forearm seated       Goals  Short Term Goals  Time Frame for Short Term Goals: 2 weeks  Short Term Goal 1: MET  Short Term Goal 2: Pt will dress UB using hemitechnique, mod A and cues  Short Term Goal 3: Pt will dress LB, hemitechnique, mod A and cues  Short Term Goal 4: Pt will toilet with mod A  Short Term Goal 5:  Toilet transfer with mod A  Additional Goals?: Yes  Short Term Goal 6: Pt will attend to R side during ADL/functional activities with moderate cues  Short Term Goal 7: Pt will perform standing tasks x 2-3 mins with L UE and mod A for balance to enhance ADL/IADL performance  Short Term Goal 8: Pt/family will demo understanding of R UE positioning/HEP/therapeutic activity recommendations with min A after ed  Long Term Goals  Time Frame for Long Term Goals : 4-5 weeks  Long Term Goal 1: Pt will bathe with min A, AE/DME prn  Long Term Goal 2: Pt will dress UB supervision  Long Term Goal 3: Pt will dress LB min A, AE prn  Long Term Goal 4: Pt will toilet with CGA  Long Term Goal 5: Pt will perform toilet transfer with CGA  Additional Goals?: Yes  Long Term Goal 6: Pt will perform simple home making task with min A  Long Term Goal 7: Pt/family will be independent in HEP/DME/therapeutic activity recommendations after ed  Long Term Goal 8: Pt will attend to R side during functional activities with occasional cueing       Therapy Time   Individual Concurrent Group Co-treatment   Time In      1100 (cotx with PT to address safety during ADLs)   Time Out      1155   Minutes      55   Timed Code Treatment Minutes: 40 Minutes       Taya Crocker OT

## 2022-12-14 LAB
BACTERIA: NEGATIVE /HPF
BILIRUBIN URINE: NEGATIVE
BLOOD, URINE: NEGATIVE
CLARITY: ABNORMAL
COLOR: YELLOW
CRYSTALS, UA: ABNORMAL /HPF
EPITHELIAL CELLS, UA: 2 /HPF (ref 0–5)
GLUCOSE URINE: NEGATIVE MG/DL
HYALINE CASTS: 15 /HPF (ref 0–8)
KETONES, URINE: NEGATIVE MG/DL
LEUKOCYTE ESTERASE, URINE: ABNORMAL
NITRITE, URINE: NEGATIVE
PH UA: 5 (ref 5–8)
PROTEIN UA: 30 MG/DL
RBC UA: 3 /HPF (ref 0–4)
SPECIFIC GRAVITY UA: 1.02 (ref 1–1.03)
UROBILINOGEN, URINE: 1 E.U./DL
WBC UA: 158 /HPF (ref 0–5)

## 2022-12-14 PROCEDURE — 87186 SC STD MICRODIL/AGAR DIL: CPT

## 2022-12-14 PROCEDURE — 97110 THERAPEUTIC EXERCISES: CPT

## 2022-12-14 PROCEDURE — 92526 ORAL FUNCTION THERAPY: CPT

## 2022-12-14 PROCEDURE — 6360000002 HC RX W HCPCS: Performed by: PSYCHIATRY & NEUROLOGY

## 2022-12-14 PROCEDURE — 6370000000 HC RX 637 (ALT 250 FOR IP): Performed by: PSYCHIATRY & NEUROLOGY

## 2022-12-14 PROCEDURE — 81001 URINALYSIS AUTO W/SCOPE: CPT

## 2022-12-14 PROCEDURE — 97530 THERAPEUTIC ACTIVITIES: CPT

## 2022-12-14 PROCEDURE — 97116 GAIT TRAINING THERAPY: CPT

## 2022-12-14 PROCEDURE — 87086 URINE CULTURE/COLONY COUNT: CPT

## 2022-12-14 PROCEDURE — 99232 SBSQ HOSP IP/OBS MODERATE 35: CPT | Performed by: PSYCHIATRY & NEUROLOGY

## 2022-12-14 PROCEDURE — 92507 TX SP LANG VOICE COMM INDIV: CPT

## 2022-12-14 PROCEDURE — 1180000000 HC REHAB R&B

## 2022-12-14 RX ADMIN — GABAPENTIN 300 MG: 300 CAPSULE ORAL at 20:56

## 2022-12-14 RX ADMIN — HYDROXYCHLOROQUINE SULFATE 200 MG: 200 TABLET, FILM COATED ORAL at 20:56

## 2022-12-14 RX ADMIN — AMLODIPINE BESYLATE 2.5 MG: 2.5 TABLET ORAL at 08:07

## 2022-12-14 RX ADMIN — THERA TABS 1 TABLET: TAB at 08:07

## 2022-12-14 RX ADMIN — FOLIC ACID 1 MG: 1 TABLET ORAL at 08:07

## 2022-12-14 RX ADMIN — MICONAZOLE NITRATE: 2 POWDER TOPICAL at 08:08

## 2022-12-14 RX ADMIN — ENOXAPARIN SODIUM 40 MG: 100 INJECTION SUBCUTANEOUS at 17:10

## 2022-12-14 RX ADMIN — ATORVASTATIN CALCIUM 40 MG: 40 TABLET, FILM COATED ORAL at 20:56

## 2022-12-14 RX ADMIN — ASPIRIN 81 MG 81 MG: 81 TABLET ORAL at 08:07

## 2022-12-14 RX ADMIN — HYDROXYCHLOROQUINE SULFATE 200 MG: 200 TABLET, FILM COATED ORAL at 08:07

## 2022-12-14 RX ADMIN — DOCUSATE SODIUM 100 MG: 100 CAPSULE, LIQUID FILLED ORAL at 08:06

## 2022-12-14 RX ADMIN — DOCUSATE SODIUM 100 MG: 100 CAPSULE, LIQUID FILLED ORAL at 20:56

## 2022-12-14 ASSESSMENT — PAIN SCALES - GENERAL: PAINLEVEL_OUTOF10: 0

## 2022-12-14 NOTE — PROGRESS NOTES
[facility]  INPATIENT SPEECH THERAPY  [facility] 8 REHAB UNIT  TIME   Time In: 1330  Time Out: 1400  Minutes: 30       [x]Daily Note  []Progress Note    Date: 2022  Patient Name: Richard Man        MRN: [id number]    Account #: [de-identified]  : 1960  (58 y.o.)  Gender: female   Primary Provider: Roly Merritt MD  Diet: Dysphagia soft and bite sized, thin liquids        PATIENT DIAGNOSIS(ES):    Diagnosis: CVA, R hemiparesis     Additional Pertinent Hx: Anxiety, depression, COPD, CVA, DM, LE edema, hyperlipidemia, HTN, obesity, RA, CAD and venous thromboembolism     RESTRICTIONS/PRECAUTIONS:    Restrictions/Precautions  Restrictions/Precautions: Modified Diet, Swallowing - Fall Risk, Aspiration Risk  Required Braces or Orthoses?: No           Subjective: The patient was upright in her wheelchair. Her  was present for her second session this date. Objective:  Receptive language tasks were targeted. Today she was able to identify pictures on an AAC board (4 pics to a page) at 75%. She was also able to identify pictures on another board with 15 pictures per page at 60%. She is still not able to use the AAC boards to communicate immediate wants and needs. Following written commands with cues was at 60% without cues. Tasks for expressive language were also targeted. Naming pictures was at 45% without cues. With phonemic cues she was at 90%. Melodic intonation therapy was utilized and she was able to complete phrases and sentences for immediate wants and needs at 70%. She was also able to sing happy birthday and produced 100% of the words correctly. She was able to sing You Are My Sunshine and produced 60% of the lyrics/words correctly. Repetition of 4 and 5 syllable words was at 100% without cueing or OLIVIA. Sentence completion tasks were at 30% this date. She was able to complete a task with antonyms at 60% without cues. Cues with gesturing increased her score to 80%.      No overt s/s of aspiration observed with thin liquids via cup. Good hyolaryngeal elevation and mildly delayed pharyngeal swallow responses were noted. She was able to complete oral motor exercises and the effortful swallow when demonstration was provided. She continues to exhibit severe expressive and receptive aphasia, dysarthria,cognitive deficits, deficits in executive function and dysphagia. Recommend she continue speech therapy services to address these deficits. Recommend she remain on a dysphagia soft diet with thin liquids at this time. SLP will upgrade to regular when she demonstrates consistent awareness of oral residue. Recommended Diet and Intervention  Soft & Bite Sized consistencies   Thin liquids  Recommended Form of Meds: Crushed in puree as able  Dysphagia treatment  Therapeutic Interventions: Pharyngeal exercises;Diet tolerance monitoring;Oral care;Oral motor exercises; Patient/Family education; Therapeutic PO trials with SLP     Compensatory Swallowing Strategies  Compensatory Swallowing Strategies : Alternate solids and liquids;Eat/Feed slowly; No straws;Upright as possible for all oral intake;Remain upright for 30-45 minutes after meals;Small bites/sips; Check for pocketing of food on the Right; Check for pocketing of food on the Left; Set up assist;Assist feed              SHORT TERM GOAL #1:  Goal 1: Pt will complete y/n tasks with 80% accuracy and minimal verbal cues/modeling. SHORT TERM GOAL #2:  Goal 2: Pt will complete verbal expression tasks with 80% accuracy and minimal verbal cues/modeling. SHORT TERM GOAL #3:  Goal 3: Pt will complete receptive/expressive language tasks with 80% accuracy and minimal verbal cues/modeling. SHORT TERM GOAL #4:  Goal 4: Pt will follow one step commands with with 90% accuracy and minimal verbal cues/modeling. SHORT TERM GOAL #5:  Goal 5: Pt will complete orientation tasks with 90% accuracy and minimal verbal cues/modeling.      Swallowing Short Term Goals  Short-term Goals  Goal 1: Pt will tolerate soft & bite sized consistencies with mildly  (nectar) thick liquids with minimal overt s/s of aspiration/penetration during hospitalization. New Goal: Pt will tolerate soft and bite sized diet with thin liquids with minimal overt s/s of aspiration/penetration during hospitalization  Goal 2: Pt will complete Modified Barium Swallow Study. Discontinue goal  Goal 3: Pt will demonstrate awareness of general aspiration precautions. Goal 4: Pt will participate in swallowing reassessments to ensure safest diet consistencies. Goal 5: Pt will allow staff to complete daily oral care. Goal 6: Pt will complete oral motor exercises for lingual and labial strengthening. Long term goals  Pt will participate in skilled speech therapy services to ensure she is able to communicate her wants and needs. Pt will participate in skilled speech therapy services to ensure she is able to complete ADLs. Pt will tolerate least restrictive diet with minimal overt s/s of aspiration/penetration during hospitalization.                          ASSESSMENT:  Assessment: [x]Progressing towards goals          []Not Progressing towards goals    Patient Tolerance of Treatment:   [x]Tolerated well []Tolerated fair []Required rest breaks []Fatigued    Education:  Learner:  [x]Patient          []Significant Other          []Other  Education provided regarding:  [x]Goals and POC   []Diet and swallowing precautions    []Home Exercise Program  []Progress and/or discharge information  Method of Education:  [x]Discussion          []Demonstration          []Handout          []Other  Evaluation of Education:   [x]Verbalized understanding   []Demonstrates without assistance  []Demonstrates with assistance  []Needs further instruction     []No evidence of learning                  []No family present      Plan: [x]Continue with current plan of care    []Modify current plan of care as follows:    []Discharge patient    Discharge Location:    Services/Supervision Recommended:      [x]Patient continues to require treatment by a licensed therapist to address functional deficits as outlined in the established plan of care.                 Electronically Signed By:  Dae Masterson  12/14/2022,3:55 PM.

## 2022-12-14 NOTE — PLAN OF CARE
Problem: Discharge Planning  Goal: Discharge to home or other facility with appropriate resources  12/13/2022 2150 by Roseanne Acharya LPN  Outcome: Progressing  12/13/2022 0945 by Aundrea Bryan RN  Outcome: Progressing     Problem: Safety - Adult  Goal: Free from fall injury  12/13/2022 2150 by Roseanne Acharya LPN  Outcome: Manuela Labor (Taken 12/13/2022 2148)  Free From Fall Injury: Instruct family/caregiver on patient safety  12/13/2022 0945 by Aundrea Bryan RN  Outcome: Progressing     Problem: Neurosensory - Adult  Goal: Achieves stable or improved neurological status  12/13/2022 2150 by Roseanne Acharya LPN  Outcome: Progressing  12/13/2022 0945 by Aundrea Bryan RN  Outcome: Progressing  Goal: Achieves maximal functionality and self care  12/13/2022 2150 by Roseanne Acharya LPN  Outcome: Progressing  12/13/2022 0945 by Aundrea Bryan RN  Outcome: Progressing     Problem: Skin/Tissue Integrity - Adult  Goal: Skin integrity remains intact  12/13/2022 2150 by Roseanne Acharya LPN  Outcome: Progressing  12/13/2022 0945 by Aundrea Bryan RN  Outcome: Progressing     Problem: Pain  Goal: Verbalizes/displays adequate comfort level or baseline comfort level  12/13/2022 2150 by Roseanne Acharya LPN  Outcome: Progressing  12/13/2022 0945 by Aundrea Bryan RN  Outcome: Progressing     Problem: Chronic Conditions and Co-morbidities  Goal: Patient's chronic conditions and co-morbidity symptoms are monitored and maintained or improved  12/13/2022 2150 by Roseanne Acharya LPN  Outcome: Progressing  12/13/2022 0945 by Aundrea Bryan RN  Outcome: Progressing     Problem: ABCDS Injury Assessment  Goal: Absence of physical injury  12/13/2022 2150 by Roseanne Acharya LPN  Outcome: Progressing  12/13/2022 0945 by Aundrea Bryan RN  Outcome: Progressing     Problem: Skin/Tissue Integrity  Goal: Absence of new skin breakdown  Description: 1. Monitor for areas of redness and/or skin breakdown  2.   Assess vascular access sites hourly  3. Every 4-6 hours minimum:  Change oxygen saturation probe site  4. Every 4-6 hours:  If on nasal continuous positive airway pressure, respiratory therapy assess nares and determine need for appliance change or resting period. 12/13/2022 2150 by Nhan Jacobs LPN  Outcome: Progressing  12/13/2022 0945 by Bridger Mendieta RN  Outcome: Progressing     Problem: Confusion  Goal: Confusion, delirium, dementia, or psychosis is improved or at baseline  Description: INTERVENTIONS:  1. Assess for possible contributors to thought disturbance, including medications, impaired vision or hearing, underlying metabolic abnormalities, dehydration, psychiatric diagnoses, and notify attending LIP  2. [place] high risk fall precautions, as indicated  3. Provide frequent short contacts to provide reality reorientation, refocusing and direction  4. Decrease environmental stimuli, including noise as appropriate  5. Monitor and intervene to maintain adequate nutrition, hydration, elimination, sleep and activity  6. If unable to ensure safety without constant attention obtain sitter and review sitter guidelines with assigned personnel  7.  Initiate Psychosocial CNS and Spiritual Care consult, as indicated  12/13/2022 2150 by Nhan Jacobs LPN  Outcome: Progressing  12/13/2022 0945 by Bridger Mendieta RN  Outcome: Progressing     Problem: Musculoskeletal - Adult  Goal: Return mobility to safest level of function  12/13/2022 2150 by Nhan Jacobs LPN  Outcome: Progressing  12/13/2022 0945 by Bridger Mendieta RN  Outcome: Progressing  Goal: Return ADL status to a safe level of function  12/13/2022 2150 by Nhan Jacobs LPN  Outcome: Progressing  12/13/2022 0945 by Bridger Mendieta RN  Outcome: Progressing     Problem: Gastrointestinal - Adult  Goal: Maintains or returns to baseline bowel function  12/13/2022 2150 by Nhan Jacobs LPN  Outcome: Progressing  12/13/2022 0945 by Bridger Mendieta RN  Outcome: Progressing  Goal: Maintains adequate nutritional intake  12/13/2022 2150 by Mulu Gilbert LPN  Outcome: Progressing  12/13/2022 0945 by Oh Garg RN  Outcome: Progressing     Problem: Metabolic/Fluid and Electrolytes - Adult  Goal: Electrolytes maintained within normal limits  12/13/2022 2150 by Mulu Gilbert LPN  Outcome: Progressing  12/13/2022 0945 by Oh Garg RN  Outcome: Progressing     Problem: Nutrition Deficit:  Goal: Optimize nutritional status  12/13/2022 2150 by Mulu Gilbert LPN  Outcome: Progressing  12/13/2022 0945 by Oh Garg RN  Outcome: Progressing

## 2022-12-14 NOTE — PROGRESS NOTES
Occupational Therapy     12/14/22 1000   General   Timed Code Treatment Minutes 60 Minutes   Pre Treatment Pain Screening   Pain at present 0   Scale Used Faces   Intervention List Patient able to continue with treatment   General   Family / Caregiver Present Yes  (Pt. spouse.)   Balance   Sitting Balance Supervision   Functional Mobility   Functional - Mobility Device Wheelchair  (Electric w/c.)   Activity Retrieve items;Transport items; To/From therapy gym   Assist Level Minimal assistance   Functional Mobility Comments Assistance navigating small spaces and doorways, multiple collisions on R side. Does well in open spaces, vc and tactile cues for changing speed and turning device on/off. Transfers   Slide Board Minimal assistance;Contact guard assistance  (Min A from w/c and electric w/c to mat table, CGA from mat table.)   Assessment   Performance deficits / Impairments Decreased functional mobility ; Decreased endurance;Decreased coordination;Decreased ADL status; Decreased sensation;Decreased posture;Decreased ROM; Decreased balance;Decreased strength;Decreased vision/visual deficit; Decreased safe awareness;Decreased high-level IADLs;Decreased cognition;Decreased fine motor control   Treatment Diagnosis L MCA stroke   Activity Tolerance   Activity Tolerance Patient Tolerated treatment well   Occupational Therapy Plan   Current Treatment Recommendations Strengthening;ROM;Balance training;Functional mobility training; Endurance training;Neuromuscular re-education; Safety education & training;Patient/Caregiver education & training;Equipment evaluation, education, & procurement;Positioning;Modalities; Self-Care / ADL; Home management training;Sensory integration; Wheelchair mobility training

## 2022-12-14 NOTE — PROGRESS NOTES
[facility]  INPATIENT SPEECH THERAPY  [facility] 8 [facility] UNIT  TIME   1432  1049  21 MINUTES    [x]Daily Note  []Progress Note    Date: 2022  Patient Name: Adrianna Godfrey        MRN: [id number]    Account #: [de-identified]  : 1960  (58 y.o.)  Gender: female   Primary Provider: Tracie Guevara MD  Swallowing Status/Diet: Dysphagia soft and bite sized, thin liquids      Subjective:  Pt alert, cooperative, and upright in wheelchair.  present for tx. Objective:   reports pt ate a hamburger for lunch with no difficulties. He reports pt is not liking the hospital food. Will consider upgrading to easy to chew. Although pt is demonstrating more independent awareness of oral residue/pocketing, this is not consistent. Swallowing reassessed during tx. Consistencies presented regular solids with thin liquids via consecutive sips side of cup. Oral prep reveals decreased rotary mastication with regular solid consistencies. Oral transit timing ranges from 2-3 seconds with regular solid consistencies. No significant oral pocketing/residue observed. Oral transit is suspected to be 1 second or faster than 1 second with thin liquids. Laryngeal movement observed to be consistently sluggish and mild-moderately decreased for swallow airway protection. No overt s/s of aspiration/penetration observed with regular solids or thin liquids on this date. Education provided on utilizing lingual sweep frequently during and after meals/all P.O. trials. Strategy practiced after regular solid P.O. trials in therapy. Pt observed independently utilizing strategy after P.O. trials. Functional reading task completed through reading/identifying words on low tech AAC device (approximately 3x4 grid size). Pt is required to read at the word level given an image of the target word. Pt is able to complete one word reading task with approximately 45% accuracy at this time. Moderate phonemic cues provided.     Pt is unable to read orientation information on this date independently. Maximum phonemic cues required to complete this task. Confrontational naming task completed. Pt is required to identify single letters. Task completed with 20% accuracy. Pt required maximum phonemic cues to complete this task. Matching/identification task completed. Pt is given 2 columns of 10 letters of the alphabet. She is required to match a letter from column 1 to the same letter in column 2. Task completed independently with 100% accuracy. SLP will continue to follow and treat. Recommended Diet and Intervention  Soft & Bite Sized consistencies   Thin liquids  Recommended Form of Meds: Crushed in puree as able  Dysphagia treatment  Therapeutic Interventions: Pharyngeal exercises;Diet tolerance monitoring;Oral care;Oral motor exercises; Patient/Family education; Therapeutic PO trials with SLP     Compensatory Swallowing Strategies  Compensatory Swallowing Strategies : Alternate solids and liquids;Eat/Feed slowly; No straws;Upright as possible for all oral intake;Remain upright for 30-45 minutes after meals;Small bites/sips; Check for pocketing of food on the Right; Check for pocketing of food on the Left; Set up assist;Assist feed    SHORT TERM GOAL #1:  Goal 1: Pt will complete y/n tasks with 80% accuracy and minimal verbal cues/modeling. Not Met    SHORT TERM GOAL #2:  Goal 2: Pt will complete verbal expression tasks with 80% accuracy and minimal verbal cues/modeling. Not Met    SHORT TERM GOAL #3:  Goal 3: Pt will complete receptive/expressive language tasks with 80% accuracy and minimal verbal cues/modeling. Not Met    SHORT TERM GOAL #4:  Goal 4: Pt will follow one step commands with with 90% accuracy and minimal verbal cues/modeling. Not Met    SHORT TERM GOAL #5:  Goal 5: Pt will complete orientation tasks with 90% accuracy and minimal verbal cues/modeling.  Not Met Goal 6: The patient will complete automatic speech tasks, confrontational naming tasks, and 1 step commands wtih 80% accuracy with minimal verbal cues/modelinng. Not Met    Swallowing Short Term Goals  Short-term Goals  Goal 1: Pt will tolerate soft & bite sized consistencies with mildly  (nectar) thick liquids with minimal overt s/s of aspiration/penetration during hospitalization. Goal 2: Pt will complete Modified Barium Swallow Study. Goal 3: Pt will demonstrate awareness of general aspiration precautions. Goal 4: Pt will participate in swallowing reassessments to ensure safest diet consistencies. Goal 5: Pt will allow staff to complete daily oral care. Goal 6: Pt will complete oral motor exercises for lingual and labial strengthening. ASSESSMENT:  Assessment: [x]Progressing towards goals          []Not Progressing towards goals    Patient Tolerance of Treatment:   [x]Tolerated well []Tolerated fair []Required rest breaks []Fatigued    Education:  Learner:  [x]Patient          [x]Significant Other          []Other  Education provided regarding:  [x]Goals and POC   [x]Diet and swallowing precautions    []Home Exercise Program  []Progress and/or discharge information  Method of Education:  [x]Discussion          []Demonstration          []Handout          []Other  Evaluation of Education:   [x]Verbalized understanding   []Demonstrates without assistance  []Demonstrates with assistance  []Needs further instruction     []No evidence of learning                  []No family present      Plan: [x]Continue with current plan of care    []Modify current plan of care as follows:    []Discharge patient    Discharge Location:    Services/Supervision Recommended:      []Patient continues to require treatment by a licensed therapist to address functional deficits as outlined in the established plan of care.       Electronically signed by MARCELINO Paz on 12/14/2022 at 2:44 PM

## 2022-12-14 NOTE — PROGRESS NOTES
Patient:   Naveen Cordero  MR#:    [id number]   Room:    0826/826-02   YOB: 1960  Date of Progress Note: 12/14/2022  Time of Note                           7:44 AM  Consulting Physician:   Chico Porter M.D. Attending Physician:  Chico Porter MD     Chief complaint Acute ischemic stroke    S:This 58 y.o. female  with history of anxiety, depression, COPD, atherosclerotic disease, colitis, COPD, CVA, DM,  LE edema, hyperlipidemia, HTN, morbid obesity, RA, CAD  and venous thromboembolism. She presented to [facility] ER on 11/9/22 after being found at home lying facedown in her feces. She was very weak, confused, not able to speak but moving purposefully and intermittently following commands. Her last well know was 3 a.m. when her  left for work. Imaging done revealed a large MCA stroke 7.7 x 5 cm. CTA head/neck revealed an acute M1 occlusion. She was outside of the window for TPA. CK on admit was 1100, consistent with rhabdomylosis. She was also noted to possibly Dens fracture. She was transferred to [facility] for a possible thrombectomy. Further imaging was done at [facility] and she was deemed not a candidate for thrombectomy. MRI done ruled out Dens fracture. Repeat CT of head on 11/11/22 showed evolving left MCA territory infarct with increasing edema/mass effect resulting in 4mm of  rightward midline shift(previously 2mm) a the level of the third ventricle. No hemorrhagic conversion or definite trapping of the right lateral ventricle, possible small acute right cerebellar infarct. Neurosurgery was consulted for possible hemicraniectomy. She was seen by Dr. Arash Mishra, who didn't feel any surgical intervention was needed. Patient was found to have a UTI + for [facility] and was placed on Rocephin on 11/14/22. Patient had a PICC line placed. Patient was evaluated by SPT and initially made NPO d/t oropharyngeal dysphagia. NGT placed and feeding started.  She was also noted to have global aphasia but is improving. She was re-evaluated by SPT on 11/15/22 for swallow and was started on a dysphagia 1 diet with nectar thick liquids. Patient also continues to have right sided weakness and is participating in both PT/OT. Repeat CT head on 11/13/22 shows stable LMCA ischemic stroke with stable edema, 5 mm shift, no hemorrhage. She is felt to need a stay on Rehab for continued PT/OT/SPT and medical management to work towards her goal of returning home with her . She is now felt ready to start the Rehab program.  No new issues overnight. REVIEW OF SYSTEMS:  Unreliable due to aphasia     Past Medical History:      Diagnosis Date    Anxiety     ASCVD (arteriosclerotic cardiovascular disease)     Carrier of group B Streptococcus     Cellulitis     Colitis     COPD (chronic obstructive pulmonary disease) (formerly [facility])     Costochondritis     CVA (cerebral vascular accident) ([address]     Depression     Edema     Fracture of sternum     non union post surgery.      Gallstones     Grief reaction     H/O superior vena cava filter placement     Hyperlipidemia     Hypertension     MI (myocardial infarction) ([address]     MRSA (methicillin resistant Staphylococcus aureus)     Neuropathy     Obesity     Pseudarthrosis sternal    RA (rheumatoid arthritis) ([address]     Rosacea     Sinus bradycardia     TIA (transient ischemic attack)     Venous thromboembolism        Past Surgical History:      Procedure Laterality Date    ADENOIDECTOMY      APPENDECTOMY      CARDIAC CATHETERIZATION  3/2009    CARDIAC CATHETERIZATION  11/5/14  JDT    EF 50%, patent bypass grafts    CHOLECYSTECTOMY  2011    COLONOSCOPY  06/03/2010    Dr. Poly Sanabria: distal colon having ulcerative lesions c/w UC    COLONOSCOPY  2009    pancolitis    COLONOSCOPY      CORONARY ARTERY BYPASS GRAFT  3/2009    PHANI Whitfield to LAD, SVGs to OM & PDA    GASTRIC BYPASS SURGERY  2012    HIATAL HERNIA REPAIR  2011    HYSTERECTOMY (CERVIX STATUS UNKNOWN)      KNEE SURGERY      MO COLONOSCOPY FLX DX W/COLLJ SPEC WHEN PFRMD N/A 3/12/2018    Dr Ryan Ferro rectal inflammation consistent with U.C.-Active proctitis--3 yr recall    [facility] ENDOSCOPY  2010    Dr. Carline Helms  2012       Medications in Hospital:      Current Facility-Administered Medications:     bisacodyl (DULCOLAX) EC tablet 5 mg, 5 mg, Oral, Daily, Bekah Phillips MD, 5 mg at 12/09/22 0748    miconazole (MICOTIN) 2 % powder, , Topical, BID, Bekah Phillips MD, Given at 12/13/22 2040    docusate sodium (COLACE) capsule 100 mg, 100 mg, Oral, BID, Bekah Phillips MD, 100 mg at 12/13/22 2039    aspirin chewable tablet 81 mg, 81 mg, Oral, Daily, Bekah Phillips MD, 81 mg at 12/13/22 0820    atorvastatin (LIPITOR) tablet 40 mg, 40 mg, Oral, Nightly, Bekah Phillips MD, 40 mg at 12/13/22 2040    dulaglutide (TRULICITY) SC injection 1.5 mg (Patient Supplied), 1.5 mg, SubCUTAneous, Weekly, Bekah Phillips MD    folic acid (FOLVITE) tablet 1 mg, 1 mg, Oral, Daily, Bekah Phillips MD, 1 mg at 12/13/22 0820    gabapentin (NEURONTIN) capsule 300 mg, 300 mg, Oral, Nightly, Bekah Phillips MD, 300 mg at 12/13/22 2040    hydroxychloroquine (PLAQUENIL) tablet 200 mg, 200 mg, Oral, BID, Bekah Phillips MD, 200 mg at 12/13/22 2039    amLODIPine (NORVASC) tablet 2.5 mg, 2.5 mg, Oral, Daily, Bekah Phillips MD, 2.5 mg at 12/13/22 0820    tiZANidine (ZANAFLEX) tablet 4 mg, 4 mg, Oral, BID PRN, Bekah Phillips MD    multivitamin 1 tablet, 1 tablet, Oral, Daily, Bekah Phillips MD, 1 tablet at 12/13/22 0820    acetaminophen (TYLENOL) tablet 650 mg, 650 mg, Oral, Q4H PRN, Bekah Phillips MD, 650 mg at 11/27/22 1933    enoxaparin (LOVENOX) injection 40 mg, 40 mg, SubCUTAneous, Daily, Bekah Phillips MD, 40 mg at 12/13/22 1653    polyethylene glycol (GLYCOLAX) packet 17 g, 17 g, Oral, Daily PRN, Bekah Phillips MD, 17 g at 11/30/22 1817    Allergies:  Methotrexate derivatives    Social History:   TOBACCO:   reports that she quit smoking about 13 years ago. Her smoking use included cigarettes. She has a 64.00 pack-year smoking history. She has never used smokeless tobacco.  ETOH:   reports current alcohol use. Family History:       Problem Relation Age of Onset    Prostate Cancer Father     Heart Failure Father     Cancer Father     Colon Cancer Neg Hx     Colon Polyps Neg Hx     Liver Cancer Neg Hx     Liver Disease Neg Hx     Esophageal Cancer Neg Hx     Rectal Cancer Neg Hx     Stomach Cancer Neg Hx          PHYSICAL EXAM:  /64   Pulse 57   Temp 96.8 °F (36 °C) (Temporal)   Resp 18   Ht 5' 7\" (1.702 m)   Wt 205 lb (93 kg)   SpO2 96%   BMI 32.11 kg/m²     Constitutional - well developed, well nourished. Eyes - conjunctiva normal.   Ear, nose, throat - No scars, masses, or lesions over external nose or ears, no atrophy of tongue  Neck-symmetric, no masses noted, no jugular vein distension  Respiration- chest wall appears symmetric, good expansion,   normal effort without use of accessory muscles  Musculoskeletal - no significant wasting of muscles noted, no bony deformities  Extremities-no clubbing, cyanosis or edema  Skin - warm, dry, and intact. No rash, erythema, or pallor. Psychiatric - mood, affect, and behavior appear normal.      Neurological exam  Awake, alert, global aphasia says \"yes\" to all questions asked   Attention and concentration appear appropriate  Recent and remote memory unable to tests     Cranial Nerve Exam     CN III, IV,VI-EOMI, No nystagmus, conjugate eye movements, no ptosis    CN VII-+ facial assymetry       Motor Exam  No movement on the right      Tremors- no tremors in hands or head noted     Gait  Not tested     Nursing/pcp notes, imaging,labs and vitals reviewed.      PT,OT and/or speech notes reviewed    Lab Results   Component Value Date    WBC 7.2 12/12/2022    HGB 10.6 (L) 12/12/2022    HCT 34.6 (L) 12/12/2022    MCV 79.7 (L) 12/12/2022     12/12/2022     Lab Results   Component Value Date     12/12/2022    K 3.9 12/12/2022     12/12/2022    CO2 24 12/12/2022    BUN 13 12/12/2022    CREATININE 0.5 12/12/2022    GLUCOSE 76 12/12/2022    CALCIUM 8.8 12/12/2022    PROT 5.1 (L) 12/12/2022    LABALBU 3.1 (L) 12/12/2022    BILITOT <0.2 12/12/2022    ALKPHOS 80 12/12/2022    AST 21 12/12/2022    ALT 18 12/12/2022    LABGLOM >60 12/12/2022   No results found for: INR, PROTIME    Mendez Hickey PTA   Physical Therapist Assistant   Physical Therapy   Progress Notes       Cosign Needed   Date of Service:  12/13/2022 11:27 AM                 Cosign Needed                                                                       Name: Nyoka School  MRN: [id number]  Date of Service:  12/13/2022 12/13/22 1000   Restrictions/Precautions   Restrictions/Precautions Modified Diet;Swallowing - Thickened Liquids; Fall Risk;Aspiration Risk;Weight Bearing   Required Braces or Orthoses? Yes   Lower Extremity Weight Bearing Restrictions   Right Lower Extremity Weight Bearing Weight Bearing As Tolerated   Left Lower Extremity Weight Bearing Weight Bearing As Tolerated   General   Chart Reviewed Yes   Patient assessed for rehabilitation services? Yes   Additional Pertinent Hx Anxiety, depression, COPD, CVA, DM, LE edema, hyperlipidemia, HTN, obesity, RA, CAD and venous thromboembolism   Diagnosis CVA, R hemiparesis   General Comment   Comments CO-TX with PART[facility]   Subjective   Subjective Pt laying in bed, agrees to participat in therapy.    Bed mobility   Supine to Sit Minimal assistance;Contact guard assistance   Scooting Contact guard assistance  (On EOB)   Bed Mobility Comments On L side of bed- use of L bedrail   Transfers   Sit to Stand Contact guard assistance;Stand by assistance  (In //)   Stand to Sit Contact guard assistance  (In //)   Lateral Transfers Contact guard assistance  (EOB<w/c w/ SB from pt L)   Ambulation   WB Status WBAT Ambulation   Surface Level tile   Device Parallel Bars   Other Apparatus AFO; Wheelchair follow;Right   Assistance Moderate assistance;Maximum assistance  (Max A for advancing RLE)   Quality of Gait insufficient wt shift to L side with inability to advance R LE or lock knee to accept wt, blocking to knee during wt bearing to allow advancement of L LE   Gait Deviations Decreased step length;Decreased step height   Distance 2', 8'   Comments Pt presents with agitation and does not want staff holding onto gait belt. Pt gait quality decreased due to atttempt to amb. fast (at times leaving RLE behind) and agitation. PT Exercises   Exercise Treatment Sitting in w/c: RLE PROM DF/PF X 20 and LLE AROM DF/PF X 20   Assessment   Assessment Pt presents with agitation and frustrated due to difficulty communicating. She sometimes becomes emotional, repeating \"birthday\" (possibly someone from patient's past). Pt able to  // with LUE pulling requiring CGA/SBA and able to amb up to 2' then 8'. Safety Devices   Type of Devices Patient at risk for falls;Gait belt;Left in chair;Chair alarm in place;Call light within reach  (In w/c)            Electronically signed by Preeti Grullon PTA on 12/13/2022 at 11:27 AM                 RECORD REVIEW: Previous medical records, medications were reviewed at today's visit    IMPRESSION:   1. Acute ischemic stroke-on aspirin/statin  2. Hypertension-on medications monitor  3. Hyperlipidemia-on statin  4. Diabetes-Trulicity-monitor blood sugar  5. DVT prophylaxis-Lovenox  6. History of coronary artery disease-aspirin/statin  7. Neuropathy-on Neurontin  8. Rheumatoid arthritis-on Plaquenil  9. COPD-monitor  10. History of anxiety and depression-monitor  11. History of colitis/gallstones-monitor  12. History of bariatric surgery-on multivitamin/folic acid  13.   History of superior vena cava filter placement with venous thromboembolism-not on anticoagulation-monitor- indicates took herself off blood thinners as not like meds and was bruising   14.   PT/OT/speech    x-ray of right ankle no acute changes  Venous ultrasound of leg no DVT    Continue current care    ELOS pending 2 weeks       Expected duration and frequency therapy: 180 minutes per day, 5 days per week    [address] [facility]  Dr Thong Dior

## 2022-12-14 NOTE — PLAN OF CARE
Problem: Discharge Planning  Goal: Discharge to home or other facility with appropriate resources  12/14/2022 1029 by Marvin Linares LPN  Outcome: Progressing  Flowsheets (Taken 12/14/2022 9060)  Discharge to home or other facility with appropriate resources: Refer to discharge planning if patient needs post-hospital services based on physician order or complex needs related to functional status, cognitive ability or social support system  12/13/2022 2150 by Roma Hernandez LPN  Outcome: Progressing     Problem: Safety - Adult  Goal: Free from fall injury  12/14/2022 1029 by Marvin Linares LPN  Outcome: Progressing  12/13/2022 2150 by Roma Hernandez LPN  Outcome: Progressing  Flowsheets (Taken 12/13/2022 2148)  Free From Fall Injury: Instruct family/caregiver on patient safety     Problem: Neurosensory - Adult  Goal: Achieves stable or improved neurological status  12/14/2022 1029 by Marvin Linares LPN  Outcome: Progressing  Flowsheets (Taken 12/14/2022 0821)  Achieves stable or improved neurological status: Assess for and report changes in neurological status  12/13/2022 2150 by Roma Hernandez LPN  Outcome: Progressing  Goal: Achieves maximal functionality and self care  12/14/2022 1029 by Marvin Linares LPN  Outcome: Progressing  12/13/2022 2150 by Roma Hernandez LPN  Outcome: Progressing     Problem: Skin/Tissue Integrity - Adult  Goal: Skin integrity remains intact  12/14/2022 1029 by Marvin Linares LPN  Outcome: Progressing  Flowsheets (Taken 12/14/2022 1008)  Skin Integrity Remains Intact: Monitor for areas of redness and/or skin breakdown  12/13/2022 2150 by Roma Hernandez LPN  Outcome: Progressing     Problem: Pain  Goal: Verbalizes/displays adequate comfort level or baseline comfort level  12/14/2022 1029 by Marvin Linares LPN  Outcome: Progressing  12/13/2022 2150 by Roma Hernandez LPN  Outcome: Progressing     Problem: Chronic Conditions and Co-morbidities  Goal: Patient's chronic conditions and co-morbidity symptoms are monitored and maintained or improved  12/14/2022 1029 by Brandon Barber LPN  Outcome: Progressing  Flowsheets (Taken 12/14/2022 0821)  Care Plan - Patient's Chronic Conditions and Co-Morbidity Symptoms are Monitored and Maintained or Improved: Monitor and assess patient's chronic conditions and comorbid symptoms for stability, deterioration, or improvement  12/13/2022 2150 by Sandee Obrien LPN  Outcome: Progressing     Problem: ABCDS Injury Assessment  Goal: Absence of physical injury  12/14/2022 1029 by Brandon Barber LPN  Outcome: Progressing  12/13/2022 2150 by Sandee Obrien LPN  Outcome: Progressing     Problem: Skin/Tissue Integrity  Goal: Absence of new skin breakdown  Description: 1. Monitor for areas of redness and/or skin breakdown  2. Assess vascular access sites hourly  3. Every 4-6 hours minimum:  Change oxygen saturation probe site  4. Every 4-6 hours:  If on nasal continuous positive airway pressure, respiratory therapy assess nares and determine need for appliance change or resting period. 12/14/2022 1029 by Brandon Barber LPN  Outcome: Progressing  12/13/2022 2150 by Sandee Obrien LPN  Outcome: Progressing     Problem: Confusion  Goal: Confusion, delirium, dementia, or psychosis is improved or at baseline  Description: INTERVENTIONS:  1. Assess for possible contributors to thought disturbance, including medications, impaired vision or hearing, underlying metabolic abnormalities, dehydration, psychiatric diagnoses, and notify attending LIP  2. [place] high risk fall precautions, as indicated  3. Provide frequent short contacts to provide reality reorientation, refocusing and direction  4. Decrease environmental stimuli, including noise as appropriate  5. Monitor and intervene to maintain adequate nutrition, hydration, elimination, sleep and activity  6.  If unable to ensure safety without constant attention obtain sitter and review sitter guidelines with assigned personnel  7.  Initiate Psychosocial CNS and Spiritual Care consult, as indicated  12/14/2022 1029 by Walt Saleh LPN  Outcome: Progressing  12/13/2022 2150 by Vernell Brennan LPN  Outcome: Progressing     Problem: Musculoskeletal - Adult  Goal: Return mobility to safest level of function  12/14/2022 1029 by Walt Saleh LPN  Outcome: Progressing  Flowsheets (Taken 12/14/2022 0821)  Return Mobility to Safest Level of Function: Assess patient stability and activity tolerance for standing, transferring and ambulating with or without assistive devices  12/13/2022 2150 by Vernell Brennan LPN  Outcome: Progressing  Goal: Return ADL status to a safe level of function  12/14/2022 1029 by Walt Saleh LPN  Outcome: Progressing  Flowsheets (Taken 12/14/2022 0821)  Return ADL Status to a Safe Level of Function: Assist and instruct patient to increase activity and self care as tolerated  12/13/2022 2150 by Vernell Brennan LPN  Outcome: Progressing     Problem: Gastrointestinal - Adult  Goal: Maintains or returns to baseline bowel function  12/14/2022 1029 by Walt Saleh LPN  Outcome: Jose Manuel Mater (Taken 12/14/2022 0821)  Maintains or returns to baseline bowel function: Assess bowel function  12/13/2022 2150 by Vernell Brennan LPN  Outcome: Progressing  Goal: Maintains adequate nutritional intake  12/14/2022 1029 by Walt Saleh LPN  Outcome: Progressing  Flowsheets (Taken 12/14/2022 0821)  Maintains adequate nutritional intake: Monitor percentage of each meal consumed  12/13/2022 2150 by Vernell Brennan LPN  Outcome: Progressing     Problem: Metabolic/Fluid and Electrolytes - Adult  Goal: Electrolytes maintained within normal limits  12/14/2022 1029 by Walt Saleh LPN  Outcome: Progressing  Flowsheets (Taken 12/14/2022 0821)  Electrolytes maintained within normal limits: Monitor labs and assess patient for signs and symptoms of electrolyte imbalances  12/13/2022 2150 by Nhan Jacobs LPN  Outcome: Progressing     Problem: Nutrition Deficit:  Goal: Optimize nutritional status  12/14/2022 1029 by Kathlyne Siemens, LPN  Outcome: Progressing  12/13/2022 2150 by Nhan Jacobs LPN  Outcome: Progressing

## 2022-12-14 NOTE — PROGRESS NOTES
Physical Therapy  Name: Val Carney  MRN:  [id number]  Date of service:  12/14/2022 12/14/22 1100   Restrictions/Precautions   Restrictions/Precautions Modified Diet;Swallowing - Thickened Liquids; Fall Risk;Aspiration Risk;Weight Bearing   Lower Extremity Weight Bearing Restrictions   Right Lower Extremity Weight Bearing Weight Bearing As Tolerated   Left Lower Extremity Weight Bearing Weight Bearing As Tolerated   General   Diagnosis CVA, R hemiparesis   General Comment   Comments in R [facility] post OT session   Subjective   Subjective Pt seems to be in good spirits and agreeable to therapy. Subjective   Subjective denies   Transfers   Sit to Stand Minimal Assistance;Contact guard assistance  (in // bars pulling  up)   Stand to Sit Contact guard assistance  (in // bars)   Lateral Transfers Contact guard assistance  (WC<>mat table level surface w/o SB)   Ambulation   WB Status WBAT   Ambulation   Surface Level tile   Device Parallel Bars   Other Apparatus AFO; Wheelchair follow;Right  (shoe cover on R)   Assistance Minimal assistance; Moderate assistance  (more for R LE management than posture/balance)   Quality of Gait insufficient wt shift to L side with inability to advance R LE without assist   Gait Deviations Decreased step height   Distance 12'   Comments less need for assist to advance R LE compared to 12/12 (shifting wt better to allow for advancement)   Ambulation 2   Surface - 2 level tile   Device 2 Parallel Bars   Other Apparatus 2 Right; Wheelchair follow;AFO  (shoe cover on R LE)   Assistance 2 Moderate assistance;Minimal assistance  (more for R LE management than balance/posture)   Quality of Gait 2 as above   Gait Deviations Decreased step height   Distance 12'   Ambulation 3   Surface - 3 level tile   Device 3 Parallel Bars   Assistance 3 Moderate assistance;Minimal assistance  (more for R LE management than balance/posture)   Quality of Gait 3 as above   Gait Deviations Decreased step height   Distance 12'   Comments lowered bars slightly which allowed for wt shifting more to L and offloading R to advance but cont to need assist to do so   PT Exercises   PROM Exercises supine mat ex R LE with facilitation techniques with no active motion noted: saq   A/AROM Exercises A marching L LE in hooklying while passively marching R LE x 15; hooklying: B bridging x 10, leg press R LE x 10 trace motion noted, HS curls R LE from saq position with motion noted; L sidelying R hip/knee flex x 10 with AA   Resistive Exercises L LE mat ex with manual resist x 15: hs, saq, hs curls from saq position, leg press; hooklying: hip abd/add x 15 with manual resist on L side to promote carryover on R with active motion noted; hooklying LTR B x 10 working on keep LE's adducted AA-A on R   Assessment   Assessment Pt showing some active motion with R LE hip/knee in modified positions on mat (ie L sidelying to perform R hip/knee flex). Pt shows improved stability and overall quality of gt in // bars requiring less physical assist. Progress with wt shifting allowing R LE to move more easily but still cannot advance independently. Safety Devices   Type of Devices Chair alarm in place;Call light within reach; Left in chair   PT Individual Minutes   Time In 1100   Time Out 1200   Minutes 60       Electronically signed by Shonda Tillman PTA on 12/14/2022 at 4:15 PM

## 2022-12-15 LAB
ALBUMIN SERPL-MCNC: 3 G/DL (ref 3.5–5.2)
ALP BLD-CCNC: 84 U/L (ref 35–104)
ALT SERPL-CCNC: 18 U/L (ref 5–33)
ANION GAP SERPL CALCULATED.3IONS-SCNC: 12 MMOL/L (ref 7–19)
AST SERPL-CCNC: 22 U/L (ref 5–32)
BASOPHILS ABSOLUTE: 0.1 K/UL (ref 0–0.2)
BASOPHILS RELATIVE PERCENT: 1.4 % (ref 0–1)
BILIRUB SERPL-MCNC: <0.2 MG/DL (ref 0.2–1.2)
BUN BLDV-MCNC: 11 MG/DL (ref 8–23)
CALCIUM SERPL-MCNC: 9 MG/DL (ref 8.8–10.2)
CHLORIDE BLD-SCNC: 107 MMOL/L (ref 98–111)
CO2: 23 MMOL/L (ref 22–29)
CREAT SERPL-MCNC: 0.5 MG/DL (ref 0.5–0.9)
EOSINOPHILS ABSOLUTE: 0.8 K/UL (ref 0–0.6)
EOSINOPHILS RELATIVE PERCENT: 8.9 % (ref 0–5)
GFR SERPL CREATININE-BSD FRML MDRD: >60 ML/MIN/{1.73_M2}
GLUCOSE BLD-MCNC: 80 MG/DL (ref 74–109)
HCT VFR BLD CALC: 34.8 % (ref 37–47)
HEMOGLOBIN: 10.7 G/DL (ref 12–16)
IMMATURE GRANULOCYTES #: 0.1 K/UL
LYMPHOCYTES ABSOLUTE: 2 K/UL (ref 1.1–4.5)
LYMPHOCYTES RELATIVE PERCENT: 23.1 % (ref 20–40)
MCH RBC QN AUTO: 24.2 PG (ref 27–31)
MCHC RBC AUTO-ENTMCNC: 30.7 G/DL (ref 33–37)
MCV RBC AUTO: 78.6 FL (ref 81–99)
MONOCYTES ABSOLUTE: 0.9 K/UL (ref 0–0.9)
MONOCYTES RELATIVE PERCENT: 10.5 % (ref 0–10)
NEUTROPHILS ABSOLUTE: 4.9 K/UL (ref 1.5–7.5)
NEUTROPHILS RELATIVE PERCENT: 55.2 % (ref 50–65)
PDW BLD-RTO: 16.7 % (ref 11.5–14.5)
PLATELET # BLD: 373 K/UL (ref 130–400)
PMV BLD AUTO: 9.3 FL (ref 9.4–12.3)
POTASSIUM REFLEX MAGNESIUM: 4.1 MMOL/L (ref 3.5–5)
RBC # BLD: 4.43 M/UL (ref 4.2–5.4)
SODIUM BLD-SCNC: 142 MMOL/L (ref 136–145)
TOTAL PROTEIN: 5 G/DL (ref 6.6–8.7)
WBC # BLD: 8.8 K/UL (ref 4.8–10.8)

## 2022-12-15 PROCEDURE — 97116 GAIT TRAINING THERAPY: CPT

## 2022-12-15 PROCEDURE — 36415 COLL VENOUS BLD VENIPUNCTURE: CPT

## 2022-12-15 PROCEDURE — 92507 TX SP LANG VOICE COMM INDIV: CPT

## 2022-12-15 PROCEDURE — 99232 SBSQ HOSP IP/OBS MODERATE 35: CPT | Performed by: PSYCHIATRY & NEUROLOGY

## 2022-12-15 PROCEDURE — 80053 COMPREHEN METABOLIC PANEL: CPT

## 2022-12-15 PROCEDURE — 85025 COMPLETE CBC W/AUTO DIFF WBC: CPT

## 2022-12-15 PROCEDURE — 97530 THERAPEUTIC ACTIVITIES: CPT

## 2022-12-15 PROCEDURE — 97110 THERAPEUTIC EXERCISES: CPT

## 2022-12-15 PROCEDURE — 92526 ORAL FUNCTION THERAPY: CPT

## 2022-12-15 PROCEDURE — 1180000000 HC REHAB R&B

## 2022-12-15 PROCEDURE — 6370000000 HC RX 637 (ALT 250 FOR IP): Performed by: PSYCHIATRY & NEUROLOGY

## 2022-12-15 PROCEDURE — 6360000002 HC RX W HCPCS: Performed by: PSYCHIATRY & NEUROLOGY

## 2022-12-15 RX ADMIN — HYDROXYCHLOROQUINE SULFATE 200 MG: 200 TABLET, FILM COATED ORAL at 20:15

## 2022-12-15 RX ADMIN — GABAPENTIN 300 MG: 300 CAPSULE ORAL at 20:14

## 2022-12-15 RX ADMIN — THERA TABS 1 TABLET: TAB at 08:21

## 2022-12-15 RX ADMIN — ATORVASTATIN CALCIUM 40 MG: 40 TABLET, FILM COATED ORAL at 20:15

## 2022-12-15 RX ADMIN — FOLIC ACID 1 MG: 1 TABLET ORAL at 08:21

## 2022-12-15 RX ADMIN — ASPIRIN 81 MG 81 MG: 81 TABLET ORAL at 08:21

## 2022-12-15 RX ADMIN — ENOXAPARIN SODIUM 40 MG: 100 INJECTION SUBCUTANEOUS at 17:06

## 2022-12-15 RX ADMIN — HYDROXYCHLOROQUINE SULFATE 200 MG: 200 TABLET, FILM COATED ORAL at 08:21

## 2022-12-15 RX ADMIN — DOCUSATE SODIUM 100 MG: 100 CAPSULE, LIQUID FILLED ORAL at 08:21

## 2022-12-15 RX ADMIN — DOCUSATE SODIUM 100 MG: 100 CAPSULE, LIQUID FILLED ORAL at 20:17

## 2022-12-15 RX ADMIN — MICONAZOLE NITRATE: 2 POWDER TOPICAL at 08:22

## 2022-12-15 RX ADMIN — AMLODIPINE BESYLATE 2.5 MG: 2.5 TABLET ORAL at 08:21

## 2022-12-15 ASSESSMENT — PAIN SCALES - GENERAL
PAINLEVEL_OUTOF10: 0
PAINLEVEL_OUTOF10: 0

## 2022-12-15 NOTE — PROGRESS NOTES
Patient:   Adrianna Godfrey  MR#:    [id number]   Room:    0826/826-02   YOB: 1960  Date of Progress Note: 12/15/2022  Time of Note                           7:49 AM  Consulting Physician:   Tracie Guevara M.D. Attending Physician:  Tracie Guevara MD     Chief complaint Acute ischemic stroke    S:This 58 y.o. female  with history of anxiety, depression, COPD, atherosclerotic disease, colitis, COPD, CVA, DM,  LE edema, hyperlipidemia, HTN, morbid obesity, RA, CAD  and venous thromboembolism. She presented to [facility] on 11/9/22 after being found at home lying facedown in her feces. She was very weak, confused, not able to speak but moving purposefully and intermittently following commands. Her last well know was 3 a.m. when her  left for work. Imaging done revealed a large MCA stroke 7.7 x 5 cm. CTA head/neck revealed an acute M1 occlusion. She was outside of the window for TPA. CK on admit was 1100, consistent with rhabdomylosis. She was also noted to possibly Dens fracture. She was transferred to [facility] for a possible thrombectomy. Further imaging was done at [facility] and she was deemed not a candidate for thrombectomy. MRI done ruled out Dens fracture. Repeat CT of head on 11/11/22 showed evolving left MCA territory infarct with increasing edema/mass effect resulting in 4mm of  rightward midline shift(previously 2mm) a the level of the third ventricle. No hemorrhagic conversion or definite trapping of the right lateral ventricle, possible small acute right cerebellar infarct. Neurosurgery was consulted for possible hemicraniectomy. She was seen by Dr. Sage Escobar, who didn't feel any surgical intervention was needed. Patient was found to have a UTI + for [facility] and was placed on Rocephin on 11/14/22. Patient had a PICC line placed. Patient was evaluated by SPT and initially made NPO d/t oropharyngeal dysphagia. NGT placed and feeding started.  She was also noted to have global aphasia but is improving. She was re-evaluated by SPT on 11/15/22 for swallow and was started on a dysphagia 1 diet with nectar thick liquids. Patient also continues to have right sided weakness and is participating in both PT/OT. Repeat CT head on 11/13/22 shows stable LMCA ischemic stroke with stable edema, 5 mm shift, no hemorrhage. She is felt to need a stay on Rehab for continued PT/OT/SPT and medical management to work towards her goal of returning home with her . She is now felt ready to start the Rehab program.  No acute issues overnight. REVIEW OF SYSTEMS:  Unreliable due to aphasia     Past Medical History:      Diagnosis Date    Anxiety     ASCVD (arteriosclerotic cardiovascular disease)     Carrier of group B Streptococcus     Cellulitis     Colitis     COPD (chronic obstructive pulmonary disease) ([facility])     Costochondritis     CVA (cerebral vascular accident) ([address]     Depression     Edema     Fracture of sternum     non union post surgery.      Gallstones     Grief reaction     H/O superior vena cava filter placement     Hyperlipidemia     Hypertension     MI (myocardial infarction) ([address]     MRSA (methicillin resistant Staphylococcus aureus)     Neuropathy     Obesity     Pseudarthrosis sternal    RA (rheumatoid arthritis) ([address]     Rosacea     Sinus bradycardia     TIA (transient ischemic attack)     Venous thromboembolism        Past Surgical History:      Procedure Laterality Date    ADENOIDECTOMY      APPENDECTOMY      CARDIAC CATHETERIZATION  3/2009    CARDIAC CATHETERIZATION  11/5/14  JDT    EF 50%, patent bypass grafts    CHOLECYSTECTOMY  2011    COLONOSCOPY  06/03/2010    Dr. Poly Sanabria: distal colon having ulcerative lesions c/w UC    COLONOSCOPY  2009    pancolitis    COLONOSCOPY      CORONARY ARTERY BYPASS GRAFT  3/2009    PHANI Whitfield to LAD, SVGs to OM & PDA    GASTRIC BYPASS SURGERY  2012    HIATAL HERNIA REPAIR  2011    HYSTERECTOMY (CERVIX STATUS UNKNOWN)      KNEE SURGERY      OR COLONOSCOPY FLX DX W/COLLJ SPEC WHEN PFRMD N/A 3/12/2018    Dr Stahl Counter rectal inflammation consistent with U.C.-Active proctitis--3 yr recall    [facility] ENDOSCOPY  2010    Dr. Deidra Church  2012       Medications in Hospital:      Current Facility-Administered Medications:     bisacodyl (DULCOLAX) EC tablet 5 mg, 5 mg, Oral, Daily, Xavi Beltran MD, 5 mg at 12/09/22 0748    miconazole (MICOTIN) 2 % powder, , Topical, BID, Xavi Beltran MD, Given at 12/14/22 0808    docusate sodium (COLACE) capsule 100 mg, 100 mg, Oral, BID, Xavi Beltran MD, 100 mg at 12/14/22 2056    aspirin chewable tablet 81 mg, 81 mg, Oral, Daily, Xavi Beltran MD, 81 mg at 12/14/22 0807    atorvastatin (LIPITOR) tablet 40 mg, 40 mg, Oral, Nightly, Xavi Beltran MD, 40 mg at 12/14/22 2056    dulaglutide (TRULICITY) SC injection 1.5 mg (Patient Supplied), 1.5 mg, SubCUTAneous, Weekly, Xavi Beltran MD    folic acid (FOLVITE) tablet 1 mg, 1 mg, Oral, Daily, Xavi Beltran MD, 1 mg at 12/14/22 0807    gabapentin (NEURONTIN) capsule 300 mg, 300 mg, Oral, Nightly, Xavi Beltran MD, 300 mg at 12/14/22 2056    hydroxychloroquine (PLAQUENIL) tablet 200 mg, 200 mg, Oral, BID, Xavi Beltran MD, 200 mg at 12/14/22 2056    amLODIPine (NORVASC) tablet 2.5 mg, 2.5 mg, Oral, Daily, Xavi Beltran MD, 2.5 mg at 12/14/22 0807    tiZANidine (ZANAFLEX) tablet 4 mg, 4 mg, Oral, BID PRN, Xavi Beltran MD    multivitamin 1 tablet, 1 tablet, Oral, Daily, Xavi Beltran MD, 1 tablet at 12/14/22 0807    acetaminophen (TYLENOL) tablet 650 mg, 650 mg, Oral, Q4H PRN, Xavi Beltran MD, 650 mg at 11/27/22 1933    enoxaparin (LOVENOX) injection 40 mg, 40 mg, SubCUTAneous, Daily, Xavi Beltran MD, 40 mg at 12/14/22 1710    polyethylene glycol (GLYCOLAX) packet 17 g, 17 g, Oral, Daily PRN, Xavi Beltran MD, 17 g at 11/30/22 1817    Allergies:  Methotrexate derivatives    Social History:   TOBACCO:   reports that she quit smoking about 13 years ago. Her smoking use included cigarettes. She has a 64.00 pack-year smoking history. She has never used smokeless tobacco.  ETOH:   reports current alcohol use. Family History:       Problem Relation Age of Onset    Prostate Cancer Father     Heart Failure Father     Cancer Father     Colon Cancer Neg Hx     Colon Polyps Neg Hx     Liver Cancer Neg Hx     Liver Disease Neg Hx     Esophageal Cancer Neg Hx     Rectal Cancer Neg Hx     Stomach Cancer Neg Hx          PHYSICAL EXAM:  /72   Pulse 54   Temp (!) 96.6 °F (35.9 °C) (Temporal)   Resp 16   Ht 5' 7\" (1.702 m)   Wt 205 lb (93 kg)   SpO2 93%   BMI 32.11 kg/m²     Constitutional - well developed, well nourished. Eyes - conjunctiva normal.   Ear, nose, throat - No scars, masses, or lesions over external nose or ears, no atrophy of tongue  Neck-symmetric, no masses noted, no jugular vein distension  Respiration- chest wall appears symmetric, good expansion,   normal effort without use of accessory muscles  Musculoskeletal - no significant wasting of muscles noted, no bony deformities  Extremities-no clubbing, cyanosis or edema  Skin - warm, dry, and intact. No rash, erythema, or pallor. Psychiatric - mood, affect, and behavior appear normal.      Neurological exam  Awake, alert, global aphasia says \"yes\" to all questions asked   Attention and concentration appear appropriate  Recent and remote memory unable to tests     Cranial Nerve Exam     CN III, IV,VI-EOMI, No nystagmus, conjugate eye movements, no ptosis    CN VII-+ facial assymetry       Motor Exam  No movement on the right      Tremors- no tremors in hands or head noted     Gait  Not tested     Nursing/pcp notes, imaging,labs and vitals reviewed.      PT,OT and/or speech notes reviewed    Lab Results   Component Value Date    WBC 8.8 12/15/2022    HGB 10.7 (L) 12/15/2022    HCT 34.8 (L) 12/15/2022    MCV 78.6 (L) 12/15/2022     12/15/2022     Lab Results   Component Value Date     12/15/2022    K 4.1 12/15/2022     12/15/2022    CO2 23 12/15/2022    BUN 11 12/15/2022    CREATININE 0.5 12/15/2022    GLUCOSE 80 12/15/2022    CALCIUM 9.0 12/15/2022    PROT 5.0 (L) 12/15/2022    LABALBU 3.0 (L) 12/15/2022    BILITOT <0.2 12/15/2022    ALKPHOS 84 12/15/2022    AST 22 12/15/2022    ALT 18 12/15/2022    LABGLOM >60 12/15/2022   No results found for: INR, PROTIME    Thang Mak PTA   Physical Therapist Assistant   Physical Therapy   Progress Notes       Cosign Needed   Date of Service:  12/13/2022 11:27 AM                 Cosign Needed                                                                       Name: Abel Fleming  MRN: [id number]  Date of Service:  12/13/2022 12/13/22 1000   Restrictions/Precautions   Restrictions/Precautions Modified Diet;Swallowing - Thickened Liquids; Fall Risk;Aspiration Risk;Weight Bearing   Required Braces or Orthoses? Yes   Lower Extremity Weight Bearing Restrictions   Right Lower Extremity Weight Bearing Weight Bearing As Tolerated   Left Lower Extremity Weight Bearing Weight Bearing As Tolerated   General   Chart Reviewed Yes   Patient assessed for rehabilitation services? Yes   Additional Pertinent Hx Anxiety, depression, COPD, CVA, DM, LE edema, hyperlipidemia, HTN, obesity, RA, CAD and venous thromboembolism   Diagnosis CVA, R hemiparesis   General Comment   Comments CO-TX with PARTIDA   Subjective   Subjective Pt laying in bed, agrees to participat in therapy.    Bed mobility   Supine to Sit Minimal assistance;Contact guard assistance   Scooting Contact guard assistance  (On EOB)   Bed Mobility Comments On L side of bed- use of L bedrail   Transfers   Sit to Stand Contact guard assistance;Stand by assistance  (In //)   Stand to Sit Contact guard assistance  (In //)   Lateral Transfers Contact guard assistance  (EOB<w/c w/ SB from pt L)   Ambulation   WB Status WBAT   Ambulation   Surface Level tile   Device Parallel Bars   Other Apparatus AFO; Wheelchair follow;Right   Assistance Moderate assistance;Maximum assistance  (Max A for advancing RLE)   Quality of Gait insufficient wt shift to L side with inability to advance R LE or lock knee to accept wt, blocking to knee during wt bearing to allow advancement of L LE   Gait Deviations Decreased step length;Decreased step height   Distance 2', 8'   Comments Pt presents with agitation and does not want staff holding onto gait belt. Pt gait quality decreased due to atttempt to amb. fast (at times leaving RLE behind) and agitation. PT Exercises   Exercise Treatment Sitting in w/c: RLE PROM DF/PF X 20 and LLE AROM DF/PF X 20   Assessment   Assessment Pt presents with agitation and frustrated due to difficulty communicating. She sometimes becomes emotional, repeating \"birthday\" (possibly someone from patient's past). Pt able to  // with LUE pulling requiring CGA/SBA and able to amb up to 2' then 8'. Safety Devices   Type of Devices Patient at risk for falls;Gait belt;Left in chair;Chair alarm in place;Call light within reach  (In w/c)            Electronically signed by Toni Rojas PTA on 12/13/2022 at 11:27 AM                 RECORD REVIEW: Previous medical records, medications were reviewed at today's visit    IMPRESSION:   1. Acute ischemic stroke-on aspirin/statin  2. Hypertension-on medications monitor  3. Hyperlipidemia-on statin  4. Diabetes-Trulicity-monitor blood sugar  5. DVT prophylaxis-Lovenox  6. History of coronary artery disease-aspirin/statin  7. Neuropathy-on Neurontin  8. Rheumatoid arthritis-on Plaquenil  9. COPD-monitor  10. History of anxiety and depression-monitor  11. History of colitis/gallstones-monitor  12. History of bariatric surgery-on multivitamin/folic acid  13.   History of superior vena cava filter placement with venous thromboembolism-not on anticoagulation-monitor- indicates took herself off blood thinners as not like meds and was bruising   14.   PT/OT/speech    x-ray of right ankle no acute changes  Venous ultrasound of leg no DVT    Continue present care    ELOS pending 2 weeks       Expected duration and frequency therapy: 180 minutes per day, 5 days per week    [address] [facility]  Dr Landon Serrato

## 2022-12-15 NOTE — PROGRESS NOTES
[facility]  INPATIENT SPEECH THERAPY  [facility] REHAB UNIT      [x]Daily Note  []Progress Note    Date: 12/15/2022  Patient Name: Pradip Millan        MRN: [id number]    Account #: [de-identified]  : 1960  (64 y.o.)  Gender: female   Primary Provider: Jac Rivera MD  Diet: Dysphagia soft and bite sized, thin liquids        PATIENT DIAGNOSIS(ES):    Diagnosis: CVA, R hemiparesis     Additional Pertinent Hx: Anxiety, depression, COPD, CVA, DM, LE edema, hyperlipidemia, HTN, obesity, RA, CAD and venous thromboembolism     RESTRICTIONS/PRECAUTIONS:    Restrictions/Precautions  Restrictions/Precautions: Modified Diet, Swallowing - Fall Risk, Aspiration Risk  Required Braces or Orthoses?: No           Subjective: The patient was upright in her wheelchair. Her  and daughter were present for today's session. Objective: Today she exhibited unmasticated bread during her lunch with her daughter. Her daughter provided cues to clear the oral cavity. She did use a finger sweep, however, do question if she became distracted while eating downstairs. Did discuss with the patient that she needs to follow instructions provided by her  and daughter if she forgets to clear the oral cavity. She will need to use a lingual or finger sweep to clear the food. The patient was also encouraged to consider allowing her  and daughter to complete some expressive and receptive language tasks with her over the weekend. Especially since she will not have scheduled therapy time. Therapist can provide a list of activities for them to complete with her. Tasks for expressive language were targeted. Naming pictures was at 33% without cues. With phonemic cues she was at 100%. Melodic intonation therapy was utilized and she was able to complete phrases and sentences for immediate wants and needs with repetition provided. After a few examples were provided she was able to repeat the phrases and sentences at 80%.  She was able to sing You Are My Sunshine and produced approximately 50% of the lyrics/words correctly. Repetition of 3, 4, and 5 syllable words was at 90% without cueing or OLIVIA. Sentence completion tasks were at 80% this date without cues. She continues to exhibit severe expressive and receptive aphasia, dysarthria,cognitive deficits, deficits in executive function and dysphagia. Recommend she continue speech therapy services to address these deficits. Recommend she remain on a dysphagia soft diet with thin liquids at this time. SLP will upgrade to regular when she demonstrates consistent awareness of oral residue. Recommended Diet and Intervention  Soft & Bite Sized consistencies   Thin liquids  Recommended Form of Meds: Crushed in puree as able  Dysphagia treatment  Therapeutic Interventions: Pharyngeal exercises;Diet tolerance monitoring;Oral care;Oral motor exercises; Patient/Family education; Therapeutic PO trials with SLP     Compensatory Swallowing Strategies  Compensatory Swallowing Strategies : Alternate solids and liquids;Eat/Feed slowly; No straws;Upright as possible for all oral intake;Remain upright for 30-45 minutes after meals;Small bites/sips; Check for pocketing of food on the Right; Check for pocketing of food on the Left; Set up assist;Assist feed              SHORT TERM GOAL #1:  Goal 1: Pt will complete y/n tasks with 80% accuracy and minimal verbal cues/modeling. SHORT TERM GOAL #2:  Goal 2: Pt will complete verbal expression tasks with 80% accuracy and minimal verbal cues/modeling. SHORT TERM GOAL #3:  Goal 3: Pt will complete receptive/expressive language tasks with 80% accuracy and minimal verbal cues/modeling. SHORT TERM GOAL #4:  Goal 4: Pt will follow one step commands with with 90% accuracy and minimal verbal cues/modeling. SHORT TERM GOAL #5:  Goal 5: Pt will complete orientation tasks with 90% accuracy and minimal verbal cues/modeling.      Swallowing Short Term Goals  Short-term Goals  Goal 1: Pt will tolerate soft & bite sized consistencies with mildly  (nectar) thick liquids with minimal overt s/s of aspiration/penetration during hospitalization. New Goal: Pt will tolerate soft and bite sized diet with thin liquids with minimal overt s/s of aspiration/penetration during hospitalization  Goal 2: Pt will complete Modified Barium Swallow Study. Discontinue goal  Goal 3: Pt will demonstrate awareness of general aspiration precautions. Goal 4: Pt will participate in swallowing reassessments to ensure safest diet consistencies. Goal 5: Pt will allow staff to complete daily oral care. Goal 6: Pt will complete oral motor exercises for lingual and labial strengthening. Long term goals  Pt will participate in skilled speech therapy services to ensure she is able to communicate her wants and needs. Pt will participate in skilled speech therapy services to ensure she is able to complete ADLs. Pt will tolerate least restrictive diet with minimal overt s/s of aspiration/penetration during hospitalization.            ASSESSMENT:  Assessment: [x]Progressing towards goals          []Not Progressing towards goals    Patient Tolerance of Treatment:   [x]Tolerated well []Tolerated fair []Required rest breaks []Fatigued    Education:  Learner:  [x]Patient          [x]Significant Other          [x]Other  Education provided regarding:  [x]Goals and POC   []Diet and swallowing precautions    []Home Exercise Program  []Progress and/or discharge information  Method of Education:  [x]Discussion          []Demonstration          []Handout          []Other  Evaluation of Education:   []Verbalized understanding   []Demonstrates without assistance  []Demonstrates with assistance  [x]Needs further instruction     []No evidence of learning                  []No family present      Plan: [x]Continue with current plan of care    []Modify current plan of care as follows:    []Discharge patient    Discharge Location:    Services/Supervision Recommended:      [x]Patient continues to require treatment by a licensed therapist to address functional deficits as outlined in the established plan of care.          Electronically Signed By:  Jose Parmar  12/15/2022,1:50 PM.

## 2022-12-15 NOTE — PROGRESS NOTES
Physical Therapy  Name: Dana Coello  MRN:  [id number]  Date of service:  12/15/2022       12/15/22 1100   Restrictions/Precautions   Restrictions/Precautions Modified Diet;Swallowing - Thickened Liquids; Fall Risk;Aspiration Risk;Weight Bearing   Lower Extremity Weight Bearing Restrictions   Right Lower Extremity Weight Bearing Weight Bearing As Tolerated   Left Lower Extremity Weight Bearing Weight Bearing As Tolerated   General Comment   Comments in [facility] post OT session with dtr present   Subjective   Subjective Dtr eager for pt to be able to transfer using stand pivot technique. Says she is here more for observation. Pt pleasant and cooperative with staff, but seems frustrated/irritable with dtr. Transfers   Sit to Stand Moderate Assistance;Minimal Assistance  (STS pulling up in // bars with min, STS pushing up from R [facility] 23 to // bars or HW with min-mod@)   Stand to Sit Contact guard assistance;Minimal Assistance  (min-cg@ from [facility] and cg@ in // bars)   Ambulation   WB Status WBAT   Ambulation   Surface Level tile   Device Parallel Bars   Other Apparatus AFO; Wheelchair follow;Right  (shoe cover on R)   Assistance Minimal assistance; Moderate assistance  (more for R LE management than posture/balance)   Quality of Gait insufficient wt shift diagonally (to L and post)in order to advance R LE without assist   Gait Deviations Decreased step height   Distance 12' x 3   Comments demonstrated and cued (vc's and tactile) for diagonal wt shift in order to advance R LE   PT Exercises   PROM Exercises laq x 2/5 working on concentric and eccentric activation but unable to achieve   A/AROM Exercises L LE exercised more for demonstration and to ensure pt is understanding desired motion   Standing Open/Closed Kinetic Chain Exercises terminal knee ext with AA x 10 standing at [facility], STS 2 x 5 working on pushing up from [facility] 23 and using optimal body mechanics to rise with controlled return to chair, standing at [facility] x 3 in front of mirror working on upright posture and terminal knee extension R side   Assessment   Assessment Pt cont to show gradual progress with mobility. Able to demonstrate STS using push up from [facility] rather than pull up to bar. Trying to incorporate diagonal wt shift (L lean with post shift) to promote automatic R LE advancement. Pt gave good effort and shows potential with transition to [facility] as wt shifting/R LE advancement improves.    Safety Devices   Type of Devices Chair alarm in place  (in hallway propelling WC with dtr supervision/assist)   PT Individual Minutes   Time In 1100   Time Out 1200   Minutes 60       Electronically signed by Kevin Reyes PTA on 12/15/2022 at 3:58 PM

## 2022-12-15 NOTE — PROGRESS NOTES
Occupational Therapy     12/15/22 1000   General   Timed Code Treatment Minutes 60 Minutes   Pre Treatment Pain Screening   Pain at present 0   Scale Used Faces   Intervention List Patient able to continue with treatment   General   Family / Caregiver Present Yes  (Pt. daughter.)   Balance   Sitting Balance Supervision   Standing Balance Minimal assistance   Functional Mobility   Functional - Mobility Device Wheelchair  (Electric)   Activity Retrieve items;Transport items; To/From therapy gym   Assist Level Minimal assistance   Functional Mobility Comments In halls, OT gym, and ADL apartment. Completed simple homemaking tasks. Min A to correct collisions on R side when in small environments. Transfers   Slide Board Minimal assistance;Contact guard assistance  (Bed to electric w/c, electric w/c to mat table, mat table to w/c.)   Sit to stand Minimal assistance   Stand to sit Minimal assistance   Type of ROM/Therapeutic Exercise   Type of ROM/Therapeutic Exercise AAROM;PROM   Comment RUE extension of elbow, wrist and fingers. Assessment   Performance deficits / Impairments Decreased functional mobility ; Decreased endurance;Decreased coordination;Decreased ADL status; Decreased sensation;Decreased posture;Decreased ROM; Decreased balance;Decreased strength;Decreased vision/visual deficit; Decreased safe awareness;Decreased high-level IADLs;Decreased cognition;Decreased fine motor control   Treatment Diagnosis L MCA stroke   Activity Tolerance   Activity Tolerance Patient Tolerated treatment well   Occupational Therapy Plan   Current Treatment Recommendations Strengthening;ROM;Balance training;Functional mobility training; Endurance training;Neuromuscular re-education; Safety education & training;Patient/Caregiver education & training;Equipment evaluation, education, & procurement;Positioning;Modalities; Self-Care / ADL; Home management training;Sensory integration; Wheelchair mobility training

## 2022-12-15 NOTE — PLAN OF CARE
Problem: Discharge Planning  Goal: Discharge to home or other facility with appropriate resources  12/15/2022 1016 by Liz Jha LPN  Outcome: Progressing  Flowsheets (Taken 12/15/2022 2344)  Discharge to home or other facility with appropriate resources: Refer to discharge planning if patient needs post-hospital services based on physician order or complex needs related to functional status, cognitive ability or social support system  12/14/2022 2305 by Benoit Foss RN  Outcome: Progressing  Flowsheets (Taken 12/14/2022 2250)  Discharge to home or other facility with appropriate resources: Refer to discharge planning if patient needs post-hospital services based on physician order or complex needs related to functional status, cognitive ability or social support system     Problem: Safety - Adult  Goal: Free from fall injury  12/15/2022 1016 by Liz Jha LPN  Outcome: Progressing  12/14/2022 2305 by Benoit Foss RN  Outcome: Progressing  Flowsheets (Taken 12/14/2022 2304)  Free From Fall Injury: Instruct family/caregiver on patient safety     Problem: Neurosensory - Adult  Goal: Achieves stable or improved neurological status  12/15/2022 1016 by Liz Jha LPN  Outcome: Progressing  Flowsheets (Taken 12/15/2022 0826)  Achieves stable or improved neurological status: Assess for and report changes in neurological status  12/14/2022 2305 by Benoit Foss RN  Outcome: Progressing  Flowsheets (Taken 12/14/2022 2250)  Achieves stable or improved neurological status: Assess for and report changes in neurological status  Goal: Achieves maximal functionality and self care  12/15/2022 1016 by Liz Jha LPN  Outcome: Progressing  Flowsheets (Taken 12/15/2022 0826)  Achieves maximal functionality and self care: Encourage and assist patient to increase activity and self care with guidance from physical therapy/occupational therapy  12/14/2022 2305 by Benoit Foss RN  Outcome: Progressing  Flowsheets (Taken 12/14/2022 2250)  Achieves maximal functionality and self care: Monitor swallowing and airway patency with patient fatigue and changes in neurological status     Problem: Skin/Tissue Integrity - Adult  Goal: Skin integrity remains intact  12/15/2022 1016 by Maryjo Israel LPN  Outcome: Progressing  Flowsheets (Taken 12/15/2022 1004)  Skin Integrity Remains Intact: Monitor for areas of redness and/or skin breakdown  12/14/2022 2305 by Darby Correa RN  Outcome: Progressing  Flowsheets  Taken 12/14/2022 2304  Skin Integrity Remains Intact: Monitor for areas of redness and/or skin breakdown  Taken 12/14/2022 2250  Skin Integrity Remains Intact: Monitor for areas of redness and/or skin breakdown     Problem: Pain  Goal: Verbalizes/displays adequate comfort level or baseline comfort level  12/15/2022 1016 by Maryjo Israel LPN  Outcome: Progressing  12/14/2022 2305 by Darby Correa RN  Outcome: Progressing     Problem: Chronic Conditions and Co-morbidities  Goal: Patient's chronic conditions and co-morbidity symptoms are monitored and maintained or improved  12/15/2022 1016 by Maryjo Israel LPN  Outcome: Progressing  Flowsheets (Taken 12/15/2022 0826)  Care Plan - Patient's Chronic Conditions and Co-Morbidity Symptoms are Monitored and Maintained or Improved: Monitor and assess patient's chronic conditions and comorbid symptoms for stability, deterioration, or improvement  12/14/2022 2305 by Darby Correa RN  Outcome: Progressing  Flowsheets (Taken 12/14/2022 2250)  Care Plan - Patient's Chronic Conditions and Co-Morbidity Symptoms are Monitored and Maintained or Improved: Monitor and assess patient's chronic conditions and comorbid symptoms for stability, deterioration, or improvement     Problem: ABCDS Injury Assessment  Goal: Absence of physical injury  12/15/2022 1016 by Maryjo Israel LPN  Outcome: Progressing  12/14/2022 2305 by Ronald Castillo Dolores Leon RN  Outcome: Progressing  Flowsheets (Taken 12/14/2022 2304)  Absence of Physical Injury: Implement safety measures based on patient assessment     Problem: Skin/Tissue Integrity  Goal: Absence of new skin breakdown  Description: 1. Monitor for areas of redness and/or skin breakdown  2. Assess vascular access sites hourly  3. Every 4-6 hours minimum:  Change oxygen saturation probe site  4. Every 4-6 hours:  If on nasal continuous positive airway pressure, respiratory therapy assess nares and determine need for appliance change or resting period. 12/15/2022 1016 by Kathlyne Siemens, LPN  Outcome: Progressing  12/14/2022 2305 by Leonid Bourne RN  Outcome: Progressing     Problem: Confusion  Goal: Confusion, delirium, dementia, or psychosis is improved or at baseline  Description: INTERVENTIONS:  1. Assess for possible contributors to thought disturbance, including medications, impaired vision or hearing, underlying metabolic abnormalities, dehydration, psychiatric diagnoses, and notify attending LIP  2. [place] high risk fall precautions, as indicated  3. Provide frequent short contacts to provide reality reorientation, refocusing and direction  4. Decrease environmental stimuli, including noise as appropriate  5. Monitor and intervene to maintain adequate nutrition, hydration, elimination, sleep and activity  6. If unable to ensure safety without constant attention obtain sitter and review sitter guidelines with assigned personnel  7.  Initiate Psychosocial CNS and Spiritual Care consult, as indicated  12/15/2022 1016 by Kathlyne Siemens, LPN  Outcome: Progressing  12/14/2022 2305 by Leonid Bourne RN  Outcome: Progressing     Problem: Musculoskeletal - Adult  Goal: Return mobility to safest level of function  12/15/2022 1016 by Kathlyne Siemens, LPN  Outcome: Progressing  Flowsheets (Taken 12/15/2022 0826)  Return Mobility to Safest Level of Function: Assess patient stability and activity tolerance for standing, transferring and ambulating with or without assistive devices  12/14/2022 2305 by Shanae Triplett RN  Outcome: Progressing  Flowsheets (Taken 12/14/2022 2250)  Return Mobility to Safest Level of Function: Assess patient stability and activity tolerance for standing, transferring and ambulating with or without assistive devices  Goal: Return ADL status to a safe level of function  12/15/2022 1016 by Eligio Victor LPN  Outcome: Progressing  Flowsheets (Taken 12/15/2022 0826)  Return ADL Status to a Safe Level of Function: Assist and instruct patient to increase activity and self care as tolerated  12/14/2022 2305 by Shanae Triplett RN  Outcome: Progressing     Problem: Gastrointestinal - Adult  Goal: Maintains or returns to baseline bowel function  12/15/2022 1016 by Eligio Victor LPN  Outcome: Progressing  Flowsheets (Taken 12/15/2022 0826)  Maintains or returns to baseline bowel function: Assess bowel function  12/14/2022 2305 by Shanae Triplett RN  Outcome: Progressing  Flowsheets (Taken 12/14/2022 2250)  Maintains or returns to baseline bowel function: Assess bowel function  Goal: Maintains adequate nutritional intake  12/15/2022 1016 by Eligio Victor LPN  Outcome: Progressing  Flowsheets (Taken 12/15/2022 0826)  Maintains adequate nutritional intake: Monitor percentage of each meal consumed  12/14/2022 2305 by Shanae Triplett RN  Outcome: Progressing  Flowsheets (Taken 12/14/2022 2250)  Maintains adequate nutritional intake: Monitor percentage of each meal consumed     Problem: Metabolic/Fluid and Electrolytes - Adult  Goal: Electrolytes maintained within normal limits  12/15/2022 1016 by Eligio Victor LPN  Outcome: Progressing  Flowsheets (Taken 12/15/2022 0826)  Electrolytes maintained within normal limits: Monitor labs and assess patient for signs and symptoms of electrolyte imbalances  12/14/2022 2305 by Shanae Triplett RN  Outcome: Progressing  Flowsheets (Taken 12/14/2022 2250)  Electrolytes maintained within normal limits: Monitor labs and assess patient for signs and symptoms of electrolyte imbalances     Problem: Nutrition Deficit:  Goal: Optimize nutritional status  12/15/2022 1016 by Anu Wray LPN  Outcome: Progressing  12/14/2022 2305 by Arnaud Barraza RN  Outcome: Progressing

## 2022-12-15 NOTE — PLAN OF CARE
Problem: Discharge Planning  Goal: Discharge to home or other facility with appropriate resources  12/14/2022 2305 by Benoit Foss RN  Outcome: Progressing  Flowsheets (Taken 12/14/2022 2250)  Discharge to home or other facility with appropriate resources: Refer to discharge planning if patient needs post-hospital services based on physician order or complex needs related to functional status, cognitive ability or social support system  12/14/2022 1029 by Liz Jha LPN  Outcome: Progressing  Flowsheets (Taken 12/14/2022 5339)  Discharge to home or other facility with appropriate resources: Refer to discharge planning if patient needs post-hospital services based on physician order or complex needs related to functional status, cognitive ability or social support system     Problem: Neurosensory - Adult  Goal: Achieves stable or improved neurological status  12/14/2022 2305 by Benoit Foss RN  Outcome: Progressing  Flowsheets (Taken 12/14/2022 2250)  Achieves stable or improved neurological status: Assess for and report changes in neurological status  12/14/2022 1029 by Liz Jha LPN  Outcome: Progressing  Flowsheets (Taken 12/14/2022 0821)  Achieves stable or improved neurological status: Assess for and report changes in neurological status  Goal: Achieves maximal functionality and self care  12/14/2022 2305 by Benoit Foss RN  Outcome: Progressing  Flowsheets (Taken 12/14/2022 2250)  Achieves maximal functionality and self care: Monitor swallowing and airway patency with patient fatigue and changes in neurological status  12/14/2022 1029 by Liz Jha LPN  Outcome: Progressing

## 2022-12-15 NOTE — PROGRESS NOTES
[facility]  INPATIENT SPEECH THERAPY  [facility] 8 [facility] UNIT  TIME   1400  6502  11 MINUTES    [x]Daily Note  []Progress Note    Date: 12/15/2022  Patient Name: Pradip Millan        MRN: [id number]    Account #: [de-identified]  : 1960  (58 y.o.)  Gender: female   Primary Provider: Jac Rivera MD  Swallowing Status/Diet: Dysphagia soft and bite sized, thin liquids      Subjective:  Pt alert, cooperative, and upright in wheelchair.  and daughter present for tx. Objective:    Sequencing/organizational task completed. Pt is provided a 4-5 letter word on a white board. Pt is required to read the word aloud. Maximum phonemic cues required to read aloud word, as she was unable to read at the word level independently. After reading, letters for word were rearranged. Pt was required to sequence the letters in the order in which they occur to make the word. Pt would complete this task independently with 100% accuracy. Functional reading task completed. Pt is required to identify single 3-4 letter words. Task completed with 30% accuracy. Pt required maximum phonemic cues to complete this task. Matching/identification task completed. Pt is given 2 columns of 5 photos. Pt is required to match a photo on the left column to the same column on the right. Task completed independently with 100% accuracy. Confrontational naming task completed. Pt is provided images of 5 common objects and is required to elicit what the objects are. Task completed with 33% accuracy. Pt required maximum phonemic cues to complete this task. Daughter reports pt ate a subway sandwich for lunch on this date. Daughter reports pt was impulsive throughout meal; however, with 1x verbal cues, pt knew to utilize lingual sweep. Swallowing reassessed during tx. Consistencies presented regular solids with thin liquids via consecutive sips side of cup. Oral prep reveals decreased rotary mastication with regular solid consistencies.  Oral transit timing ranges from 2-3 seconds with regular solid consistencies. No significant oral pocketing/residue observed. Oral transit is suspected to be 1 second or faster than 1 second with thin liquids. Laryngeal movement observed to be consistently sluggish and mild-moderately decreased for swallow airway protection. No overt s/s of aspiration/penetration observed with regular solids or thin liquids on this date. SLP will continue to follow and treat. Recommended Diet and Intervention  Soft & Bite Sized consistencies   Thin liquids  Recommended Form of Meds: Crushed in puree as able  Dysphagia treatment  Therapeutic Interventions: Pharyngeal exercises;Diet tolerance monitoring;Oral care;Oral motor exercises; Patient/Family education; Therapeutic PO trials with SLP     Compensatory Swallowing Strategies  Compensatory Swallowing Strategies : Alternate solids and liquids;Eat/Feed slowly; No straws;Upright as possible for all oral intake;Remain upright for 30-45 minutes after meals;Small bites/sips; Check for pocketing of food on the Right; Check for pocketing of food on the Left; Set up assist;Assist feed    SHORT TERM GOAL #1:  Goal 1: Pt will complete y/n tasks with 80% accuracy and minimal verbal cues/modeling. Not Met    SHORT TERM GOAL #2:  Goal 2: Pt will complete verbal expression tasks with 80% accuracy and minimal verbal cues/modeling. Not Met    SHORT TERM GOAL #3:  Goal 3: Pt will complete receptive/expressive language tasks with 80% accuracy and minimal verbal cues/modeling. Not Met    SHORT TERM GOAL #4:  Goal 4: Pt will follow one step commands with with 90% accuracy and minimal verbal cues/modeling. Not Met    SHORT TERM GOAL #5:  Goal 5: Pt will complete orientation tasks with 90% accuracy and minimal verbal cues/modeling. Not Met Goal 6: The patient will complete automatic speech tasks, confrontational naming tasks, and 1 step commands wtih 80% accuracy with minimal verbal cues/modelinng.  Not Met    Swallowing Short Term Goals  Short-term Goals  Goal 1: Pt will tolerate soft & bite sized consistencies with mildly  (nectar) thick liquids with minimal overt s/s of aspiration/penetration during hospitalization. Goal 2: Pt will complete Modified Barium Swallow Study. Goal 3: Pt will demonstrate awareness of general aspiration precautions. Goal 4: Pt will participate in swallowing reassessments to ensure safest diet consistencies. Goal 5: Pt will allow staff to complete daily oral care. Goal 6: Pt will complete oral motor exercises for lingual and labial strengthening. ASSESSMENT:  Assessment: [x]Progressing towards goals          []Not Progressing towards goals    Patient Tolerance of Treatment:   [x]Tolerated well []Tolerated fair []Required rest breaks []Fatigued    Education:  Learner:  [x]Patient          []Significant Other          []Other  Education provided regarding:  [x]Goals and POC   []Diet and swallowing precautions    []Home Exercise Program  []Progress and/or discharge information  Method of Education:  [x]Discussion          []Demonstration          []Handout          []Other  Evaluation of Education:   [x]Verbalized understanding   []Demonstrates without assistance  []Demonstrates with assistance  []Needs further instruction     []No evidence of learning                  []No family present      Plan: [x]Continue with current plan of care    []Modify current plan of care as follows:    []Discharge patient    Discharge Location:    Services/Supervision Recommended:      []Patient continues to require treatment by a licensed therapist to address functional deficits as outlined in the established plan of care.       Electronically signed by MARCELINO Dial on 12/15/2022 at 3:06 PM  '

## 2022-12-16 LAB
ORGANISM: ABNORMAL
ORGANISM: ABNORMAL
URINE CULTURE, ROUTINE: ABNORMAL

## 2022-12-16 PROCEDURE — 92526 ORAL FUNCTION THERAPY: CPT

## 2022-12-16 PROCEDURE — 94760 N-INVAS EAR/PLS OXIMETRY 1: CPT

## 2022-12-16 PROCEDURE — 97110 THERAPEUTIC EXERCISES: CPT

## 2022-12-16 PROCEDURE — 6370000000 HC RX 637 (ALT 250 FOR IP): Performed by: PSYCHIATRY & NEUROLOGY

## 2022-12-16 PROCEDURE — 1180000000 HC REHAB R&B

## 2022-12-16 PROCEDURE — 6360000002 HC RX W HCPCS: Performed by: PSYCHIATRY & NEUROLOGY

## 2022-12-16 PROCEDURE — 97116 GAIT TRAINING THERAPY: CPT

## 2022-12-16 PROCEDURE — 92507 TX SP LANG VOICE COMM INDIV: CPT

## 2022-12-16 PROCEDURE — 97530 THERAPEUTIC ACTIVITIES: CPT

## 2022-12-16 PROCEDURE — 97535 SELF CARE MNGMENT TRAINING: CPT

## 2022-12-16 PROCEDURE — 99232 SBSQ HOSP IP/OBS MODERATE 35: CPT | Performed by: PSYCHIATRY & NEUROLOGY

## 2022-12-16 RX ADMIN — HYDROXYCHLOROQUINE SULFATE 200 MG: 200 TABLET, FILM COATED ORAL at 08:06

## 2022-12-16 RX ADMIN — ENOXAPARIN SODIUM 40 MG: 100 INJECTION SUBCUTANEOUS at 16:40

## 2022-12-16 RX ADMIN — HYDROXYCHLOROQUINE SULFATE 200 MG: 200 TABLET, FILM COATED ORAL at 20:24

## 2022-12-16 RX ADMIN — ATORVASTATIN CALCIUM 40 MG: 40 TABLET, FILM COATED ORAL at 20:24

## 2022-12-16 RX ADMIN — MICONAZOLE NITRATE: 2 POWDER TOPICAL at 20:26

## 2022-12-16 RX ADMIN — DOCUSATE SODIUM 100 MG: 100 CAPSULE, LIQUID FILLED ORAL at 08:06

## 2022-12-16 RX ADMIN — AMLODIPINE BESYLATE 2.5 MG: 2.5 TABLET ORAL at 08:06

## 2022-12-16 RX ADMIN — MICONAZOLE NITRATE: 2 POWDER TOPICAL at 08:30

## 2022-12-16 RX ADMIN — FOLIC ACID 1 MG: 1 TABLET ORAL at 08:06

## 2022-12-16 RX ADMIN — THERA TABS 1 TABLET: TAB at 08:06

## 2022-12-16 RX ADMIN — GABAPENTIN 300 MG: 300 CAPSULE ORAL at 20:24

## 2022-12-16 RX ADMIN — ASPIRIN 81 MG 81 MG: 81 TABLET ORAL at 08:06

## 2022-12-16 RX ADMIN — BISACODYL 5 MG: 5 TABLET, COATED ORAL at 08:06

## 2022-12-16 NOTE — PROGRESS NOTES
[facility]  INPATIENT SPEECH THERAPY  [facility] 8 REHAB UNIT  TIME   Time In: 0930  Time Out: 1000  Minutes: 30  Time In: 1300  Time Out: 1330  Minutes: 30       [x]Daily Note  []Progress Note    Date: 2022  Patient Name: Nerissa Le        MRN: [id number]    Account #: [de-identified]  : 1960  (58 y.o.)  Gender: female   Primary Provider: Nicolasa Truong MD  Diet: Dysphagia soft and bite sized, thin liquids        PATIENT DIAGNOSIS(ES):    Diagnosis: CVA, R hemiparesis     Additional Pertinent Hx: Anxiety, depression, COPD, CVA, DM, LE edema, hyperlipidemia, HTN, obesity, RA, CAD and venous thromboembolism     RESTRICTIONS/PRECAUTIONS:    Restrictions/Precautions  Restrictions/Precautions: Modified Diet, Swallowing - Fall Risk, Aspiration Risk  Required Braces or Orthoses?: No           Subjective: The patient was upright in bed. She appeared frustrated this morning. Objective:  She has been reciting the [place]  [employer] the [employer] prayer. Today, these were provided in writing and she was able to say 100% of the words to the pledge without any cueing. She was able to say approximately 70% of the [employer] prayer while reading from the written version. The lyrics were provided to You Are My [place]. She was able to produce 70% of the words. Tasks for receptive language were completed. Today she was able to reading one word commands at 20% with cues, without any cues at 30%, and with a model at 100%. Tasks for expressive language were targeted. Sentence completion tasks were at 100% today without any cues. Naming pictures was at 80% without cueing. Phonemic cues were only necessary for a few pictures this date. She was presented with thin liquids and no overt s/s of aspiration were observed. Her pitcher straw was removed and a small straw utilized instead. Clear voicing was noted after all sips. Mildly delayed swallow responses and decreased hyolaryngeal elevation were noted.  Today she completed oral motor exercises and the effortful swallow with verbal instructions and a model. This afternoon she was completed additional tasks for receptive language. She was able to answer yes/no questions at 50%. She followed written commands at 85%. She identified pictures on an AAC board (12 pictures per page) at 75%. Tasks for expressive language included: Naming pictures was at 40% independently and at 50% with placement cues, phrase completion cues, gesture cue, or with phonemic cueing. Sentence completion tasks were at 70% this afternoon. She was able to produce 60% of the words to the pledge of allegiance and 80% of the words to you are my sunshine this afternoon. She did agree to complete therapy tasks over the weekend with either her daughter or . Activities will be provided. She continues to exhibit severe expressive and receptive aphasia, dysarthria,cognitive deficits, deficits in executive function and dysphagia. She continues to exhibit perseverations, neologisms, semantic paraphasias, and literal paraphasias. Recommend she continue speech therapy services to address these deficits. Recommend she remain on a dysphagia soft diet with thin liquids at this time. SLP will upgrade to regular when she demonstrates consistent awareness of oral residue. Recommended Diet and Intervention  Soft & Bite Sized consistencies   Thin liquids  Recommended Form of Meds: Crushed in puree as able  Dysphagia treatment  Therapeutic Interventions: Pharyngeal exercises;Diet tolerance monitoring;Oral care;Oral motor exercises; Patient/Family education; Therapeutic PO trials with SLP     Compensatory Swallowing Strategies  Compensatory Swallowing Strategies : Alternate solids and liquids;Eat/Feed slowly; No straws;Upright as possible for all oral intake;Remain upright for 30-45 minutes after meals;Small bites/sips; Check for pocketing of food on the Right; Check for pocketing of food on the Left;Set up assist;Assist feed           SHORT TERM GOAL #1:  Goal 1: Pt will complete y/n tasks with 80% accuracy and minimal verbal cues/modeling. SHORT TERM GOAL #2:  Goal 2: Pt will complete verbal expression tasks with 80% accuracy and minimal verbal cues/modeling. SHORT TERM GOAL #3:  Goal 3: Pt will complete receptive/expressive language tasks with 80% accuracy and minimal verbal cues/modeling. SHORT TERM GOAL #4:  Goal 4: Pt will follow one step commands with with 90% accuracy and minimal verbal cues/modeling. SHORT TERM GOAL #5:  Goal 5: Pt will complete orientation tasks with 90% accuracy and minimal verbal cues/modeling. Swallowing Short Term Goals  Short-term Goals  Goal 1: Pt will tolerate soft & bite sized consistencies with mildly  (nectar) thick liquids with minimal overt s/s of aspiration/penetration during hospitalization. New Goal: Pt will tolerate soft and bite sized diet with thin liquids with minimal overt s/s of aspiration/penetration during hospitalization  Goal 2: Pt will complete Modified Barium Swallow Study. Discontinue goal  Goal 3: Pt will demonstrate awareness of general aspiration precautions. Goal 4: Pt will participate in swallowing reassessments to ensure safest diet consistencies. Goal 5: Pt will allow staff to complete daily oral care. Goal 6: Pt will complete oral motor exercises for lingual and labial strengthening.            ASSESSMENT:  Assessment: [x]Progressing towards goals          []Not Progressing towards goals    Patient Tolerance of Treatment:   [x]Tolerated well []Tolerated fair []Required rest breaks []Fatigued    Education:  Learner:  [x]Patient          []Significant Other          []Other  Education provided regarding:  [x]Goals and POC   []Diet and swallowing precautions    []Home Exercise Program  []Progress and/or discharge information  Method of Education:  [x]Discussion          []Demonstration          []Handout []Other  Evaluation of Education:   [x]Verbalized understanding   []Demonstrates without assistance  []Demonstrates with assistance  []Needs further instruction     []No evidence of learning                  []No family present      Plan: [x]Continue with current plan of care    []Modify current plan of care as follows:    []Discharge patient    Discharge Location:    Services/Supervision Recommended:      [x]Patient continues to require treatment by a licensed therapist to address functional deficits as outlined in the established plan of care.             Electronically Signed By:  Tejas Taylor M.S., CCC-SLP  12/16/2022,9:37 AM.

## 2022-12-16 NOTE — PROGRESS NOTES
Patient:   Dana Coello  MR#:    [id number]   Room:    0826/826-02   YOB: 1960  Date of Progress Note: 12/16/2022  Time of Note                           10:03 AM  Consulting Physician:   Mahala Bumpers, M.D. Attending Physician:  Mahala Bumpers, MD     Chief complaint Acute ischemic stroke    S:This 58 y.o. female  with history of anxiety, depression, COPD, atherosclerotic disease, colitis, COPD, CVA, DM,  LE edema, hyperlipidemia, HTN, morbid obesity, RA, CAD  and venous thromboembolism. She presented to [facility] ER on 11/9/22 after being found at home lying facedown in her feces. She was very weak, confused, not able to speak but moving purposefully and intermittently following commands. Her last well know was 3 a.m. when her  left for work. Imaging done revealed a large MCA stroke 7.7 x 5 cm. CTA head/neck revealed an acute M1 occlusion. She was outside of the window for TPA. CK on admit was 1100, consistent with rhabdomylosis. She was also noted to possibly Dens fracture. She was transferred to [facility] for a possible thrombectomy. Further imaging was done at [facility] and she was deemed not a candidate for thrombectomy. MRI done ruled out Dens fracture. Repeat CT of head on 11/11/22 showed evolving left MCA territory infarct with increasing edema/mass effect resulting in 4mm of  rightward midline shift(previously 2mm) a the level of the third ventricle. No hemorrhagic conversion or definite trapping of the right lateral ventricle, possible small acute right cerebellar infarct. Neurosurgery was consulted for possible hemicraniectomy. She was seen by Dr. Dang Sinha, who didn't feel any surgical intervention was needed. Patient was found to have a UTI + for [facility] and was placed on Rocephin on 11/14/22. Patient had a PICC line placed. Patient was evaluated by SPT and initially made NPO d/t oropharyngeal dysphagia. NGT placed and feeding started.  She was also noted to have global aphasia but is improving. She was re-evaluated by SPT on 11/15/22 for swallow and was started on a dysphagia 1 diet with nectar thick liquids. Patient also continues to have right sided weakness and is participating in both PT/OT. Repeat CT head on 11/13/22 shows stable LMCA ischemic stroke with stable edema, 5 mm shift, no hemorrhage. She is felt to need a stay on Rehab for continued PT/OT/SPT and medical management to work towards her goal of returning home with her . She is now felt ready to start the Rehab program.  No new issues overnight. REVIEW OF SYSTEMS:  Unreliable due to aphasia     Past Medical History:      Diagnosis Date    Anxiety     ASCVD (arteriosclerotic cardiovascular disease)     Carrier of group B Streptococcus     Cellulitis     Colitis     COPD (chronic obstructive pulmonary disease) ([facility])     Costochondritis     CVA (cerebral vascular accident) ([address]     Depression     Edema     Fracture of sternum     non union post surgery.      Gallstones     Grief reaction     H/O superior vena cava filter placement     Hyperlipidemia     Hypertension     MI (myocardial infarction) ([address]     MRSA (methicillin resistant Staphylococcus aureus)     Neuropathy     Obesity     Pseudarthrosis sternal    RA (rheumatoid arthritis) ([address]     Rosacea     Sinus bradycardia     TIA (transient ischemic attack)     Venous thromboembolism        Past Surgical History:      Procedure Laterality Date    ADENOIDECTOMY      APPENDECTOMY      CARDIAC CATHETERIZATION  3/2009    CARDIAC CATHETERIZATION  11/5/14  JDT    EF 50%, patent bypass grafts    CHOLECYSTECTOMY  2011    COLONOSCOPY  06/03/2010    Dr. Jing Hodge: distal colon having ulcerative lesions c/w UC    COLONOSCOPY  2009    pancolitis    COLONOSCOPY      CORONARY ARTERY BYPASS GRAFT  3/2009    PHANI Powell to LAD, SVGs to OM & PDA    GASTRIC BYPASS SURGERY  2012    HIATAL HERNIA REPAIR  2011    HYSTERECTOMY (CERVIX STATUS UNKNOWN)      KNEE SURGERY      VA COLONOSCOPY FLX DX W/COLLJ SPEC WHEN PFRMD N/A 3/12/2018    Dr Pepe García rectal inflammation consistent with U.C.-Active proctitis--3 yr recall    THORACOTOMY      TONSILLECTOMY      UPPER GASTROINTESTINAL ENDOSCOPY  2010    Dr. Whit Adam  2012       Medications in Hospital:      Current Facility-Administered Medications:     bisacodyl (DULCOLAX) EC tablet 5 mg, 5 mg, Oral, Daily, Morrell Alpers, MD, 5 mg at 12/16/22 0806    miconazole (MICOTIN) 2 % powder, , Topical, BID, Morrell Alpers, MD, Given at 12/16/22 0830    docusate sodium (COLACE) capsule 100 mg, 100 mg, Oral, BID, Morrell Alpers, MD, 100 mg at 12/16/22 5592    aspirin chewable tablet 81 mg, 81 mg, Oral, Daily, Morrell Alpers, MD, 81 mg at 12/16/22 0806    atorvastatin (LIPITOR) tablet 40 mg, 40 mg, Oral, Nightly, Morrell Alpers, MD, 40 mg at 12/15/22 2015    dulaglutide (TRULICITY) SC injection 1.5 mg (Patient Supplied), 1.5 mg, SubCUTAneous, Weekly, Morrell Alpers, MD    folic acid (FOLVITE) tablet 1 mg, 1 mg, Oral, Daily, Morrell Alpers, MD, 1 mg at 12/16/22 0806    gabapentin (NEURONTIN) capsule 300 mg, 300 mg, Oral, Nightly, Morrell Alpers, MD, 300 mg at 12/15/22 2014    hydroxychloroquine (PLAQUENIL) tablet 200 mg, 200 mg, Oral, BID, Morrell Alpers, MD, 200 mg at 12/16/22 0806    amLODIPine (NORVASC) tablet 2.5 mg, 2.5 mg, Oral, Daily, Morrell Alpers, MD, 2.5 mg at 12/16/22 0806    tiZANidine (ZANAFLEX) tablet 4 mg, 4 mg, Oral, BID PRN, Morrell Alpers, MD    multivitamin 1 tablet, 1 tablet, Oral, Daily, Morrell Alpers, MD, 1 tablet at 12/16/22 0806    acetaminophen (TYLENOL) tablet 650 mg, 650 mg, Oral, Q4H PRN, Morrell Alpers, MD, 650 mg at 11/27/22 1933    enoxaparin (LOVENOX) injection 40 mg, 40 mg, SubCUTAneous, Daily, Morrell Alpers, MD, 40 mg at 12/15/22 1706    polyethylene glycol (GLYCOLAX) packet 17 g, 17 g, Oral, Daily PRN, Morrell Alpers, MD, 17 g at 11/30/22 1817    Allergies:  Methotrexate derivatives    Social History:   TOBACCO:   reports that she quit smoking about 13 years ago. Her smoking use included cigarettes. She has a 64.00 pack-year smoking history. She has never used smokeless tobacco.  ETOH:   reports current alcohol use. Family History:       Problem Relation Age of Onset    Prostate Cancer Father     Heart Failure Father     Cancer Father     Colon Cancer Neg Hx     Colon Polyps Neg Hx     Liver Cancer Neg Hx     Liver Disease Neg Hx     Esophageal Cancer Neg Hx     Rectal Cancer Neg Hx     Stomach Cancer Neg Hx          PHYSICAL EXAM:  BP (!) 155/64   Pulse 65   Temp 97.5 °F (36.4 °C) (Temporal)   Resp 18   Ht 5' 7\" (1.702 m)   Wt 205 lb (93 kg)   SpO2 99%   BMI 32.11 kg/m²     Constitutional - well developed, well nourished. Eyes - conjunctiva normal.   Ear, nose, throat - No scars, masses, or lesions over external nose or ears, no atrophy of tongue  Neck-symmetric, no masses noted, no jugular vein distension  Respiration- chest wall appears symmetric, good expansion,   normal effort without use of accessory muscles  Musculoskeletal - no significant wasting of muscles noted, no bony deformities  Extremities-no clubbing, cyanosis or edema  Skin - warm, dry, and intact. No rash, erythema, or pallor. Psychiatric - mood, affect, and behavior appear normal.      Neurological exam  Awake, alert, global aphasia says \"yes\" to all questions asked   Attention and concentration appear appropriate  Recent and remote memory unable to tests     Cranial Nerve Exam     CN III, IV,VI-EOMI, No nystagmus, conjugate eye movements, no ptosis    CN VII-+ facial assymetry       Motor Exam  No movement on the right      Tremors- no tremors in hands or head noted     Gait  Not tested     Nursing/pcp notes, imaging,labs and vitals reviewed.      PT,OT and/or speech notes reviewed    Lab Results   Component Value Date    WBC 8.8 12/15/2022    HGB 10.7 (L) 12/15/2022    HCT 34.8 (L) 12/15/2022    MCV 78.6 (L) 12/15/2022     12/15/2022     Lab Results   Component Value Date     12/15/2022    K 4.1 12/15/2022     12/15/2022    CO2 23 12/15/2022    BUN 11 12/15/2022    CREATININE 0.5 12/15/2022    GLUCOSE 80 12/15/2022    CALCIUM 9.0 12/15/2022    PROT 5.0 (L) 12/15/2022    LABALBU 3.0 (L) 12/15/2022    BILITOT <0.2 12/15/2022    ALKPHOS 84 12/15/2022    AST 22 12/15/2022    ALT 18 12/15/2022    LABGLOM >60 12/15/2022   No results found for: INR, PROTIME    Amanda Herrera PTA   Physical Therapist Assistant   Physical Therapy   Progress Notes      Signed   Date of Service:  12/15/2022  3:58 PM                 Signed                                                                                                                Physical Therapy  Name: Mynor Rojas  MRN:  [id number]  Date of service:  12/15/2022          12/15/22 1100   Restrictions/Precautions   Restrictions/Precautions Modified Diet;Swallowing - Thickened Liquids; Fall Risk;Aspiration Risk;Weight Bearing   Lower Extremity Weight Bearing Restrictions   Right Lower Extremity Weight Bearing Weight Bearing As Tolerated   Left Lower Extremity Weight Bearing Weight Bearing As Tolerated   General Comment   Comments in [facility] post OT session with dtr present   Subjective   Subjective Dtr eager for pt to be able to transfer using stand pivot technique. Says she is here more for observation. Pt pleasant and cooperative with staff, but seems frustrated/irritable with dtr. Transfers   Sit to Stand Moderate Assistance;Minimal Assistance  (STS pulling up in // bars with min, STS pushing up from [facility] to // bars or HW with min-mod@)   Stand to Sit Contact guard assistance;Minimal Assistance  (min-cg@ from [facility] and cg@ in // bars)   Ambulation   WB Status WBAT   Ambulation   Surface Level tile   Device Parallel Bars   Other Apparatus AFO; Wheelchair follow;Right  (shoe cover on R)   Assistance Minimal assistance; Moderate assistance  (more for R LE management than posture/balance)   Quality of Gait insufficient wt shift diagonally (to L and post)in order to advance R LE without assist   Gait Deviations Decreased step height   Distance 12' x 3   Comments demonstrated and cued (vc's and tactile) for diagonal wt shift in order to advance R LE   PT Exercises   PROM Exercises laq x 2/5 working on concentric and eccentric activation but unable to achieve   A/AROM Exercises L LE exercised more for demonstration and to ensure pt is understanding desired motion   Standing Open/Closed Kinetic Chain Exercises terminal knee ext with AA x 10 standing at [facility], STS 2 x 5 working on pushing up from [facility] and using optimal body mechanics to rise with controlled return to chair, standing at [facility] x 3 in front of mirror working on upright posture and terminal knee extension R side   Assessment   Assessment Pt cont to show gradual progress with mobility. Able to demonstrate STS using push up from [facility] rather than pull up to bar. Trying to incorporate diagonal wt shift (L lean with post shift) to promote automatic R LE advancement. Pt gave good effort and shows potential with transition to [facility] as wt shifting/R LE advancement improves. Safety Devices   Type of Devices Chair alarm in place  (in hallway propelling WC with dtr supervision/assist)   PT Individual Minutes   Time In 1100   Time Out 1200   Minutes 60         Electronically signed by Justine Herrera PTA on 12/15/2022 at 3:58 PM               Cosigned by: Johnny Nayak PT at 12/16/2022  7:44 AM     Revision History                          RECORD REVIEW: Previous medical records, medications were reviewed at today's visit    IMPRESSION:   1. Acute ischemic stroke-on aspirin/statin  2. Hypertension-on medications monitor  3. Hyperlipidemia-on statin  4. Diabetes-Trulicity-monitor blood sugar  5. DVT prophylaxis-Lovenox  6. History of coronary artery disease-aspirin/statin  7. Neuropathy-on Neurontin  8.   Rheumatoid arthritis-on Plaquenil  9. COPD-monitor  10. History of anxiety and depression-monitor  11. History of colitis/gallstones-monitor  12. History of bariatric surgery-on multivitamin/folic acid  13. History of superior vena cava filter placement with venous thromboembolism-not on anticoagulation-monitor- indicates took herself off blood thinners as not like meds and was bruising   14.   PT/OT/speech    x-ray of right ankle no acute changes  Venous ultrasound of leg no DVT    Continue current care    ELOS pending 2 weeks       Expected duration and frequency therapy: 180 minutes per day, 5 days per week    [address] [facility]  Dr Max Mcqueen

## 2022-12-16 NOTE — PLAN OF CARE
Problem: Discharge Planning  Goal: Discharge to home or other facility with appropriate resources  Outcome: Progressing  Flowsheets (Taken 12/16/2022 0806)  Discharge to home or other facility with appropriate resources: Refer to discharge planning if patient needs post-hospital services based on physician order or complex needs related to functional status, cognitive ability or social support system     Problem: Safety - Adult  Goal: Free from fall injury  Outcome: Progressing     Problem: Neurosensory - Adult  Goal: Achieves stable or improved neurological status  Outcome: Progressing  Flowsheets (Taken 12/16/2022 0806)  Achieves stable or improved neurological status: Assess for and report changes in neurological status  Goal: Achieves maximal functionality and self care  Outcome: Progressing  Flowsheets (Taken 12/16/2022 0806)  Achieves maximal functionality and self care: Encourage and assist patient to increase activity and self care with guidance from physical therapy/occupational therapy     Problem: Skin/Tissue Integrity - Adult  Goal: Skin integrity remains intact  Outcome: Progressing  Flowsheets (Taken 12/16/2022 0806)  Skin Integrity Remains Intact: Monitor for areas of redness and/or skin breakdown     Problem: Pain  Goal: Verbalizes/displays adequate comfort level or baseline comfort level  Outcome: Progressing     Problem: Chronic Conditions and Co-morbidities  Goal: Patient's chronic conditions and co-morbidity symptoms are monitored and maintained or improved  Outcome: Progressing  Flowsheets (Taken 12/16/2022 0806)  Care Plan - Patient's Chronic Conditions and Co-Morbidity Symptoms are Monitored and Maintained or Improved: Monitor and assess patient's chronic conditions and comorbid symptoms for stability, deterioration, or improvement     Problem: ABCDS Injury Assessment  Goal: Absence of physical injury  Outcome: Progressing     Problem: Skin/Tissue Integrity  Goal: Absence of new skin breakdown  Description: 1. Monitor for areas of redness and/or skin breakdown  2. Assess vascular access sites hourly  3. Every 4-6 hours minimum:  Change oxygen saturation probe site  4. Every 4-6 hours:  If on nasal continuous positive airway pressure, respiratory therapy assess nares and determine need for appliance change or resting period. Outcome: Progressing     Problem: Confusion  Goal: Confusion, delirium, dementia, or psychosis is improved or at baseline  Description: INTERVENTIONS:  1. Assess for possible contributors to thought disturbance, including medications, impaired vision or hearing, underlying metabolic abnormalities, dehydration, psychiatric diagnoses, and notify attending LIP  2. [place] high risk fall precautions, as indicated  3. Provide frequent short contacts to provide reality reorientation, refocusing and direction  4. Decrease environmental stimuli, including noise as appropriate  5. Monitor and intervene to maintain adequate nutrition, hydration, elimination, sleep and activity  6. If unable to ensure safety without constant attention obtain sitter and review sitter guidelines with assigned personnel  7.  Initiate Psychosocial CNS and Spiritual Care consult, as indicated  Outcome: Progressing  Flowsheets (Taken 12/16/2022 0806)  Effect of thought disturbance (confusion, delirium, dementia, or psychosis) are managed with adequate functional status:   Provide frequent short contacts to provide reality reorientation, refocusing and direction   Decrease environmental stimuli, including noise as appropriate     Problem: Musculoskeletal - Adult  Goal: Return mobility to safest level of function  Outcome: Progressing  Flowsheets (Taken 12/16/2022 0806)  Return Mobility to Safest Level of Function:   Assess patient stability and activity tolerance for standing, transferring and ambulating with or without assistive devices   Assist with transfers and ambulation using safe patient handling equipment as needed   Ensure adequate protection for wounds/incisions during mobilization  Goal: Return ADL status to a safe level of function  Outcome: Progressing  Flowsheets (Taken 12/16/2022 0806)  Return ADL Status to a Safe Level of Function: Administer medication as ordered     Problem: Gastrointestinal - Adult  Goal: Maintains or returns to baseline bowel function  Outcome: Progressing  Flowsheets (Taken 12/16/2022 0806)  Maintains or returns to baseline bowel function:   Assess bowel function   Encourage oral fluids to ensure adequate hydration  Goal: Maintains adequate nutritional intake  Outcome: Progressing  Flowsheets (Taken 12/16/2022 0806)  Maintains adequate nutritional intake: Monitor percentage of each meal consumed     Problem: Metabolic/Fluid and Electrolytes - Adult  Goal: Electrolytes maintained within normal limits  Outcome: Progressing  Flowsheets (Taken 12/16/2022 0806)  Electrolytes maintained within normal limits: Monitor labs and assess patient for signs and symptoms of electrolyte imbalances     Problem: Nutrition Deficit:  Goal: Optimize nutritional status  Outcome: Progressing       Electronically signed by Mariann Roque.  Jose Manuel Smoker on 12/16/2022 at 2:25 PM

## 2022-12-16 NOTE — PROGRESS NOTES
Occupational Therapy     12/16/22 1000   General   Timed Code Treatment Minutes 60 Minutes   Restrictions/Precautions   Restrictions/Precautions Modified Diet;Swallowing - Thickened Liquids; Fall Risk;Aspiration Risk;Weight Bearing   Subjective   Subjective Most of tx time spent toileting d/t several bouts of diarrhea. ADL   Toileting Maximum assistance  (incontinent of bowel d/t loose stools)   Additional Comments pt completed frontal hygiene and pulling up pants on Left side   Balance   Standing Balance Contact guard assistance  (at [place], CGA once up)   Standing Balance   Activity toileting   Bed mobility   Supine to Sit Minimal assistance   Transfers   Sit to stand Moderate assistance   Stand to sit Contact guard assistance   Toilet Transfers   Toilet - Technique Stand pivot   Toilet Transfer Minimal assistance  (rushing d/t urgency and GB on affected side)   Toilet Transfers Comments one t/f w/c to toilet but swtiched to Pamella hughes d/t frequent need to stand and sit   Wheelchair Bed Transfers   Wheelchair/Bed - Technique Stand pivot;Stand step   Level of Asssistance Minimal assistance   Type of ROM/Therapeutic Exercise   Type of ROM/Therapeutic Exercise PROM   Comment RUE   Assessment   Performance deficits / Impairments Decreased functional mobility ; Decreased endurance;Decreased coordination;Decreased ADL status; Decreased sensation;Decreased posture;Decreased ROM; Decreased balance;Decreased strength;Decreased vision/visual deficit; Decreased safe awareness;Decreased high-level IADLs;Decreased cognition;Decreased fine motor control   Treatment Diagnosis L MCA stroke   Activity Tolerance   Activity Tolerance Patient Tolerated treatment well

## 2022-12-16 NOTE — PROGRESS NOTES
Name: Erasmo Nevarez  MRN: [id number]  Date of Service:  12/16/2022 12/16/22 1100   Restrictions/Precautions   Restrictions/Precautions Fall Risk;Weight Bearing   Lower Extremity Weight Bearing Restrictions   Right Lower Extremity Weight Bearing Weight Bearing As Tolerated   Left Lower Extremity Weight Bearing Weight Bearing As Tolerated   General   Chart Reviewed Yes   Patient assessed for rehabilitation services? Yes   Additional Pertinent Hx Anxiety, depression, COPD, CVA, DM, LE edema, hyperlipidemia, HTN, obesity, RA, CAD and venous thromboembolism   Family / Caregiver Present No   Diagnosis CVA, R hemiparesis   Subjective   Subjective Pt brought into gym by PARTIDA, agrees to participate in therapy. Subjective   Pain Verbal: 0/10 and Faces: 0/10   Transfers   Sit to Stand Moderate Assistance;Minimal Assistance   Stand to Sit Stand by assistance;Contact guard assistance;Minimal Assistance; Moderate Assistance  (SBA/CGA in SS, Min/Mod A in SS)   Lateral Transfers Moderate Assistance;Minimal Assistance;2 Person Assistance;Dependent/Total  (Mod/Min X 2 from w/c<toilet from pt R and  use of SS back to w/c from toilet)   Ambulation   WB Status WBAT   Ambulation   Surface Level tile   Device Parallel Bars   Other Apparatus AFO; Wheelchair follow;Right   Assistance Minimal assistance  (To advance RLE)   Quality of Gait insufficient wt shift diagonally (to L and post)in order to advance R LE without assist, 1X v/c's for correct sequencing   Gait Deviations Decreased step length;Decreased step height   Distance 12' X 3   Comments ~4 min sitting rest in between   PT Exercises   Exercise Treatment Sitting in w/c BLE ther-ex with added 2# LLE: LLE LAQ X 15/ RLE AAROM/PROM X 15, LLE hip flexion X 15/RLE PROM/AAROM hip flexion X 15, LLE hip ext agnist min manual resistance X 15,   Assessment   Assessment Pt presents with continually saying \"no, stop\" to PTA holding gait belt throughout tx. Pt able to amb.  up to 12' X 3 in //  requiring Min A for advancing RLE. At end of tx, pt verbally groaning and rubbing stomach. Pt reports she needs to use the BR. Pt able to TF with assist of two w/c<toilet. Use of SS for assisting pt with personal hygiene and TF back to w/c. JENS Yadav present and assisted PTA in assisting pt in BR. Use of SS necessary due to pt BM and need to perform prolonged standing for personal hygiene. Pt presents with frustration, continually saying \"Stop\", R foot coming off SS, and leaning forward. Pt very unsafe and upset during cleanup.  Pt sitting in w/c, setup for lunch post.   Discharge Recommendations Continue to assess pending progress;Subacute/Skilled Nursing Facility;24 hour supervision or assist;Patient would benefit from continued therapy after discharge   Safety Devices   Type of Devices Patient at risk for falls;Gait belt;Left in chair;Chair alarm in place;Call light within reach       Electronically signed by Joon Vogel PTA on 12/16/2022 at 12:42 PM

## 2022-12-17 PROCEDURE — 1180000000 HC REHAB R&B

## 2022-12-17 PROCEDURE — 6370000000 HC RX 637 (ALT 250 FOR IP): Performed by: PSYCHIATRY & NEUROLOGY

## 2022-12-17 PROCEDURE — 97530 THERAPEUTIC ACTIVITIES: CPT

## 2022-12-17 PROCEDURE — 2500000003 HC RX 250 WO HCPCS: Performed by: PSYCHIATRY & NEUROLOGY

## 2022-12-17 PROCEDURE — 94760 N-INVAS EAR/PLS OXIMETRY 1: CPT

## 2022-12-17 PROCEDURE — 6360000002 HC RX W HCPCS: Performed by: PSYCHIATRY & NEUROLOGY

## 2022-12-17 RX ADMIN — THERA TABS 1 TABLET: TAB at 09:04

## 2022-12-17 RX ADMIN — GABAPENTIN 300 MG: 300 CAPSULE ORAL at 20:58

## 2022-12-17 RX ADMIN — ATORVASTATIN CALCIUM 40 MG: 40 TABLET, FILM COATED ORAL at 20:58

## 2022-12-17 RX ADMIN — ENOXAPARIN SODIUM 40 MG: 100 INJECTION SUBCUTANEOUS at 18:03

## 2022-12-17 RX ADMIN — AMLODIPINE BESYLATE 2.5 MG: 2.5 TABLET ORAL at 09:02

## 2022-12-17 RX ADMIN — ASPIRIN 81 MG 81 MG: 81 TABLET ORAL at 09:03

## 2022-12-17 RX ADMIN — HYDROXYCHLOROQUINE SULFATE 200 MG: 200 TABLET, FILM COATED ORAL at 20:58

## 2022-12-17 RX ADMIN — FOLIC ACID 1 MG: 1 TABLET ORAL at 09:04

## 2022-12-17 RX ADMIN — MICONAZOLE NITRATE: 2 POWDER TOPICAL at 09:04

## 2022-12-17 RX ADMIN — HYDROXYCHLOROQUINE SULFATE 200 MG: 200 TABLET, FILM COATED ORAL at 09:04

## 2022-12-17 NOTE — PLAN OF CARE
Problem: Discharge Planning  Goal: Discharge to home or other facility with appropriate resources  Outcome: Progressing  Flowsheets (Taken 12/17/2022 1113)  Discharge to home or other facility with appropriate resources: Refer to discharge planning if patient needs post-hospital services based on physician order or complex needs related to functional status, cognitive ability or social support system     Problem: Safety - Adult  Goal: Free from fall injury  Outcome: Progressing     Problem: Neurosensory - Adult  Goal: Achieves stable or improved neurological status  Outcome: Progressing  Flowsheets (Taken 12/17/2022 1113)  Achieves stable or improved neurological status: Assess for and report changes in neurological status  Goal: Achieves maximal functionality and self care  Outcome: Progressing  Flowsheets (Taken 12/17/2022 1113)  Achieves maximal functionality and self care: Monitor swallowing and airway patency with patient fatigue and changes in neurological status     Problem: Skin/Tissue Integrity - Adult  Goal: Skin integrity remains intact  Outcome: Progressing  Flowsheets (Taken 12/17/2022 1113)  Skin Integrity Remains Intact: Monitor for areas of redness and/or skin breakdown     Problem: Pain  Goal: Verbalizes/displays adequate comfort level or baseline comfort level  Outcome: Progressing     Problem: Chronic Conditions and Co-morbidities  Goal: Patient's chronic conditions and co-morbidity symptoms are monitored and maintained or improved  Outcome: Progressing  Flowsheets (Taken 12/17/2022 1113)  Care Plan - Patient's Chronic Conditions and Co-Morbidity Symptoms are Monitored and Maintained or Improved:   Monitor and assess patient's chronic conditions and comorbid symptoms for stability, deterioration, or improvement   Update acute care plan with appropriate goals if chronic or comorbid symptoms are exacerbated and prevent overall improvement and discharge     Problem: ABCDS Injury Assessment  Goal: Absence of physical injury  Outcome: Progressing     Problem: Skin/Tissue Integrity  Goal: Absence of new skin breakdown  Description: 1. Monitor for areas of redness and/or skin breakdown  2. Assess vascular access sites hourly  3. Every 4-6 hours minimum:  Change oxygen saturation probe site  4. Every 4-6 hours:  If on nasal continuous positive airway pressure, respiratory therapy assess nares and determine need for appliance change or resting period. Outcome: Progressing     Problem: Confusion  Goal: Confusion, delirium, dementia, or psychosis is improved or at baseline  Description: INTERVENTIONS:  1. Assess for possible contributors to thought disturbance, including medications, impaired vision or hearing, underlying metabolic abnormalities, dehydration, psychiatric diagnoses, and notify attending LIP  2. [place] high risk fall precautions, as indicated  3. Provide frequent short contacts to provide reality reorientation, refocusing and direction  4. Decrease environmental stimuli, including noise as appropriate  5. Monitor and intervene to maintain adequate nutrition, hydration, elimination, sleep and activity  6. If unable to ensure safety without constant attention obtain sitter and review sitter guidelines with assigned personnel  7.  Initiate Psychosocial CNS and Spiritual Care consult, as indicated  Outcome: Progressing  Flowsheets (Taken 12/17/2022 1113)  Effect of thought disturbance (confusion, delirium, dementia, or psychosis) are managed with adequate functional status:   [place] high risk fall precautions, as indicated   Provide frequent short contacts to provide reality reorientation, refocusing and direction   Decrease environmental stimuli, including noise as appropriate     Problem: Musculoskeletal - Adult  Goal: Return mobility to safest level of function  Outcome: Progressing  Flowsheets (Taken 12/17/2022 1113)  Return Mobility to Safest Level of Function:   Assess patient stability and activity tolerance for standing, transferring and ambulating with or without assistive devices   Assist with transfers and ambulation using safe patient handling equipment as needed   Ensure adequate protection for wounds/incisions during mobilization  Goal: Return ADL status to a safe level of function  Outcome: Progressing  Flowsheets (Taken 12/17/2022 1113)  Return ADL Status to a Safe Level of Function: Assist and instruct patient to increase activity and self care as tolerated     Problem: Gastrointestinal - Adult  Goal: Maintains or returns to baseline bowel function  Outcome: Progressing  Flowsheets (Taken 12/17/2022 1113)  Maintains or returns to baseline bowel function:   Assess bowel function   Encourage oral fluids to ensure adequate hydration  Goal: Maintains adequate nutritional intake  Outcome: Progressing  Flowsheets (Taken 12/17/2022 1113)  Maintains adequate nutritional intake: Monitor percentage of each meal consumed     Problem: Metabolic/Fluid and Electrolytes - Adult  Goal: Electrolytes maintained within normal limits  Outcome: Progressing  Flowsheets (Taken 12/17/2022 1113)  Electrolytes maintained within normal limits: Monitor labs and assess patient for signs and symptoms of electrolyte imbalances     Problem: Nutrition Deficit:  Goal: Optimize nutritional status  Outcome: Progressing       Electronically signed by Jonna Donovan.  Sandoval Tuttle on 12/17/2022 at 12:18 PM

## 2022-12-17 NOTE — PLAN OF CARE
Problem: Discharge Planning  Goal: Discharge to home or other facility with appropriate resources  Outcome: Progressing  Flowsheets (Taken 12/17/2022 1113)  Discharge to home or other facility with appropriate resources: Refer to discharge planning if patient needs post-hospital services based on physician order or complex needs related to functional status, cognitive ability or social support system     Problem: Safety - Adult  Goal: Free from fall injury  Outcome: Progressing     Problem: Neurosensory - Adult  Goal: Achieves stable or improved neurological status  Outcome: Progressing  Flowsheets (Taken 12/17/2022 1113)  Achieves stable or improved neurological status: Assess for and report changes in neurological status  Goal: Achieves maximal functionality and self care  Outcome: Progressing  Flowsheets (Taken 12/17/2022 1113)  Achieves maximal functionality and self care: Monitor swallowing and airway patency with patient fatigue and changes in neurological status     Problem: Skin/Tissue Integrity - Adult  Goal: Skin integrity remains intact  Outcome: Progressing  Flowsheets (Taken 12/17/2022 1113)  Skin Integrity Remains Intact: Monitor for areas of redness and/or skin breakdown     Problem: Pain  Goal: Verbalizes/displays adequate comfort level or baseline comfort level  Outcome: Progressing     Problem: Chronic Conditions and Co-morbidities  Goal: Patient's chronic conditions and co-morbidity symptoms are monitored and maintained or improved  Outcome: Progressing  Flowsheets (Taken 12/17/2022 1113)  Care Plan - Patient's Chronic Conditions and Co-Morbidity Symptoms are Monitored and Maintained or Improved:   Monitor and assess patient's chronic conditions and comorbid symptoms for stability, deterioration, or improvement   Update acute care plan with appropriate goals if chronic or comorbid symptoms are exacerbated and prevent overall improvement and discharge     Problem: ABCDS Injury Assessment  Goal: Absence of physical injury  Outcome: Progressing     Problem: Skin/Tissue Integrity  Goal: Absence of new skin breakdown  Description: 1. Monitor for areas of redness and/or skin breakdown  2. Assess vascular access sites hourly  3. Every 4-6 hours minimum:  Change oxygen saturation probe site  4. Every 4-6 hours:  If on nasal continuous positive airway pressure, respiratory therapy assess nares and determine need for appliance change or resting period. Outcome: Progressing     Problem: Confusion  Goal: Confusion, delirium, dementia, or psychosis is improved or at baseline  Description: INTERVENTIONS:  1. Assess for possible contributors to thought disturbance, including medications, impaired vision or hearing, underlying metabolic abnormalities, dehydration, psychiatric diagnoses, and notify attending LIP  2. [place] high risk fall precautions, as indicated  3. Provide frequent short contacts to provide reality reorientation, refocusing and direction  4. Decrease environmental stimuli, including noise as appropriate  5. Monitor and intervene to maintain adequate nutrition, hydration, elimination, sleep and activity  6. If unable to ensure safety without constant attention obtain sitter and review sitter guidelines with assigned personnel  7.  Initiate Psychosocial CNS and Spiritual Care consult, as indicated  Outcome: Progressing  Flowsheets (Taken 12/17/2022 1113)  Effect of thought disturbance (confusion, delirium, dementia, or psychosis) are managed with adequate functional status:   [place] high risk fall precautions, as indicated   Provide frequent short contacts to provide reality reorientation, refocusing and direction   Decrease environmental stimuli, including noise as appropriate     Problem: Musculoskeletal - Adult  Goal: Return mobility to safest level of function  Outcome: Progressing  Flowsheets (Taken 12/17/2022 1113)  Return Mobility to Safest Level of Function:   Assess patient stability and activity tolerance for standing, transferring and ambulating with or without assistive devices   Assist with transfers and ambulation using safe patient handling equipment as needed   Ensure adequate protection for wounds/incisions during mobilization  Goal: Return ADL status to a safe level of function  Outcome: Progressing  Flowsheets (Taken 12/17/2022 1113)  Return ADL Status to a Safe Level of Function: Assist and instruct patient to increase activity and self care as tolerated     Problem: Gastrointestinal - Adult  Goal: Maintains or returns to baseline bowel function  Outcome: Progressing  Flowsheets (Taken 12/17/2022 1113)  Maintains or returns to baseline bowel function:   Assess bowel function   Encourage oral fluids to ensure adequate hydration  Goal: Maintains adequate nutritional intake  Outcome: Progressing  Flowsheets (Taken 12/17/2022 1113)  Maintains adequate nutritional intake: Monitor percentage of each meal consumed     Problem: Metabolic/Fluid and Electrolytes - Adult  Goal: Electrolytes maintained within normal limits  Outcome: Progressing  Flowsheets (Taken 12/17/2022 1113)  Electrolytes maintained within normal limits: Monitor labs and assess patient for signs and symptoms of electrolyte imbalances     Problem: Nutrition Deficit:  Goal: Optimize nutritional status  Outcome: Progressing         Electronically signed by Josr Amaya on 12/17/2022 at 12:19 PM

## 2022-12-17 NOTE — PROGRESS NOTES
Occupational Therapy     12/17/22 1100   General   Timed Code Treatment Minutes 60 Minutes   Restrictions/Precautions   Restrictions/Precautions Fall Risk;Weight Bearing   General   Family / Caregiver Present Yes  (Daughter for portion of tx)   Balance   Sitting Balance Supervision   Functional Mobility   Functional - Mobility Device Wheelchair   Assist Level Minimal assistance   Functional Mobility Comments in room- assist to make a tight turn   Bed mobility   Supine to Sit Minimal assistance   Transfers   Stand Step Transfers Minimal assistance   Sit to stand Moderate assistance   Stand to sit Contact guard assistance   Transfer Comments ability to complete sit to stand is main limiting factor for progression of stand step t/fs   Wheelchair Bed Transfers   Wheelchair/Bed - Technique Stand step   Level of Asssistance Minimal assistance   Type of ROM/Therapeutic Exercise   Type of ROM/Therapeutic Exercise AAROM;PROM   Comment R UE   Exercises   Scapular Protraction PROM   Scapular Retraction PROM   Shoulder Depression PROM   Shoulder Elevation PROM   Shoulder Flexion GE   Shoulder Extension PG 2- with facilitation   Shoulder ABduction GE   Shoulder ADduction GE   Elbow Flexion AG 2- with facilitation   Elbow Extension PG 2 with facilitation   Supination PG with facilitation 2   Pronation GE with facilitation 2-   Wrist Flexion AG 2 with facilitation   Wrist Extension AG 2+ with facilitation   Finger Flexion 2-   Finger Extension 1   Other IR/ER tabletop 2-   Weight Bearing   RUE Weight Bearing Forearm seated   Cognition   Following Directions Follows one step commands  (not able to follow 2 step)   Attention Span Appears intact   Following Commands Follows one step commands with repetition   Awareness of Errors Assistance required to identify errors made   Sequencing and Organization   (100% with sequencing rote items)   Activity Tolerance   Activity Tolerance Patient Tolerated treatment well

## 2022-12-18 PROCEDURE — 6360000002 HC RX W HCPCS: Performed by: PSYCHIATRY & NEUROLOGY

## 2022-12-18 PROCEDURE — 94760 N-INVAS EAR/PLS OXIMETRY 1: CPT

## 2022-12-18 PROCEDURE — 1180000000 HC REHAB R&B

## 2022-12-18 PROCEDURE — 6370000000 HC RX 637 (ALT 250 FOR IP): Performed by: PSYCHIATRY & NEUROLOGY

## 2022-12-18 RX ADMIN — ASPIRIN 81 MG 81 MG: 81 TABLET ORAL at 07:53

## 2022-12-18 RX ADMIN — FOLIC ACID 1 MG: 1 TABLET ORAL at 07:52

## 2022-12-18 RX ADMIN — HYDROXYCHLOROQUINE SULFATE 200 MG: 200 TABLET, FILM COATED ORAL at 07:52

## 2022-12-18 RX ADMIN — ENOXAPARIN SODIUM 40 MG: 100 INJECTION SUBCUTANEOUS at 17:14

## 2022-12-18 RX ADMIN — MICONAZOLE NITRATE: 2 POWDER TOPICAL at 07:54

## 2022-12-18 RX ADMIN — THERA TABS 1 TABLET: TAB at 07:53

## 2022-12-18 RX ADMIN — ATORVASTATIN CALCIUM 40 MG: 40 TABLET, FILM COATED ORAL at 20:42

## 2022-12-18 RX ADMIN — HYDROXYCHLOROQUINE SULFATE 200 MG: 200 TABLET, FILM COATED ORAL at 20:42

## 2022-12-18 RX ADMIN — AMLODIPINE BESYLATE 2.5 MG: 2.5 TABLET ORAL at 07:53

## 2022-12-18 RX ADMIN — GABAPENTIN 300 MG: 300 CAPSULE ORAL at 20:42

## 2022-12-18 ASSESSMENT — PAIN SCALES - GENERAL
PAINLEVEL_OUTOF10: 0
PAINLEVEL_OUTOF10: 0

## 2022-12-18 NOTE — PLAN OF CARE
Problem: Discharge Planning  Goal: Discharge to home or other facility with appropriate resources  Recent Flowsheet Documentation  Taken 12/18/2022 0801 by Mason Butcher LPN  Discharge to home or other facility with appropriate resources: Refer to discharge planning if patient needs post-hospital services based on physician order or complex needs related to functional status, cognitive ability or social support system  Taken 12/17/2022 2100 by Loree Hoffman LPN  Discharge to home or other facility with appropriate resources: Refer to discharge planning if patient needs post-hospital services based on physician order or complex needs related to functional status, cognitive ability or social support system     Problem: Neurosensory - Adult  Goal: Achieves stable or improved neurological status  Recent Flowsheet Documentation  Taken 12/18/2022 0801 by Mason Butcher LPN  Achieves stable or improved neurological status: Assess for and report changes in neurological status  Taken 12/17/2022 2100 by Loree Hoffman LPN  Achieves stable or improved neurological status: Assess for and report changes in neurological status  Goal: Achieves maximal functionality and self care  Recent Flowsheet Documentation  Taken 12/18/2022 0801 by Mason Butcher LPN  Achieves maximal functionality and self care: Encourage and assist patient to increase activity and self care with guidance from physical therapy/occupational therapy  Taken 12/17/2022 2100 by Loree Hoffman LPN  Achieves maximal functionality and self care: Monitor swallowing and airway patency with patient fatigue and changes in neurological status     Problem: Skin/Tissue Integrity - Adult  Goal: Skin integrity remains intact  Recent Flowsheet Documentation  Taken 12/18/2022 0840 by Mason Butcher LPN  Skin Integrity Remains Intact: Monitor for areas of redness and/or skin breakdown  Taken 12/17/2022 2100 by Loree Hoffman LPN  Skin Integrity Remains Intact: Monitor for areas of redness and/or skin breakdown     Problem: Chronic Conditions and Co-morbidities  Goal: Patient's chronic conditions and co-morbidity symptoms are monitored and maintained or improved  Recent Flowsheet Documentation  Taken 12/18/2022 0801 by Fabiana Anaya LPN  Care Plan - Patient's Chronic Conditions and Co-Morbidity Symptoms are Monitored and Maintained or Improved: Monitor and assess patient's chronic conditions and comorbid symptoms for stability, deterioration, or improvement  Taken 12/17/2022 2100 by Shanique Sevilla LPN  Care Plan - Patient's Chronic Conditions and Co-Morbidity Symptoms are Monitored and Maintained or Improved: Monitor and assess patient's chronic conditions and comorbid symptoms for stability, deterioration, or improvement     Problem: Confusion  Goal: Confusion, delirium, dementia, or psychosis is improved or at baseline  Description: INTERVENTIONS:  1. Assess for possible contributors to thought disturbance, including medications, impaired vision or hearing, underlying metabolic abnormalities, dehydration, psychiatric diagnoses, and notify attending LIP  2. [place] high risk fall precautions, as indicated  3. Provide frequent short contacts to provide reality reorientation, refocusing and direction  4. Decrease environmental stimuli, including noise as appropriate  5. Monitor and intervene to maintain adequate nutrition, hydration, elimination, sleep and activity  6. If unable to ensure safety without constant attention obtain sitter and review sitter guidelines with assigned personnel  7.  Initiate Psychosocial CNS and Spiritual Care consult, as indicated  Recent Flowsheet Documentation  Taken 12/17/2022 2100 by Shanique Sevilla LPN  Effect of thought disturbance (confusion, delirium, dementia, or psychosis) are managed with adequate functional status: Assess for contributors to thought disturbance, including medications, impaired vision or hearing, underlying metabolic abnormalities, dehydration, psychiatric diagnoses, notify LIP     Problem: Musculoskeletal - Adult  Goal: Return mobility to safest level of function  Recent Flowsheet Documentation  Taken 12/18/2022 0801 by Jamie Esteban LPN  Return Mobility to Safest Level of Function: Assess patient stability and activity tolerance for standing, transferring and ambulating with or without assistive devices  Taken 12/17/2022 2100 by Susan Bearden LPN  Return Mobility to Safest Level of Function: Assess patient stability and activity tolerance for standing, transferring and ambulating with or without assistive devices  Goal: Return ADL status to a safe level of function  Recent Flowsheet Documentation  Taken 12/18/2022 0801 by Jamie Esteban LPN  Return ADL Status to a Safe Level of Function: Assess activities of daily living deficits and provide assistive devices as needed  Taken 12/17/2022 2100 by Susan Bearden LPN  Return ADL Status to a Safe Level of Function:   Administer medication as ordered   Assess activities of daily living deficits and provide assistive devices as needed     Problem: Gastrointestinal - Adult  Goal: Maintains or returns to baseline bowel function  Recent Flowsheet Documentation  Taken 12/18/2022 0801 by Jamie Esteban LPN  Maintains or returns to baseline bowel function: Assess bowel function  Taken 12/17/2022 2100 by Susan Bearden LPN  Maintains or returns to baseline bowel function: Assess bowel function  Goal: Maintains adequate nutritional intake  Recent Flowsheet Documentation  Taken 12/18/2022 0801 by Jamie Esteban LPN  Maintains adequate nutritional intake: Monitor percentage of each meal consumed  Taken 12/17/2022 2100 by Susan Bearden LPN  Maintains adequate nutritional intake: Monitor percentage of each meal consumed     Problem: Metabolic/Fluid and Electrolytes - Adult  Goal: Electrolytes maintained within normal limits  Recent Flowsheet Documentation  Taken 12/18/2022 0801 by Walt Saleh LPN  Electrolytes maintained within normal limits: Monitor labs and assess patient for signs and symptoms of electrolyte imbalances  Taken 12/17/2022 2100 by Reece Izaguirre LPN  Electrolytes maintained within normal limits: Monitor labs and assess patient for signs and symptoms of electrolyte imbalances

## 2022-12-19 LAB
ALBUMIN SERPL-MCNC: 2.8 G/DL (ref 3.5–5.2)
ALP BLD-CCNC: 82 U/L (ref 35–104)
ALT SERPL-CCNC: 13 U/L (ref 5–33)
ANION GAP SERPL CALCULATED.3IONS-SCNC: 13 MMOL/L (ref 7–19)
AST SERPL-CCNC: 17 U/L (ref 5–32)
BASOPHILS ABSOLUTE: 0.1 K/UL (ref 0–0.2)
BASOPHILS RELATIVE PERCENT: 1 % (ref 0–1)
BILIRUB SERPL-MCNC: <0.2 MG/DL (ref 0.2–1.2)
BUN BLDV-MCNC: 9 MG/DL (ref 8–23)
CALCIUM SERPL-MCNC: 8.6 MG/DL (ref 8.8–10.2)
CHLORIDE BLD-SCNC: 106 MMOL/L (ref 98–111)
CO2: 23 MMOL/L (ref 22–29)
CREAT SERPL-MCNC: 0.5 MG/DL (ref 0.5–0.9)
EOSINOPHILS ABSOLUTE: 0.7 K/UL (ref 0–0.6)
EOSINOPHILS RELATIVE PERCENT: 8.9 % (ref 0–5)
GFR SERPL CREATININE-BSD FRML MDRD: >60 ML/MIN/{1.73_M2}
GLUCOSE BLD-MCNC: 73 MG/DL (ref 74–109)
HCT VFR BLD CALC: 34.7 % (ref 37–47)
HEMOGLOBIN: 10.7 G/DL (ref 12–16)
IMMATURE GRANULOCYTES #: 0.1 K/UL
LYMPHOCYTES ABSOLUTE: 2 K/UL (ref 1.1–4.5)
LYMPHOCYTES RELATIVE PERCENT: 24 % (ref 20–40)
MCH RBC QN AUTO: 24.1 PG (ref 27–31)
MCHC RBC AUTO-ENTMCNC: 30.8 G/DL (ref 33–37)
MCV RBC AUTO: 78.2 FL (ref 81–99)
MONOCYTES ABSOLUTE: 0.9 K/UL (ref 0–0.9)
MONOCYTES RELATIVE PERCENT: 11.4 % (ref 0–10)
NEUTROPHILS ABSOLUTE: 4.4 K/UL (ref 1.5–7.5)
NEUTROPHILS RELATIVE PERCENT: 54.1 % (ref 50–65)
PDW BLD-RTO: 16.7 % (ref 11.5–14.5)
PLATELET # BLD: 464 K/UL (ref 130–400)
PMV BLD AUTO: 9.7 FL (ref 9.4–12.3)
POTASSIUM REFLEX MAGNESIUM: 3.6 MMOL/L (ref 3.5–5)
RBC # BLD: 4.44 M/UL (ref 4.2–5.4)
SODIUM BLD-SCNC: 142 MMOL/L (ref 136–145)
TOTAL PROTEIN: 5.3 G/DL (ref 6.6–8.7)
WBC # BLD: 8.2 K/UL (ref 4.8–10.8)

## 2022-12-19 PROCEDURE — 6370000000 HC RX 637 (ALT 250 FOR IP): Performed by: PSYCHIATRY & NEUROLOGY

## 2022-12-19 PROCEDURE — 1180000000 HC REHAB R&B

## 2022-12-19 PROCEDURE — 92526 ORAL FUNCTION THERAPY: CPT

## 2022-12-19 PROCEDURE — 80053 COMPREHEN METABOLIC PANEL: CPT

## 2022-12-19 PROCEDURE — 99232 SBSQ HOSP IP/OBS MODERATE 35: CPT | Performed by: PSYCHIATRY & NEUROLOGY

## 2022-12-19 PROCEDURE — 85025 COMPLETE CBC W/AUTO DIFF WBC: CPT

## 2022-12-19 PROCEDURE — 97110 THERAPEUTIC EXERCISES: CPT

## 2022-12-19 PROCEDURE — 97535 SELF CARE MNGMENT TRAINING: CPT

## 2022-12-19 PROCEDURE — 6360000002 HC RX W HCPCS: Performed by: PSYCHIATRY & NEUROLOGY

## 2022-12-19 PROCEDURE — 92507 TX SP LANG VOICE COMM INDIV: CPT

## 2022-12-19 PROCEDURE — 97530 THERAPEUTIC ACTIVITIES: CPT

## 2022-12-19 PROCEDURE — 94760 N-INVAS EAR/PLS OXIMETRY 1: CPT

## 2022-12-19 PROCEDURE — 36415 COLL VENOUS BLD VENIPUNCTURE: CPT

## 2022-12-19 RX ADMIN — THERA TABS 1 TABLET: TAB at 08:10

## 2022-12-19 RX ADMIN — FOLIC ACID 1 MG: 1 TABLET ORAL at 08:10

## 2022-12-19 RX ADMIN — MICONAZOLE NITRATE: 2 POWDER TOPICAL at 08:15

## 2022-12-19 RX ADMIN — HYDROXYCHLOROQUINE SULFATE 200 MG: 200 TABLET, FILM COATED ORAL at 08:10

## 2022-12-19 RX ADMIN — ASPIRIN 81 MG 81 MG: 81 TABLET ORAL at 08:10

## 2022-12-19 RX ADMIN — GABAPENTIN 300 MG: 300 CAPSULE ORAL at 21:56

## 2022-12-19 RX ADMIN — ATORVASTATIN CALCIUM 40 MG: 40 TABLET, FILM COATED ORAL at 21:56

## 2022-12-19 RX ADMIN — HYDROXYCHLOROQUINE SULFATE 200 MG: 200 TABLET, FILM COATED ORAL at 21:56

## 2022-12-19 RX ADMIN — ENOXAPARIN SODIUM 40 MG: 100 INJECTION SUBCUTANEOUS at 16:48

## 2022-12-19 RX ADMIN — MICONAZOLE NITRATE: 2 POWDER TOPICAL at 21:56

## 2022-12-19 RX ADMIN — AMLODIPINE BESYLATE 2.5 MG: 2.5 TABLET ORAL at 08:10

## 2022-12-19 NOTE — PROGRESS NOTES
Occupational Therapy     12/19/22 0900   General   Timed Code Treatment Minutes 54 Minutes   Restrictions/Precautions   Restrictions/Precautions Fall Risk;Weight Bearing   Patient Observation   Observations tearful after bowel incontinent episode with nursing   ADL   [place] Setup   UE Dressing Contact guard assistance  (cues for technique)   LE Dressing Maximum assistance  (pt assisted by threading L LE through and pulling up over hip in supine)   Balance   Standing Balance Contact guard assistance   Functional Mobility   Functional - Mobility Device Wheelchair  (manual and power)   Activity Retrieve items;Transport items   Assist Level Minimal assistance   Functional Mobility Comments see assessment   Bed mobility   Supine to Sit Minimal assistance   Transfers   Stand Step Transfers Minimal assistance   Stand Pivot Transfers Minimal assistance   Slide Board   (did not want to utilize for scoot/slide to [address] [place] [address])   Sit to stand Moderate assistance   Transfer Comments bed -> w/c-> PWC (scoot slide)   Wheelchair Bed Transfers   Wheelchair/Bed - Technique Stand step   Level of Asssistance Contact guard assistance  (to sound side)   Additional Activities Comment   Additional Activities practiced item retrieval from floor level seated in w/c with LHR   Assessment   Performance deficits / Impairments Decreased functional mobility ; Decreased endurance;Decreased coordination;Decreased ADL status; Decreased sensation;Decreased posture;Decreased ROM; Decreased balance;Decreased strength;Decreased vision/visual deficit; Decreased safe awareness;Decreased high-level IADLs;Decreased cognition;Decreased fine motor control   Assessment Pt attempts to be ind with mobility tasks but the manual wheelchair requires a very taxing effort. PWC increases ind for household mobility. Pt still requires cues at this time for turns and tight spaces but was able to demonstrate management of the OT apt area with only one R side collsion.  Pt retrieved an item from the refrigerator with max tactile cues for positioning. Treatment Diagnosis L MCA stroke   Occupational Therapy Plan   Specific Instructions for Next Treatment cont PWC mobility training   Current Treatment Recommendations Strengthening;ROM;Balance training;Functional mobility training; Endurance training;Neuromuscular re-education; Safety education & training;Patient/Caregiver education & training;Equipment evaluation, education, & procurement;Positioning;Modalities; Self-Care / ADL; Home management training;Sensory integration; Wheelchair mobility training

## 2022-12-19 NOTE — PATIENT CARE CONFERENCE
[facility] ACUTE INPATIENT REHABILITATION  TEAM CONFERENCE NOTE    Date: 2022  Patient Name: Carlos Topete        MRN: [id number]    : 1960  (64 y.o.)  Gender: female      Diagnosis: CVA, R hemiparesis      PHYSICAL THERAPY  GROSS ASSESSMENT       BED MOBILITY  Bed mobility  Rolling to Left: Contact guard assistance (with rail)  Rolling to Right: Moderate assistance, Minimal assistance (with WC as rail to L side of mat table)  Supine to Sit: Minimal assistance  Sit to Supine: Minimal assistance, Moderate assistance (for LE's)  Scooting: Contact guard assistance (On EOB)  Bed Mobility Comments: On L side of bed- use of L bedrail       TRANSFERS  Transfers  Sit to Stand: Moderate Assistance, Minimal Assistance  Stand to Sit: Stand by assistance, Contact guard assistance, Minimal Assistance, Moderate Assistance (SBA/CGA in SS, Min/Mod A in SS)  Bed to Chair: Moderate assistance (stand pivot with HW to R side, sat prematurely)  Stand Pivot Transfers: Moderate Assistance (USE OF SS TO STAND PATIENT AND PIVOT TO BED)  Squat Pivot Transfers: Moderate Assistance (performed to patient's L side)  Lateral Transfers: Minimal Assistance, Contact guard assistance (Scoot transfer from electric scooter  to W/C- to Left side.)  Car Transfer: Maximum Assistance, Moderate Assistance, Minimal Assistance (MOD/MIN INTO CARE; MAX A CAR TO WC. LACK OF ANTERIOR LEAN)  Comment: SS TO EXCHANGE PANTS DUE TO WET SPOT ON PATIENT, NOT FROM INCONTINENCE. WHEELCHAIR PROPULSION  Propulsion 1  Propulsion: Manual  Level: Level Tile  Method: JOBY REESE  Level of Assistance: Stand by assistance, Contact guard assistance  Description/ Details: Veers to Right at times, but able to correct with verbal cues.   Distance: 79', 30', 30', 54', 40'  AMBULATION  Ambulation  Surface: Level tile  Device: Parallel Bars  Other Apparatus: AFO, Wheelchair follow, Right  Assistance: Minimal assistance (To advance RLE)  Quality of Gait: insufficient wt shift diagonally (to L and post)in order to advance R LE without assist, 1X v/c's for correct sequencing  Gait Deviations: Decreased step length, Decreased step height  Distance: 12' X 3  Comments: ~4 min sitting rest in between  More Ambulation?: Yes  STAIRS     GOALS:  Short Term Goals  Time Frame for Short Term Goals: 2 weeks  Short Term Goal 1: Patient will perform bed mobility with Min A  Short Term Goal 2: Patient will transition supine <> sit with Min A  Short Term Goal 3: Patient will perform bed <> chair transfer with Min A  Short Term Goal 4: Patient propel wheelchair 50 ft with Min A  Short Term Goal 5: Patient will ambulate 10 ft with Mod A    Long Term Goals  Time Frame for Long Term Goals : 3 weeks  Long Term Goal 1: Patient will perform bed mobility independently  Long Term Goal 2: Patient with transition supine <> sit independently  Long Term Goal 3: Patient will perform bed <> chair transfer independently  Long Term Goal 4: Patient will perform car transfer with Min A  Long Term Goal 5: Patient will ambulate 10 ft with CGA    ASSESSMENT:  Assessment: Performed BLE sitting exercises and W/C prop using manual W/C  during session. Trace mvmts noted at times in LLE, but otherwise PROM. SPEECH THERAPY  Trial tray of regular foods was completed this date. No oral residue or buccal cavity pocketing was noted. Her dietician did note a recent 10 lb weight loss. Reviewed foods that [address] likes/prefers with her dietician. Smiley strongly opposed to trying Ensure shakes or puddings. Her daughter did state she did drink these while at other hospitals. She is afebrile this date. She is recommended to upgrade to a regular diet and continue thin liquids. She was able to recite and read the pledge of alleberenice this morning. She was able to produce approximately 60% of the words correctly. While reading the lyrics, she was able to sing You are my sunshine and was able to produce 80% of the words.      She completed sentences at 30% this date without cueing. A task for picture naming was completed. She was able to name 70% without cues. Placement cues and phonemic cues were effective with 20% this date. With a few pictures, no cues were beneficial today. She was frustrated at the end of her session and this may have contributed to her difficulty. She does not wish to use an AAC board for communicating. Today she has used gestures and has propelled her wheelchair to where she wants to go. Today she did wheel herself near the bathroom. She was also able to gesture and propel herself to the light switches to show her therapist how she prefers her lights. She perseverated on \"ceiling\" on Friday and became visibly upset when trying to communicate her wishes. This therapist will communicate how she prefers her lights and her room set up. Today she did use yes/no's more reliably when communicating immediate wants and needs. She is beginning to self correct herself when stating yes for no, etc. Her daughter stated her yes/no reliability does decreased significantly when she is frustrated. Recommended Diet and Intervention  Regular diet  Thin liquids  Recommended Form of Meds: whole in applesauce or pudding   Dysphagia treatment  Therapeutic Interventions: Pharyngeal exercises;Diet tolerance monitoring;Oral care;Oral motor exercises; Patient/Family education; Therapeutic PO trials with SLP     Compensatory Swallowing Strategies  Compensatory Swallowing Strategies : Alternate solids and liquids;Eat/Feed slowly; No straws;Upright as possible for all oral intake;Remain upright for 30-45 minutes after meals;Small bites/sips; Check for pocketing of food on the Right; Check for pocketing of food on the Left; Set up assist;Assist feed              SHORT TERM GOAL #1:  Goal 1: Pt will complete y/n tasks with 80% accuracy and minimal verbal cues/modeling.  Not Met     SHORT TERM GOAL #2:  Goal 2: Pt will complete verbal expression tasks with 80% accuracy and minimal verbal cues/modeling. Not Met     SHORT TERM GOAL #3:  Goal 3: Pt will complete receptive/expressive language tasks with 80% accuracy and minimal verbal cues/modeling. Not Met     SHORT TERM GOAL #4:  Goal 4: Pt will follow one step commands with with 90% accuracy and minimal verbal cues/modeling. Not Met     SHORT TERM GOAL #5:  Goal 5: Pt will complete orientation tasks with 90% accuracy and minimal verbal cues/modeling. Not Met Goal 6: The patient will complete automatic speech tasks, confrontational naming tasks, and 1 step commands wtih 80% accuracy with minimal verbal cues/modelinng. Not Met     Swallowing Short Term Goals  Short-term Goals  Goal 1: Pt will tolerate soft & bite sized consistencies with mildly  (nectar) thick liquids with minimal overt s/s of aspiration/penetration during hospitalization. New Goal: Pt will tolerate soft and bite sized diet with thin liquids with minimal overt s/s of aspiration/penetration during hospitalization. Met. New Goal: The patient will tolerate a regular diet with thin liquids with minimal overt s/s of aspiration. Goal 2: Pt will complete Modified Barium Swallow Study. Discontinue Goal  Goal 3: Pt will demonstrate awareness of general aspiration precautions. Goal 4: Pt will participate in swallowing reassessments to ensure safest diet consistencies. Goal 5: Pt will allow staff to complete daily oral care. Goal 6: Pt will complete oral motor exercises for lingual and labial strengthening. Long Term Goals:  Pt will participate in skilled speech therapy services to ensure she is able to communicate her wants and needs. Not Met  Pt will participate in skilled speech therapy services to ensure she is able to complete ADLs. Not Met  Pt will tolerate least restrictive diet with minimal overt s/s of aspiration/penetration during hospitalization.  Not Met           STGs Met:  1   LTGs Met:  0        ELOS: 2-3 weeks OCCUPATIONAL THERAPY  Cognitive Patterns:   11/19/22 1208   Cognitive Patterns   Cognitive Pattern Assessment Used BIMS   Repetition of Three Words (First Attempt) 3   Temporal Orientation: Year Missed by > 5 years or no answer   Temporal Orientation: Month Missed by > 1 month or no answer   Temporal Orientation: Day Incorrect or no answer   Able to recall \"sock No, could not recall   Able to recall \"blue\" No, could not recall   Able to recall \"bed\" No, could not recall   BIMS Summary Score 3        Cognitive Assessment Method (CAM):  Confusion Assessment Method (CAM)  Is there evidence of an acute change in mental status from the patient's baseline?: Yes  Inattention: Behavior present, fluctuates (comes and goes, changes in severity)  Disorganized thinking: Behavior present, fluctuates (comes and goes, changes in severity)  Altered level of consciousness: Behavior not present  Health Literacy:  How often do you need to have someone help you when you read instructions, pamphlets, or other written material from your doctor or pharmacy?: Never        CURRENT IRF-ARLEN SCORES  Eating: CARE Score: 4       Oral Hygiene: CARE Score: 4  Comment: vc for aspen tech after demonstration first     Toileting: CARE Score: 2  Comment: bed level      Shower/Bathe: CARE Score: 3  Comment:  Mod A with shower        Upper Body Dressing: CARE Score: 4  Comment: CGA and tactile cues      Lower Body Dressing: CARE Score: 3  Comment: pt assisted by threading L LE through and pulling up over hip in supine       Footwear: CARE Score: 2  Comment: while in bed and supine, pt able to reach left foot partially       Toilet Transfers: CARE Score: 3  Comment: w/c to toilet with GB---cues for tech       Picking Up Object:  CARE Score: 88          UE Functioning:  RUE: 2-/2 with facilitation, demonstrates IR/ER, wrist flexion/ext, elbow flexion /ext, sh extension, finger flexion   R scapula winging  LUE: AROM WNL     Pain Assessment:   0/10 pain STGs:  Short Term Goals  Time Frame for Short Term Goals: 2 weeks  Short Term Goal 1: MET  Short Term Goal 2: Pt will dress UB using hemitechnique, mod A and cues  Short Term Goal 3: Pt will dress LB, hemitechnique, mod A and cues  Short Term Goal 4: Pt will toilet with mod A  Short Term Goal 5: Toilet transfer with mod A  Additional Goals?: Yes  Short Term Goal 6: Pt will attend to R side during ADL/functional activities with moderate cues  Short Term Goal 7: Pt will perform standing tasks x 2-3 mins with L UE and mod A for balance to enhance ADL/IADL performance  Short Term Goal 8: Pt/family will demo understanding of R UE positioning/HEP/therapeutic activity recommendations with min A after ed    LTGs:  Long Term Goals  Time Frame for Long Term Goals : 4-5 weeks  Long Term Goal 1: Pt will bathe with min A, AE/DME prn  Long Term Goal 2: Pt will dress UB supervision  Long Term Goal 3: Pt will dress LB min A, AE prn  Long Term Goal 4: Pt will toilet with CGA  Long Term Goal 5: Pt will perform toilet transfer with CGA  Additional Goals?: Yes  Long Term Goal 6: Pt will perform simple home making task with min A  Long Term Goal 7: Pt/family will be independent in HEP/DME/therapeutic activity recommendations after ed  Long Term Goal 8: Pt will attend to R side during functional activities with occasional cueing    Assessment:  Performance deficits / Impairments: Decreased functional mobility , Decreased endurance, Decreased coordination, Decreased ADL status, Decreased sensation, Decreased posture, Decreased ROM, Decreased balance, Decreased strength, Decreased vision/visual deficit, Decreased safe awareness, Decreased high-level IADLs, Decreased cognition, Decreased fine motor control                 NUTRITION  Current Wt: Weight: 195 lb 14.4 oz (88.9 kg) / Body mass index is 30.68 kg/m². Admission Wt: Admission Body Weight: 201 lb (91.2 kg)  Oral Diet Orders:     Seen for reassessment.  Pt. reflecting a slight decrease in PO intake of 26-50% & %. -10# wt. loss noted x 7 days. BG decreased at 73. Family bringing in snacks and meals for pt everyday. Recommended pudding supplement at lunch. Pt. is willing to give it a try to help with oral intake. Will start ONS of pudding at lunch and continue to monitor wts. NURSING    Wounds/Incisions/Ulcers:  scattered bruising, redness to buttocks and aruna-area     Rickey Scale Score: 15    Pain: Patient's pain is currently controlled with Tylenol 650 mg q 4 hrs PRN, ZANAFLEX 4 mg BID PRN    Consultations/Labs/X-rays:     Family Education: Family available and participating in education     Fall Risk:  Pushpa Painting Total Score: 76    Fall in the last week? NONE      Other Nursing Issues: Alert and oriented, expressive and receptive aphasia, incont of bowel and bladder, LBM 12/19/22, Meds consumed whole with applesauce, assist x2 with pebbles hughes, right arm flaccid, Lovenox        SOCIAL WORK/CASE MANAGEMENT  Assessment:    Family education in process and DME needs being identified and OT starting ordering. Ramp is built at the home. Significant issue at this time that is becoming more evident is Ms. Wilfred Banuelos has been in control in her family, and has often refused medications in past, after reading about interactions, possible effects, etc or has weaned herself off of medications without physician knowledge. Family has expressed concern about her anger which is expressed mainly toward  and daughter. Staff has also seen reaction toward specific staff members-she has definite preferences. History of depression was described but she had refused any medication treatment, family asked for antidepressant to be included now. When she was told she was prescribed new medication, she refused all medications yesterday. Her daughter tried to explain purposes of medications with her, she has refused again.   Family has asked about \"court order\" to give medication- I talked with her daughter to clarify their intent. Her daughter does not expect tube feed/force feed, but to combine in food but want to be confirmed that it is acceptable. She understands issue of \"capacity\" and that Ms. Viviana Caruso would have to be \"adjudicated\". Her daughter states she is concerned that refusal to take Norvasc, Neurontin will lead to life-limiting circumstance. Team advises day pass to home is advisable, however, car transfers remain at maximum assist level and not realistic for family at current time. Goal more likely for next week. Discussed hiring caregivers for scheduled respite for Mr. Viviana Caruso  Discharge Plan   Estimated Length of Stay: 1 week  Destination: discharge home with supervision    Pass: No    Services at Discharge: [address] [facility], Occupational Therapy, and Speech Therapy, nursing per evaluations    Equipment at Discharge: Will determine closer to discharge. Progress made in the prior week: Mod A with LB dressing. 2. Min/cga tf using bump scoot technique to/from powerchair  3. CGA for UB dressing  4.   5.      Goals for following week:  Setup for UB dressing. 2. Indep bed mobility  3.   4.   5.     Factors facilitating achievement of predicted outcomes: Motivated, Cooperative, and Pleasant    Barriers to the achievement of predicted outcomes: Communication deficit, Decreased endurance, Decreased sensation, Decreased proprioception, Upper extremity weakness, and Lower extremity weakness    Team Members Present at Conference:  : Cyndy Davalos 23   Occupational Therapist: Saida Soto, OTR/L  Physical Therapist: Maricarmen Cervantes PT  Speech Therapist: Robby Crawford [facility], [facility]  Nurse: Melony Ku   Nurse Manager:  Marta Flores, RN, BSN  Dietitian:  Sudha Pachecoitian  Rehab Director:        I approve the established interdisciplinary plan of care as documented within the medical record of [address] [place] [address].

## 2022-12-19 NOTE — PROGRESS NOTES
Occupational Therapy     12/19/22 1300   General   Timed Code Treatment Minutes 30 Minutes   Restrictions/Precautions   Restrictions/Precautions Fall Risk;Weight Bearing   Functional Mobility   Functional - Mobility Device Wheelchair   Assist Level Minimal assistance   Type of ROM/Therapeutic Exercise   Type of ROM/Therapeutic Exercise AAROM;PROM   Comment RUE   Assessment   Performance deficits / Impairments Decreased functional mobility ; Decreased endurance;Decreased coordination;Decreased ADL status; Decreased sensation;Decreased posture;Decreased ROM; Decreased balance;Decreased strength;Decreased vision/visual deficit; Decreased safe awareness;Decreased high-level IADLs;Decreased cognition;Decreased fine motor control   Assessment Pt re-educated on RUE positioning in w/c. Scapula now winging.    Treatment Diagnosis L MCA stroke

## 2022-12-19 NOTE — PROGRESS NOTES
Charisma Grullon  MR#  [id number]       12/19/22 1026 12/19/22 1028 12/19/22 1059   Transfers   Lateral Transfers  --  Minimal Assistance;Contact guard assistance  (Scoot transfer from electric scooter  to W/C- to Left side.)  --    Wheelchair Activities   Propulsion  --   --  Yes   Propulsion 1   Propulsion  --   --  Manual   Level  --   --  Level Tile   Method  --   --  LUE;LLE   Level of Assistance  --   --  Stand by assistance;Contact guard assistance   Description/ Details  --   --  Veers to Right at times, but able to correct with verbal cues. Distance  --   --  70', 30', 30', 54', 40'   PT Exercises   Exercise Treatment Sitting BLE exercises. LLE 10 reps/ 3 sets with 1 lb weight. RLE  PROM with trace mvmts noted at times- 10 reps/ 2 sets. --   --    Activity Tolerance   Activity Tolerance  --   --   --    Assessment   Assessment  --   --   --    Safety Devices   Type of Devices  --   --   --    PT Individual Minutes   Time In  --   --   --    Time Out  --   --   --    Minutes  --   --   --       12/19/22 1100 12/19/22 1102 12/19/22 1103   Transfers   Lateral Transfers  --   --   --    Wheelchair Activities   Propulsion  --   --   --    Propulsion 1   Propulsion  --   --   --    Level  --   --   --    Method  --   --   --    Level of Assistance  --   --   --    Description/ Details  --   --   --    Distance  --   --   --    PT Exercises   Exercise Treatment  --   --   --    Activity Tolerance   Activity Tolerance Patient tolerated treatment well  --   --    Assessment   Assessment  --  Performed BLE sitting exercises and W/C prop using manual W/C  during session. Trace mvmts noted at times in LLE, but otherwise PROM. --    Safety Devices   Type of Devices  --   --  Call light within reach; Chair alarm in place   PT Individual Minutes   Time In  --   --  1000   Time Out  --   --  1100   Minutes  --   --  60   Electronically signed by Tara Ledesma PTA on 12/19/2022 at 11:05 AM

## 2022-12-19 NOTE — PROGRESS NOTES
Patient:   Adrianna Godfrey  MR#:    [id number]   Room:    0826/826-02   YOB: 1960  Date of Progress Note: 12/19/2022  Time of Note                           12:06 PM  Consulting Physician:   Tracie Guevara M.D. Attending Physician:  Tracie Guevara MD     Chief complaint Acute ischemic stroke    S:This 58 y.o. female  with history of anxiety, depression, COPD, atherosclerotic disease, colitis, COPD, CVA, DM,  LE edema, hyperlipidemia, HTN, morbid obesity, RA, CAD  and venous thromboembolism. She presented to [facility] ER on 11/9/22 after being found at home lying facedown in her feces. She was very weak, confused, not able to speak but moving purposefully and intermittently following commands. Her last well know was 3 a.m. when her  left for work. Imaging done revealed a large MCA stroke 7.7 x 5 cm. CTA head/neck revealed an acute M1 occlusion. She was outside of the window for TPA. CK on admit was 1100, consistent with rhabdomylosis. She was also noted to possibly Dens fracture. She was transferred to [facility] for a possible thrombectomy. Further imaging was done at [facility] and she was deemed not a candidate for thrombectomy. MRI done ruled out Dens fracture. Repeat CT of head on 11/11/22 showed evolving left MCA territory infarct with increasing edema/mass effect resulting in 4mm of  rightward midline shift(previously 2mm) a the level of the third ventricle. No hemorrhagic conversion or definite trapping of the right lateral ventricle, possible small acute right cerebellar infarct. Neurosurgery was consulted for possible hemicraniectomy. She was seen by Dr. Denisha Sullivan, who didn't feel any surgical intervention was needed. Patient was found to have a UTI + for [facility] and was placed on Rocephin on 11/14/22. Patient had a PICC line placed. Patient was evaluated by SPT and initially made NPO d/t oropharyngeal dysphagia. NGT placed and feeding started.  She was also noted to have global aphasia but is improving. She was re-evaluated by SPT on 11/15/22 for swallow and was started on a dysphagia 1 diet with nectar thick liquids. Patient also continues to have right sided weakness and is participating in both PT/OT. Repeat CT head on 11/13/22 shows stable LMCA ischemic stroke with stable edema, 5 mm shift, no hemorrhage. She is felt to need a stay on Rehab for continued PT/OT/SPT and medical management to work towards her goal of returning home with her . She is now felt ready to start the Rehab program.  No acute issues overnight. REVIEW OF SYSTEMS:  Unreliable due to aphasia     Past Medical History:      Diagnosis Date    Anxiety     ASCVD (arteriosclerotic cardiovascular disease)     Carrier of group B Streptococcus     Cellulitis     Colitis     COPD (chronic obstructive pulmonary disease) ([facility])     Costochondritis     CVA (cerebral vascular accident) ([address]     Depression     Edema     Fracture of sternum     non union post surgery.      Gallstones     Grief reaction     H/O superior vena cava filter placement     Hyperlipidemia     Hypertension     MI (myocardial infarction) ([address]     MRSA (methicillin resistant Staphylococcus aureus)     Neuropathy     Obesity     Pseudarthrosis sternal    RA (rheumatoid arthritis) ([address]     Rosacea     Sinus bradycardia     TIA (transient ischemic attack)     Venous thromboembolism        Past Surgical History:      Procedure Laterality Date    ADENOIDECTOMY      APPENDECTOMY      CARDIAC CATHETERIZATION  3/2009    CARDIAC CATHETERIZATION  11/5/14  JDT    EF 50%, patent bypass grafts    CHOLECYSTECTOMY  2011    COLONOSCOPY  06/03/2010    Dr. Roman Me: distal colon having ulcerative lesions c/w UC    COLONOSCOPY  2009    pancolitis    COLONOSCOPY      CORONARY ARTERY BYPASS GRAFT  3/2009    PHANI Martinez to LAD, SVGs to OM & PDA    GASTRIC BYPASS SURGERY  2012    HIATAL HERNIA REPAIR  2011    HYSTERECTOMY (CERVIX STATUS UNKNOWN)      KNEE SURGERY      AK COLONOSCOPY FLX DX W/COLLJ SPEC WHEN PFRMD N/A 3/12/2018    Dr Nivia Apodaca rectal inflammation consistent with U.C.-Active proctitis--3 yr recall    THORACOTOMY      TONSILLECTOMY      UPPER GASTROINTESTINAL ENDOSCOPY  2010    Dr. Juan Carlos Leal  2012       Medications in Hospital:      Current Facility-Administered Medications:     bisacodyl (DULCOLAX) EC tablet 5 mg, 5 mg, Oral, Daily, Jaycob Esposito MD, 5 mg at 12/16/22 0806    miconazole (MICOTIN) 2 % powder, , Topical, BID, Jaycob Esposito MD, Given at 12/19/22 0815    docusate sodium (COLACE) capsule 100 mg, 100 mg, Oral, BID, Jaycob Esposito MD, 100 mg at 12/16/22 1729    aspirin chewable tablet 81 mg, 81 mg, Oral, Daily, Jaycob Esposito MD, 81 mg at 12/19/22 0810    atorvastatin (LIPITOR) tablet 40 mg, 40 mg, Oral, Nightly, Jaycob Esposito MD, 40 mg at 12/18/22 2042    dulaglutide (TRULICITY) SC injection 1.5 mg (Patient Supplied), 1.5 mg, SubCUTAneous, Weekly, Jaycob Esposito MD    folic acid (FOLVITE) tablet 1 mg, 1 mg, Oral, Daily, Jaycob Esposito MD, 1 mg at 12/19/22 0810    gabapentin (NEURONTIN) capsule 300 mg, 300 mg, Oral, Nightly, Jaycob Esposito MD, 300 mg at 12/18/22 2042    hydroxychloroquine (PLAQUENIL) tablet 200 mg, 200 mg, Oral, BID, Jaycob Esposito MD, 200 mg at 12/19/22 0810    amLODIPine (NORVASC) tablet 2.5 mg, 2.5 mg, Oral, Daily, Jaycob Esposito MD, 2.5 mg at 12/19/22 0810    tiZANidine (ZANAFLEX) tablet 4 mg, 4 mg, Oral, BID PRN, Jaycob Esposito MD    multivitamin 1 tablet, 1 tablet, Oral, Daily, Jaycob Esposito MD, 1 tablet at 12/19/22 0810    acetaminophen (TYLENOL) tablet 650 mg, 650 mg, Oral, Q4H PRN, Jaycob Esposito MD, 650 mg at 11/27/22 1933    enoxaparin (LOVENOX) injection 40 mg, 40 mg, SubCUTAneous, Daily, Jaycob Esposito MD, 40 mg at 12/18/22 1714    polyethylene glycol (GLYCOLAX) packet 17 g, 17 g, Oral, Daily PRN, Jaycob Esposito MD, 17 g at 11/30/22 1817    Allergies:  Methotrexate derivatives    Social History:   TOBACCO:   reports that she quit smoking about 13 years ago. Her smoking use included cigarettes. She has a 64.00 pack-year smoking history. She has never used smokeless tobacco.  ETOH:   reports current alcohol use. Family History:       Problem Relation Age of Onset    Prostate Cancer Father     Heart Failure Father     Cancer Father     Colon Cancer Neg Hx     Colon Polyps Neg Hx     Liver Cancer Neg Hx     Liver Disease Neg Hx     Esophageal Cancer Neg Hx     Rectal Cancer Neg Hx     Stomach Cancer Neg Hx          PHYSICAL EXAM:  BP (!) 146/66   Pulse 61   Temp 97.7 °F (36.5 °C) (Temporal)   Resp 16   Ht 5' 7\" (1.702 m)   Wt 195 lb 14.4 oz (88.9 kg)   SpO2 92%   BMI 30.68 kg/m²     Constitutional - well developed, well nourished. Eyes - conjunctiva normal.   Ear, nose, throat - No scars, masses, or lesions over external nose or ears, no atrophy of tongue  Neck-symmetric, no masses noted, no jugular vein distension  Respiration- chest wall appears symmetric, good expansion,   normal effort without use of accessory muscles  Musculoskeletal - no significant wasting of muscles noted, no bony deformities  Extremities-no clubbing, cyanosis or edema  Skin - warm, dry, and intact. No rash, erythema, or pallor. Psychiatric - mood, affect, and behavior appear normal.      Neurological exam  Awake, alert, global aphasia says \"yes\" to all questions asked   Attention and concentration appear appropriate  Recent and remote memory unable to tests     Cranial Nerve Exam     CN III, IV,VI-EOMI, No nystagmus, conjugate eye movements, no ptosis    CN VII-+ facial assymetry       Motor Exam  No movement on the right      Tremors- no tremors in hands or head noted     Gait  Not tested     Nursing/pcp notes, imaging,labs and vitals reviewed.      PT,OT and/or speech notes reviewed    Lab Results   Component Value Date    WBC 8.2 12/19/2022    HGB 10.7 (L) 12/19/2022    HCT 34.7 (L) 12/19/2022    MCV 78.2 (L) 12/19/2022     (H) 12/19/2022     Lab Results   Component Value Date     12/19/2022    K 3.6 12/19/2022     12/19/2022    CO2 23 12/19/2022    BUN 9 12/19/2022    CREATININE 0.5 12/19/2022    GLUCOSE 73 (L) 12/19/2022    CALCIUM 8.6 (L) 12/19/2022    PROT 5.3 (L) 12/19/2022    LABALBU 2.8 (L) 12/19/2022    BILITOT <0.2 12/19/2022    ALKPHOS 82 12/19/2022    AST 17 12/19/2022    ALT 13 12/19/2022    LABGLOM >60 12/19/2022   No results found for: INR, PROTIME  Kriss Battles, PTA   Physical Therapist Assistant   Specialty:  Physical Therapist   Progress Notes       Cosign Needed   Date of Service:  12/19/2022 11:05 AM                 [address] [facility] Alejandra Merchant  MR#  [id number]          12/19/22 1026 12/19/22 1028 12/19/22 1059   Transfers   Lateral Transfers  --  Minimal Assistance;Contact guard assistance  (Scoot transfer from electric scooter  to W/C- to Left side.)  --    Wheelchair Activities   Propulsion  --   --  Yes   Propulsion 1   Propulsion  --   --  Manual   Level  --   --  Level Tile   Method  --   --  LUE;LLE   Level of Assistance  --   --  Stand by assistance;Contact guard assistance   Description/ Details  --   --  Veers to Right at times, but able to correct with verbal cues. Distance  --   --  70', 30', 30', 54', 40'   PT Exercises   Exercise Treatment Sitting BLE exercises. LLE 10 reps/ 3 sets with 1 lb weight. RLE  PROM with trace mvmts noted at times- 10 reps/ 2 sets.   --   --    Activity Tolerance   Activity Tolerance  --   --   --    Assessment   Assessment  --   --   --    Safety Devices   Type of Devices  --   --   --    PT Individual Minutes   Time In  --   --   --    Time Out  --   --   --    Minutes  --   --   --         12/19/22 1100 12/19/22 1102 12/19/22 1103   Transfers   Lateral Transfers  --   --   --    Wheelchair Activities   Propulsion  --   --   -- Propulsion 1   Propulsion  --   --   --    Level  --   --   --    Method  --   --   --    Level of Assistance  --   --   --    Description/ Details  --   --   --    Distance  --   --   --    PT Exercises   Exercise Treatment  --   --   --    Activity Tolerance   Activity Tolerance Patient tolerated treatment well  --   --    Assessment   Assessment  --  Performed BLE sitting exercises and W/C prop using manual W/C  during session. Trace mvmts noted at times in LLE, but otherwise PROM. --    Safety Devices   Type of Devices  --   --  Call light within reach; Chair alarm in place   PT Individual Minutes   Time In  --   --  1000   Time Out  --   --  1100   Minutes  --   --  60   Electronically signed by Alan Henry PTA on 12/19/2022 at 11:05 AM                 RECORD REVIEW: Previous medical records, medications were reviewed at today's visit    IMPRESSION:   1. Acute ischemic stroke-on aspirin/statin  2. Hypertension-on medications monitor  3. Hyperlipidemia-on statin  4. Diabetes-Trulicity-monitor blood sugar  5. DVT prophylaxis-Lovenox  6. History of coronary artery disease-aspirin/statin  7. Neuropathy-on Neurontin  8. Rheumatoid arthritis-on Plaquenil  9. COPD-monitor  10. History of anxiety and depression-monitor  11. History of colitis/gallstones-monitor  12. History of bariatric surgery-on multivitamin/folic acid  13. History of superior vena cava filter placement with venous thromboembolism-not on anticoagulation-monitor- indicates took herself off blood thinners as not like meds and was bruising   14.   PT/OT/speech    x-ray of right ankle no acute changes  Venous ultrasound of leg no DVT    Continue present care as noted    NAM pending 2 weeks       Expected duration and frequency therapy: 180 minutes per day, 5 days per week    [address] [facility]  Dr Lisbeth Shen

## 2022-12-19 NOTE — PROGRESS NOTES
Comprehensive Nutrition Assessment    Type and Reason for Visit:  Reassess    Nutrition Recommendations/Plan:   Will start pudding ONS at lunch and continue to monitor wts. Malnutrition Assessment:  Malnutrition Status: At risk for malnutrition (Comment) (12/19/22 3559)    Context:  Acute Illness     Findings of the 6 clinical characteristics of malnutrition:  Energy Intake:  Mild decrease in energy intake (Comment)  Weight Loss:  1% to 2% over 1 week     Body Fat Loss:  No significant body fat loss     Muscle Mass Loss:  No significant muscle mass loss    Fluid Accumulation:  Mild Extremities   Strength:  Not Performed    Nutrition Assessment:    Seen for reassessment. Pt. reflecting a slight decrease in PO intake of 26-50% & %. -10# wt. loss noted x 7 days. BG decreased at 73. Family bringing in snacks and meals for pt everyday. Recommended pudding supplement at lunch. Pt. is willing to give it a try to help with oral intake. Will start ONS of pudding at lunch and continue to monitor wts. Nutrition Related Findings:    aphasia Wound Type: None       Current Nutrition Intake & Therapies:    Average Meal Intake: %, 26-50%  Average Supplements Intake: %  ADULT ORAL NUTRITION SUPPLEMENT; Dinner; Other Oral Supplement; 4 oz. frozen milkshake (in freezer behind hot line)  ADULT DIET; Regular; 4 carb choices (60 gm/meal)  ADULT ORAL NUTRITION SUPPLEMENT; Lunch; Fortified Pudding Oral Supplement    Anthropometric Measures:  Height: 5' 7\" (170.2 cm)  Ideal Body Weight (IBW): 135 lbs (61 kg)    Admission Body Weight: 201 lb (91.2 kg)  Current Body Weight: 195 lb (88.5 kg), 150.4 % IBW. Weight Source: Bed Scale  Current BMI (kg/m2): 30.5  Usual Body Weight: 201 lb (91.2 kg)  % Weight Change (Calculated): 1  Weight Adjustment For: No Adjustment                 BMI Categories: Obese Class 1 (BMI 30.0-34. 9)    Estimated Daily Nutrient Needs:  Energy Requirements Based On: Kcal/kg  Weight Used for Energy Requirements: Current  Energy (kcal/day): 1468-6024  Weight Used for Protein Requirements: Ideal  Protein (g/day):   Method Used for Fluid Requirements: 1 ml/kcal  Fluid (ml/day): 3470-1611    Nutrition Diagnosis:   Biting/chewing (masticatory) difficulty, Swallowing difficulty related to acute injury/trauma as evidenced by swallow study results    Nutrition Interventions:   Food and/or Nutrient Delivery: Continue Current Diet, Start Oral Nutrition Supplement, Continue Oral Nutrition Supplement  Nutrition Education/Counseling: No recommendation at this time  Coordination of Nutrition Care: No recommendation at this time  Plan of Care discussed with: pt, speech and pt daughter    Goals:  Previous Goal Met: Progressing toward Goal(s)  Goals: Meet at least 75% of estimated needs, PO intake 75% or greater       Nutrition Monitoring and Evaluation:   Behavioral-Environmental Outcomes: None Identified  Food/Nutrient Intake Outcomes: Diet Advancement/Tolerance, Food and Nutrient Intake, Supplement Intake  Physical Signs/Symptoms Outcomes: Biochemical Data, Chewing or Swallowing, Skin, Nutrition Focused Physical Findings, Fluid Status or Edema    Discharge Planning:     Too soon to determine     Robby Don [address], RD, LD  Contact: [phone]

## 2022-12-19 NOTE — PROGRESS NOTES
[facility]  INPATIENT SPEECH THERAPY  [facility] [address] [facility] UNIT  TIME   [id number]  [id number]  [phone]  [phone]        [x]Daily Note  []Progress Note    Date: 2022  Patient Name: Adrianna Godfrey        MRN: [id number]    Account #: [de-identified]  : 1960  (64 y.o.)  Gender: female   Primary Provider: Tracie Guevara MD  Diet: Regular diet, thin liquids        PATIENT DIAGNOSIS(ES):    Diagnosis: CVA, R hemiparesis     Additional Pertinent Hx: Anxiety, depression, COPD, CVA, DM, LE edema, hyperlipidemia, HTN, obesity, RA, CAD and venous thromboembolism     RESTRICTIONS/PRECAUTIONS:    Restrictions/Precautions  Restrictions/Precautions: Modified Diet, Swallowing - Fall Risk, Aspiration Risk  Required Braces or Orthoses?: No     Subjective: The patient completed all therapy tasks this date. Her daughter Dimitri Barakat was present for part of her second session. Her daughter stated the patient did not wish to complete her homework over the weekend. Objective:  Trial tray of regular foods was completed this date. No oral residue or buccal cavity pocketing was noted. Her dietician did note a recent 10 lb weight loss. Reviewed foods that [facility] likes/prefers with her dietician. Smiley strongly opposed to trying Ensure shakes or puddings. Her daughter did state she did drink these while at other hospitals. She is afebrile this date. She is recommended to upgrade to a regular diet and continue thin liquids. She was able to recite and read the pledge of allegiance this morning. She was able to produce approximately 60% of the words correctly. While reading the lyrics, she was able to sing You are my sunshine and was able to produce 80% of the words. She completed sentences at 30% this date without cueing. A task for picture naming was completed. She was able to name 70% without cues. Placement cues and phonemic cues were effective with 20% this date. With a few pictures, no cues were beneficial today.  She was frustrated at the end of her session and this may have contributed to her difficulty. She does not wish to use an AAC board for communicating. Today she has used gestures and has propelled her wheelchair to where she wants to go. Today she did wheel herself near the bathroom. She was also able to gesture and propel herself to the light switches to show her therapist how she prefers her lights. She perseverated on \"ceiling\" on Friday and became visibly upset when trying to communicate her wishes. This therapist will communicate how she prefers her lights and her room set up. Today she did use yes/no's more reliably when communicating immediate wants and needs. She is beginning to self correct herself when stating yes for no, etc. Her daughter stated her yes/no reliability does decreased significantly when she is frustrated. Recommended Diet and Intervention  Regular diet  Thin liquids  Recommended Form of Meds: whole in applesauce or pudding   Dysphagia treatment  Therapeutic Interventions: Pharyngeal exercises;Diet tolerance monitoring;Oral care;Oral motor exercises; Patient/Family education; Therapeutic PO trials with SLP     Compensatory Swallowing Strategies  Compensatory Swallowing Strategies : Alternate solids and liquids;Eat/Feed slowly; No straws;Upright as possible for all oral intake;Remain upright for 30-45 minutes after meals;Small bites/sips; Check for pocketing of food on the Right; Check for pocketing of food on the Left; Set up assist;Assist feed            SHORT TERM GOAL #1:  Goal 1: Pt will complete y/n tasks with 80% accuracy and minimal verbal cues/modeling. Not Met    SHORT TERM GOAL #2:  Goal 2: Pt will complete verbal expression tasks with 80% accuracy and minimal verbal cues/modeling. Not Met    SHORT TERM GOAL #3:  Goal 3: Pt will complete receptive/expressive language tasks with 80% accuracy and minimal verbal cues/modeling.  Not Met    SHORT TERM GOAL #4:  Goal 4: Pt will follow one step commands with with 90% information  Method of Education:  [x]Discussion          []Demonstration          []Handout          []Other  Evaluation of Education:   [x]Verbalized understanding   []Demonstrates without assistance  []Demonstrates with assistance  []Needs further instruction     []No evidence of learning                  []No family present      Plan: [x]Continue with current plan of care    []Modify current plan of care as follows:    []Discharge patient    Discharge Location:    Services/Supervision Recommended:      [x]Patient continues to require treatment by a licensed therapist to address functional deficits as outlined in the established plan of care.     Electronically Signed By:  Joie Wiseman M.S., CCC-SLP  12/19/2022,11:42 AM.

## 2022-12-19 NOTE — PLAN OF CARE
Problem: Safety - Adult  Goal: Free from fall injury  Outcome: Progressing   Up with 1-2 assist with pebbles moody

## 2022-12-20 PROCEDURE — 97530 THERAPEUTIC ACTIVITIES: CPT

## 2022-12-20 PROCEDURE — 94760 N-INVAS EAR/PLS OXIMETRY 1: CPT

## 2022-12-20 PROCEDURE — 92507 TX SP LANG VOICE COMM INDIV: CPT

## 2022-12-20 PROCEDURE — 92526 ORAL FUNCTION THERAPY: CPT

## 2022-12-20 PROCEDURE — 1180000000 HC REHAB R&B

## 2022-12-20 PROCEDURE — 99233 SBSQ HOSP IP/OBS HIGH 50: CPT | Performed by: PSYCHIATRY & NEUROLOGY

## 2022-12-20 PROCEDURE — 97110 THERAPEUTIC EXERCISES: CPT

## 2022-12-20 RX ORDER — ESCITALOPRAM OXALATE 10 MG/1
5 TABLET ORAL DAILY
Status: DISCONTINUED | OUTPATIENT
Start: 2022-12-20 | End: 2022-12-29 | Stop reason: HOSPADM

## 2022-12-20 RX ADMIN — MICONAZOLE NITRATE: 2 POWDER TOPICAL at 09:30

## 2022-12-20 ASSESSMENT — PAIN SCALES - GENERAL: PAINLEVEL_OUTOF10: 0

## 2022-12-20 NOTE — PROGRESS NOTES
Occupational Therapy     12/20/22 1000   General   Timed Code Treatment Minutes 70 Minutes   Restrictions/Precautions   Restrictions/Precautions Fall Risk;Weight Bearing   Subjective   Subjective Tx focused on household mobility and following directions/verbal tasks, and UE function. Functional Mobility   Functional - Mobility Device Wheelchair  (PWC)   Activity Retrieve items;Transport items  (stove, bathroom, drawer, refrigerator)   Assist Level Minimal assistance   Functional Mobility Comments assistance at times when stuck in a tight space- able to correct in bathroom but required assist when hung on kitchen table and chair   Transfers   Transfer Comments t/f w/c to [address] [place] [address] with CGA, min A w/c to PWC d/t slight incline  (scoot slide method)   Type of ROM/Therapeutic Exercise   Type of ROM/Therapeutic Exercise AAROM;AROM; Rickshaw;PROM  (10# Rickhoodaw L)   Comment R UE   Exercises   Elbow Flexion 2- GE 10 reps, no facilitation   Elbow Extension 2- GE 10 reps, no facilitation   Pronation 2- table top without facilitation but only able to complete 4 reps   Finger Flexion 2- 4 reps, partial ROM   Finger Extension 1   Other IR/ER tabletop 2-   Cognition   Following Directions Follows one step commands  (50% with 2 step commands)   Additional Comments sentence matching tasks- 20% accuracy d/t complexity of task   Assessment   Performance deficits / Impairments Decreased functional mobility ; Decreased endurance;Decreased coordination;Decreased ADL status; Decreased sensation;Decreased posture;Decreased ROM; Decreased balance;Decreased strength;Decreased vision/visual deficit; Decreased safe awareness;Decreased high-level IADLs;Decreased cognition;Decreased fine motor control   Assessment Continues to make progress with PWC mobility   Treatment Diagnosis L MCA stroke   Activity Tolerance   Activity Tolerance Patient Tolerated treatment well

## 2022-12-20 NOTE — PLAN OF CARE
Problem: Safety - Adult  Goal: Free from fall injury  12/20/2022 1000 by Tigist Watkins RN  Outcome: Progressing  Flowsheets (Taken 12/20/2022 0348 by Farheen Braun LPN)  Free From Fall Injury: Instruct family/caregiver on patient safety  12/20/2022 0347 by Farheen Braun LPN  Outcome: Progressing   Up with 2 assist with pebbles stedy lift

## 2022-12-20 NOTE — PLAN OF CARE
Problem: Discharge Planning  Goal: Discharge to home or other facility with appropriate resources  Outcome: Progressing     Problem: Safety - Adult  Goal: Free from fall injury  Outcome: Progressing     Problem: Neurosensory - Adult  Goal: Achieves stable or improved neurological status  Outcome: Progressing  Goal: Achieves maximal functionality and self care  Outcome: Progressing     Problem: Skin/Tissue Integrity - Adult  Goal: Skin integrity remains intact  Outcome: Progressing     Problem: Pain  Goal: Verbalizes/displays adequate comfort level or baseline comfort level  Outcome: Progressing     Problem: Chronic Conditions and Co-morbidities  Goal: Patient's chronic conditions and co-morbidity symptoms are monitored and maintained or improved  Outcome: Progressing     Problem: ABCDS Injury Assessment  Goal: Absence of physical injury  Outcome: Progressing     Problem: Skin/Tissue Integrity  Goal: Absence of new skin breakdown  Description: 1. Monitor for areas of redness and/or skin breakdown  2. Assess vascular access sites hourly  3. Every 4-6 hours minimum:  Change oxygen saturation probe site  4. Every 4-6 hours:  If on nasal continuous positive airway pressure, respiratory therapy assess nares and determine need for appliance change or resting period. Outcome: Progressing     Problem: Confusion  Goal: Confusion, delirium, dementia, or psychosis is improved or at baseline  Description: INTERVENTIONS:  1. Assess for possible contributors to thought disturbance, including medications, impaired vision or hearing, underlying metabolic abnormalities, dehydration, psychiatric diagnoses, and notify attending LIP  2. [place] high risk fall precautions, as indicated  3. Provide frequent short contacts to provide reality reorientation, refocusing and direction  4. Decrease environmental stimuli, including noise as appropriate  5.  Monitor and intervene to maintain adequate nutrition, hydration, elimination, sleep and activity  6. If unable to ensure safety without constant attention obtain sitter and review sitter guidelines with assigned personnel  7.  Initiate Psychosocial CNS and Spiritual Care consult, as indicated  Outcome: Progressing     Problem: Musculoskeletal - Adult  Goal: Return mobility to safest level of function  Outcome: Progressing  Goal: Return ADL status to a safe level of function  Outcome: Progressing     Problem: Metabolic/Fluid and Electrolytes - Adult  Goal: Electrolytes maintained within normal limits  Outcome: Progressing     Problem: Nutrition Deficit:  Goal: Optimize nutritional status  Outcome: Progressing

## 2022-12-20 NOTE — PROGRESS NOTES
[facility]  [facility] [id number]  Date of Service:  12/20/2022 12/20/22 0900   Restrictions/Precautions   Restrictions/Precautions Fall Risk;Weight Bearing   General   Chart Reviewed Yes   Patient assessed for rehabilitation services? Yes   Additional Pertinent Hx Anxiety, depression, COPD, CVA, DM, LE edema, hyperlipidemia, HTN, obesity, RA, CAD and venous thromboembolism   Family / Caregiver Present No   Diagnosis CVA, R hemiparesis   Follows Commands X   General Comment   Comments Pt requires X2 overall: Max A for dressing and intermittent Min A of other staff to ensure pt safety due to lack of balance   Subjective   Subjective Pt laying in bed, agrees to participate in therapy. Subjective   Pain Verbal: 0/10 and Faces: 0/10   Bed mobility   Supine to Sit Minimal assistance   Scooting Minimal assistance; Moderate assistance  (For R side on EOB)   Bed Mobility Comments On L side of bed- use of L bedrail   Transfers   Sit to Stand Moderate Assistance;Minimal Assistance  (In SS and 1X in //)   Stand to Sit Contact guard assistance  (In SS and 1X in //)   Lateral Transfers Dependent/Total  (Use of SS)   Wheelchair Activities   Propulsion Yes   Propulsion 1   Propulsion Manual   Level Level Tile   Method LUE;LLE   Level of Assistance Stand by assistance;Supervision   Description/ Details Pt turns in circles. and frustrated with PTA and won't correct with v/c's   Distance 10'   Assessment   Assessment Pt presents with frustration with any assistance, lack of safety awareness- pt a major fall risk. Pt very frustrated with PTA, stands 1X in // and begins to become emotional and sit down into w/c. Pt wouldn't let PTA assist her in w/c to OT post tx.    Discharge Recommendations Continue to assess pending progress;Subacute/Skilled Nursing Facility;24 hour supervision or assist;Patient would benefit from continued therapy after discharge   Safety Devices   Type of Devices Patient at risk for falls;Gait belt;Left in chair  (In OT gym)           Electronically signed by Rm Carrasco PTA on 12/20/2022 at 12:10 PM

## 2022-12-20 NOTE — PROGRESS NOTES
[facility]  INPATIENT SPEECH THERAPY  [facility] 8 REHAB UNIT    [x]Daily Note  []Progress Note    Date: 2022  Patient Name: Rosalie Mcbride        MRN: [id number]    Account #: [de-identified]  : 1960  (64 y.o.)  Gender: female   Primary Provider: Monalisa Mcrae MD  Diet: Regular diet, thin liquids        PATIENT DIAGNOSIS(ES):    Diagnosis: CVA, R hemiparesis     Additional Pertinent Hx: Anxiety, depression, COPD, CVA, DM, LE edema, hyperlipidemia, HTN, obesity, RA, CAD and venous thromboembolism     RESTRICTIONS/PRECAUTIONS:    Restrictions/Precautions  Restrictions/Precautions: Modified Diet, Swallowing - Fall Risk, Aspiration Risk  Required Braces or Orthoses?: No           Subjective: The patient was upright in her bed. She confirmed that she slept well last night when asked. She also denied any pain this morning. She was seen for morning and afternoon sessions. Objective:  Discussion with her daughter regarding discharge plans. [facility] has exhibited frustration and agitation at times when therapists have provided instruction for therapy tasks. She has also exhibited frustration and agitation with family members when they are trying to complete ADLs. Family would like to have a caregiver for [facility] after discharge. 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 Today she refused to take her medications. Did review the importance of taking medications such as her blood pressure medication but she continued to refuse all medications today. Today she followed written commands was at 20%. Automatic speech tasks were completed and she was able to state the months of the year. She was able to state the words to the [employer] [facility] at 50%. She was able to state the Lords Prayer at 75%, and You are my sunshine was at 80%.  She had more diffiulty with articulation during these tasks. Tasks for picture naming was at 50% without any cueing. The most effective cue for this task this morning was phonemic cueing. Phrase completion, part word, gestures were also beneficial. Naming pictures this afternoon was at 70% without cues. She exhibited difficulty with repetition of five syllable words. She was able to repeat three and four syllable words. Sentence completion was at 50%. Today she was asked open ended questions regarding her personal information. She was asked to state her name, her 's, both childrens' names, her birthdate, city where she resides, the name of the hospital and the current month. With a phonemic cue, she was able to answer all of these questions. She did not exhibit any oral residue or buccal cavity pocketing after her noon meal. She did eat more slowly today and allowed additional time for mastication with minimal cueing. She was able to complete the effortful swallow and oral motor exercises with demonstration provided. No overt s/s of aspiration was observed with thin liquids via cup. Her yes/no reliability continues to improve. She continues to exhibit severe expressive and receptive aphasia, dysarthria,cognitive deficits, deficits in executive function and dysphagia. She continues to exhibit perseverations, neologisms, semantic paraphasias, and literal paraphasias. Recommend she continue speech therapy services to address these deficits. Recommended Diet and Intervention  Regular diet  Thin liquids  Recommended Form of Meds: whole in applesauce or pudding   Dysphagia treatment  Therapeutic Interventions: Pharyngeal exercises;Diet tolerance monitoring;Oral care;Oral motor exercises; Patient/Family education; Therapeutic PO trials with SLP     Compensatory Swallowing Strategies  Compensatory Swallowing Strategies : Alternate solids and liquids;Eat/Feed slowly; No straws;Upright as possible for all oral intake;Remain upright for 30-45 minutes after meals;Small bites/sips; Check for pocketing of food on the Right; Check for pocketing of food on the Left; Set up assist;Assist feed              SHORT TERM GOAL #1:  Goal 1: Pt will complete y/n tasks with 80% accuracy and minimal verbal cues/modeling. Not Met     SHORT TERM GOAL #2:  Goal 2: Pt will complete verbal expression tasks with 80% accuracy and minimal verbal cues/modeling. Not Met     SHORT TERM GOAL #3:  Goal 3: Pt will complete receptive/expressive language tasks with 80% accuracy and minimal verbal cues/modeling. Not Met     SHORT TERM GOAL #4:  Goal 4: Pt will follow one step commands with with 90% accuracy and minimal verbal cues/modeling. Not Met     SHORT TERM GOAL #5:  Goal 5: Pt will complete orientation tasks with 90% accuracy and minimal verbal cues/modeling. Not Met Goal 6: The patient will complete automatic speech tasks, confrontational naming tasks, and 1 step commands wtih 80% accuracy with minimal verbal cues/modelinng. Not Met     Swallowing Short Term Goals  Short-term Goals  Goal 1: Pt will tolerate soft & bite sized consistencies with mildly  (nectar) thick liquids with minimal overt s/s of aspiration/penetration during hospitalization. New Goal: Pt will tolerate soft and bite sized diet with thin liquids with minimal overt s/s of aspiration/penetration during hospitalization. Goal Met: The patient will tolerate a regular diet with thin liquids with minimal overt s/s of aspiration. Goal 2: Pt will complete Modified Barium Swallow Study. Discontinue Goal  Goal 3: Pt will demonstrate awareness of general aspiration precautions. Goal 4: Pt will participate in swallowing reassessments to ensure safest diet consistencies. Goal 5: Pt will allow staff to complete daily oral care. Goal 6: Pt will complete oral motor exercises for lingual and labial strengthening.      Long Term Goals:  Pt will participate in skilled speech therapy services to ensure she is able to communicate her wants and needs. Not Met  Pt will participate in skilled speech therapy services to ensure she is able to complete ADLs. Not Met  Pt will tolerate least restrictive diet with minimal overt s/s of aspiration/penetration during hospitalization. Not Met              ASSESSMENT:  Assessment: [x]Progressing towards goals          []Not Progressing towards goals    Patient Tolerance of Treatment:   [x]Tolerated well []Tolerated fair []Required rest breaks []Fatigued    Education:  Learner:  [x]Patient          []Significant Other          []Other  Education provided regarding:  [x]Goals and POC   []Diet and swallowing precautions    []Home Exercise Program  []Progress and/or discharge information  Method of Education:  [x]Discussion          []Demonstration          []Handout          []Other  Evaluation of Education:   [x]Verbalized understanding   []Demonstrates without assistance  []Demonstrates with assistance  []Needs further instruction     []No evidence of learning                  []No family present      Plan: [x]Continue with current plan of care    []Modify current plan of care as follows:    []Discharge patient    Discharge Location:    Services/Supervision Recommended:      [x]Patient continues to require treatment by a licensed therapist to address functional deficits as outlined in the established plan of care.             Electronically Signed By:  Amarilis Pinzon M.S., CCC-SLP  12/20/2022,7:28 AM.

## 2022-12-20 NOTE — PROGRESS NOTES
Occupational Therapy  Facility/Department: [facility] 8 REHAB UNIT      Name: Cynthia Thomas  : 1960  MRN: [id number]  Date of Service: 2022    Discharge Recommendations:  Continue to assess pending progress          Patient Diagnosis(es): There were no encounter diagnoses. Past Medical History:  has a past medical history of Anxiety, ASCVD (arteriosclerotic cardiovascular disease), Carrier of group B Streptococcus, Cellulitis, Colitis, COPD (chronic obstructive pulmonary disease) ([address] Costochondritis, CVA (cerebral vascular accident) ([address] Depression, Edema, Fracture of sternum, Gallstones, Grief reaction, H/O superior vena cava filter placement, Hyperlipidemia, Hypertension, MI (myocardial infarction) ([address] MRSA (methicillin resistant Staphylococcus aureus), Neuropathy, Obesity, Pseudarthrosis, RA (rheumatoid arthritis) ([address] Rosacea, Sinus bradycardia, TIA (transient ischemic attack), and Venous thromboembolism. Past Surgical History:  has a past surgical history that includes Coronary artery bypass graft (3/2009); Tonsillectomy; Hysterectomy; thoracotomy; knee surgery; Appendectomy; Colonoscopy (2010); Cardiac catheterization (3/2009); Adenoidectomy; Cholecystectomy (); Gastric bypass surgery (); hiatal hernia repair (); Cardiac catheterization (14  JDT); Colonoscopy (); Colonoscopy; Upper gastrointestinal endoscopy (); Upper gastrointestinal endoscopy (); and pr colonoscopy flx dx w/collj spec when pfrmd (N/A, 3/12/2018).     Treatment Diagnosis: L MCA stroke        Plan   Occupational Therapy Plan  Specific Instructions for Next Treatment: cont PWC mobility training  Current Treatment Recommendations: Strengthening, ROM, Balance training, Functional mobility training, Endurance training, Neuromuscular re-education, Safety education & training, Patient/Caregiver education & training, Equipment evaluation, education, & procurement, Positioning, Modalities, Self-Care / ADL, Home management training, Sensory integration, Wheelchair mobility training     Restrictions  Restrictions/Precautions  Restrictions/Precautions: Fall Risk, Weight Bearing  Required Braces or Orthoses?: Yes  Lower Extremity Weight Bearing Restrictions  Right Lower Extremity Weight Bearing: Weight Bearing As Tolerated  Left Lower Extremity Weight Bearing: Weight Bearing As Tolerated    Subjective      12/20/22 1115   General   Family / Caregiver Present Yes  (spouse)   Pre Treatment Pain Screening   Pain at present 0   Scale Used Faces         Objective     Safety Devices  Type of Devices: Left in chair;Call light within reach (left with speech)    PROM Exercises: wrist extension/flexion        12/20/22 1115   Other Specialty Interventions   Other Treatments/Modalities estim, Pakistani setting, 15 mins, 24mAccs, 10/20--on/off cycle, 50% duty cycle wrist extension for strength and neuromuscular re education       Goals  Short Term Goals  Time Frame for Short Term Goals: 2 weeks  Short Term Goal 1: MET  Short Term Goal 2: Pt will dress UB using hemitechnique, mod A and cues  Short Term Goal 3: Pt will dress LB, hemitechnique, mod A and cues  Short Term Goal 4: Pt will toilet with mod A  Short Term Goal 5:  Toilet transfer with mod A  Additional Goals?: Yes  Short Term Goal 6: Pt will attend to R side during ADL/functional activities with moderate cues  Short Term Goal 7: Pt will perform standing tasks x 2-3 mins with L UE and mod A for balance to enhance ADL/IADL performance  Short Term Goal 8: Pt/family will demo understanding of R UE positioning/HEP/therapeutic activity recommendations with min A after ed  Long Term Goals  Time Frame for Long Term Goals : 4-5 weeks  Long Term Goal 1: Pt will bathe with min A, AE/DME prn  Long Term Goal 2: Pt will dress UB supervision  Long Term Goal 3: Pt will dress LB min A, AE prn  Long Term Goal 4: Pt will toilet with CGA  Long Term Goal 5: Pt will perform toilet transfer with CGA  Additional Goals?: Yes  Long Term Goal 6: Pt will perform simple home making task with min A  Long Term Goal 7: Pt/family will be independent in HEP/DME/therapeutic activity recommendations after ed  Long Term Goal 8: Pt will attend to R side during functional activities with occasional cueing       Therapy Time   Individual Concurrent Group Co-treatment   Time In 1115         Time Out 1130         Minutes 15         Timed Code Treatment Minutes: 15 Minutes       Jt Davidson OT

## 2022-12-20 NOTE — PROGRESS NOTES
Patient continues to refuse oral medications,  in room and attempted to get her to take medicine, she covered her armband and said \"no\"

## 2022-12-20 NOTE — PROGRESS NOTES
Patient:   Fabian Short  MR#:    [id number]   Room:    0826/826-02   YOB: 1960  Date of Progress Note: 12/20/2022  Time of Note                           8:17 AM  Consulting Physician:   Trisha Thomson M.D. Attending Physician:  Trisha Thomson MD     Chief complaint Acute ischemic stroke    S:This 58 y.o. female  with history of anxiety, depression, COPD, atherosclerotic disease, colitis, COPD, CVA, DM,  LE edema, hyperlipidemia, HTN, morbid obesity, RA, CAD  and venous thromboembolism. She presented to [facility] ER on 11/9/22 after being found at home lying facedown in her feces. She was very weak, confused, not able to speak but moving purposefully and intermittently following commands. Her last well know was 3 a.m. when her  left for work. Imaging done revealed a large MCA stroke 7.7 x 5 cm. CTA head/neck revealed an acute M1 occlusion. She was outside of the window for TPA. CK on admit was 1100, consistent with rhabdomylosis. She was also noted to possibly Dens fracture. She was transferred to [facility] for a possible thrombectomy. Further imaging was done at [facility] and she was deemed not a candidate for thrombectomy. MRI done ruled out Dens fracture. Repeat CT of head on 11/11/22 showed evolving left MCA territory infarct with increasing edema/mass effect resulting in 4mm of  rightward midline shift(previously 2mm) a the level of the third ventricle. No hemorrhagic conversion or definite trapping of the right lateral ventricle, possible small acute right cerebellar infarct. Neurosurgery was consulted for possible hemicraniectomy. She was seen by Dr. Linh Bah, who didn't feel any surgical intervention was needed. Patient was found to have a UTI + for [facility] and was placed on Rocephin on 11/14/22. Patient had a PICC line placed. Patient was evaluated by SPT and initially made NPO d/t oropharyngeal dysphagia. NGT placed and feeding started.  She was also noted to have global aphasia but is improving. She was re-evaluated by SPT on 11/15/22 for swallow and was started on a dysphagia 1 diet with nectar thick liquids. Patient also continues to have right sided weakness and is participating in both PT/OT. Repeat CT head on 11/13/22 shows stable LMCA ischemic stroke with stable edema, 5 mm shift, no hemorrhage. She is felt to need a stay on Rehab for continued PT/OT/SPT and medical management to work towards her goal of returning home with her . She is now felt ready to start the Rehab program.  No new issues overnight. REVIEW OF SYSTEMS:  Unreliable due to aphasia     Past Medical History:      Diagnosis Date    Anxiety     ASCVD (arteriosclerotic cardiovascular disease)     Carrier of group B Streptococcus     Cellulitis     Colitis     COPD (chronic obstructive pulmonary disease) ([facility])     Costochondritis     CVA (cerebral vascular accident) ([address]     Depression     Edema     Fracture of sternum     non union post surgery.      Gallstones     Grief reaction     H/O superior vena cava filter placement     Hyperlipidemia     Hypertension     MI (myocardial infarction) ([address]     MRSA (methicillin resistant Staphylococcus aureus)     Neuropathy     Obesity     Pseudarthrosis sternal    RA (rheumatoid arthritis) ([address]     Rosacea     Sinus bradycardia     TIA (transient ischemic attack)     Venous thromboembolism        Past Surgical History:      Procedure Laterality Date    ADENOIDECTOMY      APPENDECTOMY      CARDIAC CATHETERIZATION  3/2009    CARDIAC CATHETERIZATION  11/5/14  JDT    EF 50%, patent bypass grafts    CHOLECYSTECTOMY  2011    COLONOSCOPY  06/03/2010    Dr. Christo Elam: distal colon having ulcerative lesions c/w UC    COLONOSCOPY  2009    pancolitis    COLONOSCOPY      CORONARY ARTERY BYPASS GRAFT  3/2009    PHANI Son to LAD, SVGs to OM & PDA    GASTRIC BYPASS SURGERY  2012    HIATAL HERNIA REPAIR  2011    HYSTERECTOMY (CERVIX STATUS UNKNOWN)      KNEE SURGERY      CA COLONOSCOPY FLX DX W/COLLJ SPEC WHEN PFRMD N/A 3/12/2018    Dr Pepe García rectal inflammation consistent with U.C.-Active proctitis--3 yr recall    [facility] ENDOSCOPY  2010    Dr. Whit Adam  2012       Medications in Hospital:      Current Facility-Administered Medications:     bisacodyl (DULCOLAX) EC tablet 5 mg, 5 mg, Oral, Daily, Morrell Alpers, MD, 5 mg at 12/16/22 0806    miconazole (MICOTIN) 2 % powder, , Topical, BID, Morrell Alpers, MD, Given at 12/19/22 2156    docusate sodium (COLACE) capsule 100 mg, 100 mg, Oral, BID, Morrell Alpers, MD, 100 mg at 12/16/22 5871    aspirin chewable tablet 81 mg, 81 mg, Oral, Daily, Morrell Alpers, MD, 81 mg at 12/19/22 0810    atorvastatin (LIPITOR) tablet 40 mg, 40 mg, Oral, Nightly, Morrell Alpers, MD, 40 mg at 12/19/22 2156    dulaglutide (TRULICITY) SC injection 1.5 mg (Patient Supplied), 1.5 mg, SubCUTAneous, Weekly, Morrell Alpers, MD    folic acid (FOLVITE) tablet 1 mg, 1 mg, Oral, Daily, Morrell Alpers, MD, 1 mg at 12/19/22 0810    gabapentin (NEURONTIN) capsule 300 mg, 300 mg, Oral, Nightly, Morrell Alpers, MD, 300 mg at 12/19/22 2156    hydroxychloroquine (PLAQUENIL) tablet 200 mg, 200 mg, Oral, BID, Morrell Alpers, MD, 200 mg at 12/19/22 2156    amLODIPine (NORVASC) tablet 2.5 mg, 2.5 mg, Oral, Daily, Morrell Alpers, MD, 2.5 mg at 12/19/22 0810    tiZANidine (ZANAFLEX) tablet 4 mg, 4 mg, Oral, BID PRN, Morrell Alpers, MD    multivitamin 1 tablet, 1 tablet, Oral, Daily, Morrell Alpers, MD, 1 tablet at 12/19/22 0810    acetaminophen (TYLENOL) tablet 650 mg, 650 mg, Oral, Q4H PRN, Morrell Alpers, MD, 650 mg at 11/27/22 1933    enoxaparin (LOVENOX) injection 40 mg, 40 mg, SubCUTAneous, Daily, Morrell Alpers, MD, 40 mg at 12/19/22 1648    polyethylene glycol (GLYCOLAX) packet 17 g, 17 g, Oral, Daily PRN, Morrell Alpers, MD, 17 g at 11/30/22 1817    Allergies:  Methotrexate derivatives    Social History:   TOBACCO:   reports that she quit smoking about 13 years ago. Her smoking use included cigarettes. She has a 64.00 pack-year smoking history. She has never used smokeless tobacco.  ETOH:   reports current alcohol use. Family History:       Problem Relation Age of Onset    Prostate Cancer Father     Heart Failure Father     Cancer Father     Colon Cancer Neg Hx     Colon Polyps Neg Hx     Liver Cancer Neg Hx     Liver Disease Neg Hx     Esophageal Cancer Neg Hx     Rectal Cancer Neg Hx     Stomach Cancer Neg Hx          PHYSICAL EXAM:  BP (!) 157/74   Pulse 64   Temp 98.4 °F (36.9 °C) (Temporal)   Resp 14   Ht 5' 7\" (1.702 m)   Wt 195 lb 14.4 oz (88.9 kg)   SpO2 95%   BMI 30.68 kg/m²     Constitutional - well developed, well nourished. Eyes - conjunctiva normal.   Ear, nose, throat - No scars, masses, or lesions over external nose or ears, no atrophy of tongue  Neck-symmetric, no masses noted, no jugular vein distension  Respiration- chest wall appears symmetric, good expansion,   normal effort without use of accessory muscles  Musculoskeletal - no significant wasting of muscles noted, no bony deformities  Extremities-no clubbing, cyanosis or edema  Skin - warm, dry, and intact. No rash, erythema, or pallor. Psychiatric - mood, affect, and behavior appear normal.      Neurological exam  Awake, alert, global aphasia says \"yes\" to all questions asked   Attention and concentration appear appropriate  Recent and remote memory unable to tests     Cranial Nerve Exam     CN III, IV,VI-EOMI, No nystagmus, conjugate eye movements, no ptosis    CN VII-+ facial assymetry       Motor Exam  No movement on the right      Tremors- no tremors in hands or head noted     Gait  Not tested     Nursing/pcp notes, imaging,labs and vitals reviewed.      PT,OT and/or speech notes reviewed    Lab Results   Component Value Date    WBC 8.2 12/19/2022    HGB 10.7 (L) 12/19/2022    HCT 34.7 (L) 12/19/2022    MCV 78.2 (L) 12/19/2022     (H) 12/19/2022     Lab Results   Component Value Date     12/19/2022    K 3.6 12/19/2022     12/19/2022    CO2 23 12/19/2022    BUN 9 12/19/2022    CREATININE 0.5 12/19/2022    GLUCOSE 73 (L) 12/19/2022    CALCIUM 8.6 (L) 12/19/2022    PROT 5.3 (L) 12/19/2022    LABALBU 2.8 (L) 12/19/2022    BILITOT <0.2 12/19/2022    ALKPHOS 82 12/19/2022    AST 17 12/19/2022    ALT 13 12/19/2022    LABGLOM >60 12/19/2022   No results found for: INR, PROTIME  Hellen Flores, PTA   Physical Therapist Assistant   Specialty:  Physical Therapist   Progress Notes       Cosign Needed   Date of Service:  12/19/2022 11:05 AM                 [address] [facility] Rehan Murphy  MR#  [id number]          12/19/22 1026 12/19/22 1028 12/19/22 1059   Transfers   Lateral Transfers  --  Minimal Assistance;Contact guard assistance  (Scoot transfer from electric scooter  to W/C- to Left side.)  --    Wheelchair Activities   Propulsion  --   --  Yes   Propulsion 1   Propulsion  --   --  Manual   Level  --   --  Level Tile   Method  --   --  LUE;LLE   Level of Assistance  --   --  Stand by assistance;Contact guard assistance   Description/ Details  --   --  Veers to Right at times, but able to correct with verbal cues. Distance  --   --  70', 30', 30', 54', 40'   PT Exercises   Exercise Treatment Sitting BLE exercises. LLE 10 reps/ 3 sets with 1 lb weight. RLE  PROM with trace mvmts noted at times- 10 reps/ 2 sets.   --   --    Activity Tolerance   Activity Tolerance  --   --   --    Assessment   Assessment  --   --   --    Safety Devices   Type of Devices  --   --   --    PT Individual Minutes   Time In  --   --   --    Time Out  --   --   --    Minutes  --   --   --         12/19/22 1100 12/19/22 1102 12/19/22 1103   Transfers   Lateral Transfers  --   --   --    Wheelchair Activities   Propulsion  --   --   -- Propulsion 1   Propulsion  --   --   --    Level  --   --   --    Method  --   --   --    Level of Assistance  --   --   --    Description/ Details  --   --   --    Distance  --   --   --    PT Exercises   Exercise Treatment  --   --   --    Activity Tolerance   Activity Tolerance Patient tolerated treatment well  --   --    Assessment   Assessment  --  Performed BLE sitting exercises and W/C prop using manual W/C  during session. Trace mvmts noted at times in LLE, but otherwise PROM. --    Safety Devices   Type of Devices  --   --  Call light within reach; Chair alarm in place   PT Individual Minutes   Time In  --   --  1000   Time Out  --   --  1100   Minutes  --   --  60   Electronically signed by Alexandria Ward PTA on 12/19/2022 at 11:05 AM                 RECORD REVIEW: Previous medical records, medications were reviewed at today's visit    IMPRESSION:   1. Acute ischemic stroke-on aspirin/statin  2. Hypertension-on medications monitor  3. Hyperlipidemia-on statin  4. Diabetes-Trulicity-monitor blood sugar  5. DVT prophylaxis-Lovenox  6. History of coronary artery disease-aspirin/statin  7. Neuropathy-on Neurontin  8. Rheumatoid arthritis-on Plaquenil  9. COPD-monitor  10. History of anxiety and depression-monitor  11. History of colitis/gallstones-monitor  12. History of bariatric surgery-on multivitamin/folic acid  13. History of superior vena cava filter placement with venous thromboembolism-not on anticoagulation-monitor- indicates took herself off blood thinners as not like meds and was bruising   14. PT/OT/speech    x-ray of right ankle no acute changes  Venous ultrasound of leg no DVT    Team conference with PT/OT/speech/nursing/Care coordinator to review in depth patients care and discharge planning. At least 35 minutes spent coordinating care for patient >50% of time spent in coordination of care.       Add lost dose Lexapro    WC CGA 70 ft  Ambulation parallel bars 12 ft AFO shoe cover needs therapist to advance leg     Continue present care as noted    ELOS pending 1 week      Expected duration and frequency therapy: 180 minutes per day, 5 days per week    [address] [facility]  [phone] CELL  Dr Chantal Jurado

## 2022-12-21 PROCEDURE — 97110 THERAPEUTIC EXERCISES: CPT

## 2022-12-21 PROCEDURE — 92526 ORAL FUNCTION THERAPY: CPT

## 2022-12-21 PROCEDURE — 92507 TX SP LANG VOICE COMM INDIV: CPT

## 2022-12-21 PROCEDURE — 97530 THERAPEUTIC ACTIVITIES: CPT

## 2022-12-21 PROCEDURE — 1180000000 HC REHAB R&B

## 2022-12-21 PROCEDURE — 99233 SBSQ HOSP IP/OBS HIGH 50: CPT | Performed by: PSYCHIATRY & NEUROLOGY

## 2022-12-21 PROCEDURE — 97116 GAIT TRAINING THERAPY: CPT

## 2022-12-21 RX ADMIN — MICONAZOLE NITRATE: 2 POWDER TOPICAL at 11:33

## 2022-12-21 NOTE — PROGRESS NOTES
[facility]  INPATIENT SPEECH THERAPY  [facility] UNIT  TIME   [id number]  39 MINUTES    [x]Daily Note  []Progress Note    Date: 2022  Patient Name: Yina Sanchez        MRN: [id number]    Account #: [de-identified]  : 1960  (58 y.o.)  Gender: female   Primary Provider: Pedro Tilley MD  Swallowing Status/Diet: Regular diet, thin liquids      Subjective: Pt alert, cooperative, and upright in wheelchair. Daughter present for approximately 10 minutes of tx session. Objective:    Swallowing reassessed during noon meal. Consistencies presented on meal tray include regular solids with thin liquids via consecutive sips side of cup. Oral prep reveals decreased rotary mastication with regular solid consistencies. Oral transit timing ranges from 2-3 seconds with regular solid consistencies. No significant oral pocketing/residue observed. Oral transit is suspected to be 1-2 seconds with thin liquids. Laryngeal movement observed to be consistently sluggish and mild-moderately decreased for swallow airway protection. No overt s/s of aspiration/penetration observed with regular solids or thin liquids throughout noon meal.     Continue regular solids with thin liquids at this time. Diet upgraded to regular solids with thin liquids. Pt completed puzzles on this date. Given a field of 8 pt was prompted to put puzzle pieces together. Each puzzle piece set contained a 4 letter target word. One piece had two letters and the other piece had two letters of the word. On each piece, there was a photo of the target word. Pt was independently able to put each piece together with 100% accuracy. Functional reading task completed. Pt was required to read aloud each 4 letter word from previous task. Task completed with 70% accuracy. Moderate phonemic cues provided to complete this task. Confrontational naming task completed. Pt was required to identify photos of general objects.  Pt was able to independently identify 7/15 photos of general objects. Pt required moderate phonemic cues to identify 8/15 photos of general objects. When asked what holiday was approaching, pt independently stated \"Yolanda\". Functional reading task completed. Pt is required to identify single 3-4 letter words. Pt was able to independently read 5/10 words aloud. Pt required moderate phonemic cues to read 3/10 words aloud. Pt was unable to read 2/10 words aloud given maximum phonemic cues. SLP will continue to follow and treat. Recommended Diet and Intervention  Regular diet  Thin liquids  Recommended Form of Meds: whole in applesauce or pudding   Dysphagia treatment  Therapeutic Interventions: Pharyngeal exercises;Diet tolerance monitoring;Oral care;Oral motor exercises; Patient/Family education; Therapeutic PO trials with SLP     Compensatory Swallowing Strategies  Compensatory Swallowing Strategies : Alternate solids and liquids;Eat/Feed slowly; No straws;Upright as possible for all oral intake;Remain upright for 30-45 minutes after meals;Small bites/sips; Check for pocketing of food on the Right; Check for pocketing of food on the Left; Set up assist;Assist feed     SHORT TERM GOAL #1:  Goal 1: Pt will complete y/n tasks with 80% accuracy and minimal verbal cues/modeling. Not Met     SHORT TERM GOAL #2:  Goal 2: Pt will complete verbal expression tasks with 80% accuracy and minimal verbal cues/modeling. Not Met     SHORT TERM GOAL #3:  Goal 3: Pt will complete receptive/expressive language tasks with 80% accuracy and minimal verbal cues/modeling. Not Met     SHORT TERM GOAL #4:  Goal 4: Pt will follow one step commands with with 90% accuracy and minimal verbal cues/modeling. Not Met     SHORT TERM GOAL #5:  Goal 5: Pt will complete orientation tasks with 90% accuracy and minimal verbal cues/modeling.  Not Met Goal 6: The patient will complete automatic speech tasks, confrontational naming tasks, and 1 step commands wtih 80% accuracy with minimal verbal cues/modelinng. Not Met     Swallowing Short Term Goals  Short-term Goals  Goal 1: Pt will tolerate soft & bite sized consistencies with mildly  (nectar) thick liquids with minimal overt s/s of aspiration/penetration during hospitalization. New Goal: Pt will tolerate soft and bite sized diet with thin liquids with minimal overt s/s of aspiration/penetration during hospitalization. Goal Met: The patient will tolerate a regular diet with thin liquids with minimal overt s/s of aspiration. Goal 2: Pt will complete Modified Barium Swallow Study. Discontinue Goal  Goal 3: Pt will demonstrate awareness of general aspiration precautions. Goal 4: Pt will participate in swallowing reassessments to ensure safest diet consistencies. Goal 5: Pt will allow staff to complete daily oral care. Goal 6: Pt will complete oral motor exercises for lingual and labial strengthening.     ASSESSMENT:  Assessment: [x]Progressing towards goals          []Not Progressing towards goals    Patient Tolerance of Treatment:   [x]Tolerated well []Tolerated fair []Required rest breaks []Fatigued    Education:  Learner:  [x]Patient          []Significant Other          []Other  Education provided regarding:  [x]Goals and POC   [x]Diet and swallowing precautions    []Home Exercise Program  []Progress and/or discharge information  Method of Education:  [x]Discussion          []Demonstration          []Handout          []Other  Evaluation of Education:   [x]Verbalized understanding   []Demonstrates without assistance  []Demonstrates with assistance  []Needs further instruction     []No evidence of learning                  []No family present      Plan: [x]Continue with current plan of care    []Modify current plan of care as follows:    []Discharge patient    Discharge Location:    Services/Supervision Recommended:      []Patient continues to require treatment by a licensed therapist to address functional deficits as outlined in the established plan of care.     Electronically signed by MARCELINO Calix on 12/21/2022 at 12:47 PM

## 2022-12-21 NOTE — CARE COORDINATION
Discharge Planning in process. Family education in process and DME needs being identified and OT starting ordering. Ramp is built at the home. Significant issue at this time that is becoming more evident is Ms. Claudene High has been in control in her family, and has often refused medications in past, after reading about interactions, possible effects, etc or has weaned herself off of medications without physician knowledge. Family has expressed concern about her anger which is expressed mainly toward  and daughter. Staff has also seen reaction toward specific staff members-she has definite preferences. History of depression was described but she had refused any medication treatment, family asked for antidepressant to be included now. When she was told she was prescribed new medication, she refused all medications yesterday. Her daughter tried to explain purposes of medications with her, she has refused again. Family has asked about \"court order\" to give medication- I talked with her daughter to clarify their intent. Her daughter does not expect tube feed/force feed, but to combine in food but want to be confirmed that it is acceptable. She understands issue of \"capacity\" and that Ms. Claudene High would have to be \"adjudicated\". Her daughter states she is concerned that refusal to take Norvasc, Neurontin will lead to life-limiting circumstance. Team advises day pass to home is advisable, however, car transfers remain at maximum assist level and not realistic for family at current time. Goal more likely for next week. Discussed hiring caregivers for scheduled respite for Mr. Claudene High.

## 2022-12-21 NOTE — PROGRESS NOTES
Pt refused to take meds at this time and became very agitated with daughter when she tried to talk to her about it. Will notify MD in am.  Electronically signed by Leonid Bourne RN on 12/20/22 at 8:48 PM CST

## 2022-12-21 NOTE — PROGRESS NOTES
[facility]  INPATIENT SPEECH THERAPY  [facility] REHAB UNIT      [x]Daily Note  []Progress Note    Date: 2022  Patient Name: Adrianna Godfrey        MRN: [id number]    Account #: [de-identified]  : 1960  (64 y.o.)  Gender: female   Primary Provider: Tracie Guevara MD  Diet: Regular diet, thin liquids        PATIENT DIAGNOSIS(ES):    Diagnosis: CVA, R hemiparesis     Additional Pertinent Hx: Anxiety, depression, COPD, CVA, DM, LE edema, hyperlipidemia, HTN, obesity, RA, CAD and venous thromboembolism     RESTRICTIONS/PRECAUTIONS:    Restrictions/Precautions  Restrictions/Precautions: Modified Diet, Swallowing - Fall Risk, Aspiration Risk  Required Braces or Orthoses?: No              Subjective: The patient was upright in the bed and her daughter Dimitri Barakat was present. Objective:  Tasks for receptive language were completed. She was able to answer yes/no questions at 30% this date. Tasks for expressive language were completed. Automatic speech tasks (completing the DANIEL) at 100%. Sentence completion with questions was at 80%. Naming pictures was at 90% with phonemic cues and phrase completion cues. She continues to exhibit severe expressive and receptive aphasia, dysarthria,cognitive deficits, deficits in executive function and dysphagia. She continues to exhibit perseverations, neologisms, semantic paraphasias, and literal paraphasias. Recommend she continue speech therapy services to address these deficits. Recommended Diet and Intervention  Regular diet  Thin liquids  Recommended Form of Meds: whole in applesauce or pudding   Dysphagia treatment  Therapeutic Interventions: Pharyngeal exercises;Diet tolerance monitoring;Oral care;Oral motor exercises; Patient/Family education; Therapeutic PO trials with SLP     Compensatory Swallowing Strategies  Compensatory Swallowing Strategies : Alternate solids and liquids;Eat/Feed slowly; No straws;Upright as possible for all oral intake;Remain upright for 30-45 minutes after meals;Small bites/sips; Check for pocketing of food on the Right; Check for pocketing of food on the Left; Set up assist;Assist feed              SHORT TERM GOAL #1:  Goal 1: Pt will complete y/n tasks with 80% accuracy and minimal verbal cues/modeling. Not Met     SHORT TERM GOAL #2:  Goal 2: Pt will complete verbal expression tasks with 80% accuracy and minimal verbal cues/modeling. Not Met     SHORT TERM GOAL #3:  Goal 3: Pt will complete receptive/expressive language tasks with 80% accuracy and minimal verbal cues/modeling. Not Met     SHORT TERM GOAL #4:  Goal 4: Pt will follow one step commands with with 90% accuracy and minimal verbal cues/modeling. Not Met     SHORT TERM GOAL #5:  Goal 5: Pt will complete orientation tasks with 90% accuracy and minimal verbal cues/modeling. Not Met Goal 6: The patient will complete automatic speech tasks, confrontational naming tasks, and 1 step commands wtih 80% accuracy with minimal verbal cues/modelinng. Not Met     Swallowing Short Term Goals  Short-term Goals  Goal 1: Pt will tolerate soft & bite sized consistencies with mildly  (nectar) thick liquids with minimal overt s/s of aspiration/penetration during hospitalization. New Goal: Pt will tolerate soft and bite sized diet with thin liquids with minimal overt s/s of aspiration/penetration during hospitalization. Goal Met: The patient will tolerate a regular diet with thin liquids with minimal overt s/s of aspiration. Goal 2: Pt will complete Modified Barium Swallow Study. Discontinue Goal  Goal 3: Pt will demonstrate awareness of general aspiration precautions. Goal 4: Pt will participate in swallowing reassessments to ensure safest diet consistencies. Goal 5: Pt will allow staff to complete daily oral care. Goal 6: Pt will complete oral motor exercises for lingual and labial strengthening.      Long Term Goals:  Pt will participate in skilled speech therapy services to ensure she is able to communicate her wants and needs. Not Met  Pt will participate in skilled speech therapy services to ensure she is able to complete ADLs. Not Met  Pt will tolerate least restrictive diet with minimal overt s/s of aspiration/penetration during hospitalization. Not Met                  ASSESSMENT:  Assessment: [x]Progressing towards goals          []Not Progressing towards goals    Patient Tolerance of Treatment:   [x]Tolerated well []Tolerated fair []Required rest breaks []Fatigued    Education:  Learner:  [x]Patient          []Significant Other          []Other  Education provided regarding:  [x]Goals and POC   []Diet and swallowing precautions    []Home Exercise Program  []Progress and/or discharge information  Method of Education:  [x]Discussion          []Demonstration          []Handout          []Other  Evaluation of Education:   [x]Verbalized understanding   []Demonstrates without assistance  []Demonstrates with assistance  []Needs further instruction     []No evidence of learning                  []No family present      Plan: [x]Continue with current plan of care    []Modify current plan of care as follows:    []Discharge patient    Discharge Location:    Services/Supervision Recommended:      [x]Patient continues to require treatment by a licensed therapist to address functional deficits as outlined in the established plan of care.       Electronically Signed By:  Metro Glassing M.S. CCC-SLP  12/21/2022,8:36 AM.

## 2022-12-21 NOTE — PROGRESS NOTES
Patient:   Richard Man  MR#:    [id number]   Room:    0826/826-02   YOB: 1960  Date of Progress Note: 12/21/2022  Time of Note                           8:51 AM  Consulting Physician:   Roly Merritt M.D. Attending Physician:  Roly Merritt MD     Chief complaint Acute ischemic stroke    S:This 58 y.o. female  with history of anxiety, depression, COPD, atherosclerotic disease, colitis, COPD, CVA, DM,  LE edema, hyperlipidemia, HTN, morbid obesity, RA, CAD  and venous thromboembolism. She presented to [facility] ER on 11/9/22 after being found at home lying facedown in her feces. She was very weak, confused, not able to speak but moving purposefully and intermittently following commands. Her last well know was 3 a.m. when her  left for work. Imaging done revealed a large MCA stroke 7.7 x 5 cm. CTA head/neck revealed an acute M1 occlusion. She was outside of the window for TPA. CK on admit was 1100, consistent with rhabdomylosis. She was also noted to possibly Dens fracture. She was transferred to [facility] for a possible thrombectomy. Further imaging was done at [facility] and she was deemed not a candidate for thrombectomy. MRI done ruled out Dens fracture. Repeat CT of head on 11/11/22 showed evolving left MCA territory infarct with increasing edema/mass effect resulting in 4mm of  rightward midline shift(previously 2mm) a the level of the third ventricle. No hemorrhagic conversion or definite trapping of the right lateral ventricle, possible small acute right cerebellar infarct. Neurosurgery was consulted for possible hemicraniectomy. She was seen by Dr. Natty Gonzales, who didn't feel any surgical intervention was needed. Patient was found to have a UTI + for [facility] and was placed on Rocephin on 11/14/22. Patient had a PICC line placed. Patient was evaluated by SPT and initially made NPO d/t oropharyngeal dysphagia. NGT placed and feeding started.  She was also noted to have global aphasia but is improving. She was re-evaluated by SPT on 11/15/22 for swallow and was started on a dysphagia 1 diet with nectar thick liquids. Patient also continues to have right sided weakness and is participating in both PT/OT. Repeat CT head on 11/13/22 shows stable LMCA ischemic stroke with stable edema, 5 mm shift, no hemorrhage. She is felt to need a stay on Rehab for continued PT/OT/SPT and medical management to work towards her goal of returning home with her . She is now felt ready to start the Rehab program.  Patient refused to take her medicines yesterday    REVIEW OF SYSTEMS:  Unreliable due to aphasia     Past Medical History:      Diagnosis Date    Anxiety     ASCVD (arteriosclerotic cardiovascular disease)     Carrier of group B Streptococcus     Cellulitis     Colitis     COPD (chronic obstructive pulmonary disease) ([facility])     Costochondritis     CVA (cerebral vascular accident) ([address]     Depression     Edema     Fracture of sternum     non union post surgery.      Gallstones     Grief reaction     H/O superior vena cava filter placement     Hyperlipidemia     Hypertension     MI (myocardial infarction) ([address]     MRSA (methicillin resistant Staphylococcus aureus)     Neuropathy     Obesity     Pseudarthrosis sternal    RA (rheumatoid arthritis) ([address]     Rosacea     Sinus bradycardia     TIA (transient ischemic attack)     Venous thromboembolism        Past Surgical History:      Procedure Laterality Date    ADENOIDECTOMY      APPENDECTOMY      CARDIAC CATHETERIZATION  3/2009    CARDIAC CATHETERIZATION  11/5/14  JDT    EF 50%, patent bypass grafts    CHOLECYSTECTOMY  2011    COLONOSCOPY  06/03/2010    Dr. Poly Sanabria: distal colon having ulcerative lesions c/w UC    COLONOSCOPY  2009    pancolitis    COLONOSCOPY      CORONARY ARTERY BYPASS GRAFT  3/2009    Dr [employer], [place] to LAD, SVGs to OM & PDA    GASTRIC BYPASS SURGERY  2012    HIATAL HERNIA REPAIR  2011    HYSTERECTOMY (CERVIX STATUS UNKNOWN)      KNEE SURGERY      VA COLONOSCOPY FLX DX W/COLLJ SPEC WHEN PFRMD N/A 3/12/2018    Dr Rincon Heads rectal inflammation consistent with U.C.-Active proctitis--3 yr recall    [id number] [place] [address] Dr ENDOSCOPY  2010    Dr. Marely Mitchell  2012       Medications in Hospital:      Current Facility-Administered Medications:     escitalopram (LEXAPRO) tablet 5 mg, 5 mg, Oral, Daily, Jac Rivera MD    bisacodyl (DULCOLAX) EC tablet 5 mg, 5 mg, Oral, Daily, Jac Rivera MD, 5 mg at 12/16/22 0806    miconazole (MICOTIN) 2 % powder, , Topical, BID, Jac Rivera MD, Given at 12/20/22 0930    docusate sodium (COLACE) capsule 100 mg, 100 mg, Oral, BID, Jac Rivera MD, 100 mg at 12/16/22 2269    aspirin chewable tablet 81 mg, 81 mg, Oral, Daily, Jac Rivera MD, 81 mg at 12/19/22 0810    atorvastatin (LIPITOR) tablet 40 mg, 40 mg, Oral, Nightly, Jac Rivera MD, 40 mg at 12/19/22 2156    dulaglutide (TRULICITY) SC injection 1.5 mg (Patient Supplied), 1.5 mg, SubCUTAneous, Weekly, Jac Rivera MD    folic acid (FOLVITE) tablet 1 mg, 1 mg, Oral, Daily, Jac Rivera MD, 1 mg at 12/19/22 0810    gabapentin (NEURONTIN) capsule 300 mg, 300 mg, Oral, Nightly, Jac Rivera MD, 300 mg at 12/19/22 2156    hydroxychloroquine (PLAQUENIL) tablet 200 mg, 200 mg, Oral, BID, Jac Rivera MD, 200 mg at 12/19/22 2156    amLODIPine (NORVASC) tablet 2.5 mg, 2.5 mg, Oral, Daily, Jac Rivera MD, 2.5 mg at 12/19/22 0810    tiZANidine (ZANAFLEX) tablet 4 mg, 4 mg, Oral, BID PRN, Jac Rivera MD    multivitamin 1 tablet, 1 tablet, Oral, Daily, Jac Rivera MD, 1 tablet at 12/19/22 0810    acetaminophen (TYLENOL) tablet 650 mg, 650 mg, Oral, Q4H PRN, Jac Rivera MD, 650 mg at 11/27/22 1933    enoxaparin (LOVENOX) injection 40 mg, 40 mg, SubCUTAneous, Daily, Jac Rivera MD, 40 mg at 12/19/22 1648    polyethylene glycol (GLYCOLAX) packet 17 g, 17 g, Oral, Daily eBkah HERNÁNDEZ MD, 17 g at 11/30/22 1527    Allergies:  Methotrexate derivatives    Social History:   TOBACCO:   reports that she quit smoking about 13 years ago. Her smoking use included cigarettes. She has a 64.00 pack-year smoking history. She has never used smokeless tobacco.  ETOH:   reports current alcohol use. Family History:       Problem Relation Age of Onset    Prostate Cancer Father     Heart Failure Father     Cancer Father     Colon Cancer Neg Hx     Colon Polyps Neg Hx     Liver Cancer Neg Hx     Liver Disease Neg Hx     Esophageal Cancer Neg Hx     Rectal Cancer Neg Hx     Stomach Cancer Neg Hx          PHYSICAL EXAM:  BP (!) 143/70   Pulse 62   Temp 98.4 °F (36.9 °C) (Temporal)   Resp 16   Ht 5' 7\" (1.702 m)   Wt 195 lb 14.4 oz (88.9 kg)   SpO2 94%   BMI 30.68 kg/m²     Constitutional - well developed, well nourished. Eyes - conjunctiva normal.   Ear, nose, throat - No scars, masses, or lesions over external nose or ears, no atrophy of tongue  Neck-symmetric, no masses noted, no jugular vein distension  Respiration- chest wall appears symmetric, good expansion,   normal effort without use of accessory muscles  Musculoskeletal - no significant wasting of muscles noted, no bony deformities  Extremities-no clubbing, cyanosis or edema  Skin - warm, dry, and intact. No rash, erythema, or pallor. Psychiatric - mood, affect, and behavior appear normal.      Neurological exam  Awake, alert, global aphasia says \"yes\" to all questions asked   Attention and concentration appear appropriate  Recent and remote memory unable to tests     Cranial Nerve Exam     CN III, IV,VI-EOMI, No nystagmus, conjugate eye movements, no ptosis    CN VII-+ facial assymetry       Motor Exam  No movement on the right      Tremors- no tremors in hands or head noted     Gait  Not tested     Nursing/pcp notes, imaging,labs and vitals reviewed.      PT,OT and/or speech notes reviewed    Lab Results   Component Value Date    WBC 8.2 12/19/2022    HGB 10.7 (L) 12/19/2022    HCT 34.7 (L) 12/19/2022    MCV 78.2 (L) 12/19/2022     (H) 12/19/2022     Lab Results   Component Value Date     12/19/2022    K 3.6 12/19/2022     12/19/2022    CO2 23 12/19/2022    BUN 9 12/19/2022    CREATININE 0.5 12/19/2022    GLUCOSE 73 (L) 12/19/2022    CALCIUM 8.6 (L) 12/19/2022    PROT 5.3 (L) 12/19/2022    LABALBU 2.8 (L) 12/19/2022    BILITOT <0.2 12/19/2022    ALKPHOS 82 12/19/2022    AST 17 12/19/2022    ALT 13 12/19/2022    LABGLOM >60 12/19/2022   No results found for: INR, PROTIME    Tien Hathaway PTA   Physical Therapist Assistant   Physical Therapy   Progress Notes       Cosign Needed   Date of Service:  12/20/2022 12:09 PM                 Cosign Needed                                                                                                       Name: Cyndi Walter  MRN: [id number]  Date of Service:  12/20/2022 12/20/22 0900   Restrictions/Precautions   Restrictions/Precautions Fall Risk;Weight Bearing   General   Chart Reviewed Yes   Patient assessed for rehabilitation services? Yes   Additional Pertinent Hx Anxiety, depression, COPD, CVA, DM, LE edema, hyperlipidemia, HTN, obesity, RA, CAD and venous thromboembolism   Family / Caregiver Present No   Diagnosis CVA, R hemiparesis   Follows Commands X   General Comment   Comments Pt requires X2 overall: Max A for dressing and intermittent Min A of other staff to ensure pt safety due to lack of balance   Subjective   Subjective Pt laying in bed, agrees to participate in therapy. Subjective   Pain Verbal: 0/10 and Faces: 0/10   Bed mobility   Supine to Sit Minimal assistance   Scooting Minimal assistance; Moderate assistance  (For R side on EOB)   Bed Mobility Comments On L side of bed- use of L bedrail   Transfers   Sit to Stand Moderate Assistance;Minimal Assistance  (In SS and 1X in //)   Stand to Sit Contact guard assistance  (In SS and 1X in //)   Lateral Transfers Dependent/Total  (Use of SS)   Wheelchair Activities   Propulsion Yes   Propulsion 1   Propulsion Manual   Level Level Tile   Method LUE;LLE   Level of Assistance Stand by assistance;Supervision   Description/ Details Pt turns in circles. and frustrated with PTA and won't correct with v/c's   Distance 10'   Assessment   Assessment Pt presents with frustration with any assistance, lack of safety awareness- pt a major fall risk. Pt very frustrated with PTA, stands 1X in // and begins to become emotional and sit down into w/c. Pt wouldn't let PTA assist her in w/c to OT post tx. Discharge Recommendations Continue to assess pending progress;Subacute/Skilled Nursing Facility;24 hour supervision or assist;Patient would benefit from continued therapy after discharge   Safety Devices   Type of Devices Patient at risk for falls;Gait belt;Left in chair  (In OT gym)               Electronically signed by Buck Mercado PTA on 12/20/2022 at 12:10 PM                   RECORD REVIEW: Previous medical records, medications were reviewed at today's visit    IMPRESSION:   1. Acute ischemic stroke-on aspirin/statin  2. Hypertension-on medications monitor  3. Hyperlipidemia-on statin  4. Diabetes-Trulicity-monitor blood sugar  5. DVT prophylaxis-Lovenox  6. History of coronary artery disease-aspirin/statin  7. Neuropathy-on Neurontin  8. Rheumatoid arthritis-on Plaquenil  9. COPD-monitor  10. History of anxiety and depression-monitor  11. History of colitis/gallstones-monitor  12. History of bariatric surgery-on multivitamin/folic acid  13. History of superior vena cava filter placement with venous thromboembolism-not on anticoagulation-monitor- took herself off blood thinners as not like meds and was bruising   14.   PT/OT/speech    x-ray of right ankle no acute changes  Venous ultrasound of leg no DVT    Added low dose Lexapro    Discussed with daughter that it would be very difficult to force patient to take medications. It is better to discuss and have agreeable to initiating treatment on her own  Unable to force the patient to take medications.  she would need to be restrained with feeding tube    Daughter indicates she will see how she does today    Continue present care as noted    NAM pending 1 week      Expected duration and frequency therapy: 180 minutes per day, 5 days per week    Leobardo Severino  [phone] CELL  Dr Chano Apple

## 2022-12-21 NOTE — PROGRESS NOTES
Physical Therapy  Name: Fabian Short  MRN:  [id number]  Date of service:  12/21/2022 12/21/22 0900   Restrictions/Precautions   Restrictions/Precautions Fall Risk;Weight Bearing   Lower Extremity Weight Bearing Restrictions   Right Lower Extremity Weight Bearing Weight Bearing As Tolerated   Left Lower Extremity Weight Bearing Weight Bearing As Tolerated   General   Diagnosis CVA, R hemiparesis   General Comment   Comments awake in bed   Subjective   Subjective Pt ready to go, starts moving upon my entry and reaches for gt belt. Bed mobility   Supine to Sit Minimal assistance  (oob to L side with rail and hob elevated)   Transfers   Sit to Stand Minimal Assistance; Moderate Assistance  (from [facility] to [facility] and // bars, pushing up from armrest of WC)   Stand to Sit Contact guard assistance;Minimal Assistance  (poor control upon descent)   Bed to Chair Minimal assistance  (pushing up from surface, reaching for destination (pivoted WC>mat table x2 and mat table >WC x 2))   Lateral Transfers Stand by assistance  (scoot oob to [facility] towards strong L side (level surface), therapist holding chair from sliding)   Ambulation   WB Status WBAT   Ambulation   Surface Level tile   Device Parallel Bars   Other Apparatus AFO; Wheelchair follow;Right   Assistance Minimal assistance;Contact guard assistance   Quality of Gait improved wt shift and advancing R LE independently though steps small, assist to widen audrey as position of R foot not always optimal   Gait Deviations Slow Tamia;Decreased step height   Distance 12'   Ambulation 2   Surface - 2 level tile   Device 2 Hemiwalker   Other Apparatus 2 Right; Wheelchair follow;AFO  (shoe cover on R LE)   Assistance 2 Moderate assistance;Minimal assistance  (more for R LE management than balance/posture)   Quality of Gait 2 max vc's for sequencing, occ assist to inc HW sufficiently, fwd flexed (wants to watch feet), advancing R LE but not wide enough position and small steps (assist to optimal placement)   Gait Deviations Decreased step height;Slow Tamia   Distance 6', 5', 3'   Assessment   Assessment Treatment focussed on STS, transfers and short distance amb with introduction of HW. Pt able to advance R LE using compensatory strategies, but foot placement not optimal (jesus when using HW). Pt gave good effort and compliant with treatment in full. Shows good potential with HW use, but will need work on AD management and R LE positioning to improve safety and independence.    Safety Devices   Type of Devices Left in chair  (with dtr headed to OT in [facility])   PT Individual Minutes   Time In 0900   Time Out 1000   Minutes 60       Electronically signed by Oumar Bonner PTA on 12/21/2022 at 12:47 PM

## 2022-12-22 LAB
ALBUMIN SERPL-MCNC: 3.1 G/DL (ref 3.5–5.2)
ALP BLD-CCNC: 77 U/L (ref 35–104)
ALT SERPL-CCNC: 11 U/L (ref 5–33)
ANION GAP SERPL CALCULATED.3IONS-SCNC: 12 MMOL/L (ref 7–19)
AST SERPL-CCNC: 14 U/L (ref 5–32)
BASOPHILS ABSOLUTE: 0.1 K/UL (ref 0–0.2)
BASOPHILS RELATIVE PERCENT: 1.3 % (ref 0–1)
BILIRUB SERPL-MCNC: <0.2 MG/DL (ref 0.2–1.2)
BUN BLDV-MCNC: 11 MG/DL (ref 8–23)
CALCIUM SERPL-MCNC: 9 MG/DL (ref 8.8–10.2)
CHLORIDE BLD-SCNC: 104 MMOL/L (ref 98–111)
CO2: 24 MMOL/L (ref 22–29)
CREAT SERPL-MCNC: 0.5 MG/DL (ref 0.5–0.9)
EOSINOPHILS ABSOLUTE: 1 K/UL (ref 0–0.6)
EOSINOPHILS RELATIVE PERCENT: 10.7 % (ref 0–5)
GFR SERPL CREATININE-BSD FRML MDRD: >60 ML/MIN/{1.73_M2}
GLUCOSE BLD-MCNC: 74 MG/DL (ref 74–109)
HCT VFR BLD CALC: 34.1 % (ref 37–47)
HEMOGLOBIN: 10.7 G/DL (ref 12–16)
IMMATURE GRANULOCYTES #: 0.1 K/UL
LYMPHOCYTES ABSOLUTE: 2 K/UL (ref 1.1–4.5)
LYMPHOCYTES RELATIVE PERCENT: 21.3 % (ref 20–40)
MCH RBC QN AUTO: 24.3 PG (ref 27–31)
MCHC RBC AUTO-ENTMCNC: 31.4 G/DL (ref 33–37)
MCV RBC AUTO: 77.5 FL (ref 81–99)
MONOCYTES ABSOLUTE: 1.2 K/UL (ref 0–0.9)
MONOCYTES RELATIVE PERCENT: 12.7 % (ref 0–10)
NEUTROPHILS ABSOLUTE: 5.1 K/UL (ref 1.5–7.5)
NEUTROPHILS RELATIVE PERCENT: 53.1 % (ref 50–65)
PDW BLD-RTO: 16.7 % (ref 11.5–14.5)
PLATELET # BLD: 489 K/UL (ref 130–400)
PMV BLD AUTO: 9.5 FL (ref 9.4–12.3)
POTASSIUM REFLEX MAGNESIUM: 4 MMOL/L (ref 3.5–5)
RBC # BLD: 4.4 M/UL (ref 4.2–5.4)
SODIUM BLD-SCNC: 140 MMOL/L (ref 136–145)
TOTAL PROTEIN: 5.2 G/DL (ref 6.6–8.7)
WBC # BLD: 9.5 K/UL (ref 4.8–10.8)

## 2022-12-22 PROCEDURE — 97530 THERAPEUTIC ACTIVITIES: CPT

## 2022-12-22 PROCEDURE — 6360000002 HC RX W HCPCS: Performed by: PSYCHIATRY & NEUROLOGY

## 2022-12-22 PROCEDURE — 99232 SBSQ HOSP IP/OBS MODERATE 35: CPT | Performed by: PSYCHIATRY & NEUROLOGY

## 2022-12-22 PROCEDURE — 36415 COLL VENOUS BLD VENIPUNCTURE: CPT

## 2022-12-22 PROCEDURE — 6370000000 HC RX 637 (ALT 250 FOR IP): Performed by: PSYCHIATRY & NEUROLOGY

## 2022-12-22 PROCEDURE — 1180000000 HC REHAB R&B

## 2022-12-22 PROCEDURE — 92507 TX SP LANG VOICE COMM INDIV: CPT

## 2022-12-22 PROCEDURE — 97110 THERAPEUTIC EXERCISES: CPT

## 2022-12-22 PROCEDURE — 94760 N-INVAS EAR/PLS OXIMETRY 1: CPT

## 2022-12-22 PROCEDURE — 97116 GAIT TRAINING THERAPY: CPT

## 2022-12-22 PROCEDURE — 85025 COMPLETE CBC W/AUTO DIFF WBC: CPT

## 2022-12-22 PROCEDURE — 80053 COMPREHEN METABOLIC PANEL: CPT

## 2022-12-22 RX ADMIN — ENOXAPARIN SODIUM 40 MG: 100 INJECTION SUBCUTANEOUS at 17:22

## 2022-12-22 RX ADMIN — ATORVASTATIN CALCIUM 40 MG: 40 TABLET, FILM COATED ORAL at 21:07

## 2022-12-22 RX ADMIN — MICONAZOLE NITRATE: 2 POWDER TOPICAL at 08:32

## 2022-12-22 NOTE — PROGRESS NOTES
Patient:   Briana Porter  MR#:    [id number]   Room:    0826/826-02   YOB: 1960  Date of Progress Note: 12/22/2022  Time of Note                           8:37 AM  Consulting Physician:   Bekah Phillips M.D. Attending Physician:  Bekah Phillips MD     Chief complaint Acute ischemic stroke    S:This 58 y.o. female  with history of anxiety, depression, COPD, atherosclerotic disease, colitis, COPD, CVA, DM,  LE edema, hyperlipidemia, HTN, morbid obesity, RA, CAD  and venous thromboembolism. She presented to [facility] ER on 11/9/22 after being found at home lying facedown in her feces. She was very weak, confused, not able to speak but moving purposefully and intermittently following commands. Her last well know was 3 a.m. when her  left for work. Imaging done revealed a large MCA stroke 7.7 x 5 cm. CTA head/neck revealed an acute M1 occlusion. She was outside of the window for TPA. CK on admit was 1100, consistent with rhabdomylosis. She was also noted to possibly Dens fracture. She was transferred to [facility] for a possible thrombectomy. Further imaging was done at [facility] and she was deemed not a candidate for thrombectomy. MRI done ruled out Dens fracture. Repeat CT of head on 11/11/22 showed evolving left MCA territory infarct with increasing edema/mass effect resulting in 4mm of  rightward midline shift(previously 2mm) a the level of the third ventricle. No hemorrhagic conversion or definite trapping of the right lateral ventricle, possible small acute right cerebellar infarct. Neurosurgery was consulted for possible hemicraniectomy. She was seen by Dr. Brionna Solis, who didn't feel any surgical intervention was needed. Patient was found to have a UTI + for [facility] and was placed on Rocephin on 11/14/22. Patient had a PICC line placed. Patient was evaluated by SPT and initially made NPO d/t oropharyngeal dysphagia. NGT placed and feeding started.  She was also noted to have global aphasia but is improving. She was re-evaluated by SPT on 11/15/22 for swallow and was started on a dysphagia 1 diet with nectar thick liquids. Patient also continues to have right sided weakness and is participating in both PT/OT. Repeat CT head on 11/13/22 shows stable LMCA ischemic stroke with stable edema, 5 mm shift, no hemorrhage. She is felt to need a stay on Rehab for continued PT/OT/SPT and medical management to work towards her goal of returning home with her . She is now felt ready to start the Rehab program.  Still refusing to take her medicines. REVIEW OF SYSTEMS:  Unreliable due to aphasia     Past Medical History:      Diagnosis Date    Anxiety     ASCVD (arteriosclerotic cardiovascular disease)     Carrier of group B Streptococcus     Cellulitis     Colitis     COPD (chronic obstructive pulmonary disease) ([facility])     Costochondritis     CVA (cerebral vascular accident) ([address]     Depression     Edema     Fracture of sternum     non union post surgery.      Gallstones     Grief reaction     H/O superior vena cava filter placement     Hyperlipidemia     Hypertension     MI (myocardial infarction) ([address]     MRSA (methicillin resistant Staphylococcus aureus)     Neuropathy     Obesity     Pseudarthrosis sternal    RA (rheumatoid arthritis) ([address]     Rosacea     Sinus bradycardia     TIA (transient ischemic attack)     Venous thromboembolism        Past Surgical History:      Procedure Laterality Date    ADENOIDECTOMY      APPENDECTOMY      CARDIAC CATHETERIZATION  3/2009    CARDIAC CATHETERIZATION  11/5/14  JDT    EF 50%, patent bypass grafts    CHOLECYSTECTOMY  2011    COLONOSCOPY  06/03/2010    Dr. Alexander Cade: distal colon having ulcerative lesions c/w UC    COLONOSCOPY  2009    pancolitis    COLONOSCOPY      CORONARY ARTERY BYPASS GRAFT  3/2009    Dr Burrows Monday, JERONIMO to LAD, SVGs to OM & PDA    GASTRIC BYPASS SURGERY  2012    HIATAL HERNIA REPAIR  2011    HYSTERECTOMY (CERVIX STATUS UNKNOWN)      KNEE SURGERY NV COLONOSCOPY FLX DX W/COLLJ SPEC WHEN PFRMD N/A 3/12/2018    Dr Dagoberto Norris rectal inflammation consistent with U.C.-Active proctitis--3 yr recall    [address] [place] [address]  ENDOSCOPY  2010    Dr. Neris Starks  2012       Medications in Hospital:      Current Facility-Administered Medications:     escitalopram (LEXAPRO) tablet 5 mg, 5 mg, Oral, Daily, Thong Dior MD    bisacodyl (DULCOLAX) EC tablet 5 mg, 5 mg, Oral, Daily, Thong Dior MD, 5 mg at 12/16/22 0806    miconazole (MICOTIN) 2 % powder, , Topical, BID, Thong Dior MD, Given at 12/22/22 0832    docusate sodium (COLACE) capsule 100 mg, 100 mg, Oral, BID, Thong Dior MD, 100 mg at 12/16/22 5431    aspirin chewable tablet 81 mg, 81 mg, Oral, Daily, Thong Dior MD, 81 mg at 12/19/22 0810    atorvastatin (LIPITOR) tablet 40 mg, 40 mg, Oral, Nightly, Thong Dior MD, 40 mg at 12/19/22 2156    dulaglutide (TRULICITY) SC injection 1.5 mg (Patient Supplied), 1.5 mg, SubCUTAneous, Weekly, Thong Dior MD    folic acid (FOLVITE) tablet 1 mg, 1 mg, Oral, Daily, Thong Dior MD, 1 mg at 12/19/22 0810    gabapentin (NEURONTIN) capsule 300 mg, 300 mg, Oral, Nightly, Thong Dior MD, 300 mg at 12/19/22 2156    hydroxychloroquine (PLAQUENIL) tablet 200 mg, 200 mg, Oral, BID, Thong Dior MD, 200 mg at 12/19/22 2156    amLODIPine (NORVASC) tablet 2.5 mg, 2.5 mg, Oral, Daily, Thong Dior MD, 2.5 mg at 12/19/22 0810    tiZANidine (ZANAFLEX) tablet 4 mg, 4 mg, Oral, BID PRN, Thong Dior MD    multivitamin 1 tablet, 1 tablet, Oral, Daily, Thong Dior MD, 1 tablet at 12/19/22 0810    acetaminophen (TYLENOL) tablet 650 mg, 650 mg, Oral, Q4H PRN, Thong Dior MD, 650 mg at 11/27/22 1933    enoxaparin (LOVENOX) injection 40 mg, 40 mg, SubCUTAneous, Daily, Thong Dior MD, 40 mg at 12/19/22 1648    polyethylene glycol (GLYCOLAX) packet 17 g, 17 g, Oral, Daily PRN, Tauurs MD Tenzin, 17 g at 11/30/22 1817    Allergies:  Methotrexate derivatives    Social History:   TOBACCO:   reports that she quit smoking about 13 years ago. Her smoking use included cigarettes. She has a 64.00 pack-year smoking history. She has never used smokeless tobacco.  ETOH:   reports current alcohol use. Family History:       Problem Relation Age of Onset    Prostate Cancer Father     Heart Failure Father     Cancer Father     Colon Cancer Neg Hx     Colon Polyps Neg Hx     Liver Cancer Neg Hx     Liver Disease Neg Hx     Esophageal Cancer Neg Hx     Rectal Cancer Neg Hx     Stomach Cancer Neg Hx          PHYSICAL EXAM:  /73   Pulse 64   Temp 97.7 °F (36.5 °C) (Temporal)   Resp 20   Ht 5' 7\" (1.702 m)   Wt 195 lb 14.4 oz (88.9 kg)   SpO2 96%   BMI 30.68 kg/m²     Constitutional - well developed, well nourished. Eyes - conjunctiva normal.   Ear, nose, throat - No scars, masses, or lesions over external nose or ears, no atrophy of tongue  Neck-symmetric, no masses noted, no jugular vein distension  Respiration- chest wall appears symmetric, good expansion,   normal effort without use of accessory muscles  Musculoskeletal - no significant wasting of muscles noted, no bony deformities  Extremities-no clubbing, cyanosis or edema  Skin - warm, dry, and intact. No rash, erythema, or pallor. Psychiatric - mood, affect, and behavior appear normal.      Neurological exam  Awake, alert, global aphasia says \"yes\" to all questions asked   Attention and concentration appear appropriate  Recent and remote memory unable to tests     Cranial Nerve Exam     CN III, IV,VI-EOMI, No nystagmus, conjugate eye movements, no ptosis    CN VII-+ facial assymetry       Motor Exam  No movement on the right      Tremors- no tremors in hands or head noted     Gait  Not tested     Nursing/pcp notes, imaging,labs and vitals reviewed.      PT,OT and/or speech notes reviewed    Lab Results   Component Value Date    WBC 9.5 12/22/2022    HGB 10.7 (L) 12/22/2022    HCT 34.1 (L) 12/22/2022    MCV 77.5 (L) 12/22/2022     (H) 12/22/2022     Lab Results   Component Value Date     12/22/2022    K 4.0 12/22/2022     12/22/2022    CO2 24 12/22/2022    BUN 11 12/22/2022    CREATININE 0.5 12/22/2022    GLUCOSE 74 12/22/2022    CALCIUM 9.0 12/22/2022    PROT 5.2 (L) 12/22/2022    LABALBU 3.1 (L) 12/22/2022    BILITOT <0.2 12/22/2022    ALKPHOS 77 12/22/2022    AST 14 12/22/2022    ALT 11 12/22/2022    LABGLOM >60 12/22/2022   No results found for: INR, PROTIME    Davnakul Drum, PTA   Physical Therapist Assistant   Physical Therapy   Progress Notes       Cosign Needed   Date of Service:  12/20/2022 12:09 PM                 Cosign Needed                                                                                                       Name: Barry English  MRN: [id number]  Date of Service:  12/20/2022 12/20/22 0900   Restrictions/Precautions   Restrictions/Precautions Fall Risk;Weight Bearing   General   Chart Reviewed Yes   Patient assessed for rehabilitation services? Yes   Additional Pertinent Hx Anxiety, depression, COPD, CVA, DM, LE edema, hyperlipidemia, HTN, obesity, RA, CAD and venous thromboembolism   Family / Caregiver Present No   Diagnosis CVA, R hemiparesis   Follows Commands X   General Comment   Comments Pt requires X2 overall: Max A for dressing and intermittent Min A of other staff to ensure pt safety due to lack of balance   Subjective   Subjective Pt laying in bed, agrees to participate in therapy. Subjective   Pain Verbal: 0/10 and Faces: 0/10   Bed mobility   Supine to Sit Minimal assistance   Scooting Minimal assistance; Moderate assistance  (For R side on EOB)   Bed Mobility Comments On L side of bed- use of L bedrail   Transfers   Sit to Stand Moderate Assistance;Minimal Assistance  (In SS and 1X in //)   Stand to Sit Contact guard assistance  (In SS and 1X in //)   Lateral Transfers Dependent/Total  (Use of SS)   Wheelchair Activities   Propulsion Yes   Propulsion 1   Propulsion Manual   Level Level Tile   Method LUE;LLE   Level of Assistance Stand by assistance;Supervision   Description/ Details Pt turns in circles. and frustrated with PTA and won't correct with v/c's   Distance 10'   Assessment   Assessment Pt presents with frustration with any assistance, lack of safety awareness- pt a major fall risk. Pt very frustrated with PTA, stands 1X in // and begins to become emotional and sit down into w/c. Pt wouldn't let PTA assist her in w/c to OT post tx. Discharge Recommendations Continue to assess pending progress;Subacute/Skilled Nursing Facility;24 hour supervision or assist;Patient would benefit from continued therapy after discharge   Safety Devices   Type of Devices Patient at risk for falls;Gait belt;Left in chair  (In OT gym)               Electronically signed by Georgette Bell PTA on 12/20/2022 at 12:10 PM                   RECORD REVIEW: Previous medical records, medications were reviewed at today's visit    IMPRESSION:   1. Acute ischemic stroke-on aspirin/statin  2. Hypertension-on medications monitor  3. Hyperlipidemia-on statin  4. Diabetes-Trulicity-monitor blood sugar  5. DVT prophylaxis-Lovenox  6. History of coronary artery disease-aspirin/statin  7. Neuropathy-on Neurontin  8. Rheumatoid arthritis-on Plaquenil  9. COPD-monitor  10. History of anxiety and depression-monitor  11. History of colitis/gallstones-monitor  12. History of bariatric surgery-on multivitamin/folic acid  13. History of superior vena cava filter placement with venous thromboembolism-not on anticoagulation-monitor- took herself off blood thinners as not like meds and was bruising   14.   PT/OT/speech    x-ray of right ankle no acute changes  Venous ultrasound of leg no DVT    Added low dose Lexapro    Discussed with daughter previously that it would be very difficult to force patient to take medications. It is better to discuss and have agreeable to initiating treatment on her own  Unable to force the patient to take medications.  She would need to be restrained with feeding tube    Continue current care    Continue present care as noted    NAM pending 1 week      Expected duration and frequency therapy: 180 minutes per day, 5 days per week    [address] [facility]  [phone] CELL  Dr Naz Rodríguez

## 2022-12-22 NOTE — PROGRESS NOTES
Occupational Therapy     12/21/22 1000   General   Timed Code Treatment Minutes 60 Minutes   Restrictions/Precautions   Restrictions/Precautions Fall Risk;Weight Bearing   General   Family / Caregiver Present Yes  (daughter)   Balance   Standing Balance Contact guard assistance   Standing Balance   Time 1-2 min   Activity LUE act   Comment at mat table while wt bearing RUE   Functional Mobility   Functional - Mobility Device Wheelchair  (power)   Activity Retrieve items;Transport items; To/from bathroom; To/From therapy gym   Assist Level Supervision   Transfers   Sit to stand Minimal assistance   Stand to sit Contact guard assistance   Toilet Transfers   Toilet - Technique Stand pivot   Equipment Used Grab bars   Toilet Transfer Contact guard assistance   Toilet Transfers Comments very functional t/f with GB on left from PWC   Type of ROM/Therapeutic Exercise   Type of ROM/Therapeutic Exercise AAROM; Self PROM;AROM; [place]  ([place] 10# RUE)   Comment RUE   Weight Bearing   RUE Weight Bearing Extended arm standing   Response To Weight Bearing Technique good for tone inhibition after AAROM   Assessment   Performance deficits / Impairments Decreased functional mobility ; Decreased endurance;Decreased coordination;Decreased ADL status; Decreased sensation;Decreased posture;Decreased ROM; Decreased balance;Decreased strength;Decreased vision/visual deficit; Decreased safe awareness;Decreased high-level IADLs;Decreased cognition;Decreased fine motor control   Assessment Pt did well with PWC operation to access bathroom. CGA for toilet t/f.  Rec GB on Left   Treatment Diagnosis L MCA stroke   Activity Tolerance   Activity Tolerance Patient Tolerated treatment well

## 2022-12-22 NOTE — PLAN OF CARE
Problem: Discharge Planning  Goal: Discharge to home or other facility with appropriate resources  Outcome: Progressing  Flowsheets (Taken 12/21/2022 1132)  Discharge to home or other facility with appropriate resources: Refer to discharge planning if patient needs post-hospital services based on physician order or complex needs related to functional status, cognitive ability or social support system     Problem: Safety - Adult  Goal: Free from fall injury  Outcome: Progressing     Problem: Neurosensory - Adult  Goal: Achieves stable or improved neurological status  Outcome: Progressing  Flowsheets (Taken 12/21/2022 1132)  Achieves stable or improved neurological status: Assess for and report changes in neurological status  Goal: Achieves maximal functionality and self care  Outcome: Progressing  Flowsheets (Taken 12/21/2022 1132)  Achieves maximal functionality and self care: Monitor swallowing and airway patency with patient fatigue and changes in neurological status     Problem: Skin/Tissue Integrity - Adult  Goal: Skin integrity remains intact  Outcome: Progressing  Flowsheets (Taken 12/21/2022 1132)  Skin Integrity Remains Intact: Monitor for areas of redness and/or skin breakdown     Problem: Pain  Goal: Verbalizes/displays adequate comfort level or baseline comfort level  Outcome: Progressing     Problem: Chronic Conditions and Co-morbidities  Goal: Patient's chronic conditions and co-morbidity symptoms are monitored and maintained or improved  Outcome: Progressing  Flowsheets (Taken 12/21/2022 1132)  Care Plan - Patient's Chronic Conditions and Co-Morbidity Symptoms are Monitored and Maintained or Improved: Monitor and assess patient's chronic conditions and comorbid symptoms for stability, deterioration, or improvement     Problem: ABCDS Injury Assessment  Goal: Absence of physical injury  Outcome: Progressing     Problem: Skin/Tissue Integrity  Goal: Absence of new skin breakdown  Description: 1. Monitor for areas of redness and/or skin breakdown  2. Assess vascular access sites hourly  3. Every 4-6 hours minimum:  Change oxygen saturation probe site  4. Every 4-6 hours:  If on nasal continuous positive airway pressure, respiratory therapy assess nares and determine need for appliance change or resting period. Outcome: Progressing     Problem: Confusion  Goal: Confusion, delirium, dementia, or psychosis is improved or at baseline  Description: INTERVENTIONS:  1. Assess for possible contributors to thought disturbance, including medications, impaired vision or hearing, underlying metabolic abnormalities, dehydration, psychiatric diagnoses, and notify attending LIP  2. [place] high risk fall precautions, as indicated  3. Provide frequent short contacts to provide reality reorientation, refocusing and direction  4. Decrease environmental stimuli, including noise as appropriate  5. Monitor and intervene to maintain adequate nutrition, hydration, elimination, sleep and activity  6. If unable to ensure safety without constant attention obtain sitter and review sitter guidelines with assigned personnel  7.  Initiate Psychosocial CNS and Spiritual Care consult, as indicated  Outcome: Progressing  Flowsheets (Taken 12/21/2022 1132)  Effect of thought disturbance (confusion, delirium, dementia, or psychosis) are managed with adequate functional status: Assess for contributors to thought disturbance, including medications, impaired vision or hearing, underlying metabolic abnormalities, dehydration, psychiatric diagnoses, notify LIP     Problem: Musculoskeletal - Adult  Goal: Return mobility to safest level of function  Outcome: Progressing  Flowsheets (Taken 12/21/2022 1132)  Return Mobility to Safest Level of Function:   Assess patient stability and activity tolerance for standing, transferring and ambulating with or without assistive devices   Assist with transfers and ambulation using safe patient handling equipment as needed   Ensure adequate protection for wounds/incisions during mobilization  Goal: Return ADL status to a safe level of function  Outcome: Progressing  Flowsheets (Taken 12/21/2022 1132)  Return ADL Status to a Safe Level of Function: Administer medication as ordered     Problem: Gastrointestinal - Adult  Goal: Maintains or returns to baseline bowel function  Outcome: Progressing  Flowsheets (Taken 12/21/2022 1132)  Maintains or returns to baseline bowel function: Assess bowel function  Goal: Maintains adequate nutritional intake  Outcome: Progressing  Flowsheets (Taken 12/21/2022 1132)  Maintains adequate nutritional intake: Monitor percentage of each meal consumed     Problem: Metabolic/Fluid and Electrolytes - Adult  Goal: Electrolytes maintained within normal limits  Outcome: Progressing  Flowsheets (Taken 12/21/2022 1132)  Electrolytes maintained within normal limits: Monitor labs and assess patient for signs and symptoms of electrolyte imbalances     Problem: Nutrition Deficit:  Goal: Optimize nutritional status  Outcome: Progressing       Electronically signed by Vinod Cisneros on 12/21/2022 at 7:04 PM

## 2022-12-22 NOTE — PLAN OF CARE
Problem: Discharge Planning  Goal: Discharge to home or other facility with appropriate resources  12/22/2022 1010 by Rickey Gresham LPN  Outcome: Progressing  Flowsheets (Taken 12/22/2022 5662)  Discharge to home or other facility with appropriate resources: Refer to discharge planning if patient needs post-hospital services based on physician order or complex needs related to functional status, cognitive ability or social support system  12/21/2022 2138 by Sveta Ventura RN  Outcome: Progressing     Problem: Safety - Adult  Goal: Free from fall injury  12/22/2022 1010 by Rickey Gresham LPN  Outcome: Progressing  12/21/2022 2138 by Sveta Ventura RN  Outcome: Progressing     Problem: Neurosensory - Adult  Goal: Achieves stable or improved neurological status  12/22/2022 1010 by Rickey Gresham LPN  Outcome: Progressing  Flowsheets (Taken 12/22/2022 2341)  Achieves stable or improved neurological status: Assess for and report changes in neurological status  12/21/2022 2138 by Sveta Ventura RN  Outcome: Progressing  Goal: Achieves maximal functionality and self care  12/22/2022 1010 by Rickey Gresham LPN  Outcome: Progressing  Flowsheets (Taken 12/22/2022 4073)  Achieves maximal functionality and self care: Monitor swallowing and airway patency with patient fatigue and changes in neurological status  12/21/2022 2138 by Sveta Ventura RN  Outcome: Progressing     Problem: Skin/Tissue Integrity - Adult  Goal: Skin integrity remains intact  12/22/2022 1010 by Rickey Gresham LPN  Outcome: Progressing  Flowsheets (Taken 12/22/2022 0711)  Skin Integrity Remains Intact: Monitor for areas of redness and/or skin breakdown  12/21/2022 2138 by Sveta Ventura RN  Outcome: Progressing     Problem: Pain  Goal: Verbalizes/displays adequate comfort level or baseline comfort level  12/22/2022 1010 by Rickey Gresham LPN  Outcome: Progressing  12/21/2022 2138 by Sveta Ventura RN  Outcome: Progressing     Problem: Chronic Conditions and Co-morbidities  Goal: Patient's chronic conditions and co-morbidity symptoms are monitored and maintained or improved  12/22/2022 1010 by Petey Kevin LPN  Outcome: Progressing  Flowsheets (Taken 12/22/2022 3949)  Care Plan - Patient's Chronic Conditions and Co-Morbidity Symptoms are Monitored and Maintained or Improved: Monitor and assess patient's chronic conditions and comorbid symptoms for stability, deterioration, or improvement  12/21/2022 2138 by Jaron Bhardwaj RN  Outcome: Progressing     Problem: ABCDS Injury Assessment  Goal: Absence of physical injury  12/22/2022 1010 by Petey Kevin LPN  Outcome: Progressing  12/21/2022 2138 by Jaron Bhardwaj RN  Outcome: Progressing     Problem: Skin/Tissue Integrity  Goal: Absence of new skin breakdown  Description: 1. Monitor for areas of redness and/or skin breakdown  2. Assess vascular access sites hourly  3. Every 4-6 hours minimum:  Change oxygen saturation probe site  4. Every 4-6 hours:  If on nasal continuous positive airway pressure, respiratory therapy assess nares and determine need for appliance change or resting period. 12/22/2022 1010 by Petey Kevin LPN  Outcome: Progressing  12/21/2022 2138 by Jaron Bhardwaj RN  Outcome: Progressing     Problem: Confusion  Goal: Confusion, delirium, dementia, or psychosis is improved or at baseline  Description: INTERVENTIONS:  1. Assess for possible contributors to thought disturbance, including medications, impaired vision or hearing, underlying metabolic abnormalities, dehydration, psychiatric diagnoses, and notify attending LIP  2. [place] high risk fall precautions, as indicated  3. Provide frequent short contacts to provide reality reorientation, refocusing and direction  4. Decrease environmental stimuli, including noise as appropriate  5.  Monitor and intervene to maintain adequate nutrition, hydration, 12/22/2022 3074)  Electrolytes maintained within normal limits: Monitor labs and assess patient for signs and symptoms of electrolyte imbalances  12/21/2022 2138 by Oseas Enriquez RN  Outcome: Progressing     Problem: Nutrition Deficit:  Goal: Optimize nutritional status  12/22/2022 1010 by Alicia Tomlinson LPN  Outcome: Progressing  12/21/2022 2138 by Oseas Enriquez RN  Outcome: Progressing

## 2022-12-22 NOTE — PLAN OF CARE
Problem: Discharge Planning  Goal: Discharge to home or other facility with appropriate resources  12/21/2022 2138 by Shanae Triplett RN  Outcome: Progressing  12/21/2022 1904 by Sylvia Little  Outcome: Progressing  Flowsheets (Taken 12/21/2022 1132)  Discharge to home or other facility with appropriate resources: Refer to discharge planning if patient needs post-hospital services based on physician order or complex needs related to functional status, cognitive ability or social support system     Problem: Safety - Adult  Goal: Free from fall injury  12/21/2022 2138 by Shanae Triplett RN  Outcome: Progressing  12/21/2022 1904 by Sylvia Little  Outcome: Progressing     Problem: Neurosensory - Adult  Goal: Achieves stable or improved neurological status  12/21/2022 2138 by Shanae Triplett RN  Outcome: Progressing  12/21/2022 1904 by Sylvia Little  Outcome: Progressing  Flowsheets (Taken 12/21/2022 1132)  Achieves stable or improved neurological status: Assess for and report changes in neurological status  Goal: Achieves maximal functionality and self care  12/21/2022 2138 by Shanae Triplett RN  Outcome: Progressing  12/21/2022 1904 by Sylvia Little  Outcome: Progressing  Flowsheets (Taken 12/21/2022 1132)  Achieves maximal functionality and self care: Monitor swallowing and airway patency with patient fatigue and changes in neurological status     Problem: Skin/Tissue Integrity - Adult  Goal: Skin integrity remains intact  12/21/2022 2138 by Shanae Triplett RN  Outcome: Progressing  12/21/2022 1904 by Sylvia Little  Outcome: Progressing  Flowsheets (Taken 12/21/2022 1132)  Skin Integrity Remains Intact: Monitor for areas of redness and/or skin breakdown

## 2022-12-22 NOTE — PROGRESS NOTES
Occupational Therapy     12/22/22 1000   General   Timed Code Treatment Minutes 60 Minutes   Restrictions/Precautions   Restrictions/Precautions Fall Risk;Weight Bearing   General   Family / Caregiver Present Yes  (spouse)   Subjective   Subjective Tx focused on reviewing mobility strategies for home, UE function, and DME. Functional Mobility   Functional - Mobility Device Wheelchair  (power)   Activity Retrieve items;Transport items; To/from bathroom; To/From therapy gym   Assist Level Supervision   Functional Mobility Comments pt did not require cues for positioning of PWC and technique for retrieval of item from refrigerator   Transfers   Stand Pivot Transfers Moderate assistance  (PWC to w/c)   Sit Pivot Transfers Contact guard assistance  (w/c to power chair)   Sit to stand Minimal assistance  (to mat table)   Stand to sit Contact guard assistance   Toilet Transfers   Toilet - Technique Stand pivot   Equipment Used Grab bars   Toilet Transfer Contact guard assistance   Type of ROM/Therapeutic Exercise   Type of ROM/Therapeutic Exercise AAROM;AROM   Comment RUE   Assessment   Performance deficits / Impairments Decreased functional mobility ; Decreased endurance;Decreased coordination;Decreased ADL status; Decreased sensation;Decreased posture;Decreased ROM; Decreased balance;Decreased strength;Decreased vision/visual deficit; Decreased safe awareness;Decreased high-level IADLs;Decreased cognition;Decreased fine motor control   Assessment DME needed: 30\" SB, w/c, BSC, aspen walker, TTB  (Rec a floor to ceiling GB by toilet)   Activity Tolerance   Activity Tolerance Patient Tolerated treatment well

## 2022-12-22 NOTE — PROGRESS NOTES
Jovany Mart  MR#  [id number]       12/22/22 0919 12/22/22 0920 12/22/22 0921   Subjective   Pain States no pain  --   --    Bed mobility   Supine to Sit  --  Minimal assistance  (HOB elevated)  --    Transfers   Bed to Chair  --   --  Minimal assistance  (Scoot to Left with arm rest removed. Assist required to clear cushion.)   Ambulation   WB Status  --   --   --    Ambulation   Surface  --   --   --    Device  --   --   --    Other Apparatus  --   --   --    Assistance  --   --   --    Gait Deviations  --   --   --    Distance  --   --   --    Wheelchair Activities   Propulsion  --   --   --    Propulsion 1   Propulsion  --   --   --    Level  --   --   --    Method  --   --   --    Level of Assistance  --   --   --    Description/ Details  --   --   --    Distance  --   --   --    PT Exercises   Exercise Treatment  --   --   --    Activity Tolerance   Activity Tolerance  --   --   --    Assessment   Assessment  --   --   --    PT Individual Minutes   Time In  --   --   --    Time Out  --   --   --    Minutes  --   --   --       12/22/22 0922 12/22/22 0953 12/22/22 0954   Subjective   Pain  --   --   --    Bed mobility   Supine to Sit  --   --   --    Transfers   Bed to Chair  --   --   --    Ambulation   WB Status WBAT  --   --    Ambulation   Surface Level tile  --   --    Device Parallel Bars  --   --    Other Apparatus AFO; Right; Wheelchair follow  --   --    Assistance Minimal assistance; Moderate assistance  (Pt. not able to advance RLE today. Forward lean.)  --   --    Gait Deviations Slow Tamia;Decreased step height  --   --    Distance 12'  --   --    Wheelchair Activities   Propulsion Yes  --   --    Propulsion 1   Propulsion Manual  --   --    Level Level Tile  --   --    Method LUE;LLE  --   --    Level of Assistance Stand by assistance;Minimal assistance  --   --    Description/ Details Kept trying to just use UE or LE; verbal cues to use both for steering. Too close to R wall at times.   --   -- Distance [de-identified]'  --   --    PT Exercises   Exercise Treatment  --  Sitting BLE exercises. LLE 15 reps/ 2 sets with 1-1/2  lb weight. RLE  PROM with trace mvmts noted at times- 10 reps/ 2 sets. --    Activity Tolerance   Activity Tolerance  --   --  Patient limited by endurance   Assessment   Assessment  --   --  Amb 12' in parallel bars but required assist to advance RLE and more pronounced forward lean.    PT Individual Minutes   Time In  --   --   --    Time Out  --   --   --    Minutes  --   --   --       12/22/22 0955   Subjective   Pain  --    Bed mobility   Supine to Sit  --    Transfers   Bed to Chair  --    Ambulation   WB Status  --    Ambulation   Surface  --    Device  --    Other Apparatus  --    Assistance  --    Gait Deviations  --    Distance  --    Wheelchair Activities   Propulsion  --    Propulsion 1   Propulsion  --    Level  --    Method  --    Level of Assistance  --    Description/ Details  --    Distance  --    PT Exercises   Exercise Treatment  --    Activity Tolerance   Activity Tolerance  --    Assessment   Assessment  --    PT Individual Minutes   Time In 0900   Time Out 1000   Minutes 60   Electronically signed by Brissa Ambrose PTA on 12/22/2022 at 9:56 AM

## 2022-12-23 PROCEDURE — 6360000002 HC RX W HCPCS: Performed by: PSYCHIATRY & NEUROLOGY

## 2022-12-23 PROCEDURE — 97530 THERAPEUTIC ACTIVITIES: CPT

## 2022-12-23 PROCEDURE — 1180000000 HC REHAB R&B

## 2022-12-23 PROCEDURE — 92507 TX SP LANG VOICE COMM INDIV: CPT

## 2022-12-23 PROCEDURE — 99232 SBSQ HOSP IP/OBS MODERATE 35: CPT | Performed by: PSYCHIATRY & NEUROLOGY

## 2022-12-23 PROCEDURE — 97116 GAIT TRAINING THERAPY: CPT

## 2022-12-23 PROCEDURE — 94760 N-INVAS EAR/PLS OXIMETRY 1: CPT

## 2022-12-23 PROCEDURE — 6370000000 HC RX 637 (ALT 250 FOR IP): Performed by: PSYCHIATRY & NEUROLOGY

## 2022-12-23 PROCEDURE — 97110 THERAPEUTIC EXERCISES: CPT

## 2022-12-23 RX ADMIN — HYDROXYCHLOROQUINE SULFATE 200 MG: 200 TABLET, FILM COATED ORAL at 21:22

## 2022-12-23 RX ADMIN — DOCUSATE SODIUM 100 MG: 100 CAPSULE, LIQUID FILLED ORAL at 21:23

## 2022-12-23 RX ADMIN — ACETAMINOPHEN 650 MG: 325 TABLET ORAL at 21:22

## 2022-12-23 RX ADMIN — GABAPENTIN 300 MG: 300 CAPSULE ORAL at 21:23

## 2022-12-23 RX ADMIN — MICONAZOLE NITRATE: 2 POWDER TOPICAL at 21:26

## 2022-12-23 RX ADMIN — ATORVASTATIN CALCIUM 40 MG: 40 TABLET, FILM COATED ORAL at 21:23

## 2022-12-23 RX ADMIN — ENOXAPARIN SODIUM 40 MG: 100 INJECTION SUBCUTANEOUS at 17:00

## 2022-12-23 ASSESSMENT — PAIN - FUNCTIONAL ASSESSMENT: PAIN_FUNCTIONAL_ASSESSMENT: ACTIVITIES ARE NOT PREVENTED

## 2022-12-23 ASSESSMENT — PAIN DESCRIPTION - DESCRIPTORS: DESCRIPTORS: ACHING

## 2022-12-23 ASSESSMENT — PAIN SCALES - GENERAL
PAINLEVEL_OUTOF10: 0
PAINLEVEL_OUTOF10: 10

## 2022-12-23 ASSESSMENT — PAIN DESCRIPTION - LOCATION: LOCATION: GENERALIZED

## 2022-12-23 NOTE — PLAN OF CARE
Problem: Discharge Planning  Goal: Discharge to home or other facility with appropriate resources  12/23/2022 1040 by Dean Peters LPN  Outcome: Progressing  Flowsheets (Taken 12/23/2022 5020)  Discharge to home or other facility with appropriate resources: Refer to discharge planning if patient needs post-hospital services based on physician order or complex needs related to functional status, cognitive ability or social support system  12/22/2022 2329 by Arlin Taveras RN  Outcome: Progressing     Problem: Safety - Adult  Goal: Free from fall injury  12/23/2022 1040 by Dean Peters LPN  Outcome: Progressing  12/22/2022 2329 by Arlin Taveras RN  Outcome: Progressing     Problem: Neurosensory - Adult  Goal: Achieves stable or improved neurological status  12/23/2022 1040 by Dean Peters LPN  Outcome: Progressing  Flowsheets (Taken 12/23/2022 0826)  Achieves stable or improved neurological status: Assess for and report changes in neurological status  12/22/2022 2329 by Arlin Taveras RN  Outcome: Progressing  Goal: Achieves maximal functionality and self care  12/23/2022 1040 by Dean Peters LPN  Outcome: Progressing  Flowsheets (Taken 12/23/2022 0826)  Achieves maximal functionality and self care: Monitor swallowing and airway patency with patient fatigue and changes in neurological status  12/22/2022 2329 by Arlin Taveras RN  Outcome: Progressing     Problem: Skin/Tissue Integrity - Adult  Goal: Skin integrity remains intact  12/23/2022 1040 by Dean Peters LPN  Outcome: Progressing  Flowsheets (Taken 12/23/2022 0828)  Skin Integrity Remains Intact: Monitor for areas of redness and/or skin breakdown  12/22/2022 2329 by Arlin Taveras RN  Outcome: Progressing     Problem: Pain  Goal: Verbalizes/displays adequate comfort level or baseline comfort level  12/23/2022 1040 by Dean Peters LPN  Outcome: Progressing  12/22/2022 2329 by Arlin Taveras RN  Outcome: Progressing     Problem: Chronic Conditions and Co-morbidities  Goal: Patient's chronic conditions and co-morbidity symptoms are monitored and maintained or improved  12/23/2022 1040 by Kathlyne Siemens, LPN  Outcome: Progressing  Flowsheets (Taken 12/23/2022 0826)  Care Plan - Patient's Chronic Conditions and Co-Morbidity Symptoms are Monitored and Maintained or Improved: Monitor and assess patient's chronic conditions and comorbid symptoms for stability, deterioration, or improvement  12/22/2022 2329 by Leonid Bourne RN  Outcome: Progressing     Problem: ABCDS Injury Assessment  Goal: Absence of physical injury  12/23/2022 1040 by Kathlyne Siemens, LPN  Outcome: Progressing  12/22/2022 2329 by Leonid Bourne RN  Outcome: Progressing     Problem: Skin/Tissue Integrity  Goal: Absence of new skin breakdown  Description: 1. Monitor for areas of redness and/or skin breakdown  2. Assess vascular access sites hourly  3. Every 4-6 hours minimum:  Change oxygen saturation probe site  4. Every 4-6 hours:  If on nasal continuous positive airway pressure, respiratory therapy assess nares and determine need for appliance change or resting period. 12/23/2022 1040 by Kathlyne Siemens, LPN  Outcome: Progressing  12/22/2022 2329 by Leonid Bourne RN  Outcome: Progressing     Problem: Confusion  Goal: Confusion, delirium, dementia, or psychosis is improved or at baseline  Description: INTERVENTIONS:  1. Assess for possible contributors to thought disturbance, including medications, impaired vision or hearing, underlying metabolic abnormalities, dehydration, psychiatric diagnoses, and notify attending LIP  2. [place] high risk fall precautions, as indicated  3. Provide frequent short contacts to provide reality reorientation, refocusing and direction  4. Decrease environmental stimuli, including noise as appropriate  5.  Monitor and intervene to maintain adequate nutrition, hydration, elimination, sleep and activity  6. If unable to ensure safety without constant attention obtain sitter and review sitter guidelines with assigned personnel  7.  Initiate Psychosocial CNS and Spiritual Care consult, as indicated  12/23/2022 1040 by Mason Butcher LPN  Outcome: Progressing  12/22/2022 2329 by Bryon Rothman RN  Outcome: Progressing     Problem: Musculoskeletal - Adult  Goal: Return mobility to safest level of function  12/23/2022 1040 by Mason Butcher LPN  Outcome: Progressing  Flowsheets (Taken 12/23/2022 0826)  Return Mobility to Safest Level of Function: Assess patient stability and activity tolerance for standing, transferring and ambulating with or without assistive devices  12/22/2022 2329 by Bryon Rothman RN  Outcome: Progressing  Goal: Return ADL status to a safe level of function  12/23/2022 1040 by Mason Butcher LPN  Outcome: Progressing  Flowsheets (Taken 12/23/2022 0826)  Return ADL Status to a Safe Level of Function: Assess activities of daily living deficits and provide assistive devices as needed  12/22/2022 2329 by Bryon Rothman RN  Outcome: Progressing     Problem: Gastrointestinal - Adult  Goal: Maintains or returns to baseline bowel function  12/23/2022 1040 by Mason Butcher LPN  Outcome: Progressing  Flowsheets (Taken 12/23/2022 0826)  Maintains or returns to baseline bowel function: Assess bowel function  12/22/2022 2329 by Bryon Rothman RN  Outcome: Progressing  Goal: Maintains adequate nutritional intake  12/23/2022 1040 by Mason Butcher LPN  Outcome: Progressing  Flowsheets (Taken 12/23/2022 0826)  Maintains adequate nutritional intake: Monitor percentage of each meal consumed  12/22/2022 2329 by Bryon Rothman RN  Outcome: Progressing     Problem: Metabolic/Fluid and Electrolytes - Adult  Goal: Electrolytes maintained within normal limits  12/23/2022 1040 by Mason Butcher LPN  Outcome: Progressing  Flowsheets (Taken 12/23/2022 0826)  Electrolytes maintained within normal limits: Monitor labs and assess patient for signs and symptoms of electrolyte imbalances  12/22/2022 2329 by Arnaud Barraza RN  Outcome: Progressing     Problem: Nutrition Deficit:  Goal: Optimize nutritional status  12/23/2022 1040 by Anu Wray LPN  Outcome: Progressing  12/22/2022 2329 by Arnaud Barraza RN  Outcome: Progressing

## 2022-12-23 NOTE — PROGRESS NOTES
[facility]  [facility] [address]     2019  9304    75 MINUTES    [x]Daily Note  []Progress Note     Date: 2022  Patient Name: Barry English        MRN:   [id number]    Account #: [de-identified]  : 1960  (64 y.o.)  Gender: female   Primary Provider: Librado Hidalgo MD  Diet: Regular diet, thin liquids        PATIENT DIAGNOSIS(ES):    Diagnosis: CVA, R hemiparesis     Additional Pertinent Hx: Anxiety, depression, COPD, CVA, DM, LE edema, hyperlipidemia, HTN, obesity, RA, CAD and venous thromboembolism     RESTRICTIONS/PRECAUTIONS:    Restrictions/Precautions  Restrictions/Precautions: Modified Diet, Swallowing - Fall Risk, Aspiration Risk  Required Braces or Orthoses?: No     Subjective:  Patient alert, sitting in wheelchair. Spouse was present for part of session. Objective:  Patient did not demonstrate ability to answer wh question, however, answered y/n questions with 69% accuracy with use of visual choices. Accuracy increased to 100% with repetition, visual/verbal choices, and verbal prompt. Patient provided with low tech yes/no visual. Patient verbal expression noted to not always match chosen answer on low tech visual.     Patient presented with low tech alphabet board. Patient tasked to identify target letters (some duplicate letters appeared on alphabet board). With time, patient was 13% accurate. This increased to 75% with repetition, verbal cues/prompts, and multiple attempts. Patient tasked to spell first name with low tech alphabet board. Patient initially presented with difficulty in independent completion. However, did demonstrate ability to do it again accurately independently when prompted. Patient tasked to spell last name with low tech alphabet board. Patient completed with 60% accuracy independently. Verbal expression was noted to be inaccurate in this task.  When patient started over, with time, patient was accurate in producing last name with alphabet board even through verbal expression was not accurate. Patient tasked to name spouse in the room. Patient accurately produced spouse name with verbal expression. Tasked patient to use alphabet board to tell spouse name. Patient spelled own name (both names share first two letters). With repetition and time, patient independently produced spouse name. In confrontational naming task, patient was 64% accurate independently primarily with verbal expression with one speech production error. With repetition, multiple attempts, verbal cues/prompts, visual of first letter on board (1x), and initial sound cues, accuracy increased to 100%. Patient tasked to use alphabet board to some named items. Patient not accurate in this task independently. Patient tasked to complete verbally provided phrase (hot and ---, off and ---). Patient was successful 1/3 trials independently. This did not increase with verbal cues/prompts, visual cues/prompts, sentence completion. SHORT TERM GOAL #1:  Goal 1: Pt will complete y/n tasks with 80% accuracy and minimal verbal cues/modeling. Not Met     SHORT TERM GOAL #2:  Goal 2: Pt will complete verbal expression tasks with 80% accuracy and minimal verbal cues/modeling. Not Met     SHORT TERM GOAL #3:  Goal 3: Pt will complete receptive/expressive language tasks with 80% accuracy and minimal verbal cues/modeling. Not Met     SHORT TERM GOAL #4:  Goal 4: Pt will follow one step commands with with 90% accuracy and minimal verbal cues/modeling. Not Met     SHORT TERM GOAL #5:  Goal 5: Pt will complete orientation tasks with 90% accuracy and minimal verbal cues/modeling. Not Met Goal 6: The patient will complete automatic speech tasks, confrontational naming tasks, and 1 step commands wtih 80% accuracy with minimal verbal cues/modelinng.  Not Met     Swallowing Short Term Goals  Short-term Goals  Goal 1: Pt will tolerate soft & bite sized consistencies with mildly  (nectar) thick

## 2022-12-23 NOTE — PROGRESS NOTES
Patient:   Dana Coello  MR#:    [id number]   Room:    0826/826-02   YOB: 1960  Date of Progress Note: 12/23/2022  Time of Note                           10:07 AM  Consulting Physician:   Mahala Bumpers, M.D. Attending Physician:  Mahala Bumpers, MD     Chief complaint Acute ischemic stroke    S:This 58 y.o. female  with history of anxiety, depression, COPD, atherosclerotic disease, colitis, COPD, CVA, DM,  LE edema, hyperlipidemia, HTN, morbid obesity, RA, CAD  and venous thromboembolism. She presented to [facility] ER on 11/9/22 after being found at home lying facedown in her feces. She was very weak, confused, not able to speak but moving purposefully and intermittently following commands. Her last well know was 3 a.m. when her  left for work. Imaging done revealed a large MCA stroke 7.7 x 5 cm. CTA head/neck revealed an acute M1 occlusion. She was outside of the window for TPA. CK on admit was 1100, consistent with rhabdomylosis. She was also noted to possibly Dens fracture. She was transferred to [facility] for a possible thrombectomy. Further imaging was done at [facility] and she was deemed not a candidate for thrombectomy. MRI done ruled out Dens fracture. Repeat CT of head on 11/11/22 showed evolving left MCA territory infarct with increasing edema/mass effect resulting in 4mm of  rightward midline shift(previously 2mm) a the level of the third ventricle. No hemorrhagic conversion or definite trapping of the right lateral ventricle, possible small acute right cerebellar infarct. Neurosurgery was consulted for possible hemicraniectomy. She was seen by Dr. Dang Sinha, who didn't feel any surgical intervention was needed. Patient was found to have a UTI + for [facility] and was placed on Rocephin on 11/14/22. Patient had a PICC line placed. Patient was evaluated by SPT and initially made NPO d/t oropharyngeal dysphagia. NGT placed and feeding started.  She was also noted to have global aphasia but is improving. She was re-evaluated by SPT on 11/15/22 for swallow and was started on a dysphagia 1 diet with nectar thick liquids. Patient also continues to have right sided weakness and is participating in both PT/OT. Repeat CT head on 11/13/22 shows stable LMCA ischemic stroke with stable edema, 5 mm shift, no hemorrhage. She is felt to need a stay on Rehab for continued PT/OT/SPT and medical management to work towards her goal of returning home with her . She is now felt ready to start the Rehab program.  Still refusing to take her medicines but did take some last time. No acute issues as per . REVIEW OF SYSTEMS:  Unreliable due to aphasia     Past Medical History:      Diagnosis Date    Anxiety     ASCVD (arteriosclerotic cardiovascular disease)     Carrier of group B Streptococcus     Cellulitis     Colitis     COPD (chronic obstructive pulmonary disease) ([facility])     Costochondritis     CVA (cerebral vascular accident) ([address]     Depression     Edema     Fracture of sternum     non union post surgery.      Gallstones     Grief reaction     H/O superior vena cava filter placement     Hyperlipidemia     Hypertension     MI (myocardial infarction) ([address]     MRSA (methicillin resistant Staphylococcus aureus)     Neuropathy     Obesity     Pseudarthrosis sternal    RA (rheumatoid arthritis) ([address]     Rosacea     Sinus bradycardia     TIA (transient ischemic attack)     Venous thromboembolism        Past Surgical History:      Procedure Laterality Date    ADENOIDECTOMY      APPENDECTOMY      CARDIAC CATHETERIZATION  3/2009    CARDIAC CATHETERIZATION  11/5/14  JDT    EF 50%, patent bypass grafts    CHOLECYSTECTOMY  2011    COLONOSCOPY  06/03/2010    Dr. Beena Wells: distal colon having ulcerative lesions c/w UC    COLONOSCOPY  2009    pancolitis    COLONOSCOPY      CORONARY ARTERY BYPASS GRAFT  3/2009    PHANI Winters to LAD, SVGs to [address] 2011 HYSTERECTOMY (CERVIX STATUS UNKNOWN)      KNEE SURGERY      MN COLONOSCOPY FLX DX W/COLLJ SPEC WHEN PFRMD N/A 3/12/2018    Dr Sammie Ruiz rectal inflammation consistent with U.C.-Active proctitis--3 yr recall    THORACOTOMY      TONSILLECTOMY      UPPER GASTROINTESTINAL ENDOSCOPY  2010    Dr. Yocasta Rodriguez  2012       Medications in Hospital:      Current Facility-Administered Medications:     escitalopram (LEXAPRO) tablet 5 mg, 5 mg, Oral, Daily, Chantal Jurado MD    bisacodyl (DULCOLAX) EC tablet 5 mg, 5 mg, Oral, Daily, Chantal Jurado MD, 5 mg at 12/16/22 0806    miconazole (MICOTIN) 2 % powder, , Topical, BID, Chantal Jurado MD, Given at 12/22/22 0832    docusate sodium (COLACE) capsule 100 mg, 100 mg, Oral, BID, Chantal Jurado MD, 100 mg at 12/16/22 6619    aspirin chewable tablet 81 mg, 81 mg, Oral, Daily, Chantal Jurado MD, 81 mg at 12/19/22 0810    atorvastatin (LIPITOR) tablet 40 mg, 40 mg, Oral, Nightly, Chantal Jurado MD, 40 mg at 12/22/22 2107    dulaglutide (TRULICITY) SC injection 1.5 mg (Patient Supplied), 1.5 mg, SubCUTAneous, Weekly, Chantal Jurado MD    folic acid (FOLVITE) tablet 1 mg, 1 mg, Oral, Daily, Chantal Jurado MD, 1 mg at 12/19/22 0810    gabapentin (NEURONTIN) capsule 300 mg, 300 mg, Oral, Nightly, Chantal Jurado MD, 300 mg at 12/19/22 2156    hydroxychloroquine (PLAQUENIL) tablet 200 mg, 200 mg, Oral, BID, Chantal Jurado MD, 200 mg at 12/19/22 2156    amLODIPine (NORVASC) tablet 2.5 mg, 2.5 mg, Oral, Daily, Chantal Jurado MD, 2.5 mg at 12/19/22 0810    tiZANidine (ZANAFLEX) tablet 4 mg, 4 mg, Oral, BID PRN, Chantal Jurado MD    multivitamin 1 tablet, 1 tablet, Oral, Daily, Chantal Jurado MD, 1 tablet at 12/19/22 0810    acetaminophen (TYLENOL) tablet 650 mg, 650 mg, Oral, Q4H PRN, Chantal Jurado MD, 650 mg at 11/27/22 1933    enoxaparin (LOVENOX) injection 40 mg, 40 mg, SubCUTAneous, Daily, Chantal Jurado MD, 40 mg at 12/22/22 1722    polyethylene glycol (GLYCOLAX) packet 17 g, 17 g, Oral, Daily PRN, Ankit Bender MD, 17 g at 11/30/22 2112    Allergies:  Methotrexate derivatives    Social History:   TOBACCO:   reports that she quit smoking about 13 years ago. Her smoking use included cigarettes. She has a 64.00 pack-year smoking history. She has never used smokeless tobacco.  ETOH:   reports current alcohol use. Family History:       Problem Relation Age of Onset    Prostate Cancer Father     Heart Failure Father     Cancer Father     Colon Cancer Neg Hx     Colon Polyps Neg Hx     Liver Cancer Neg Hx     Liver Disease Neg Hx     Esophageal Cancer Neg Hx     Rectal Cancer Neg Hx     Stomach Cancer Neg Hx          PHYSICAL EXAM:  /71   Pulse 60   Temp 97.2 °F (36.2 °C) (Temporal)   Resp 18   Ht 5' 7\" (1.702 m)   Wt 195 lb 14.4 oz (88.9 kg)   SpO2 96%   BMI 30.68 kg/m²     Constitutional - well developed, well nourished. Eyes - conjunctiva normal.   Ear, nose, throat - No scars, masses, or lesions over external nose or ears, no atrophy of tongue  Neck-symmetric, no masses noted, no jugular vein distension  Respiration- chest wall appears symmetric, good expansion,   normal effort without use of accessory muscles  Musculoskeletal - no significant wasting of muscles noted, no bony deformities  Extremities-no clubbing, cyanosis or edema  Skin - warm, dry, and intact. No rash, erythema, or pallor. Psychiatric - mood, affect, and behavior appear normal.      Neurological exam  Awake, alert, global aphasia says \"yes\" to all questions asked   Attention and concentration appear appropriate  Recent and remote memory unable to tests     Cranial Nerve Exam     CN III, IV,VI-EOMI, No nystagmus, conjugate eye movements, no ptosis    CN VII-+ facial assymetry       Motor Exam  No movement on the right      Tremors- no tremors in hands or head noted     Gait  Not tested     Nursing/pcp notes, imaging,labs and vitals reviewed.      PT,OT and/or speech notes reviewed    Lab Results   Component Value Date    WBC 9.5 12/22/2022    HGB 10.7 (L) 12/22/2022    HCT 34.1 (L) 12/22/2022    MCV 77.5 (L) 12/22/2022     (H) 12/22/2022     Lab Results   Component Value Date     12/22/2022    K 4.0 12/22/2022     12/22/2022    CO2 24 12/22/2022    BUN 11 12/22/2022    CREATININE 0.5 12/22/2022    GLUCOSE 74 12/22/2022    CALCIUM 9.0 12/22/2022    PROT 5.2 (L) 12/22/2022    LABALBU 3.1 (L) 12/22/2022    BILITOT <0.2 12/22/2022    ALKPHOS 77 12/22/2022    AST 14 12/22/2022    ALT 11 12/22/2022    LABGLOM >60 12/22/2022   No results found for: INR, PROTIME    Eward Lanius, PTA   Physical Therapist Assistant   Specialty:  Physical Therapist   Progress Notes       Cosign Needed   Date of Service:  12/22/2022  9:56 AM                 Cosign Needed                                                                               Rosalie Beams  MR#  [id number]          12/22/22 0919 12/22/22 0920 12/22/22 0921   Subjective   Pain States no pain  --   --    Bed mobility   Supine to Sit  --  Minimal assistance  (HOB elevated)  --    Transfers   Bed to Chair  --   --  Minimal assistance  (Scoot to Left with arm rest removed.   Assist required to clear cushion.)   Ambulation   WB Status  --   --   --    Ambulation   Surface  --   --   --    Device  --   --   --    Other Apparatus  --   --   --    Assistance  --   --   --    Gait Deviations  --   --   --    Distance  --   --   --    Wheelchair Activities   Propulsion  --   --   --    Propulsion 1   Propulsion  --   --   --    Level  --   --   --    Method  --   --   --    Level of Assistance  --   --   --    Description/ Details  --   --   --    Distance  --   --   --    PT Exercises   Exercise Treatment  --   --   --    Activity Tolerance   Activity Tolerance  --   --   --    Assessment   Assessment  --   --   --    PT Individual Minutes   Time In  --   --   --    Time Out  --   --   --    Minutes  --   --   --         12/22/22 6058 12/22/22 0953 12/22/22 0954   Subjective   Pain  --   --   --    Bed mobility   Supine to Sit  --   --   --    Transfers   Bed to Chair  --   --   --    Ambulation   WB Status WBAT  --   --    Ambulation   Surface Level tile  --   --    Device Parallel Bars  --   --    Other Apparatus AFO; Right; Wheelchair follow  --   --    Assistance Minimal assistance; Moderate assistance  (Pt. not able to advance RLE today. Forward lean.)  --   --    Gait Deviations Slow Tamia;Decreased step height  --   --    Distance 12'  --   --    Wheelchair Activities   Propulsion Yes  --   --    Propulsion 1   Propulsion Manual  --   --    Level Level Tile  --   --    Method LUE;LLE  --   --    Level of Assistance Stand by assistance;Minimal assistance  --   --    Description/ Details Kept trying to just use UE or LE; verbal cues to use both for steering. Too close to R wall at times. --   --    Distance [de-identified]'  --   --    PT Exercises   Exercise Treatment  --  Sitting BLE exercises. LLE 15 reps/ 2 sets with 1-1/2  lb weight. RLE  PROM with trace mvmts noted at times- 10 reps/ 2 sets. --    Activity Tolerance   Activity Tolerance  --   --  Patient limited by endurance   Assessment   Assessment  --   --  Amb 12' in parallel bars but required assist to advance RLE and more pronounced forward lean.    PT Individual Minutes   Time In  --   --   --    Time Out  --   --   --    Minutes  --   --   --         12/22/22 0955   Subjective   Pain  --    Bed mobility   Supine to Sit  --    Transfers   Bed to Chair  --    Ambulation   WB Status  --    Ambulation   Surface  --    Device  --    Other Apparatus  --    Assistance  --    Gait Deviations  --    Distance  --    Wheelchair Activities   Propulsion  --    Propulsion 1   Propulsion  --    Level  --    Method  --    Level of Assistance  --    Description/ Details  --    Distance  --    PT Exercises   Exercise Treatment  --    Activity Tolerance   Activity Tolerance  --    Assessment   Assessment --    PT Individual Minutes   Time In 0900   Time Out 1000   Minutes 60   Electronically signed by Kacy Cagle PTA on 12/22/2022 at 9:56 AM                 RECORD REVIEW: Previous medical records, medications were reviewed at today's visit    IMPRESSION:   1. Acute ischemic stroke-on aspirin/statin  2. Hypertension-on medications monitor  3. Hyperlipidemia-on statin  4. Diabetes-Trulicity-monitor blood sugar  5. DVT prophylaxis-Lovenox  6. History of coronary artery disease-aspirin/statin  7. Neuropathy-on Neurontin  8. Rheumatoid arthritis-on Plaquenil  9. COPD-monitor  10. History of anxiety and depression-monitor  11. History of colitis/gallstones-monitor  12. History of bariatric surgery-on multivitamin/folic acid  13. History of superior vena cava filter placement with venous thromboembolism-not on anticoagulation-monitor- took herself off blood thinners as not like meds and was bruising   14. PT/OT/speech    x-ray of right ankle no acute changes  Venous ultrasound of leg no DVT    Added low dose Lexapro    Discussed with daughter previously that it would be very difficult to force patient to take medications. It is better to discuss and have agreeable to initiating treatment on her own  Unable to force the patient to take medications.  She would need to be restrained with feeding tube    Continue care as noted above    NAM pending 1 week      Expected duration and frequency therapy: 180 minutes per day, 5 days per week    [address] [facility]  Dr Yanelis Starkey

## 2022-12-23 NOTE — PLAN OF CARE
Problem: Discharge Planning  Goal: Discharge to home or other facility with appropriate resources  12/22/2022 2329 by Kameron Contreras RN  Outcome: Progressing  12/22/2022 1010 by Asia Abreu LPN  Outcome: Progressing  Flowsheets (Taken 12/22/2022 0236)  Discharge to home or other facility with appropriate resources: Refer to discharge planning if patient needs post-hospital services based on physician order or complex needs related to functional status, cognitive ability or social support system     Problem: Safety - Adult  Goal: Free from fall injury  12/22/2022 2329 by Kameron Contreras RN  Outcome: Progressing  12/22/2022 1010 by Asia Arbeu LPN  Outcome: Progressing     Problem: Neurosensory - Adult  Goal: Achieves stable or improved neurological status  12/22/2022 2329 by Kameron Contreras RN  Outcome: Progressing  12/22/2022 1010 by Asia Abreu LPN  Outcome: Progressing  Flowsheets (Taken 12/22/2022 3348)  Achieves stable or improved neurological status: Assess for and report changes in neurological status  Goal: Achieves maximal functionality and self care  12/22/2022 2329 by Kameron Contreras RN  Outcome: Progressing  12/22/2022 1010 by Asia Abreu LPN  Outcome: Progressing  Flowsheets (Taken 12/22/2022 9749)  Achieves maximal functionality and self care: Monitor swallowing and airway patency with patient fatigue and changes in neurological status     Problem: Skin/Tissue Integrity - Adult  Goal: Skin integrity remains intact  12/22/2022 2329 by Kameron Contreras RN  Outcome: Progressing  12/22/2022 1010 by Asia Abreu LPN  Outcome: Progressing  Flowsheets (Taken 12/22/2022 0711)  Skin Integrity Remains Intact: Monitor for areas of redness and/or skin breakdown

## 2022-12-23 NOTE — PROGRESS NOTES
Physical Therapy  Name: Nerissa Le  MRN:  [id number]  Date of service:  12/23/2022 12/23/22 1420   Restrictions/Precautions   Restrictions/Precautions Fall Risk;Weight Bearing   Required Braces or Orthoses? Yes   Lower Extremity Weight Bearing Restrictions   Right Lower Extremity Weight Bearing Weight Bearing As Tolerated   Left Lower Extremity Weight Bearing Weight Bearing As Tolerated   General   Chart Reviewed Yes   Family / Caregiver Present Yes   Subjective   Subjective Patient is sitting in her wc and is ready for tx   General Comment   Comments Nursing present stating patient just got cleaned up form having BM. Pain Assessment   Pain Assessment None - Denies Pain   Patient Observation   Observations Patient able to count some with therapist and say yes or no to some questions. Transfers   Sit to Stand Minimal Assistance; Moderate Assistance   Stand to Sit Contact guard assistance;Minimal Assistance   Comment Multiplt STS for amublation in //; patient has little eccentric control sitting   Ambulation   WB Status WBAT   Ambulation   Surface Level tile   Device Parallel Bars   Other Apparatus AFO; Right; Wheelchair follow   Assistance Moderate assistance  (Therapist advanced R LE for patient)   Quality of Gait patient unable to lift R LE on her own this sessionl weight shifting forwards and to the L   Gait Deviations Slow Tamia;Decreased step height   Distance 12' x 3   Comments Seated rest in between sets   Stairs/Curb   Stairs? No   Propulsion 1   Propulsion Manual   Level Level Tile   Method LUE;LLE   Level of Assistance Contact guard assistance   Description/ Details Able to use L UE and R LE together this date with min vearing to the R. Needs more assist with turns.  Required recovery periods   Distance 150'   Exercises   Hamstring Sets red x 20   Hip Flexion 20   Hip Abduction red x 20; ball squeeze x 20   Knee Long Arc Quad 20   Ankle Pumps 20   Comments Seated GWEN LE exercises; AAROM on R LE   Other exercises   Other exercises? Yes   Other exercises 1 : ankle 4 way red x 20   Other exercises 2 AAROM/PROM R ankle 4 way x 20   Patient Goals    Patient Goals  Return home with family   Short Term Goals   Time Frame for Short Term Goals 2 weeks   Short Term Goal 1 Patient will perform bed mobility with Min A   Short Term Goal 2 Patient will transition supine <> sit with Min A   Short Term Goal 3 Patient will perform bed <> chair transfer with Min A   Short Term Goal 4 Patient propel wheelchair 50 ft with Min A   Short Term Goal 5 Patient will ambulate 10 ft with Mod A   Long Term Goals   Time Frame for Long Term Goals  3 weeks   Long Term Goal 1 Patient will perform bed mobility independently   Long Term Goal 2 Patient with transition supine <> sit independently   Long Term Goal 3 Patient will perform bed <> chair transfer independently   Long Term Goal 4 Patient will perform car transfer with Min A   Long Term Goal 5 Patient will ambulate 10 ft with CGA   Conditions Requiring Skilled Therapeutic Intervention   Body Structures, Functions, Activity Limitations Requiring Skilled Therapeutic Intervention Decreased ADL status; Decreased ROM; Decreased strength;Decreased safe awareness;Decreased cognition;Decreased endurance;Decreased sensation;Decreased balance;Decreased coordination;Decreased posture   Assessment Patient tolerates ambulation and exercises well needing cues fro technique and safety awareness. She is able to shift weight into her L LE during ambulation but is not able to activily advance R LE this session requiring therapist assist. She was left sitting in her WC with her  present. Will continue to progress as able. Activity Tolerance   Activity Tolerance Patient limited by endurance   Safety Devices   Type of Devices Gait belt;Call light within reach; Left in chair         Electronically signed by Ameya Santiago PTA on 12/23/2022 at 3:22 PM

## 2022-12-23 NOTE — PROGRESS NOTES
Comprehensive Nutrition Assessment    Type and Reason for Visit:  Reassess    Nutrition Recommendations/Plan:   Will continue POC. Malnutrition Assessment:  Malnutrition Status:  No malnutrition (12/23/22 0934)    Context:  Acute Illness     Findings of the 6 clinical characteristics of malnutrition:  Energy Intake:  No significant decrease in energy intake  Weight Loss:  No significant weight loss     Body Fat Loss:  No significant body fat loss     Muscle Mass Loss:  No significant muscle mass loss    Fluid Accumulation:  Mild Extremities   Strength:  Not Performed    Nutrition Assessment:    Seen for reassessment. Pt. reflecting an increase in PO intake of 51-75% & %. She states her intake has improved since diet upgrade. Family is bringing in several snacks and meals for her. She has also been consuming half of her ONS. Wt. is stable at this time. Electrolytes WNL. Will continue POC. Nutrition Related Findings:    aphasia Wound Type: None       Current Nutrition Intake & Therapies:    Average Meal Intake: %, 51-75%  Average Supplements Intake: %  ADULT ORAL NUTRITION SUPPLEMENT; Dinner; Other Oral Supplement; 4 oz. frozen milkshake (in freezer behind hot line)  ADULT ORAL NUTRITION SUPPLEMENT; Lunch; Fortified Pudding Oral Supplement  ADULT DIET; Regular; 4 carb choices (60 gm/meal); Please bring biscuits and gravy for breakfast, she loves hamburger and fries as well for a lunch or dinner option. Anthropometric Measures:  Height: 5' 7\" (170.2 cm)  Ideal Body Weight (IBW): 135 lbs (61 kg)    Admission Body Weight: 195 lb (88.5 kg)  Current Body Weight: 195 lb (88.5 kg), 144.4 % IBW. Weight Source: Bed Scale  Current BMI (kg/m2): 30.5  Usual Body Weight: 201 lb (91.2 kg)  % Weight Change (Calculated): 1  Weight Adjustment For: No Adjustment    BMI Categories: Obese Class 1 (BMI 30.0-34. 9)    Estimated Daily Nutrient Needs:  Energy Requirements Based On: Kcal/kg  Weight Used for Energy Requirements: Current  Energy (kcal/day): 5524-2331  Weight Used for Protein Requirements: Ideal  Protein (g/day):   Method Used for Fluid Requirements: 1 ml/kcal  Fluid (ml/day): 4550-7327    Nutrition Diagnosis:   Biting/chewing (masticatory) difficulty, Swallowing difficulty related to acute injury/trauma as evidenced by swallow study results    Nutrition Interventions:   Food and/or Nutrient Delivery: Continue Current Diet, Continue Oral Nutrition Supplement  Nutrition Education/Counseling: No recommendation at this time  Coordination of Nutrition Care: No recommendation at this time  Plan of Care discussed with: pt and pt     Goals:  Previous Goal Met: Progressing toward Goal(s)  Goals: Meet at least 75% of estimated needs, PO intake 75% or greater       Nutrition Monitoring and Evaluation:   Behavioral-Environmental Outcomes: None Identified  Food/Nutrient Intake Outcomes: Diet Advancement/Tolerance, Food and Nutrient Intake, Supplement Intake  Physical Signs/Symptoms Outcomes: Biochemical Data, Chewing or Swallowing, Skin, Fluid Status or Edema, Nutrition Focused Physical Findings    Discharge Planning:     Too soon to determine     Robby Blake [address], LD  Contact: [phone]

## 2022-12-23 NOTE — PROGRESS NOTES
[facility]  INPATIENT SPEECH THERAPY  [facility] 8 REHAB UNIT     1000  1100    61 MINUTES    [x]Daily Note  []Progress Note     Date: 2022  Patient Name: Wei Tubbs        MRN:   [id number]    Account #: [de-identified]  : 1960  (64 y.o.)  Gender: female   Primary Provider: Nathaly Aguilar MD  Diet: Regular diet, thin liquids        PATIENT DIAGNOSIS(ES):    Diagnosis: CVA, R hemiparesis     Additional Pertinent Hx: Anxiety, depression, COPD, CVA, DM, LE edema, hyperlipidemia, HTN, obesity, RA, CAD and venous thromboembolism     RESTRICTIONS/PRECAUTIONS:    Restrictions/Precautions  Restrictions/Precautions: Modified Diet, Swallowing - Fall Risk, Aspiration Risk  Required Braces or Orthoses?: No     Subjective:  Patient alert, sitting in wheelchair. Spouse was present for session. Objective:  Patient answered y/n questions with 60% accuracy independently with time noted on one trial. Patient was provided with visual for choices yes/no. Accuracy increased to 100% with prompts, repetitions, and multiple attempts. Patient identified target item by name from a field of 3 in 3/4 trials independently with time noted for one trial. Accuracy increased to 4/4 trials with multiple attempts. From a field of 4, patient was successful 4/5 trials. Accuracy increased to 5/5 trials with multiple attempts and repetition. In task to identify item by description, patient was accurate 2/4 trials independently from a field of 3 choices. This increased to 3/4 trials with multiple attempts and 4/4 trials with multiple attempts, visual cues/prompts, and repetition. From a field of 4, patient was accurate in 4/6 trials independently. This increased to 6/6 trials with repetition, multiple attempts, and verbal cues/prompts or visual cues/prompts. Patient was 1/2 trials in body part identification with R/L discrimination.  Patient was 20% accurate in body part identification without R/L discrimination with visual cues/prompts and repetition. Patient completed confrontational naming task with 60% accuracy independently. This increased to 90% with verbal cues/prompts, multiple attempts, phrase/sentence completion, initial sound(s) cue, and time. Of missed trial, patient demonstrated ability to imitate following model. In automatic speech task of DANIEL, patient was 100% accurate with first day provided. In phrase completion task, patient was 80% accurate independently. This increased to 90% with multiple attempts. Patient did not demonstrate success in accurate use of low tech alphabet board to name items. Modeling use of board was provided. Patient presented with perseveration and phonemic paraphasias. SHORT TERM GOAL #1:  Goal 1: Pt will complete y/n tasks with 80% accuracy and minimal verbal cues/modeling. Not Met     SHORT TERM GOAL #2:  Goal 2: Pt will complete verbal expression tasks with 80% accuracy and minimal verbal cues/modeling. Not Met     SHORT TERM GOAL #3:  Goal 3: Pt will complete receptive/expressive language tasks with 80% accuracy and minimal verbal cues/modeling. Not Met     SHORT TERM GOAL #4:  Goal 4: Pt will follow one step commands with with 90% accuracy and minimal verbal cues/modeling. Not Met     SHORT TERM GOAL #5:  Goal 5: Pt will complete orientation tasks with 90% accuracy and minimal verbal cues/modeling. Not Met Goal 6: The patient will complete automatic speech tasks, confrontational naming tasks, and 1 step commands wtih 80% accuracy with minimal verbal cues/modelinng. Not Met     Swallowing Short Term Goals  Short-term Goals  Goal 1: Pt will tolerate soft & bite sized consistencies with mildly  (nectar) thick liquids with minimal overt s/s of aspiration/penetration during hospitalization. New Goal: Pt will tolerate soft and bite sized diet with thin liquids with minimal overt s/s of aspiration/penetration during hospitalization.  Goal Met: The patient will tolerate a regular diet with thin liquids with minimal overt s/s of aspiration. Goal 2: Pt will complete Modified Barium Swallow Study. Discontinue Goal  Goal 3: Pt will demonstrate awareness of general aspiration precautions. Goal 4: Pt will participate in swallowing reassessments to ensure safest diet consistencies. Goal 5: Pt will allow staff to complete daily oral care. Goal 6: Pt will complete oral motor exercises for lingual and labial strengthening. Long Term Goals:  Pt will participate in skilled speech therapy services to ensure she is able to communicate her wants and needs. Pt will participate in skilled speech therapy services to ensure she is able to complete ADLs. Pt will tolerate least restrictive diet with minimal overt s/s of aspiration/penetration during hospitalization. ASSESSMENT:  Assessment: [x]Progressing towards goals          []Not Progressing towards goals     Patient Tolerance of Treatment:       [x]Tolerated well         []Tolerated fair          []Required rest breaks          []Fatigued                   Plan:   [x]Continue with current plan of care               []Modify current plan of care as follows:               []Discharge patient                          Discharge Location:                          Services/Supervision Recommended:       [x]Patient continues to require treatment by a licensed therapist to address functional deficits as outlined in the established plan of care.     Electronically signed by MARCELINO Almanza on 12/23/2022 at 5:39 PM

## 2022-12-23 NOTE — PROGRESS NOTES
Pt refused all meds this pm except lipitor. Talked with pt and  and pt agreed to only take this pill tonight. After taking, pt very emotional and crying.   Electronically signed by Kenya Flores RN on 12/22/22 at 9:11 PM CST

## 2022-12-23 NOTE — PROGRESS NOTES
Occupational Therapy     12/23/22 1100   General   Timed Code Treatment Minutes 60 Minutes   Restrictions/Precautions   Restrictions/Precautions Fall Risk;Weight Bearing   Subjective   Subjective Tcx focused on community mobility with PWC. Instrumental ADL's   Instrumental ADLs Yes   Commmunity Re-entry   Community Re-Entry Level Wheelchair  (power)   Community Re-entry Level of Assistance Stand by [employer] Re-Entry one collision on R side getting onto elevator- pt able to navigate oopen and crowded environments with min cues   Balance   Standing Balance Contact guard assistance   Standing Balance   Time 1 min   Activity LUE   Transfers   Sit Pivot Transfers Stand by assistance   Sit to stand Minimal assistance   Stand to sit Contact guard assistance   Type of ROM/Therapeutic Exercise   Type of ROM/Therapeutic Exercise PROM   Comment RUE   Weight Bearing   RUE Weight Bearing Extended arm standing   Assessment   Performance deficits / Impairments Decreased functional mobility ; Decreased endurance;Decreased coordination;Decreased ADL status; Decreased sensation;Decreased posture;Decreased ROM; Decreased balance;Decreased strength;Decreased vision/visual deficit; Decreased safe awareness;Decreased high-level IADLs;Decreased cognition;Decreased fine motor control   Treatment Diagnosis L MCA stroke   Activity Tolerance   Activity Tolerance Patient Tolerated treatment well

## 2022-12-24 PROCEDURE — 1180000000 HC REHAB R&B

## 2022-12-24 PROCEDURE — 94760 N-INVAS EAR/PLS OXIMETRY 1: CPT

## 2022-12-24 PROCEDURE — 6360000002 HC RX W HCPCS: Performed by: PSYCHIATRY & NEUROLOGY

## 2022-12-24 PROCEDURE — 99232 SBSQ HOSP IP/OBS MODERATE 35: CPT | Performed by: PSYCHIATRY & NEUROLOGY

## 2022-12-24 PROCEDURE — 6370000000 HC RX 637 (ALT 250 FOR IP): Performed by: PSYCHIATRY & NEUROLOGY

## 2022-12-24 RX ADMIN — TIZANIDINE 4 MG: 4 TABLET ORAL at 17:59

## 2022-12-24 RX ADMIN — GABAPENTIN 300 MG: 300 CAPSULE ORAL at 18:40

## 2022-12-24 RX ADMIN — ATORVASTATIN CALCIUM 40 MG: 40 TABLET, FILM COATED ORAL at 19:44

## 2022-12-24 RX ADMIN — MICONAZOLE NITRATE: 2 POWDER TOPICAL at 09:45

## 2022-12-24 RX ADMIN — HYDROXYCHLOROQUINE SULFATE 200 MG: 200 TABLET, FILM COATED ORAL at 19:45

## 2022-12-24 RX ADMIN — DOCUSATE SODIUM 100 MG: 100 CAPSULE, LIQUID FILLED ORAL at 19:44

## 2022-12-24 RX ADMIN — ENOXAPARIN SODIUM 40 MG: 100 INJECTION SUBCUTANEOUS at 17:01

## 2022-12-24 RX ADMIN — MICONAZOLE NITRATE: 2 POWDER TOPICAL at 19:45

## 2022-12-24 NOTE — PLAN OF CARE
Problem: Discharge Planning  Goal: Discharge to home or other facility with appropriate resources  Outcome: Progressing     Problem: Safety - Adult  Goal: Free from fall injury  Outcome: Progressing  Flowsheets (Taken 12/24/2022 0126)  Free From Fall Injury: Instruct family/caregiver on patient safety     Problem: Neurosensory - Adult  Goal: Achieves stable or improved neurological status  Outcome: Progressing  Goal: Achieves maximal functionality and self care  Outcome: Progressing     Problem: Skin/Tissue Integrity - Adult  Goal: Skin integrity remains intact  Outcome: Progressing     Problem: Pain  Goal: Verbalizes/displays adequate comfort level or baseline comfort level  Outcome: Progressing     Problem: Chronic Conditions and Co-morbidities  Goal: Patient's chronic conditions and co-morbidity symptoms are monitored and maintained or improved  Outcome: Progressing     Problem: ABCDS Injury Assessment  Goal: Absence of physical injury  Outcome: Progressing     Problem: Skin/Tissue Integrity  Goal: Absence of new skin breakdown  Description: 1. Monitor for areas of redness and/or skin breakdown  2. Assess vascular access sites hourly  3. Every 4-6 hours minimum:  Change oxygen saturation probe site  4. Every 4-6 hours:  If on nasal continuous positive airway pressure, respiratory therapy assess nares and determine need for appliance change or resting period. Outcome: Progressing     Problem: Confusion  Goal: Confusion, delirium, dementia, or psychosis is improved or at baseline  Description: INTERVENTIONS:  1. Assess for possible contributors to thought disturbance, including medications, impaired vision or hearing, underlying metabolic abnormalities, dehydration, psychiatric diagnoses, and notify attending LIP  2. Wendel high risk fall precautions, as indicated  3. Provide frequent short contacts to provide reality reorientation, refocusing and direction  4.  Decrease environmental stimuli, including noise as appropriate  5. Monitor and intervene to maintain adequate nutrition, hydration, elimination, sleep and activity  6. If unable to ensure safety without constant attention obtain sitter and review sitter guidelines with assigned personnel  7.  Initiate Psychosocial CNS and Spiritual Care consult, as indicated  Outcome: Progressing     Problem: Musculoskeletal - Adult  Goal: Return mobility to safest level of function  Outcome: Progressing  Goal: Return ADL status to a safe level of function  Outcome: Progressing     Problem: Gastrointestinal - Adult  Goal: Maintains or returns to baseline bowel function  Outcome: Progressing  Goal: Maintains adequate nutritional intake  Outcome: Progressing     Problem: Metabolic/Fluid and Electrolytes - Adult  Goal: Electrolytes maintained within normal limits  Outcome: Progressing     Problem: Nutrition Deficit:  Goal: Optimize nutritional status  Outcome: Progressing

## 2022-12-24 NOTE — PROGRESS NOTES
Patient:   Mynor Rojas  MR#:    [id number]   Room:    0826/826-02   YOB: 1960  Date of Progress Note: 12/24/2022  Time of Note                           9:33 AM  Consulting Physician:   Elicia Weller M.D. Attending Physician:  Elicia Weller MD     Chief complaint Acute ischemic stroke    S:This 58 y.o. female  with history of anxiety, depression, COPD, atherosclerotic disease, colitis, COPD, CVA, DM,  LE edema, hyperlipidemia, HTN, morbid obesity, RA, CAD  and venous thromboembolism. She presented to [facility] ER on 11/9/22 after being found at home lying facedown in her feces. She was very weak, confused, not able to speak but moving purposefully and intermittently following commands. Her last well know was 3 a.m. when her  left for work. Imaging done revealed a large MCA stroke 7.7 x 5 cm. CTA head/neck revealed an acute M1 occlusion. She was outside of the window for TPA. CK on admit was 1100, consistent with rhabdomylosis. She was also noted to possibly Dens fracture. She was transferred to [facility] for a possible thrombectomy. Further imaging was done at [facility] and she was deemed not a candidate for thrombectomy. MRI done ruled out Dens fracture. Repeat CT of head on 11/11/22 showed evolving left MCA territory infarct with increasing edema/mass effect resulting in 4mm of  rightward midline shift(previously 2mm) a the level of the third ventricle. No hemorrhagic conversion or definite trapping of the right lateral ventricle, possible small acute right cerebellar infarct. Neurosurgery was consulted for possible hemicraniectomy. She was seen by Dr. Priscila Colon, who didn't feel any surgical intervention was needed. Patient was found to have a UTI + for [facility] and was placed on Rocephin on 11/14/22. Patient had a PICC line placed. Patient was evaluated by SPT and initially made NPO d/t oropharyngeal dysphagia. NGT placed and feeding started.  She was also noted to have global aphasia but is improving. She was re-evaluated by SPT on 11/15/22 for swallow and was started on a dysphagia 1 diet with nectar thick liquids. Patient also continues to have right sided weakness and is participating in both PT/OT. Repeat CT head on 11/13/22 shows stable LMCA ischemic stroke with stable edema, 5 mm shift, no hemorrhage. She is felt to need a stay on Rehab for continued PT/OT/SPT and medical management to work towards her goal of returning home with her . She is now felt ready to start the Rehab program.  Still refusing to take her medicines but did take some last time. No acute issues overnight. REVIEW OF SYSTEMS:  Unreliable due to aphasia     Past Medical History:      Diagnosis Date    Anxiety     ASCVD (arteriosclerotic cardiovascular disease)     Carrier of group B Streptococcus     Cellulitis     Colitis     COPD (chronic obstructive pulmonary disease) ([facility])     Costochondritis     CVA (cerebral vascular accident) ([address]     Depression     Edema     Fracture of sternum     non union post surgery.      Gallstones     Grief reaction     H/O superior vena cava filter placement     Hyperlipidemia     Hypertension     MI (myocardial infarction) ([address]     MRSA (methicillin resistant Staphylococcus aureus)     Neuropathy     Obesity     Pseudarthrosis sternal    RA (rheumatoid arthritis) ([address]     Rosacea     Sinus bradycardia     TIA (transient ischemic attack)     Venous thromboembolism        Past Surgical History:      Procedure Laterality Date    ADENOIDECTOMY      APPENDECTOMY      CARDIAC CATHETERIZATION  3/2009    CARDIAC CATHETERIZATION  11/5/14  JDT    EF 50%, patent bypass grafts    CHOLECYSTECTOMY  2011    COLONOSCOPY  06/03/2010    Dr. Aj Salvador: distal colon having ulcerative lesions c/w UC    COLONOSCOPY  2009    pancolitis    COLONOSCOPY      CORONARY ARTERY BYPASS GRAFT  3/2009    PHANI Wadsworth to LAD, SVGs to [address] 2011 HYSTERECTOMY (CERVIX STATUS UNKNOWN)      KNEE SURGERY      PA COLONOSCOPY FLX DX W/COLLJ SPEC WHEN PFRMD N/A 3/12/2018    Dr Sammie Ruiz rectal inflammation consistent with U.C.-Active proctitis--3 yr recall    THORACOTOMY      TONSILLECTOMY      UPPER GASTROINTESTINAL ENDOSCOPY  2010    Dr. Yocasta Rodriguez  2012       Medications in Hospital:      Current Facility-Administered Medications:     escitalopram (LEXAPRO) tablet 5 mg, 5 mg, Oral, Daily, Chantal Jurado MD    bisacodyl (DULCOLAX) EC tablet 5 mg, 5 mg, Oral, Daily, Chantal Jurado MD, 5 mg at 12/16/22 0806    miconazole (MICOTIN) 2 % powder, , Topical, BID, Chantal Jurado MD, Given at 12/23/22 2126    docusate sodium (COLACE) capsule 100 mg, 100 mg, Oral, BID, Chantal Jurado MD, 100 mg at 12/23/22 2123    aspirin chewable tablet 81 mg, 81 mg, Oral, Daily, Chantal Jurado MD, 81 mg at 12/19/22 0810    atorvastatin (LIPITOR) tablet 40 mg, 40 mg, Oral, Nightly, Chantal Jurado MD, 40 mg at 12/23/22 2123    dulaglutide (TRULICITY) SC injection 1.5 mg (Patient Supplied), 1.5 mg, SubCUTAneous, Weekly, Chantal Jurado MD    folic acid (FOLVITE) tablet 1 mg, 1 mg, Oral, Daily, Chantal Jurado MD, 1 mg at 12/19/22 0810    gabapentin (NEURONTIN) capsule 300 mg, 300 mg, Oral, Nightly, Chantal Jurado MD, 300 mg at 12/23/22 2123    hydroxychloroquine (PLAQUENIL) tablet 200 mg, 200 mg, Oral, BID, Chantal Jurado MD, 200 mg at 12/23/22 2122    amLODIPine (NORVASC) tablet 2.5 mg, 2.5 mg, Oral, Daily, Chantal Jurado MD, 2.5 mg at 12/19/22 0810    tiZANidine (ZANAFLEX) tablet 4 mg, 4 mg, Oral, BID PRN, Chantal Jurado MD    multivitamin 1 tablet, 1 tablet, Oral, Daily, Chantal Jurado MD, 1 tablet at 12/19/22 0810    acetaminophen (TYLENOL) tablet 650 mg, 650 mg, Oral, Q4H PRN, Chantal Jurado MD, 650 mg at 12/23/22 2122    enoxaparin (LOVENOX) injection 40 mg, 40 mg, SubCUTAneous, Daily, Chantal Jurado MD, 40 mg at 12/23/22 1700    polyethylene glycol (GLYCOLAX) packet 17 g, 17 g, Oral, Daily PRN, Tracie Guevara MD, 17 g at 11/30/22 1137    Allergies:  Methotrexate derivatives    Social History:   TOBACCO:   reports that she quit smoking about 13 years ago. Her smoking use included cigarettes. She has a 64.00 pack-year smoking history. She has never used smokeless tobacco.  ETOH:   reports current alcohol use. Family History:       Problem Relation Age of Onset    Prostate Cancer Father     Heart Failure Father     Cancer Father     Colon Cancer Neg Hx     Colon Polyps Neg Hx     Liver Cancer Neg Hx     Liver Disease Neg Hx     Esophageal Cancer Neg Hx     Rectal Cancer Neg Hx     Stomach Cancer Neg Hx          PHYSICAL EXAM:  BP (!) 154/73   Pulse 61   Temp 96.8 °F (36 °C) (Temporal)   Resp 16   Ht 5' 7\" (1.702 m)   Wt 195 lb 14.4 oz (88.9 kg)   SpO2 97%   BMI 30.68 kg/m²     Constitutional - well developed, well nourished. Eyes - conjunctiva normal.   Ear, nose, throat - No scars, masses, or lesions over external nose or ears, no atrophy of tongue  Neck-symmetric, no masses noted, no jugular vein distension  Respiration- chest wall appears symmetric, good expansion,   normal effort without use of accessory muscles  Musculoskeletal - no significant wasting of muscles noted, no bony deformities  Extremities-no clubbing, cyanosis or edema  Skin - warm, dry, and intact. No rash, erythema, or pallor. Psychiatric - mood, affect, and behavior appear normal.      Neurological exam  Awake, alert, global aphasia says \"yes\" to all questions asked   Attention and concentration appear appropriate  Recent and remote memory unable to tests     Cranial Nerve Exam     CN III, IV,VI-EOMI, No nystagmus, conjugate eye movements, no ptosis    CN VII-+ facial assymetry       Motor Exam  No movement on the right      Tremors- no tremors in hands or head noted     Gait  Not tested     Nursing/pcp notes, imaging,labs and vitals reviewed.      PT,OT and/or speech notes reviewed    Lab Results   Component Value Date    WBC 9.5 12/22/2022    HGB 10.7 (L) 12/22/2022    HCT 34.1 (L) 12/22/2022    MCV 77.5 (L) 12/22/2022     (H) 12/22/2022     Lab Results   Component Value Date     12/22/2022    K 4.0 12/22/2022     12/22/2022    CO2 24 12/22/2022    BUN 11 12/22/2022    CREATININE 0.5 12/22/2022    GLUCOSE 74 12/22/2022    CALCIUM 9.0 12/22/2022    PROT 5.2 (L) 12/22/2022    LABALBU 3.1 (L) 12/22/2022    BILITOT <0.2 12/22/2022    ALKPHOS 77 12/22/2022    AST 14 12/22/2022    ALT 11 12/22/2022    LABGLOM >60 12/22/2022   No results found for: INR, PROTIME    Karine Crouch PTA   Physical Therapist Assistant   Specialty:  Physical Therapist   Progress Notes       Cosign Needed   Date of Service:  12/22/2022  9:56 AM                 Cosign Needed                                                                               Dana Coello  MR#  [id number]          12/22/22 0919 12/22/22 0920 12/22/22 0921   Subjective   Pain States no pain  --   --    Bed mobility   Supine to Sit  --  Minimal assistance  (HOB elevated)  --    Transfers   Bed to Chair  --   --  Minimal assistance  (Scoot to Left with arm rest removed.   Assist required to clear cushion.)   Ambulation   WB Status  --   --   --    Ambulation   Surface  --   --   --    Device  --   --   --    Other Apparatus  --   --   --    Assistance  --   --   --    Gait Deviations  --   --   --    Distance  --   --   --    Wheelchair Activities   Propulsion  --   --   --    Propulsion 1   Propulsion  --   --   --    Level  --   --   --    Method  --   --   --    Level of Assistance  --   --   --    Description/ Details  --   --   --    Distance  --   --   --    PT Exercises   Exercise Treatment  --   --   --    Activity Tolerance   Activity Tolerance  --   --   --    Assessment   Assessment  --   --   --    PT Individual Minutes   Time In  --   --   --    Time Out  --   --   --    Minutes  --   --   --         12/22/22 6048 12/22/22 0953 12/22/22 0954   Subjective   Pain  --   --   --    Bed mobility   Supine to Sit  --   --   --    Transfers   Bed to Chair  --   --   --    Ambulation   WB Status WBAT  --   --    Ambulation   Surface Level tile  --   --    Device Parallel Bars  --   --    Other Apparatus AFO; Right; Wheelchair follow  --   --    Assistance Minimal assistance; Moderate assistance  (Pt. not able to advance RLE today. Forward lean.)  --   --    Gait Deviations Slow Tamia;Decreased step height  --   --    Distance 12'  --   --    Wheelchair Activities   Propulsion Yes  --   --    Propulsion 1   Propulsion Manual  --   --    Level Level Tile  --   --    Method LUE;LLE  --   --    Level of Assistance Stand by assistance;Minimal assistance  --   --    Description/ Details Kept trying to just use UE or LE; verbal cues to use both for steering. Too close to R wall at times. --   --    Distance [de-identified]'  --   --    PT Exercises   Exercise Treatment  --  Sitting BLE exercises. LLE 15 reps/ 2 sets with 1-1/2  lb weight. RLE  PROM with trace mvmts noted at times- 10 reps/ 2 sets. --    Activity Tolerance   Activity Tolerance  --   --  Patient limited by endurance   Assessment   Assessment  --   --  Amb 12' in parallel bars but required assist to advance RLE and more pronounced forward lean.    PT Individual Minutes   Time In  --   --   --    Time Out  --   --   --    Minutes  --   --   --         12/22/22 0955   Subjective   Pain  --    Bed mobility   Supine to Sit  --    Transfers   Bed to Chair  --    Ambulation   WB Status  --    Ambulation   Surface  --    Device  --    Other Apparatus  --    Assistance  --    Gait Deviations  --    Distance  --    Wheelchair Activities   Propulsion  --    Propulsion 1   Propulsion  --    Level  --    Method  --    Level of Assistance  --    Description/ Details  --    Distance  --    PT Exercises   Exercise Treatment  --    Activity Tolerance   Activity Tolerance  --    Assessment Assessment  --    PT Individual Minutes   Time In 0900   Time Out 1000   Minutes 60   Electronically signed by Racquel Kent PTA on 12/22/2022 at 9:56 AM                 RECORD REVIEW: Previous medical records, medications were reviewed at today's visit    IMPRESSION:   1. Acute ischemic stroke-on aspirin/statin  2. Hypertension-on medications monitor  3. Hyperlipidemia-on statin  4. Diabetes-Trulicity-monitor blood sugar  5. DVT prophylaxis-Lovenox  6. History of coronary artery disease-aspirin/statin  7. Neuropathy-on Neurontin  8. Rheumatoid arthritis-on Plaquenil  9. COPD-monitor  10. History of anxiety and depression-monitor  11. History of colitis/gallstones-monitor  12. History of bariatric surgery-on multivitamin/folic acid  13. History of superior vena cava filter placement with venous thromboembolism-not on anticoagulation-monitor- took herself off blood thinners as not like meds and was bruising   14. PT/OT/speech    x-ray of right ankle no acute changes  Venous ultrasound of leg no DVT    Added low dose Lexapro    Discussed with daughter previously that it would be very difficult to force patient to take medications. It is better to discuss and have agreeable to initiating treatment on her own  Unable to force the patient to take medications.  She would need to be restrained with feeding tube    Continue present care    ELOS pending 1 week      Expected duration and frequency therapy: 180 minutes per day, 5 days per week    [address] [place] [address]  [phone] CELL  Dr Nathaly Aguilar

## 2022-12-25 PROCEDURE — 1180000000 HC REHAB R&B

## 2022-12-25 PROCEDURE — 6370000000 HC RX 637 (ALT 250 FOR IP): Performed by: PSYCHIATRY & NEUROLOGY

## 2022-12-25 PROCEDURE — 99232 SBSQ HOSP IP/OBS MODERATE 35: CPT | Performed by: PSYCHIATRY & NEUROLOGY

## 2022-12-25 PROCEDURE — 6360000002 HC RX W HCPCS: Performed by: PSYCHIATRY & NEUROLOGY

## 2022-12-25 PROCEDURE — 94760 N-INVAS EAR/PLS OXIMETRY 1: CPT

## 2022-12-25 RX ADMIN — ENOXAPARIN SODIUM 40 MG: 100 INJECTION SUBCUTANEOUS at 16:41

## 2022-12-25 RX ADMIN — MICONAZOLE NITRATE: 2 POWDER TOPICAL at 09:05

## 2022-12-25 RX ADMIN — GABAPENTIN 300 MG: 300 CAPSULE ORAL at 18:32

## 2022-12-25 NOTE — PLAN OF CARE
Problem: Safety - Adult  Goal: Free from fall injury  Outcome: Progressing   Up with 2 assist with pebbles hughes lift

## 2022-12-25 NOTE — PROGRESS NOTES
Patient:   Tru Lee  MR#:    [id number]   Room:    0826/826-02   YOB: 1960  Date of Progress Note: 12/25/2022  Time of Note                           9:43 AM  Consulting Physician:   Ankit Bender M.D. Attending Physician:  Ankit Bender MD     Chief complaint Acute ischemic stroke    S:This 58 y.o. female  with history of anxiety, depression, COPD, atherosclerotic disease, colitis, COPD, CVA, DM,  LE edema, hyperlipidemia, HTN, morbid obesity, RA, CAD  and venous thromboembolism. She presented to [facility] ER on 11/9/22 after being found at home lying facedown in her feces. She was very weak, confused, not able to speak but moving purposefully and intermittently following commands. Her last well know was 3 a.m. when her  left for work. Imaging done revealed a large MCA stroke 7.7 x 5 cm. CTA head/neck revealed an acute M1 occlusion. She was outside of the window for TPA. CK on admit was 1100, consistent with rhabdomylosis. She was also noted to possibly Dens fracture. She was transferred to [facility] for a possible thrombectomy. Further imaging was done at [facility] and she was deemed not a candidate for thrombectomy. MRI done ruled out Dens fracture. Repeat CT of head on 11/11/22 showed evolving left MCA territory infarct with increasing edema/mass effect resulting in 4mm of  rightward midline shift(previously 2mm) a the level of the third ventricle. No hemorrhagic conversion or definite trapping of the right lateral ventricle, possible small acute right cerebellar infarct. Neurosurgery was consulted for possible hemicraniectomy. She was seen by Dr. Jane Meneses, who didn't feel any surgical intervention was needed. Patient was found to have a UTI + for [facility] and was placed on Rocephin on 11/14/22. Patient had a PICC line placed. Patient was evaluated by SPT and initially made NPO d/t oropharyngeal dysphagia. NGT placed and feeding started.  She was also noted to have global aphasia but is improving. She was re-evaluated by SPT on 11/15/22 for swallow and was started on a dysphagia 1 diet with nectar thick liquids. Patient also continues to have right sided weakness and is participating in both PT/OT. Repeat CT head on 11/13/22 shows stable LMCA ischemic stroke with stable edema, 5 mm shift, no hemorrhage. She is felt to need a stay on Rehab for continued PT/OT/SPT and medical management to work towards her goal of returning home with her . She is now felt ready to start the Rehab program.  No new issues.  indicates took some medicines but not all. REVIEW OF SYSTEMS:  Unreliable due to aphasia     Past Medical History:      Diagnosis Date    Anxiety     ASCVD (arteriosclerotic cardiovascular disease)     Carrier of group B Streptococcus     Cellulitis     Colitis     COPD (chronic obstructive pulmonary disease) ([facility])     Costochondritis     CVA (cerebral vascular accident) ([address]     Depression     Edema     Fracture of sternum     non union post surgery.      Gallstones     Grief reaction     H/O superior vena cava filter placement     Hyperlipidemia     Hypertension     MI (myocardial infarction) ([address]     MRSA (methicillin resistant Staphylococcus aureus)     Neuropathy     Obesity     Pseudarthrosis sternal    RA (rheumatoid arthritis) ([address]     Rosacea     Sinus bradycardia     TIA (transient ischemic attack)     Venous thromboembolism        Past Surgical History:      Procedure Laterality Date    ADENOIDECTOMY      APPENDECTOMY      CARDIAC CATHETERIZATION  3/2009    CARDIAC CATHETERIZATION  11/5/14  JDT    EF 50%, patent bypass grafts    CHOLECYSTECTOMY  2011    COLONOSCOPY  06/03/2010    Dr. Alexander Cade: distal colon having ulcerative lesions c/w UC    COLONOSCOPY  2009    pancolitis    COLONOSCOPY      CORONARY ARTERY BYPASS GRAFT  3/2009    Dr Burrows Monday, JERONIMO to LAD, SVGs to OM & PDA    GASTRIC BYPASS SURGERY  2012    HIATAL HERNIA REPAIR  2011    HYSTERECTOMY (CERVIX STATUS UNKNOWN)      KNEE SURGERY      WI COLONOSCOPY FLX DX W/COLLJ SPEC WHEN PFRMD N/A 3/12/2018    Dr Brianna Holliday rectal inflammation consistent with U.C.-Active proctitis--3 yr recall    THORACOTOMY      TONSILLECTOMY      UPPER GASTROINTESTINAL ENDOSCOPY  2010    Dr. Yee Larose  2012       Medications in Hospital:      Current Facility-Administered Medications:     escitalopram (LEXAPRO) tablet 5 mg, 5 mg, Oral, Daily, Tracie Guevara MD    bisacodyl (DULCOLAX) EC tablet 5 mg, 5 mg, Oral, Daily, Tracie Guevara MD, 5 mg at 12/16/22 0806    miconazole (MICOTIN) 2 % powder, , Topical, BID, Tracie Guevara MD, Given at 12/25/22 0905    docusate sodium (COLACE) capsule 100 mg, 100 mg, Oral, BID, Tracie Guevara MD, 100 mg at 12/24/22 1944    aspirin chewable tablet 81 mg, 81 mg, Oral, Daily, Tracie Guevara MD, 81 mg at 12/19/22 0810    atorvastatin (LIPITOR) tablet 40 mg, 40 mg, Oral, Nightly, Tracie Guevara MD, 40 mg at 12/24/22 1944    dulaglutide (TRULICITY) SC injection 1.5 mg (Patient Supplied), 1.5 mg, SubCUTAneous, Weekly, Tracie Guevara MD    folic acid (FOLVITE) tablet 1 mg, 1 mg, Oral, Daily, Tracie Guevara MD, 1 mg at 12/19/22 0810    gabapentin (NEURONTIN) capsule 300 mg, 300 mg, Oral, Nightly, Tracie Guevara MD, 300 mg at 12/24/22 1840    hydroxychloroquine (PLAQUENIL) tablet 200 mg, 200 mg, Oral, BID, Tracie Guevara MD, 200 mg at 12/24/22 1945    amLODIPine (NORVASC) tablet 2.5 mg, 2.5 mg, Oral, Daily, Tracie Guevara MD, 2.5 mg at 12/19/22 0810    tiZANidine (ZANAFLEX) tablet 4 mg, 4 mg, Oral, BID PRN, Tracie Guevara MD, 4 mg at 12/24/22 1759    multivitamin 1 tablet, 1 tablet, Oral, Daily, Tracie Guevara MD, 1 tablet at 12/19/22 0810    acetaminophen (TYLENOL) tablet 650 mg, 650 mg, Oral, Q4H PRN, Tracie Guevara MD, 650 mg at 12/23/22 2122    enoxaparin (LOVENOX) injection 40 mg, 40 mg, SubCUTAneous, Daily, Tracie Guevara MD, 40 mg at 12/24/22 1701    polyethylene glycol (GLYCOLAX) packet 17 g, 17 g, Oral, Daily PRN, Mimi Jacobo MD, 17 g at 11/30/22 8967    Allergies:  Methotrexate derivatives    Social History:   TOBACCO:   reports that she quit smoking about 13 years ago. Her smoking use included cigarettes. She has a 64.00 pack-year smoking history. She has never used smokeless tobacco.  ETOH:   reports current alcohol use. Family History:       Problem Relation Age of Onset    Prostate Cancer Father     Heart Failure Father     Cancer Father     Colon Cancer Neg Hx     Colon Polyps Neg Hx     Liver Cancer Neg Hx     Liver Disease Neg Hx     Esophageal Cancer Neg Hx     Rectal Cancer Neg Hx     Stomach Cancer Neg Hx          PHYSICAL EXAM:  /76   Pulse 62   Temp 97 °F (36.1 °C)   Resp 20   Ht 5' 7\" (1.702 m)   Wt 196 lb 2 oz (89 kg)   SpO2 95%   BMI 30.72 kg/m²     Constitutional - well developed, well nourished. Eyes - conjunctiva normal.   Ear, nose, throat - No scars, masses, or lesions over external nose or ears, no atrophy of tongue  Neck-symmetric, no masses noted, no jugular vein distension  Respiration- chest wall appears symmetric, good expansion,   normal effort without use of accessory muscles  Musculoskeletal - no significant wasting of muscles noted, no bony deformities  Extremities-no clubbing, cyanosis or edema  Skin - warm, dry, and intact. No rash, erythema, or pallor. Psychiatric - mood, affect, and behavior appear normal.      Neurological exam  Awake, alert, global aphasia says \"yes\" to all questions asked   Attention and concentration appear appropriate  Recent and remote memory unable to tests     Cranial Nerve Exam     CN III, IV,VI-EOMI, No nystagmus, conjugate eye movements, no ptosis    CN VII-+ facial assymetry       Motor Exam  No movement on the right      Tremors- no tremors in hands or head noted     Gait  Not tested     Nursing/pcp notes, imaging,labs and vitals reviewed.      PT,OT and/or speech notes reviewed    Lab Results Component Value Date    WBC 9.5 12/22/2022    HGB 10.7 (L) 12/22/2022    HCT 34.1 (L) 12/22/2022    MCV 77.5 (L) 12/22/2022     (H) 12/22/2022     Lab Results   Component Value Date     12/22/2022    K 4.0 12/22/2022     12/22/2022    CO2 24 12/22/2022    BUN 11 12/22/2022    CREATININE 0.5 12/22/2022    GLUCOSE 74 12/22/2022    CALCIUM 9.0 12/22/2022    PROT 5.2 (L) 12/22/2022    LABALBU 3.1 (L) 12/22/2022    BILITOT <0.2 12/22/2022    ALKPHOS 77 12/22/2022    AST 14 12/22/2022    ALT 11 12/22/2022    LABGLOM >60 12/22/2022   No results found for: INR, PROTIME    Banner Fang, PTA   Physical Therapist Assistant   Specialty:  Physical Therapist   Progress Notes       Cosign Needed   Date of Service:  12/22/2022  9:56 AM                 Cosign Needed                                                                               [facility]  MR#  [id number]          12/22/22 0919 12/22/22 0920 12/22/22 0921   Subjective   Pain States no pain  --   --    Bed mobility   Supine to Sit  --  Minimal assistance  (HOB elevated)  --    Transfers   Bed to Chair  --   --  Minimal assistance  (Scoot to Left with arm rest removed.   Assist required to clear cushion.)   Ambulation   WB Status  --   --   --    Ambulation   Surface  --   --   --    Device  --   --   --    Other Apparatus  --   --   --    Assistance  --   --   --    Gait Deviations  --   --   --    Distance  --   --   --    Wheelchair Activities   Propulsion  --   --   --    Propulsion 1   Propulsion  --   --   --    Level  --   --   --    Method  --   --   --    Level of Assistance  --   --   --    Description/ Details  --   --   --    Distance  --   --   --    PT Exercises   Exercise Treatment  --   --   --    Activity Tolerance   Activity Tolerance  --   --   --    Assessment   Assessment  --   --   --    PT Individual Minutes   Time In  --   --   --    Time Out  --   --   --    Minutes  --   --   --         12/22/22 9533 12/22/22 0953 12/22/22 4778   Subjective   Pain  --   --   --    Bed mobility   Supine to Sit  --   --   --    Transfers   Bed to Chair  --   --   --    Ambulation   WB Status WBAT  --   --    Ambulation   Surface Level tile  --   --    Device Parallel Bars  --   --    Other Apparatus AFO; Right; Wheelchair follow  --   --    Assistance Minimal assistance; Moderate assistance  (Pt. not able to advance RLE today. Forward lean.)  --   --    Gait Deviations Slow Tamia;Decreased step height  --   --    Distance 12'  --   --    Wheelchair Activities   Propulsion Yes  --   --    Propulsion 1   Propulsion Manual  --   --    Level Level Tile  --   --    Method LUE;LLE  --   --    Level of Assistance Stand by assistance;Minimal assistance  --   --    Description/ Details Kept trying to just use UE or LE; verbal cues to use both for steering. Too close to R wall at times. --   --    Distance [de-identified]'  --   --    PT Exercises   Exercise Treatment  --  Sitting BLE exercises. LLE 15 reps/ 2 sets with 1-1/2  lb weight. RLE  PROM with trace mvmts noted at times- 10 reps/ 2 sets. --    Activity Tolerance   Activity Tolerance  --   --  Patient limited by endurance   Assessment   Assessment  --   --  Amb 12' in parallel bars but required assist to advance RLE and more pronounced forward lean.    PT Individual Minutes   Time In  --   --   --    Time Out  --   --   --    Minutes  --   --   --         12/22/22 0955   Subjective   Pain  --    Bed mobility   Supine to Sit  --    Transfers   Bed to Chair  --    Ambulation   WB Status  --    Ambulation   Surface  --    Device  --    Other Apparatus  --    Assistance  --    Gait Deviations  --    Distance  --    Wheelchair Activities   Propulsion  --    Propulsion 1   Propulsion  --    Level  --    Method  --    Level of Assistance  --    Description/ Details  --    Distance  --    PT Exercises   Exercise Treatment  --    Activity Tolerance   Activity Tolerance  --    Assessment   Assessment  --    PT Individual Minutes   Time In 0900   Time Out 1000   Minutes 61   Electronically signed by Charles Escboedo PTA on 12/22/2022 at 9:56 AM                 RECORD REVIEW: Previous medical records, medications were reviewed at today's visit    IMPRESSION:   1. Acute ischemic stroke-on aspirin/statin  2. Hypertension-on medications monitor  3. Hyperlipidemia-on statin  4. Diabetes-Trulicity-monitor blood sugar  5. DVT prophylaxis-Lovenox  6. History of coronary artery disease-aspirin/statin  7. Neuropathy-on Neurontin  8. Rheumatoid arthritis-on Plaquenil  9. COPD-monitor  10. History of anxiety and depression-monitor  11. History of colitis/gallstones-monitor  12. History of bariatric surgery-on multivitamin/folic acid  13. History of superior vena cava filter placement with venous thromboembolism-not on anticoagulation-monitor- took herself off blood thinners as not like meds and was bruising   14. PT/OT/speech    x-ray of right ankle no acute changes  Venous ultrasound of leg no DVT    Added low dose Lexapro    Discussed with daughter previously that it would be very difficult to force patient to take medications. It is better to discuss and have agreeable to initiating treatment on her own  Unable to force the patient to take medications.  She would need to be restrained with feeding tube    Continue current care    ELOS pending 1 week      Expected duration and frequency therapy: 180 minutes per day, 5 days per week    [address] [facility]  [phone] CELL  Dr Kailee Morejon

## 2022-12-25 NOTE — PLAN OF CARE
Problem: Discharge Planning  Goal: Discharge to home or other facility with appropriate resources  Outcome: Progressing     Problem: Safety - Adult  Goal: Free from fall injury  Outcome: Progressing     Problem: Neurosensory - Adult  Goal: Achieves stable or improved neurological status  Outcome: Progressing  Goal: Achieves maximal functionality and self care  Outcome: Progressing     Problem: Skin/Tissue Integrity - Adult  Goal: Skin integrity remains intact  Outcome: Progressing     Problem: Pain  Goal: Verbalizes/displays adequate comfort level or baseline comfort level  Outcome: Progressing     Problem: Chronic Conditions and Co-morbidities  Goal: Patient's chronic conditions and co-morbidity symptoms are monitored and maintained or improved  Outcome: Progressing     Problem: ABCDS Injury Assessment  Goal: Absence of physical injury  Outcome: Progressing     Problem: Skin/Tissue Integrity  Goal: Absence of new skin breakdown  Description: 1. Monitor for areas of redness and/or skin breakdown  2. Assess vascular access sites hourly  3. Every 4-6 hours minimum:  Change oxygen saturation probe site  4. Every 4-6 hours:  If on nasal continuous positive airway pressure, respiratory therapy assess nares and determine need for appliance change or resting period. Outcome: Progressing     Problem: Confusion  Goal: Confusion, delirium, dementia, or psychosis is improved or at baseline  Description: INTERVENTIONS:  1. Assess for possible contributors to thought disturbance, including medications, impaired vision or hearing, underlying metabolic abnormalities, dehydration, psychiatric diagnoses, and notify attending LIP  2. [place] high risk fall precautions, as indicated  3. Provide frequent short contacts to provide reality reorientation, refocusing and direction  4. Decrease environmental stimuli, including noise as appropriate  5.  Monitor and intervene to maintain adequate nutrition, hydration, elimination, sleep and activity  6. If unable to ensure safety without constant attention obtain sitter and review sitter guidelines with assigned personnel  7.  Initiate Psychosocial CNS and Spiritual Care consult, as indicated  Outcome: Progressing     Problem: Musculoskeletal - Adult  Goal: Return mobility to safest level of function  Outcome: Progressing  Goal: Return ADL status to a safe level of function  Outcome: Progressing     Problem: Gastrointestinal - Adult  Goal: Maintains or returns to baseline bowel function  Outcome: Progressing  Goal: Maintains adequate nutritional intake  Outcome: Progressing     Problem: Metabolic/Fluid and Electrolytes - Adult  Goal: Electrolytes maintained within normal limits  Outcome: Progressing     Problem: Nutrition Deficit:  Goal: Optimize nutritional status  Outcome: Progressing   Electronically signed by Noe Joshi RN on 12/24/2022 at 10:36 PM

## 2022-12-26 LAB
ALBUMIN SERPL-MCNC: 2.9 G/DL (ref 3.5–5.2)
ALP BLD-CCNC: 78 U/L (ref 35–104)
ALT SERPL-CCNC: 10 U/L (ref 5–33)
ANION GAP SERPL CALCULATED.3IONS-SCNC: 13 MMOL/L (ref 7–19)
AST SERPL-CCNC: 14 U/L (ref 5–32)
BASOPHILS ABSOLUTE: 0.1 K/UL (ref 0–0.2)
BASOPHILS RELATIVE PERCENT: 1.1 % (ref 0–1)
BILIRUB SERPL-MCNC: <0.2 MG/DL (ref 0.2–1.2)
BUN BLDV-MCNC: 9 MG/DL (ref 8–23)
CALCIUM SERPL-MCNC: 8.9 MG/DL (ref 8.8–10.2)
CHLORIDE BLD-SCNC: 105 MMOL/L (ref 98–111)
CO2: 23 MMOL/L (ref 22–29)
CREAT SERPL-MCNC: 0.6 MG/DL (ref 0.5–0.9)
EOSINOPHILS ABSOLUTE: 1 K/UL (ref 0–0.6)
EOSINOPHILS RELATIVE PERCENT: 10.3 % (ref 0–5)
GFR SERPL CREATININE-BSD FRML MDRD: >60 ML/MIN/{1.73_M2}
GLUCOSE BLD-MCNC: 84 MG/DL (ref 74–109)
HCT VFR BLD CALC: 35.1 % (ref 37–47)
HEMOGLOBIN: 10.9 G/DL (ref 12–16)
IMMATURE GRANULOCYTES #: 0.1 K/UL
LYMPHOCYTES ABSOLUTE: 2.1 K/UL (ref 1.1–4.5)
LYMPHOCYTES RELATIVE PERCENT: 20.5 % (ref 20–40)
MCH RBC QN AUTO: 24.1 PG (ref 27–31)
MCHC RBC AUTO-ENTMCNC: 31.1 G/DL (ref 33–37)
MCV RBC AUTO: 77.7 FL (ref 81–99)
MONOCYTES ABSOLUTE: 0.9 K/UL (ref 0–0.9)
MONOCYTES RELATIVE PERCENT: 9.2 % (ref 0–10)
NEUTROPHILS ABSOLUTE: 5.8 K/UL (ref 1.5–7.5)
NEUTROPHILS RELATIVE PERCENT: 57.8 % (ref 50–65)
PDW BLD-RTO: 16.9 % (ref 11.5–14.5)
PLATELET # BLD: 468 K/UL (ref 130–400)
PMV BLD AUTO: 9.3 FL (ref 9.4–12.3)
POTASSIUM REFLEX MAGNESIUM: 3.6 MMOL/L (ref 3.5–5)
RBC # BLD: 4.52 M/UL (ref 4.2–5.4)
SODIUM BLD-SCNC: 141 MMOL/L (ref 136–145)
TOTAL PROTEIN: 5.8 G/DL (ref 6.6–8.7)
WBC # BLD: 10 K/UL (ref 4.8–10.8)

## 2022-12-26 PROCEDURE — 6360000002 HC RX W HCPCS: Performed by: PSYCHIATRY & NEUROLOGY

## 2022-12-26 PROCEDURE — 92507 TX SP LANG VOICE COMM INDIV: CPT

## 2022-12-26 PROCEDURE — 36415 COLL VENOUS BLD VENIPUNCTURE: CPT

## 2022-12-26 PROCEDURE — 6370000000 HC RX 637 (ALT 250 FOR IP): Performed by: PSYCHIATRY & NEUROLOGY

## 2022-12-26 PROCEDURE — 97116 GAIT TRAINING THERAPY: CPT

## 2022-12-26 PROCEDURE — 97530 THERAPEUTIC ACTIVITIES: CPT

## 2022-12-26 PROCEDURE — 92526 ORAL FUNCTION THERAPY: CPT

## 2022-12-26 PROCEDURE — 99232 SBSQ HOSP IP/OBS MODERATE 35: CPT | Performed by: PSYCHIATRY & NEUROLOGY

## 2022-12-26 PROCEDURE — 97535 SELF CARE MNGMENT TRAINING: CPT

## 2022-12-26 PROCEDURE — 97110 THERAPEUTIC EXERCISES: CPT

## 2022-12-26 PROCEDURE — 1180000000 HC REHAB R&B

## 2022-12-26 PROCEDURE — 85025 COMPLETE CBC W/AUTO DIFF WBC: CPT

## 2022-12-26 PROCEDURE — 80053 COMPREHEN METABOLIC PANEL: CPT

## 2022-12-26 RX ADMIN — GABAPENTIN 300 MG: 300 CAPSULE ORAL at 19:03

## 2022-12-26 RX ADMIN — ENOXAPARIN SODIUM 40 MG: 100 INJECTION SUBCUTANEOUS at 17:23

## 2022-12-26 NOTE — PROGRESS NOTES
Physical Therapy  Name: Pradip Millan  MRN:  [id number]  Date of service:  12/26/2022 12/26/22 1000   Restrictions/Precautions   Restrictions/Precautions Fall Risk;Weight Bearing   Lower Extremity Weight Bearing Restrictions   Right Lower Extremity Weight Bearing Weight Bearing As Tolerated   Left Lower Extremity Weight Bearing Weight Bearing As Tolerated   General Comment   Comments Pt sorting through belongings in closet. Spouse present   Subjective   Subjective Pt ready and seems eager. Transfers   Sit to Stand Minimal Assistance; Moderate Assistance  (from [facility])   Stand to Sit Contact guard assistance;Minimal Assistance  (from [facility])   Comment STS: practiced scooting out via slouch method and using back extensor mm's, cues to \"lean fwd and STAY fwd\" while pushing up from chair with demonstration and tactile cues   Ambulation   WB Status WBAT   Ambulation   Surface Level tile   Device Hemiwalker   Other Apparatus AFO; Right; Wheelchair follow  (utilized shoe cover last couple attempts)   Quality of Gait initially pt able to swing R LE fwd, but with fatigue this became more difficult; assist to position R LE both fwd and laterally enough to provide sufficient audrey; pt wants to watch her feet which negates ability to advance R LE automatically   Gait Deviations Decreased step height;Slow Tamia  (inconsistent foot placement R both fwd and laterally; excessive step at times on L)   Distance 5' x 4 and 7' x 1   Comments AMB: cues to \"stand tall, move walker, swing R leg, lock knee, and step with L\" all while giving visual and tactile cues; HW traded for taller model and adjusted at end of session to prevent excessive L lateral lean as this was resulting in scissoring of R LE with advancement (trying to find optimal position to allow wt shifting but not cause this scissoring step)   PT Exercises   Standing Open/Closed Kinetic Chain Exercises standing at [facility]: worked on R knee extension in standing x 10 with verbal and tactile cues, pt lacks terminal knee extension; STS: practiced keeping cog over audrey (fwd and to L) while pushing up to [facility] to stand x 2/5 reps with min@ being her best performance min-mod@ otherwise   Assessment   Assessment Pt gives great effort with session. She completed all tasks requested and had to be encouraged to rest. Pt quicker to apply instuctions given. During amb, pt fatigues quickly, in part due to desire to watch her own feet, increasing effort required to advance R LE. Will need HW to be assessed for optimal ht as fatigued at end of session and unable to discern if adjustments made were beneficial. Pt showing good progress with STS pushing up from [facility] once optimal position achieved.    Safety Devices   Type of Devices   (to OT via [facility] with spouse post session)   PT Individual Minutes   Time In 1000   Time Out 1100   Minutes 60       Electronically signed by Winnie Gerber PTA on 12/26/2022 at 3:43 PM

## 2022-12-26 NOTE — PROGRESS NOTES
Comprehensive Nutrition Assessment    Type and Reason for Visit:  Reassess    Nutrition Recommendations/Plan:   Will continue POC. Malnutrition Assessment:  Malnutrition Status:  No malnutrition (12/26/22 1247)    Context:  Acute Illness     Findings of the 6 clinical characteristics of malnutrition:  Energy Intake:  No significant decrease in energy intake  Weight Loss:  No significant weight loss     Body Fat Loss:  No significant body fat loss     Muscle Mass Loss:  No significant muscle mass loss    Fluid Accumulation:  Mild Extremities   Strength:  Not Performed    Nutrition Assessment:    Seen for reassessment. Pt. reflecting a % PO intake. Family brings in lots of meals and snacks. Wt. is stable at this time. Electrolytes WNL. Will continue POC. Nutrition Related Findings:    aphasia Wound Type: None       Current Nutrition Intake & Therapies:    Average Meal Intake: %  Average Supplements Intake: %  ADULT ORAL NUTRITION SUPPLEMENT; Dinner; Other Oral Supplement; 4 oz. frozen milkshake (in freezer behind hot line)  ADULT ORAL NUTRITION SUPPLEMENT; Lunch; Fortified Pudding Oral Supplement  ADULT DIET; Regular; 4 carb choices (60 gm/meal); Please bring biscuits and gravy for breakfast, she loves hamburger and fries as well for a lunch or dinner option. Anthropometric Measures:  Height: 5' 7\" (170.2 cm)  Ideal Body Weight (IBW): 135 lbs (61 kg)    Admission Body Weight: 195 lb (88.5 kg)  Current Body Weight: 196 lb (88.9 kg), 145.2 % IBW. Weight Source: Bed Scale  Current BMI (kg/m2): 30.7  Usual Body Weight: 201 lb (91.2 kg)  % Weight Change (Calculated): 1  Weight Adjustment For: No Adjustment    BMI Categories: Obese Class 1 (BMI 30.0-34. 9)    Estimated Daily Nutrient Needs:  Energy Requirements Based On: Kcal/kg  Weight Used for Energy Requirements: Current  Energy (kcal/day): 4479-0790  Weight Used for Protein Requirements: Ideal  Protein (g/day):   Method Used for Fluid Requirements: 1 ml/kcal  Fluid (ml/day): 4321-8274    Nutrition Diagnosis:   Biting/chewing (masticatory) difficulty, Swallowing difficulty related to acute injury/trauma as evidenced by swallow study results    Nutrition Interventions:   Food and/or Nutrient Delivery: Continue Current Diet, Continue Oral Nutrition Supplement  Nutrition Education/Counseling: No recommendation at this time  Coordination of Nutrition Care: No recommendation at this time  Plan of Care discussed with: pt and pt     Goals:  Previous Goal Met: Progressing toward Goal(s)  Goals: Meet at least 75% of estimated needs, PO intake 75% or greater       Nutrition Monitoring and Evaluation:   Behavioral-Environmental Outcomes: None Identified  Food/Nutrient Intake Outcomes: Diet Advancement/Tolerance, Food and Nutrient Intake, Supplement Intake  Physical Signs/Symptoms Outcomes: Biochemical Data, Chewing or Swallowing, Skin, Fluid Status or Edema, Nutrition Focused Physical Findings    Discharge Planning:     Too soon to determine     Robby Pete [address], RD, LD  Contact: [phone]

## 2022-12-26 NOTE — PROGRESS NOTES
Patient:   Olga Lidia Klein  MR#:    [id number]   Room:    0826/826-02   YOB: 1960  Date of Progress Note: 12/26/2022  Time of Note                           8:35 AM  Consulting Physician:   Yanelis Starkey M.D. Attending Physician:  Yanelis Starkey MD     Chief complaint Acute ischemic stroke    S:This 58 y.o. female  with history of anxiety, depression, COPD, atherosclerotic disease, colitis, COPD, CVA, DM,  LE edema, hyperlipidemia, HTN, morbid obesity, RA, CAD  and venous thromboembolism. She presented to [facility] ER on 11/9/22 after being found at home lying facedown in her feces. She was very weak, confused, not able to speak but moving purposefully and intermittently following commands. Her last well know was 3 a.m. when her  left for work. Imaging done revealed a large MCA stroke 7.7 x 5 cm. CTA head/neck revealed an acute M1 occlusion. She was outside of the window for TPA. CK on admit was 1100, consistent with rhabdomylosis. She was also noted to possibly Dens fracture. She was transferred to [facility] for a possible thrombectomy. Further imaging was done at [facility] and she was deemed not a candidate for thrombectomy. MRI done ruled out Dens fracture. Repeat CT of head on 11/11/22 showed evolving left MCA territory infarct with increasing edema/mass effect resulting in 4mm of  rightward midline shift(previously 2mm) a the level of the third ventricle. No hemorrhagic conversion or definite trapping of the right lateral ventricle, possible small acute right cerebellar infarct. Neurosurgery was consulted for possible hemicraniectomy. She was seen by Dr. Ron Asif, who didn't feel any surgical intervention was needed. Patient was found to have a UTI + for [facility] and was placed on Rocephin on 11/14/22. Patient had a PICC line placed. Patient was evaluated by SPT and initially made NPO d/t oropharyngeal dysphagia. NGT placed and feeding started.  She was also noted to have global aphasia but is improving. She was re-evaluated by SPT on 11/15/22 for swallow and was started on a dysphagia 1 diet with nectar thick liquids. Patient also continues to have right sided weakness and is participating in both PT/OT. Repeat CT head on 11/13/22 shows stable LMCA ischemic stroke with stable edema, 5 mm shift, no hemorrhage. She is felt to need a stay on Rehab for continued PT/OT/SPT and medical management to work towards her goal of returning home with her . She is now felt ready to start the Rehab program.  No acute issues overnight.  indicates takes most medicines but still refusing some. REVIEW OF SYSTEMS:  Unreliable due to aphasia     Past Medical History:      Diagnosis Date    Anxiety     ASCVD (arteriosclerotic cardiovascular disease)     Carrier of group B Streptococcus     Cellulitis     Colitis     COPD (chronic obstructive pulmonary disease) ([facility])     Costochondritis     CVA (cerebral vascular accident) ([address]     Depression     Edema     Fracture of sternum     non union post surgery.      Gallstones     Grief reaction     H/O superior vena cava filter placement     Hyperlipidemia     Hypertension     MI (myocardial infarction) ([address]     MRSA (methicillin resistant Staphylococcus aureus)     Neuropathy     Obesity     Pseudarthrosis sternal    RA (rheumatoid arthritis) ([address]     Rosacea     Sinus bradycardia     TIA (transient ischemic attack)     Venous thromboembolism        Past Surgical History:      Procedure Laterality Date    ADENOIDECTOMY      APPENDECTOMY      CARDIAC CATHETERIZATION  3/2009    CARDIAC CATHETERIZATION  11/5/14  JDT    EF 50%, patent bypass grafts    CHOLECYSTECTOMY  2011    COLONOSCOPY  06/03/2010    Dr. Khanh Hernadnez: distal colon having ulcerative lesions c/w UC    COLONOSCOPY  2009    pancolitis    COLONOSCOPY      CORONARY ARTERY BYPASS GRAFT  3/2009    PHANI Bowser to LAD, SVGs to [address]  2011 1641    polyethylene glycol (GLYCOLAX) packet 17 g, 17 g, Oral, Daily PRN, Carmenza Simons MD, 17 g at 11/30/22 1817    Allergies:  Methotrexate derivatives    Social History:   TOBACCO:   reports that she quit smoking about 13 years ago. Her smoking use included cigarettes. She has a 64.00 pack-year smoking history. She has never used smokeless tobacco.  ETOH:   reports current alcohol use. Family History:       Problem Relation Age of Onset    Prostate Cancer Father     Heart Failure Father     Cancer Father     Colon Cancer Neg Hx     Colon Polyps Neg Hx     Liver Cancer Neg Hx     Liver Disease Neg Hx     Esophageal Cancer Neg Hx     Rectal Cancer Neg Hx     Stomach Cancer Neg Hx          PHYSICAL EXAM:  /65   Pulse 61   Temp 97.5 °F (36.4 °C) (Oral)   Resp 16   Ht 5' 7\" (1.702 m)   Wt 196 lb 2 oz (89 kg)   SpO2 95%   BMI 30.72 kg/m²     Constitutional - well developed, well nourished. Eyes - conjunctiva normal.   Ear, nose, throat - No scars, masses, or lesions over external nose or ears, no atrophy of tongue  Neck-symmetric, no masses noted, no jugular vein distension  Respiration- chest wall appears symmetric, good expansion,   normal effort without use of accessory muscles  Musculoskeletal - no significant wasting of muscles noted, no bony deformities  Extremities-no clubbing, cyanosis or edema  Skin - warm, dry, and intact. No rash, erythema, or pallor. Psychiatric - mood, affect, and behavior appear normal.      Neurological exam  Awake, alert, global aphasia says \"yes\" to all questions asked   Attention and concentration appear appropriate  Recent and remote memory unable to tests     Cranial Nerve Exam     CN III, IV,VI-EOMI, No nystagmus, conjugate eye movements, no ptosis    CN VII-+ facial assymetry       Motor Exam  No movement on the right      Tremors- no tremors in hands or head noted     Gait  Not tested     Nursing/pcp notes, imaging,labs and vitals reviewed.      PT,OT and/or speech notes reviewed    Lab Results   Component Value Date    WBC 10.0 12/26/2022    HGB 10.9 (L) 12/26/2022    HCT 35.1 (L) 12/26/2022    MCV 77.7 (L) 12/26/2022     (H) 12/26/2022     Lab Results   Component Value Date     12/26/2022    K 3.6 12/26/2022     12/26/2022    CO2 23 12/26/2022    BUN 9 12/26/2022    CREATININE 0.6 12/26/2022    GLUCOSE 84 12/26/2022    CALCIUM 8.9 12/26/2022    PROT 5.8 (L) 12/26/2022    LABALBU 2.9 (L) 12/26/2022    BILITOT <0.2 12/26/2022    ALKPHOS 78 12/26/2022    AST 14 12/26/2022    ALT 10 12/26/2022    LABGLOM >60 12/26/2022   No results found for: INR, PROTIME    Maya Carrera PTA   Physical Therapist Assistant   Physical Therapy   Progress Notes       Cosign Needed   Date of Service:  12/23/2022  3:22 PM                 Cosign Needed                                                                                                    Physical Therapy  Name: Starr Scherer  MRN:  [id number]  Date of service:  12/23/2022 12/23/22 1420   Restrictions/Precautions   Restrictions/Precautions Fall Risk;Weight Bearing   Required Braces or Orthoses? Yes   Lower Extremity Weight Bearing Restrictions   Right Lower Extremity Weight Bearing Weight Bearing As Tolerated   Left Lower Extremity Weight Bearing Weight Bearing As Tolerated   General   Chart Reviewed Yes   Family / Caregiver Present Yes   Subjective   Subjective Patient is sitting in her wc and is ready for tx   General Comment   Comments Nursing present stating patient just got cleaned up form having BM. Pain Assessment   Pain Assessment None - Denies Pain   Patient Observation   Observations Patient able to count some with therapist and say yes or no to some questions. Transfers   Sit to Stand Minimal Assistance; Moderate Assistance   Stand to Sit Contact guard assistance;Minimal Assistance   Comment Multiplt STS for amublation in //; patient has little eccentric control sitting   Ambulation   WB Status WBAT   Ambulation   Surface Level tile   Device Parallel Bars   Other Apparatus AFO; Right; Wheelchair follow   Assistance Moderate assistance  (Therapist advanced R LE for patient)   Quality of Gait patient unable to lift R LE on her own this sessionl weight shifting forwards and to the L   Gait Deviations Slow Tamia;Decreased step height   Distance 12' x 3   Comments Seated rest in between sets   Stairs/Curb   Stairs? No   Propulsion 1   Propulsion Manual   Level Level Tile   Method LUE;LLE   Level of Assistance Contact guard assistance   Description/ Details Able to use L UE and R LE together this date with min vearing to the R. Needs more assist with turns. Required recovery periods   Distance 150'   Exercises   Hamstring Sets red x 20   Hip Flexion 20   Hip Abduction red x 20; ball squeeze x 20   Knee Long Arc Quad 20   Ankle Pumps 20   Comments Seated GWEN LE exercises; AAROM on R LE   Other exercises   Other exercises?  Yes   Other exercises 1 : ankle 4 way red x 20   Other exercises 2 AAROM/PROM R ankle 4 way x 20   Patient Goals    Patient Goals  Return home with family   Short Term Goals   Time Frame for Short Term Goals 2 weeks   Short Term Goal 1 Patient will perform bed mobility with Min A   Short Term Goal 2 Patient will transition supine <> sit with Min A   Short Term Goal 3 Patient will perform bed <> chair transfer with Min A   Short Term Goal 4 Patient propel wheelchair 50 ft with Min A   Short Term Goal 5 Patient will ambulate 10 ft with Mod A   Long Term Goals   Time Frame for Long Term Goals  3 weeks   Long Term Goal 1 Patient will perform bed mobility independently   Long Term Goal 2 Patient with transition supine <> sit independently   Long Term Goal 3 Patient will perform bed <> chair transfer independently   Long Term Goal 4 Patient will perform car transfer with Min A   Long Term Goal 5 Patient will ambulate 10 ft with CGA   Conditions Requiring Skilled Therapeutic Intervention   Body Structures, Functions, Activity Limitations Requiring Skilled Therapeutic Intervention Decreased ADL status; Decreased ROM; Decreased strength;Decreased safe awareness;Decreased cognition;Decreased endurance;Decreased sensation;Decreased balance;Decreased coordination;Decreased posture   Assessment Patient tolerates ambulation and exercises well needing cues fro technique and safety awareness. She is able to shift weight into her L LE during ambulation but is not able to activily advance R LE this session requiring therapist assist. She was left sitting in her WC with her  present. Will continue to progress as able. Activity Tolerance   Activity Tolerance Patient limited by endurance   Safety Devices   Type of Devices Gait belt;Call light within reach; Left in chair            Electronically signed by Maya Carrera PTA on 12/23/2022 at 3:22 PM                     RECORD REVIEW: Previous medical records, medications were reviewed at today's visit    IMPRESSION:   1. Acute ischemic stroke-on aspirin/statin  2. Hypertension-on medications monitor  3. Hyperlipidemia-on statin  4. Diabetes-Trulicity-monitor blood sugar  5. DVT prophylaxis-Lovenox  6. History of coronary artery disease-aspirin/statin  7. Neuropathy-on Neurontin  8. Rheumatoid arthritis-on Plaquenil  9. COPD-monitor  10. History of anxiety and depression-monitor  11. History of colitis/gallstones-monitor  12. History of bariatric surgery-on multivitamin/folic acid  13. History of superior vena cava filter placement with venous thromboembolism-not on anticoagulation-monitor- took herself off blood thinners as not like meds and was bruising   14. PT/OT/speech    x-ray of right ankle no acute changes  Venous ultrasound of leg no DVT    Added low dose Lexapro    Discussed with daughter previously that it would be very difficult to force patient to take medications.   It is better to discuss and have agreeable to initiating treatment on her own  Unable to force the patient to take medications.  She would need to be restrained with feeding tube    Continue present care    ELOS pending 1 week      Expected duration and frequency therapy: 180 minutes per day, 5 days per week    [address] [facility]  [phone] CELL  Dr Thong Dior

## 2022-12-26 NOTE — PROGRESS NOTES
[facility]  INPATIENT SPEECH THERAPY  [facility] 8 REHAB UNIT     0900  1000     61 MINUTES     [x]Daily Note  []Progress Note     Date: 2022  Patient Name: Richard Man        MRN:   [id number]    Account #: [de-identified]  : 1960  (64 y.o.)  Gender: female   Primary Provider: Roly Merritt MD  Diet: Regular diet, thin liquids        PATIENT DIAGNOSIS(ES):    Diagnosis: CVA, R hemiparesis     Additional Pertinent Hx: Anxiety, depression, COPD, CVA, DM, LE edema, hyperlipidemia, HTN, obesity, RA, CAD and venous thromboembolism     RESTRICTIONS/PRECAUTIONS:    Restrictions/Precautions  Restrictions/Precautions: Modified Diet, Swallowing - Fall Risk, Aspiration Risk  Required Braces or Orthoses?: No     Subjective:  Patient sitting in wheelchair.  present for tx session. Patient did not indicate pain. Objective:  Patient answered y/n questions with 63% accuracy. In task to identify object/body part by name, patient was 0/3 trials independently that increased to 2/3 trials with verbal cues/prompts and visual choices/cues/prompts. Patient followed 1 step written directions with 33% (2/6 trials) accuracy. Patient continues to present with perseveration. SHORT TERM GOAL #1:  Goal 1: Pt will complete y/n tasks with 80% accuracy and minimal verbal cues/modeling. Not Met     SHORT TERM GOAL #2:  Goal 2: Pt will complete verbal expression tasks with 80% accuracy and minimal verbal cues/modeling. Not Met     SHORT TERM GOAL #3:  Goal 3: Pt will complete receptive/expressive language tasks with 80% accuracy and minimal verbal cues/modeling. Not Met     SHORT TERM GOAL #4:  Goal 4: Pt will follow one step commands with with 90% accuracy and minimal verbal cues/modeling. Not Met     SHORT TERM GOAL #5:  Goal 5: Pt will complete orientation tasks with 90% accuracy and minimal verbal cues/modeling.  Not Met Goal 6: The patient will complete automatic speech tasks, confrontational naming tasks, and 1 step commands wtih 80% accuracy with minimal verbal cues/modelinng. Not Met     Swallowing Short Term Goals  Short-term Goals  Goal 1: Pt will tolerate soft & bite sized consistencies with mildly  (nectar) thick liquids with minimal overt s/s of aspiration/penetration during hospitalization. New Goal: Pt will tolerate soft and bite sized diet with thin liquids with minimal overt s/s of aspiration/penetration during hospitalization. Goal Met: The patient will tolerate a regular diet with thin liquids with minimal overt s/s of aspiration. Goal 2: Pt will complete Modified Barium Swallow Study. Discontinue Goal  Goal 3: Pt will demonstrate awareness of general aspiration precautions. Goal 4: Pt will participate in swallowing reassessments to ensure safest diet consistencies. Goal 5: Pt will allow staff to complete daily oral care. Goal 6: Pt will complete oral motor exercises for lingual and labial strengthening. Long Term Goals:  Pt will participate in skilled speech therapy services to ensure she is able to communicate her wants and needs. Pt will participate in skilled speech therapy services to ensure she is able to complete ADLs. Pt will tolerate least restrictive diet with minimal overt s/s of aspiration/penetration during hospitalization. ASSESSMENT:  Assessment: [x]Progressing towards goals          []Not Progressing towards goals     Patient Tolerance of Treatment:       [x]Tolerated well         []Tolerated fair          []Required rest breaks          []Fatigued                   Plan:   [x]Continue with current plan of care               []Modify current plan of care as follows:               []Discharge patient                          Discharge Location:                          Services/Supervision Recommended:       [x]Patient continues to require treatment by a licensed therapist to address functional deficits as outlined in the established plan of care.     Electronically signed by Bette Donald, SLP on 12/26/2022 at 3:30 PM

## 2022-12-26 NOTE — PLAN OF CARE
Problem: Discharge Planning  Goal: Discharge to home or other facility with appropriate resources  12/26/2022 1201 by Herbert Mclean RN  Outcome: Progressing  Flowsheets (Taken 12/26/2022 0803)  Discharge to home or other facility with appropriate resources: Refer to discharge planning if patient needs post-hospital services based on physician order or complex needs related to functional status, cognitive ability or social support system  12/25/2022 2255 by Rajan Sheffield RN  Outcome: Progressing

## 2022-12-26 NOTE — PATIENT CARE CONFERENCE
[facility] ACUTE INPATIENT REHABILITATION  TEAM CONFERENCE NOTE    Date: 2022  Patient Name: Abel Fleming        MRN: [id number]    : 1960  (64 y.o.)  Gender: female      Diagnosis: CVA, R hemiparesis      PHYSICAL THERAPY  GROSS ASSESSMENT       BED MOBILITY  Bed mobility  Rolling to Left: Contact guard assistance (with rail)  Rolling to Right: Moderate assistance, Minimal assistance (with WC as rail to L side of mat table)  Supine to Sit: Minimal assistance (HOB elevated)  Sit to Supine: Minimal assistance, Moderate assistance (for LE's)  Scooting: Minimal assistance, Moderate assistance (For R side on EOB)  Bed Mobility Comments: On L side of bed- use of L bedrail       TRANSFERS  Transfers  Sit to Stand: Minimal Assistance, Moderate Assistance (from [facility])  Stand to Sit: Contact guard assistance, Minimal Assistance (from [facility])  Bed to Chair: Minimal assistance (Scoot to Left with arm rest removed. Assist required to clear cushion.)  Stand Pivot Transfers: Moderate Assistance (USE OF SS TO STAND PATIENT AND PIVOT TO BED)  Squat Pivot Transfers: Moderate Assistance (performed to patient's L side)  Lateral Transfers: Stand by assistance (scoot oob to WC towards strong L side (level surface), therapist holding chair from sliding)  Car Transfer: Maximum Assistance, Moderate Assistance, Minimal Assistance (MOD/MIN INTO CARE; MAX A CAR TO WC. LACK OF ANTERIOR LEAN)  Comment: STS: practiced scooting out via slouch method and using back extensor mm's, cues to \"lean fwd and STAY fwd\" while pushing up from chair with demonstration and tactile cues  WHEELCHAIR PROPULSION  Propulsion 1  Propulsion: Manual  Level: Level Tile  Method: JOBY REESE  Level of Assistance: Contact guard assistance  Description/ Details: Able to use L UE and R LE together this date with min vearing to the R. Needs more assist with turns.  Required recovery periods  Distance: 150'  AMBULATION  Ambulation  Surface: Level tile  Device: Hemiwalker  Other Apparatus: AFO, Right, Wheelchair follow (utilized shoe cover last couple attempts)  Assistance: Moderate assistance (Therapist advanced R LE for patient)  Quality of Gait: initially pt able to swing R LE fwd, but with fatigue this became more difficult; assist to position R LE both fwd and laterally enough to provide sufficient audrey; pt wants to watch her feet which negates ability to advance R LE automatically  Gait Deviations: Decreased step height, Slow Tamia (inconsistent foot placement R both fwd and laterally; excessive step at times on L)  Distance: 5' x 4 and 7' x 1  Comments: AMB: cues to \"stand tall, move walker, swing R leg, lock knee, and step with L\" all while giving visual and tactile cues; HW traded for taller model and adjusted at end of session to prevent excessive L lateral lean as this was resulting in scissoring of R LE with advancement (trying to find optimal position to allow wt shifting but not cause this scissoring step)  More Ambulation?: Yes  STAIRS     GOALS:  Short Term Goals  Time Frame for Short Term Goals: 2 weeks  Short Term Goal 1: Patient will perform bed mobility with Min A  Short Term Goal 2: Patient will transition supine <> sit with Min A  Short Term Goal 3: Patient will perform bed <> chair transfer with Min A  Short Term Goal 4: Patient propel wheelchair 50 ft with Min A  Short Term Goal 5: Patient will ambulate 10 ft with Mod A    Long Term Goals  Time Frame for Long Term Goals : 3 weeks  Long Term Goal 1: Patient will perform bed mobility independently  Long Term Goal 2: Patient with transition supine <> sit independently  Long Term Goal 3: Patient will perform bed <> chair transfer independently  Long Term Goal 4: Patient will perform car transfer with Min A  Long Term Goal 5: Patient will ambulate 10 ft with CGA    ASSESSMENT:  Assessment: Pt gives great effort with session.  She completed all tasks requested and had to be encouraged to rest. Pt quicker to apply instuctions given. During amb, pt fatigues quickly, in part due to desire to watch her own feet, increasing effort required to advance R LE. Will need HW to be assessed for optimal ht as fatigued at end of session and unable to discern if adjustments made were beneficial. Pt showing good progress with STS pushing up from [facility] once optimal position achieved. SPEECH THERAPY  Her  stated she ate a hamburger quickly and was experiencing some abdominal discomfort. Frequent belching was noted. She did confirm nausea. She did expectorate a large amount of phlegm. Today she exhibited mildly decreased accuracy with diadochokinetic tasks. She did have difficulty with repetition of four syllable words. She exhibited good production of two and three syllable words. Naming pictures was at 80% without cues. With phonemic cues she was at 100%. She was asked to complete demographic information recall and orientation recall tasks with sentence completion. She scored at 60%. Phonemic cues were beneficial.      Tradtional sentence completion tasks were at 40% this date. She followed one step commands at 50% without cues and at 100% with a model. Body part identification was at 10% without cues and at 100% with cues. She was able to recite the lord's prayer and the pledge of allegiance in unison and while reading the words. No overt s/s of aspiration observed with thin liquids via cup. Good hyolaryngeal elevation and mildly delayed pharyngeal swallow response. She completed oral motor exercises and the effortful swallow. She is producing more spontaneous words and phrases. She is very motivated to improve. She still does not wish to use an AAC communication board. She is gesturing and pointing for wants and needs. She continues to exhibit severe expressive and receptive aphasia, dysarthria,cognitive deficits, deficits in executive function and dysphagia.  She continues to exhibit perseverations, neologisms, semantic paraphasias, and literal paraphasias. Recommend she continue speech therapy services to address these deficits. Recommended Diet and Intervention  Regular diet  Thin liquids  Recommended Form of Meds: whole in applesauce or pudding   Dysphagia treatment  Therapeutic Interventions: Pharyngeal exercises;Diet tolerance monitoring;Oral care;Oral motor exercises; Patient/Family education; Therapeutic PO trials with SLP     Compensatory Swallowing Strategies  Compensatory Swallowing Strategies : Alternate solids and liquids;Eat/Feed slowly; No straws;Upright as possible for all oral intake;Remain upright for 30-45 minutes after meals;Small bites/sips; Check for pocketing of food on the Right; Check for pocketing of food on the Left; Set up assist;Assist feed              SHORT TERM GOAL #1:  Goal 1: Pt will complete y/n tasks with 80% accuracy and minimal verbal cues/modeling. Not Met     SHORT TERM GOAL #2:  Goal 2: Pt will complete verbal expression tasks with 80% accuracy and minimal verbal cues/modeling. Not Met     SHORT TERM GOAL #3:  Goal 3: Pt will complete receptive/expressive language tasks with 80% accuracy and minimal verbal cues/modeling. Not Met     SHORT TERM GOAL #4:  Goal 4: Pt will follow one step commands with with 90% accuracy and minimal verbal cues/modeling. Not Met     SHORT TERM GOAL #5:  Goal 5: Pt will complete orientation tasks with 90% accuracy and minimal verbal cues/modeling. Not Met Goal 6: The patient will complete automatic speech tasks, confrontational naming tasks, and 1 step commands wtih 80% accuracy with minimal verbal cues/modelinng. Not Met     Swallowing Short Term Goals  Short-term Goals  Goal 1: Pt will tolerate soft & bite sized consistencies with mildly  (nectar) thick liquids with minimal overt s/s of aspiration/penetration during hospitalization. New Goal: Pt will tolerate soft and bite sized diet with thin liquids with minimal overt s/s to don over B feet, assist to pull up over R side in standing       Footwear: CARE Score: 4  Comment: CGA for socks and Left shoe, max assist for R shoe       Toilet Transfers: CARE Score: 3  Comment: w/c to toilet with GB---cues for tech       Picking Up Object:  CARE Score: 88          UE Functioning:  RUE: With facilitation 2-/5, 3-/5 in elbow flexioni  LUE: AROM WFL     Pain Assessment:   0/10 pain        STGs:  Short Term Goals  Time Frame for Short Term Goals: 2 weeks  Short Term Goal 1: MET  Short Term Goal 2: Pt will dress UB using hemitechnique, mod A and cues  Short Term Goal 3: Pt will dress LB, hemitechnique, mod A and cues  Short Term Goal 4: Pt will toilet with mod A  Short Term Goal 5:  Toilet transfer with mod A  Additional Goals?: Yes  Short Term Goal 6: Pt will attend to R side during ADL/functional activities with moderate cues  Short Term Goal 7: Pt will perform standing tasks x 2-3 mins with L UE and mod A for balance to enhance ADL/IADL performance  Short Term Goal 8: Pt/family will demo understanding of R UE positioning/HEP/therapeutic activity recommendations with min A after ed    LTGs:  Long Term Goals  Time Frame for Long Term Goals : 4-5 weeks  Long Term Goal 1: Pt will bathe with min A, AE/DME prn  Long Term Goal 2: Pt will dress UB supervision  Long Term Goal 3: Pt will dress LB min A, AE prn  Long Term Goal 4: Pt will toilet with CGA  Long Term Goal 5: Pt will perform toilet transfer with CGA  Additional Goals?: Yes  Long Term Goal 6: Pt will perform simple home making task with min A  Long Term Goal 7: Pt/family will be independent in HEP/DME/therapeutic activity recommendations after ed  Long Term Goal 8: Pt will attend to R side during functional activities with occasional cueing    Assessment:  Performance deficits / Impairments: Decreased functional mobility , Decreased endurance, Decreased coordination, Decreased ADL status, Decreased sensation, Decreased posture, Decreased ROM, Decreased balance, Decreased strength, Decreased vision/visual deficit, Decreased safe awareness, Decreased high-level IADLs, Decreased cognition, Decreased fine motor control                 NUTRITION  Current Wt: Weight: 196 lb 2 oz (89 kg) / Body mass index is 30.72 kg/m². Admission Wt: Admission Body Weight: 195 lb (88.5 kg)  Oral Diet Orders:     Seen for reassessment. Pt. reflecting a % PO intake. Family brings in lots of meals and snacks. Wt. is stable at this time. Electrolytes WNL. Will continue POC. NURSING    Wounds/Incisions/Ulcers: No skin issues identified     Rickey Scale Score: 15    Pain: No pain concerns to address    Consultations/Labs/X-rays:     Family Education: Family available and participating in education     Fall Risk:  Sarah Sanchez Total Score: 61    Fall in the last week?no        Other Nursing Issues: Alert with receptive and expressive aphasia. Mostly able to make needs known. Still refusing most meds but did want to take gabepentin . Will agree to take lovenox at times. Takes meds whole with applesauce. Incont of bowel and bladder at times. [place] at hs. Right side flaccid. Up with assist x2 and pebbles steady. SOCIAL WORK/CASE MANAGEMENT  Assessment: remains on FMLA and may renew FMLA in beginning of next year-expects to be primary caregiver and then will retire. He has asked appropriate questions about insurance benefits and has contacted Medicare coordination of benefits for accurate information about which will be primary/secondary payor at time of his intermediate. Daughter has returned to assist in discharge plan, expected to fly back- states her mother responds more favorably to son, who can assist in/out of home. DME is planned, home health care planned. Information given about private duty agencies through which to hire for respite/attendant care. Information given about [address].     Discharge Plan   Estimated Length of Stay: 12/29/22  Destination: discharge home with supervision    Pass: No    Services at Discharge: [address], Occupational Therapy, Speech Therapy, and Nursing Other per evaluations    Equipment at Discharge: 30 inch sliding board, wheelchair, BSC , aspen walker, TTB     Progress made in the prior week:  MIn/Mod on bed mobility  2. CGA with toilet transfers   3.  4.  5.      Goals for following week:  Min A/CGA   2. CGA with toilet transfers. 3.   4.   5.     Factors facilitating achievement of predicted outcomes: Family support    Barriers to the achievement of predicted outcomes: Cognitive deficit, Impulsivity, Limited safety awareness, Limited insight into deficits, Communication deficit, Upper extremity weakness, and Lower extremity weakness    Team Members Present at Conference:  : Cyndy Esparza 23   Occupational Therapist:  Tammie Thomason OTR/L  Physical Therapist: Radha Brock PT  Speech Therapist: Robby Ashraf Oneil [address], [address]  Nurse: Gennie Litten   Nurse Manager:  Devin Raymond, RN, BSN  Dietitian:  Kayla Neff  Rehab Director:        I approve the established interdisciplinary plan of care as documented within the medical record of [address] [place] [address].

## 2022-12-26 NOTE — PLAN OF CARE
Problem: Discharge Planning  Goal: Discharge to home or other facility with appropriate resources  12/25/2022 2255 by Aaliyah Arce RN  Outcome: Progressing  12/25/2022 1255 by Gloria Baker RN  Outcome: Progressing     Problem: Safety - Adult  Goal: Free from fall injury  12/25/2022 2255 by Aaliyah Arce RN  Outcome: Progressing  12/25/2022 1255 by Gloria Baker RN  Outcome: Progressing

## 2022-12-26 NOTE — PROGRESS NOTES
[facility]  INPATIENT SPEECH THERAPY  [facility] 8 REHAB UNIT      [x]Daily Note  []Progress Note    Date: 2022  Patient Name: Val Carney        MRN: [id number]    Account #: [de-identified]  : 1960  (64 y.o.)  Gender: female   Primary Provider: Xavi Beltran MD  Diet: Regular diet, thin liquids        PATIENT DIAGNOSIS(ES):    Diagnosis: CVA, R hemiparesis     Additional Pertinent Hx: Anxiety, depression, COPD, CVA, DM, LE edema, hyperlipidemia, HTN, obesity, RA, CAD and venous thromboembolism     RESTRICTIONS/PRECAUTIONS:    Restrictions/Precautions  Restrictions/Precautions: Modified Diet, Swallowing - Fall Risk, Aspiration Risk  Required Braces or Orthoses?: No              Subjective: The patient attempted all therapy tasks. Nursing states she refused her meds today. Objective:  Her  stated she ate a hamburger quickly and was experiencing some abdominal discomfort. Frequent belching was noted. She did confirm nausea. She did expectorate a large amount of phlegm. Today she exhibited mildly decreased accuracy with diadochokinetic tasks. She did have difficulty with repetition of four syllable words. She exhibited good production of two and three syllable words. Naming pictures was at 80% without cues. With phonemic cues she was at 100%. She was asked to complete demographic information recall and orientation recall tasks with sentence completion. She scored at 60%. Phonemic cues were beneficial.     Tradtional sentence completion tasks were at 40% this date. She followed one step commands at 50% without cues and at 100% with a model. Body part identification was at 10% without cues and at 100% with cues. She was able to recite the lord's prayer and the pledge of allegiance in unison and while reading the words. No overt s/s of aspiration observed with thin liquids via cup. Good hyolaryngeal elevation and mildly delayed pharyngeal swallow response.  She completed oral motor exercises and the effortful swallow. She is producing more spontaneous words and phrases. She is very motivated to improve. She still does not wish to use an AAC communication board. She is gesturing and pointing for wants and needs. She continues to exhibit severe expressive and receptive aphasia, dysarthria,cognitive deficits, deficits in executive function and dysphagia. She continues to exhibit perseverations, neologisms, semantic paraphasias, and literal paraphasias. Recommend she continue speech therapy services to address these deficits. Recommended Diet and Intervention  Regular diet  Thin liquids  Recommended Form of Meds: whole in applesauce or pudding   Dysphagia treatment  Therapeutic Interventions: Pharyngeal exercises;Diet tolerance monitoring;Oral care;Oral motor exercises; Patient/Family education; Therapeutic PO trials with SLP     Compensatory Swallowing Strategies  Compensatory Swallowing Strategies : Alternate solids and liquids;Eat/Feed slowly; No straws;Upright as possible for all oral intake;Remain upright for 30-45 minutes after meals;Small bites/sips; Check for pocketing of food on the Right; Check for pocketing of food on the Left; Set up assist;Assist feed              SHORT TERM GOAL #1:  Goal 1: Pt will complete y/n tasks with 80% accuracy and minimal verbal cues/modeling. Not Met     SHORT TERM GOAL #2:  Goal 2: Pt will complete verbal expression tasks with 80% accuracy and minimal verbal cues/modeling. Not Met     SHORT TERM GOAL #3:  Goal 3: Pt will complete receptive/expressive language tasks with 80% accuracy and minimal verbal cues/modeling. Not Met     SHORT TERM GOAL #4:  Goal 4: Pt will follow one step commands with with 90% accuracy and minimal verbal cues/modeling. Not Met     SHORT TERM GOAL #5:  Goal 5: Pt will complete orientation tasks with 90% accuracy and minimal verbal cues/modeling.  Not Met Goal 6: The patient will complete automatic speech tasks, confrontational naming tasks, and 1 step commands wtih 80% accuracy with minimal verbal cues/modelinng. Not Met     Swallowing Short Term Goals  Short-term Goals  Goal 1: Pt will tolerate soft & bite sized consistencies with mildly  (nectar) thick liquids with minimal overt s/s of aspiration/penetration during hospitalization. New Goal: Pt will tolerate soft and bite sized diet with thin liquids with minimal overt s/s of aspiration/penetration during hospitalization. Goal Met: The patient will tolerate a regular diet with thin liquids with minimal overt s/s of aspiration. Goal 2: Pt will complete Modified Barium Swallow Study. Discontinue Goal  Goal 3: Pt will demonstrate awareness of general aspiration precautions. Goal 4: Pt will participate in swallowing reassessments to ensure safest diet consistencies. Goal 5: Pt will allow staff to complete daily oral care. Goal 6: Pt will complete oral motor exercises for lingual and labial strengthening. Long Term Goals:  Pt will participate in skilled speech therapy services to ensure she is able to communicate her wants and needs. Not Met  Pt will participate in skilled speech therapy services to ensure she is able to complete ADLs. Not Met  Pt will tolerate least restrictive diet with minimal overt s/s of aspiration/penetration during hospitalization.  Not Met     STGs Met: 1  LTGs Met: 0    ELOS: 2-3 weeks      ASSESSMENT:  Assessment: [x]Progressing towards goals          []Not Progressing towards goals    Patient Tolerance of Treatment:   [x]Tolerated well []Tolerated fair []Required rest breaks []Fatigued    Education:  Learner:  [x]Patient          []Significant Other          []Other  Education provided regarding:  [x]Goals and POC   []Diet and swallowing precautions    []Home Exercise Program  []Progress and/or discharge information  Method of Education:  [x]Discussion          []Demonstration          []Handout          []Other  Evaluation of Education: [x]Verbalized understanding   []Demonstrates without assistance  []Demonstrates with assistance  []Needs further instruction     []No evidence of learning                  []No family present      Plan: [x]Continue with current plan of care    []Modify current plan of care as follows:    []Discharge patient    Discharge Location:    Services/Supervision Recommended:      [x]Patient continues to require treatment by a licensed therapist to address functional deficits as outlined in the established plan of care.          Electronically Signed By:  Riddhi Wells  12/26/2022,12:55 PM.

## 2022-12-27 LAB
BACTERIA: ABNORMAL /HPF
BILIRUBIN URINE: NEGATIVE
BLOOD, URINE: ABNORMAL
CLARITY: ABNORMAL
COLOR: YELLOW
CRYSTALS, UA: ABNORMAL /HPF
EPITHELIAL CELLS, UA: 2 /HPF (ref 0–5)
GLUCOSE URINE: NEGATIVE MG/DL
HYALINE CASTS: 2 /HPF (ref 0–8)
KETONES, URINE: ABNORMAL MG/DL
LEUKOCYTE ESTERASE, URINE: ABNORMAL
NITRITE, URINE: NEGATIVE
PH UA: 5 (ref 5–8)
PROTEIN UA: 30 MG/DL
RBC UA: 7 /HPF (ref 0–4)
SPECIFIC GRAVITY UA: 1.03 (ref 1–1.03)
UROBILINOGEN, URINE: 1 E.U./DL
WBC UA: 202 /HPF (ref 0–5)

## 2022-12-27 PROCEDURE — 6360000002 HC RX W HCPCS: Performed by: PSYCHIATRY & NEUROLOGY

## 2022-12-27 PROCEDURE — 87077 CULTURE AEROBIC IDENTIFY: CPT

## 2022-12-27 PROCEDURE — 92507 TX SP LANG VOICE COMM INDIV: CPT

## 2022-12-27 PROCEDURE — 81003 URINALYSIS AUTO W/O SCOPE: CPT

## 2022-12-27 PROCEDURE — 99233 SBSQ HOSP IP/OBS HIGH 50: CPT | Performed by: PSYCHIATRY & NEUROLOGY

## 2022-12-27 PROCEDURE — 87186 SC STD MICRODIL/AGAR DIL: CPT

## 2022-12-27 PROCEDURE — 94760 N-INVAS EAR/PLS OXIMETRY 1: CPT

## 2022-12-27 PROCEDURE — 97110 THERAPEUTIC EXERCISES: CPT

## 2022-12-27 PROCEDURE — 97530 THERAPEUTIC ACTIVITIES: CPT

## 2022-12-27 PROCEDURE — 87086 URINE CULTURE/COLONY COUNT: CPT

## 2022-12-27 PROCEDURE — 97116 GAIT TRAINING THERAPY: CPT

## 2022-12-27 PROCEDURE — 1180000000 HC REHAB R&B

## 2022-12-27 RX ADMIN — ENOXAPARIN SODIUM 40 MG: 100 INJECTION SUBCUTANEOUS at 17:03

## 2022-12-27 NOTE — PROGRESS NOTES
Patient:   Pradip Millan  MR#:    [id number]   Room:    26/826-02   YOB: 1960  Date of Progress Note: 12/27/2022  Time of Note                           8:34 AM  Consulting Physician:   Jac Rivera M.D. Attending Physician:  Jac Rivera MD     Chief complaint Acute ischemic stroke    S:This 58 y.o. female  with history of anxiety, depression, COPD, atherosclerotic disease, colitis, COPD, CVA, DM,  LE edema, hyperlipidemia, HTN, morbid obesity, RA, CAD  and venous thromboembolism. She presented to [facility] ER on 11/9/22 after being found at home lying facedown in her feces. She was very weak, confused, not able to speak but moving purposefully and intermittently following commands. Her last well know was 3 a.m. when her  left for work. Imaging done revealed a large MCA stroke 7.7 x 5 cm. CTA head/neck revealed an acute M1 occlusion. She was outside of the window for TPA. CK on admit was 1100, consistent with rhabdomylosis. She was also noted to possibly Dens fracture. She was transferred to [facility] for a possible thrombectomy. Further imaging was done at [facility] and she was deemed not a candidate for thrombectomy. MRI done ruled out Dens fracture. Repeat CT of head on 11/11/22 showed evolving left MCA territory infarct with increasing edema/mass effect resulting in 4mm of  rightward midline shift(previously 2mm) a the level of the third ventricle. No hemorrhagic conversion or definite trapping of the right lateral ventricle, possible small acute right cerebellar infarct. Neurosurgery was consulted for possible hemicraniectomy. She was seen by Dr. Sheree Go, who didn't feel any surgical intervention was needed. Patient was found to have a UTI + for [facility] and was placed on Rocephin on 11/14/22. Patient had a PICC line placed. Patient was evaluated by SPT and initially made NPO d/t oropharyngeal dysphagia. NGT placed and feeding started.  She was also noted to have global aphasia but is improving. She was re-evaluated by SPT on 11/15/22 for swallow and was started on a dysphagia 1 diet with nectar thick liquids. Patient also continues to have right sided weakness and is participating in both PT/OT. Repeat CT head on 11/13/22 shows stable LMCA ischemic stroke with stable edema, 5 mm shift, no hemorrhage. She is felt to need a stay on Rehab for continued PT/OT/SPT and medical management to work towards her goal of returning home with her . She is now felt ready to start the Rehab program.  No new issues overnight. REVIEW OF SYSTEMS:  Unreliable due to aphasia     Past Medical History:      Diagnosis Date    Anxiety     ASCVD (arteriosclerotic cardiovascular disease)     Carrier of group B Streptococcus     Cellulitis     Colitis     COPD (chronic obstructive pulmonary disease) ([facility])     Costochondritis     CVA (cerebral vascular accident) ([address]     Depression     Edema     Fracture of sternum     non union post surgery.      Gallstones     Grief reaction     H/O superior vena cava filter placement     Hyperlipidemia     Hypertension     MI (myocardial infarction) ([address]     MRSA (methicillin resistant Staphylococcus aureus)     Neuropathy     Obesity     Pseudarthrosis sternal    RA (rheumatoid arthritis) ([address]     Rosacea     Sinus bradycardia     TIA (transient ischemic attack)     Venous thromboembolism        Past Surgical History:      Procedure Laterality Date    ADENOIDECTOMY      APPENDECTOMY      CARDIAC CATHETERIZATION  3/2009    CARDIAC CATHETERIZATION  11/5/14  JDT    EF 50%, patent bypass grafts    CHOLECYSTECTOMY  2011    COLONOSCOPY  06/03/2010    Dr. Abraham Bravo: distal colon having ulcerative lesions c/w UC    COLONOSCOPY  2009    pancolitis    COLONOSCOPY      CORONARY ARTERY BYPASS GRAFT  3/2009    Dr Faith Nino, LIMA to LAD, SVGs to OM & PDA    GASTRIC BYPASS SURGERY  2012    HIATAL HERNIA REPAIR  2011    HYSTERECTOMY (CERVIX STATUS UNKNOWN)      KNEE SURGERY      MS COLONOSCOPY FLX DX W/COLLJ SPEC WHEN PFRMD N/A 3/12/2018    Dr Rodrick Koyanagi rectal inflammation consistent with U.C.-Active proctitis--3 yr recall    THORACOTOMY      TONSILLECTOMY      UPPER GASTROINTESTINAL ENDOSCOPY  2010    Dr. Belinda Desir  2012       Medications in Hospital:      Current Facility-Administered Medications:     escitalopram (LEXAPRO) tablet 5 mg, 5 mg, Oral, Daily, Mirian Waterman MD    bisacodyl (DULCOLAX) EC tablet 5 mg, 5 mg, Oral, Daily, Mirian Waterman MD, 5 mg at 12/16/22 0806    miconazole (MICOTIN) 2 % powder, , Topical, BID, Mirian Waterman MD, Given at 12/25/22 0905    docusate sodium (COLACE) capsule 100 mg, 100 mg, Oral, BID, Mirian Waterman MD, 100 mg at 12/24/22 1944    aspirin chewable tablet 81 mg, 81 mg, Oral, Daily, Mirian Waterman MD, 81 mg at 12/19/22 0810    atorvastatin (LIPITOR) tablet 40 mg, 40 mg, Oral, Nightly, Mirian Waterman MD, 40 mg at 12/24/22 1944    dulaglutide (TRULICITY) SC injection 1.5 mg (Patient Supplied), 1.5 mg, SubCUTAneous, Weekly, Mirian Waterman MD    folic acid (FOLVITE) tablet 1 mg, 1 mg, Oral, Daily, Mirian Waterman MD, 1 mg at 12/19/22 0810    gabapentin (NEURONTIN) capsule 300 mg, 300 mg, Oral, Nightly, Mirian Waterman MD, 300 mg at 12/26/22 1903    hydroxychloroquine (PLAQUENIL) tablet 200 mg, 200 mg, Oral, BID, Mirian Waterman MD, 200 mg at 12/24/22 1945    amLODIPine (NORVASC) tablet 2.5 mg, 2.5 mg, Oral, Daily, Mirian Waterman MD, 2.5 mg at 12/19/22 0810    tiZANidine (ZANAFLEX) tablet 4 mg, 4 mg, Oral, BID PRN, Mirian Waterman MD, 4 mg at 12/24/22 1759    multivitamin 1 tablet, 1 tablet, Oral, Daily, Mirian Waterman MD, 1 tablet at 12/19/22 0810    acetaminophen (TYLENOL) tablet 650 mg, 650 mg, Oral, Q4H PRN, Mirian Waterman MD, 650 mg at 12/23/22 2122    enoxaparin (LOVENOX) injection 40 mg, 40 mg, SubCUTAneous, Daily, Mirian Waterman MD, 40 mg at 12/26/22 1723    polyethylene glycol (GLYCOLAX) packet 17 g, 17 g, Oral, Daily PRN, Sandeep Montgomery MD, 17 g at 11/30/22 1817    Allergies:  Methotrexate derivatives    Social History:   TOBACCO:   reports that she quit smoking about 13 years ago. Her smoking use included cigarettes. She has a 64.00 pack-year smoking history. She has never used smokeless tobacco.  ETOH:   reports current alcohol use. Family History:       Problem Relation Age of Onset    Prostate Cancer Father     Heart Failure Father     Cancer Father     Colon Cancer Neg Hx     Colon Polyps Neg Hx     Liver Cancer Neg Hx     Liver Disease Neg Hx     Esophageal Cancer Neg Hx     Rectal Cancer Neg Hx     Stomach Cancer Neg Hx          PHYSICAL EXAM:  BP (!) 148/71   Pulse 62   Temp (!) 96.6 °F (35.9 °C)   Resp 18   Ht 5' 7\" (1.702 m)   Wt 196 lb 2 oz (89 kg)   SpO2 94%   BMI 30.72 kg/m²     Constitutional - well developed, well nourished. Eyes - conjunctiva normal.   Ear, nose, throat - No scars, masses, or lesions over external nose or ears, no atrophy of tongue  Neck-symmetric, no masses noted, no jugular vein distension  Respiration- chest wall appears symmetric, good expansion,   normal effort without use of accessory muscles  Musculoskeletal - no significant wasting of muscles noted, no bony deformities  Extremities-no clubbing, cyanosis or edema  Skin - warm, dry, and intact. No rash, erythema, or pallor. Psychiatric - mood, affect, and behavior appear normal.      Neurological exam  Awake, alert, global aphasia says \"yes\" to all questions asked   Attention and concentration appear appropriate  Recent and remote memory unable to tests     Cranial Nerve Exam     CN III, IV,VI-EOMI, No nystagmus, conjugate eye movements, no ptosis    CN VII-+ facial assymetry       Motor Exam  No movement on the right      Tremors- no tremors in hands or head noted     Gait  Not tested     Nursing/pcp notes, imaging,labs and vitals reviewed.      PT,OT and/or speech notes reviewed    Lab Results   Component Value Date WBC 10.0 12/26/2022    HGB 10.9 (L) 12/26/2022    HCT 35.1 (L) 12/26/2022    MCV 77.7 (L) 12/26/2022     (H) 12/26/2022     Lab Results   Component Value Date     12/26/2022    K 3.6 12/26/2022     12/26/2022    CO2 23 12/26/2022    BUN 9 12/26/2022    CREATININE 0.6 12/26/2022    GLUCOSE 84 12/26/2022    CALCIUM 8.9 12/26/2022    PROT 5.8 (L) 12/26/2022    LABALBU 2.9 (L) 12/26/2022    BILITOT <0.2 12/26/2022    ALKPHOS 78 12/26/2022    AST 14 12/26/2022    ALT 10 12/26/2022    LABGLOM >60 12/26/2022   No results found for: INR, PROTIME    Norlin Abt, PTA   Physical Therapist Assistant   Physical Therapy   Progress Notes       Cosign Needed   Date of Service:  12/23/2022  3:22 PM                 Cosign Needed                                                                                                    Physical Therapy  Name: Wei Bachelor  MRN:  [id number]  Date of service:  12/23/2022 12/23/22 1420   Restrictions/Precautions   Restrictions/Precautions Fall Risk;Weight Bearing   Required Braces or Orthoses? Yes   Lower Extremity Weight Bearing Restrictions   Right Lower Extremity Weight Bearing Weight Bearing As Tolerated   Left Lower Extremity Weight Bearing Weight Bearing As Tolerated   General   Chart Reviewed Yes   Family / Caregiver Present Yes   Subjective   Subjective Patient is sitting in her wc and is ready for tx   General Comment   Comments Nursing present stating patient just got cleaned up form having BM. Pain Assessment   Pain Assessment None - Denies Pain   Patient Observation   Observations Patient able to count some with therapist and say yes or no to some questions. Transfers   Sit to Stand Minimal Assistance; Moderate Assistance   Stand to Sit Contact guard assistance;Minimal Assistance   Comment Multiplt STS for amublation in //; patient has little eccentric control sitting   Ambulation   WB Status WBAT   Ambulation   Surface Level tile   Device Parallel Bars Other Apparatus AFO; Right; Wheelchair follow   Assistance Moderate assistance  (Therapist advanced R LE for patient)   Quality of Gait patient unable to lift R LE on her own this sessionl weight shifting forwards and to the L   Gait Deviations Slow Tamia;Decreased step height   Distance 12' x 3   Comments Seated rest in between sets   Stairs/Curb   Stairs? No   Propulsion 1   Propulsion Manual   Level Level Tile   Method LUE;LLE   Level of Assistance Contact guard assistance   Description/ Details Able to use L UE and R LE together this date with min vearing to the R. Needs more assist with turns. Required recovery periods   Distance 150'   Exercises   Hamstring Sets red x 20   Hip Flexion 20   Hip Abduction red x 20; ball squeeze x 20   Knee Long Arc Quad 20   Ankle Pumps 20   Comments Seated GWEN LE exercises; AAROM on R LE   Other exercises   Other exercises?  Yes   Other exercises 1 : ankle 4 way red x 20   Other exercises 2 AAROM/PROM R ankle 4 way x 20   Patient Goals    Patient Goals  Return home with family   Short Term Goals   Time Frame for Short Term Goals 2 weeks   Short Term Goal 1 Patient will perform bed mobility with Min A   Short Term Goal 2 Patient will transition supine <> sit with Min A   Short Term Goal 3 Patient will perform bed <> chair transfer with Min A   Short Term Goal 4 Patient propel wheelchair 50 ft with Min A   Short Term Goal 5 Patient will ambulate 10 ft with Mod A   Long Term Goals   Time Frame for Long Term Goals  3 weeks   Long Term Goal 1 Patient will perform bed mobility independently   Long Term Goal 2 Patient with transition supine <> sit independently   Long Term Goal 3 Patient will perform bed <> chair transfer independently   Long Term Goal 4 Patient will perform car transfer with Min A   Long Term Goal 5 Patient will ambulate 10 ft with CGA   Conditions Requiring Skilled Therapeutic Intervention   Body Structures, Functions, Activity Limitations Requiring Skilled Therapeutic Intervention Decreased ADL status; Decreased ROM; Decreased strength;Decreased safe awareness;Decreased cognition;Decreased endurance;Decreased sensation;Decreased balance;Decreased coordination;Decreased posture   Assessment Patient tolerates ambulation and exercises well needing cues fro technique and safety awareness. She is able to shift weight into her L LE during ambulation but is not able to activily advance R LE this session requiring therapist assist. She was left sitting in her WC with her  present. Will continue to progress as able. Activity Tolerance   Activity Tolerance Patient limited by endurance   Safety Devices   Type of Devices Gait belt;Call light within reach; Left in chair            Electronically signed by Jhonny Larid PTA on 12/23/2022 at 3:22 PM                     RECORD REVIEW: Previous medical records, medications were reviewed at today's visit    IMPRESSION:   1. Acute ischemic stroke-on aspirin/statin  2. Hypertension-on medications monitor  3. Hyperlipidemia-on statin  4. Diabetes-Trulicity-monitor blood sugar  5. DVT prophylaxis-Lovenox  6. History of coronary artery disease-aspirin/statin  7. Neuropathy-on Neurontin  8. Rheumatoid arthritis-on Plaquenil  9. COPD-monitor  10. History of anxiety and depression-monitor  11. History of colitis/gallstones-monitor  12. History of bariatric surgery-on multivitamin/folic acid  13. History of superior vena cava filter placement with venous thromboembolism-not on anticoagulation-monitor- took herself off blood thinners as not like meds and was bruising   14. PT/OT/speech    x-ray of right ankle no acute changes  Venous ultrasound of leg no DVT    Added low dose Lexapro    Discussed with daughter previously that it would be very difficult to force patient to take medications. It is better to discuss and have agreeable to initiating treatment on her own  Unable to force the patient to take medications.  She would need to be restrained with feeding tube    Continue present care    Team conference with PT/OT/speech/nursing/Care coordinator to review in depth patients care and discharge planning. At least 35 minutes spent coordinating care for patient >50% of time spent in coordination of care.        ft CGA  Ambulation hemiwalker AFO shoe cover 5-7 ft near constant cuing  0 steps    ELOS Thursday       Expected duration and frequency therapy: 180 minutes per day, 5 days per week    [place]   [phone] CELL  Dr Brisa Casper

## 2022-12-27 NOTE — PLAN OF CARE
Problem: Discharge Planning  Goal: Discharge to home or other facility with appropriate resources  12/27/2022 0140 by Bradley Guzman RN  Outcome: Progressing  12/26/2022 1201 by Steven Malone RN  Outcome: Progressing  Flowsheets (Taken 12/26/2022 9088)  Discharge to home or other facility with appropriate resources: Refer to discharge planning if patient needs post-hospital services based on physician order or complex needs related to functional status, cognitive ability or social support system     Problem: Safety - Adult  Goal: Free from fall injury  12/27/2022 0140 by Bradley Guzman RN  Outcome: Progressing  12/26/2022 1201 by Steven Malone RN  Outcome: Progressing     Problem: Neurosensory - Adult  Goal: Achieves stable or improved neurological status  12/27/2022 0140 by Bradley Guzman RN  Outcome: Progressing  12/26/2022 1201 by Steven Malone RN  Outcome: Progressing  Flowsheets (Taken 12/26/2022 0803)  Achieves stable or improved neurological status: Assess for and report changes in neurological status  Goal: Achieves maximal functionality and self care  12/27/2022 0140 by Bradley Guzman RN  Outcome: Progressing  12/26/2022 1201 by Steven Malone RN  Outcome: Progressing  Flowsheets (Taken 12/26/2022 0803)  Achieves maximal functionality and self care: Monitor swallowing and airway patency with patient fatigue and changes in neurological status     Problem: Skin/Tissue Integrity - Adult  Goal: Skin integrity remains intact  12/27/2022 0140 by Bradley Guzman RN  Outcome: Progressing  12/26/2022 1201 by Steven Malone RN  Outcome: Progressing  Flowsheets  Taken 12/26/2022 1200  Skin Integrity Remains Intact: Monitor for areas of redness and/or skin breakdown  Taken 12/26/2022 0803  Skin Integrity Remains Intact: Monitor for areas of redness and/or skin breakdown

## 2022-12-27 NOTE — PROGRESS NOTES
Durable Medical Equipment   Physician Order     Patient Name Donna Betancur  Patient Phone: [phone])    Patient Address: [address] [facility] [place] [address]    Patient Height Height: 5' 7\" (170.2 cm)  Patient Weight 196 lb 2 oz (89 kg)   1960     1. [place]  [place]    F/O Payor/Plan Precert #   BCBS/BCBS -  [address] #   Matthew Pineda [id number]   Address Phone   [id number]   [address]      2.  [address] [facility] PART A AND B    F/O Payor/Plan Precert #   MEDICARE/MEDICARE PART A AND B    Subscriber Subscriber #   Hunter Estefania [id number]   Address Phone   [address] [address] [address] [facility] [phone]       DME needed:    18\" standard height wheelchair with the following specifications:  Flip back full length armrests  B swing away footrests   R brake extension  Wheelchair cushion  Satinder walker  Heavy duty BSC  TTB            DIAGNOSIS:  Acute ischemic stroke [facility]) I63.9     [facility]   History and Physical           Patient:                                    Donna Betancur  MR#:                                        [id number]  Account Number:                   [id number]              Room:                                      0826/826-01      YOB: 1960  Date of Progress Note:           2022  Time of Note                           9:25 AM  Attending Physician:               Carissa Loredo MD           Admitting diagnosis:Cerebral infarction, unspecified     Secondary diagnoses:Hemiplegia and hemiparesis following cerebral infarction affecting right dominant side,Dysphagia following cerebral infarction,Aphasia following cerebral infarction,Anxiety disorder, unspecified,Depression, unspecified,Chronic obstructive pulmonary disease, unspecified,Morbid (severe) obesity due to excess calories,Rheumatoid arthritis, unspecified,Essential (primary) hypertension,Atherosclerotic heart disease of native coronary artery without angina pectoris,Hyperlipidemia, unspecified,Type 2 diabetes mellitus with hyperglycemia,Dysphagia, oropharyngeal phase,Urinary tract infection, site not specified,Unspecified Escherichia coli [E. coli] as the cause of diseases classified elsewhere,Rhabdomyolysis     CHIEF COMPLAINT: Acute ischemic stroke        HISTORY OF PRESENT ILLNESS:   This 64 y.o. female with history of anxiety, depression, COPD, atherosclerotic disease, colitis, COPD, CVA, DM,  LE edema, hyperlipidemia, HTN, morbid obesity, RA, CAD  and venous thromboembolism. She presented to [facility] ER on 11/9/22 after being found at home lying facedown in her feces. She was very weak, confused, not able to speak but moving purposefully and intermittently following commands. Her last well know was 3 a.m. when her  left for work. Imaging done revealed a large MCA stroke 7.7 x 5 cm. CTA head/neck revealed an acute M1 occlusion. She was outside of the window for TPA. CK on admit was 1100, consistent with rhabdomylosis. She was also noted to possibly Dens fracture. She was transferred to [facility] for a possible thrombectomy. Further imaging was done at [facility] and she was deemed not a candidate for thrombectomy. MRI done ruled out Dens fracture. Repeat CT of head on 11/11/22 showed evolving left MCA territory infarct with increasing edema/mass effect resulting in 4mm of  rightward midline shift(previously 2mm) a the level of the third ventricle. No hemorrhagic conversion or definite trapping of the right lateral ventricle, possible small acute right cerebellar infarct. Neurosurgery was consulted for possible hemicraniectomy. She was seen by Dr. Karl Albright, who didn't feel any surgical intervention was needed. Patient was found to have a UTI + for [facility] and was placed on Rocephin on 11/14/22. Patient had a PICC line placed. Patient was evaluated by SPT and initially made NPO d/t oropharyngeal dysphagia. NGT placed and feeding started.  She was also noted to have global aphasia but is improving. She was re-evaluated by SPT on 11/15/22 for swallow and was started on a dysphagia 1 diet with nectar thick liquids. Patient also continues to have right sided weakness and is participating in both PT/OT. Repeat CT head on 11/13/22 shows stable LMCA ischemic stroke with stable edema, 5 mm shift, no hemorrhage. She is felt to need a stay on Rehab for continued PT/OT/SPT and medical management to work towards her goal of returning home with her . She is now felt ready to start the Rehab program.     REVIEW OF SYSTEMS:     Limited due to aphasia        Past Medical History:  Past Medical History             Diagnosis Date    Anxiety      ASCVD (arteriosclerotic cardiovascular disease)      Carrier of group B Streptococcus      Cellulitis      Colitis      COPD (chronic obstructive pulmonary disease) ([facility])      Costochondritis      CVA (cerebral vascular accident) ([address]      Depression      Edema      Fracture of sternum       non union post surgery.      Gallstones      Grief reaction      H/O superior vena cava filter placement      Hyperlipidemia      Hypertension      MI (myocardial infarction) ([address]      MRSA (methicillin resistant Staphylococcus aureus)      Neuropathy      Obesity      Pseudarthrosis sternal    RA (rheumatoid arthritis) ([facility])      Rosacea      Sinus bradycardia      TIA (transient ischemic attack)      Venous thromboembolism              Past Surgical History:  Past Surgical History             Procedure Laterality Date    ADENOIDECTOMY        APPENDECTOMY        CARDIAC CATHETERIZATION   3/2009    CARDIAC CATHETERIZATION   11/5/14  JDT     EF 50%, patent bypass grafts    CHOLECYSTECTOMY   2011    COLONOSCOPY   06/03/2010     Dr. Christo Elam: distal colon having ulcerative lesions c/w UC    COLONOSCOPY   2009     pancolitis    COLONOSCOPY        CORONARY ARTERY BYPASS GRAFT   3/2009     PHANI Son to LAD, SVGs to OM & PDA    GASTRIC BYPASS SURGERY   2012    HIATAL HERNIA REPAIR   2011    HYSTERECTOMY (CERVIX STATUS UNKNOWN)        KNEE SURGERY        IA COLONOSCOPY FLX DX W/COLLJ SPEC WHEN PFRMD N/A 3/12/2018     Dr Aminata Sandhu rectal inflammation consistent with U.C.-Active proctitis--3 yr recall    Dorcas López ENDOSCOPY   2010     Dr. Som Edmondson   2012            Medications in Hospital:      Current Medication      Current Facility-Administered Medications:     aspirin chewable tablet 81 mg, 81 mg, Oral, Daily, Kailee Morejon MD, 81 mg at 11/19/22 0841    atorvastatin (LIPITOR) tablet 40 mg, 40 mg, Oral, Nightly, Kailee Morejon MD, 40 mg at 11/18/22 2047    dulaglutide (TRULICITY) SC injection 1.5 mg (Patient Supplied), 1.5 mg, SubCUTAneous, Weekly, Kailee Morejon MD    folic acid (FOLVITE) tablet 1 mg, 1 mg, Oral, Daily, Kailee Morejon MD, 1 mg at 11/19/22 0841    gabapentin (NEURONTIN) capsule 300 mg, 300 mg, Oral, Nightly, Kailee Morejon MD, 300 mg at 11/18/22 2047    hydroxychloroquine (PLAQUENIL) tablet 200 mg, 200 mg, Oral, BID, Kailee Morejon MD, 200 mg at 11/19/22 0841    metoprolol succinate (TOPROL XL) extended release tablet 25 mg, 25 mg, Oral, Nightly, Kailee Morejon MD, 25 mg at 11/18/22 2046    amLODIPine (NORVASC) tablet 2.5 mg, 2.5 mg, Oral, Daily, Kailee Morejon MD, 2.5 mg at 11/19/22 0841    tiZANidine (ZANAFLEX) tablet 4 mg, 4 mg, Oral, BID PRN, Kailee Morejon MD    multivitamin 1 tablet, 1 tablet, Oral, Daily, Kailee Morejon MD, 1 tablet at 11/19/22 0841    acetaminophen (TYLENOL) tablet 650 mg, 650 mg, Oral, Q4H PRN, Kailee Morejon MD, 650 mg at 11/18/22 1803    enoxaparin (LOVENOX) injection 40 mg, 40 mg, SubCUTAneous, Daily, Kailee Morejon MD    polyethylene glycol (GLYCOLAX) packet 17 g, 17 g, Oral, Daily PRN, Kailee Morejon MD        Allergies:  Methotrexate derivatives     Social History:   TOBACCO:   reports that she quit smoking about 13 years ago. Her smoking use included cigarettes. She has a 64.00 pack-year smoking history. She has never used smokeless tobacco.  ETOH:   reports current alcohol use. Family History:   Family History             Problem Relation Age of Onset    Prostate Cancer Father      Heart Failure Father      Cancer Father      Colon Cancer Neg Hx      Colon Polyps Neg Hx      Liver Cancer Neg Hx      Liver Disease Neg Hx      Esophageal Cancer Neg Hx      Rectal Cancer Neg Hx      Stomach Cancer Neg Hx                    Physical Exam:    Vitals: /70   Pulse 51   Temp 97.7 °F (36.5 °C) (Temporal)   Resp 14   Wt 201 lb 5 oz (91.3 kg)   SpO2 95%   BMI 31.53 kg/m²      Constitutional - well developed, well nourished. Eyes - conjunctiva normal.  Pupils not tested  Ear, nose, throat -hearing intact to finger rub No scars, masses, or lesions over external nose or ears, no atrophy of tongue  Neck-symmetric, no masses noted, no jugular vein distension  Respiration- chest wall appears symmetric, good expansion,   normal effort without use of accessory muscles  Musculoskeletal - no significant wasting of muscles noted, no bony deformities  Extremities-no clubbing, cyanosis or edema  Skin - warm, dry, and intact. No rash, erythema, or pallor.   Psychiatric - mood, affect, and behavior appear normal.       Neurological exam  Awake, alert, global aphasia says \"yes\" to all questions asked   Can follow occasional commands such as closing eyes and trying to stick tongue out  Attention and concentration appear appropriate  Recent and remote memory unable to test  Speech with dysarthria        Cranial Nerve Exam   CN II- Visual fields grossly unremarkable  CN III, IV,VI-EOMI, No nystagmus, conjugate eye movements, no ptosis  CN V-sensation intact to LT over face  CN VII-+facial assymetry  CN VIII-Hearing intact to finger rub  CN IX and X- Palate not tested  CN XI-not test shoulder shrug  CN XII-Tongue midline with no fasciculations or fibrillations     Motor Exam  No movement noted on the right arm and right leg no cogwheeling, normal tone     Sensory Exam  Sensation intact to light touch and temperature upper and lower extremities bilaterally     Reflexes   Not tested     Tremors- no tremors in hands or head noted     Gait  Not tested     Coordination  Finger to nose-unable to obtain           CBC:       Recent Labs     11/19/22 0131   WBC 11.7*   HGB 11.8*   *         BMP:        Recent Labs     11/19/22 0131      K 4.3      CO2 27   BUN 27*   CREATININE 0.6   GLUCOSE 92         Hepatic:       Recent Labs     11/19/22 0131   AST 33*   ALT 40*   BILITOT <0.2   ALKPHOS 83         Lipids: No results for input(s): CHOL, HDL in the last 72 hours. Invalid input(s): LDLCALCU     INR: No results for input(s): INR in the last 72 hours. Assessment and Plan      1. Acute ischemic stroke-on aspirin/statin  2. Hypertension-on medications monitor  3. Hyperlipidemia-on statin  4. Diabetes-Trulicity-monitor blood sugar  5. DVT prophylaxis-Lovenox  6. History of coronary artery disease-aspirin/statin  7. Neuropathy-on Neurontin  8. Rheumatoid arthritis-on Plaquenil  9. COPD-monitor  10. History of anxiety and depression-monitor  11. History of colitis/gallstones-monitor  12. History of bariatric surgery-on multivitamin/folic acid  13. History of superior vena cava filter placement with venous thromboembolism-not on anticoagulation-monitor  14. PT/OT/speech        Patient's functional assessment  Prior to hospitalization the patient was occassionally incontinent of bowel and continent of bladder     Current therapy  Requires PT, OT and/or speech therapy     Anticipated discharge approximately 17 days     Functional assessment  All notes from reehab data were reviewed regarding the patient's functional status.            Anticipated discharge setting  Home with home care     No clear barriers to discharge     The patient appears to be an appropriate candidate for inpatient rehabilitation     Sufficiently stable: Patient's condition is sufficiently stable at the time of admission to allow the patient to actively participate in an intensive rehabilitation program     Close medical supervision: A rehabilitation physician, or other licensed treating physician with specialized training and experience in inpatient rehabilitation, will conduct face-to-face visits with the patient a minimum of at least 3 days per week throughout the patient's stay     This patient requires close medical supervision for the active management of the ongoing conditions and potential complications stated in the admission note     Intensive rehabilitation nursing: The patient demonstrates the need for 24-hour rehabilitation nursing care for active management of the multiple medical issues documented in the admission note     Appropriate therapy needed: The patient requires the active and ongoing therapeutic intervention of at least 2 therapeutic disciplines, one of which must be physical or occupational therapy and/or speech therapy     Intensive therapy: The patient requires and is reasonably expected to actively participate in at least 3 hours of therapy per day at least 5 days per week, and expected to make measurable improvements that will be of practical value to improve the patient's functional capacity or adaptation to impairments.  In addition, therapy treatments will begin within 36 hours from midnight of the day of the patient's admission to the inpatient rehabilitation facility     Expected duration and frequency therapy: 180 minutes per day, 5 days per week     Interdisciplinary team: The patient demonstrates the need for an interdisciplinary team for active management of the following medical issues including ataxia, motor planning, balance, disease management, elimination, endurance, family training, education, independent ADLs, pain management, precautions, range of motion, safety, strength, and transfers     I have reviewed the preadmission screening documents and concur with the findings. I believe the patient meets criteria and is sufficiently stable to allow participation in the program. This requires an intensive level of therapy, close medical supervision, and an interdisciplinary team approach provided through an individualized plan of care. I approve admitting this patient for an intensive inpatient rehabilitation program.     Please feel free to call with any questions   [phone] (cell phone)     Dr Sharen Denver certified in Neurology  Board Certified in Clinical Neurophysiology  Fellowship Trained in [place] [address] Neurophysiology        EMR Dragon/transcription disclaimer:Significant part of this  encounter note is electronic transcription/translation of spoken language to printed text. The electronic translation of spoken language may be erroneous, or at times, nonsensical words or phrases may be inadvertently transcribed.  Although I have reviewed the note for such errors, some may still exist.                  ____________________ _____________________ ________________   Physician Signature      Physician Name (print)   Physician NPI          Date

## 2022-12-27 NOTE — PLAN OF CARE
Problem: Discharge Planning  Goal: Discharge to home or other facility with appropriate resources  12/27/2022 1202 by Go Topete LPN  Outcome: Progressing  Flowsheets (Taken 12/27/2022 5973)  Discharge to home or other facility with appropriate resources: Refer to discharge planning if patient needs post-hospital services based on physician order or complex needs related to functional status, cognitive ability or social support system  12/27/2022 0140 by Kenya Flores RN  Outcome: Progressing     Problem: Safety - Adult  Goal: Free from fall injury  12/27/2022 1202 by Go Topete LPN  Outcome: Progressing  12/27/2022 0140 by Kenya Flores RN  Outcome: Progressing     Problem: Neurosensory - Adult  Goal: Achieves stable or improved neurological status  12/27/2022 1202 by Go Topete LPN  Outcome: Progressing  Flowsheets (Taken 12/27/2022 0839)  Achieves stable or improved neurological status: Assess for and report changes in neurological status  12/27/2022 0140 by Kenya Flores RN  Outcome: Progressing  Goal: Achieves maximal functionality and self care  12/27/2022 1202 by Go Topete LPN  Outcome: Progressing  Flowsheets (Taken 12/27/2022 0839)  Achieves maximal functionality and self care: Encourage and assist patient to increase activity and self care with guidance from physical therapy/occupational therapy  12/27/2022 0140 by Kenya Flores RN  Outcome: Progressing     Problem: Skin/Tissue Integrity - Adult  Goal: Skin integrity remains intact  12/27/2022 1202 by Go Topete LPN  Outcome: Progressing  Flowsheets (Taken 12/27/2022 1150)  Skin Integrity Remains Intact: Monitor for areas of redness and/or skin breakdown  12/27/2022 0140 by Kenya Flores RN  Outcome: Progressing     Problem: Pain  Goal: Verbalizes/displays adequate comfort level or baseline comfort level  12/27/2022 1202 by Go Topete LPN  Outcome: Progressing  12/27/2022 0140 by Camila Schaeffer RN  Outcome: Progressing     Problem: Chronic Conditions and Co-morbidities  Goal: Patient's chronic conditions and co-morbidity symptoms are monitored and maintained or improved  12/27/2022 1202 by Jeffery Mason LPN  Outcome: Progressing  Flowsheets (Taken 12/27/2022 0839)  Care Plan - Patient's Chronic Conditions and Co-Morbidity Symptoms are Monitored and Maintained or Improved: Monitor and assess patient's chronic conditions and comorbid symptoms for stability, deterioration, or improvement  12/27/2022 0140 by Camila Schaeffer RN  Outcome: Progressing     Problem: ABCDS Injury Assessment  Goal: Absence of physical injury  12/27/2022 1202 by Jeffery Mason LPN  Outcome: Progressing  12/27/2022 0140 by Camila Schaeffer RN  Outcome: Progressing     Problem: Skin/Tissue Integrity  Goal: Absence of new skin breakdown  Description: 1. Monitor for areas of redness and/or skin breakdown  2. Assess vascular access sites hourly  3. Every 4-6 hours minimum:  Change oxygen saturation probe site  4. Every 4-6 hours:  If on nasal continuous positive airway pressure, respiratory therapy assess nares and determine need for appliance change or resting period. 12/27/2022 1202 by Jeffery Mason LPN  Outcome: Progressing  12/27/2022 0140 by Camila Schaeffer RN  Outcome: Progressing     Problem: Confusion  Goal: Confusion, delirium, dementia, or psychosis is improved or at baseline  Description: INTERVENTIONS:  1. Assess for possible contributors to thought disturbance, including medications, impaired vision or hearing, underlying metabolic abnormalities, dehydration, psychiatric diagnoses, and notify attending LIP  2. [place] high risk fall precautions, as indicated  3. Provide frequent short contacts to provide reality reorientation, refocusing and direction  4. Decrease environmental stimuli, including noise as appropriate  5.  Monitor and intervene to maintain adequate nutrition, hydration, elimination, sleep and activity  6. If unable to ensure safety without constant attention obtain sitter and review sitter guidelines with assigned personnel  7.  Initiate Psychosocial CNS and Spiritual Care consult, as indicated  12/27/2022 1202 by Fabiana Anaya LPN  Outcome: Progressing  12/27/2022 0140 by Maira Beth RN  Outcome: Progressing     Problem: Musculoskeletal - Adult  Goal: Return mobility to safest level of function  12/27/2022 1202 by Fabiana Anaya LPN  Outcome: Progressing  Flowsheets (Taken 12/27/2022 0839)  Return Mobility to Safest Level of Function: Assess patient stability and activity tolerance for standing, transferring and ambulating with or without assistive devices  12/27/2022 0140 by Maira Beth RN  Outcome: Progressing  Goal: Return ADL status to a safe level of function  12/27/2022 1202 by Fabiana Anaya LPN  Outcome: Progressing  Flowsheets (Taken 12/27/2022 0839)  Return ADL Status to a Safe Level of Function: Assess activities of daily living deficits and provide assistive devices as needed  12/27/2022 0140 by Maira Beth RN  Outcome: Progressing     Problem: Gastrointestinal - Adult  Goal: Maintains or returns to baseline bowel function  12/27/2022 1202 by Fabiana Anaya LPN  Outcome: Progressing  [address] (Taken 12/27/2022 0839)  Maintains or returns to baseline bowel function: Assess bowel function  12/27/2022 0140 by Maira Beth RN  Outcome: Progressing  Goal: Maintains adequate nutritional intake  12/27/2022 1202 by Fabiana Anaya LPN  Outcome: Progressing  Flowsheets (Taken 12/27/2022 0839)  Maintains adequate nutritional intake: Monitor percentage of each meal consumed  12/27/2022 0140 by Maira Beth RN  Outcome: Progressing     Problem: Metabolic/Fluid and Electrolytes - Adult  Goal: Electrolytes maintained within normal limits  12/27/2022 1202 by Fabiana Anaya LPN  Outcome: Progressing  Flowsheets (Taken 12/27/2022 0839)  Electrolytes maintained within normal limits: Monitor labs and assess patient for signs and symptoms of electrolyte imbalances  12/27/2022 0140 by Denise Bhagat RN  Outcome: Progressing     Problem: Nutrition Deficit:  Goal: Optimize nutritional status  12/27/2022 1202 by Daquan Chu LPN  Outcome: Progressing  12/27/2022 0140 by Denise Bhagat RN  Outcome: Progressing

## 2022-12-27 NOTE — PROGRESS NOTES
[facility]  [facility]  [facility] 8 REHAB UNIT  TIME   Time In: 0900  Time Out: 1000  Minutes: 60       [x]Daily Note  []Progress Note    Date: 2022  Patient Name: Amanda Paulino        MRN: [id number]    Account #: [de-identified]  : 1960  (64 y.o.)  Gender: female   Primary Provider: Sandeep Montgomery MD  Diet: Regular diet, thin liquids        PATIENT DIAGNOSIS(ES):    Diagnosis: CVA, R hemiparesis     Additional Pertinent Hx: Anxiety, depression, COPD, CVA, DM, LE edema, hyperlipidemia, HTN, obesity, RA, CAD and venous thromboembolism     RESTRICTIONS/PRECAUTIONS:    Restrictions/Precautions  Restrictions/Precautions: Modified Diet, Swallowing - Fall Risk, Aspiration Risk  Required Braces or Orthoses?: No     Subjective:  She attempted all therapy tasks. She did indicate pain but was unable to express location of her pain. Objective:  Naming pictures without cues was at 37%. With phonemic or placement cues she was at 100%. Today she completed melodic intonation therapy tasks. She was able to produce nearly all of the lyrics to You Are My Sparks and O Holy Night. She was also able to produce sentences pertaining to personal information utilizing OLIVIA. Sentence completion tasks utilizing personal information was at 30% without cues and 100% with phonemic cues. Repetition of three and four syllable words was at approximately 70%. Yes/no questions were at 30%. Following one step commands was at 66%. Body part identification was at 20% without cues and 100% with cueing. She is producing more spontaneous words and phrases. She is very motivated to improve. She still does not wish to use an AAC communication board. She is gesturing and pointing for wants and needs. She continues to exhibit severe expressive and receptive aphasia, dysarthria,cognitive deficits, deficits in executive function and dysphagia.  She continues to exhibit perseverations, neologisms, semantic paraphasias, and literal paraphasias. Recommend she continue speech therapy services to address these deficits. Recommended Diet and Intervention  Regular diet  Thin liquids  Recommended Form of Meds: whole in applesauce or pudding   Dysphagia treatment  Therapeutic Interventions: Pharyngeal exercises;Diet tolerance monitoring;Oral care;Oral motor exercises; Patient/Family education; Therapeutic PO trials with SLP     Compensatory Swallowing Strategies  Compensatory Swallowing Strategies : Alternate solids and liquids;Eat/Feed slowly; No straws;Upright as possible for all oral intake;Remain upright for 30-45 minutes after meals;Small bites/sips; Check for pocketing of food on the Right; Check for pocketing of food on the Left; Set up assist;Assist feed             SHORT TERM GOAL #1:  Goal 1: Pt will complete y/n tasks with 80% accuracy and minimal verbal cues/modeling. Not Met    SHORT TERM GOAL #2:  Goal 2: Pt will complete verbal expression tasks with 80% accuracy and minimal verbal cues/modeling. Not Met    SHORT TERM GOAL #3:  Goal 3: Pt will complete receptive/expressive language tasks with 80% accuracy and minimal verbal cues/modeling. Not Met    SHORT TERM GOAL #4:  Goal 4: Pt will follow one step commands with with 90% accuracy and minimal verbal cues/modeling. Not Met    SHORT TERM GOAL #5:  Goal 5: Pt will complete orientation tasks with 90% accuracy and minimal verbal cues/modeling. Not Met Goal 6: The patient will complete automatic speech tasks, confrontational naming tasks, and 1 step commands wtih 80% accuracy with minimal verbal cues/modelinng. Not Met       Swallowing Short Term Goals  Short-term Goals  Goal 1: Pt will tolerate soft & bite sized consistencies with mildly  (nectar) thick liquids with minimal overt s/s of aspiration/penetration during hospitalization. New Goal: Pt will tolerate soft and bite sized diet with thin liquids with minimal overt s/s of aspiration/penetration during hospitalization.  Goal Met: The patient will tolerate a regular diet with thin liquids with minimal overt s/s of aspiration. Goal 2: Pt will complete Modified Barium Swallow Study. Discontinue Goal  Goal 3: Pt will demonstrate awareness of general aspiration precautions. Goal 4: Pt will participate in swallowing reassessments to ensure safest diet consistencies. Goal 5: Pt will allow staff to complete daily oral care. Goal 6: Pt will complete oral motor exercises for lingual and labial strengthening. ASSESSMENT:  Assessment: [x]Progressing towards goals          []Not Progressing towards goals    Patient Tolerance of Treatment:   [x]Tolerated well []Tolerated fair []Required rest breaks []Fatigued    Education:  Learner:  [x]Patient          []Significant Other          []Other  Education provided regarding:  [x]Goals and POC   []Diet and swallowing precautions    []Home Exercise Program  []Progress and/or discharge information  Method of Education:  [x]Discussion          []Demonstration          []Handout          []Other  Evaluation of Education:   [x]Verbalized understanding   []Demonstrates without assistance  []Demonstrates with assistance  []Needs further instruction     []No evidence of learning                  []No family present      Plan: [x]Continue with current plan of care    []Modify current plan of care as follows:    []Discharge patient    Discharge Location:    Services/Supervision Recommended:      [x]Patient continues to require treatment by a licensed therapist to address functional deficits as outlined in the established plan of care.       Electronically Signed By:  Darian Wild  12/27/2022,3:31 PM.

## 2022-12-27 NOTE — PROGRESS NOTES
Name: Ryan Ayala  MRN:  [id number]  Date of service:  12/27/2022 12/27/22 1031   Restrictions/Precautions   Restrictions/Precautions Fall Risk;Weight Bearing   Required Braces or Orthoses? Yes   Lower Extremity Weight Bearing Restrictions   Right Lower Extremity Weight Bearing Weight Bearing As Tolerated   Left Lower Extremity Weight Bearing Weight Bearing As Tolerated   General   Chart Reviewed Yes   Subjective   Subjective Pt up in wc, ready for therapy. Pain Assessment   Pain Assessment None - Denies Pain   Bed Mobility   Bridging Minimal assistance   Rolling Minimal assistance; Moderate assistance   Supine to Sit Moderate assistance;Maximal assistance   Sit to Supine Moderate assistance;Maximal assistance   Comment worked with pt on rolling and bed mobility d/t incontinence of urine-brief change   Transfers   Sit to Stand Moderate Assistance  (from wc and from bedside)   Stand to Sit Moderate Assistance   Stand Pivot Transfers Moderate Assistance  (wc<>bed)   Ambulation   WB Status WBAT   Ambulation   Surface Level tile   Device Hemiwalker   Other Apparatus AFO; Right; Wheelchair follow  (shoe cover)   Assistance Moderate assistance;Maximum assistance   Quality of Gait pt with intermittent ability to swing RLE fwd, mostly dependent on ability to stand tall and weight shift onto LLE- this is limited by pt's insistence on looking at LE's while taking steps   Gait Deviations Decreased step length;Decreased step height;Slow Tamia  (narrow BAYLEE unless physically adjusted otherwise)   Distance 5' x 4, 7' x 2   Comments constant vc's for technique, seated rest breaks required between bouts   Conditions Requiring Skilled Therapeutic Intervention   Assessment Tx focused on STS and weight shifting to advance RLE. Multiple bouts made ranging from 2-8 steps each, some with manual facilitation to advance RLE and some with pt able to weight shift and stand erect to advance RLE.  Pt appeared to improve in ability to advance RLE as tx progressed.    Activity Tolerance   Activity Tolerance Patient limited by endurance   Safety Devices   Type of Devices   (left with  and OT for further tx)       Electronically signed by Silver [place] PTA on 12/27/2022 at 11:02 AM

## 2022-12-27 NOTE — PROGRESS NOTES
Durable Medical Equipment   Physician Order      Patient Name Fabian Short  Patient Phone: [phone] ([phone]     Patient Address: [address] [facility] [address] [facility] [address] [place] [address]     Patient Height Height: 5' 7\" (170.2 cm)          Patient Weight 196 lb 2 oz (89 kg)       1960      1. [place] -  [place]          F/O Payor/Plan Precert #   BCBS/BCBS -  [address] #   Pretty Hankins [id number]   Address Phone   [address], [address]        2.  [facility] PART A AND B          F/O Payor/Plan Precert #   MEDICARE/MEDICARE PART A AND B     Subscriber Subscriber #   Hal Chester [id number]   Address Phone   [address] [address] [address] [place] [address] [phone]         DME needed:     30\" Sliding board                 DIAGNOSIS:  Acute ischemic stroke [facility] I63.9      [facility]   History and Physical           Patient:                                    Fabian Short  MR#:                                        [id number]  Account Number:                   [id number]              Room:                                      [address] [place] [address]      YOB: 1960  Date of Progress Note:           2022  Time of Note                           9:25 AM  Attending Physician:               Trisha Thomson MD           Admitting diagnosis:Cerebral infarction, unspecified     Secondary diagnoses:Hemiplegia and hemiparesis following cerebral infarction affecting right dominant side,Dysphagia following cerebral infarction,Aphasia following cerebral infarction,Anxiety disorder, unspecified,Depression, unspecified,Chronic obstructive pulmonary disease, unspecified,Morbid (severe) obesity due to excess calories,Rheumatoid arthritis, unspecified,Essential (primary) hypertension,Atherosclerotic heart disease of native coronary artery without angina pectoris,Hyperlipidemia, unspecified,Type 2 diabetes mellitus with hyperglycemia,Dysphagia, oropharyngeal phase,Urinary tract infection, site not specified,Unspecified Escherichia coli [E. coli] as the cause of diseases classified elsewhere,Rhabdomyolysis     CHIEF COMPLAINT: Acute ischemic stroke        HISTORY OF PRESENT ILLNESS:   This 64 y.o. female with history of anxiety, depression, COPD, atherosclerotic disease, colitis, COPD, CVA, DM,  LE edema, hyperlipidemia, HTN, morbid obesity, RA, CAD  and venous thromboembolism. She presented to [facility] ER on 11/9/22 after being found at home lying facedown in her feces. She was very weak, confused, not able to speak but moving purposefully and intermittently following commands. Her last well know was 3 a.m. when her  left for work. Imaging done revealed a large MCA stroke 7.7 x 5 cm. CTA head/neck revealed an acute M1 occlusion. She was outside of the window for TPA. CK on admit was 1100, consistent with rhabdomylosis. She was also noted to possibly Dens fracture. She was transferred to [facility] for a possible thrombectomy. Further imaging was done at [facility] and she was deemed not a candidate for thrombectomy. MRI done ruled out Dens fracture. Repeat CT of head on 11/11/22 showed evolving left MCA territory infarct with increasing edema/mass effect resulting in 4mm of  rightward midline shift(previously 2mm) a the level of the third ventricle. No hemorrhagic conversion or definite trapping of the right lateral ventricle, possible small acute right cerebellar infarct. Neurosurgery was consulted for possible hemicraniectomy. She was seen by Dr. Denisha Sullivan, who didn't feel any surgical intervention was needed. Patient was found to have a UTI + for [facility] and was placed on Rocephin on 11/14/22. Patient had a PICC line placed. Patient was evaluated by SPT and initially made NPO d/t oropharyngeal dysphagia. NGT placed and feeding started. She was also noted to have global aphasia but is improving.  She was re-evaluated by SPT on 11/15/22 for swallow and was started on a dysphagia 1 diet with nectar thick liquids. Patient also continues to have right sided weakness and is participating in both PT/OT. Repeat CT head on 11/13/22 shows stable LMCA ischemic stroke with stable edema, 5 mm shift, no hemorrhage. She is felt to need a stay on Rehab for continued PT/OT/SPT and medical management to work towards her goal of returning home with her . She is now felt ready to start the Rehab program.     REVIEW OF SYSTEMS:     Limited due to aphasia        Past Medical History:  Past Medical History                Diagnosis Date    Anxiety      ASCVD (arteriosclerotic cardiovascular disease)      Carrier of group B Streptococcus      Cellulitis      Colitis      COPD (chronic obstructive pulmonary disease) ([facility])      Costochondritis      CVA (cerebral vascular accident) ([facility] [facility]      Depression      Edema      Fracture of sternum       non union post surgery.      Gallstones      Grief reaction      H/O superior vena cava filter placement      Hyperlipidemia      Hypertension      MI (myocardial infarction) ([facility] [address]      MRSA (methicillin resistant Staphylococcus aureus)      Neuropathy      Obesity      Pseudarthrosis sternal    RA (rheumatoid arthritis) ([facility])      Rosacea      Sinus bradycardia      TIA (transient ischemic attack)      Venous thromboembolism              Past Surgical History:  Past Surgical History                 Procedure Laterality Date    ADENOIDECTOMY        APPENDECTOMY        CARDIAC CATHETERIZATION   3/2009    CARDIAC CATHETERIZATION   11/5/14  JDT     EF 50%, patent bypass grafts    CHOLECYSTECTOMY   2011    COLONOSCOPY   06/03/2010     Dr. Sherley Carballo: distal colon having ulcerative lesions c/w UC    COLONOSCOPY   2009     pancolitis    COLONOSCOPY        CORONARY ARTERY BYPASS GRAFT   3/2009     PHANI Peterson to LAD, SVGs to OM & PDA    GASTRIC BYPASS SURGERY   2012    HIATAL HERNIA REPAIR   2011    HYSTERECTOMY (CERVIX STATUS UNKNOWN)        KNEE SURGERY [place] COLONOSCOPY FLX DX W/COLLJ SPEC WHEN PFRMD N/A 3/12/2018     Dr Brittney Anderson rectal inflammation consistent with U.C.-Active proctitis--3 yr recall    Martin Arreola ENDOSCOPY   2010     Dr. Hernández [facility]   2012            Medications in Hospital:       Current Medication      Current Facility-Administered Medications:     aspirin chewable tablet 81 mg, 81 mg, Oral, Daily, Mimi Jacobo MD, 81 mg at 11/19/22 0841    atorvastatin (LIPITOR) tablet 40 mg, 40 mg, Oral, Nightly, Mimi Jacobo MD, 40 mg at 11/18/22 2047    dulaglutide (TRULICITY) SC injection 1.5 mg (Patient Supplied), 1.5 mg, SubCUTAneous, Weekly, Mimi Jacobo MD    folic acid (FOLVITE) tablet 1 mg, 1 mg, Oral, Daily, Mimi Jacobo MD, 1 mg at 11/19/22 0841    gabapentin (NEURONTIN) capsule 300 mg, 300 mg, Oral, Nightly, Mimi Jacobo MD, 300 mg at 11/18/22 2047    hydroxychloroquine (PLAQUENIL) tablet 200 mg, 200 mg, Oral, BID, Mimi Jacobo MD, 200 mg at 11/19/22 0841    metoprolol succinate (TOPROL XL) extended release tablet 25 mg, 25 mg, Oral, Nightly, Mimi Jacobo MD, 25 mg at 11/18/22 2046    amLODIPine (NORVASC) tablet 2.5 mg, 2.5 mg, Oral, Daily, Mimi Jacobo MD, 2.5 mg at 11/19/22 0841    tiZANidine (ZANAFLEX) tablet 4 mg, 4 mg, Oral, BID PRN, Mimi Jacobo MD    multivitamin 1 tablet, 1 tablet, Oral, Daily, Mimi Jacobo MD, 1 tablet at 11/19/22 0841    acetaminophen (TYLENOL) tablet 650 mg, 650 mg, Oral, Q4H PRN, Mimi Jacobo MD, 650 mg at 11/18/22 1803    enoxaparin (LOVENOX) injection 40 mg, 40 mg, SubCUTAneous, Daily, Mimi Jacobo MD    polyethylene glycol (GLYCOLAX) packet 17 g, 17 g, Oral, Daily PRN, Mimi Jacobo MD         Allergies:  Methotrexate derivatives     Social History:   TOBACCO:   reports that she quit smoking about 13 years ago. Her smoking use included cigarettes. She has a 64.00 pack-year smoking history.  She has never used smokeless tobacco.  ETOH:   reports current alcohol use. Family History:   Family History                 Problem Relation Age of Onset    Prostate Cancer Father      Heart Failure Father      Cancer Father      Colon Cancer Neg Hx      Colon Polyps Neg Hx      Liver Cancer Neg Hx      Liver Disease Neg Hx      Esophageal Cancer Neg Hx      Rectal Cancer Neg Hx      Stomach Cancer Neg Hx                    Physical Exam:    Vitals: /70   Pulse 51   Temp 97.7 °F (36.5 °C) (Temporal)   Resp 14   Wt 201 lb 5 oz (91.3 kg)   SpO2 95%   BMI 31.53 kg/m²      Constitutional - well developed, well nourished. Eyes - conjunctiva normal.  Pupils not tested  Ear, nose, throat -hearing intact to finger rub No scars, masses, or lesions over external nose or ears, no atrophy of tongue  Neck-symmetric, no masses noted, no jugular vein distension  Respiration- chest wall appears symmetric, good expansion,   normal effort without use of accessory muscles  Musculoskeletal - no significant wasting of muscles noted, no bony deformities  Extremities-no clubbing, cyanosis or edema  Skin - warm, dry, and intact. No rash, erythema, or pallor.   Psychiatric - mood, affect, and behavior appear normal.       Neurological exam  Awake, alert, global aphasia says \"yes\" to all questions asked   Can follow occasional commands such as closing eyes and trying to stick tongue out  Attention and concentration appear appropriate  Recent and remote memory unable to test  Speech with dysarthria        Cranial Nerve Exam   CN II- Visual fields grossly unremarkable  CN III, IV,VI-EOMI, No nystagmus, conjugate eye movements, no ptosis  CN V-sensation intact to LT over face  CN VII-+facial assymetry  CN VIII-Hearing intact to finger rub  CN IX and X- Palate not tested  CN XI-not test shoulder shrug  CN XII-Tongue midline with no fasciculations or fibrillations     Motor Exam  No movement noted on the right arm and right leg no candidate for inpatient rehabilitation     Sufficiently stable: Patient's condition is sufficiently stable at the time of admission to allow the patient to actively participate in an intensive rehabilitation program     Close medical supervision: A rehabilitation physician, or other licensed treating physician with specialized training and experience in inpatient rehabilitation, will conduct face-to-face visits with the patient a minimum of at least 3 days per week throughout the patient's stay     This patient requires close medical supervision for the active management of the ongoing conditions and potential complications stated in the admission note     Intensive rehabilitation nursing: The patient demonstrates the need for 24-hour rehabilitation nursing care for active management of the multiple medical issues documented in the admission note     Appropriate therapy needed: The patient requires the active and ongoing therapeutic intervention of at least 2 therapeutic disciplines, one of which must be physical or occupational therapy and/or speech therapy     Intensive therapy: The patient requires and is reasonably expected to actively participate in at least 3 hours of therapy per day at least 5 days per week, and expected to make measurable improvements that will be of practical value to improve the patient's functional capacity or adaptation to impairments.  In addition, therapy treatments will begin within 36 hours from midnight of the day of the patient's admission to the inpatient rehabilitation facility     Expected duration and frequency therapy: 180 minutes per day, 5 days per week     Interdisciplinary team: The patient demonstrates the need for an interdisciplinary team for active management of the following medical issues including ataxia, motor planning, balance, disease management, elimination, endurance, family training, education, independent ADLs, pain management, precautions, range of motion, safety, strength, and transfers     I have reviewed the preadmission screening documents and concur with the findings. I believe the patient meets criteria and is sufficiently stable to allow participation in the program. This requires an intensive level of therapy, close medical supervision, and an interdisciplinary team approach provided through an individualized plan of care. I approve admitting this patient for an intensive inpatient rehabilitation program.     Please feel free to call with any questions   [phone] (cell phone)     Dr Chaitanya Dillard certified in Neurology  Board Certified in Clinical Neurophysiology  Fellowship Trained in [place] [address] Neurophysiology        EMR Dragon/transcription disclaimer:Significant part of this  encounter note is electronic transcription/translation of spoken language to printed text. The electronic translation of spoken language may be erroneous, or at times, nonsensical words or phrases may be inadvertently transcribed.  Although I have reviewed the note for such errors, some may still exist.                    ____________________ _____________________ ________________   Physician Signature      Physician Name (print)   Physician NPI          Date                  Routing History

## 2022-12-28 PROCEDURE — 1180000000 HC REHAB R&B

## 2022-12-28 PROCEDURE — 97110 THERAPEUTIC EXERCISES: CPT

## 2022-12-28 PROCEDURE — 6360000002 HC RX W HCPCS: Performed by: PSYCHIATRY & NEUROLOGY

## 2022-12-28 PROCEDURE — 94760 N-INVAS EAR/PLS OXIMETRY 1: CPT

## 2022-12-28 PROCEDURE — 92507 TX SP LANG VOICE COMM INDIV: CPT

## 2022-12-28 PROCEDURE — 6370000000 HC RX 637 (ALT 250 FOR IP): Performed by: PSYCHIATRY & NEUROLOGY

## 2022-12-28 PROCEDURE — 97530 THERAPEUTIC ACTIVITIES: CPT

## 2022-12-28 PROCEDURE — 99232 SBSQ HOSP IP/OBS MODERATE 35: CPT | Performed by: PSYCHIATRY & NEUROLOGY

## 2022-12-28 PROCEDURE — 97116 GAIT TRAINING THERAPY: CPT

## 2022-12-28 PROCEDURE — 92526 ORAL FUNCTION THERAPY: CPT

## 2022-12-28 RX ORDER — ESCITALOPRAM OXALATE 5 MG/1
5 TABLET ORAL DAILY
Qty: 30 TABLET | Refills: 0 | Status: SHIPPED | OUTPATIENT
Start: 2022-12-29

## 2022-12-28 RX ORDER — POLYETHYLENE GLYCOL 3350 17 G/17G
17 POWDER, FOR SOLUTION ORAL DAILY PRN
Qty: 527 G | Refills: 0 | Status: SHIPPED | OUTPATIENT
Start: 2022-12-28 | End: 2023-01-27

## 2022-12-28 RX ORDER — HYDROXYCHLOROQUINE SULFATE 200 MG/1
200 TABLET, FILM COATED ORAL 2 TIMES DAILY
Qty: 60 TABLET | Refills: 0 | Status: SHIPPED | OUTPATIENT
Start: 2022-12-28

## 2022-12-28 RX ORDER — GABAPENTIN 300 MG/1
300 CAPSULE ORAL NIGHTLY
Qty: 30 CAPSULE | Refills: 0 | Status: SHIPPED | OUTPATIENT
Start: 2022-12-28 | End: 2023-01-27

## 2022-12-28 RX ORDER — MULTIVITAMIN WITH IRON
1 TABLET ORAL DAILY
Qty: 30 TABLET | Refills: 0 | Status: SHIPPED | OUTPATIENT
Start: 2022-12-29

## 2022-12-28 RX ORDER — CLOPIDOGREL BISULFATE 75 MG/1
75 TABLET ORAL DAILY
Qty: 30 TABLET | Refills: 0 | Status: SHIPPED | OUTPATIENT
Start: 2022-12-28

## 2022-12-28 RX ORDER — NITROFURANTOIN 25; 75 MG/1; MG/1
100 CAPSULE ORAL EVERY 12 HOURS SCHEDULED
Qty: 12 CAPSULE | Refills: 0 | Status: SHIPPED | OUTPATIENT
Start: 2022-12-28 | End: 2023-01-03

## 2022-12-28 RX ORDER — ATORVASTATIN CALCIUM 40 MG/1
40 TABLET, FILM COATED ORAL NIGHTLY
Qty: 30 TABLET | Refills: 0 | Status: SHIPPED | OUTPATIENT
Start: 2022-12-28

## 2022-12-28 RX ORDER — FOLIC ACID 1 MG/1
1 TABLET ORAL DAILY
Qty: 30 TABLET | Refills: 0 | Status: SHIPPED | OUTPATIENT
Start: 2022-12-28

## 2022-12-28 RX ORDER — AMLODIPINE BESYLATE 2.5 MG/1
2.5 TABLET ORAL DAILY
Qty: 30 TABLET | Refills: 0 | Status: SHIPPED | OUTPATIENT
Start: 2022-12-29

## 2022-12-28 RX ORDER — ASPIRIN 81 MG/1
81 TABLET, CHEWABLE ORAL DAILY
Qty: 30 TABLET | Refills: 0 | Status: SHIPPED | OUTPATIENT
Start: 2022-12-28 | End: 2022-12-28 | Stop reason: HOSPADM

## 2022-12-28 RX ORDER — PSEUDOEPHEDRINE HCL 30 MG
100 TABLET ORAL 2 TIMES DAILY
Qty: 60 CAPSULE | Refills: 0 | Status: SHIPPED | OUTPATIENT
Start: 2022-12-28

## 2022-12-28 RX ORDER — NITROFURANTOIN 25; 75 MG/1; MG/1
100 CAPSULE ORAL EVERY 12 HOURS SCHEDULED
Status: DISCONTINUED | OUTPATIENT
Start: 2022-12-28 | End: 2022-12-29 | Stop reason: HOSPADM

## 2022-12-28 RX ADMIN — ENOXAPARIN SODIUM 40 MG: 100 INJECTION SUBCUTANEOUS at 17:17

## 2022-12-28 RX ADMIN — NITROFURANTOIN MONOHYDRATE/MACROCRYSTALLINE 100 MG: 25; 75 CAPSULE ORAL at 12:13

## 2022-12-28 RX ADMIN — NITROFURANTOIN MONOHYDRATE/MACROCRYSTALLINE 100 MG: 25; 75 CAPSULE ORAL at 20:41

## 2022-12-28 RX ADMIN — GABAPENTIN 300 MG: 300 CAPSULE ORAL at 20:38

## 2022-12-28 NOTE — PROGRESS NOTES
[facility]  INPATIENT SPEECH THERAPY  [facility] 8 [facility] UNIT  TIME   1300  1400            [x]Daily Note  []Progress Note    Date: 2022  Patient Name: Donna Betancur        MRN: [id number]    Account #: [de-identified]  : 1960  (64 y.o.)  Gender: female   Primary Provider: Carissa Loredo MD  Diet: Regular diet, thin liquids        PATIENT DIAGNOSIS(ES):    Diagnosis: CVA, R hemiparesis     Additional Pertinent Hx: Anxiety, depression, COPD, CVA, DM, LE edema, hyperlipidemia, HTN, obesity, RA, CAD and venous thromboembolism     RESTRICTIONS/PRECAUTIONS:    Restrictions/Precautions  Restrictions/Precautions: Modified Diet, Swallowing - Fall Risk, Aspiration Risk  Required Braces or Orthoses?: No     Subjective:  She was upright in her wheelchair. She was able to express being anxious about discharge from inpatient rehab. Objective:  Tasks for expressive language were completed. Naming pictures without cues was at 80%. Phonemic cues were beneficial and she scored at 100%     Today she completed melodic intonation therapy tasks using personal information. She was able to produce phrases at 70%. She was able to sing several of her favorite songs from the 's and produced over 50% of the lyrics. Sentence completion tasks utilizing personal information was at 70%. With phonemic cues she scored at 100%. Repetition of three and four syllable words was at 75%. Repetition of short phrases was at 50%. Receptive language tasks were completed. Yes/no questions were at 10%. Following one step commands was at 80% without cues and 100% with a model. Body part identification was at 60% without cues and 100% with a model. No overt s/s of aspiration observed with consecutive sips of thin liquids via cup. Good hyolaryngeal elevation and mildly delayed pharyngeal swallow responses were noted. Clear voicing was noted after all sips.  No oral residue or buccal cavity pocketing was noted after her noon meal. She is producing more spontaneous words and phrases. She is very motivated to improve. She still does not wish to use an AAC communication board. She is gesturing and pointing for wants and needs. She continues to exhibit severe expressive and receptive aphasia, dysarthria,cognitive deficits, deficits in executive function and dysphagia. She continues to exhibit perseverations, neologisms, semantic paraphasias, and literal paraphasias. Recommend she continue speech therapy services after discharge from inpatient rehab to address these deficits. Recommended Diet and Intervention  Regular diet  Thin liquids  Recommended Form of Meds: whole in applesauce or pudding   Dysphagia treatment  Therapeutic Interventions: Pharyngeal exercises;Diet tolerance monitoring;Oral care;Oral motor exercises; Patient/Family education; Therapeutic PO trials with SLP     Compensatory Swallowing Strategies  Compensatory Swallowing Strategies : Alternate solids and liquids;Eat/Feed slowly; No straws;Upright as possible for all oral intake;Remain upright for 30-45 minutes after meals;Small bites/sips; Check for pocketing of food on the Right; Check for pocketing of food on the Left; Set up assist;Assist feed              SHORT TERM GOAL #1:  Goal 1: Pt will complete y/n tasks with 80% accuracy and minimal verbal cues/modeling. Not Met     SHORT TERM GOAL #2:  Goal 2: Pt will complete verbal expression tasks with 80% accuracy and minimal verbal cues/modeling. Not Met     SHORT TERM GOAL #3:  Goal 3: Pt will complete receptive/expressive language tasks with 80% accuracy and minimal verbal cues/modeling. Not Met     SHORT TERM GOAL #4:  Goal 4: Pt will follow one step commands with with 90% accuracy and minimal verbal cues/modeling. Not Met     SHORT TERM GOAL #5:  Goal 5: Pt will complete orientation tasks with 90% accuracy and minimal verbal cues/modeling.  Not Met Goal 6: The patient will complete automatic speech tasks, confrontational naming tasks, and 1 step commands wtih 80% accuracy with minimal verbal cues/modelinng. Not Met         Swallowing Short Term Goals  Short-term Goals  Goal 1: Pt will tolerate soft & bite sized consistencies with mildly  (nectar) thick liquids with minimal overt s/s of aspiration/penetration during hospitalization. New Goal: Pt will tolerate soft and bite sized diet with thin liquids with minimal overt s/s of aspiration/penetration during hospitalization. Goal Met: The patient will tolerate a regular diet with thin liquids with minimal overt s/s of aspiration. Goal 2: Pt will complete Modified Barium Swallow Study. Discontinue Goal  Goal 3: Pt will demonstrate awareness of general aspiration precautions. Goal 4: Pt will participate in swallowing reassessments to ensure safest diet consistencies. Goal 5: Pt will allow staff to complete daily oral care. Goal 6: Pt will complete oral motor exercises for lingual and labial strengthening.              ASSESSMENT:  Assessment: [x]Progressing towards goals          []Not Progressing towards goals    Patient Tolerance of Treatment:   [x]Tolerated well []Tolerated fair []Required rest breaks []Fatigued    Education:  Learner:  [x]Patient          []Significant Other          []Other  Education provided regarding:  [x]Goals and POC   []Diet and swallowing precautions    []Home Exercise Program  []Progress and/or discharge information  Method of Education:  [x]Discussion          []Demonstration          []Handout          []Other  Evaluation of Education:   []Verbalized understanding   []Demonstrates without assistance  []Demonstrates with assistance  []Needs further instruction     []No evidence of learning                  []No family present      Plan: [x]Continue with current plan of care    []Modify current plan of care as follows:    []Discharge patient    Discharge Location:    Services/Supervision Recommended:      [x]Patient continues to require treatment by a licensed therapist to address functional deficits as outlined in the established plan of care.        Electronically Signed By:  Néstor Avendano M.S., CCC-SLP  12/28/2022,9:05 AM.

## 2022-12-28 NOTE — PROGRESS NOTES
Carolee Neil Rabago  MR#  [id number]       12/28/22 1026 12/28/22 1043 12/28/22 1044   Transfers   Sit to Stand  --  Minimal Assistance; Moderate Assistance  (Pulling with parallel bar. )  --    Stand to Sit  --  Minimal Assistance  --    Car Transfer Minimal Assistance; Moderate Assistance  (Using sliding board into pt's convertible with lower seat. .  Verbal cues also required for technique. )  --   --    Ambulation   WB Status  --   --  WBAT   Ambulation   Surface  --   --  Level tile   Device  --   --  Parallel Bars   Other Apparatus  --   --  AFO; Right; Wheelchair follow  (Arm sling)   Assistance  --   --  Minimal assistance   Quality of Gait  --   --  Assist to advance RLE. Good, upright posture and weight shifting. Gait Deviations  --   --  Decreased step length;Decreased step height;Slow Tamia   Distance  --   --  15' x2   Activity Tolerance   Activity Tolerance  --   --   --    Assessment   Assessment  --   --   --    PT Co-Treatment Minutes   Time In  --   --   --    Time Out  --   --   --    Minutes  --   --   --       12/28/22 1048 12/28/22 1051   Transfers   Sit to Stand  --   --    Stand to Sit  --   --    Car Transfer  --   --    Ambulation   WB Status  --   --    Ambulation   Surface  --   --    Device  --   --    Other Apparatus  --   --    Assistance  --   --    Quality of Gait  --   --    Gait Deviations  --   --    Distance  --   --    Activity Tolerance   Activity Tolerance Patient limited by endurance  --    Assessment   Assessment Practiced car transfer into pt's vehicle using sliding board. Pt's spoiuse present. Able to amb 12' x2 in parallel bars with min assist.  Practiced W/C prop using electric scooter. Co-treat session with OT dept.   --    PT Co-Treatment Minutes   Time In  --  1000   Time Out  --  1100   Minutes  --  60   Electronically signed by Charles Escobedo PTA on 12/28/2022 at 11:07 AM

## 2022-12-28 NOTE — DISCHARGE SUMMARY
Neurology Discharge Summary     Patient Identification:  Abel Fleming is a 58 y.o. female. :  1960  Admit Date:  2022  Discharge date : 2022  Attending Provider: Max Mcqueen MD     Account Number: [de-identified]                                   Admission Diagnoses:   Acute ischemic stroke [facility]) [I63.9]    Discharge Diagnoses:  Principal Problem:    Acute ischemic stroke [facility])  Active Problems:    CAD (coronary artery disease)    Aphasia    COPD (chronic obstructive pulmonary disease) ([facility] [address]    Anxiety    H/O superior vena cava filter placement    Essential hypertension    History of MI (myocardial infarction)    Rheumatoid arthritis ([facility]    Dysphagia due to recent cerebral infarction    Type 2 diabetes mellitus with complication, without long-term current use of insulin ([facility]    Hypertension    Hyperlipidemia    ASCVD (arteriosclerotic cardiovascular disease)  Resolved Problems:    * No resolved hospital problems. *      Discharge Medications:    Current Discharge Medication List             Details   escitalopram (LEXAPRO) 5 MG tablet Take 1 tablet by mouth daily  Qty: 30 tablet, Refills: 0      amLODIPine (NORVASC) 2.5 MG tablet Take 1 tablet by mouth daily  Qty: 30 tablet, Refills: 0      miconazole (MICOTIN) 2 % powder Apply topically 2 times daily.   Qty: 45 g, Refills: 0      bisacodyl (DULCOLAX) 5 MG EC tablet Take 1 tablet by mouth daily  Qty: 30 tablet, Refills: 0      docusate sodium (COLACE, DULCOLAX) 100 MG CAPS Take 100 mg by mouth 2 times daily  Qty: 60 capsule, Refills: 0      polyethylene glycol (GLYCOLAX) 17 g packet Take 17 g by mouth daily as needed for Constipation  Qty: 527 g, Refills: 0      nitrofurantoin, macrocrystal-monohydrate, (MACROBID) 100 MG capsule Take 1 capsule by mouth every 12 hours for 6 days  Qty: 12 capsule, Refills: 0      clopidogrel (PLAVIX) 75 MG tablet Take 1 tablet by mouth daily  Qty: 30 tablet, Refills: 0                Details   gabapentin (NEURONTIN) 300 MG capsule Take 1 capsule by mouth at bedtime for 30 days. Max Daily Amount: 300 mg  Qty: 30 capsule, Refills: 0    Associated Diagnoses: Type 2 diabetes mellitus with complication, without long-term current use of insulin ([facility] Dysphagia due to recent cerebral infarction; Essential hypertension; Anxiety; Aphasia; Acute ischemic stroke ([facility] Coronary artery disease involving native coronary artery of native heart without angina pectoris      dulaglutide (TRULICITY) 1.5 ZX/4.6YJ SC injection Inject 0.5 mLs into the skin once a week  Qty: 4 Adjustable Dose Pre-filled Pen Syringe, Refills: 0      atorvastatin (LIPITOR) 40 MG tablet Take 1 tablet by mouth at bedtime  Qty: 30 tablet, Refills: 0      hydroxychloroquine (PLAQUENIL) 200 MG tablet Take 1 tablet by mouth 2 times daily  Qty: 60 tablet, Refills: 0    Associated Diagnoses: Type 2 diabetes mellitus with complication, without long-term current use of insulin ([facility] Dysphagia due to recent cerebral infarction; Essential hypertension; Anxiety; Aphasia; Acute ischemic stroke ([facility] Coronary artery disease involving native coronary artery of native heart without angina pectoris      folic acid (FOLVITE) 1 MG tablet Take 1 tablet by mouth daily  Qty: 30 tablet, Refills: 0      Multiple Vitamin (MULTIVITAMIN) TABS tablet Take 1 tablet by mouth daily  Qty: 30 tablet, Refills: 0           Current Discharge Medication List        STOP taking these medications       niCARdipine (CARDENE) 20 MG capsule Comments:   Reason for Stopping:         aspirin 81 MG chewable tablet Comments:   Reason for Stopping:         tiZANidine (ZANAFLEX) 4 MG capsule Comments:   Reason for Stopping:         metoprolol (TOPROL-XL) 25 MG XL tablet Comments:   Reason for Stopping:                 Consults:   none    Hospital Course: This 58 y.o. female  with history of anxiety, depression, COPD, atherosclerotic disease, colitis, COPD, CVA, DM,  LE edema, hyperlipidemia, HTN, morbid obesity, RA, CAD  and venous thromboembolism. She presented to [place] [facility] on 11/9/22 after being found at home lying facedown in her feces. She was very weak, confused, not able to speak but moving purposefully and intermittently following commands. Her last well know was 3 a.m. when her  left for work. Imaging done revealed a large MCA stroke 7.7 x 5 cm. CTA head/neck revealed an acute M1 occlusion. She was outside of the window for TPA. CK on admit was 1100, consistent with rhabdomylosis. She was also noted to possibly Dens fracture. She was transferred to [facility] for a possible thrombectomy. Further imaging was done at [facility] and she was deemed not a candidate for thrombectomy. MRI done ruled out Dens fracture. Repeat CT of head on 11/11/22 showed evolving left MCA territory infarct with increasing edema/mass effect resulting in 4mm of  rightward midline shift(previously 2mm) a the level of the third ventricle. No hemorrhagic conversion or definite trapping of the right lateral ventricle, possible small acute right cerebellar infarct. Neurosurgery was consulted for possible hemicraniectomy. She was seen by  [facility], who didn't feel any surgical intervention was needed. Patient was found to have a UTI + for [facility] and was placed on Rocephin on 11/14/22. Patient had a PICC line placed. Patient was evaluated by SPT and initially made NPO d/t oropharyngeal dysphagia. NGT placed and feeding started. She was also noted to have global aphasia but is improving. She was re-evaluated by SPT on 11/15/22 for swallow and was started on a dysphagia 1 diet with nectar thick liquids. Patient also continues to have right sided weakness and is participating in both PT/OT. Repeat CT head on 11/13/22 shows stable LMCA ischemic stroke with stable edema, 5 mm shift, no hemorrhage. She was felt to need a stay on Rehab for continued PT/OT/SPT and medical management to work towards her goal of returning home with her . The patient was admitted to rehab with improvement. She had significant issues with expressive and receptive speech. She did have some issues refusing her medications at times. Clinically she improved. Plan discharge to home with home health. Antibiotics started for possible urinary tract infection. Cultures are pending. Discharge Instructions     Patient Instructions:   Home  Therapy orders: PT, OT, SLP, and nursing    Discharge lab work: none  Code status: Full Code   Activity: activity as tolerated  Diet: [address]; Dinner; Other Oral Supplement; 4 oz. frozen milkshake (in freezer behind hot line)  ADULT ORAL NUTRITION SUPPLEMENT; Lunch; Fortified Pudding Oral Supplement  ADULT DIET; Regular; 4 carb choices (60 gm/meal); Please bring biscuits and gravy for breakfast, she loves hamburger and fries as well for a lunch or dinner option.     Wound Care: as directed  Equipment: as per therapy      Chantal Jurado MD, MD    At least 35 minutes were spent in discharging the patient

## 2022-12-28 NOTE — PLAN OF CARE
Problem: Discharge Planning  Goal: Discharge to home or other facility with appropriate resources  12/28/2022 0011 by Darby Correa RN  Outcome: Progressing  12/27/2022 1202 by Maryjo Israel LPN  Outcome: Progressing  Flowsheets (Taken 12/27/2022 3974)  Discharge to home or other facility with appropriate resources: Refer to discharge planning if patient needs post-hospital services based on physician order or complex needs related to functional status, cognitive ability or social support system     Problem: Safety - Adult  Goal: Free from fall injury  12/28/2022 0011 by Darby Correa RN  Outcome: Progressing  12/27/2022 1202 by Maryjo Israel LPN  Outcome: Progressing

## 2022-12-28 NOTE — PROGRESS NOTES
Occupational Therapy     12/28/22 1000   General   Timed Code Treatment Minutes 60 Minutes   Restrictions/Precautions   Restrictions/Precautions Fall Risk;Weight Bearing   Commmunity Re-entry   Community Re-Entry Level Wheelchair   Community Re-entry Level of Assistance Contact guard assistance   Community Re-Entry car t/f with CGA x 1 in preparation for d/c   Balance   Standing Balance Contact guard assistance   Functional Mobility   Functional - Mobility Device Wheelchair  (power)   Activity Retrieve items;Transport items   Assist Level Supervision   Functional Mobility Comments practiced PWC again for assessment of technique carryover - pt required reeducation   Assessment   Performance deficits / Impairments Decreased functional mobility ; Decreased endurance;Decreased coordination;Decreased ADL status; Decreased sensation;Decreased posture;Decreased ROM; Decreased balance;Decreased strength;Decreased vision/visual deficit; Decreased safe awareness;Decreased high-level IADLs;Decreased cognition;Decreased fine motor control   Assessment Pt was not able to carryover technique from last week to this I'ly and required reviewing of tehcniques for kitchen management. Pt will benefit from daily practice in home to establish a functional routine.

## 2022-12-28 NOTE — PROGRESS NOTES
Occupational Therapy  Facility/Department: [facility] 8 REHAB UNIT      Name: Erasmo Nevarez  : 1960  MRN: [id number]  Date of Service: 2022    Discharge Recommendations:  Continue to assess pending progress          Patient Diagnosis(es): The primary encounter diagnosis was Type 2 diabetes mellitus with complication, without long-term current use of insulin ([address] Diagnoses of Dysphagia due to recent cerebral infarction, Essential hypertension, Anxiety, Aphasia, Acute ischemic stroke ([address] and Coronary artery disease involving native coronary artery of native heart without angina pectoris were also pertinent to this visit. Past Medical History:  has a past medical history of Anxiety, ASCVD (arteriosclerotic cardiovascular disease), Carrier of group B Streptococcus, Cellulitis, Colitis, COPD (chronic obstructive pulmonary disease) ([address] Costochondritis, CVA (cerebral vascular accident) ([address] Depression, Edema, Fracture of sternum, Gallstones, Grief reaction, H/O superior vena cava filter placement, Hyperlipidemia, Hypertension, MI (myocardial infarction) ([address] MRSA (methicillin resistant Staphylococcus aureus), Neuropathy, Obesity, Pseudarthrosis, RA (rheumatoid arthritis) ([address] Rosacea, Sinus bradycardia, TIA (transient ischemic attack), and Venous thromboembolism. Past Surgical History:  has a past surgical history that includes Coronary artery bypass graft (3/2009); Tonsillectomy; Hysterectomy; thoracotomy; knee surgery; Appendectomy; Colonoscopy (2010); Cardiac catheterization (3/2009); Adenoidectomy; Cholecystectomy (); Gastric bypass surgery (); hiatal hernia repair (); Cardiac catheterization (14  JDT); Colonoscopy (); Colonoscopy; Upper gastrointestinal endoscopy (); Upper gastrointestinal endoscopy (); and pr colonoscopy flx dx w/collj spec when pfrmd (N/A, 3/12/2018).     Treatment Diagnosis: L MCA stroke      Assessment   Performance deficits / Impairments: Decreased functional mobility ; Decreased endurance;Decreased coordination;Decreased ADL status; Decreased sensation;Decreased posture;Decreased ROM; Decreased balance;Decreased strength;Decreased vision/visual deficit; Decreased safe awareness;Decreased high-level IADLs;Decreased cognition;Decreased fine motor control  Activity Tolerance  Activity Tolerance: Patient Tolerated treatment well        Plan   Occupational Therapy Plan  Specific Instructions for Next Treatment: cont PWC mobility training  Current Treatment Recommendations: Strengthening, ROM, Balance training, Functional mobility training, Endurance training, Neuromuscular re-education, Safety education & training, Patient/Caregiver education & training, Equipment evaluation, education, & procurement, Positioning, Modalities, Self-Care / ADL, Home management training, Sensory integration, Wheelchair mobility training       Objective      12/28/22 1505   Safety Devices   Type of Devices Left in chair;Call light within reach; Chair alarm in place     Safety Devices  Type of Devices: Left in chair;Call light within reach; Chair alarm in place     12/28/22 1505   Activity Tolerance   Activity Tolerance Patient Tolerated treatment well           PROM Exercises: HEP--reviewed and demonstrated exercises with min A      12/28/22 1505   Education   Education Given To Patient   Education Provided Home Exercise Program   Education Method Demonstration;Printed Information/Hand-outs   Barriers to Learning Cognition   Education Outcome Continued education needed;Demonstrated understanding     Goals  Short Term Goals  Time Frame for Short Term Goals: 2 weeks  Short Term Goal 1: MET  Short Term Goal 2: Pt will dress UB using hemitechnique, mod A and cues  Short Term Goal 3: Pt will dress LB, hemitechnique, mod A and cues  Short Term Goal 4: Pt will toilet with mod A  Short Term Goal 5:  Toilet transfer with mod A  Additional Goals?: Yes  Short Term Goal 6: Pt will attend to R side during ADL/functional activities with moderate cues  Short Term Goal 7: Pt will perform standing tasks x 2-3 mins with L UE and mod A for balance to enhance ADL/IADL performance  Short Term Goal 8: Pt/family will demo understanding of R UE positioning/HEP/therapeutic activity recommendations with min A after ed  Long Term Goals  Time Frame for Long Term Goals : 4-5 weeks  Long Term Goal 1: Pt will bathe with min A, AE/DME prn  Long Term Goal 2: Pt will dress UB supervision  Long Term Goal 3: Pt will dress LB min A, AE prn  Long Term Goal 4: Pt will toilet with CGA  Long Term Goal 5: Pt will perform toilet transfer with CGA  Additional Goals?: Yes  Long Term Goal 6: Pt will perform simple home making task with min A  Long Term Goal 7: Pt/family will be independent in HEP/DME/therapeutic activity recommendations after ed  Long Term Goal 8: Pt will attend to R side during functional activities with occasional cueing       Therapy Time   Individual Concurrent Group Co-treatment   Time In 1505         Time Out 1530         Minutes 25         Timed Code Treatment Minutes: 25 Minutes       Deann Ferrer, OT

## 2022-12-28 NOTE — DISCHARGE INSTRUCTIONS
Discharge Instructions      Patient Instructions:   Home  Therapy orders: PT, OT, SLP, and nursing    Discharge lab work: none  Code status: Full Code   Activity: activity as tolerated  Diet: [address]; Dinner; Other Oral Supplement; 4 oz. frozen milkshake (in freezer behind hot line)  ADULT ORAL NUTRITION SUPPLEMENT; Lunch; Fortified Pudding Oral Supplement  ADULT DIET; Regular; 4 carb choices (60 gm/meal); Please bring biscuits and gravy for breakfast, she loves hamburger and fries as well for a lunch or dinner option.     Wound Care: as directed  Equipment: as per therapy

## 2022-12-28 NOTE — PLAN OF CARE
Problem: Discharge Planning  Goal: Discharge to home or other facility with appropriate resources  12/28/2022 1154 by Kathlyne Siemens, LPN  Outcome: Progressing  Flowsheets (Taken 12/28/2022 0312)  Discharge to home or other facility with appropriate resources: Refer to discharge planning if patient needs post-hospital services based on physician order or complex needs related to functional status, cognitive ability or social support system  12/28/2022 0011 by Leonid Bourne RN  Outcome: Progressing     Problem: Safety - Adult  Goal: Free from fall injury  12/28/2022 1154 by Kathlyne Siemens, LPN  Outcome: Progressing  12/28/2022 0011 by Leonid Bourne RN  Outcome: Progressing     Problem: Neurosensory - Adult  Goal: Achieves stable or improved neurological status  12/28/2022 1154 by Kathlyne Siemens, LPN  Outcome: Progressing  Flowsheets (Taken 12/28/2022 0853)  Achieves stable or improved neurological status: Assess for and report changes in neurological status  12/28/2022 0011 by Leonid Bourne RN  Outcome: Progressing  Goal: Achieves maximal functionality and self care  12/28/2022 1154 by Kathlyne Siemens, LPN  Outcome: Progressing  Flowsheets (Taken 12/28/2022 0853)  Achieves maximal functionality and self care: Encourage and assist patient to increase activity and self care with guidance from physical therapy/occupational therapy  12/28/2022 0011 by Leonid Bourne RN  Outcome: Progressing     Problem: Skin/Tissue Integrity - Adult  Goal: Skin integrity remains intact  12/28/2022 1154 by Kathlyne Siemens, LPN  Outcome: Progressing  Flowsheets (Taken 12/28/2022 1141)  Skin Integrity Remains Intact: Monitor for areas of redness and/or skin breakdown  12/28/2022 0011 by Leonid Bourne RN  Outcome: Progressing     Problem: Pain  Goal: Verbalizes/displays adequate comfort level or baseline comfort level  12/28/2022 1154 by Kathlyne Siemens, LPN  Outcome: Progressing  12/28/2022 0011 by Alistair Delta, RN  Outcome: Progressing     Problem: Chronic Conditions and Co-morbidities  Goal: Patient's chronic conditions and co-morbidity symptoms are monitored and maintained or improved  12/28/2022 1154 by Guy Almanza LPN  Outcome: Progressing  Flowsheets (Taken 12/28/2022 0853)  Care Plan - Patient's Chronic Conditions and Co-Morbidity Symptoms are Monitored and Maintained or Improved: Monitor and assess patient's chronic conditions and comorbid symptoms for stability, deterioration, or improvement  12/28/2022 0011 by Alistair Sorenson RN  Outcome: Progressing     Problem: ABCDS Injury Assessment  Goal: Absence of physical injury  12/28/2022 1154 by Guy Almanza LPN  Outcome: Progressing  12/28/2022 0011 by Alistair Sorenson RN  Outcome: Progressing     Problem: Skin/Tissue Integrity  Goal: Absence of new skin breakdown  Description: 1. Monitor for areas of redness and/or skin breakdown  2. Assess vascular access sites hourly  3. Every 4-6 hours minimum:  Change oxygen saturation probe site  4. Every 4-6 hours:  If on nasal continuous positive airway pressure, respiratory therapy assess nares and determine need for appliance change or resting period. 12/28/2022 1154 by Guy Almanza LPN  Outcome: Progressing  12/28/2022 0011 by Alistair Sorenson RN  Outcome: Progressing     Problem: Confusion  Goal: Confusion, delirium, dementia, or psychosis is improved or at baseline  Description: INTERVENTIONS:  1. Assess for possible contributors to thought disturbance, including medications, impaired vision or hearing, underlying metabolic abnormalities, dehydration, psychiatric diagnoses, and notify attending LIP  2. Alda high risk fall precautions, as indicated  3. Provide frequent short contacts to provide reality reorientation, refocusing and direction  4. Decrease environmental stimuli, including noise as appropriate  5.  Monitor and intervene to maintain adequate nutrition, hydration, elimination, sleep and activity  6. If unable to ensure safety without constant attention obtain sitter and review sitter guidelines with assigned personnel  7.  Initiate Psychosocial CNS and Spiritual Care consult, as indicated  12/28/2022 1154 by Arlin Lyon LPN  Outcome: Progressing  12/28/2022 0011 by Staci Saucedo RN  Outcome: Progressing     Problem: Musculoskeletal - Adult  Goal: Return mobility to safest level of function  12/28/2022 1154 by Arlin Lyon LPN  Outcome: Progressing  Flowsheets (Taken 12/28/2022 0853)  Return Mobility to Safest Level of Function: Assess patient stability and activity tolerance for standing, transferring and ambulating with or without assistive devices  12/28/2022 0011 by Staci Saucedo RN  Outcome: Progressing  Goal: Return ADL status to a safe level of function  12/28/2022 1154 by Arlin Lyon LPN  Outcome: Progressing  Flowsheets (Taken 12/28/2022 0853)  Return ADL Status to a Safe Level of Function: Assess activities of daily living deficits and provide assistive devices as needed  12/28/2022 0011 by Staci Saucedo RN  Outcome: Progressing     Problem: Gastrointestinal - Adult  Goal: Maintains or returns to baseline bowel function  12/28/2022 1154 by Arlin Lyon LPN  Outcome: Progressing  Flowsheets (Taken 12/28/2022 0853)  Maintains or returns to baseline bowel function: Assess bowel function  12/28/2022 0011 by Staci Saucedo RN  Outcome: Progressing  Goal: Maintains adequate nutritional intake  12/28/2022 1154 by Arlin Lyon LPN  Outcome: Progressing  Flowsheets (Taken 12/28/2022 0853)  Maintains adequate nutritional intake: Monitor percentage of each meal consumed  12/28/2022 0011 by Staci Saucedo RN  Outcome: Progressing     Problem: Metabolic/Fluid and Electrolytes - Adult  Goal: Electrolytes maintained within normal limits  12/28/2022 1154 by Arlin Lyon LPN  Outcome: Progressing  Flowsheets (Taken 12/28/2022 0853)  Electrolytes maintained within normal limits: Monitor labs and assess patient for signs and symptoms of electrolyte imbalances  12/28/2022 0011 by Staci Saucedo RN  Outcome: Progressing     Problem: Nutrition Deficit:  Goal: Optimize nutritional status  12/28/2022 1154 by Arlin Lyon LPN  Outcome: Progressing  12/28/2022 0011 by Staci Saucedo RN  Outcome: Progressing

## 2022-12-28 NOTE — PROGRESS NOTES
Patient:   Blaire Baker  MR#:    [id number]   Room:    0826/826-02   YOB: 1960  Date of Progress Note: 12/28/2022  Time of Note                           9:26 AM  Consulting Physician:   Lisbeth Shen M.D. Attending Physician:  Lisbeth Shen MD     Chief complaint Acute ischemic stroke    S:This 58 y.o. female  with history of anxiety, depression, COPD, atherosclerotic disease, colitis, COPD, CVA, DM,  LE edema, hyperlipidemia, HTN, morbid obesity, RA, CAD  and venous thromboembolism. She presented to [facility] ER on 11/9/22 after being found at home lying facedown in her feces. She was very weak, confused, not able to speak but moving purposefully and intermittently following commands. Her last well know was 3 a.m. when her  left for work. Imaging done revealed a large MCA stroke 7.7 x 5 cm. CTA head/neck revealed an acute M1 occlusion. She was outside of the window for TPA. CK on admit was 1100, consistent with rhabdomylosis. She was also noted to possibly Dens fracture. She was transferred to [facility] for a possible thrombectomy. Further imaging was done at [facility] and she was deemed not a candidate for thrombectomy. MRI done ruled out Dens fracture. Repeat CT of head on 11/11/22 showed evolving left MCA territory infarct with increasing edema/mass effect resulting in 4mm of  rightward midline shift(previously 2mm) a the level of the third ventricle. No hemorrhagic conversion or definite trapping of the right lateral ventricle, possible small acute right cerebellar infarct. Neurosurgery was consulted for possible hemicraniectomy. She was seen by Dr. Bhanu Quinteros, who didn't feel any surgical intervention was needed. Patient was found to have a UTI + for [facility] and was placed on Rocephin on 11/14/22. Patient had a PICC line placed. Patient was evaluated by SPT and initially made NPO d/t oropharyngeal dysphagia. NGT placed and feeding started.  She was also noted to have global aphasia but is improving. She was re-evaluated by SPT on 11/15/22 for swallow and was started on a dysphagia 1 diet with nectar thick liquids. Patient also continues to have right sided weakness and is participating in both PT/OT. Repeat CT head on 11/13/22 shows stable LMCA ischemic stroke with stable edema, 5 mm shift, no hemorrhage. She is felt to need a stay on Rehab for continued PT/OT/SPT and medical management to work towards her goal of returning home with her . She is now felt ready to start the Rehab program.  No new complaints. REVIEW OF SYSTEMS:  Unreliable due to aphasia     Past Medical History:      Diagnosis Date    Anxiety     ASCVD (arteriosclerotic cardiovascular disease)     Carrier of group B Streptococcus     Cellulitis     Colitis     COPD (chronic obstructive pulmonary disease) ([facility])     Costochondritis     CVA (cerebral vascular accident) ([address]     Depression     Edema     Fracture of sternum     non union post surgery.      Gallstones     Grief reaction     H/O superior vena cava filter placement     Hyperlipidemia     Hypertension     MI (myocardial infarction) ([address]     MRSA (methicillin resistant Staphylococcus aureus)     Neuropathy     Obesity     Pseudarthrosis sternal    RA (rheumatoid arthritis) ([address]     Rosacea     Sinus bradycardia     TIA (transient ischemic attack)     Venous thromboembolism        Past Surgical History:      Procedure Laterality Date    ADENOIDECTOMY      APPENDECTOMY      CARDIAC CATHETERIZATION  3/2009    CARDIAC CATHETERIZATION  11/5/14  JDT    EF 50%, patent bypass grafts    CHOLECYSTECTOMY  2011    COLONOSCOPY  06/03/2010    Dr. [address]ien[address]: distal colon having ulcerative lesions c/w UC    COLONOSCOPY  2009    pancolitis    COLONOSCOPY      CORONARY ARTERY BYPASS GRAFT  3/2009    PHANI Son to LAD, SVGs to OM & PDA    GASTRIC BYPASS SURGERY  2012    HIATAL HERNIA REPAIR  2011    HYSTERECTOMY (CERVIX STATUS UNKNOWN)      KNEE SURGERY      ND COLONOSCOPY FLX DX W/COLLJ SPEC WHEN PFRMD N/A 3/12/2018    Dr Daina Chaves rectal inflammation consistent with U.C.-Active proctitis--3 yr recall    THORACOTOMY      TONSILLECTOMY      UPPER GASTROINTESTINAL ENDOSCOPY  2010    Dr. Sanchez [facility]  2012       Medications in Hospital:      Current Facility-Administered Medications:     nitrofurantoin (macrocrystal-monohydrate) (MACROBID) capsule 100 mg, 100 mg, Oral, 2 times per day, Chnao Apple MD    escitalopram (LEXAPRO) tablet 5 mg, 5 mg, Oral, Daily, Chano Apple MD    bisacodyl (DULCOLAX) EC tablet 5 mg, 5 mg, Oral, Daily, Chano Apple MD, 5 mg at 12/16/22 0806    miconazole (MICOTIN) 2 % powder, , Topical, BID, Chano Apple MD, Given at 12/25/22 0905    docusate sodium (COLACE) capsule 100 mg, 100 mg, Oral, BID, Chano Apple MD, 100 mg at 12/24/22 1944    aspirin chewable tablet 81 mg, 81 mg, Oral, Daily, Chano Apple MD, 81 mg at 12/19/22 0810    atorvastatin (LIPITOR) tablet 40 mg, 40 mg, Oral, Nightly, Chano Apple MD, 40 mg at 12/24/22 1944    dulaglutide (TRULICITY) SC injection 1.5 mg (Patient Supplied), 1.5 mg, SubCUTAneous, Weekly, Chano Apple MD    folic acid (FOLVITE) tablet 1 mg, 1 mg, Oral, Daily, Chano Apple MD, 1 mg at 12/19/22 0810    gabapentin (NEURONTIN) capsule 300 mg, 300 mg, Oral, Nightly, Chano Apple MD, 300 mg at 12/26/22 1903    hydroxychloroquine (PLAQUENIL) tablet 200 mg, 200 mg, Oral, BID, Chano Apple MD, 200 mg at 12/24/22 1945    amLODIPine (NORVASC) tablet 2.5 mg, 2.5 mg, Oral, Daily, Chano Apple MD, 2.5 mg at 12/19/22 0810    tiZANidine (ZANAFLEX) tablet 4 mg, 4 mg, Oral, BID PRN, Chano Apple MD, 4 mg at 12/24/22 1759    multivitamin 1 tablet, 1 tablet, Oral, Daily, Chano Apple MD, 1 tablet at 12/19/22 0810    acetaminophen (TYLENOL) tablet 650 mg, 650 mg, Oral, Q4H PRN, Chano Apple MD, 650 mg at 12/23/22 2122    enoxaparin (LOVENOX) injection 40 mg, 40 mg, SubCUTAneous, Daily, Nathaly Aguilar MD, 40 mg at 12/27/22 1703    polyethylene glycol (GLYCOLAX) packet 17 g, 17 g, Oral, Daily PRN, Nathaly Aguilar MD, 17 g at 11/30/22 1817    Allergies:  Methotrexate derivatives    Social History:   TOBACCO:   reports that she quit smoking about 13 years ago. Her smoking use included cigarettes. She has a 64.00 pack-year smoking history. She has never used smokeless tobacco.  ETOH:   reports current alcohol use. Family History:       Problem Relation Age of Onset    Prostate Cancer Father     Heart Failure Father     Cancer Father     Colon Cancer Neg Hx     Colon Polyps Neg Hx     Liver Cancer Neg Hx     Liver Disease Neg Hx     Esophageal Cancer Neg Hx     Rectal Cancer Neg Hx     Stomach Cancer Neg Hx          PHYSICAL EXAM:  BP (!) 150/68   Pulse 59   Temp 96.8 °F (36 °C) (Temporal)   Resp 14   Ht 5' 7\" (1.702 m)   Wt 196 lb 2 oz (89 kg)   SpO2 97%   BMI 30.72 kg/m²     Constitutional - well developed, well nourished. Eyes - conjunctiva normal.   Ear, nose, throat - No scars, masses, or lesions over external nose or ears, no atrophy of tongue  Neck-symmetric, no masses noted, no jugular vein distension  Respiration- chest wall appears symmetric, good expansion,   normal effort without use of accessory muscles  Musculoskeletal - no significant wasting of muscles noted, no bony deformities  Extremities-no clubbing, cyanosis or edema  Skin - warm, dry, and intact. No rash, erythema, or pallor.   Psychiatric - mood, affect, and behavior appear normal.      Neurological exam  Awake, alert, global aphasia says \"yes\" to all questions asked   Attention and concentration appear appropriate  Recent and remote memory unable to tests     Cranial Nerve Exam     CN III, IV,VI-EOMI, No nystagmus, conjugate eye movements, no ptosis    CN VII-+ facial assymetry       Motor Exam  No movement on the right      Tremors- no tremors in hands or head noted     Gait  Not tested     Nursing/pcp notes, imaging,labs and vitals reviewed. PT,OT and/or speech notes reviewed    Lab Results   Component Value Date    WBC 10.0 12/26/2022    HGB 10.9 (L) 12/26/2022    HCT 35.1 (L) 12/26/2022    MCV 77.7 (L) 12/26/2022     (H) 12/26/2022     Lab Results   Component Value Date     12/26/2022    K 3.6 12/26/2022     12/26/2022    CO2 23 12/26/2022    BUN 9 12/26/2022    CREATININE 0.6 12/26/2022    GLUCOSE 84 12/26/2022    CALCIUM 8.9 12/26/2022    PROT 5.8 (L) 12/26/2022    LABALBU 2.9 (L) 12/26/2022    BILITOT <0.2 12/26/2022    ALKPHOS 78 12/26/2022    AST 14 12/26/2022    ALT 10 12/26/2022    LABGLOM >60 12/26/2022   No results found for: INR, PROTIME      Accord Ezra, PTA   Physical Therapist Assistant   Physical Therapy   Progress Notes      Signed   Date of Service:  12/27/2022 11:02 AM                 Signed                                                                                            Name: Yina Sanchez  MRN:  [id number]  Date of service:  12/27/2022 12/27/22 1031   Restrictions/Precautions   Restrictions/Precautions Fall Risk;Weight Bearing   Required Braces or Orthoses? Yes   Lower Extremity Weight Bearing Restrictions   Right Lower Extremity Weight Bearing Weight Bearing As Tolerated   Left Lower Extremity Weight Bearing Weight Bearing As Tolerated   General   Chart Reviewed Yes   Subjective   Subjective Pt up in wc, ready for therapy. Pain Assessment   Pain Assessment None - Denies Pain   Bed Mobility   Bridging Minimal assistance   Rolling Minimal assistance; Moderate assistance   Supine to Sit Moderate assistance;Maximal assistance   Sit to Supine Moderate assistance;Maximal assistance   Comment worked with pt on rolling and bed mobility d/t incontinence of urine-brief change   Transfers   Sit to Stand Moderate Assistance  (from wc and from bedside)   Stand to Sit Moderate Assistance   Stand Pivot Transfers Moderate Assistance  (wc<>bed)   Ambulation   WB Status WBAT Ambulation   Surface Level tile   Device Hemiwalker   Other Apparatus AFO; Right; Wheelchair follow  (shoe cover)   Assistance Moderate assistance;Maximum assistance   Quality of Gait pt with intermittent ability to swing RLE fwd, mostly dependent on ability to stand tall and weight shift onto LLE- this is limited by pt's insistence on looking at LE's while taking steps   Gait Deviations Decreased step length;Decreased step height;Slow Tamia  (narrow BAYLEE unless physically adjusted otherwise)   Distance 5' x 4, 7' x 2   Comments constant vc's for technique, seated rest breaks required between bouts   Conditions Requiring Skilled Therapeutic Intervention   Assessment Tx focused on STS and weight shifting to advance RLE. Multiple bouts made ranging from 2-8 steps each, some with manual facilitation to advance RLE and some with pt able to weight shift and stand erect to advance RLE. Pt appeared to improve in ability to advance RLE as tx progressed. Activity Tolerance   Activity Tolerance Patient limited by endurance   Safety Devices   Type of Devices    (left with  and OT for further tx)         Electronically signed by Chris Toth PTA on 12/27/2022 at 11:02 AM             Cosigned by: Raymond Bruno PT at 12/27/2022  3:34 PM     Revision History                        RECORD REVIEW: Previous medical records, medications were reviewed at today's visit    IMPRESSION:   1. Acute ischemic stroke-on aspirin/statin  2. Hypertension-on medications monitor  3. Hyperlipidemia-on statin  4. Diabetes-Trulicity-monitor blood sugar  5. DVT prophylaxis-Lovenox  6. History of coronary artery disease-aspirin/statin  7. Neuropathy-on Neurontin  8. Rheumatoid arthritis-on Plaquenil  9. COPD-monitor  10. History of anxiety and depression-monitor  11. History of colitis/gallstones-monitor  12. History of bariatric surgery-on multivitamin/folic acid  13.   History of superior vena cava filter placement with venous thromboembolism-not on anticoagulation-monitor- took herself off blood thinners as not like meds and was bruising   14. PT/OT/speech    x-ray of right ankle no acute changes  Venous ultrasound of leg no DVT    Added low dose Lexapro    Discussed with daughter previously that it would be very difficult to force patient to take medications. It is better to discuss and have agreeable to initiating treatment on her own  Unable to force the patient to take medications.  She would need to be restrained with feeding tube    Continue present care      ELOS omorrow      Expected duration and frequency therapy: 180 minutes per day, 5 days per week    [address] [facility]  Dr Naz Rodríguez

## 2022-12-29 VITALS
HEART RATE: 60 BPM | TEMPERATURE: 96.3 F | DIASTOLIC BLOOD PRESSURE: 70 MMHG | SYSTOLIC BLOOD PRESSURE: 156 MMHG | BODY MASS INDEX: 30.61 KG/M2 | RESPIRATION RATE: 16 BRPM | WEIGHT: 195 LBS | OXYGEN SATURATION: 94 % | HEIGHT: 67 IN

## 2022-12-29 LAB
ALBUMIN SERPL-MCNC: 3.1 G/DL (ref 3.5–5.2)
ALP BLD-CCNC: 83 U/L (ref 35–104)
ALT SERPL-CCNC: 12 U/L (ref 5–33)
ANION GAP SERPL CALCULATED.3IONS-SCNC: 10 MMOL/L (ref 7–19)
AST SERPL-CCNC: 15 U/L (ref 5–32)
BASOPHILS ABSOLUTE: 0.1 K/UL (ref 0–0.2)
BASOPHILS RELATIVE PERCENT: 1.4 % (ref 0–1)
BILIRUB SERPL-MCNC: <0.2 MG/DL (ref 0.2–1.2)
BUN BLDV-MCNC: 9 MG/DL (ref 8–23)
CALCIUM SERPL-MCNC: 9.1 MG/DL (ref 8.8–10.2)
CHLORIDE BLD-SCNC: 105 MMOL/L (ref 98–111)
CO2: 26 MMOL/L (ref 22–29)
CREAT SERPL-MCNC: 0.5 MG/DL (ref 0.5–0.9)
EOSINOPHILS ABSOLUTE: 1.1 K/UL (ref 0–0.6)
EOSINOPHILS RELATIVE PERCENT: 11.6 % (ref 0–5)
GFR SERPL CREATININE-BSD FRML MDRD: >60 ML/MIN/{1.73_M2}
GLUCOSE BLD-MCNC: 85 MG/DL (ref 74–109)
HCT VFR BLD CALC: 36.4 % (ref 37–47)
HEMOGLOBIN: 11.3 G/DL (ref 12–16)
IMMATURE GRANULOCYTES #: 0.1 K/UL
LYMPHOCYTES ABSOLUTE: 1.4 K/UL (ref 1.1–4.5)
LYMPHOCYTES RELATIVE PERCENT: 15.6 % (ref 20–40)
MCH RBC QN AUTO: 24 PG (ref 27–31)
MCHC RBC AUTO-ENTMCNC: 31 G/DL (ref 33–37)
MCV RBC AUTO: 77.4 FL (ref 81–99)
MONOCYTES ABSOLUTE: 0.8 K/UL (ref 0–0.9)
MONOCYTES RELATIVE PERCENT: 9 % (ref 0–10)
NEUTROPHILS ABSOLUTE: 5.7 K/UL (ref 1.5–7.5)
NEUTROPHILS RELATIVE PERCENT: 61.6 % (ref 50–65)
ORGANISM: ABNORMAL
ORGANISM: ABNORMAL
PDW BLD-RTO: 17 % (ref 11.5–14.5)
PLATELET # BLD: 443 K/UL (ref 130–400)
PMV BLD AUTO: 8.9 FL (ref 9.4–12.3)
POTASSIUM REFLEX MAGNESIUM: 4.4 MMOL/L (ref 3.5–5)
RBC # BLD: 4.7 M/UL (ref 4.2–5.4)
SODIUM BLD-SCNC: 141 MMOL/L (ref 136–145)
TOTAL PROTEIN: 5 G/DL (ref 6.6–8.7)
URINE CULTURE, ROUTINE: ABNORMAL
WBC # BLD: 9.2 K/UL (ref 4.8–10.8)

## 2022-12-29 PROCEDURE — 94760 N-INVAS EAR/PLS OXIMETRY 1: CPT

## 2022-12-29 PROCEDURE — 6370000000 HC RX 637 (ALT 250 FOR IP): Performed by: PSYCHIATRY & NEUROLOGY

## 2022-12-29 PROCEDURE — 99239 HOSP IP/OBS DSCHRG MGMT >30: CPT | Performed by: PSYCHIATRY & NEUROLOGY

## 2022-12-29 PROCEDURE — 80053 COMPREHEN METABOLIC PANEL: CPT

## 2022-12-29 PROCEDURE — 85025 COMPLETE CBC W/AUTO DIFF WBC: CPT

## 2022-12-29 PROCEDURE — 36415 COLL VENOUS BLD VENIPUNCTURE: CPT

## 2022-12-29 RX ADMIN — NITROFURANTOIN MONOHYDRATE/MACROCRYSTALLINE 100 MG: 25; 75 CAPSULE ORAL at 09:01

## 2022-12-29 NOTE — DISCHARGE SUMMARY
Occupational Therapy Discharge Summary         Date: 2022  Patient Name: Mynor Rojas        MRN: [id number]    : 1960  (64 y.o.)  Gender: female      Diagnosis: CVA, R hemiparesis  Restrictions/Precautions  Restrictions/Precautions: Fall Risk, Weight Bearing  Required Braces or Orthoses?: Yes      Discharge Date: 22      UE Functioning:  L WNLS  RUE overall 2- with facilitation    Home Management:  Functional Mobility  Functional - Mobility Device: Wheelchair (power)  Activity: Retrieve items, Transport items  Assist Level: Supervision  Functional Mobility Comments: practiced PWC again for assessment of technique carryover - pt required reeducation    Adaptive Equipment/DME Status:   Arranged w/c, aspen walker, heavy duty BSC, SB, and TTB   Daughter arranged bed rail    Pain Assessment:          Remaining Problems:  Decreased functional mobility ; Decreased endurance;Decreased coordination;Decreased ADL status; Decreased sensation;Decreased posture;Decreased ROM; Decreased balance;Decreased strength;Decreased vision/visual deficit; Decreased safe awareness;Decreased high-level IADLs;Decreased cognition;Decreased fine motor control    STGs:  Short Term Goals  Time Frame for Short Term Goals: 2 weeks  Short Term Goal 1: Pt will bathe with mod A, AE prn  Short Term Goal 2: Pt will dress UB using hemitechnique, mod A and cues  Short Term Goal 3: Pt will dress LB, hemitechnique, mod A and cues  Short Term Goal 4: Pt will toilet with mod A  Short Term Goal 5:  Toilet transfer with mod A  Additional Goals?: Yes  Short Term Goal 6: Pt will attend to R side during ADL/functional activities with moderate cues  Short Term Goal 7: Pt will perform standing tasks x 2-3 mins with L UE and mod A for balance to enhance ADL/IADL performance  Short Term Goal 8: Pt/family will demo understanding of R UE positioning/HEP/therapeutic activity recommendations with min A after ed  Met  STGs    LTGs:  Long Term Goals  Time Frame for Long Term Goals : 4-5 weeks  Long Term Goal 1: Pt will bathe with min A, AE/DME prn  Long Term Goal 2: Pt will dress UB supervision  Long Term Goal 3: Pt will dress LB min A, AE prn  Long Term Goal 4: Pt will toilet with CGA  Long Term Goal 5: Pt will perform toilet transfer with CGA  Additional Goals?: Yes  Long Term Goal 6: Pt will perform simple home making task with min A  Long Term Goal 7: Pt/family will be independent in HEP/DME/therapeutic activity recommendations after ed  Long Term Goal 8: Pt will attend to R side during functional activities with occasional cueing  Met 3/8 LTGs    Discharge Setting and Recommendations:  Home with spouse. Home health Occupational Therapy to address remaining deficits. Discharge Care Scores    Eating: CARE Score: 5  Oral Hygiene: CARE Score: 5  Toileting: CARE Score: 2  Shower/Bathe: CARE Score: 3  Upper Body Dressing: CARE Score: 4  Lower Body Dressing: CARE Score: 3  Footwear: CARE Score: 4  Toilet Transfers: CARE Score: 4  Picking Up Object:  CARE Score: 88  Cognitive Patterns:  Cognitive Patterns  Cognitive Pattern Assessment Used: BIMS  Repetition of Three Words (First Attempt): 3  Temporal Orientation: Year: Missed by > 5 years or no answer  Temporal Orientation: Month: Missed by > 1 month or no answer  Temporal Orientation: Day:  Incorrect or no answer  Able to recall \"sock: No, could not recall  Able to recall \"blue\": No, could not recall  Able to recall \"bed\": No, could not recall  BIMS Summary Score: 3                 Confusion Assessment Method (CAM):  Confusion Assessment Method (CAM)  Is there evidence of an acute change in mental status from the patient's baseline?: Yes  Inattention: Behavior present, fluctuates (comes and goes, changes in severity)  Disorganized thinking: Behavior present, fluctuates (comes and goes, changes in severity)  Altered level of consciousness: Behavior not present  Health Literacy:  How often do you need to have someone help you when you read instructions, pamphlets, or other written material from your doctor or pharmacy?: Never    Electronically signed by ARYAN Xavier on 12/29/22 at 4:56 PM CST

## 2022-12-29 NOTE — PROGRESS NOTES
Patient:   Erasmo Nevarez  MR#:    [id number]   Room:    0826/826-02   YOB: 1960  Date of Progress Note: 12/29/2022  Time of Note                           7:12 AM  Consulting Physician:   Gisel Rollins M.D. Attending Physician:  Gisel Rollins MD     Chief complaint Acute ischemic stroke    S:This 58 y.o. female  with history of anxiety, depression, COPD, atherosclerotic disease, colitis, COPD, CVA, DM,  LE edema, hyperlipidemia, HTN, morbid obesity, RA, CAD  and venous thromboembolism. She presented to [facility] ER on 11/9/22 after being found at home lying facedown in her feces. She was very weak, confused, not able to speak but moving purposefully and intermittently following commands. Her last well know was 3 a.m. when her  left for work. Imaging done revealed a large MCA stroke 7.7 x 5 cm. CTA head/neck revealed an acute M1 occlusion. She was outside of the window for TPA. CK on admit was 1100, consistent with rhabdomylosis. She was also noted to possibly Dens fracture. She was transferred to [facility] for a possible thrombectomy. Further imaging was done at [facility] and she was deemed not a candidate for thrombectomy. MRI done ruled out Dens fracture. Repeat CT of head on 11/11/22 showed evolving left MCA territory infarct with increasing edema/mass effect resulting in 4mm of  rightward midline shift(previously 2mm) a the level of the third ventricle. No hemorrhagic conversion or definite trapping of the right lateral ventricle, possible small acute right cerebellar infarct. Neurosurgery was consulted for possible hemicraniectomy. She was seen by Dr. Pita Linares, who didn't feel any surgical intervention was needed. Patient was found to have a UTI + for [facility] and was placed on Rocephin on 11/14/22. Patient had a PICC line placed. Patient was evaluated by SPT and initially made NPO d/t oropharyngeal dysphagia. NGT placed and feeding started.  She was also noted to have global aphasia but is improving. She was re-evaluated by SPT on 11/15/22 for swallow and was started on a dysphagia 1 diet with nectar thick liquids. Patient also continues to have right sided weakness and is participating in both PT/OT. Repeat CT head on 11/13/22 shows stable LMCA ischemic stroke with stable edema, 5 mm shift, no hemorrhage. She is felt to need a stay on Rehab for continued PT/OT/SPT and medical management to work towards her goal of returning home with her . She is now felt ready to start the Rehab program.  No new issues. REVIEW OF SYSTEMS:  Unreliable due to aphasia     Past Medical History:      Diagnosis Date    Anxiety     ASCVD (arteriosclerotic cardiovascular disease)     Carrier of group B Streptococcus     Cellulitis     Colitis     COPD (chronic obstructive pulmonary disease) ([facility])     Costochondritis     CVA (cerebral vascular accident) ([address]     Depression     Edema     Fracture of sternum     non union post surgery.      Gallstones     Grief reaction     H/O superior vena cava filter placement     Hyperlipidemia     Hypertension     MI (myocardial infarction) ([address]     MRSA (methicillin resistant Staphylococcus aureus)     Neuropathy     Obesity     Pseudarthrosis sternal    RA (rheumatoid arthritis) ([address]     Rosacea     Sinus bradycardia     TIA (transient ischemic attack)     Venous thromboembolism        Past Surgical History:      Procedure Laterality Date    ADENOIDECTOMY      APPENDECTOMY      CARDIAC CATHETERIZATION  3/2009    CARDIAC CATHETERIZATION  11/5/14  JDT    EF 50%, patent bypass grafts    CHOLECYSTECTOMY  2011    COLONOSCOPY  06/03/2010    Dr. Yoni Cruz: distal colon having ulcerative lesions c/w UC    COLONOSCOPY  2009    pancolitis    COLONOSCOPY      CORONARY ARTERY BYPASS GRAFT  3/2009    PHANI Beltran to LAD, SVGs to OM & PDA    GASTRIC BYPASS SURGERY  2012    HIATAL HERNIA REPAIR  2011    HYSTERECTOMY (CERVIX STATUS UNKNOWN)      KNEE SURGERY      MS COLONOSCOPY FLX DX W/COLLJ SPEC WHEN PFRMD N/A 3/12/2018    Dr Aminata Sandhu rectal inflammation consistent with U.C.-Active proctitis--3 yr recall    [id number] [place] [address] Dr ENDOSCOPY  2010    Dr. Som Edmondson  2012       Medications in Hospital:      Current Facility-Administered Medications:     nitrofurantoin (macrocrystal-monohydrate) (MACROBID) capsule 100 mg, 100 mg, Oral, 2 times per day, Kailee Morejon MD, 100 mg at 12/28/22 2041    escitalopram (LEXAPRO) tablet 5 mg, 5 mg, Oral, Daily, Kailee Morejon MD    bisacodyl (DULCOLAX) EC tablet 5 mg, 5 mg, Oral, Daily, Kailee Morejon MD, 5 mg at 12/16/22 0806    miconazole (MICOTIN) 2 % powder, , Topical, BID, Kailee Morejon MD, Given at 12/25/22 0905    docusate sodium (COLACE) capsule 100 mg, 100 mg, Oral, BID, Kailee Morejon MD, 100 mg at 12/24/22 1944    aspirin chewable tablet 81 mg, 81 mg, Oral, Daily, Kailee Morejon MD, 81 mg at 12/19/22 0810    atorvastatin (LIPITOR) tablet 40 mg, 40 mg, Oral, Nightly, Kailee Morejon MD, 40 mg at 12/24/22 1944    dulaglutide (TRULICITY) SC injection 1.5 mg (Patient Supplied), 1.5 mg, SubCUTAneous, Weekly, Kailee Morejon MD    folic acid (FOLVITE) tablet 1 mg, 1 mg, Oral, Daily, Kailee Morejon MD, 1 mg at 12/19/22 0810    gabapentin (NEURONTIN) capsule 300 mg, 300 mg, Oral, Nightly, Kailee Morejon MD, 300 mg at 12/28/22 2038    hydroxychloroquine (PLAQUENIL) tablet 200 mg, 200 mg, Oral, BID, Kailee Morejon MD, 200 mg at 12/24/22 1945    amLODIPine (NORVASC) tablet 2.5 mg, 2.5 mg, Oral, Daily, Kailee Morejon MD, 2.5 mg at 12/19/22 0810    tiZANidine (ZANAFLEX) tablet 4 mg, 4 mg, Oral, BID PRN, Kailee Morejon MD, 4 mg at 12/24/22 1759    multivitamin 1 tablet, 1 tablet, Oral, Daily, Kailee Morejon MD, 1 tablet at 12/19/22 0810    acetaminophen (TYLENOL) tablet 650 mg, 650 mg, Oral, Q4H PRN, Kailee Morejon MD, 650 mg at 12/23/22 2122    enoxaparin (LOVENOX) injection 40 mg, 40 mg, SubCUTAneous, Daily, Mimi Jacobo MD, 40 mg at 12/28/22 1717    polyethylene glycol (GLYCOLAX) packet 17 g, 17 g, Oral, Daily PRN, Mimi Jacobo MD, 17 g at 11/30/22 1817    Allergies:  Methotrexate derivatives    Social History:   TOBACCO:   reports that she quit smoking about 13 years ago. Her smoking use included cigarettes. She has a 64.00 pack-year smoking history. She has never used smokeless tobacco.  ETOH:   reports current alcohol use. Family History:       Problem Relation Age of Onset    Prostate Cancer Father     Heart Failure Father     Cancer Father     Colon Cancer Neg Hx     Colon Polyps Neg Hx     Liver Cancer Neg Hx     Liver Disease Neg Hx     Esophageal Cancer Neg Hx     Rectal Cancer Neg Hx     Stomach Cancer Neg Hx          PHYSICAL EXAM:  BP (!) 156/70   Pulse 60   Temp (!) 96.3 °F (35.7 °C) (Temporal)   Resp 16   Ht 5' 7\" (1.702 m)   Wt 195 lb (88.5 kg)   SpO2 93%   BMI 30.54 kg/m²     Constitutional - well developed, well nourished. Eyes - conjunctiva normal.   Ear, nose, throat - No scars, masses, or lesions over external nose or ears, no atrophy of tongue  Neck-symmetric, no masses noted, no jugular vein distension  Respiration- chest wall appears symmetric, good expansion,   normal effort without use of accessory muscles  Musculoskeletal - no significant wasting of muscles noted, no bony deformities  Extremities-no clubbing, cyanosis or edema  Skin - warm, dry, and intact. No rash, erythema, or pallor.   Psychiatric - mood, affect, and behavior appear normal.      Neurological exam  Awake, alert, global aphasia says \"yes\" to all questions asked   Attention and concentration appear appropriate  Recent and remote memory unable to tests     Cranial Nerve Exam     CN III, IV,VI-EOMI, No nystagmus, conjugate eye movements, no ptosis    CN VII-+ facial assymetry       Motor Exam  No movement on the right      Tremors- no tremors in hands or head noted     Gait  Not tested Nursing/pcp notes, imaging,labs and vitals reviewed. PT,OT and/or speech notes reviewed    Lab Results   Component Value Date    WBC 10.0 12/26/2022    HGB 10.9 (L) 12/26/2022    HCT 35.1 (L) 12/26/2022    MCV 77.7 (L) 12/26/2022     (H) 12/26/2022     Lab Results   Component Value Date     12/26/2022    K 3.6 12/26/2022     12/26/2022    CO2 23 12/26/2022    BUN 9 12/26/2022    CREATININE 0.6 12/26/2022    GLUCOSE 84 12/26/2022    CALCIUM 8.9 12/26/2022    PROT 5.8 (L) 12/26/2022    LABALBU 2.9 (L) 12/26/2022    BILITOT <0.2 12/26/2022    ALKPHOS 78 12/26/2022    AST 14 12/26/2022    ALT 10 12/26/2022    LABGLOM >60 12/26/2022   No results found for: INR, PROTIME      [place] Parker, PTA   Physical Therapist Assistant   Physical Therapy   Progress Notes      Signed   Date of Service:  12/27/2022 11:02 AM                 Signed                                                                                            Name: Kennedy Holcomb  MRN:  [id number]  Date of service:  12/27/2022 12/27/22 1031   Restrictions/Precautions   Restrictions/Precautions Fall Risk;Weight Bearing   Required Braces or Orthoses? Yes   Lower Extremity Weight Bearing Restrictions   Right Lower Extremity Weight Bearing Weight Bearing As Tolerated   Left Lower Extremity Weight Bearing Weight Bearing As Tolerated   General   Chart Reviewed Yes   Subjective   Subjective Pt up in wc, ready for therapy. Pain Assessment   Pain Assessment None - Denies Pain   Bed Mobility   Bridging Minimal assistance   Rolling Minimal assistance; Moderate assistance   Supine to Sit Moderate assistance;Maximal assistance   Sit to Supine Moderate assistance;Maximal assistance   Comment worked with pt on rolling and bed mobility d/t incontinence of urine-brief change   Transfers   Sit to Stand Moderate Assistance  (from wc and from bedside)   Stand to Sit Moderate Assistance   Stand Pivot Transfers Moderate Assistance  (wc<>bed) Ambulation   WB Status WBAT   Ambulation   Surface Level tile   Device Hemiwalker   Other Apparatus AFO; Right; Wheelchair follow  (shoe cover)   Assistance Moderate assistance;Maximum assistance   Quality of Gait pt with intermittent ability to swing RLE fwd, mostly dependent on ability to stand tall and weight shift onto LLE- this is limited by pt's insistence on looking at LE's while taking steps   Gait Deviations Decreased step length;Decreased step height;Slow Tamia  (narrow BAYLEE unless physically adjusted otherwise)   Distance 5' x 4, 7' x 2   Comments constant vc's for technique, seated rest breaks required between bouts   Conditions Requiring Skilled Therapeutic Intervention   Assessment Tx focused on STS and weight shifting to advance RLE. Multiple bouts made ranging from 2-8 steps each, some with manual facilitation to advance RLE and some with pt able to weight shift and stand erect to advance RLE. Pt appeared to improve in ability to advance RLE as tx progressed. Activity Tolerance   Activity Tolerance Patient limited by endurance   Safety Devices   Type of Devices    (left with  and OT for further tx)         Electronically signed by Desiree Strauss PTA on 12/27/2022 at 11:02 AM             Cosigned by: Zuleika Rojas PT at 12/27/2022  3:34 PM     Revision History                        RECORD REVIEW: Previous medical records, medications were reviewed at today's visit    IMPRESSION:   1. Acute ischemic stroke-on aspirin/statin  2. Hypertension-on medications monitor  3. Hyperlipidemia-on statin  4. Diabetes-Trulicity-monitor blood sugar  5. DVT prophylaxis-Lovenox  6. History of coronary artery disease-aspirin/statin  7. Neuropathy-on Neurontin  8. Rheumatoid arthritis-on Plaquenil  9. COPD-monitor  10. History of anxiety and depression-monitor  11. History of colitis/gallstones-monitor  12. History of bariatric surgery-on multivitamin/folic acid  13.   History of superior vena cava filter placement with venous thromboembolism-not on anticoagulation-monitor- took herself off blood thinners as not like meds and was bruising   14. PT/OT/speech    x-ray of right ankle no acute changes  Venous ultrasound of leg no DVT    Added low dose Lexapro    Discussed with daughter previously that it would be very difficult to force patient to take medications. It is better to discuss and have agreeable to initiating treatment on her own  Unable to force the patient to take medications.  She would need to be restrained with feeding tube    Continue present care      ELOS home      Expected duration and frequency therapy: 180 minutes per day, 5 days per week    [address] [facility]  Dr Sweta Vasquez

## 2022-12-29 NOTE — PLAN OF CARE
Problem: Discharge Planning  Goal: Discharge to home or other facility with appropriate resources  12/28/2022 2346 by Neva Murray RN  Outcome: Progressing  Flowsheets (Taken 12/28/2022 2045)  Discharge to home or other facility with appropriate resources: Refer to discharge planning if patient needs post-hospital services based on physician order or complex needs related to functional status, cognitive ability or social support system  12/28/2022 1154 by Kelsey Murry LPN  Outcome: Progressing  Flowsheets (Taken 12/28/2022 6865)  Discharge to home or other facility with appropriate resources: Refer to discharge planning if patient needs post-hospital services based on physician order or complex needs related to functional status, cognitive ability or social support system     Problem: Safety - Adult  Goal: Free from fall injury  12/28/2022 2346 by Neva Murray RN  Outcome: Progressing  12/28/2022 1154 by Kelsey Murry LPN  Outcome: Progressing     Problem: Neurosensory - Adult  Goal: Achieves stable or improved neurological status  12/28/2022 2346 by Neva Murray RN  Outcome: Progressing  Flowsheets (Taken 12/28/2022 2045)  Achieves stable or improved neurological status: Assess for and report changes in neurological status  12/28/2022 1154 by Kelsey Murry LPN  Outcome: Progressing  Flowsheets (Taken 12/28/2022 0853)  Achieves stable or improved neurological status: Assess for and report changes in neurological status  Goal: Achieves maximal functionality and self care  12/28/2022 2346 by Neva Murray RN  Outcome: Progressing  12/28/2022 1154 by Kelsey Murry LPN  Outcome: Progressing  Flowsheets (Taken 12/28/2022 0853)  Achieves maximal functionality and self care: Encourage and assist patient to increase activity and self care with guidance from physical therapy/occupational therapy     Problem: Skin/Tissue Integrity - Adult  Goal: Skin integrity remains intact  12/28/2022 2346 by Neva Murray RN  Outcome: Progressing  Flowsheets (Taken 12/28/2022 2045)  Skin Integrity Remains Intact: Monitor for areas of redness and/or skin breakdown  12/28/2022 1154 by Yumiko Marina LPN  Outcome: Progressing  Flowsheets (Taken 12/28/2022 1141)  Skin Integrity Remains Intact: Monitor for areas of redness and/or skin breakdown     Problem: Pain  Goal: Verbalizes/displays adequate comfort level or baseline comfort level  12/28/2022 2346 by Naveen Peterson RN  Outcome: Progressing  12/28/2022 1154 by Yumiko Marina LPN  Outcome: Progressing     Problem: Chronic Conditions and Co-morbidities  Goal: Patient's chronic conditions and co-morbidity symptoms are monitored and maintained or improved  12/28/2022 2346 by Naveen Peterson RN  Outcome: Progressing  [address] (Taken 12/28/2022 2045)  Care Plan - Patient's Chronic Conditions and Co-Morbidity Symptoms are Monitored and Maintained or Improved: Monitor and assess patient's chronic conditions and comorbid symptoms for stability, deterioration, or improvement  12/28/2022 1154 by Yumiko Marina LPN  Outcome: Progressing  Flowsheets (Taken 12/28/2022 0853)  Care Plan - Patient's Chronic Conditions and Co-Morbidity Symptoms are Monitored and Maintained or Improved: Monitor and assess patient's chronic conditions and comorbid symptoms for stability, deterioration, or improvement     Problem: ABCDS Injury Assessment  Goal: Absence of physical injury  12/28/2022 2346 by Naveen Peterson RN  Outcome: Progressing  12/28/2022 1154 by Yumiko Marina LPN  Outcome: Progressing     Problem: Skin/Tissue Integrity  Goal: Absence of new skin breakdown  Description: 1. Monitor for areas of redness and/or skin breakdown  2. Assess vascular access sites hourly  3. Every 4-6 hours minimum:  Change oxygen saturation probe site  4.   Every 4-6 hours:  If on nasal continuous positive airway pressure, respiratory therapy assess nares and determine need for appliance change or resting period. 12/28/2022 2346 by Yariel Da Silva RN  Outcome: Progressing  12/28/2022 1154 by Sebastian Park LPN  Outcome: Progressing     Problem: Confusion  Goal: Confusion, delirium, dementia, or psychosis is improved or at baseline  Description: INTERVENTIONS:  1. Assess for possible contributors to thought disturbance, including medications, impaired vision or hearing, underlying metabolic abnormalities, dehydration, psychiatric diagnoses, and notify attending LIP  2. [facility] high risk fall precautions, as indicated  3. Provide frequent short contacts to provide reality reorientation, refocusing and direction  4. Decrease environmental stimuli, including noise as appropriate  5. Monitor and intervene to maintain adequate nutrition, hydration, elimination, sleep and activity  6. If unable to ensure safety without constant attention obtain sitter and review sitter guidelines with assigned personnel  7.  Initiate Psychosocial CNS and Spiritual Care consult, as indicated  12/28/2022 2346 by Yariel Da Silva RN  Outcome: Progressing  12/28/2022 1154 by Sebastian Park LPN  Outcome: Progressing     Problem: Musculoskeletal - Adult  Goal: Return mobility to safest level of function  12/28/2022 2346 by Yariel Da Silva RN  Outcome: Progressing  Flowsheets (Taken 12/28/2022 2045)  Return Mobility to Safest Level of Function: Assess patient stability and activity tolerance for standing, transferring and ambulating with or without assistive devices  12/28/2022 1154 by Sebastian Park LPN  Outcome: Progressing  Flowsheets (Taken 12/28/2022 0853)  Return Mobility to Safest Level of Function: Assess patient stability and activity tolerance for standing, transferring and ambulating with or without assistive devices  Goal: Return ADL status to a safe level of function  12/28/2022 2346 by Yariel Da Silva RN  Outcome: Progressing  12/28/2022 1154 by Sebastian Park LPN  Outcome: Progressing  Flowsheets (Taken 12/28/2022 0853)  Return ADL Status to a Safe Level of Function: Assess activities of daily living deficits and provide assistive devices as needed     Problem: Gastrointestinal - Adult  Goal: Maintains or returns to baseline bowel function  12/28/2022 2346 by Enid Thurston RN  Outcome: Progressing  12/28/2022 1154 by Charisse Guerrero LPN  Outcome: Progressing  Flowsheets (Taken 12/28/2022 2418)  Maintains or returns to baseline bowel function: Assess bowel function  Goal: Maintains adequate nutritional intake  12/28/2022 2346 by Enid Thurston RN  Outcome: Progressing  12/28/2022 1154 by Charisse Guerrero LPN  Outcome: Progressing  Flowsheets (Taken 12/28/2022 2614)  Maintains adequate nutritional intake: Monitor percentage of each meal consumed     Problem: Metabolic/Fluid and Electrolytes - Adult  Goal: Electrolytes maintained within normal limits  12/28/2022 2346 by Enid Thurston RN  Outcome: Progressing  Flowsheets (Taken 12/28/2022 2045)  Electrolytes maintained within normal limits: Monitor labs and assess patient for signs and symptoms of electrolyte imbalances  12/28/2022 1154 by Charisse Guerrero LPN  Outcome: Progressing  Flowsheets (Taken 12/28/2022 0853)  Electrolytes maintained within normal limits: Monitor labs and assess patient for signs and symptoms of electrolyte imbalances     Problem: Nutrition Deficit:  Goal: Optimize nutritional status  12/28/2022 2346 by Enid Thurston RN  Outcome: Progressing  12/28/2022 1154 by Charisse Guerrero LPN  Outcome: Progressing

## 2022-12-29 NOTE — DISCHARGE SUMMARY
Physical Therapy DISCHARGE Note  DATE:  2022  NAME:  Cyndi Walter  :  1960  (62 y.o.,female)  MRN:  [id number]    HEIGHT:  Height: 5' 7\" (170.2 cm)  WEIGHT:  Weight: 195 lb (88.5 kg)    PATIENT DIAGNOSIS(ES):    Diagnosis: CVA, R hemiparesis    Additional Pertinent Hx: Anxiety, depression, COPD, CVA, DM, LE edema, hyperlipidemia, HTN, obesity, RA, CAD and venous thromboembolism  RESTRICTIONS/PRECAUTIONS:    Restrictions/Precautions  Restrictions/Precautions: Fall Risk, Weight Bearing  Required Braces or Orthoses?: Yes     OVERALL  ORIENTATION STATUS:  Overall Orientation Status: Impaired  PAIN:  Pain Level: 0       Pain Location: Generalized     Pain Orientation: Right      GROSS ASSESSMENT        POSTURE/BALANCE  Balance  Posture: Fair (L truncal lean when seated in W/C)  Sitting - Static: Fair (No LOB, unable to mainain midline position)  Sitting - Dynamic: Fair, -       ACTIVITY TOLERANCE  Activity Tolerance  Activity Tolerance: Patient limited by endurance      BED MOBILITY  Bed mobility  Bridging: Minimal assistance  Rolling to Left: Contact guard assistance (with rail)  Rolling to Right: Moderate assistance, Minimal assistance (with WC as rail to L side of mat table)  Supine to Sit: Minimal assistance (HOB elevated)  Sit to Supine: Minimal assistance, Moderate assistance (for LE's)  Scooting: Minimal assistance, Moderate assistance (For R side on EOB)  Bed Mobility Comments: On L side of bed- use of L bedrail        TRANSFERS  Transfers  Sit to Stand: Minimal Assistance, Moderate Assistance (Pulling with parallel bar.)  Stand to Sit: Minimal Assistance  Bed to Chair: Minimal assistance (Scoot to Left with arm rest removed. Assist required to clear cushion.)  Stand Pivot Transfers: Moderate Assistance (wc<>bed)  Squat Pivot Transfers:  Moderate Assistance (performed to patient's L side)  Lateral Transfers: Stand by assistance (scoot oob to [facility] towards strong L side (level surface), therapist holding chair from sliding)  Car Transfer: Minimal Assistance, Moderate Assistance (Using sliding board into pt's convertible with lower seat. .  Verbal cues also required for technique.)  Comment: STS: practiced scooting out via slouch method and using back extensor mm's, cues to \"lean fwd and STAY fwd\" while pushing up from chair with demonstration and tactile cues       AMBULATION 1  Ambulation  Surface: Level tile  Device: Parallel Bars  Other Apparatus: AFO, Right, Wheelchair follow (Arm sling)  Assistance: Minimal assistance  Quality of Gait: Assist to advance RLE. Good, upright posture and weight shifting. Gait Deviations: Decreased step length, Decreased step height, Slow Tamia  Distance: 12' x2  Comments: constant vc's for technique, seated rest breaks required between bouts  More Ambulation?: Yes  AMBULATION 2  Ambulation 2  Surface - 2: level tile  Device 2: Hemiwalker  Other Apparatus 2: Right, Wheelchair follow, AFO (shoe cover on R LE)  Assistance 2: Moderate assistance, Minimal assistance (more for R LE management than balance/posture)  Quality of Gait 2: max vc's for sequencing, occ assist to advance HW sufficiently, fwd flexed (wants to watch feet), advancing R LE but not wide enough position and small steps (assist to optimal placement)  Gait Deviations: Decreased step height, Slow Tamia  Distance: 6', 5', 3'  Comments: NO DEVIATIONS OR INCIDENCES OF LOB/UNSTEADINESS, GREAT TAMIA AND STEP LENGTH WITH LLE  STAIRS   UNABLE TO PERFORM  WHEELCHAIR PROPULSION 1  Propulsion 1  Propulsion: Manual  Level: Level Tile  Method: JOBY REESE  Level of Assistance: Contact guard assistance  Description/ Details: Able to use L UE and R LE together this date with min vearing to the R. Needs more assist with turns.  Required recovery periods  Distance: 150'  WHEELCHAIR PROPULSION 2       GOALS:  Short Term Goals  Time Frame for Short Term Goals: 2 weeks  Short Term Goal 1: Patient will perform bed mobility with Min A  Short Term Goal 2: Patient will transition supine <> sit with Min A  Short Term Goal 3: Patient will perform bed <> chair transfer with Min A  Short Term Goal 4: Patient propel wheelchair 50 ft with Min A  Short Term Goal 5: Patient will ambulate 10 ft with Mod A    Long Term Goals  Time Frame for Long Term Goals : 3 weeks  Long Term Goal 1: Patient will perform bed mobility independently  Long Term Goal 2: Patient with transition supine <> sit independently  Long Term Goal 3: Patient will perform bed <> chair transfer independently  Long Term Goal 4: Patient will perform car transfer with Min A  Long Term Goal 5: Patient will ambulate 10 ft with CGA  HOME LIVING:     Type of Home: House  Home Layout: One level  Home Access:  (2 steps to enter)        ASSESSMENT (IMPAIRMENTS/BARRIERS): Body Structures, Functions, Activity Limitations Requiring Skilled Therapeutic Intervention: Decreased ADL status, Decreased ROM, Decreased strength, Decreased safe awareness, Decreased cognition, Decreased endurance, Decreased sensation, Decreased balance, Decreased coordination, Decreased posture  Assessment: Practiced car transfer into pt's vehicle using sliding board. Pt's spoiuse present. Able to amb 12' x2 in parallel bars with min assist.  Practiced W/C prop using electric scooter. Co-treat session with OT dept.   Activity Tolerance: Patient limited by endurance     PLAN:  Discharge Recommendations: Home with Home health PT, 24 hour supervision or assist    PATIENT GOAL FOR REHAB:  RETURN TO PRIOR LEVEL OF FUNCTION       IRF/ARLEN  Roll Left and Right  Assistance Needed: Partial/moderate assistance  CARE Score: 3  Discharge Goal: Independent    Sit to Lying  Assistance Needed: Partial/moderate assistance  CARE Score: 3  Discharge Goal: Independent    Lying to Sitting on Side of Bed  Assistance Needed: Partial/moderate assistance  CARE Score: 3  Discharge Goal: Independent    Sit to Stand  Assistance Needed: Partial/moderate assistance  CARE Score: 3  Discharge Goal: Supervision or touching assistance    Chair/Bed-to-Chair Transfer  Assistance Needed: Partial/moderate assistance  CARE Score: 3  Discharge Goal: Independent    Car Transfer  Assistance Needed: Partial/moderate assistance  Reason if not Attempted: Not attempted due to medical condition or safety concerns  CARE Score: 3  Discharge Goal: Partial/moderate assistance    Walk 10 Feet  Assistance Needed: Partial/moderate assistance  Reason if not Attempted: Not attempted due to medical condition or safety concerns  CARE Score: 3  Discharge Goal: Partial/moderate assistance    Walk 50 Feet with Two Turns  Reason if not Attempted: Not attempted due to medical condition or safety concerns  CARE Score: 88  Discharge Goal: Partial/moderate assistance    Walk 150 Feet  Reason if not Attempted: Not attempted due to medical condition or safety concerns  CARE Score: 88  Discharge Goal: Not Attempted    Walking 10 Feet on Uneven Surfaces  Reason if not Attempted: Not attempted due to medical condition or safety concerns  CARE Score: 88  Discharge Goal: Not Attempted    1 Step (Curb)  Reason if not Attempted: Not attempted due to medical condition or safety concerns  CARE Score: 88  Discharge Goal: Partial/moderate assistance    4 Steps  Reason if not Attempted: Not attempted due to medical condition or safety concerns  CARE Score: 88  Discharge Goal: Partial/moderate assistance    12 Steps  Reason if not Attempted: Not attempted due to medical condition or safety concerns  CARE Score: 88  Discharge Goal: Not Attempted    Wheel 50 Feet with Two Turns  Assistance Needed: Supervision or touching assistance  CARE Score: 4  Discharge Goal: Partial/moderate assistance    Wheel 150 Feet  Assistance Needed: Supervision or touching assistance  Reason if not Attempted: Not attempted due to medical condition or safety concerns  CARE Score: 4  Discharge Goal: Partial/moderate assistance      LAST TREATMENT TIME  PT Individual Minutes  Time In: 1000  Time Out: 1100  Minutes: 60      Electronically signed by Mohamud Sosa PT on 12/29/22 at 10:46 AM CST

## 2022-12-29 NOTE — PLAN OF CARE
Problem: Discharge Planning  Goal: Discharge to home or other facility with appropriate resources  12/29/2022 1018 by Kathlyne Siemens, LPN  Outcome: Completed  Flowsheets (Taken 12/29/2022 0911)  Discharge to home or other facility with appropriate resources: Refer to discharge planning if patient needs post-hospital services based on physician order or complex needs related to functional status, cognitive ability or social support system  12/28/2022 2346 by Gregoria De Luna RN  Outcome: Progressing  Flowsheets (Taken 12/28/2022 2045)  Discharge to home or other facility with appropriate resources: Refer to discharge planning if patient needs post-hospital services based on physician order or complex needs related to functional status, cognitive ability or social support system     Problem: Safety - Adult  Goal: Free from fall injury  12/29/2022 1018 by Kathlyne Siemens, LPN  Outcome: Completed  12/28/2022 2346 by Gregoria De Luna RN  Outcome: Progressing     Problem: Neurosensory - Adult  Goal: Achieves stable or improved neurological status  12/29/2022 1018 by Kathlyne Siemens, LPN  Outcome: Completed  Flowsheets (Taken 12/29/2022 0911)  Achieves stable or improved neurological status: Assess for and report changes in neurological status  12/28/2022 2346 by Gregoria De Luna RN  Outcome: Progressing  Flowsheets (Taken 12/28/2022 2045)  Achieves stable or improved neurological status: Assess for and report changes in neurological status  Goal: Achieves maximal functionality and self care  12/29/2022 1018 by Kathlyne Siemens, LPN  Outcome: Completed  Flowsheets (Taken 12/29/2022 0911)  Achieves maximal functionality and self care: Encourage and assist patient to increase activity and self care with guidance from physical therapy/occupational therapy  12/28/2022 2346 by Gregoria De Luna RN  Outcome: Progressing     Problem: Skin/Tissue Integrity - Adult  Goal: Skin integrity remains intact  12/29/2022 1018 by Kathlyne Siemens, LPN  Outcome: Completed  Flowsheets (Taken 12/29/2022 0821)  Skin Integrity Remains Intact: Monitor for areas of redness and/or skin breakdown  12/28/2022 2346 by Karon Ying RN  Outcome: Progressing  Flowsheets (Taken 12/28/2022 2045)  Skin Integrity Remains Intact: Monitor for areas of redness and/or skin breakdown     Problem: Pain  Goal: Verbalizes/displays adequate comfort level or baseline comfort level  12/29/2022 1018 by Lacey Martino LPN  Outcome: Completed  12/28/2022 2346 by Karon Ying RN  Outcome: Progressing     Problem: Chronic Conditions and Co-morbidities  Goal: Patient's chronic conditions and co-morbidity symptoms are monitored and maintained or improved  12/29/2022 1018 by Lacey Martino LPN  Outcome: Completed  Flowsheets (Taken 12/29/2022 0911)  Care Plan - Patient's Chronic Conditions and Co-Morbidity Symptoms are Monitored and Maintained or Improved: Monitor and assess patient's chronic conditions and comorbid symptoms for stability, deterioration, or improvement  12/28/2022 2346 by Karon Ying RN  Outcome: Progressing  [address] (Taken 12/28/2022 2045)  Care Plan - Patient's Chronic Conditions and Co-Morbidity Symptoms are Monitored and Maintained or Improved: Monitor and assess patient's chronic conditions and comorbid symptoms for stability, deterioration, or improvement     Problem: ABCDS Injury Assessment  Goal: Absence of physical injury  12/29/2022 1018 by Lacey Martino LPN  Outcome: Completed  12/28/2022 2346 by Karon Ying RN  Outcome: Progressing     Problem: Skin/Tissue Integrity  Goal: Absence of new skin breakdown  Description: 1. Monitor for areas of redness and/or skin breakdown  2. Assess vascular access sites hourly  3. Every 4-6 hours minimum:  Change oxygen saturation probe site  4. Every 4-6 hours:  If on nasal continuous positive airway pressure, respiratory therapy assess nares and determine need for appliance change or resting period.   12/29/2022 1018 by Liz Alias, LPN  Outcome: Completed  12/28/2022 2346 by Neida Villegas RN  Outcome: Progressing     Problem: Confusion  Goal: Confusion, delirium, dementia, or psychosis is improved or at baseline  Description: INTERVENTIONS:  1. Assess for possible contributors to thought disturbance, including medications, impaired vision or hearing, underlying metabolic abnormalities, dehydration, psychiatric diagnoses, and notify attending LIP  2. [place] high risk fall precautions, as indicated  3. Provide frequent short contacts to provide reality reorientation, refocusing and direction  4. Decrease environmental stimuli, including noise as appropriate  5. Monitor and intervene to maintain adequate nutrition, hydration, elimination, sleep and activity  6. If unable to ensure safety without constant attention obtain sitter and review sitter guidelines with assigned personnel  7.  Initiate Psychosocial CNS and Spiritual Care consult, as indicated  12/29/2022 1018 by Liz Jha LPN  Outcome: Completed  12/28/2022 2346 by Neida Villegas RN  Outcome: Progressing     Problem: Musculoskeletal - Adult  Goal: Return mobility to safest level of function  12/29/2022 1018 by Liz Jha LPN  Outcome: Completed  Flowsheets (Taken 12/29/2022 0911)  Return Mobility to Safest Level of Function: Assess patient stability and activity tolerance for standing, transferring and ambulating with or without assistive devices  12/28/2022 2346 by Neida Villegas RN  Outcome: Progressing  Flowsheets (Taken 12/28/2022 2045)  Return Mobility to Safest Level of Function: Assess patient stability and activity tolerance for standing, transferring and ambulating with or without assistive devices  Goal: Return ADL status to a safe level of function  12/29/2022 1018 by Liz Jha LPN  Outcome: Completed  Flowsheets (Taken 12/29/2022 0911)  Return ADL Status to a Safe Level of Function: Assess activities of daily living deficits and provide assistive devices as needed  12/28/2022 2346 by Lary Carter RN  Outcome: Progressing     Problem: Gastrointestinal - Adult  Goal: Maintains or returns to baseline bowel function  12/29/2022 1018 by Adithya Avalos LPN  Outcome: Completed  Flowsheets (Taken 12/29/2022 0911)  Maintains or returns to baseline bowel function: Assess bowel function  12/28/2022 2346 by Lary Carter RN  Outcome: Progressing  Goal: Maintains adequate nutritional intake  12/29/2022 1018 by Adithya Avalos LPN  Outcome: Completed  Flowsheets (Taken 12/29/2022 0911)  Maintains adequate nutritional intake: Monitor percentage of each meal consumed  12/28/2022 2346 by Lary Carter RN  Outcome: Progressing     Problem: Metabolic/Fluid and Electrolytes - Adult  Goal: Electrolytes maintained within normal limits  12/29/2022 1018 by Adithya Avalos LPN  Outcome: Completed  Flowsheets (Taken 12/29/2022 0911)  Electrolytes maintained within normal limits: Monitor labs and assess patient for signs and symptoms of electrolyte imbalances  12/28/2022 2346 by Lary Carter RN  Outcome: Progressing  Flowsheets (Taken 12/28/2022 2045)  Electrolytes maintained within normal limits: Monitor labs and assess patient for signs and symptoms of electrolyte imbalances     Problem: Nutrition Deficit:  Goal: Optimize nutritional status  12/29/2022 1018 by Adithya Avalos LPN  Outcome: Completed  12/28/2022 2346 by Lary Carter RN  Outcome: Progressing

## 2023-01-19 RX ORDER — AMLODIPINE BESYLATE 2.5 MG/1
TABLET ORAL
Qty: 30 TABLET | Refills: 0 | OUTPATIENT
Start: 2023-01-19

## 2023-01-19 RX ORDER — ATORVASTATIN CALCIUM 40 MG/1
TABLET, FILM COATED ORAL
Qty: 30 TABLET | Refills: 0 | OUTPATIENT
Start: 2023-01-19

## 2023-01-23 RX ORDER — DULAGLUTIDE 1.5 MG/.5ML
INJECTION, SOLUTION SUBCUTANEOUS
OUTPATIENT
Start: 2023-01-23

## 2023-01-24 ENCOUNTER — APPOINTMENT (OUTPATIENT)
Dept: CT IMAGING | Age: 63
DRG: 300 | End: 2023-01-24
Payer: COMMERCIAL

## 2023-01-24 ENCOUNTER — HOSPITAL ENCOUNTER (INPATIENT)
Age: 63
LOS: 1 days | Discharge: ANOTHER HOSPITAL | DRG: 300 | End: 2023-01-25
Attending: STUDENT IN AN ORGANIZED HEALTH CARE EDUCATION/TRAINING PROGRAM | Admitting: HOSPITALIST
Payer: COMMERCIAL

## 2023-01-24 ENCOUNTER — APPOINTMENT (OUTPATIENT)
Dept: GENERAL RADIOLOGY | Age: 63
DRG: 300 | End: 2023-01-24
Payer: COMMERCIAL

## 2023-01-24 DIAGNOSIS — I74.10 AORTIC THROMBUS (HCC): ICD-10-CM

## 2023-01-24 DIAGNOSIS — I70.202 LEFT POPLITEAL ARTERY OCCLUSION (HCC): Primary | ICD-10-CM

## 2023-01-24 DIAGNOSIS — Z86.73 HISTORY OF CVA IN ADULTHOOD: ICD-10-CM

## 2023-01-24 PROBLEM — I82.90 THROMBOSIS: Status: ACTIVE | Noted: 2023-01-24

## 2023-01-24 LAB
ALBUMIN SERPL-MCNC: 3.4 G/DL (ref 3.5–5.2)
ALP BLD-CCNC: 177 U/L (ref 35–104)
ALT SERPL-CCNC: 24 U/L (ref 5–33)
ANION GAP SERPL CALCULATED.3IONS-SCNC: 11 MMOL/L (ref 7–19)
APTT: 113.9 SEC (ref 26–36.2)
APTT: 26.2 SEC (ref 26–36.2)
AST SERPL-CCNC: 37 U/L (ref 5–32)
BILIRUB SERPL-MCNC: 0.4 MG/DL (ref 0.2–1.2)
BUN BLDV-MCNC: 8 MG/DL (ref 8–23)
CALCIUM SERPL-MCNC: 9.2 MG/DL (ref 8.8–10.2)
CHLORIDE BLD-SCNC: 103 MMOL/L (ref 98–111)
CO2: 24 MMOL/L (ref 22–29)
CREAT SERPL-MCNC: 0.7 MG/DL (ref 0.5–0.9)
GFR SERPL CREATININE-BSD FRML MDRD: >60 ML/MIN/{1.73_M2}
GLUCOSE BLD-MCNC: 90 MG/DL (ref 74–109)
HCT VFR BLD CALC: 36.3 % (ref 37–47)
HEMOGLOBIN: 11.2 G/DL (ref 12–16)
INR BLD: 1.12 (ref 0.88–1.18)
MCH RBC QN AUTO: 24.5 PG (ref 27–31)
MCHC RBC AUTO-ENTMCNC: 30.9 G/DL (ref 33–37)
MCV RBC AUTO: 79.4 FL (ref 81–99)
PDW BLD-RTO: 17.6 % (ref 11.5–14.5)
PLATELET # BLD: 520 K/UL (ref 130–400)
PMV BLD AUTO: 9.3 FL (ref 9.4–12.3)
POTASSIUM SERPL-SCNC: 4.2 MMOL/L (ref 3.5–5)
PROTHROMBIN TIME: 14.4 SEC (ref 12–14.6)
RBC # BLD: 4.57 M/UL (ref 4.2–5.4)
SARS-COV-2, NAAT: NOT DETECTED
SODIUM BLD-SCNC: 138 MMOL/L (ref 136–145)
TOTAL PROTEIN: 6.5 G/DL (ref 6.6–8.7)
WBC # BLD: 14.8 K/UL (ref 4.8–10.8)

## 2023-01-24 PROCEDURE — 83550 IRON BINDING TEST: CPT

## 2023-01-24 PROCEDURE — 93005 ELECTROCARDIOGRAM TRACING: CPT | Performed by: EMERGENCY MEDICINE

## 2023-01-24 PROCEDURE — 1210000000 HC MED SURG R&B

## 2023-01-24 PROCEDURE — 71275 CT ANGIOGRAPHY CHEST: CPT

## 2023-01-24 PROCEDURE — 6360000004 HC RX CONTRAST MEDICATION: Performed by: EMERGENCY MEDICINE

## 2023-01-24 PROCEDURE — 83690 ASSAY OF LIPASE: CPT

## 2023-01-24 PROCEDURE — 96375 TX/PRO/DX INJ NEW DRUG ADDON: CPT

## 2023-01-24 PROCEDURE — 73590 X-RAY EXAM OF LOWER LEG: CPT | Performed by: RADIOLOGY

## 2023-01-24 PROCEDURE — 82306 VITAMIN D 25 HYDROXY: CPT

## 2023-01-24 PROCEDURE — 75635 CT ANGIO ABDOMINAL ARTERIES: CPT | Performed by: RADIOLOGY

## 2023-01-24 PROCEDURE — 82728 ASSAY OF FERRITIN: CPT

## 2023-01-24 PROCEDURE — 71275 CT ANGIOGRAPHY CHEST: CPT | Performed by: RADIOLOGY

## 2023-01-24 PROCEDURE — 73590 X-RAY EXAM OF LOWER LEG: CPT

## 2023-01-24 PROCEDURE — 85730 THROMBOPLASTIN TIME PARTIAL: CPT

## 2023-01-24 PROCEDURE — 82607 VITAMIN B-12: CPT

## 2023-01-24 PROCEDURE — 85027 COMPLETE CBC AUTOMATED: CPT

## 2023-01-24 PROCEDURE — 80053 COMPREHEN METABOLIC PANEL: CPT

## 2023-01-24 PROCEDURE — 96365 THER/PROPH/DIAG IV INF INIT: CPT

## 2023-01-24 PROCEDURE — 96366 THER/PROPH/DIAG IV INF ADDON: CPT

## 2023-01-24 PROCEDURE — 36415 COLL VENOUS BLD VENIPUNCTURE: CPT

## 2023-01-24 PROCEDURE — 93971 EXTREMITY STUDY: CPT

## 2023-01-24 PROCEDURE — 6360000004 HC RX CONTRAST MEDICATION: Performed by: PHYSICIAN ASSISTANT

## 2023-01-24 PROCEDURE — 83735 ASSAY OF MAGNESIUM: CPT

## 2023-01-24 PROCEDURE — 99285 EMERGENCY DEPT VISIT HI MDM: CPT

## 2023-01-24 PROCEDURE — 6360000002 HC RX W HCPCS: Performed by: PHYSICIAN ASSISTANT

## 2023-01-24 PROCEDURE — 87635 SARS-COV-2 COVID-19 AMP PRB: CPT

## 2023-01-24 PROCEDURE — 84443 ASSAY THYROID STIM HORMONE: CPT

## 2023-01-24 PROCEDURE — 82746 ASSAY OF FOLIC ACID SERUM: CPT

## 2023-01-24 PROCEDURE — 85610 PROTHROMBIN TIME: CPT

## 2023-01-24 PROCEDURE — 75635 CT ANGIO ABDOMINAL ARTERIES: CPT

## 2023-01-24 PROCEDURE — 83540 ASSAY OF IRON: CPT

## 2023-01-24 RX ORDER — HEPARIN SODIUM 10000 [USP'U]/100ML
5-30 INJECTION, SOLUTION INTRAVENOUS CONTINUOUS
Status: DISCONTINUED | OUTPATIENT
Start: 2023-01-24 | End: 2023-01-26 | Stop reason: HOSPADM

## 2023-01-24 RX ORDER — ACETAMINOPHEN 650 MG/1
650 SUPPOSITORY RECTAL EVERY 6 HOURS PRN
Status: DISCONTINUED | OUTPATIENT
Start: 2023-01-24 | End: 2023-01-24

## 2023-01-24 RX ORDER — HEPARIN SODIUM 1000 [USP'U]/ML
40 INJECTION, SOLUTION INTRAVENOUS; SUBCUTANEOUS PRN
Status: DISCONTINUED | OUTPATIENT
Start: 2023-01-24 | End: 2023-01-24

## 2023-01-24 RX ORDER — ACETAMINOPHEN 325 MG/1
650 TABLET ORAL EVERY 6 HOURS PRN
Status: DISCONTINUED | OUTPATIENT
Start: 2023-01-24 | End: 2023-01-26 | Stop reason: HOSPADM

## 2023-01-24 RX ORDER — HEPARIN SODIUM 1000 [USP'U]/ML
80 INJECTION, SOLUTION INTRAVENOUS; SUBCUTANEOUS PRN
Status: DISCONTINUED | OUTPATIENT
Start: 2023-01-24 | End: 2023-01-24

## 2023-01-24 RX ORDER — SODIUM CHLORIDE 0.9 % (FLUSH) 0.9 %
5-40 SYRINGE (ML) INJECTION EVERY 12 HOURS SCHEDULED
Status: DISCONTINUED | OUTPATIENT
Start: 2023-01-24 | End: 2023-01-24

## 2023-01-24 RX ORDER — HEPARIN SODIUM 1000 [USP'U]/ML
80 INJECTION, SOLUTION INTRAVENOUS; SUBCUTANEOUS PRN
Status: DISCONTINUED | OUTPATIENT
Start: 2023-01-24 | End: 2023-01-26 | Stop reason: HOSPADM

## 2023-01-24 RX ORDER — SODIUM CHLORIDE 9 MG/ML
INJECTION, SOLUTION INTRAVENOUS PRN
Status: DISCONTINUED | OUTPATIENT
Start: 2023-01-24 | End: 2023-01-24

## 2023-01-24 RX ORDER — SODIUM CHLORIDE 0.9 % (FLUSH) 0.9 %
5-40 SYRINGE (ML) INJECTION PRN
Status: DISCONTINUED | OUTPATIENT
Start: 2023-01-24 | End: 2023-01-26 | Stop reason: HOSPADM

## 2023-01-24 RX ORDER — ONDANSETRON 4 MG/1
4 TABLET, ORALLY DISINTEGRATING ORAL EVERY 8 HOURS PRN
Status: DISCONTINUED | OUTPATIENT
Start: 2023-01-24 | End: 2023-01-24

## 2023-01-24 RX ORDER — ONDANSETRON 2 MG/ML
4 INJECTION INTRAMUSCULAR; INTRAVENOUS EVERY 6 HOURS PRN
Status: DISCONTINUED | OUTPATIENT
Start: 2023-01-24 | End: 2023-01-26 | Stop reason: HOSPADM

## 2023-01-24 RX ORDER — INSULIN LISPRO 100 [IU]/ML
0-4 INJECTION, SOLUTION INTRAVENOUS; SUBCUTANEOUS NIGHTLY
Status: DISCONTINUED | OUTPATIENT
Start: 2023-01-24 | End: 2023-01-24

## 2023-01-24 RX ORDER — SODIUM CHLORIDE 9 MG/ML
INJECTION, SOLUTION INTRAVENOUS CONTINUOUS
Status: DISCONTINUED | OUTPATIENT
Start: 2023-01-24 | End: 2023-01-26 | Stop reason: HOSPADM

## 2023-01-24 RX ORDER — DEXTROSE MONOHYDRATE 100 MG/ML
INJECTION, SOLUTION INTRAVENOUS CONTINUOUS PRN
Status: DISCONTINUED | OUTPATIENT
Start: 2023-01-24 | End: 2023-01-24

## 2023-01-24 RX ORDER — HEPARIN SODIUM 1000 [USP'U]/ML
80 INJECTION, SOLUTION INTRAVENOUS; SUBCUTANEOUS ONCE
Status: COMPLETED | OUTPATIENT
Start: 2023-01-24 | End: 2023-01-24

## 2023-01-24 RX ORDER — POTASSIUM CHLORIDE 20 MEQ/1
40 TABLET, EXTENDED RELEASE ORAL PRN
Status: DISCONTINUED | OUTPATIENT
Start: 2023-01-24 | End: 2023-01-24

## 2023-01-24 RX ORDER — POTASSIUM CHLORIDE 7.45 MG/ML
10 INJECTION INTRAVENOUS PRN
Status: DISCONTINUED | OUTPATIENT
Start: 2023-01-24 | End: 2023-01-24

## 2023-01-24 RX ORDER — ONDANSETRON 4 MG/1
4 TABLET, ORALLY DISINTEGRATING ORAL EVERY 8 HOURS PRN
Status: DISCONTINUED | OUTPATIENT
Start: 2023-01-24 | End: 2023-01-26 | Stop reason: HOSPADM

## 2023-01-24 RX ORDER — SODIUM CHLORIDE 9 MG/ML
INJECTION, SOLUTION INTRAVENOUS PRN
Status: DISCONTINUED | OUTPATIENT
Start: 2023-01-24 | End: 2023-01-26 | Stop reason: HOSPADM

## 2023-01-24 RX ORDER — POLYETHYLENE GLYCOL 3350 17 G/17G
17 POWDER, FOR SOLUTION ORAL DAILY PRN
Status: DISCONTINUED | OUTPATIENT
Start: 2023-01-24 | End: 2023-01-26 | Stop reason: HOSPADM

## 2023-01-24 RX ORDER — ONDANSETRON 2 MG/ML
4 INJECTION INTRAMUSCULAR; INTRAVENOUS EVERY 6 HOURS PRN
Status: DISCONTINUED | OUTPATIENT
Start: 2023-01-24 | End: 2023-01-24

## 2023-01-24 RX ORDER — SODIUM CHLORIDE 0.9 % (FLUSH) 0.9 %
5-40 SYRINGE (ML) INJECTION EVERY 12 HOURS SCHEDULED
Status: DISCONTINUED | OUTPATIENT
Start: 2023-01-24 | End: 2023-01-26 | Stop reason: HOSPADM

## 2023-01-24 RX ORDER — ACETAMINOPHEN 650 MG/1
650 SUPPOSITORY RECTAL EVERY 6 HOURS PRN
Status: DISCONTINUED | OUTPATIENT
Start: 2023-01-24 | End: 2023-01-26 | Stop reason: HOSPADM

## 2023-01-24 RX ORDER — ACETAMINOPHEN 325 MG/1
650 TABLET ORAL EVERY 6 HOURS PRN
Status: DISCONTINUED | OUTPATIENT
Start: 2023-01-24 | End: 2023-01-24

## 2023-01-24 RX ORDER — HEPARIN SODIUM 1000 [USP'U]/ML
40 INJECTION, SOLUTION INTRAVENOUS; SUBCUTANEOUS PRN
Status: DISCONTINUED | OUTPATIENT
Start: 2023-01-24 | End: 2023-01-26 | Stop reason: HOSPADM

## 2023-01-24 RX ORDER — SODIUM CHLORIDE 0.9 % (FLUSH) 0.9 %
5-40 SYRINGE (ML) INJECTION PRN
Status: DISCONTINUED | OUTPATIENT
Start: 2023-01-24 | End: 2023-01-24

## 2023-01-24 RX ORDER — INSULIN LISPRO 100 [IU]/ML
0-4 INJECTION, SOLUTION INTRAVENOUS; SUBCUTANEOUS
Status: DISCONTINUED | OUTPATIENT
Start: 2023-01-24 | End: 2023-01-24

## 2023-01-24 RX ADMIN — HEPARIN SODIUM 7080 UNITS: 1000 INJECTION INTRAVENOUS; SUBCUTANEOUS at 17:29

## 2023-01-24 RX ADMIN — IOPAMIDOL 70 ML: 755 INJECTION, SOLUTION INTRAVENOUS at 17:35

## 2023-01-24 RX ADMIN — HEPARIN SODIUM 18 UNITS/KG/HR: 10000 INJECTION, SOLUTION INTRAVENOUS at 18:32

## 2023-01-24 RX ADMIN — IOPAMIDOL 90 ML: 755 INJECTION, SOLUTION INTRAVENOUS at 22:09

## 2023-01-24 NOTE — PROGRESS NOTES
Vascular Lab Prelim  Duplex venous scan of LLE shows no evidence for dvt, svt, reflux noted at this time. Incidental finding: left Popliteal artery occlusion. Final report pending.

## 2023-01-24 NOTE — H&P
[id number] [facility]      Hospitalist - History & Physical      PCP: Odette Dodson DO    Date of Admission: 1/24/2023    Date of Service: 1/24/2023    Chief Complaint:  LLE pain    History Of Present Illness: This patient is a 58 y.o. female with past medical history of prior CVA with residual, DM II, CAD, COPD, anxiety, hypertension, RA, MI, hyperlipidemia who presented to [facility] ED with complaints of LLE pain. Patient was recently admitted to the hospital on 11/9/2022 with acute CVA due to left MCA occlusion. She was discharged on Plavix at that time. Patient reports to ED provider that she began experiencing LLE pain last night, to the point that she was in tears.  did attempt to massage leg to help, and pain eventually subsided. Physical therapy was working with the patient this morning, and after walking, pt experinced the pain again which prompted  to bring to ED for evaluation. Denies recent trauma and pain is not reproducible.  does not report any SOB/CP/Neuro changes. Work-up in ER reveals WBC 14.8, hemoglobin 11.2, hematocrit 36.3, platelet count 208, PT 14.4, INR 1.12, PTT 26.2, x-ray left tib/fib shows no acute osseous abnormality. Swelling of BLE, no worse than baseline per . Pulses equal. Duplex venous scan of LLE shows no evidence of DVT, however does show a left popliteal artery occlusion; final report is pending. PA did contact Dr. Tristian Duggan who is recommending heparin drip and CTA abdomen aorta with runoff. Patient will be admitted to hospital medicine with vascular surgery following for management of arterial occlusion.     Past Medical History:        Diagnosis Date    Anxiety     ASCVD (arteriosclerotic cardiovascular disease)     Carrier of group B Streptococcus     Cellulitis     Colitis     COPD (chronic obstructive pulmonary disease) (HCC)     Costochondritis     CVA (cerebral vascular accident) ([address]     Depression     Edema     Fracture of sternum     non union post surgery. Gallstones     Grief reaction     H/O superior vena cava filter placement     Hyperlipidemia     Hypertension     MI (myocardial infarction) ([facility] [address]     MRSA (methicillin resistant Staphylococcus aureus)     Neuropathy     Obesity     Pseudarthrosis sternal    RA (rheumatoid arthritis) ([facility] [address]     Rosacea     Sinus bradycardia     TIA (transient ischemic attack)     Venous thromboembolism        Past Surgical History:        Procedure Laterality Date    ADENOIDECTOMY      APPENDECTOMY      CARDIAC CATHETERIZATION  3/2009    CARDIAC CATHETERIZATION  11/5/14  JDT    EF 50%, patent bypass grafts    CHOLECYSTECTOMY  2011    COLONOSCOPY  06/03/2010    Dr. Lucio Lennox: distal colon having ulcerative lesions c/w UC    COLONOSCOPY  2009    pancolitis    COLONOSCOPY      CORONARY ARTERY BYPASS GRAFT  3/2009    Dr Audie García, LIMA to LAD, SVGs to OM & PDA    GASTRIC BYPASS SURGERY  2012    HIATAL HERNIA REPAIR  2011    HYSTERECTOMY (CERVIX STATUS UNKNOWN)      KNEE SURGERY      IA COLONOSCOPY FLX DX W/COLLJ SPEC WHEN PFRMD N/A 3/12/2018    Dr Riaz Segundo rectal inflammation consistent with U.C.-Active proctitis--3 yr recall    Maxwell Douglas  2010    Dr. Lana Shearer  2012       Home Medications:  Prior to Admission medications    Medication Sig Start Date End Date Taking? Authorizing Provider   gabapentin (NEURONTIN) 300 MG capsule Take 1 capsule by mouth at bedtime for 30 days.  Max Daily Amount: 300 mg 12/28/22 1/27/23  Fidelina Cat MD   escitalopram (LEXAPRO) 5 MG tablet Take 1 tablet by mouth daily 12/29/22   Fidelina Cat MD   dulaglutide (TRULICITY) 1.5 BN/9.4PR SC injection Inject 0.5 mLs into the skin once a week 12/30/22   Fidelina Cat MD   atorvastatin (LIPITOR) 40 MG tablet Take 1 tablet by mouth at bedtime 12/28/22   Fidelina Cat MD   hydroxychloroquine (PLAQUENIL) 200 MG tablet Take 1 tablet by mouth 2 times daily 12/28/22   Hansa Porter MD   amLODIPine (NORVASC) 2.5 MG tablet Take 1 tablet by mouth daily 12/29/22   Hansa Porter MD   miconazole (MICOTIN) 2 % powder Apply topically 2 times daily. 12/28/22   Hansa Porter MD   folic acid (FOLVITE) 1 MG tablet Take 1 tablet by mouth daily 12/28/22   Hansa Porter MD   bisacodyl (DULCOLAX) 5 MG EC tablet Take 1 tablet by mouth daily 12/29/22   Hansa Porter MD   docusate sodium (COLACE, DULCOLAX) 100 MG CAPS Take 100 mg by mouth 2 times daily 12/28/22   Hansa Porter MD   polyethylene glycol (GLYCOLAX) 17 g packet Take 17 g by mouth daily as needed for Constipation 12/28/22 1/27/23  Hansa Porter MD   Multiple Vitamin (MULTIVITAMIN) TABS tablet Take 1 tablet by mouth daily 12/29/22   Hansa Porter MD   clopidogrel (PLAVIX) 75 MG tablet Take 1 tablet by mouth daily 12/28/22   Hansa Porter MD       Allergies:    Methotrexate derivatives    Social History:    The patient currently lives at home with her . Tobacco:   reports that she quit smoking about 13 years ago. Her smoking use included cigarettes. She has a 64.00 pack-year smoking history. She has never used smokeless tobacco.  Alcohol:   reports current alcohol use. Illicit Drugs: denies    Family History:      Problem Relation Age of Onset    Prostate Cancer Father     Heart Failure Father     Cancer Father     Colon Cancer Neg Hx     Colon Polyps Neg Hx     Liver Cancer Neg Hx     Liver Disease Neg Hx     Esophageal Cancer Neg Hx     Rectal Cancer Neg Hx     Stomach Cancer Neg Hx        Review of Systems   Unable to perform ROS: Other      Physical Examination:  BP (!) 159/86   Pulse 73   Temp 97.7 °F (36.5 °C)   Resp 18   Wt 195 lb (88.5 kg)   SpO2 96%   BMI 30.54 kg/m²     Physical Exam  Constitutional:       General: She is not in acute distress. Appearance: Normal appearance. She is overweight. She is not ill-appearing.    HENT:      Mouth/Throat:      Mouth: Mucous membranes are moist.      Pharynx: Oropharynx is clear. Eyes:      Pupils: Pupils are equal, round, and reactive to light. Cardiovascular:      Rate and Rhythm: Normal rate and regular rhythm. Pulses: Normal pulses. Heart sounds: Normal heart sounds. No murmur heard. Pulmonary:      Effort: Pulmonary effort is normal. No respiratory distress. Breath sounds: Normal breath sounds. No wheezing or rhonchi. Abdominal:      General: Abdomen is flat. Bowel sounds are normal. There is no distension. Palpations: Abdomen is soft. Tenderness: There is no abdominal tenderness. There is no guarding. Musculoskeletal:      Cervical back: Normal range of motion. Right lower le+ Edema present. Left lower le+ Edema present. Comments: Edema baseline per . Skin:     Capillary Refill: Capillary refill takes less than 2 seconds. Neurological:      Mental Status: She is alert. Mental status is at baseline. Comments: Patient pleasant but unable to answer questions and responds with \"thank you. \"  reports this is baseline since her CVA. Diagnostic Data:    CBC:  Recent Labs     23  1654   WBC 14.8*   HGB 11.2*   HCT 36.3*   *       BMP:  Recent Labs     23  1654      K 4.2      CO2 24   BUN 8   CREATININE 0.7   CALCIUM 9.2     Recent Labs     23  1654   AST 37*   ALT 24   BILITOT 0.4   ALKPHOS 177*       Coag Panel:   Recent Labs     23  1654   INR 1.12   PROTIME 14.4   APTT 26.2       Cardiac Enzymes: No results for input(s): CKTOTAL, TROPONINI in the last 72 hours.     ABGs:  Lab Results   Component Value Date/Time    PHART 7.420 2022 06:07 PM    PO2ART 68.0 2022 06:07 PM    AWB4UEG 37.0 2022 06:07 PM       Urinalysis:  Lab Results   Component Value Date/Time    NITRU Negative 2022 07:13 PM    WBCUA 202 2022 07:13 PM    BACTERIA Rare 2022 07:13 PM    RBCUA 7 2022 07:13 PM    BLOODU TRACE 12/27/2022 07:13 PM    SPECGRAV 1.031 12/27/2022 07:13 PM    GLUCOSEU Negative 12/27/2022 07:13 PM       A1C: No results for input(s): LABA1C in the last 72 hours. ABG:No results for input(s): PHART, SOV6HLT, PO2ART, VYS5CUS, BEART, HGBAE, Y4BSNONI, CARBOXHGBART in the last 72 hours. Rad:    XR TIBIA FIBULA LEFT (2 VIEWS)    Result Date: 1/24/2023  Exam: Tib-fib radiographs, left History: Pain Comparison: None. Technique: 2 views acquired. Findings: The bone mineralization is normal.There is no acute fracture of the imaged osseous structures. Degenerative changes in the ankle joint. The alignment is anatomic and the joint spaces are preserved. Visualized portion of the knee and ankle joints are within normal limits. No focal soft tissue abnormality. No acute osseous abnormality. Assessment/Plan:   Principal Problem:    Left popliteal artery occlusion (HCC)   -Vascular surgery consulted and following   -Heparin bolus and gtt   -Coags ordered   -CTA abdomen/aorta; awaiting results    Active Problems:    Essential hypertension/Hyperlipidemia   -Home amlodipine & atorvastatin ordered   -Vital signs per routine      Type 2 diabetes mellitus with complication, without long-term current use of insulin (HCC)   -Low dose SSI   -Accuchecks AC/HS   -Hypoglycemia protocol    DVT Prophylaxis: Heparin gtt    GI prophylaxis:      Discharge planning:  TBD    Advance Directive: Full Code    Diet: ADULT DIET; Regular; 4 carb choices (60 gm/meal)  Diet NPO     Consults Made:   IP CONSULT TO VASCULAR SURGERY    Further Orders per Clinical course/attending. Signed:  Electronically signed by ALEENA Infante CNP on 1/24/23 at 5:17 PM CST     EMR Dragon/Transcription disclaimer:   Much of this encounter note is an electronic transcription/translation of spoken language to printed text.  The electronic translation of spoken language may permit erroneous, or at times, nonsensical words or phrases to be inadvertently transcribed; although attempts have made to review the note for such errors, some may still exist.

## 2023-01-25 VITALS
WEIGHT: 188.03 LBS | SYSTOLIC BLOOD PRESSURE: 152 MMHG | OXYGEN SATURATION: 91 % | TEMPERATURE: 97.5 F | HEART RATE: 81 BPM | BODY MASS INDEX: 28.5 KG/M2 | DIASTOLIC BLOOD PRESSURE: 58 MMHG | HEIGHT: 68 IN | RESPIRATION RATE: 18 BRPM

## 2023-01-25 LAB
ANION GAP SERPL CALCULATED.3IONS-SCNC: 11 MMOL/L (ref 7–19)
APTT: 183.2 SEC (ref 26–36.2)
APTT: 77.9 SEC (ref 26–36.2)
APTT: 97.5 SEC (ref 26–36.2)
BASOPHILS ABSOLUTE: 0.2 K/UL (ref 0–0.2)
BASOPHILS RELATIVE PERCENT: 1.5 % (ref 0–1)
BUN BLDV-MCNC: 7 MG/DL (ref 8–23)
CALCIUM SERPL-MCNC: 8.6 MG/DL (ref 8.8–10.2)
CHLORIDE BLD-SCNC: 100 MMOL/L (ref 98–111)
CO2: 23 MMOL/L (ref 22–29)
CREAT SERPL-MCNC: 0.7 MG/DL (ref 0.5–0.9)
D DIMER: 3.97 UG/ML FEU (ref 0–0.48)
EKG P AXIS: 50 DEGREES
EKG P-R INTERVAL: 124 MS
EKG Q-T INTERVAL: 410 MS
EKG QRS DURATION: 112 MS
EKG QTC CALCULATION (BAZETT): 438 MS
EKG T AXIS: 1 DEGREES
EOSINOPHILS ABSOLUTE: 0.4 K/UL (ref 0–0.6)
EOSINOPHILS RELATIVE PERCENT: 2.6 % (ref 0–5)
FERRITIN: 72.6 NG/ML (ref 13–150)
FOLATE: >20 NG/ML (ref 4.8–37.3)
GFR SERPL CREATININE-BSD FRML MDRD: >60 ML/MIN/{1.73_M2}
GLUCOSE BLD-MCNC: 118 MG/DL (ref 70–99)
GLUCOSE BLD-MCNC: 90 MG/DL (ref 74–109)
GLUCOSE BLD-MCNC: 96 MG/DL (ref 70–99)
GLUCOSE BLD-MCNC: 98 MG/DL (ref 70–99)
GLUCOSE BLD-MCNC: 99 MG/DL (ref 70–99)
HBA1C MFR BLD: 5.5 % (ref 4–6)
HCT VFR BLD CALC: 33.3 % (ref 37–47)
HEMOGLOBIN: 10.2 G/DL (ref 12–16)
IMMATURE GRANULOCYTES #: 0.1 K/UL
IRON SATURATION: 8 % (ref 14–50)
IRON: 26 UG/DL (ref 37–145)
LIPASE: 14 U/L (ref 13–60)
LYMPHOCYTES ABSOLUTE: 2.1 K/UL (ref 1.1–4.5)
LYMPHOCYTES RELATIVE PERCENT: 14.4 % (ref 20–40)
MAGNESIUM: 1.8 MG/DL (ref 1.6–2.4)
MCH RBC QN AUTO: 23.5 PG (ref 27–31)
MCHC RBC AUTO-ENTMCNC: 30.6 G/DL (ref 33–37)
MCV RBC AUTO: 76.7 FL (ref 81–99)
MONOCYTES ABSOLUTE: 1.7 K/UL (ref 0–0.9)
MONOCYTES RELATIVE PERCENT: 12.1 % (ref 0–10)
NEUTROPHILS ABSOLUTE: 9.9 K/UL (ref 1.5–7.5)
NEUTROPHILS RELATIVE PERCENT: 68.9 % (ref 50–65)
PDW BLD-RTO: 17.3 % (ref 11.5–14.5)
PERFORMED ON: ABNORMAL
PERFORMED ON: NORMAL
PLATELET # BLD: 499 K/UL (ref 130–400)
PMV BLD AUTO: 9.4 FL (ref 9.4–12.3)
POTASSIUM SERPL-SCNC: 3.9 MMOL/L (ref 3.5–5)
RBC # BLD: 4.34 M/UL (ref 4.2–5.4)
SODIUM BLD-SCNC: 134 MMOL/L (ref 136–145)
TOTAL IRON BINDING CAPACITY: 341 UG/DL (ref 250–400)
TSH SERPL DL<=0.05 MIU/L-ACNC: 1.22 UIU/ML (ref 0.27–4.2)
VITAMIN B-12: 618 PG/ML (ref 211–946)
VITAMIN D 25-HYDROXY: 35.9 NG/ML
WBC # BLD: 14.4 K/UL (ref 4.8–10.8)

## 2023-01-25 PROCEDURE — 83036 HEMOGLOBIN GLYCOSYLATED A1C: CPT

## 2023-01-25 PROCEDURE — 93010 ELECTROCARDIOGRAM REPORT: CPT | Performed by: INTERNAL MEDICINE

## 2023-01-25 PROCEDURE — 6360000002 HC RX W HCPCS: Performed by: PHYSICIAN ASSISTANT

## 2023-01-25 PROCEDURE — 6360000002 HC RX W HCPCS: Performed by: HOSPITALIST

## 2023-01-25 PROCEDURE — 80048 BASIC METABOLIC PNL TOTAL CA: CPT

## 2023-01-25 PROCEDURE — 85379 FIBRIN DEGRADATION QUANT: CPT

## 2023-01-25 PROCEDURE — 82962 GLUCOSE BLOOD TEST: CPT

## 2023-01-25 PROCEDURE — 51798 US URINE CAPACITY MEASURE: CPT

## 2023-01-25 PROCEDURE — 6360000002 HC RX W HCPCS: Performed by: STUDENT IN AN ORGANIZED HEALTH CARE EDUCATION/TRAINING PROGRAM

## 2023-01-25 PROCEDURE — 2580000003 HC RX 258: Performed by: HOSPITALIST

## 2023-01-25 PROCEDURE — 85730 THROMBOPLASTIN TIME PARTIAL: CPT

## 2023-01-25 PROCEDURE — 85025 COMPLETE CBC W/AUTO DIFF WBC: CPT

## 2023-01-25 PROCEDURE — 1210000000 HC MED SURG R&B

## 2023-01-25 PROCEDURE — 6370000000 HC RX 637 (ALT 250 FOR IP)

## 2023-01-25 PROCEDURE — 36415 COLL VENOUS BLD VENIPUNCTURE: CPT

## 2023-01-25 RX ORDER — ESCITALOPRAM OXALATE 10 MG/1
5 TABLET ORAL DAILY
Status: DISCONTINUED | OUTPATIENT
Start: 2023-01-25 | End: 2023-01-26 | Stop reason: HOSPADM

## 2023-01-25 RX ORDER — INSULIN LISPRO 100 [IU]/ML
0-4 INJECTION, SOLUTION INTRAVENOUS; SUBCUTANEOUS NIGHTLY
Status: DISCONTINUED | OUTPATIENT
Start: 2023-01-25 | End: 2023-01-26 | Stop reason: HOSPADM

## 2023-01-25 RX ORDER — MORPHINE SULFATE 2 MG/ML
2 INJECTION, SOLUTION INTRAMUSCULAR; INTRAVENOUS EVERY 4 HOURS PRN
Status: DISCONTINUED | OUTPATIENT
Start: 2023-01-25 | End: 2023-01-26 | Stop reason: HOSPADM

## 2023-01-25 RX ORDER — HYDRALAZINE HYDROCHLORIDE 20 MG/ML
INJECTION INTRAMUSCULAR; INTRAVENOUS
Status: DISCONTINUED
Start: 2023-01-25 | End: 2023-01-26 | Stop reason: HOSPADM

## 2023-01-25 RX ORDER — MORPHINE SULFATE 2 MG/ML
2 INJECTION, SOLUTION INTRAMUSCULAR; INTRAVENOUS EVERY 4 HOURS PRN
Status: DISCONTINUED | OUTPATIENT
Start: 2023-01-25 | End: 2023-01-25

## 2023-01-25 RX ORDER — CLOPIDOGREL BISULFATE 75 MG/1
75 TABLET ORAL DAILY
Status: DISCONTINUED | OUTPATIENT
Start: 2023-01-25 | End: 2023-01-26 | Stop reason: HOSPADM

## 2023-01-25 RX ORDER — HYDRALAZINE HYDROCHLORIDE 20 MG/ML
5 INJECTION INTRAMUSCULAR; INTRAVENOUS EVERY 4 HOURS PRN
Status: DISCONTINUED | OUTPATIENT
Start: 2023-01-25 | End: 2023-01-26 | Stop reason: HOSPADM

## 2023-01-25 RX ORDER — GABAPENTIN 300 MG/1
300 CAPSULE ORAL NIGHTLY
Status: DISCONTINUED | OUTPATIENT
Start: 2023-01-25 | End: 2023-01-26 | Stop reason: HOSPADM

## 2023-01-25 RX ORDER — OXYCODONE HYDROCHLORIDE 5 MG/1
TABLET ORAL
Status: COMPLETED
Start: 2023-01-25 | End: 2023-01-25

## 2023-01-25 RX ORDER — MORPHINE SULFATE 4 MG/ML
4 INJECTION, SOLUTION INTRAMUSCULAR; INTRAVENOUS EVERY 4 HOURS PRN
Status: DISCONTINUED | OUTPATIENT
Start: 2023-01-25 | End: 2023-01-26 | Stop reason: HOSPADM

## 2023-01-25 RX ORDER — AMLODIPINE BESYLATE 5 MG/1
2.5 TABLET ORAL DAILY
Status: DISCONTINUED | OUTPATIENT
Start: 2023-01-25 | End: 2023-01-26 | Stop reason: HOSPADM

## 2023-01-25 RX ORDER — INSULIN LISPRO 100 [IU]/ML
0-4 INJECTION, SOLUTION INTRAVENOUS; SUBCUTANEOUS
Status: DISCONTINUED | OUTPATIENT
Start: 2023-01-25 | End: 2023-01-26 | Stop reason: HOSPADM

## 2023-01-25 RX ORDER — HYDROXYCHLOROQUINE SULFATE 200 MG/1
200 TABLET, FILM COATED ORAL 2 TIMES DAILY
Status: DISCONTINUED | OUTPATIENT
Start: 2023-01-25 | End: 2023-01-26 | Stop reason: HOSPADM

## 2023-01-25 RX ORDER — ATORVASTATIN CALCIUM 40 MG/1
40 TABLET, FILM COATED ORAL NIGHTLY
Status: DISCONTINUED | OUTPATIENT
Start: 2023-01-25 | End: 2023-01-26 | Stop reason: HOSPADM

## 2023-01-25 RX ORDER — MORPHINE SULFATE 4 MG/ML
INJECTION, SOLUTION INTRAMUSCULAR; INTRAVENOUS
Status: DISCONTINUED
Start: 2023-01-25 | End: 2023-01-26 | Stop reason: HOSPADM

## 2023-01-25 RX ORDER — OXYCODONE HYDROCHLORIDE 5 MG/1
5 TABLET ORAL EVERY 4 HOURS PRN
Status: DISCONTINUED | OUTPATIENT
Start: 2023-01-25 | End: 2023-01-26 | Stop reason: HOSPADM

## 2023-01-25 RX ORDER — DEXTROSE MONOHYDRATE 100 MG/ML
INJECTION, SOLUTION INTRAVENOUS CONTINUOUS PRN
Status: DISCONTINUED | OUTPATIENT
Start: 2023-01-25 | End: 2023-01-26 | Stop reason: HOSPADM

## 2023-01-25 RX ORDER — FOLIC ACID 1 MG/1
1 TABLET ORAL DAILY
Status: DISCONTINUED | OUTPATIENT
Start: 2023-01-25 | End: 2023-01-26 | Stop reason: HOSPADM

## 2023-01-25 RX ADMIN — ESCITALOPRAM OXALATE 5 MG: 10 TABLET ORAL at 08:44

## 2023-01-25 RX ADMIN — HEPARIN SODIUM 16 UNITS/KG/HR: 10000 INJECTION, SOLUTION INTRAVENOUS at 01:15

## 2023-01-25 RX ADMIN — IRON SUCROSE 200 MG: 20 INJECTION, SOLUTION INTRAVENOUS at 08:44

## 2023-01-25 RX ADMIN — SODIUM CHLORIDE: 9 INJECTION, SOLUTION INTRAVENOUS at 11:22

## 2023-01-25 RX ADMIN — OXYCODONE HYDROCHLORIDE 5 MG: 5 TABLET ORAL at 08:54

## 2023-01-25 RX ADMIN — ONDANSETRON 4 MG: 2 INJECTION INTRAMUSCULAR; INTRAVENOUS at 17:58

## 2023-01-25 RX ADMIN — SODIUM CHLORIDE: 9 INJECTION, SOLUTION INTRAVENOUS at 00:56

## 2023-01-25 RX ADMIN — AMLODIPINE BESYLATE 2.5 MG: 5 TABLET ORAL at 08:44

## 2023-01-25 RX ADMIN — FOLIC ACID 1 MG: 1 TABLET ORAL at 08:44

## 2023-01-25 RX ADMIN — HEPARIN SODIUM 15 UNITS/KG/HR: 10000 INJECTION, SOLUTION INTRAVENOUS at 11:24

## 2023-01-25 RX ADMIN — MORPHINE SULFATE 4 MG: 4 INJECTION, SOLUTION INTRAMUSCULAR; INTRAVENOUS at 18:47

## 2023-01-25 RX ADMIN — HYDROXYCHLOROQUINE SULFATE 200 MG: 200 TABLET ORAL at 08:44

## 2023-01-25 ASSESSMENT — PAIN DESCRIPTION - LOCATION
LOCATION: ABDOMEN
LOCATION: ABDOMEN
LOCATION: LEG

## 2023-01-25 ASSESSMENT — PAIN DESCRIPTION - PAIN TYPE
TYPE: ACUTE PAIN

## 2023-01-25 ASSESSMENT — PAIN DESCRIPTION - ORIENTATION
ORIENTATION: LEFT
ORIENTATION: LOWER;MID

## 2023-01-25 ASSESSMENT — PAIN SCALES - WONG BAKER
WONGBAKER_NUMERICALRESPONSE: 0
WONGBAKER_NUMERICALRESPONSE: 10
WONGBAKER_NUMERICALRESPONSE: 8

## 2023-01-25 ASSESSMENT — PAIN DESCRIPTION - DESCRIPTORS
DESCRIPTORS: ACHING
DESCRIPTORS: DISCOMFORT

## 2023-01-25 ASSESSMENT — PAIN - FUNCTIONAL ASSESSMENT
PAIN_FUNCTIONAL_ASSESSMENT: PREVENTS OR INTERFERES SOME ACTIVE ACTIVITIES AND ADLS
PAIN_FUNCTIONAL_ASSESSMENT: PREVENTS OR INTERFERES SOME ACTIVE ACTIVITIES AND ADLS

## 2023-01-25 ASSESSMENT — PAIN SCALES - GENERAL: PAINLEVEL_OUTOF10: 0

## 2023-01-25 NOTE — PROGRESS NOTES
[facility]      [facility] Note    Patient:  Panchito Christensen  YOB: 1960  Date of Service: 1/25/2023  MRN: [id number]   Acct: [de-identified]   Primary Care Physician: Jacinda Gibbons DO  Advance Directive: Full Code  Admit Date: 1/24/2023       Hospital Day: 1    Portions of this note have been copied forward, however, updated to reflect the most current clinical status of this patient. CHIEF COMPLAINT Left leg pain     SUBJECTIVE:  Ms. Deandre Rascon was sleeping this morning. Patient has expressive aphasia. Per  patient has not slept well for past couple days due to LLE pain. CUMULATIVE HOSPITAL COURSE:   The patient is a 58 y.o female with PMH of recent stroke (Nov 2022) sent home on Plavix, IVC filter (more than 10 years ago), diabetes, hypertension, hyperlipidemia, COPD, Ulcerative colitis, and MI who presented to [facility] ED with complaints of left leg pain. Patient does have residual right sided weakness and expressive aphasia from the recent stroke, therefore HPI provided by . Per , patient had leg discomfort and pain for past couple days prior to admission. Reported having severe pain to the left lower leg night prior to admission that prompted visit to ED. Workup in ED revealed Incidental findings of left popliteal artery occlusion on Venous study of LLE, negative for DVT. CTA Abdominal aorta was obtained, which indicated filling defect within the distal thoracic aorta, thrombus measuring up to 1 cm, linear abnormality within the similar aorta of aortic concerning for possible dissection, total occlusion of long segment of left popliteal artery, significant stenosis posteriorly within the left internal iliac artery, abnormal appearance of the right renal parenchyma and splenic, possible infarction. CTA chest indicated thrombus in the descending aorta measuring 2.3 x 1.2 cm with multiple organ infarcts, diffuse infarct of the right kidney, majority of the spleen.   Heparin drip was initiated in ED. Vascular surgery was consulted whom recommended transfer to [facility] because of thoracic aortic thrombus/embolus, embolizing spleen and bilateral lower extremities. Transfer to [facility] in [place] initiated. Patient accepted by Dr. Eric Lacey (hospitalist), awaiting bed availability. Review of Systems   Unable to perform ROS: Patient nonverbal (Expressive aphasia from recent stroke)      Objective:   VITALS:  BP (!) 154/82 Comment: RN notified  Pulse 88   Temp 98.2 °F (36.8 °C) (Oral)   Resp 16   Ht 5' 8\" (1.727 m)   Wt 188 lb 0.5 oz (85.3 kg)   SpO2 90%   BMI 28.59 kg/m²   24HR INTAKE/OUTPUT:    Intake/Output Summary (Last 24 hours) at 1/25/2023 1634  Last data filed at 1/25/2023 1537  Gross per 24 hour   Intake 1324 ml   Output 400 ml   Net 924 ml         Physical Exam  Constitutional:       General: She is not in acute distress. Appearance: Normal appearance. She is not toxic-appearing or diaphoretic. Comments: Patient sleeping currently    HENT:      Head: Normocephalic and atraumatic. Right Ear: External ear normal.      Left Ear: External ear normal.      Nose: Nose normal. No congestion or rhinorrhea. Mouth/Throat:      Mouth: Mucous membranes are moist.      Pharynx: Oropharynx is clear. Eyes:      General: No scleral icterus. Extraocular Movements: Extraocular movements intact. Conjunctiva/sclera: Conjunctivae normal.   Cardiovascular:      Rate and Rhythm: Normal rate and regular rhythm. Pulses: Normal pulses. Heart sounds: Normal heart sounds. No murmur heard. No friction rub. No gallop. Pulmonary:      Effort: Pulmonary effort is normal. No respiratory distress. Breath sounds: Normal breath sounds. No wheezing, rhonchi or rales. Abdominal:      General: Abdomen is flat. Bowel sounds are normal. There is no distension. Palpations: Abdomen is soft. Tenderness:  There is no abdominal tenderness. Musculoskeletal:         General: No swelling. Normal range of motion. Cervical back: Normal range of motion and neck supple. Right lower leg: No edema. Left lower leg: No edema. Skin:     General: Skin is warm and dry. Coloration: Skin is not jaundiced. Findings: No erythema, lesion or rash. Comments: Adequate capillary refill to BLE   BLE warm to touch    Neurological:      Mental Status: She is disoriented.           Medications:      dextrose      heparin (PORCINE) Infusion 11 Units/kg/hr (01/25/23 1515)    sodium chloride 75 mL/hr at 01/25/23 1122    sodium chloride        amLODIPine  2.5 mg Oral Daily    atorvastatin  40 mg Oral Nightly    [Held by provider] clopidogrel  75 mg Oral Daily    escitalopram  5 mg Oral Daily    folic acid  1 mg Oral Daily    gabapentin  300 mg Oral Nightly    hydroxychloroquine  200 mg Oral BID    insulin lispro  0-4 Units SubCUTAneous TID WC    insulin lispro  0-4 Units SubCUTAneous Nightly    iron sucrose  200 mg IntraVENous Daily    sodium chloride flush  5-40 mL IntraVENous 2 times per day     glucose, dextrose bolus **OR** dextrose bolus, glucagon (rDNA), dextrose, oxyCODONE, heparin (porcine), heparin (porcine), sodium chloride flush, sodium chloride, ondansetron **OR** ondansetron, polyethylene glycol, acetaminophen **OR** acetaminophen  Diet NPO Exceptions are: Ice Chips, Sips of Water with Meds     Lab and other Data:     Recent Labs     01/24/23 1654 01/25/23  0629   WBC 14.8* 14.4*   HGB 11.2* 10.2*   * 499*     Recent Labs     01/24/23  1654 01/25/23  0629    134*   K 4.2 3.9    100   CO2 24 23   BUN 8 7*   CREATININE 0.7 0.7   GLUCOSE 90 90     Recent Labs     01/24/23 1654   AST 37*   ALT 24   BILITOT 0.4   ALKPHOS 177*     INR:   Recent Labs     01/24/23 1654   INR 1.12       A1C:   Recent Labs     01/25/23  0629   LABA1C 5.5       RAD:     XR TIBIA FIBULA LEFT (2 VIEWS)  Result Date: 1/24/2023    No fracture or dislocation. Electronically signed by Vero Polanco MD on 01-24-23 at [address]  Result Date: 1/24/2023    1. Known thrombus in the descending thoracic aorta, measuring 2.3 x 1.2 cm. This is in keeping with the multiple organ infarcts, described below. 2. Diffuse infarcts of the right kidney and a majority of the spleen. The left lower pole  is not included in the field of view and may be infarcted as well. CTA of the abdomen and pelvis recommended. Vascular surgical consultation recommended. 3. No focal infiltrate, pleural effusion, or pneumothorax. Moderate centrilobular emphysema. Electronically signed by Vero Polanco MD on 01-24-23 at 2247      VL Extremity Venous Left  Result Date: 1/24/2023    Impression   Normal venous duplex study of the left lower extremity. There is no  evidence of deep or superficial venous thrombosis. Incidental finding left popliteal artery occlusion. Signature   ----------------------------------------------------------------  Electronically signed by Сергей Crooks       CTA ABDOMINAL AORTA W BILAT RUNOFF W CONTRAST  Result Date: 1/24/2023    1. Filling defect within the distal thoracic aorta. A thrombus is suspected measuring up to 1 cm. A linear abnormality within the similar area of the aorta is concerning for possible dissection. Surrounding fat is unremarkable 2. Total occlusion of a long segment of the left popliteal artery. Attenuation of flow into the lower extremity. Flow detected to the level of the dorsalis pedis artery however decreased. 3. Attenuation of enhancement/flow within the right lower extremity involving the anterior and posterior tibial arteries. No flow seen within the right dorsalis pedis artery. 4. Hemodynamically significant stenosis posteriorly within the left internal iliac artery. 5. Abnormal appearance of the right renal parenchyma and spleen.  Uncertain if this is related to arterial flow or represents component of infection or possibly infarction. All CT scans at this facility utilize dose modulation, iterative reconstruction, and/or weight based dosing when appropriate to reduce radiation dose to as low as reasonably achievable. Amended by Cherise Burt MD, MQSA CERTIFIED at [id number] 08:38:10 PM EST Dictated and Electronically Signed by ZHOU Flannery MD CERTIFIED at [id number] 08:22:37 PM EST                 Micro:    COVID-19, Rapid [[id number]] Collected: 01/24/23 1723   Order Status: Completed Specimen: Nasopharyngeal Swab Updated: 01/24/23 1800    SARS-CoV-2, NAAT Not Detected       Assessment/Plan   Principal Problem:    Left popliteal artery occlusion (HCC)  Active Problems:    COPD (chronic obstructive pulmonary disease) ([facility] [address]    Essential hypertension    Type 2 diabetes mellitus with complication, without long-term current use of insulin (HCC)    Thrombosis    Hyperlipidemia  Resolved Problems:    * No resolved hospital problems. *      Principal Problem:    Left popliteal artery occlusion (HCC)/ Thrombosis-    - Vascular surgery followin    - Continue Heparin gtt   - Accepted to [facility] in [place], awaiting bed availability         Active Problems:    COPD (chronic obstructive pulmonary disease) ([address]-    - Supplemental oxygent as needed       Essential hypertension-    - Elevated at times   - Continue current medications    - Monitor BP closely       Type 2 diabetes mellitus with complication, without long-term current use of insulin ([address]-    - A1c 5.5   - SSI   - Accu-Checks    - Hypoglycemia treatment protocol in place         Hyperlipidemia-    - continue statin           DVT Prophylaxis: Heparin gtt     Discharge planning: Accepted to [facility] in [place], awaiting bed availability        Further Orders per Clinical course/attending.      Electronically signed by ALEENA Abraham CNP on 1/25/2023 at 4:34 PM       EMR Dragon/Transcription disclaimer:   Much of this encounter note is an electronic transcription/translation of spoken language to printed text.  The electronic translation of spoken language may permit erroneous, or at times, nonsensical words or phrases to be inadvertently transcribed; although attempts have made to review the note for such errors, some may still exist.

## 2023-01-25 NOTE — ED PROVIDER NOTES
[facility] EMERGENCY DEPT  EMERGENCY DEPARTMENT ENCOUNTER      Pt Name: Isaias Crump  MRN: [id number]  [place] 1960  Date of evaluation: 1/24/2023  Provider: Alma Varner MD    [address] [place] [address]       Chief Complaint   Patient presents with    Leg Pain     Left leg, no known injury         PHYSICAL EXAM    (up to 7 for level 4, 8 or more for level 5)     ED Triage Vitals [01/24/23 1425]   BP Temp Temp src Heart Rate Resp SpO2 Height Weight   (!) 159/86 97.7 °F (36.5 °C) -- 73 18 96 % -- 195 lb (88.5 kg)       Physical Exam    DIAGNOSTIC RESULTS     EKG: All EKG's are interpreted by the Emergency Department Physician who either signs or Co-signs this chart in the absence of a cardiologist.        RADIOLOGY:   Non-plain film images such as CT, Ultrasound and MRI are read by the radiologist. Michelle Redman radiographicimages are visualized and preliminarily interpreted by the emergency physician with the below findings:        CTA ABDOMINAL AORTA W BILAT RUNOFF W CONTRAST   Final Result   1. Filling defect within the distal thoracic aorta. A thrombus is suspected measuring up to 1 cm. A linear abnormality within the similar area of the aorta is concerning for possible dissection. Surrounding fat is unremarkable   2. Total occlusion of a long segment of the left popliteal artery. Attenuation of flow into the lower extremity. Flow detected to the level of the dorsalis pedis artery however decreased. 3. Attenuation of enhancement/flow within the right lower extremity involving the anterior and posterior tibial arteries. No flow seen within the right dorsalis pedis artery. 4. Hemodynamically significant stenosis posteriorly within the left internal iliac artery. 5. Abnormal appearance of the right renal parenchyma and spleen. Uncertain if this is related to arterial flow or represents component of infection or possibly infarction.    All CT scans at this facility utilize dose modulation, iterative reconstruction, and/or weight based dosing when appropriate to reduce radiation dose to as low as reasonably achievable. Amended by Leeann Coffman MD, MQSA CERTIFIED at [id number] 08:38:10 PM EST   Dictated and Electronically Signed by Leeann Coffman MD, [address] [facility] at [id number] 08:22:37 PM EST               XR TIBIA FIBULA LEFT (2 VIEWS)   Final Result   No acute osseous abnormality. VL Extremity Venous Left         CTA CHEST W WO CONTRAST    (Results Pending)           LABS:  Labs Reviewed   CBC - Abnormal; Notable for the following components:       Result Value    WBC 14.8 (*)     Hemoglobin 11.2 (*)     Hematocrit 36.3 (*)     MCV 79.4 (*)     MCH 24.5 (*)     MCHC 30.9 (*)     RDW 17.6 (*)     Platelets 419 (*)     MPV 9.3 (*)     All other components within normal limits   COMPREHENSIVE METABOLIC PANEL - Abnormal; Notable for the following components: Total Protein 6.5 (*)     Albumin 3.4 (*)     Alkaline Phosphatase 177 (*)     AST 37 (*)     All other components within normal limits   COVID-19, RAPID   APTT   PROTIME-INR       All other labs were within normal range or not returned as of this dictation. EMERGENCY DEPARTMENT COURSE and DIFFERENTIALDIAGNOSIS/MDM:   Vitals:    Vitals:    01/24/23 1836 01/24/23 1958 01/24/23 2054 01/24/23 2143   BP: (!) 147/72 (!) 156/75 (!) 147/94 (!) 141/71   Pulse: 87 88  81   Resp: 20 20 20 20   Temp:       SpO2:   98% 95%   Weight:           MDM      Reassessment    ED Course as of 01/24/23 2219 Tue Jan 24, 2023 1917 Discussed case with physician assistant after presentation here and ultrasound that showed popliteal artery occlusion. CTA was ordered and case was discussed with vascular surgery. Heparin infusion was started. CTA shows a thoracic aortic thrombus and evidence of splenic infarcts as well as bilateral lower extremity thromboemboli with occlusion.   Given the extent of thrombus and location in the thoracic aorta, recommends transfer to higher level of care [GIOVANNI]   1925 Discussed with [place] [facility] [place] [GIOVANNI]   1959 EKG shows sinus rhythm with a rate of 76. Inferior and anterior T wave inversions with diffuse low voltage. Overall unchanged compared to prior EKG from November [GIOVANNI]   1959 Discussed with [facility] transfer center [GIOVANNI]   2123 Discussed with [place]  transfer center in [facility]. Still waiting to hear back from [facility] [GIOVANNI]   2159 Discussed with Dr. Omid Mann, vascular surgeon at [address] tentatively agrees to accept I requested a CTA of the chest to evaluate the ascending and more proximal descending aorta. [GIOVANNI]   2217 With vascular surgeon Dr. Lizeth Mosquera and [facility] in [place]. After discussion of the case he does not believe he has any other capabilities there than what we are available to do here and does not feel that warrants would essentially be a lateral transfer at this time [GIOVANNI]      ED Course User Index  [GIOVANNI] Gilford Hopping., MD       CONSULTS:  IP CONSULT TO VASCULAR SURGERY    PROCEDURES:  Unless otherwise noted below, none     Procedures    CRITICAL CARE TIME   Total Critical Care time was 45 minutes, excluding separately reportable procedures. There was a high probability of clinically significant/life threatening deterioration in the patient's condition which required my urgent intervention. FINAL IMPRESSION     1. Left popliteal artery occlusion (HCC)    2. Aortic thrombus (HCC)    3. History of CVA in adulthood          DISPOSITION/PLAN   DISPOSITION Decision To Transfer    PATIENT REFERRED TO:  No follow-up provider specified.     DISCHARGE MEDICATIONS:  New Prescriptions    No medications on file          (Please note that portions of this note were completed with a voice recognition program.  Efforts were made to edit the dictations but occasionally words aremis-transcribed.)    Gilford Hopping, MD (electronically signed)  Emergency Physician        Gilford Hopping., MD  01/24/23 5431

## 2023-01-25 NOTE — CARE COORDINATION
Patient is current with [address] [address] for Skilled Nursing, PT, OT, ST Services. Will follow. Please advise when patient discharges. WILL NEED RESUMPTION OF CARE ORDERS TO RESUME HH SERVICES WHEN PATIENT DISCHARGES. Thank you. [address] [place] [address] [phone]. [phone].     Mickie Hall RN, Home Care Liaison  [phone] P  Electronically signed by Mickie Hall on 1/25/2023 at 8:52 AM

## 2023-01-25 NOTE — DISCHARGE SUMMARY
[facility]ists    Discharge Summary      Gamal Bran  :  1960  MRN:  [id number]    Admit date:  2023  Discharge date:  2023    Discharging Physician:  Dr. Abel Mcgowan     Advance Directive: Full Code    Consults: vascular surgery    Primary Care Physician:  Ameena Ramirez DO    Discharge Diagnoses:  Principal Problem:    Left popliteal artery occlusion [facility])  Active Problems:    COPD (chronic obstructive pulmonary disease) ([facility]    Essential hypertension    Type 2 diabetes mellitus with complication, without long-term current use of insulin ([facility]    Thrombosis    Hyperlipidemia  Resolved Problems:    * No resolved hospital problems. *      Portions of this note have been copied forward, however, changed to reflect the most current clinical status of this patient. Hospital Course: The patient is a 58 y.o female with PMH of recent stroke (2022) sent home on Plavix, IVC filter (more than 10 years ago), diabetes, hypertension, hyperlipidemia, COPD, Ulcerative colitis, and MI who presented to [facility] ED with complaints of left leg pain. Patient does have residual right sided weakness and expressive aphasia from the recent stroke, therefore HPI provided by . Per , patient had leg discomfort and pain for past couple days prior to admission. Reported having severe pain to the left lower leg night prior to admission that prompted visit to ED. Workup in ED revealed Incidental findings of left popliteal artery occlusion on Venous study of LLE, negative for DVT. CTA Abdominal aorta was obtained, which indicated filling defect within the distal thoracic aorta, thrombus measuring up to 1 cm, linear abnormality within the similar aorta of aortic concerning for possible dissection, total occlusion of long segment of left popliteal artery, significant stenosis posteriorly within the left internal iliac artery, abnormal appearance of the right renal parenchyma and splenic, possible infarction. CTA chest indicated thrombus in the descending aorta measuring 2.3 x 1.2 cm with multiple organ infarcts, diffuse infarct of the right kidney, majority of the spleen. Heparin drip was initiated in ED. Vascular surgery was consulted whom recommended transfer to [facility] because of thoracic aortic thrombus/embolus, embolizing spleen and bilateral lower extremities. Transfer to [facility] in [place], [place] initiated. Case discussed with vascular surgery and hospitalist at [facility].  Patient accepted by Dr. Concepcion Mata (hospitalist). Patient is in stable condition to be transferred to [facility] in [place] for further management. Significant Diagnostic Studies:     XR TIBIA FIBULA LEFT (2 VIEWS)  Result Date: 1/24/2023    No fracture or dislocation. Electronically signed by Derick Ferrara MD on 01-24-23 at [address]  Result Date: 1/24/2023    1. Known thrombus in the descending thoracic aorta, measuring 2.3 x 1.2 cm. This is in keeping with the multiple organ infarcts, described below. 2. Diffuse infarcts of the right kidney and a majority of the spleen. The left lower pole  is not included in the field of view and may be infarcted as well. CTA of the abdomen and pelvis recommended. Vascular surgical consultation recommended. 3. No focal infiltrate, pleural effusion, or pneumothorax. Moderate centrilobular emphysema. Electronically signed by Derick Ferrara MD on 01-24-23 at 2247      VL Extremity Venous Left  Result Date: 1/24/2023    Impression   Normal venous duplex study of the left lower extremity. There is no  evidence of deep or superficial venous thrombosis. Incidental finding left popliteal artery occlusion.    Signature   ----------------------------------------------------------------  Electronically signed by Catia Xie MD(Interpreting  physician) on 01/24/2023 10:54 PM        CTA ABDOMINAL AORTA W BILAT RUNOFF W CONTRAST  Result Date: 1/24/2023    1. Filling defect within the distal thoracic aorta. A thrombus is suspected measuring up to 1 cm. A linear abnormality within the similar area of the aorta is concerning for possible dissection. Surrounding fat is unremarkable 2. Total occlusion of a long segment of the left popliteal artery. Attenuation of flow into the lower extremity. Flow detected to the level of the dorsalis pedis artery however decreased. 3. Attenuation of enhancement/flow within the right lower extremity involving the anterior and posterior tibial arteries. No flow seen within the right dorsalis pedis artery. 4. Hemodynamically significant stenosis posteriorly within the left internal iliac artery. 5. Abnormal appearance of the right renal parenchyma and spleen. Uncertain if this is related to arterial flow or represents component of infection or possibly infarction. All CT scans at this facility utilize dose modulation, iterative reconstruction, and/or weight based dosing when appropriate to reduce radiation dose to as low as reasonably achievable. Amended by Kevin Adhikari MD, MQSA CERTIFIED at [phone] 08:38:10 PM EST Dictated and Electronically Signed by Kevin Adhikari MD MQSA CERTIFIED at [id number] 08:22:37 PM EST               Pertinent Labs:   CBC:   Recent Labs     01/24/23  1654 01/25/23  0629   WBC 14.8* 14.4*   HGB 11.2* 10.2*   * 499*     BMP:    Recent Labs     01/24/23  1654 01/25/23  0629    134*   K 4.2 3.9    100   CO2 24 23   BUN 8 7*   CREATININE 0.7 0.7   GLUCOSE 90 90     INR:   Recent Labs     01/24/23  1654   INR 1.12       Physical Exam:   Vital Signs: BP (!) 154/82 Comment: RN notified  Pulse 88   Temp 98.2 °F (36.8 °C) (Oral)   Resp 16   Ht 5' 8\" (1.727 m)   Wt 188 lb 0.5 oz (85.3 kg)   SpO2 90%   BMI 28.59 kg/m²   General appearance:. Alert and Cooperative   HEENT: Normocephalic. Chest: Lung sounds clear bilaterally without wheezes or rhonchi.   Cardiac: RRR, S1, S2 normal. No murmurs, gallops, or rubs auscultated. Abdomen: soft, non-tender; non-distended normal bowel sounds no masses, no organomegaly. Extremities: No clubbing or cyanosis. No peripheral edema. Peripheral pulses palpable. Neurologic: Grossly intact. Discharge Medications:       Final medication reconciliation to be completed upon discharge from tertiary care facility. Discharge Instructions: Follow up with [facility], DO in 7 days after being discharged from tertiary care facility. Follow-up with specialty providers as recommended. Take medications as directed. Resume activity as tolerated. Diet: ADULT DIET; Regular; 4 carb choices (60 gm/meal)     Disposition: Patient is in stable to be transferred to [facility] in [facility]. Time spent on discharge 38 minutes spent in assessing patient, reviewing medications, discussion with nursing, confirming safe discharge plan and preparation of discharge summary. Signed:  Electronically signed by ALEENA Causey CNP on 1/25/23 at 5:04 PM CST         EMR Dragon/Transcription disclaimer:   Much of this encounter note is an electronic transcription/translation of spoken language to printed text.  The electronic translation of spoken language may permit erroneous, or at times, nonsensical words or phrases to be inadvertently transcribed; although attempts have made to review the note for such errors, some may still exist.

## 2023-01-25 NOTE — ED PROVIDER NOTES
[address] [facility] [place] EMERGENCY DEPT  eMERGENCYdEPARTMENT eNCOUnter      Pt Name: Pearl Goldman  MRN: [id number]  [place] 1960  Date of evaluation: 1/24/2023  Hina Cantrell MD    Emergency Department care of this patient was assumed at 2157 from Dr. Saida Vizcaino. We have discussed the case and the plan of care. I have seen and evaluated patient and reviewed ED course. CHIEF COMPLAINT       Chief Complaint   Patient presents with    Leg Pain     Left leg, no known injury         PHYSICAL EXAM    (up to 7 for level 4, 8 or more for level 5)     ED Triage Vitals [01/24/23 1425]   BP Temp Temp src Heart Rate Resp SpO2 Height Weight   (!) 159/86 97.7 °F (36.5 °C) -- 73 18 96 % -- 195 lb (88.5 kg)       Physical Exam  Constitutional:       General: She is not in acute distress. Appearance: She is not toxic-appearing or diaphoretic. Cardiovascular:      Rate and Rhythm: Normal rate. Pulmonary:      Effort: Pulmonary effort is normal.   Skin:     Comments: LLE and foot warm to touch with cap refill less than 2 sec    RLE warm but foot cool to touch with delayed cap refill   Neurological:      Mental Status: She is alert. DIAGNOSTIC RESULTS         RADIOLOGY:   Non-plain film imagessuch as CT, Ultrasound and MRI are read by the radiologist. Plain radiographic images are visualized and preliminarily interpreted by the emergency physician with the below findings:      CTA CHEST W WO CONTRAST   Final Result      CTA ABDOMINAL AORTA W BILAT RUNOFF W CONTRAST   Final Result   1. Filling defect within the distal thoracic aorta. A thrombus is suspected measuring up to 1 cm. A linear abnormality within the similar area of the aorta is concerning for possible dissection. Surrounding fat is unremarkable   2. Total occlusion of a long segment of the left popliteal artery. Attenuation of flow into the lower extremity. Flow detected to the level of the dorsalis pedis artery however decreased.    3. Attenuation of enhancement/flow within the right lower extremity involving the anterior and posterior tibial arteries. No flow seen within the right dorsalis pedis artery. 4. Hemodynamically significant stenosis posteriorly within the left internal iliac artery. 5. Abnormal appearance of the right renal parenchyma and spleen. Uncertain if this is related to arterial flow or represents component of infection or possibly infarction. All CT scans at this facility utilize dose modulation, iterative reconstruction, and/or weight based dosing when appropriate to reduce radiation dose to as low as reasonably achievable. Amended by Ritesh Rivera MD, MQSA CERTIFIED at [phone] 08:38:10 PM EST   Dictated and Electronically Signed by Ritesh Rivera MD, [address] [facility] at [phone] 08:22:37 PM EST               VL Extremity Venous Left   Final Result      XR TIBIA FIBULA LEFT (2 VIEWS)   Final Result   No fracture or dislocation. Electronically signed by Dimitri Tucker MD on 01-24-23 at [facility]:  Labs Reviewed   CBC - Abnormal; Notable for the following components:       Result Value    WBC 14.8 (*)     Hemoglobin 11.2 (*)     Hematocrit 36.3 (*)     MCV 79.4 (*)     MCH 24.5 (*)     MCHC 30.9 (*)     RDW 17.6 (*)     Platelets 099 (*)     MPV 9.3 (*)     All other components within normal limits   COMPREHENSIVE METABOLIC PANEL - Abnormal; Notable for the following components: Total Protein 6.5 (*)     Albumin 3.4 (*)     Alkaline Phosphatase 177 (*)     AST 37 (*)     All other components within normal limits   COVID-19, RAPID   APTT   PROTIME-INR   MAGNESIUM   TSH   VITAMIN D 25 HYDROXY   LIPASE   IRON AND TIBC   FERRITIN   VITAMIN B12   FOLATE   D-DIMER, QUANTITATIVE   BASIC METABOLIC PANEL   APTT   APTT   APTT       All other labs were within normal range or not returned as of this dictation.     EMERGENCY DEPARTMENT COURSE and DIFFERENTIAL DIAGNOSIS/MDM:   Vitals:    Vitals:    01/24/23 1958 01/24/23 2054 01/24/23 2143 01/24/23 2350   BP: (!) 156/75 (!) 147/94 (!) 141/71 128/67   Pulse: 88  81 79   Resp: 20 20 20 18   Temp:       SpO2:  98% 95% 93%   Weight:           MDM    Pt with abdominal aorta thrombus with distal occlusions in extremities on heparin gtt and HDS currently. Vascular evaluated here and recommends transfer. Dr. Saida Vizcaino has d/w Dr. Giles Burns at [facility] who plans to accept but request cta chest to r/o any addtl issues. Will d/w him again once results. 2159    CTA resulted. D/w Dr. Giles Burns at [facility] and given no addtl findings does not feel needs transfer. Dr. Giles Burns feels ischemic limb is most concern and major issue more than abd thrombus. It is also snowing here and in [place]. Patient has been attempted to be transferred to multiple facilities with no success. I called and d/w Dr. Ishaan Roach to explain current situation. Offered for him to call and discuss with Dr. Giles Burns directly so possibly could express his concerns from vascular surgery standpoint and see if would possibly accept patient then but he request we admit to hospitalist and will figure out what to do continue heparin gtt. D/w Dr. Ro Bhatti for admission. CONSULTS:  IP CONSULT TO VASCULAR SURGERY  IP CONSULT TO VASCULAR SURGERY    PROCEDURES:  Unless otherwise noted below, none     Procedures    FINAL IMPRESSION      1. Left popliteal artery occlusion (HCC)    2. Aortic thrombus (HCC)    3. History of CVA in adulthood          DISPOSITION/PLAN   DISPOSITION Admitted    PATIENT REFERRED TO:  No follow-up provider specified.     DISCHARGE MEDICATIONS:  New Prescriptions    No medications on file          (Please note that portions ofthis note were completed with a voice recognition program.  Efforts were made to edit the dictations but occasionally words are mis-transcribed.)    Yolanda Mondragon MD(electronically signed)  Attending Emergency Physician         Meagan Natarajan MD  01/25/23 0000

## 2023-01-25 NOTE — PROGRESS NOTES
Facesheet faxed to Dr. Marshall Kunz at [facility] per [facility]. Radiology notified to place images from this admission on to disc for transfer.

## 2023-01-25 NOTE — H&P
History and Physical    Patient Name:  Leigh Ann Dwyer    :  1960    Chief Complaint:   Leg edema    History of Present Illness:   Leigh Ann Dwyer presents to [facility] with BL LEs edema. She has a history of inferior vena cava filter placement more than 10 years ago. She had hemispheric cerebrovascular accident in 2022. She was sent home with Plavix    Venous duplex was performed which revealed incidental finding of a left popliteal artery occlusion. CTA of the aorta with bilateral lower extremity runoff was performed. She has a descending thoracic aortic fairly large thrombus/embolus. She is embolized to her spleen, left profunda femoris artery, left popliteal artery, right popliteal artery. She does not appear to be any acute distress. Heparin was started. Dr Bridget Ren suggested transfer to higher level care however this is lateral transfer and pt was not accepted in any of the facilities. Perhaps if he talked to them they will be willing to accept. Therefore pt stayed here and accepted to hospitalist service with VS consultation     Past Medical History:   has a past medical history of Anxiety, ASCVD (arteriosclerotic cardiovascular disease), Carrier of group B Streptococcus, Cellulitis, Colitis, COPD (chronic obstructive pulmonary disease) ([address] Costochondritis, CVA (cerebral vascular accident) ([address] Depression, Edema, Fracture of sternum, Gallstones, Grief reaction, H/O superior vena cava filter placement, Hyperlipidemia, Hypertension, MI (myocardial infarction) ([address] MRSA (methicillin resistant Staphylococcus aureus), Neuropathy, Obesity, Pseudarthrosis, RA (rheumatoid arthritis) ([address] Rosacea, Sinus bradycardia, TIA (transient ischemic attack), and Venous thromboembolism. Surgical History:   has a past surgical history that includes Coronary artery bypass graft (3/2009); Tonsillectomy; Hysterectomy; thoracotomy; knee surgery; Appendectomy; Colonoscopy (2010);  Cardiac catheterization (3/2009); Adenoidectomy; Cholecystectomy (2011); Gastric bypass surgery (2012); hiatal hernia repair (2011); Cardiac catheterization (11/5/14  JDT); Colonoscopy (2009); Colonoscopy; Upper gastrointestinal endoscopy (2010); Upper gastrointestinal endoscopy (2012); and pr colonoscopy flx dx w/collj spec when pfrmd (N/A, 3/12/2018). Social History:   reports that she quit smoking about 13 years ago. Her smoking use included cigarettes. She has a 64.00 pack-year smoking history. She has never used smokeless tobacco. She reports current alcohol use. She reports that she does not use drugs. Family History:  family history includes Cancer in her father; Heart Failure in her father; Prostate Cancer in her father. Medications:  Prior to Admission medications    Medication Sig Start Date End Date Taking? Authorizing Provider   gabapentin (NEURONTIN) 300 MG capsule Take 1 capsule by mouth at bedtime for 30 days. Max Daily Amount: 300 mg 12/28/22 1/27/23  Liudmila Fields MD   escitalopram (LEXAPRO) 5 MG tablet Take 1 tablet by mouth daily 12/29/22   Liudmila Fields MD   dulaglutide (TRULICITY) 1.5 CY/6.2RU SC injection Inject 0.5 mLs into the skin once a week 12/30/22   Liudmila Fields MD   atorvastatin (LIPITOR) 40 MG tablet Take 1 tablet by mouth at bedtime 12/28/22   Liudmila Fields MD   hydroxychloroquine (PLAQUENIL) 200 MG tablet Take 1 tablet by mouth 2 times daily 12/28/22   Liudmila Fields MD   amLODIPine (NORVASC) 2.5 MG tablet Take 1 tablet by mouth daily 12/29/22   Liudmila Fields MD   miconazole (MICOTIN) 2 % powder Apply topically 2 times daily.  12/28/22   Liudmila Fields MD   folic acid (FOLVITE) 1 MG tablet Take 1 tablet by mouth daily 12/28/22   Liudmila Fields MD   bisacodyl (DULCOLAX) 5 MG EC tablet Take 1 tablet by mouth daily 12/29/22   Liudmila Fields MD   docusate sodium (COLACE, DULCOLAX) 100 MG CAPS Take 100 mg by mouth 2 times daily 12/28/22   Liudmila Fields MD   polyethylene glycol (GLYCOLAX) 17 g packet Take 17 g by mouth daily as needed for Constipation 12/28/22 1/27/23  Fidelina Cat MD   Multiple Vitamin (MULTIVITAMIN) TABS tablet Take 1 tablet by mouth daily 12/29/22   Fidelina Cat MD   clopidogrel (PLAVIX) 75 MG tablet Take 1 tablet by mouth daily 12/28/22   Fidelina Cat MD       Allergies:  Methotrexate derivatives     Review of Systems:   Not able to obtain, pt is not oriented    Physical Examination:    Vital Signs: /67   Pulse 79   Temp 97.7 °F (36.5 °C)   Resp 18   Wt 195 lb (88.5 kg)   SpO2 93%   BMI 30.54 kg/m²   General appearance: Well preserved, mesomorphic body habitus, alert, no distress. Skin: Skin color, texture, turgor normal. No rashes or lesions. No induration or tightening palpated. Head: Normocephalic. No masses, lesions, tenderness or abnormalities  Eyes: conjunctivae/corneas clear. EOM's intact. Sclera non icteric. Ears: External ears normal.  Hearing normal to finger rub. Nose/Sinuses: Nares normal. Septum midline. Mucosa normal. No drainage or sinus tenderness. Oropharynx: Lips, mucosa, and tongue normal. Oropharynx clear with no exudate seen. Neck: Neck supple, and symmetric. No adenopathy. Trachea is midline. Carotids brisk in upstroke without bruits, No abnormal JVP noted at 45°. Lungs: Lungs clear to auscultation bilaterally. No retractions or use of accessory muscles. No vocal fremitus. No ronchi, crackle or rale. Heart:  S1 > S2. Regular rate and rhythm. No gallop, murmur, rub, palpable thrill or heave noted. Abdomen: Abdomen soft, non-tender. BS normal. No masses, organomegaly. No hernia noted. Extremities: Extremities normal. No deformities, edema, or skin discoloration. No cyanosis or clubbing noted to the nails. Peripheral pulses 4/4. Musculoskeletal: Spine ROM normal. Muscular strength intact. Neuro: Motor: Strength 5/5 in all extremities. No focal weakness.       Pertinent Labs:  CBC:   Recent Labs     01/24/23  1654 WBC 14.8*   RBC 4.57   HGB 11.2*   HCT 36.3*   MCV 79.4*   MCH 24.5*   MCHC 30.9*   RDW 17.6*   *   MPV 9.3*     BMP:   Recent Labs     01/24/23  1654      K 4.2      CO2 24   BUN 8   CREATININE 0.7   GLUCOSE 90   CALCIUM 9.2     ABGs: No results for input(s): PO2, PCO2, PH, HCO3, BE, O2SAT in the last 72 hours. INR:   Recent Labs     01/24/23  1654   INR 1.12   PROTIME 14.4     BNP:  No results found for: BNP  TSH: No results found for: TSH  Cardiac Injury Profile:   Lab Results   Component Value Date    IYAGNPD 924 (H) 11/09/2022     Lipid Profile: No components found for: CHLPL  No results found for: TRIG  No results found for: HDL  No results found for: LDLCALC  No results found for: LABVLDL  Hemoglobin A1C: No results found for: LABA1C  No results found for: EAG      Assessment/Plan:  Left popliteal artery occlusion. Descending thoracic aortic fairly large thrombus/embolus. She is embolized to her spleen, left profunda femoris artery, left popliteal artery, right popliteal artery- on IV heparin - VS on board   History of CVA   ASCVD (arteriosclerotic cardiovascular disease)  CAD (coronary artery disease)  Aphasia  COPD (chronic obstructive pulmonary disease) (HCC)  H/O superior vena cava filter placement  Essential hypertension  History of MI (myocardial infarction)  Rheumatoid arthritis ([facility]  Dysphagia due to recent cerebral infarction  Type 2 diabetes mellitus with complication, without long-term current use of insulin ([address]                    I have reviewed my findings and recommendations in detail with Shanna Laws MD

## 2023-01-25 NOTE — CONSULTS
Vascular Surgery Consultation      HPI    This is a 29-year-old woman with history of fairly recent left hemispheric cerebrovascular accident in November 2022. She was actually transferred to [facility] during that hospitalization and was evaluated by neurosurgery who felt no surgical intervention was warranted for a fairly large left middle cerebral artery territory infarct with increasing edema and mass-effect with some midline shift. There was no hemorrhagic conversion of that stroke. So she was sent to rehab back here at [facility]. She did fairly well after that but continues to have expressive aphasia. She was sent home with Plavix. She presented to the emergency department today with some increased swelling in the left lower extremity. She does have a history of inferior vena cava filter placement more than 10 years ago. Her  states that she was on Coumadin for probably about a year after that. So, she does have some hypercoagulable history. Venous duplex was performed which revealed no deep vein thrombosis left lower extremity but it did reveal an incidental finding of a left popliteal artery occlusion. CTA of the aorta with bilateral lower extremity runoff was performed. The radiologist has not read the report yet but I did review images. She has a descending thoracic aortic fairly large thrombus/embolus. She is embolized to her spleen, left profunda femoris artery, left popliteal artery, right popliteal artery. She does not appear to be any acute distress. Heparin was started after the ultrasound was performed. She is moving her toes. All she can really answer is thank you. She does nod her head up and down.       Current Medical History    Enma Pritchard is a 58 y.o. female with the following history reviewed and recorded in [employer]:  Patient Active Problem List    Diagnosis Date Noted    CAD (coronary artery disease) 10/23/2014     Priority: High     10/23/2014 lexiscan  Positive for anterior MI + myocardial ischemia, EF 53%, 17/5% ischemic myocardium on stress, high risk findings, AUC indication 17, AUC score 9  9/14/16 Zio PVCs          Left popliteal artery occlusion ([facility] 01/24/2023     Priority: Medium    Type 2 diabetes mellitus with complication, without long-term current use of insulin ([address] 11/19/2022     Priority: Medium    Acute ischemic stroke ([facility] 11/18/2022     Priority: Medium    Aphasia 11/18/2022     Priority: Medium    COPD (chronic obstructive pulmonary disease) ([address] 11/18/2022     Priority: Medium    Anxiety 11/18/2022     Priority: Medium    H/O superior vena cava filter placement 11/18/2022     Priority: Medium    Essential hypertension 11/18/2022     Priority: Medium    History of MI (myocardial infarction) 11/18/2022     Priority: Medium    Rheumatoid arthritis ([facility] 11/18/2022     Priority: Medium    Dysphagia due to recent cerebral infarction 11/18/2022     Priority: Medium    Hypertension     Hyperlipidemia     MI (myocardial infarction) ([facility]     ASCVD (arteriosclerotic cardiovascular disease)     Heartburn 05/14/2012    Indigestion 05/14/2012    Nausea 05/14/2012    Morbid obesity ([facility] 05/14/2012    Anticoagulated on warfarin 05/14/2012    Ulcerative colitis ([facility] 05/14/2012     Current Facility-Administered Medications   Medication Dose Route Frequency Provider Last Rate Last Admin    heparin 25,000 units in dextrose 5% 250 mL (premix) infusion  5-30 Units/kg/hr IntraVENous Continuous FREDA Newton 15.9 mL/hr at 01/24/23 1832 18 Units/kg/hr at 01/24/23 1832     Current Outpatient Medications   Medication Sig Dispense Refill    gabapentin (NEURONTIN) 300 MG capsule Take 1 capsule by mouth at bedtime for 30 days.  Max Daily Amount: 300 mg 30 capsule 0    escitalopram (LEXAPRO) 5 MG tablet Take 1 tablet by mouth daily 30 tablet 0    dulaglutide (TRULICITY) 1.5 GA/4.3GD SC injection Inject 0.5 mLs into the skin once a week 4 Adjustable Dose Pre-filled Pen Syringe 0    atorvastatin (LIPITOR) 40 MG tablet Take 1 tablet by mouth at bedtime 30 tablet 0    hydroxychloroquine (PLAQUENIL) 200 MG tablet Take 1 tablet by mouth 2 times daily 60 tablet 0    amLODIPine (NORVASC) 2.5 MG tablet Take 1 tablet by mouth daily 30 tablet 0    miconazole (MICOTIN) 2 % powder Apply topically 2 times daily. 45 g 0    folic acid (FOLVITE) 1 MG tablet Take 1 tablet by mouth daily 30 tablet 0    bisacodyl (DULCOLAX) 5 MG EC tablet Take 1 tablet by mouth daily 30 tablet 0    docusate sodium (COLACE, DULCOLAX) 100 MG CAPS Take 100 mg by mouth 2 times daily 60 capsule 0    polyethylene glycol (GLYCOLAX) 17 g packet Take 17 g by mouth daily as needed for Constipation 527 g 0    Multiple Vitamin (MULTIVITAMIN) TABS tablet Take 1 tablet by mouth daily 30 tablet 0    clopidogrel (PLAVIX) 75 MG tablet Take 1 tablet by mouth daily 30 tablet 0     Allergies: Methotrexate derivatives  Past Medical History:   Diagnosis Date    Anxiety     ASCVD (arteriosclerotic cardiovascular disease)     Carrier of group B Streptococcus     Cellulitis     Colitis     COPD (chronic obstructive pulmonary disease) (HCC)     Costochondritis     CVA (cerebral vascular accident) ([address]     Depression     Edema     Fracture of sternum     non union post surgery.      Gallstones     Grief reaction     H/O superior vena cava filter placement     Hyperlipidemia     Hypertension     MI (myocardial infarction) ([address]     MRSA (methicillin resistant Staphylococcus aureus)     Neuropathy     Obesity     Pseudarthrosis sternal    RA (rheumatoid arthritis) ([address]     Rosacea     Sinus bradycardia     TIA (transient ischemic attack)     Venous thromboembolism      Past Surgical History:   Procedure Laterality Date    ADENOIDECTOMY      APPENDECTOMY      CARDIAC CATHETERIZATION  3/2009    CARDIAC CATHETERIZATION  11/5/14  JDT    EF 50%, patent bypass grafts    CHOLECYSTECTOMY  2011    COLONOSCOPY  06/03/2010     Neil: distal colon having ulcerative lesions c/w UC    COLONOSCOPY  2009    pancolitis    COLONOSCOPY      CORONARY ARTERY BYPASS GRAFT  3/2009    PHANI Ragland to LAD, SVGs to OM & PDA    GASTRIC BYPASS SURGERY      HIATAL HERNIA REPAIR      HYSTERECTOMY (CERVIX STATUS UNKNOWN)      KNEE SURGERY      AR COLONOSCOPY FLX DX W/COLLJ SPEC WHEN PFRMD N/A 3/12/2018    Dr Nuris Vivas rectal inflammation consistent with U.C.-Active proctitis--3 yr recall    [facility] END[facility]      Dr. Bebeto Baez       Family History   Problem Relation Age of Onset    Prostate Cancer Father     Heart Failure Father     Cancer Father     Colon Cancer Neg Hx     Colon Polyps Neg Hx     Liver Cancer Neg Hx     Liver Disease Neg Hx     Esophageal Cancer Neg Hx     Rectal Cancer Neg Hx     Stomach Cancer Neg Hx      Social History     Tobacco Use    Smoking status: Former     Packs/day: 2.00     Years: 32.00     Pack years: 64.00     Types: Cigarettes     Quit date: 3/3/2009     Years since quittin.9    Smokeless tobacco: Never   Substance Use Topics    Alcohol use: Yes     Comment: Socially       Review of Systems    See HPI  The patient cannot give me history because of her expressive aphasia. Her  is giving me the history. All other review of systems are negative. Physical Exam    Constitutional - well developed, well nourished. No diaphoresis or acute distress. Neck- ROM appears normal, no tracheal deviation. Cardiovascular - Regular rate and rhythm. Extremities -nonpalpable pedal pulses, edema of the left more than the right lower extremity. She does have motor function in both feet. She has fairly good capillary refill in both feet as well. Pulmonary - no labored breathing. no respiratory distress. GI - Abdomen - soft, non tender. No guarding or rebound tenderness.   No distension or palpable mass.  Neurologic - alert, expressive aphasia, she nods her head, but I am unsure if she is really understanding the questions that I am asking. Skin - warm, dry, and intact. No rash, erythema, or pallor. Left lower extremity venous duplex reveals no deep vein thrombosis but incidental finding of left popliteal artery occlusion. CTA (reviewed by myself, but no official read from the radiologist yet) reveals a fairly large descending thoracic thrombus/embolus with embolization of the spleen, left profunda femoris artery, left popliteal artery, right popliteal artery. Likely, this originated from the heart and was most likely now the reason why she had a left hemispheric infarct. Assessment    1. Left popliteal artery occlusion (HCC)    2. Aortic thrombus (HCC)    3. History of CVA in adulthood      Teena Elam is a 70-year-old  woman who has had a fairly recent left hemispheric CVA November 2022. She was found down in November 2022 by her , so she was outside the tPA window. She also had fairly extensive rhabdomyolysis. She was sent to [facility] for consideration of percutaneous thrombectomy of her hemispheric embolism. She was not felt to be a candidate for that. She then saw neurosurgery because of left hemispheric edema and slight midline shift. She did not have to have a craniotomy. Eventually, she was sent back to [facility] for inpatient rehab and apparently did fairly well. Today, she came to the and was evaluated by neurosurgery there who came to the emergency department with some swelling and discomfort in  her left lower extremity. She has expressive aphasia so she cannot really say anything except for thank you. She does not appear to be any acute distress. She had a left lower extremity venous duplex examination because she also has a history of deep vein thrombosis and inferior vena cava filter more than 10 years ago.   Her  states that she was on Coumadin for about a year. She had not had any clots since then. She had a venous duplex in the emergency department today with no evidence for deep vein thrombosis but it did show an incidental finding of a left popliteal artery occlusion. Heparin bolus was administered and she has been on a heparin drip since the venous duplex. CTA aorta with runoff shows thrombus/embolus sitting in the middle of the descending thoracic aorta, significant embolization with ischemia of the spleen, significant embolization with near occlusion in the profunda femoris artery and complete occlusion of the popliteal artery. She also has an occlusion of the right popliteal artery below the knee. Plan    Heparin drip including boluses already been initiated in the emergency department. I recommend that she be transferred to a [address] because of the thoracic aortic thrombus/embolus. She has already embolized to her spleen and both legs. I am afraid if I try to perform thrombectomy from the femoral arteries, I will have to track the clot across the celiac plexus, superior mesenteric artery, bilateral renal arteries which could cause significant ischemia of all abdominal organs. I will have the expertise to perform an open thoracic thrombectomy/embolectomy. I do think that this could be a subacute versus more of a chronic problem, because the patient has good motor function in her toes and although she cannot communicate she does not appear to be in any acute distress. Her toes actually have reasonable capillary refill. I discussed above with the patient's family in the room. He seems to understand.

## 2023-01-25 NOTE — PLAN OF CARE
Problem: Pain  Goal: Verbalizes/displays adequate comfort level or baseline comfort level  Outcome: Not Progressing  Flowsheets (Taken 1/25/2023 0854)  Verbalizes/displays adequate comfort level or baseline comfort level:   Encourage patient to monitor pain and request assistance   Assess pain using appropriate pain scale   Administer analgesics based on type and severity of pain and evaluate response   Consider cultural and social influences on pain and pain management   Implement non-pharmacological measures as appropriate and evaluate response     Problem: Chronic Conditions and Co-morbidities  Goal: Patient's chronic conditions and co-morbidity symptoms are monitored and maintained or improved  Outcome: Not Progressing     Problem: Discharge Planning  Goal: Discharge to home or other facility with appropriate resources  Outcome: Not Progressing     Problem: Pain  Goal: Verbalizes/displays adequate comfort level or baseline comfort level  Outcome: Not Progressing  Flowsheets (Taken 1/25/2023 0854)  Verbalizes/displays adequate comfort level or baseline comfort level:   Encourage patient to monitor pain and request assistance   Assess pain using appropriate pain scale   Administer analgesics based on type and severity of pain and evaluate response   Consider cultural and social influences on pain and pain management   Implement non-pharmacological measures as appropriate and evaluate response     Problem: Chronic Conditions and Co-morbidities  Goal: Patient's chronic conditions and co-morbidity symptoms are monitored and maintained or improved  Outcome: Not Progressing

## 2023-01-26 NOTE — PROGRESS NOTES
Vascular Surgery -  Judah Camara MD M.D. Daily Progress Note    Pt Name: Ian Arias Record Number: [id number]  Date of Birth 1960   Today's Date: 1/25/2023    Chief Complaint:  Chief Complaint   Patient presents with    Leg Pain     Left leg, no known injury       SUBJECTIVE:     Patient was seen and examined. Pain is  controlled  She looks comfortable, cannot give history. Her family in the room with the patient during my visit. OBJECTIVE:     Patient Vitals for the past 24 hrs:   BP Temp Temp src Pulse Resp SpO2 Height Weight   01/25/23 1847 (!) 152/58 -- -- -- -- -- -- --   01/25/23 1826 (!) 173/86 97.5 °F (36.4 °C) Oral 81 18 91 % -- --   01/25/23 1728 (!) 157/74 98.6 °F (37 °C) Oral 84 16 92 % -- --   01/25/23 1122 (!) 154/82 98.2 °F (36.8 °C) Oral 88 16 90 % -- --   01/25/23 0558 (!) 151/87 98.6 °F (37 °C) -- 94 16 93 % -- --   01/25/23 0030 (!) 147/73 98 °F (36.7 °C) -- 73 18 98 % 5' 8\" (1.727 m) 188 lb 0.5 oz (85.3 kg)   01/25/23 0015 -- -- -- -- -- -- -- 188 lb 5 oz (85.4 kg)   01/24/23 2350 128/67 -- -- 79 18 93 % -- --   01/24/23 2143 (!) 141/71 -- -- 81 20 95 % -- --         Intake/Output Summary (Last 24 hours) at 1/25/2023 2117  Last data filed at 1/25/2023 1853  Gross per 24 hour   Intake 1324 ml   Output 600 ml   Net 724 ml       In: 1324 [I.V.:1324]  Out: 600 [Urine:600]    I/O last 3 completed shifts: In: 9391 [I.V.:1324]  Out: 600 [Urine:600]     Date 01/25/23 0000 - 01/25/23 2359   Shift 8760-2563 6986-2004 9984-6028 24 Hour Total   INTAKE   P.O.(mL/kg/hr)  0(0)  0   I. V.(mL/kg) 746(8.7) 578(6.8)  1324(15.5)   Shift Total(mL/kg) 746(8.7) 578(6.8)  1324(15.5)   OUTPUT   Urine(mL/kg/hr)  400(0.6) 200 600   Shift Total(mL/kg)  400(4.7) 200(2.3) 600(7)   Weight (kg) 85.3 85.3 85.3 85.3       Wt Readings from Last 3 Encounters:   01/25/23 188 lb 0.5 oz (85.3 kg)   12/29/22 195 lb (88.5 kg)   11/09/22 200 lb (90.7 kg)        Body mass index is 28.59 kg/m².      Diet: ADULT DIET; Regular; 4 carb choices (60 gm/meal)        MEDS:     Scheduled Meds:   amLODIPine  2.5 mg Oral Daily    atorvastatin  40 mg Oral Nightly    [Held by provider] clopidogrel  75 mg Oral Daily    escitalopram  5 mg Oral Daily    folic acid  1 mg Oral Daily    gabapentin  300 mg Oral Nightly    hydroxychloroquine  200 mg Oral BID    insulin lispro  0-4 Units SubCUTAneous TID WC    insulin lispro  0-4 Units SubCUTAneous Nightly    iron sucrose  200 mg IntraVENous Daily    morphine        hydrALAZINE        sodium chloride flush  5-40 mL IntraVENous 2 times per day     Continuous Infusions:   dextrose      heparin (PORCINE) Infusion 11 Units/kg/hr (01/25/23 1909)    sodium chloride 75 mL/hr at 01/25/23 1122    sodium chloride       PRN Meds:glucose, 4 tablet, PRN  dextrose bolus, 125 mL, PRN   Or  dextrose bolus, 250 mL, PRN  glucagon (rDNA), 1 mg, PRN  dextrose, , Continuous PRN  oxyCODONE, 5 mg, Q4H PRN  hydrALAZINE, 5 mg, Q4H PRN  morphine, 2 mg, Q4H PRN   Or  morphine, 4 mg, Q4H PRN  heparin (porcine), 80 Units/kg, PRN  heparin (porcine), 40 Units/kg, PRN  sodium chloride flush, 5-40 mL, PRN  sodium chloride, , PRN  ondansetron, 4 mg, Q8H PRN   Or  ondansetron, 4 mg, Q6H PRN  polyethylene glycol, 17 g, Daily PRN  acetaminophen, 650 mg, Q6H PRN   Or  acetaminophen, 650 mg, Q6H PRN          PHYSICAL EXAM:     CONSTITUTIONAL: awake, alert, cooperative, no apparent distress, ABDOMEN: soft, nontender, nondistended, no masses or organomegaly  EXTREMITY: both feet are warm and toes pink, motor intact completely left foot and toes.     LABS:       CBC:   Recent Labs     01/24/23  1654 01/25/23  0629   WBC 14.8* 14.4*   RBC 4.57 4.34   HGB 11.2* 10.2*   HCT 36.3* 33.3*   MCV 79.4* 76.7*   MCH 24.5* 23.5*   MCHC 30.9* 30.6*   RDW 17.6* 17.3*   * 499*   MPV 9.3* 9.4      Last 3 CMP:   Recent Labs     01/24/23  1654 01/25/23  0629    134*   K 4.2 3.9    100   CO2 24 23   BUN 8 7*   CREATININE 0.7 0.7   GLUCOSE 90 90   CALCIUM 9.2 8.6*   PROT 6.5*  --    LABALBU 3.4*  --    BILITOT 0.4  --    ALKPHOS 177*  --    AST 37*  --    ALT 24  --       Troponin: No results for input(s): TROPONINI in the last 72 hours. Calcium:   Lab Results   Component Value Date/Time    CALCIUM 8.6 01/25/2023 06:29 AM    CALCIUM 9.2 01/24/2023 04:54 PM    CALCIUM 9.1 12/29/2022 07:16 AM      Ionized Calcium: No results found for: IONCA   Lipids: No results for input(s): CHOL, HDL in the last 72 hours. Invalid input(s): LDLCALCU  INR:   Recent Labs     01/24/23  1654   INR 1.12     Lactic Acid: No results found for: LACTA                 DVT prophylaxis: [] Lovenox                                 [] SCDs                                 [] SQ Heparin                                 [] Encourage ambulation, low risk for DVT, no chemical or                                      mechanical prophylaxis necessary              [] Already on Anticoagulation      RADIOLOGY:               ASSESSMENT:       HD # 1  Patient Active Problem List   Diagnosis    Heartburn    Indigestion    Nausea    Morbid obesity ([facility]    Anticoagulated on warfarin    Ulcerative colitis ([facility]    Hypertension    Hyperlipidemia    MI (myocardial infarction) ([facility]    ASCVD (arteriosclerotic cardiovascular disease)    CAD (coronary artery disease)    Acute ischemic stroke (HCC)    Aphasia    COPD (chronic obstructive pulmonary disease) ([facility])    Anxiety    H/O superior vena cava filter placement    Essential hypertension    History of MI (myocardial infarction)    Rheumatoid arthritis ([facility]    Dysphagia due to recent cerebral infarction    Type 2 diabetes mellitus with complication, without long-term current use of insulin (HCC)    Left popliteal artery occlusion ([facility])    Thrombosis               PLAN:     Trying to work with [address] [place] [address] and transfer service to get the patient transferred to a higher level of care hospital that can offer treatment for the thoracic aortic thrombus.  I do not nor have done any thoracic aortic interventions and send these when they do arise to [address] [place] [address] or [facility] over the last 22 years. Left leg and foot are not changed, and still warm with motor function completely intact. She doesn't appear to be in any discomfort. This would be consistent with a more chronic embolization. I can try to revascularize legs if she worsens, but I really believe she would be best served with complete treatment to prevent a potentially lethal embolization from this large thoracic thrombus. Continue anticoagulation with full dose heparin  Consider Echo, LUIS would be best to see if any additional cardiac source that might further embolize. Forrest Delacruz MD, M.SHARLA.    Electronically signed 1/25/2023 at 9:17 PM

## 2023-01-26 NOTE — PROGRESS NOTES
This nurse was called to patient's room by spouse who states that patient is complaining of increased pain in LLE and left flank. Pulses to left foot 2+ doppler PT and DP. Blood pressure elevated. Dr. Lilibeth Dorantes and Dr. Justin Oro notified.

## 2023-02-03 RX ORDER — AMLODIPINE BESYLATE 2.5 MG/1
TABLET ORAL
Qty: 30 TABLET | Refills: 0 | OUTPATIENT
Start: 2023-02-03

## 2023-02-03 RX ORDER — ATORVASTATIN CALCIUM 40 MG/1
TABLET, FILM COATED ORAL
Qty: 30 TABLET | Refills: 0 | OUTPATIENT
Start: 2023-02-03

## 2023-02-03 NOTE — TELEPHONE ENCOUNTER
Requested Prescriptions     Pending Prescriptions Disp Refills    atorvastatin (LIPITOR) 40 MG tablet [Pharmacy Med Name: ATORVASTATIN 40 MG TABLET] 30 tablet 0     Sig: TAKE 1 TABLET BY MOUTH EVERYDAY AT BEDTIME    amLODIPine (NORVASC) 2.5 MG tablet [Pharmacy Med Name: AMLODIPINE BESYLATE 2.5 MG TAB] 30 tablet 0     Sig: TAKE 1 TABLET BY MOUTH EVERY DAY       Last Office Visit: Visit date not found  Next Office Visit: 2/21/2023  Last Medication Refill: 12/28/22      [facility]

## 2023-02-14 ENCOUNTER — TELEPHONE (OUTPATIENT)
Dept: HEMATOLOGY | Age: 63
End: 2023-02-14

## 2023-02-21 ENCOUNTER — OFFICE VISIT (OUTPATIENT)
Dept: NEUROLOGY | Age: 63
End: 2023-02-21
Payer: COMMERCIAL

## 2023-02-21 VITALS
BODY MASS INDEX: 28.49 KG/M2 | SYSTOLIC BLOOD PRESSURE: 168 MMHG | WEIGHT: 188 LBS | HEART RATE: 59 BPM | HEIGHT: 68 IN | DIASTOLIC BLOOD PRESSURE: 76 MMHG

## 2023-02-21 DIAGNOSIS — R47.01 APHASIA: ICD-10-CM

## 2023-02-21 DIAGNOSIS — I25.10 CORONARY ARTERY DISEASE INVOLVING NATIVE CORONARY ARTERY OF NATIVE HEART WITHOUT ANGINA PECTORIS: ICD-10-CM

## 2023-02-21 DIAGNOSIS — F41.9 ANXIETY: ICD-10-CM

## 2023-02-21 DIAGNOSIS — Z09 HOSPITAL DISCHARGE FOLLOW-UP: ICD-10-CM

## 2023-02-21 DIAGNOSIS — I69.391 DYSPHAGIA DUE TO RECENT CEREBRAL INFARCTION: ICD-10-CM

## 2023-02-21 DIAGNOSIS — I10 ESSENTIAL HYPERTENSION: ICD-10-CM

## 2023-02-21 DIAGNOSIS — E11.8 TYPE 2 DIABETES MELLITUS WITH COMPLICATION, WITHOUT LONG-TERM CURRENT USE OF INSULIN (HCC): ICD-10-CM

## 2023-02-21 DIAGNOSIS — I63.9 ACUTE ISCHEMIC STROKE (HCC): Primary | ICD-10-CM

## 2023-02-21 PROCEDURE — 3044F HG A1C LEVEL LT 7.0%: CPT | Performed by: PSYCHIATRY & NEUROLOGY

## 2023-02-21 PROCEDURE — 1111F DSCHRG MED/CURRENT MED MERGE: CPT | Performed by: PSYCHIATRY & NEUROLOGY

## 2023-02-21 PROCEDURE — 2022F DILAT RTA XM EVC RTNOPTHY: CPT | Performed by: PSYCHIATRY & NEUROLOGY

## 2023-02-21 PROCEDURE — G8484 FLU IMMUNIZE NO ADMIN: HCPCS | Performed by: PSYCHIATRY & NEUROLOGY

## 2023-02-21 PROCEDURE — 3078F DIAST BP <80 MM HG: CPT | Performed by: PSYCHIATRY & NEUROLOGY

## 2023-02-21 PROCEDURE — G8428 CUR MEDS NOT DOCUMENT: HCPCS | Performed by: PSYCHIATRY & NEUROLOGY

## 2023-02-21 PROCEDURE — 3017F COLORECTAL CA SCREEN DOC REV: CPT | Performed by: PSYCHIATRY & NEUROLOGY

## 2023-02-21 PROCEDURE — 1036F TOBACCO NON-USER: CPT | Performed by: PSYCHIATRY & NEUROLOGY

## 2023-02-21 PROCEDURE — G8417 CALC BMI ABV UP PARAM F/U: HCPCS | Performed by: PSYCHIATRY & NEUROLOGY

## 2023-02-21 PROCEDURE — 99214 OFFICE O/P EST MOD 30 MIN: CPT | Performed by: PSYCHIATRY & NEUROLOGY

## 2023-02-21 PROCEDURE — 3077F SYST BP >= 140 MM HG: CPT | Performed by: PSYCHIATRY & NEUROLOGY

## 2023-02-21 RX ORDER — CLOPIDOGREL BISULFATE 75 MG/1
TABLET ORAL
Qty: 30 TABLET | Refills: 0 | OUTPATIENT
Start: 2023-02-21

## 2023-02-21 RX ORDER — GABAPENTIN 300 MG/1
CAPSULE ORAL
Qty: 60 CAPSULE | Refills: 5 | Status: SHIPPED | OUTPATIENT
Start: 2023-02-21 | End: 2023-03-23

## 2023-02-21 NOTE — PROGRESS NOTES
Chief Complaint   Patient presents with    Follow-Up from Hospital     Patient is not getting good sleep. She is having a lot of pain in her legs due to neuropathy. She only has a few pills left of Gabapentin        Jessica Dhaliwal is a 58y.o. year old female with history of anxiety, depression, COPD, atherosclerotic disease, colitis, COPD, CVA, DM,  LE edema, hyperlipidemia, HTN, morbid obesity, RA, CAD  and venous thromboembolism seen in the inpatient rehab facility following an acute stroke. . She presented to [facility] ER on 11/9/22 after being found at home lying facedown in her feces. She was very weak, confused, not able to speak but moving purposefully and intermittently following commands. Her last well know was 3 a.m. when her  left for work. Imaging done revealed a large MCA stroke 7.7 x 5 cm. CTA head/neck revealed an acute M1 occlusion. She was outside of the window for TPA. CK on admit was 1100, consistent with rhabdomylosis. She was also noted to possibly Dens fracture. She was transferred to [facility] for a possible thrombectomy. Further imaging was done at [facility] and she was deemed not a candidate for thrombectomy. MRI done ruled out Dens fracture. Repeat CT of head on 11/11/22 showed evolving left MCA territory infarct with increasing edema/mass effect resulting in 4mm of  rightward midline shift(previously 2mm) a the level of the third ventricle. No hemorrhagic conversion or definite trapping of the right lateral ventricle, possible small acute right cerebellar infarct. Neurosurgery was consulted for possible hemicraniectomy. She was seen by Dr. Phoenix, who didn't feel any surgical intervention was needed. Patient was found to have a UTI + for [facility] and was placed on Rocephin on 11/14/22. Patient had a PICC line placed. Patient was evaluated by SPT and initially made NPO d/t oropharyngeal dysphagia. NGT placed and feeding started. She was also noted to have global aphasia but is improving.  She was re-evaluated by SPT on 11/15/22 for swallow and was started on a dysphagia 1 diet with nectar thick liquids. Patient also continues to have right sided weakness and is participating in both PT/OT. Repeat CT head on 11/13/22 shows stable LMCA ischemic stroke with stable edema, 5 mm shift, no hemorrhage. She was felt to need a stay on Rehab for continued PT/OT/SPT and medical management to work towards her goal of returning home with her . Patient was admitted to inpatient rehab with improvement. Speech bit better. Getting therapy. Had blood clots and started on Eliquis. Unclear of Neurontin is helping her leg pains.     Active Ambulatory Problems     Diagnosis Date Noted    Heartburn 05/14/2012    Indigestion 05/14/2012    Nausea 05/14/2012    Morbid obesity ([address] 05/14/2012    Anticoagulated on warfarin 05/14/2012    Ulcerative colitis ([address] 05/14/2012    Hypertension     Hyperlipidemia     MI (myocardial infarction) ([address]     ASCVD (arteriosclerotic cardiovascular disease)     CAD (coronary artery disease) 10/23/2014    Acute ischemic stroke ([address] 11/18/2022    Aphasia 11/18/2022    COPD (chronic obstructive pulmonary disease) ([address] 11/18/2022    Anxiety 11/18/2022    H/O superior vena cava filter placement 11/18/2022    Essential hypertension 11/18/2022    History of MI (myocardial infarction) 11/18/2022    Rheumatoid arthritis ([address] 11/18/2022    Dysphagia due to recent cerebral infarction 11/18/2022    Type 2 diabetes mellitus with complication, without long-term current use of insulin ([address] 11/19/2022    Left popliteal artery occlusion ([address] 01/24/2023    Thrombosis 01/24/2023     Resolved Ambulatory Problems     Diagnosis Date Noted    No Resolved Ambulatory Problems     Past Medical History:   Diagnosis Date    Carrier of group B Streptococcus     Cellulitis     Colitis     Costochondritis     CVA (cerebral vascular accident) ([address]     Depression     Edema     Fracture of sternum     Gallstones Grief reaction     MRSA (methicillin resistant Staphylococcus aureus)     Neuropathy     Obesity     Pseudarthrosis sternal    RA (rheumatoid arthritis) (HCC)     Rosacea     Sinus bradycardia     TIA (transient ischemic attack)     Venous thromboembolism        Past Surgical History:   Procedure Laterality Date    ADENOIDECTOMY      APPENDECTOMY      CARDIAC CATHETERIZATION  3/2009    CARDIAC CATHETERIZATION  14  JDT    EF 50%, patent bypass grafts    CHOLECYSTECTOMY  2011    COLONOSCOPY  2010    Dr. Willem Richard: distal colon having ulcerative lesions c/w UC    COLONOSCOPY      pancolitis    COLONOSCOPY      CORONARY ARTERY BYPASS GRAFT  3/2009    Dr Ileana Beltran, LIMA to LAD, SVGs to OM & PDA    GASTRIC BYPASS SURGERY      HIATAL HERNIA REPAIR      HYSTERECTOMY (CERVIX STATUS UNKNOWN)      KNEE SURGERY      IL COLONOSCOPY FLX DX W/COLLJ SPEC WHEN PFRMD N/A 3/12/2018    Dr Barbara Morris rectal inflammation consistent with U.C.-Active proctitis--3 yr recall    [facility]      Dr. Lady Jung  2012       Family History   Problem Relation Age of Onset    Prostate Cancer Father     Heart Failure Father     Cancer Father     Colon Cancer Neg Hx     Colon Polyps Neg Hx     Liver Cancer Neg Hx     Liver Disease Neg Hx     Esophageal Cancer Neg Hx     Rectal Cancer Neg Hx     Stomach Cancer Neg Hx        Allergies   Allergen Reactions    Methotrexate Derivatives        Social History     Socioeconomic History    Marital status:      Spouse name: Not on file    Number of children: Not on file    Years of education: Not on file    Highest education level: Not on file   Occupational History    Not on file   Tobacco Use    Smoking status: Former     Packs/day: 2.00     Years: 32.00     Pack years: 64.00     Types: Cigarettes     Quit date: 3/3/2009     Years since quittin.9    Smokeless tobacco: Never Vaping Use    Vaping Use: Never used   Substance and Sexual Activity    Alcohol use: Yes     Comment: Socially    Drug use: No    Sexual activity: Not on file   Other Topics Concern    Not on file   Social History Narrative    Not on file     Social Determinants of Health     Financial Resource Strain: Not on file   Food Insecurity: Not on file   Transportation Needs: Not on file   Physical Activity: Not on file   Stress: Not on file   Social Connections: Not on file   Intimate Partner Violence: Not on file   Housing Stability: Not on file       Review of Systems    Constitutional - No fever or chills. No diaphoresis or significant fatigue. HENT -  No tinnitus or significant hearing loss. Eyes - no sudden vision change or eye pain  Respiratory - no significant shortness of breath or cough  Cardiovascular - no chest pain No palpitations or significant leg swelling  Gastrointestinal - no abdominal swelling or pain. Genitourinary - No difficulty urinating, dysuria  Musculoskeletal - no back pain or myalgia. Skin - no color change or rash  Neurologic - No seizures. No lateralizing weakness. Hematologic - no easy bruising or excessive bleeding. Psychiatric - no severe anxiety or nervousness. All other review of systems are negative.       Current Outpatient Medications   Medication Sig Dispense Refill    apixaban (ELIQUIS) 5 MG TABS tablet Take by mouth 2 times daily      gabapentin (NEURONTIN) 300 MG capsule 2 pills at night 60 capsule 5    escitalopram (LEXAPRO) 5 MG tablet Take 1 tablet by mouth daily 30 tablet 0    dulaglutide (TRULICITY) 1.5 OR/4.3WV SC injection Inject 0.5 mLs into the skin once a week 4 Adjustable Dose Pre-filled Pen Syringe 0    atorvastatin (LIPITOR) 40 MG tablet Take 1 tablet by mouth at bedtime 30 tablet 0    hydroxychloroquine (PLAQUENIL) 200 MG tablet Take 1 tablet by mouth 2 times daily 60 tablet 0    amLODIPine (NORVASC) 2.5 MG tablet Take 1 tablet by mouth daily 30 tablet 0 miconazole (MICOTIN) 2 % powder Apply topically 2 times daily. 45 g 0    folic acid (FOLVITE) 1 MG tablet Take 1 tablet by mouth daily 30 tablet 0    bisacodyl (DULCOLAX) 5 MG EC tablet Take 1 tablet by mouth daily 30 tablet 0    docusate sodium (COLACE, DULCOLAX) 100 MG CAPS Take 100 mg by mouth 2 times daily 60 capsule 0    Multiple Vitamin (MULTIVITAMIN) TABS tablet Take 1 tablet by mouth daily 30 tablet 0     No current facility-administered medications for this visit. BP (!) 168/76   Pulse 59   Ht 5' 8\" (1.727 m)   Wt 188 lb (85.3 kg)   BMI 28.59 kg/m²     Constitutional - well developed, well nourished. Eyes - conjunctiva normal.  Ear, nose, throat - No scars, masses, or lesions over external nose or ears, no atrophy of tongue  Neck-symmetric, no masses noted, no jugular vein distension  Respiration- chest wall appears symmetric, good expansion,   normal effort without use of accessory muscles  Musculoskeletal - no significant wasting of muscles noted, no bony deformities  Extremities-no clubbing, cyanosis or edema  Skin - warm, dry, and intact. No rash, erythema, or pallor.   Psychiatric - mood, affect, and behavior appear normal.      Neurological exam  Awake, alert, nonfluent expressive aphasia more than receptive aphasia  Attention and concentration appear appropriate  Recent and remote memory unable to test  Speech normal without dysarthria  No clear issues with language of fund of knowledge    Cranial Nerve Exam     CN III, IV,VI-EOMI, No nystagmus, conjugate eye movements, no ptosis  CN VII-no facial assymetry        Motor Exam  No significant movement of the right arm antigravity in the right leg    Tremors- no tremors in hands or head noted    Gait  In wheelchair    Lab Results   Component Value Date    [id number] 618 01/24/2023     Lab Results   Component Value Date    WBC 14.4 (H) 01/25/2023    HGB 10.2 (L) 01/25/2023    HCT 33.3 (L) 01/25/2023    MCV 76.7 (L) 01/25/2023     (H) 01/25/2023     Lab Results   Component Value Date     (L) 01/25/2023    K 3.9 01/25/2023     01/25/2023    CO2 23 01/25/2023    BUN 7 (L) 01/25/2023    CREATININE 0.7 01/25/2023    GLUCOSE 90 01/25/2023    CALCIUM 8.6 (L) 01/25/2023    PROT 6.5 (L) 01/24/2023    LABALBU 3.4 (L) 01/24/2023    BILITOT 0.4 01/24/2023    ALKPHOS 177 (H) 01/24/2023    AST 37 (H) 01/24/2023    ALT 24 01/24/2023    LABGLOM >60 01/25/2023           Assessment    ICD-10-CM    1. Acute ischemic stroke (HCC)  I63.9 gabapentin (NEURONTIN) 300 MG capsule      2. Type 2 diabetes mellitus with complication, without long-term current use of insulin (HCC)  E11.8 gabapentin (NEURONTIN) 300 MG capsule      3. Hospital discharge follow-up  Z09 SD DISCHARGE MEDS RECONCILED W/ CURRENT OUTPATIENT MED LIST      4. Dysphagia due to recent cerebral infarction  I69.391 gabapentin (NEURONTIN) 300 MG capsule      5. Essential hypertension  I10 gabapentin (NEURONTIN) 300 MG capsule      6. Anxiety  F41.9 gabapentin (NEURONTIN) 300 MG capsule      7. Aphasia  R47.01 gabapentin (NEURONTIN) 300 MG capsule      8. Coronary artery disease involving native coronary artery of native heart without angina pectoris  I25.10 gabapentin (NEURONTIN) 300 MG capsule          1. Acute ischemic stroke-on Eliquis/statin  2. Hypertension-on medications monitor  3. Hyperlipidemia-on statin  4. Diabetes-Trulicity-monitor blood sugar  5. DVT prophylaxis-Eliquis  6. History of coronary artery disease-Eliquis/statin  7. Neuropathy-on Neurontin  8. Rheumatoid arthritis-on Plaquenil  9. COPD-monitor  10. History of anxiety and depression-monitor  11. History of colitis/gallstones-monitor  12. History of bariatric surgery-on multivitamin/folic acid  13.   History of superior vena cava filter placement with venous thromboembolism she had previously taken herself off anticoagulation-now back on Eliquis    At this time we will plan to increase dose of Neurontin at nighttime. If ineffective then may consider stopping it or call to increase dose    Follow-up 6 months  Dicussed with  and patient      Plan    Orders Placed This Encounter   Procedures    NH DISCHARGE MEDS RECONCILED W/ CURRENT OUTPATIENT MED LIST       Orders Placed This Encounter   Medications    gabapentin (NEURONTIN) 300 MG capsule     Si pills at night     Dispense:  60 capsule     Refill:  5         Return in about 6 months (around 2023). EMR Dragon/transcription disclaimer:Significant part of this  encounter note is electronic transcription/translation of spoken language to printed text. The electronic translation of spoken language may be erroneous, or at times, nonsensical words or phrases may be inadvertently transcribed.  Although I have reviewed the note for such errors, some may still exist.

## 2023-03-06 NOTE — PROGRESS NOTES
OP HEMATOLOGY/ONCOLOGY CONSULTATION      Pt Name: Dilip Xiong  YOB: 1960  MRN: [id number]  Referring provider: ALEENA Cano  Requesting provider: Dr. Beth Saucedo  Reason for consultation: Thrombophilia evaluation  Date of evaluation: 3/7/2023    History Obtained From:  patient, spouse -Shayne Saeed, electronic medical record    CHIEF COMPLAINT:    Chief Complaint   Patient presents with    New Patient     Thrombophilia evaluation     HISTORY OF PRESENT ILLNESS:    Dilip Xiong is a 58 y.o.  female referred to the clinic by Dr. Beth Saucedo for thrombophilia evaluation. Hematology/Oncology consultation is performed 3/7/2023. PMH significant for CAD, COPD, ASCVD, HTN, hyperlipidemia, rheumatoid arthritis, ulcerative colitis gastric bypass, anxiety/depression, DM, BLE edema, history of IVC filter (more than 10 years ago-patient and spouse are uncertain of events around IVC) venous thromboembolism, CVA 11/2022. Lisa Cueto was diagnosed with large MCA stroke 11/9/2022, not a candidate for tPA. She has residual right-sided weakness and continues to have expressive aphasia. She [facility]/24/2023 with complaint of LLE pain/swelling. LLE venous Doppler 1/24/2023 was negative for evidence of DVT. She did have incidental finding of left popliteal artery occlusion. CTA CHEST W WO CONTRAST 1/24/2023 at [facility]:    thrombus in the descending thoracic aorta, measuring 2.3 x 1.2 cm with the multiple organ infarcts  Diffuse infarcts of the right kidney and a majority of the spleen. The left lower pole is not included in the field of view and may be infarcted as well. CTA abdominal aorta 1/24/2023 at [facility]:   Filling defect within the distal thoracic aorta. A thrombus is suspected measuring up to 1 cm. A linear abnormality within the similar area of the aorta is concerning for possible dissection. Total occlusion of a long segment of the left popliteal artery.    significant stenosis posteriorly within the left internal iliac artery. Abnormal appearance of the right renal parenchyma and spleen, possible impaired    Smiley was initiated on heparin drip in the emergency department. She was evaluated by vascular surgery with recommendations to transfer to tertiary care center due to thoracic aortic thrombus/embolus, embolizing spleen and bilateral lower extremities. Lisa Cueto was transferred to [facility] [place] [facility] in [place], [place].    CTA abdominal aorta with bilateral runoff 1/26/2023:  Short focal occlusion of the right popliteal artery with reconstitution to the anterior tibial and peroneal.    Occlusion of the distal superficial femoral artery and popliteal artery with reconstitution of the anterior tibial and posterior tibial arteries. Suggest direct catheter angiogram with possible revascularization    On 1/27/2023, Lisa Cueto underwent open thrombectomy of left femoral and popliteal arteries. She was discharged on Eliquis 10 mg po BID x1 week, 5 mg po BID thereafter, which she continues at this time. Her spouse denies missed doses. He denies Smiley to have bleeding issues. Mr. Kareen Noble provides essentially all of Smiley's history today, aside from what is noted in her medical record. Labs 1/24/2023:  Iron: 26, TIBC: 341, Iron Saturation: 8, Ferritin: 72.6  Foalte: >20.0  Vit B12: 618  Lipase: 14  TSH: 1.220  Mag; 1.8    Labs 2/13/2023 per Dr. Jenniffer Majano:   Lupus anticoagulant: negative  Anticardiolipin IgG: <10, IgM: <10  Beta-2 Glycoprotein IgG: <10, IgM: <10    No evidence of APS. Completion of thrombophilia work-up requested including JAK2 with reflexes. Continue Eliquis 5 mg po BID as prescribed.     Past Medical History:   Diagnosis Date    Anxiety     ASCVD (arteriosclerotic cardiovascular disease)     Carrier of group B Streptococcus     Cellulitis     Colitis     COPD (chronic obstructive pulmonary disease) (HCC)     Costochondritis     [facility] 11/09/2022 Depression     Edema     Fracture of sternum     non union post surgery.      Gallstones     Grief reaction     H/O superior vena cava filter placement     Hyperlipidemia     Hypertension     MI (myocardial infarction) ([address]     MRSA (methicillin resistant Staphylococcus aureus)     Neuropathy     Obesity     Pseudarthrosis sternal    RA (rheumatoid arthritis) ([facility]     Rosacea     Sinus bradycardia     TIA (transient ischemic attack)     Venous thromboembolism      Past Surgical History:   Procedure Laterality Date    ADENOIDECTOMY      APPENDECTOMY      CARDIAC CATHETERIZATION  03/2009    CARDIAC CATHETERIZATION  11/5/14  JDT    EF 50%, patent bypass grafts    CHOLECYSTECTOMY  2011    COLONOSCOPY  06/03/2010    Dr. Mariela Melgar: distal colon having ulcerative lesions c/w UC    COLONOSCOPY  2009    pancolitis    COLONOSCOPY      CORONARY ARTERY BYPASS GRAFT  03/2009    Dr Lavon Mcfadden, LIMA to LAD, SVGs to OM & PDA    GASTRIC BYPASS SURGERY  2012    HIATAL HERNIA REPAIR  2011    HYSTERECTOMY (CERVIX STATUS UNKNOWN)      KNEE SURGERY Right     infection around the knee    MA COLONOSCOPY FLX DX W/COLLJ SPEC WHEN PFRMD N/A 03/12/2018    Dr Tahmina Perkins rectal inflammation consistent with U.C.-Active proctitis--3 yr recall    [address] [place] [address] ENDOSCOPY  2010    Dr. Shahriar Marti ENDOSCOPY  2012       Current Outpatient Medications:     furosemide (LASIX) 80 MG tablet, 80 mg daily, Disp: , Rfl:     tiZANidine (ZANAFLEX) 4 MG tablet, Take 4 mg by mouth daily, Disp: , Rfl:     apixaban (ELIQUIS) 5 MG TABS tablet, Take by mouth 2 times daily, Disp: , Rfl:     gabapentin (NEURONTIN) 300 MG capsule, 2 pills at night, Disp: 60 capsule, Rfl: 5    atorvastatin (LIPITOR) 40 MG tablet, Take 1 tablet by mouth at bedtime, Disp: 30 tablet, Rfl: 0    hydroxychloroquine (PLAQUENIL) 200 MG tablet, Take 1 tablet by mouth 2 times daily, Disp: 60 tablet, Rfl: 0    amLODIPine (NORVASC) 2.5 MG tablet, Take 1 tablet by mouth daily, Disp: 30 tablet, Rfl: 0    folic acid (FOLVITE) 1 MG tablet, Take 1 tablet by mouth daily, Disp: 30 tablet, Rfl: 0   Allergies: Allergies   Allergen Reactions    Methotrexate Derivatives      Social History     Tobacco Use    Smoking status: Former     Packs/day: 2.00     Years: 32.00     Pack years: 64.00     Types: Cigarettes     Quit date: 3/3/2009     Years since quittin.0    Smokeless tobacco: Never   Vaping Use    Vaping Use: Never used   Substance Use Topics    Alcohol use: Not Currently     Comment: Socially    Drug use: No     Family History   Problem Relation Age of Onset    COPD Mother         smoker    Prostate Cancer Father     Heart Failure Father     Cancer Father     Other Sister         breathing problems    Colon Cancer Neg Hx     Colon Polyps Neg Hx     Liver Cancer Neg Hx     Liver Disease Neg Hx     Esophageal Cancer Neg Hx     Rectal Cancer Neg Hx     Stomach Cancer Neg Hx      Health Maintenance   Topic Date Due    COVID-19 Vaccine (1) Never done    Pneumococcal 0-64 years Vaccine (1 - PCV) Never done    Diabetic foot exam  Never done    Lipids  Never done    Depression Screen  Never done    HIV screen  Never done    Diabetic Alb to Cr ratio (uACR) test  Never done    Diabetic retinal exam  Never done    Hepatitis C screen  Never done    DTaP/Tdap/Td vaccine (1 - Tdap) Never done    Shingles vaccine (1 of 2) Never done    Breast cancer screen  Never done    Low dose CT lung screening  Never done    Colorectal Cancer Screen  2021    Flu vaccine (1) 2022    A1C test (Diabetic or Prediabetic)  2024    GFR test (Diabetes, CKD 3-4, OR last GFR 15-59)  2024    Hepatitis A vaccine  Aged Out    Hib vaccine  Aged Out    Meningococcal (ACWY) vaccine  Aged Out     Subjective   Review of Systems   Constitutional:  Positive for activity change and fatigue. Negative for fever.    HENT:  Negative for hearing loss, mouth sores, nosebleeds, sore throat and trouble swallowing. Eyes:  Negative for discharge and itching. Impaired vision   Respiratory:  Positive for cough. Negative for shortness of breath and wheezing. Cardiovascular:  Positive for leg swelling. Negative for chest pain and palpitations. Gastrointestinal:  Negative for abdominal pain, constipation, diarrhea, nausea and vomiting. Endocrine: Positive for cold intolerance. Negative for heat intolerance. Genitourinary:  Negative for dysuria, frequency, hematuria and urgency. Musculoskeletal:  Positive for arthralgias (right rib/side) and joint swelling. Negative for myalgias. Skin:  Negative for pallor and rash. Allergic/Immunologic: Negative for environmental allergies and immunocompromised state. Neurological:  Positive for speech difficulty. Negative for seizures, syncope and numbness. Recent stroke   Hematological:  Negative for adenopathy. Bruises/bleeds easily. Psychiatric/Behavioral:  Negative for agitation, behavioral problems and confusion. The patient is not nervous/anxious. Objective   Physical Exam  Vitals reviewed. Constitutional:       General: She is not in acute distress. Appearance: She is well-developed. She is not toxic-appearing or diaphoretic. HENT:      Head: Normocephalic and atraumatic. Right Ear: External ear normal.      Left Ear: External ear normal.      Nose: Nose normal.      Mouth/Throat:      Mouth: Mucous membranes are moist.   Eyes:      General: No scleral icterus. Right eye: No discharge. Left eye: No discharge. Conjunctiva/sclera: Conjunctivae normal.   Neck:      Trachea: No tracheal deviation. Cardiovascular:      Rate and Rhythm: Normal rate and regular rhythm. Pulmonary:      Effort: Pulmonary effort is normal. No respiratory distress. Breath sounds: Normal breath sounds. No wheezing or rales.    Abdominal:      General: Bowel sounds are normal. There is no distension. Palpations: Abdomen is soft. Tenderness: There is no abdominal tenderness. There is no guarding. Genitourinary:     Comments: Exam deferred  Musculoskeletal:         General: No tenderness or deformity. Cervical back: Neck supple. No muscular tenderness. Right lower leg: Edema present. Left lower leg: Edema present. Comments: RUE/RLE weakness post CVA. Presents in wheelchair   Lymphadenopathy:      Cervical:      Right cervical: No superficial or deep cervical adenopathy. Left cervical: No superficial or deep cervical adenopathy. Upper Body:      Right upper body: No supraclavicular adenopathy. Left upper body: No supraclavicular adenopathy. Comments:      Skin:     General: Skin is warm and dry. Findings: No rash. Neurological:      Mental Status: She is alert and oriented to person, place, and time. Comments: follows commands, expressive aphasia   Psychiatric:         Behavior: Behavior normal. Behavior is cooperative. Comments: Alert and oriented to person, place     /80   Pulse 62   Ht 5' 8\" (1.727 m)   Wt 180 lb (81.6 kg)   SpO2 95%   BMI 27.37 kg/m²   Wt Readings from Last 3 Encounters:   03/07/23 180 lb (81.6 kg)   02/21/23 188 lb (85.3 kg)   01/25/23 188 lb 0.5 oz (85.3 kg)     Labs reviewed/interpreted by me (also as noted per HPI):  CBC:   Lab Results   Component Value Date    WBC 14.81 (H) 03/07/2023    HGB 12.2 03/07/2023    HCT 42.0 03/07/2023    MCV 82.8 03/07/2023     (HH) 03/07/2023    LABLYMP 1.61 11/05/2014    LYMPHOPCT 8.8 (L) 03/07/2023    RBC 5.07 03/07/2023    MCH 24.1 (L) 03/07/2023    MCHC 29.0 (L) 03/07/2023    RDW 17.8 (H) 03/07/2023     ASSESSMENT:    1. Left popliteal artery occlusion (HCC)    2. Embolism or thrombosis of aorta (thoracic) ([address]    3. Anticoagulated    4. Iron deficiency anemia, unspecified iron deficiency anemia type    5. Neutrophilic leukocytosis    6. Thrombocytosis    7. Care plan discussed with patient      PLAN:  S/p open thrombectomy of left femoral and popliteal arteries 1/27/2023  No evidence of APS  Complete thrombophilia evaluation, drawn today, 4/8/1853  Neutrophilic leukocytosis, thrombocytosis likely reactive, however will add JAK2 with reflexes to evaluate for MPN  Labs 1/24/2023 were consistent with iron deficiency which could contribute to thrombocytosis. (Patient also has a known history of gastric bypass. ?  Malabsorption)  Continue Eliquis 5 mg po BID. monitor for bleeding. Creatinine was normal at 0.7 on 1/25/2023  Recommend routine, age appropriate tumor screenings  Plan of care discussed with patient and her spouse. All questions answered    Orders Placed This Encounter   Procedures    Thrombotic Risk Reflexive Panel    JAK2 V617F Mutation Analysis, Qual rflx CALR/Exon 12-15/MPL    Ferritin    Iron and TIBC     Tumor Screening:  Colonoscopy 6/2/2010 at by Dr. Kei Betancur [facility]: Left-sided colitis nonspecific- suspect ulcerative colitis. 5 yr recall. Mammogram 7/11/2019 at [facility]: BI-RADS category 1. Defer arrangements to PCP    Return in about 3 weeks (around 3/28/2023) for follow up with ALEENA Scott. I have seen, examined and reviewed this patient medication list, appropriate labs and imaging studies. I reviewed relevant medical records and others physicians notes. I discussed the plan of care with the patient. I answered all questions to the patients satisfaction. I have also reviewed the chief complaint (CC) and part of the history (History of Present Illness (HPI), Past Family Social History [facility]), or Review of Systems (ROS) and made changes when appropriate. Office note and serology completed by Dr. Forrest Hinds reviewed. Recent hospital records [facility] and [facility] reviewed. Dictated utilizing Dragon transcription software.         ALEENA Restrepo  7:13 AM  3/8/2023

## 2023-03-07 ENCOUNTER — HOSPITAL ENCOUNTER (OUTPATIENT)
Dept: INFUSION THERAPY | Age: 63
Discharge: HOME | End: 2023-03-07
Payer: COMMERCIAL

## 2023-03-07 ENCOUNTER — HOSPITAL ENCOUNTER (OUTPATIENT)
Dept: GENERAL RADIOLOGY | Facility: HOSPITAL | Age: 63
Discharge: HOME | End: 2023-03-07
Admitting: NURSE PRACTITIONER
Payer: COMMERCIAL

## 2023-03-07 ENCOUNTER — TRANSCRIBE ORDERS (OUTPATIENT)
Dept: ADMINISTRATIVE | Facility: HOSPITAL | Age: 63
End: 2023-03-07
Payer: MEDICARE

## 2023-03-07 ENCOUNTER — OFFICE VISIT (OUTPATIENT)
Dept: HEMATOLOGY | Age: 63
End: 2023-03-07
Payer: COMMERCIAL

## 2023-03-07 VITALS
HEART RATE: 62 BPM | WEIGHT: 180 LBS | SYSTOLIC BLOOD PRESSURE: 130 MMHG | HEIGHT: 68 IN | BODY MASS INDEX: 27.28 KG/M2 | DIASTOLIC BLOOD PRESSURE: 80 MMHG | OXYGEN SATURATION: 95 %

## 2023-03-07 DIAGNOSIS — R07.81 RIB PAIN ON RIGHT SIDE: Primary | ICD-10-CM

## 2023-03-07 DIAGNOSIS — I70.202 LEFT POPLITEAL ARTERY OCCLUSION (HCC): Primary | ICD-10-CM

## 2023-03-07 DIAGNOSIS — D68.9 CLOTTING DISORDER (HCC): Primary | ICD-10-CM

## 2023-03-07 DIAGNOSIS — D50.9 IRON DEFICIENCY ANEMIA, UNSPECIFIED IRON DEFICIENCY ANEMIA TYPE: ICD-10-CM

## 2023-03-07 DIAGNOSIS — R07.81 RIB PAIN ON RIGHT SIDE: ICD-10-CM

## 2023-03-07 DIAGNOSIS — I82.4Z2 LOWER LEG DVT (DEEP VENOUS THROMBOEMBOLISM), ACUTE, LEFT (HCC): ICD-10-CM

## 2023-03-07 DIAGNOSIS — D72.9 NEUTROPHILIC LEUKOCYTOSIS: ICD-10-CM

## 2023-03-07 DIAGNOSIS — D75.839 THROMBOCYTOSIS: ICD-10-CM

## 2023-03-07 DIAGNOSIS — Z79.01 ANTICOAGULATED: ICD-10-CM

## 2023-03-07 DIAGNOSIS — I74.11: ICD-10-CM

## 2023-03-07 DIAGNOSIS — Z71.89 CARE PLAN DISCUSSED WITH PATIENT: ICD-10-CM

## 2023-03-07 DIAGNOSIS — D68.9 CLOTTING DISORDER (HCC): ICD-10-CM

## 2023-03-07 LAB
BASOPHILS ABSOLUTE: 0.16 K/UL (ref 0.01–0.08)
BASOPHILS RELATIVE PERCENT: 1.1 % (ref 0.1–1.2)
EOSINOPHILS ABSOLUTE: 0.28 K/UL (ref 0.04–0.54)
EOSINOPHILS RELATIVE PERCENT: 1.9 % (ref 0.7–7)
FERRITIN: 52.4 NG/ML (ref 13–150)
HCT VFR BLD CALC: 42 % (ref 34.1–44.9)
HEMOGLOBIN: 12.2 G/DL (ref 11.2–15.7)
IRON SATURATION: 6 % (ref 14–50)
IRON: 22 UG/DL (ref 37–145)
LYMPHOCYTES ABSOLUTE: 1.31 K/UL (ref 1.18–3.74)
LYMPHOCYTES RELATIVE PERCENT: 8.8 % (ref 19.3–53.1)
MCH RBC QN AUTO: 24.1 PG (ref 25.6–32.2)
MCHC RBC AUTO-ENTMCNC: 29 G/DL (ref 32.3–35.5)
MCV RBC AUTO: 82.8 FL (ref 79.4–94.8)
MONOCYTES ABSOLUTE: 0.77 K/UL (ref 0.24–0.82)
MONOCYTES RELATIVE PERCENT: 5.2 % (ref 4.7–12.5)
NEUTROPHILS ABSOLUTE: 12.24 K/UL (ref 1.56–6.13)
NEUTROPHILS RELATIVE PERCENT: 82.7 % (ref 34–71.1)
PDW BLD-RTO: 17.8 % (ref 11.7–14.4)
PLATELET # BLD: 586 K/UL (ref 182–369)
PMV BLD AUTO: 9.4 FL (ref 7.4–10.4)
RBC # BLD: 5.07 M/UL (ref 3.93–5.22)
TOTAL IRON BINDING CAPACITY: 354 UG/DL (ref 250–400)
WBC # BLD: 14.81 K/UL (ref 3.98–10.04)

## 2023-03-07 PROCEDURE — 36415 COLL VENOUS BLD VENIPUNCTURE: CPT

## 2023-03-07 PROCEDURE — 99204 OFFICE O/P NEW MOD 45 MIN: CPT | Performed by: NURSE PRACTITIONER

## 2023-03-07 PROCEDURE — 3074F SYST BP LT 130 MM HG: CPT | Performed by: NURSE PRACTITIONER

## 2023-03-07 PROCEDURE — 85025 COMPLETE CBC W/AUTO DIFF WBC: CPT

## 2023-03-07 PROCEDURE — G8427 DOCREV CUR MEDS BY ELIG CLIN: HCPCS | Performed by: NURSE PRACTITIONER

## 2023-03-07 PROCEDURE — 99211 OFF/OP EST MAY X REQ PHY/QHP: CPT

## 2023-03-07 PROCEDURE — 1036F TOBACCO NON-USER: CPT | Performed by: NURSE PRACTITIONER

## 2023-03-07 PROCEDURE — 71100 X-RAY EXAM RIBS UNI 2 VIEWS: CPT

## 2023-03-07 PROCEDURE — G8417 CALC BMI ABV UP PARAM F/U: HCPCS | Performed by: NURSE PRACTITIONER

## 2023-03-07 PROCEDURE — 3017F COLORECTAL CA SCREEN DOC REV: CPT | Performed by: NURSE PRACTITIONER

## 2023-03-07 PROCEDURE — 3078F DIAST BP <80 MM HG: CPT | Performed by: NURSE PRACTITIONER

## 2023-03-07 PROCEDURE — G8484 FLU IMMUNIZE NO ADMIN: HCPCS | Performed by: NURSE PRACTITIONER

## 2023-03-07 RX ORDER — FUROSEMIDE 80 MG
80 TABLET ORAL DAILY
COMMUNITY
Start: 2022-12-20

## 2023-03-07 RX ORDER — TIZANIDINE 4 MG/1
4 TABLET ORAL DAILY
COMMUNITY

## 2023-03-07 ASSESSMENT — PROMIS GLOBAL HEALTH SCALE
TO WHAT EXTENT ARE YOU ABLE TO CARRY OUT YOUR EVERYDAY PHYSICAL ACTIVITIES SUCH AS WALKING, CLIMBING STAIRS, CARRYING GROCERIES, OR MOVING A CHAIR [ON A SCALE OF 1 (NOT AT ALL) TO 5 (COMPLETELY)]?: 2
IN THE PAST 7 DAYS, HOW OFTEN HAVE YOU BEEN BOTHERED BY EMOTIONAL PROBLEMS, SUCH AS FEELING ANXIOUS, DEPRESSED, OR IRRITABLE [ON A SCALE FROM 1 (NEVER) TO 5 (ALWAYS)]?: 4
IN GENERAL, WOULD YOU SAY YOUR HEALTH IS...[ON A SCALE OF 1 (POOR) TO 5 (EXCELLENT)]: 2
IN GENERAL, PLEASE RATE HOW WELL YOU CARRY OUT YOUR USUAL SOCIAL ACTIVITIES (INCLUDES ACTIVITIES AT HOME, AT WORK, AND IN YOUR COMMUNITY, AND RESPONSIBILITIES AS A PARENT, CHILD, SPOUSE, EMPLOYEE, FRIEND, ETC) [ON A SCALE OF 1 (POOR) TO 5 (EXCELLENT)]?: 3
IN THE PAST 7 DAYS, HOW WOULD YOU RATE YOUR PAIN ON AVERAGE [ON A SCALE FROM 0 (NO PAIN) TO 10 (WORST IMAGINABLE PAIN)]?: 9
IN GENERAL, HOW WOULD YOU RATE YOUR MENTAL HEALTH, INCLUDING YOUR MOOD AND YOUR ABILITY TO THINK [ON A SCALE OF 1 (POOR) TO 5 (EXCELLENT)]?: 2
IN GENERAL, WOULD YOU SAY YOUR QUALITY OF LIFE IS...[ON A SCALE OF 1 (POOR) TO 5 (EXCELLENT)]: 2
IN GENERAL, HOW WOULD YOU RATE YOUR PHYSICAL HEALTH [ON A SCALE OF 1 (POOR) TO 5 (EXCELLENT)]?: 2
IN THE PAST 7 DAYS, HOW WOULD YOU RATE YOUR FATIGUE ON AVERAGE [ON A SCALE FROM 1 (NONE) TO 5 (VERY SEVERE)]?: 4
IN GENERAL, HOW WOULD YOU RATE YOUR SATISFACTION WITH YOUR SOCIAL ACTIVITIES AND RELATIONSHIPS [ON A SCALE OF 1 (POOR) TO 5 (EXCELLENT)]?: 3
SUM OF RESPONSES TO QUESTIONS 2, 4, 5, & 10: 11
SUM OF RESPONSES TO QUESTIONS 3, 6, 7, & 8: 17

## 2023-03-07 ASSESSMENT — ENCOUNTER SYMPTOMS
EYE DISCHARGE: 0
WHEEZING: 0
EYE ITCHING: 0
COUGH: 1
SORE THROAT: 0
TROUBLE SWALLOWING: 0
ABDOMINAL PAIN: 0
NAUSEA: 0
CONSTIPATION: 0
VOMITING: 0
SHORTNESS OF BREATH: 0
DIARRHEA: 0

## 2023-03-09 ENCOUNTER — TELEPHONE (OUTPATIENT)
Dept: HEMATOLOGY | Age: 63
End: 2023-03-09

## 2023-03-09 NOTE — TELEPHONE ENCOUNTER
LITZY Gonzalez completed Follow up phone visit following [facility] distress screening to assess patient needs. SW introduced self and explained role and source of support. Patient spouse denies needs or concerns at this time. SW encouraged the patient and her spouse to call if assistance is needed in the future.

## 2023-03-14 ENCOUNTER — TELEPHONE (OUTPATIENT)
Dept: HEMATOLOGY | Age: 63
End: 2023-03-14

## 2023-03-14 DIAGNOSIS — E61.1 IRON DEFICIENCY: Primary | ICD-10-CM

## 2023-03-14 RX ORDER — FERROUS SULFATE 325(65) MG
325 TABLET ORAL 2 TIMES DAILY
Qty: 60 TABLET | Refills: 5 | Status: SHIPPED | OUTPATIENT
Start: 2023-03-14

## 2023-03-14 NOTE — TELEPHONE ENCOUNTER
Let patient's spouse know her iron is low. Recommend she try ferrous sulfate 325 mg po BID (caution constipation) OR if he does not think she can tolerate, arrange IV iron. Will go with the pill at this time. Verbalizes understanding and no questions at this time.  Electronically signed by Claudine Espinoza RN on 3/14/23 at 10:13 AM CDT no

## 2023-03-16 LAB
BACKGROUND, 489163: NORMAL
CALR EXON 9 MUT ANL BLD/T: NORMAL
CITATION REF LAB TEST: NORMAL
DIRECTOR REVIEW: NORMAL
EXTRACTION: NORMAL
JAK2 GENE MUT ANL BLD/T: NORMAL
JAK2 P.V617F BLD/T QL: NORMAL
LAB DIRECTOR NAME PROVIDER: NORMAL
LAB DIRECTOR NAME PROVIDER: NORMAL
LABORATORY COMMENT REPORT: NORMAL
LABORATORY COMMENT REPORT: NORMAL
Lab: NORMAL
MPL P.W515L+W515K+S505N BLD/T QL: NORMAL
REF LAB TEST METHOD: NORMAL
REF LAB TEST METHOD: NORMAL
REFLEX: NORMAL

## 2023-03-17 LAB
APCR PPP: 3.55 {RATIO}
APTT IMM NP PPP: ABNORMAL SEC (ref 32–48)
APTT P HEP NEUT PPP: ABNORMAL SEC (ref 32–48)
AT III ACT/NOR PPP CHRO: 89 % (ref 76–128)
B2 GLYCOPROT1 IGG SERPL IA-ACNC: <10 SGU
B2 GLYCOPROT1 IGM SERPL IA-ACNC: <10 SMU
CARDIOLIPIN IGG SER IA-ACNC: <10 GPL
CARDIOLIPIN IGM SER IA-ACNC: 20 MPL
CONFIRM APTT STACLOT: ABNORMAL
DRVVT SCREEN TO CONFIRM RATIO: ABNORMAL RATIO
F2 C.20210G>A GENO BLD/T: NEGATIVE
F5 P.R506Q BLD/T QL: ABNORMAL
HCYS SERPL-SCNC: 18 UMOL/L (ref 0–15)
LA 3 SCREEN W REFLEX-IMP: ABNORMAL
LA NT DPL PPP QL: ABNORMAL
MIXING DRVVT: ABNORMAL SEC (ref 33–44)
PROT C ACT/NOR PPP: 147 % (ref 83–168)
PROT S FREE AG ACT/NOR PPP IA: >250 % (ref 55–123)
PROTHROMBIN TIME: 17 SEC (ref 12–15.5)
REPTILASE TIME: ABNORMAL SEC
SCREEN APTT: 44 SEC (ref 32–48)
SCREEN DRVVT: 39 SEC (ref 33–44)
SPECIMEN SOURCE: ABNORMAL
SPECIMEN SOURCE: ABNORMAL
THROMBIN TIME: ABNORMAL SEC (ref 14.7–19.5)
THROMBOSIS INTERPRETATION: ABNORMAL

## 2023-03-20 ENCOUNTER — TELEPHONE (OUTPATIENT)
Dept: HEMATOLOGY | Age: 63
End: 2023-03-20

## 2023-03-20 DIAGNOSIS — E61.1 IRON DEFICIENCY: Primary | ICD-10-CM

## 2023-03-20 RX ORDER — FOLIC ACID 1 MG/1
1 TABLET ORAL DAILY
Qty: 30 TABLET | Refills: 5 | Status: SHIPPED | OUTPATIENT
Start: 2023-03-20

## 2023-03-20 NOTE — TELEPHONE ENCOUNTER
Regarding: Rx Folate  Please start patient on folic acid 1 mg po daily  Script sent to [facility]

## 2023-03-28 NOTE — PROGRESS NOTES
Cancer Father     Other Sister         breathing problems    Colon Cancer Neg Hx     Colon Polyps Neg Hx     Liver Cancer Neg Hx     Liver Disease Neg Hx     Esophageal Cancer Neg Hx     Rectal Cancer Neg Hx     Stomach Cancer Neg Hx      Health Maintenance   Topic Date Due    COVID-19 Vaccine (1) Never done    Pneumococcal 0-64 years Vaccine (1 - PCV) Never done    Diabetic foot exam  Never done    Lipids  Never done    Depression Screen  Never done    HIV screen  Never done    Diabetic Alb to Cr ratio (uACR) test  Never done    Diabetic retinal exam  Never done    Hepatitis C screen  Never done    DTaP/Tdap/Td vaccine (1 - Tdap) Never done    Shingles vaccine (1 of 2) Never done    Breast cancer screen  Never done    Low dose CT lung screening  Never done    Colorectal Cancer Screen  03/12/2021    Flu vaccine (1) 08/01/2022    A1C test (Diabetic or Prediabetic)  01/25/2024    GFR test (Diabetes, CKD 3-4, OR last GFR 15-59)  01/25/2024    Hepatitis A vaccine  Aged Out    Hib vaccine  Aged Out    Meningococcal (ACWY) vaccine  Aged Out     Subjective   Review of Systems   Constitutional:  Positive for activity change and fatigue. Negative for fever. HENT:  Negative for hearing loss, mouth sores, nosebleeds, sore throat and trouble swallowing. Eyes:  Negative for discharge and itching. Impaired vision   Respiratory:  Negative for shortness of breath and wheezing. Cardiovascular:  Positive for leg swelling. Negative for chest pain and palpitations. Gastrointestinal:  Negative for abdominal pain, constipation, diarrhea, nausea and vomiting. Endocrine: Positive for cold intolerance. Negative for heat intolerance. Genitourinary:  Negative for dysuria, frequency, hematuria and urgency. Musculoskeletal:  Positive for joint swelling. Negative for myalgias. Skin:  Negative for pallor and rash. Allergic/Immunologic: Negative for environmental allergies and immunocompromised state.    Neurological:

## 2023-03-29 ENCOUNTER — OFFICE VISIT (OUTPATIENT)
Dept: HEMATOLOGY | Age: 63
End: 2023-03-29
Payer: COMMERCIAL

## 2023-03-29 ENCOUNTER — HOSPITAL ENCOUNTER (OUTPATIENT)
Dept: INFUSION THERAPY | Age: 63
Discharge: HOME | End: 2023-03-29
Payer: COMMERCIAL

## 2023-03-29 VITALS
DIASTOLIC BLOOD PRESSURE: 76 MMHG | SYSTOLIC BLOOD PRESSURE: 125 MMHG | BODY MASS INDEX: 28.13 KG/M2 | OXYGEN SATURATION: 94 % | HEART RATE: 49 BPM | WEIGHT: 185 LBS

## 2023-03-29 DIAGNOSIS — D68.9 CLOTTING DISORDER (HCC): ICD-10-CM

## 2023-03-29 DIAGNOSIS — D50.9 IRON DEFICIENCY ANEMIA, UNSPECIFIED IRON DEFICIENCY ANEMIA TYPE: ICD-10-CM

## 2023-03-29 DIAGNOSIS — I74.11: ICD-10-CM

## 2023-03-29 DIAGNOSIS — Z71.89 CARE PLAN DISCUSSED WITH PATIENT: ICD-10-CM

## 2023-03-29 DIAGNOSIS — I70.202 LEFT POPLITEAL ARTERY OCCLUSION (HCC): Primary | ICD-10-CM

## 2023-03-29 DIAGNOSIS — R79.89 ELEVATED HOMOCYSTEINE: ICD-10-CM

## 2023-03-29 DIAGNOSIS — Z79.01 ANTICOAGULATED: ICD-10-CM

## 2023-03-29 DIAGNOSIS — D75.839 THROMBOCYTOSIS: ICD-10-CM

## 2023-03-29 LAB
BASOPHILS # BLD: 0.13 K/UL (ref 0.01–0.08)
BASOPHILS NFR BLD: 1.3 % (ref 0.1–1.2)
EOSINOPHIL # BLD: 0.4 K/UL (ref 0.04–0.54)
EOSINOPHIL NFR BLD: 4.1 % (ref 0.7–7)
ERYTHROCYTE [DISTWIDTH] IN BLOOD BY AUTOMATED COUNT: 17 % (ref 11.7–14.4)
HCT VFR BLD AUTO: 38.6 % (ref 34.1–44.9)
HGB BLD-MCNC: 11.3 G/DL (ref 11.2–15.7)
LYMPHOCYTES # BLD: 1.94 K/UL (ref 1.18–3.74)
LYMPHOCYTES NFR BLD: 20 % (ref 19.3–53.1)
MCH RBC QN AUTO: 23.9 PG (ref 25.6–32.2)
MCHC RBC AUTO-ENTMCNC: 29.3 G/DL (ref 32.3–35.5)
MCV RBC AUTO: 81.6 FL (ref 79.4–94.8)
MONOCYTES # BLD: 0.88 K/UL (ref 0.24–0.82)
MONOCYTES NFR BLD: 9.1 % (ref 4.7–12.5)
NEUTROPHILS # BLD: 6.35 K/UL (ref 1.56–6.13)
NEUTS SEG NFR BLD: 65.3 % (ref 34–71.1)
PLATELET # BLD AUTO: 444 K/UL (ref 182–369)
PMV BLD AUTO: 9.9 FL (ref 7.4–10.4)
RBC # BLD AUTO: 4.73 M/UL (ref 3.93–5.22)
WBC # BLD AUTO: 9.72 K/UL (ref 3.98–10.04)

## 2023-03-29 PROCEDURE — G8427 DOCREV CUR MEDS BY ELIG CLIN: HCPCS | Performed by: NURSE PRACTITIONER

## 2023-03-29 PROCEDURE — 3074F SYST BP LT 130 MM HG: CPT | Performed by: NURSE PRACTITIONER

## 2023-03-29 PROCEDURE — 85025 COMPLETE CBC W/AUTO DIFF WBC: CPT

## 2023-03-29 PROCEDURE — 36415 COLL VENOUS BLD VENIPUNCTURE: CPT

## 2023-03-29 PROCEDURE — 3078F DIAST BP <80 MM HG: CPT | Performed by: NURSE PRACTITIONER

## 2023-03-29 PROCEDURE — 1036F TOBACCO NON-USER: CPT | Performed by: NURSE PRACTITIONER

## 2023-03-29 PROCEDURE — G8417 CALC BMI ABV UP PARAM F/U: HCPCS | Performed by: NURSE PRACTITIONER

## 2023-03-29 PROCEDURE — 99213 OFFICE O/P EST LOW 20 MIN: CPT | Performed by: NURSE PRACTITIONER

## 2023-03-29 PROCEDURE — 99211 OFF/OP EST MAY X REQ PHY/QHP: CPT

## 2023-03-29 PROCEDURE — 3017F COLORECTAL CA SCREEN DOC REV: CPT | Performed by: NURSE PRACTITIONER

## 2023-03-29 PROCEDURE — G8484 FLU IMMUNIZE NO ADMIN: HCPCS | Performed by: NURSE PRACTITIONER

## 2023-03-29 ASSESSMENT — ENCOUNTER SYMPTOMS
SHORTNESS OF BREATH: 0
SORE THROAT: 0
TROUBLE SWALLOWING: 0
CONSTIPATION: 0
ABDOMINAL PAIN: 0
EYE DISCHARGE: 0
DIARRHEA: 0
WHEEZING: 0
EYE ITCHING: 0
NAUSEA: 0
VOMITING: 0

## 2023-09-06 NOTE — TELEPHONE ENCOUNTER
Requested Prescriptions     Pending Prescriptions Disp Refills    escitalopram (LEXAPRO) 5 MG tablet [Pharmacy Med Name: ESCITALOPRAM 5 MG TABLET] 30 tablet 0     Sig: TAKE 1 TABLET BY MOUTH EVERY DAY    TRULICITY 1.5 UO/2.2LV SC injection [Pharmacy Med Name: TRULICITY 1.5 KF/1.7 ML PEN] 1 Adjustable Dose Pre-filled Pen Syringe 2     Sig: INJECT 0.5 ML SUBCUTANEOUSLY ONE TIME PER WEEK       Last Office Visit: 2/21/2023  Next Office Visit: Visit date not found      Dr. Desire Willingham patient
No

## 2023-09-07 RX ORDER — DULAGLUTIDE 1.5 MG/.5ML
INJECTION, SOLUTION SUBCUTANEOUS
Qty: 1 ADJUSTABLE DOSE PRE-FILLED PEN SYRINGE | Refills: 2 | Status: SHIPPED | OUTPATIENT
Start: 2023-09-07

## 2023-09-07 RX ORDER — ESCITALOPRAM OXALATE 5 MG/1
TABLET ORAL
Qty: 30 TABLET | Refills: 0 | Status: SHIPPED | OUTPATIENT
Start: 2023-09-07

## 2023-10-08 DIAGNOSIS — E61.1 IRON DEFICIENCY: ICD-10-CM

## 2023-10-09 RX ORDER — FOLIC ACID 1 MG/1
1000 TABLET ORAL DAILY
Qty: 90 TABLET | Refills: 1 | Status: SHIPPED | OUTPATIENT
Start: 2023-10-09

## 2023-11-06 RX ORDER — ESCITALOPRAM OXALATE 5 MG/1
TABLET ORAL
Qty: 30 TABLET | Refills: 0 | Status: SHIPPED | OUTPATIENT
Start: 2023-11-06

## 2023-11-06 NOTE — TELEPHONE ENCOUNTER
Requested Prescriptions     Pending Prescriptions Disp Refills    escitalopram (LEXAPRO) 5 MG tablet [Pharmacy Med Name: ESCITALOPRAM 5 MG TABLET] 30 tablet 0     Sig: TAKE 1 TABLET BY MOUTH EVERY DAY       Last Office Visit: 2/21/2023  Next Office Visit: Visit date not found  Last Medication Refill: 9/7/2023 with 0 RF

## 2024-03-28 DIAGNOSIS — E61.1 IRON DEFICIENCY: Primary | ICD-10-CM

## 2024-03-28 NOTE — PROGRESS NOTES
OP HEMATOLOGY/ONCOLOGY PROGRESS NOTE      Pt Name: Smiley Garcia  YOB: 1960  MRN: [id number]  PCP: Dr. Nasir Rose  Date of evaluation: 03/29/24      History Obtained From:  patient, spouse -Hung Garcia, electronic medical record    CHIEF COMPLAINT:    Chief Complaint   Patient presents with    Follow-up     Iron deficiency anemia       HISTORY OF PRESENT ILLNESS:    Smiley Garcia is a 63 y.o.  female last evaluated in clinic exactly 1 year ago today, having completed thrombophilia evaluation for history of popliteal artery thrombosis and MCA stroke 11/9/2022.    Thrombophilia evaluation negative aside from mildly elevated cardiolipin antibody IgM and mildly elevated homocysteine level of 18.  Smiley has continued Eliquis 5 mg po BID.  She has persistent speech difficulty/expressive aphasia.  She presents in a wheelchair due to right-sided weakness post CVA.  Smiley was already taking folic acid supplement to complement rheumatoid arthritis medications.  She was also noted to have iron deficiency felt likely associated with malabsorption due to prior gastric bypass.  Mild thrombocytosis related to iron deficiency.  There was no evidence of MPN.  Smiley and her  desired serologic monitoring per PCP, thus Smiley was released to return to clinic PRN when last evaluated 3/29/2023, per her request.  CBC at that time included a Hgb of 11.3/MCV 81.6 and platelet count improved at 444,000.    Smiley is referred back to the clinic at this time, by Dr. Rose, for decreasing hemoglobin of 9.3 with MCV 71.7 and a platelet count of 603,000 on 3/26/2024. He recommended GI evaluation as well, however patient declines.    She is brought to the clinic by her  and daughter.  CBC today, 3/29/2024, includes a hemoglobin of 9.2, MCV 70.0 and platelet count 547,000.    HEMATOLOGY HISTORY: left femoral and popliteal artery thrombosis  referred by Dr. Nasir Rose for thrombophilia evaluation.  Hematology/Oncology

## 2024-03-29 ENCOUNTER — OFFICE VISIT (OUTPATIENT)
Dept: HEMATOLOGY | Age: 64
End: 2024-03-29
Payer: COMMERCIAL

## 2024-03-29 ENCOUNTER — HOSPITAL ENCOUNTER (OUTPATIENT)
Dept: INFUSION THERAPY | Age: 64
Discharge: HOME | End: 2024-03-29
Payer: COMMERCIAL

## 2024-03-29 VITALS
SYSTOLIC BLOOD PRESSURE: 160 MMHG | WEIGHT: 195.1 LBS | TEMPERATURE: 98.9 F | OXYGEN SATURATION: 99 % | BODY MASS INDEX: 29.66 KG/M2 | DIASTOLIC BLOOD PRESSURE: 82 MMHG | HEART RATE: 61 BPM

## 2024-03-29 DIAGNOSIS — Z71.89 CARE PLAN DISCUSSED WITH PATIENT: ICD-10-CM

## 2024-03-29 DIAGNOSIS — K95.89 IRON DEFICIENCY ANEMIA FOLLOWING BARIATRIC SURGERY: Primary | ICD-10-CM

## 2024-03-29 DIAGNOSIS — Z79.01 ANTICOAGULATED: ICD-10-CM

## 2024-03-29 DIAGNOSIS — D75.839 THROMBOCYTOSIS: ICD-10-CM

## 2024-03-29 DIAGNOSIS — E61.1 IRON DEFICIENCY: ICD-10-CM

## 2024-03-29 DIAGNOSIS — D50.8 IRON DEFICIENCY ANEMIA FOLLOWING BARIATRIC SURGERY: Primary | ICD-10-CM

## 2024-03-29 LAB
BASOPHILS # BLD: 0.11 K/UL (ref 0.01–0.08)
BASOPHILS NFR BLD: 1.2 % (ref 0.1–1.2)
EOSINOPHIL # BLD: 0.3 K/UL (ref 0.04–0.54)
EOSINOPHIL NFR BLD: 3.2 % (ref 0.7–7)
ERYTHROCYTE [DISTWIDTH] IN BLOOD BY AUTOMATED COUNT: 17.8 % (ref 11.7–14.4)
FERRITIN SERPL-MCNC: 16.2 NG/ML (ref 13–150)
HCT VFR BLD AUTO: 31.9 % (ref 34.1–44.9)
HGB BLD-MCNC: 9.2 G/DL (ref 11.2–15.7)
IRON SATN MFR SERPL: 3 % (ref 14–50)
IRON SERPL-MCNC: 14 UG/DL (ref 37–145)
LYMPHOCYTES # BLD: 1.53 K/UL (ref 1.18–3.74)
LYMPHOCYTES NFR BLD: 16.1 % (ref 19.3–53.1)
MCH RBC QN AUTO: 20.2 PG (ref 25.6–32.2)
MCHC RBC AUTO-ENTMCNC: 28.8 G/DL (ref 32.3–35.5)
MCV RBC AUTO: 70 FL (ref 79.4–94.8)
MONOCYTES # BLD: 0.79 K/UL (ref 0.24–0.82)
MONOCYTES NFR BLD: 8.3 % (ref 4.7–12.5)
NEUTROPHILS # BLD: 6.76 K/UL (ref 1.56–6.13)
NEUTS SEG NFR BLD: 71 % (ref 34–71.1)
PLATELET # BLD AUTO: 547 K/UL (ref 182–369)
PMV BLD AUTO: 9.4 FL (ref 7.4–10.4)
RBC # BLD AUTO: 4.56 M/UL (ref 3.93–5.22)
TIBC SERPL-MCNC: 410 UG/DL (ref 250–400)
VIT B12 SERPL-MCNC: 381 PG/ML (ref 211–946)
WBC # BLD AUTO: 9.51 K/UL (ref 3.98–10.04)

## 2024-03-29 PROCEDURE — 3017F COLORECTAL CA SCREEN DOC REV: CPT | Performed by: NURSE PRACTITIONER

## 2024-03-29 PROCEDURE — 1036F TOBACCO NON-USER: CPT | Performed by: NURSE PRACTITIONER

## 2024-03-29 PROCEDURE — 3079F DIAST BP 80-89 MM HG: CPT | Performed by: NURSE PRACTITIONER

## 2024-03-29 PROCEDURE — 3077F SYST BP >= 140 MM HG: CPT | Performed by: NURSE PRACTITIONER

## 2024-03-29 PROCEDURE — 36415 COLL VENOUS BLD VENIPUNCTURE: CPT

## 2024-03-29 PROCEDURE — 85025 COMPLETE CBC W/AUTO DIFF WBC: CPT

## 2024-03-29 PROCEDURE — G8427 DOCREV CUR MEDS BY ELIG CLIN: HCPCS | Performed by: NURSE PRACTITIONER

## 2024-03-29 PROCEDURE — 99214 OFFICE O/P EST MOD 30 MIN: CPT | Performed by: NURSE PRACTITIONER

## 2024-03-29 PROCEDURE — G8417 CALC BMI ABV UP PARAM F/U: HCPCS | Performed by: NURSE PRACTITIONER

## 2024-03-29 PROCEDURE — 99212 OFFICE O/P EST SF 10 MIN: CPT

## 2024-03-29 PROCEDURE — G8484 FLU IMMUNIZE NO ADMIN: HCPCS | Performed by: NURSE PRACTITIONER

## 2024-03-30 ASSESSMENT — ENCOUNTER SYMPTOMS
DIARRHEA: 0
CONSTIPATION: 0
EYE ITCHING: 0
SHORTNESS OF BREATH: 0
NAUSEA: 0
EYE DISCHARGE: 0
VOMITING: 0
SORE THROAT: 0
WHEEZING: 0
TROUBLE SWALLOWING: 0
ABDOMINAL PAIN: 0

## 2024-04-01 ENCOUNTER — TELEPHONE (OUTPATIENT)
Dept: HEMATOLOGY | Age: 64
End: 2024-04-01

## 2024-04-01 NOTE — TELEPHONE ENCOUNTER
Spoke to Hung in regards to Dawns Iron levels and they were extremely low.  Recommended IV iron due to the low levels and gastric bypass.  Hung refused IV iron at this time, will continue PO iron and return in 6 weeks to have retested.  Expressed the concern that the levels were extremely low, but he insisted that they wanted her to remain on the PO instead of IV at this time. Verbalizes understand.  No questions at this time.

## 2024-04-03 ENCOUNTER — HOSPITAL ENCOUNTER (OUTPATIENT)
Dept: INFUSION THERAPY | Age: 64
Discharge: HOME | End: 2024-04-03

## 2024-04-03 DIAGNOSIS — D50.8 IRON DEFICIENCY ANEMIA FOLLOWING BARIATRIC SURGERY: ICD-10-CM

## 2024-04-03 DIAGNOSIS — K95.89 IRON DEFICIENCY ANEMIA FOLLOWING BARIATRIC SURGERY: ICD-10-CM

## 2024-04-03 LAB
HEMOCCULT SP1 STL QL: NORMAL
HEMOCCULT SP2 STL QL: NORMAL
HEMOCCULT SP3 STL QL: NORMAL

## 2024-09-03 DIAGNOSIS — E61.1 IRON DEFICIENCY: ICD-10-CM

## 2024-09-03 RX ORDER — FOLIC ACID 1 MG/1
1000 TABLET ORAL DAILY
Qty: 90 TABLET | Refills: 1 | Status: SHIPPED | OUTPATIENT
Start: 2024-09-03

## 2024-09-19 ENCOUNTER — TELEPHONE (OUTPATIENT)
Dept: HEMATOLOGY | Age: 64
End: 2024-09-19

## 2024-09-24 DIAGNOSIS — D50.8 IRON DEFICIENCY ANEMIA FOLLOWING BARIATRIC SURGERY: Primary | ICD-10-CM

## 2024-09-24 DIAGNOSIS — K95.89 IRON DEFICIENCY ANEMIA FOLLOWING BARIATRIC SURGERY: Primary | ICD-10-CM

## 2024-11-01 RX ORDER — ESCITALOPRAM OXALATE 5 MG/1
TABLET ORAL
Qty: 30 TABLET | Refills: 0 | OUTPATIENT
Start: 2024-11-01

## 2024-11-01 NOTE — TELEPHONE ENCOUNTER
Requested Prescriptions     Pending Prescriptions Disp Refills    escitalopram (LEXAPRO) 5 MG tablet [Pharmacy Med Name: ESCITALOPRAM 5 MG TABLET] 30 tablet 0     Sig: TAKE 1 TABLET BY MOUTH EVERY DAY       Last Office Visit: 2/21/2023  Next Office Visit: Visit date not found  Last Medication Refill: 11/6/2023 with 0 RF

## 2024-11-06 ENCOUNTER — TELEPHONE (OUTPATIENT)
Dept: HEMATOLOGY | Age: 64
End: 2024-11-06

## 2024-11-06 NOTE — TELEPHONE ENCOUNTER
Patient and spouse called questioning F/U appt 11/13/24 - Patient stated, \"I am fine and I am not coming back. Cancel this appointment.\" Notified provider RN - Cancelled appointment-th

## 2024-12-10 NOTE — PROGRESS NOTES
Grossly intact to light touch.  DTR:  Normal, no pathologic reflexes.  Coordination/balance:  Normal    Musculoskeletal:    Right Shoulder:    Tenderness to palpation  Motion deferred  Strength: Deferred  Skin: normal   Patient in a sling    Radiology:  AP, lateral, and oblique views of the right shoulder were performed at an outside facility, have been reviewed and interpreted by me.  The radiology report has been reviewed as well.  Imaging quality is adequate for review.  There is a displaced fracture of the humeral surgical neck.  It does appear to be out of the socket which could be related to atrophy from her stroke.  This is a 2 part fracture.    Assessment:   Encounter Diagnosis   Name Primary?    Closed 2-part displaced fracture of surgical neck of right humerus, initial encounter Yes        Plan:  Recommend nonoperative treatment of the surgical neck fracture of the right humerus.  While it does look like there is some mild displacement, I suspect this is atrophy of the muscles related to her stroke.   Patient will return in 4 weeks for repeat x-ray.    Return in about 4 weeks (around 1/9/2025) for 3-4 weeks, f/u L .       Electronically signed by FREDA Solano on 12/12/2024 at 11:17 AM.

## 2024-12-12 ENCOUNTER — OFFICE VISIT (OUTPATIENT)
Age: 64
End: 2024-12-12
Payer: MEDICARE

## 2024-12-12 VITALS — HEIGHT: 68 IN | WEIGHT: 185 LBS | BODY MASS INDEX: 28.04 KG/M2

## 2024-12-12 DIAGNOSIS — S42.221A CLOSED 2-PART DISPLACED FRACTURE OF SURGICAL NECK OF RIGHT HUMERUS, INITIAL ENCOUNTER: Primary | ICD-10-CM

## 2024-12-12 PROCEDURE — G8417 CALC BMI ABV UP PARAM F/U: HCPCS | Performed by: PHYSICIAN ASSISTANT

## 2024-12-12 PROCEDURE — G8484 FLU IMMUNIZE NO ADMIN: HCPCS | Performed by: PHYSICIAN ASSISTANT

## 2024-12-12 PROCEDURE — 3017F COLORECTAL CA SCREEN DOC REV: CPT | Performed by: PHYSICIAN ASSISTANT

## 2024-12-12 PROCEDURE — 1036F TOBACCO NON-USER: CPT | Performed by: PHYSICIAN ASSISTANT

## 2024-12-12 PROCEDURE — G8427 DOCREV CUR MEDS BY ELIG CLIN: HCPCS | Performed by: PHYSICIAN ASSISTANT

## 2024-12-12 PROCEDURE — 99203 OFFICE O/P NEW LOW 30 MIN: CPT | Performed by: PHYSICIAN ASSISTANT

## 2024-12-12 RX ORDER — ROSUVASTATIN CALCIUM 40 MG/1
40 TABLET, COATED ORAL DAILY
COMMUNITY
Start: 2024-12-02

## 2024-12-12 RX ORDER — OXYCODONE AND ACETAMINOPHEN 7.5; 325 MG/1; MG/1
TABLET ORAL
COMMUNITY
Start: 2024-12-09

## 2024-12-13 ENCOUNTER — TELEPHONE (OUTPATIENT)
Age: 64
End: 2024-12-13

## 2024-12-13 DIAGNOSIS — S42.221A CLOSED 2-PART DISPLACED FRACTURE OF SURGICAL NECK OF RIGHT HUMERUS, INITIAL ENCOUNTER: Primary | ICD-10-CM

## 2024-12-13 NOTE — TELEPHONE ENCOUNTER
Patient was seen by FREDA Solano 12/12/2024. I do not see any documentation that a prescription was going to be sent to the pharmacy nor was one pended up for Dr. Conway to sign and send. I will route this to Dr. Conway to decide if he will fill a Percocet script for this patient as Barry is gone for the day and wont be back in clinic till next Tuesday.

## 2024-12-16 RX ORDER — OXYCODONE AND ACETAMINOPHEN 7.5; 325 MG/1; MG/1
1 TABLET ORAL EVERY 8 HOURS PRN
Qty: 21 TABLET | Refills: 0 | Status: SHIPPED | OUTPATIENT
Start: 2024-12-16 | End: 2024-12-23

## 2025-01-15 NOTE — PROGRESS NOTES
(transient ischemic attack)     Venous thromboembolism        Past Surgical History:        Procedure Laterality Date    ADENOIDECTOMY      APPENDECTOMY      CARDIAC CATHETERIZATION  03/2009    CARDIAC CATHETERIZATION  11/5/14  JDT    EF 50%, patent bypass grafts    CHOLECYSTECTOMY  2011    COLONOSCOPY  06/03/2010    Dr. Trejo: distal colon having ulcerative lesions c/w UC    COLONOSCOPY  2009    pancolitis    COLONOSCOPY      CORONARY ARTERY BYPASS GRAFT  03/2009    Dr Bauman, JERONIMO to LAD, SVGs to OM & PDA    GASTRIC BYPASS SURGERY  2012    HIATAL HERNIA REPAIR  2011    HYSTERECTOMY (CERVIX STATUS UNKNOWN)      KNEE SURGERY Right     infection around the knee    [place] COLONOSCOPY FLX DX W/COLLJ SPEC WHEN PFRMD N/A 03/12/2018    Dr Elias-minimal rectal inflammation consistent with U.C.-Active proctitis--3 yr recall    THORACOTOMY      THROMBECTOMY      TONSILLECTOMY      UPPER GASTROINTESTINAL ENDOSCOPY  2010    Dr. Trejo    UPPER GASTROINTESTINAL ENDOSCOPY  2012       Current Medications:   Prior to Admission medications    Medication Sig Start Date End Date Taking? Authorizing Provider   Abatacept (ORENCIA IV) Once a month   Yes Provider, MD Ildefonso   oxyCODONE-acetaminophen (PERCOCET) 7.5-325 MG per tablet TAKE 1 TABLET BY MOUTH EVERY 72 HOURS, WITH FOOD AS NEEDED 12/9/24  Yes Provider, MD Ildefonso   rosuvastatin (CRESTOR) 40 MG tablet Take 1 tablet by mouth daily 12/2/24  Yes Provider, MD Ildefonso   folic acid (FOLVITE) 1 MG tablet TAKE 1 TABLET BY MOUTH EVERY DAY 9/3/24  Yes Ashtyn Jiménez, ALEENA   ferrous sulfate (IRON 325) 325 (65 Fe) MG tablet Take 1 tablet by mouth 2 times daily 3/14/23  Yes Ashtyn Jiménez APRN   furosemide (LASIX) 80 MG tablet Take 1 tablet by mouth daily PRN 12/20/22  Yes Provider, MD Ildefonso   tiZANidine (ZANAFLEX) 4 MG tablet Take 1 tablet by mouth daily   Yes Provider, MD Ildefonso   apixaban (ELIQUIS) 5 MG TABS tablet Take by mouth 2 times daily   Yes

## 2025-01-16 ENCOUNTER — OFFICE VISIT (OUTPATIENT)
Age: 65
End: 2025-01-16
Payer: MEDICARE

## 2025-01-16 VITALS — BODY MASS INDEX: 28.04 KG/M2 | WEIGHT: 185 LBS | HEIGHT: 68 IN

## 2025-01-16 DIAGNOSIS — S42.221D CLOSED 2-PART DISPLACED FRACTURE OF SURGICAL NECK OF RIGHT HUMERUS WITH ROUTINE HEALING, SUBSEQUENT ENCOUNTER: Primary | ICD-10-CM

## 2025-01-16 PROCEDURE — 99213 OFFICE O/P EST LOW 20 MIN: CPT | Performed by: PHYSICIAN ASSISTANT

## 2025-01-16 PROCEDURE — G8417 CALC BMI ABV UP PARAM F/U: HCPCS | Performed by: PHYSICIAN ASSISTANT

## 2025-01-16 PROCEDURE — 3017F COLORECTAL CA SCREEN DOC REV: CPT | Performed by: PHYSICIAN ASSISTANT

## 2025-01-16 PROCEDURE — 1036F TOBACCO NON-USER: CPT | Performed by: PHYSICIAN ASSISTANT

## 2025-01-16 PROCEDURE — G8427 DOCREV CUR MEDS BY ELIG CLIN: HCPCS | Performed by: PHYSICIAN ASSISTANT

## 2025-01-16 RX ORDER — OXYCODONE AND ACETAMINOPHEN 7.5; 325 MG/1; MG/1
1 TABLET ORAL EVERY 8 HOURS PRN
Qty: 15 TABLET | Refills: 0 | Status: SHIPPED | OUTPATIENT
Start: 2025-01-16 | End: 2025-01-21

## 2025-01-20 ENCOUNTER — TELEPHONE (OUTPATIENT)
Age: 65
End: 2025-01-20

## 2025-01-20 DIAGNOSIS — S42.221D CLOSED 2-PART DISPLACED FRACTURE OF SURGICAL NECK OF RIGHT HUMERUS WITH ROUTINE HEALING, SUBSEQUENT ENCOUNTER: Primary | ICD-10-CM

## 2025-01-20 RX ORDER — OXYCODONE AND ACETAMINOPHEN 7.5; 325 MG/1; MG/1
1 TABLET ORAL EVERY 6 HOURS PRN
Qty: 20 TABLET | Refills: 0 | Status: SHIPPED | OUTPATIENT
Start: 2025-01-20 | End: 2025-01-25

## 2025-01-20 NOTE — TELEPHONE ENCOUNTER
Patient's spouse states the patient was supposed to get a prescription called into [facility] pharmacy last week and [facility] never received it

## 2025-02-05 NOTE — PROGRESS NOTES
daily 12/28/22  Yes Taurus Dave MD   amLODIPine (NORVASC) 2.5 MG tablet Take 1 tablet by mouth daily 12/29/22  Yes Taurus Dave MD   folic acid (FOLVITE) 1 MG tablet Take 1 tablet by mouth daily 12/28/22  Yes Taurus Dave MD       Allergies:  Methotrexate derivatives    Social History:   Social History     Occupational History    Not on file   Tobacco Use    Smoking status: Former     Current packs/day: 0.00     Average packs/day: 2.0 packs/day for 32.0 years (64.0 ttl pk-yrs)     Types: Cigarettes     Start date: 3/3/1977     Quit date: 3/3/2009     Years since quitting: 15.9    Smokeless tobacco: Never   Vaping Use    Vaping status: Never Used   Substance and Sexual Activity    Alcohol use: Not Currently     Comment: Socially    Drug use: No    Sexual activity: Not Currently        Family History:   Family History   Problem Relation Age of Onset    COPD Mother         smoker    Prostate Cancer Father     Heart Failure Father     Cancer Father     Other Sister         breathing problems    Colon Cancer Neg Hx     Colon Polyps Neg Hx     Liver Cancer Neg Hx     Liver Disease Neg Hx     Esophageal Cancer Neg Hx     Rectal Cancer Neg Hx     Stomach Cancer Neg Hx        Review of Systems  System  Neg/Pos  Details  Constitutional  Negative  Chills, Fatigue, Fever and Night Sweats  Respiratory  Negative  Chest Pain, Cough and Dyspnea  Cardio   Negative  Leg Swelling  GI   Negative  Abdominal Pain, Constipation, Nausea and Vomiting     Negative  Urinary Incontinence   Endocrine  Negative  Weight Gain and Weight Loss  MS   Negative  Except as noted in HPI and Chief Complaint    PHYSICAL EXAM:    Vitals:   Vitals:    02/06/25 1306   Weight: 83.9 kg (185 lb)   Height: 1.727 m (5' 8\")         General:  Appears stated age, no distress.  Orientation:  Alert and oriented to time, place, and person.  Mood and Affect:  Cooperative and pleasant.  Gait: Heel to gait  Cardiovascular:  Symmetric 1-2 plus pulses in upper

## 2025-02-06 ENCOUNTER — OFFICE VISIT (OUTPATIENT)
Age: 65
End: 2025-02-06
Payer: MEDICARE

## 2025-02-06 VITALS — BODY MASS INDEX: 28.04 KG/M2 | WEIGHT: 185 LBS | HEIGHT: 68 IN

## 2025-02-06 DIAGNOSIS — S42.221D CLOSED 2-PART DISPLACED FRACTURE OF SURGICAL NECK OF RIGHT HUMERUS WITH ROUTINE HEALING, SUBSEQUENT ENCOUNTER: Primary | ICD-10-CM

## 2025-02-06 PROCEDURE — G8417 CALC BMI ABV UP PARAM F/U: HCPCS | Performed by: PHYSICIAN ASSISTANT

## 2025-02-06 PROCEDURE — 1036F TOBACCO NON-USER: CPT | Performed by: PHYSICIAN ASSISTANT

## 2025-02-06 PROCEDURE — 3017F COLORECTAL CA SCREEN DOC REV: CPT | Performed by: PHYSICIAN ASSISTANT

## 2025-02-06 PROCEDURE — G8427 DOCREV CUR MEDS BY ELIG CLIN: HCPCS | Performed by: PHYSICIAN ASSISTANT

## 2025-02-06 PROCEDURE — 99213 OFFICE O/P EST LOW 20 MIN: CPT | Performed by: PHYSICIAN ASSISTANT

## 2025-07-29 NOTE — PROGRESS NOTES
Gracie Castellano is a 66 year old female here for  Chief Complaint   Patient presents with    Office Visit     Ductal carcinoma in situ (DCIS) of right breast       Denies latex allergy or sensitivity.    Medication verified, no changes.  PCP and Pharmacy verified.    Social History     Tobacco Use   Smoking Status Never   Smokeless Tobacco Never     Advance Directives Filed: No    ECOG:   ECOG [07/29/25 1444]   ECOG Performance Status 0       Vitals:    Visit Vitals  BP (!) 144/72   Pulse 80   Resp 16   LMP  (LMP Unknown)   SpO2 98%       These vital signs are:  Within defined parameters (Per Reference \"Defined Limits Hospital Outpatient Department (HOD)\")    Height: No.  Ht Readings from Last 1 Encounters:   06/09/25 5' 2.5\" (1.588 m)     Weight:Yes, shoes on.  Wt Readings from Last 3 Encounters:   06/09/25 79.2 kg (174 lb 11.2 oz)   05/20/25 81.4 kg (179 lb 8 oz)   05/05/25 80.7 kg (178 lb)       BMI: There is no height or weight on file to calculate BMI.    REVIEW OF SYSTEMS  GENERAL:  Patient denies headache, fevers, chills, night sweats, excessive fatigue, weight loss, dizziness, but complains of: change in appetite  ALLERGIC/IMMUNOLOGIC: Verified allergies: Yes  EYES:  Patient denies significant visual difficulties, double vision, blurred vision  ENT/MOUTH: Patient denies problems with hearing, sore throat, sinus drainage, mouth sores  ENDOCRINE:  Patient denies diabetes, thyroid disease, hormone replacement, hot flashes  HEMATOLOGIC/LYMPHATIC: Patient denies easy bruising, bleeding, tender lymph nodes, swollen lymph nodes  BREASTS: Patient denies abnormal masses of breast, nipple discharge, pain  RESPIRATORY:  Patient denies lung pain with breathing, cough, coughing up blood, shortness of breath  CARDIOVASCULAR:  Patient denies anginal chest pain, palpitations, shortness of breath when lying flat, peripheral edema  GASTROINTESTINAL: Patient denies abdominal pain , nausea, vomiting, diarrhea, GI bleeding,  Occupational Therapy  Facility/Department: [facility] REHAB UNIT      Name: Yonny Ray  : 1960  MRN: [id number]  Date of Service: 2022    Discharge Recommendations:  Continue to assess pending progress          Patient Diagnosis(es): There were no encounter diagnoses. Past Medical History:  has a past medical history of Anxiety, ASCVD (arteriosclerotic cardiovascular disease), Carrier of group B Streptococcus, Cellulitis, Colitis, COPD (chronic obstructive pulmonary disease) ([address] Costochondritis, CVA (cerebral vascular accident) ([address] Depression, Edema, Fracture of sternum, Gallstones, Grief reaction, H/O superior vena cava filter placement, Hyperlipidemia, Hypertension, MI (myocardial infarction) ([address] MRSA (methicillin resistant Staphylococcus aureus), Neuropathy, Obesity, Pseudarthrosis, RA (rheumatoid arthritis) ([address] Rosacea, Sinus bradycardia, TIA (transient ischemic attack), and Venous thromboembolism. Past Surgical History:  has a past surgical history that includes Coronary artery bypass graft (3/2009); Tonsillectomy; Hysterectomy; thoracotomy; knee surgery; Appendectomy; Colonoscopy (2010); Cardiac catheterization (3/2009); Adenoidectomy; Cholecystectomy (); Gastric bypass surgery (); hiatal hernia repair (); Cardiac catheterization (14  JDT); Colonoscopy (); Colonoscopy; Upper gastrointestinal endoscopy (); Upper gastrointestinal endoscopy (); and pr colonoscopy flx dx w/collj spec when pfrmd (N/A, 3/12/2018). Treatment Diagnosis: L MCA stroke      Assessment   Performance deficits / Impairments: Decreased functional mobility ; Decreased endurance;Decreased coordination;Decreased ADL status; Decreased sensation;Decreased posture;Decreased ROM; Decreased balance;Decreased strength;Decreased vision/visual deficit; Decreased safe awareness;Decreased high-level IADLs;Decreased cognition;Decreased fine motor control  Activity Tolerance  Activity Tolerance: Patient Tolerated treatment well        Plan   Occupational Therapy Plan  Specific Instructions for Next Treatment: transfers, UE  Current Treatment Recommendations: Strengthening, ROM, Balance training, Functional mobility training, Endurance training, Neuromuscular re-education, Safety education & training, Patient/Caregiver education & training, Equipment evaluation, education, & procurement, Positioning, Modalities, Self-Care / ADL, Home management training, Sensory integration, Wheelchair mobility training     Restrictions  Restrictions/Precautions  Restrictions/Precautions: Modified Diet, Swallowing - Thickened Liquids, Fall Risk, Aspiration Risk, Weight Bearing  Required Braces or Orthoses?: Yes (R AFO FOR AMB.  ONLY)  Lower Extremity Weight Bearing Restrictions  Right Lower Extremity Weight Bearing: Weight Bearing As Tolerated  Left Lower Extremity Weight Bearing: Weight Bearing As Tolerated         12/08/22 0905   General   Family / Caregiver Present No   Pre Treatment Pain Screening   Pain at present 0   Scale Used Faces     Objective     Safety Devices  Type of Devices: Left in chair;Gait belt (left with PT)     Bed mobility  Supine to Sit: Moderate assistance (towards left side of bed)  Transfers  Slide Board: Contact guard assistance (bed to w/c)       PROM Exercises: extensive PROM---all muscle groups  A/AROM Exercises: R shoulder flexion/extension using movement bar from tabletop         12/08/22 0905   Toilet Transfer   Assistance needed Partial/moderate assistance   Comment w/c to toilet with GB---cues for tech   CARE Score 3      12/08/22 0905   Transfers   Slide Board Contact guard assistance  (bed to w/c)        12/08/22 0905   Neuromuscular Education   Neuromuscular education Yes   NDT Treatment Upper extremity   Facilitation techniques scapular mobilization   Joint Compression R UE   Vibration wrist extension--2-/5(50% ROM)   Weight Bearing   Weight Bearing Technique Yes   RUE Weight Bearing Forearm constipation, change in bowel habits, heartburn, sensation of feeling full, difficulty swallowing  : Patient denies abnormal genital masses, blood in the urine, frequency, urgency, burning with urination, hesitancy, incontinence, vaginal bleeding, discharge  MUSCULOSKELETAL:  Patient denies joint pain, bone pain, joint swelling, redness, decreased range of motion  SKIN:  Patient denies chronic rashes, inflammation, ulcerations, skin changes, itching  NEUROLOGIC:  Patient denies loss of balance, areas of focal weakness, abnormal gait, sensory problems, numbness, tingling  PSYCHIATRIC: Patient denies insomnia, depression, anxiety    This patient reported abnormal symptoms that needed immediate verbal communication: No     seated; Extended arm seated     Goals  Short Term Goals  Time Frame for Short Term Goals: 2 weeks  Short Term Goal 1: MET  Short Term Goal 2: Pt will dress UB using hemitechnique, mod A and cues  Short Term Goal 3: Pt will dress LB, hemitechnique, mod A and cues  Short Term Goal 4: Pt will toilet with mod A  Short Term Goal 5:  Toilet transfer with mod A  Additional Goals?: Yes  Short Term Goal 6: Pt will attend to R side during ADL/functional activities with moderate cues  Short Term Goal 7: Pt will perform standing tasks x 2-3 mins with L UE and mod A for balance to enhance ADL/IADL performance  Short Term Goal 8: Pt/family will demo understanding of R UE positioning/HEP/therapeutic activity recommendations with min A after ed  Long Term Goals  Time Frame for Long Term Goals : 4-5 weeks  Long Term Goal 1: Pt will bathe with min A, AE/DME prn  Long Term Goal 2: Pt will dress UB supervision  Long Term Goal 3: Pt will dress LB min A, AE prn  Long Term Goal 4: Pt will toilet with CGA  Long Term Goal 5: Pt will perform toilet transfer with CGA  Additional Goals?: Yes  Long Term Goal 6: Pt will perform simple home making task with min A  Long Term Goal 7: Pt/family will be independent in HEP/DME/therapeutic activity recommendations after ed  Long Term Goal 8: Pt will attend to R side during functional activities with occasional cueing       Therapy Time   Individual Concurrent Group Co-treatment   Time In 0905         Time Out 1000         Minutes 55         Timed Code Treatment Minutes: Mikey Pringle OT

## (undated) DEVICE — MMIS - PACK X-COATED SYSTEM 1

## (undated) DEVICE — Device

## (undated) DEVICE — MMIS - CLAMP 8MM FGRTY SOFTJAW 2 JAW ATRM OCL RADOPQ SURG

## (undated) DEVICE — MMIS - SPONGE ABS THK10MM 12.5X8CM PORCINE GELTN STRL

## (undated) DEVICE — MMIS - SUTURE PROLENE MTPS 7-0 BV175-6 24IN 2 ARM MONO

## (undated) DEVICE — MMIS - DRESSING WND TELFA 14X4IN ABS NADH FILM ISLAND

## (undated) DEVICE — MMIS - PEN SURGMRK STD TIP FLXB RULER LBL PREP RST

## (undated) DEVICE — MMIS - CATHETER DRN 32FR 23IN STRGT TPR TIP KINK RST 6

## (undated) DEVICE — MMIS - SYRINGE 50CC IRR FNGR FLNG STRL BULB LF DISP

## (undated) DEVICE — MMIS - PREFILTER SMKEVC 1 1/3IN OPTM

## (undated) DEVICE — MMIS - BANDAGE GAUZE KERLIX 4.1YDX4.5IN PHMB

## (undated) DEVICE — MMIS - SUTURE PROLENE 4-0 SH 36IN 2 ARM MONO NABSB BLUE

## (undated) DEVICE — MMIS - TUBE NG 18FR 48IN S SMP INTGR FNL CNCT 2 LUM 5IN 1

## (undated) DEVICE — MMIS - CONNECTOR PRFSN 3/8IN Y STRL LF

## (undated) DEVICE — MMIS - STAPLER INTERNAL 16CMX4MM CRTDG 1 HAND GRASP HNDL

## (undated) DEVICE — MMIS - APPLICATOR RATIO SURG

## (undated) DEVICE — MMIS - BLANKET WRM SNGWRM LWR BODY CONV

## (undated) DEVICE — MMIS - DRAIN INCS FLAT 20CMX7MM SIL RADOPQ .75 PRFR HBLS

## (undated) DEVICE — MMIS - BLADE OPHTHALMIC 15D 3MM STAB INCS BVR MIC-SHARP

## (undated) DEVICE — MMIS - SOLUTION ANTIFOG 6ML KIT FGOUT STRL LF DISP

## (undated) DEVICE — TRAP POLYP ETRAP

## (undated) DEVICE — MMIS - SUTURE PROLENE 3-0 SH 36IN 2 ARM MONO NABSB BLUE

## (undated) DEVICE — MMIS - SET 10FT MPS DELIVERY ARS AGENT AD CSST VENT LN

## (undated) DEVICE — MMIS - CATHETER 6FR IM CRV 100CM RADOPQ BRAID SLCT SUP

## (undated) DEVICE — MMIS - PUNCH 4.4MM LONG AOR

## (undated) DEVICE — MMIS - INTRODUCER SHTH 6FR 23CM GRN HUB HEMOSTASIS VLV

## (undated) DEVICE — MMIS - PACK SURG SIL TBG 19IN 5/16IN 3/16IN

## (undated) DEVICE — MMIS - DRAIN INCS ATR OASIS PLASTIC CHEST THOR TUBE DRY

## (undated) DEVICE — MMIS - SUTURE STL 5 CCS 18IN SS MONO 4 STRN NABSB SLVR

## (undated) DEVICE — MMIS - CANNULA PRFSN 14FR 18MM 10.5IN SELF INFL SMTH BLN

## (undated) DEVICE — MMIS - CLIP INTERNAL MED HMCLP TI

## (undated) DEVICE — MMIS - CANNULA ARTERIAL 24FR 3/8" CONNECTOR"

## (undated) DEVICE — MMIS - CATHETER 6FR JL4 CRV 100CM RADOPQ BRAID SLCT SUP

## (undated) DEVICE — MMIS - SENSOR SHUNT PRFSN

## (undated) DEVICE — MMIS - CANNULA PRFSN 24FR 12-15IN 3/8IN RT ANG 1 STG CNCT

## (undated) DEVICE — MMIS - ELECTRODE ESURG BLADE PNCL 10FT FTSWTCH CORD HLSTR

## (undated) DEVICE — MMIS - SOLUTION AQLT 0.9% NACL 1000ML LF IRR

## (undated) DEVICE — MMIS - DECANTER FLUID 9IN SET BAG

## (undated) DEVICE — MMIS - SYRINGE 50ML GRAD N-PYRG DEHP-FR PVC FREE STRL MED

## (undated) DEVICE — MMIS - BANDAGE ADH CURITY XL 3.75X2IN FABRIC FLXB STRCH 1

## (undated) DEVICE — MMIS - BANDAGE CMPR HOOK-LOCK 5YDX6IN VELCRO POLY ST

## (undated) DEVICE — MMIS - SYSTEM VSL HRVT 30CC 5CC CANNULA BTT SYRINGE CNCL

## (undated) DEVICE — MMIS - CANNULA PRFSN 22FR 15IN .25-3/8IN CNCT SITE SM

## (undated) DEVICE — MMIS - COUNTER 40/70 CNT ADH NDL DEVON SHRP FOAM XL

## (undated) DEVICE — MMIS - DEVICE VSL GUARDIAN CO2 GAS COND INFL

## (undated) DEVICE — MMIS - BLADE SURG 10 STRL CBNSTL

## (undated) DEVICE — MMIS - CANNULA PRFSN 26FR 15IN 3/8IN CNCT SITE 1 STG

## (undated) DEVICE — MMIS - SUTURE PROLENE 4-0 RB1 36IN 2 ARM MONO NABSB BLUE

## (undated) DEVICE — MMIS - SUTURE PROLENE 7-0 BV175-6 24IN 2 ARM MONO 4 STRN

## (undated) DEVICE — MMIS - GLOVE SURG 8 BIOGEL LTX STRAW PF BEAD CUFF TXTR

## (undated) DEVICE — MMIS - BLADE TNG 6IN REG SR SMTH FNSH CLN EDGE HI TNSL

## (undated) DEVICE — MMIS - SUTURE PROLENE 2-0 RB1 36IN 2 ARM MONO NABSB BLUE

## (undated) DEVICE — MMIS - DRESSING WND 12X10IN CURITY CTN HVY DRN PK KERLIX

## (undated) DEVICE — MMIS - BANDAGE CMPR HOOK-LOCK 5YDX4IN VELCRO POLY YARN

## (undated) DEVICE — MMIS - TRAY CATH SURESTEP LUBRI-SIL IC CMP CR STLK 14FR

## (undated) DEVICE — MMIS - HOLDER INST 23X19CM 8 PCKT SURGI-KIT STRL LF DISP

## (undated) DEVICE — MMIS - SOLUTION PLSMLT A IV VIAFLEX MLT ELCLY 1L 2B2544X

## (undated) DEVICE — MMIS - TUBING 10FT HOSE SMKEVC

## (undated) DEVICE — MMIS - SUTURE PROLENE 6-0 RB-2 30IN 2 ARM MONO 4 STRN

## (undated) DEVICE — MMIS - TAPE UMB 30X1/8IN LF TIE WOVEN NABSB MPK CTN STRL

## (undated) DEVICE — MMIS - CATHETER 6FR 145D PGTL CRV 110CM 6 SH RADOPQ BRAID

## (undated) DEVICE — MMIS - INSERT CLAMP FGRTY HYDRAGRIP SOFTJAW 86MM SET ATRM

## (undated) DEVICE — MMIS - WIRE PCNG STREAMLINE ATR TEMP STRL LF DISP

## (undated) DEVICE — MMIS - ADAPTER SMKEVC 1 1/3-7/8IN OPTM PREFLT NS LF DISP

## (undated) DEVICE — MMIS - KIT INTRO 4FR 21GA 10CM 7CM .018IN STF DIL NTNL GW

## (undated) DEVICE — MMIS - CATHETER 6FR JR4 CLASSIC CRV 100CM RADOPQ BRAID

## (undated) DEVICE — MMIS - BLADE SAW 6X31.5X.64MM OFFSET STRNM STRL

## (undated) DEVICE — MMIS - PW .035/145/3MM J WIRE

## (undated) DEVICE — MMIS - WAX BN 2.5G NON ABS INDF NATURAL

## (undated) DEVICE — MMIS - ELECTRODE ESURG BLADE STD 2.75IN .2IN 3/32IN

## (undated) DEVICE — MMIS - PACK PROCEDURE APC-120 FOR SMARTPREP 2

## (undated) DEVICE — MMIS - TUBE SCT 6FR 10FR 6IN DLP SFTP MALLEABLE SS

## (undated) DEVICE — MMIS - COVER STAND 54X23IN MAYO REG FABRIC REINFORCE STD

## (undated) DEVICE — MMIS - CANNULA PRFSN 2IN 3MM FEMALE LL RADOPQ BLUNT TIP

## (undated) DEVICE — MMIS - BLANKET WRM LWR BODY 60X36IN 3M BR HGR 8OZ STRL

## (undated) DEVICE — MMIS - SUMP INTRCRD SCT ADULT .25IN 15IN .25IN PRICRD

## (undated) DEVICE — MMIS - DRESSING WND TELFA 5X4IN ABS NADH FILM ISLAND

## (undated) DEVICE — MMIS - ELECTRODE DFBR OVAL 5.75X3.75IN MLFNC PRATCH LDWR

## (undated) DEVICE — MMIS - TIP SCT ARG YNKR 22FR 25CM VINYL SMTH EYE RGD OPEN

## (undated) DEVICE — MMIS - PERFUSION SET Y TYPE W/VENT STERILE

## (undated) DEVICE — MMIS - LEAD PCNG STREAMLINE .7MM 4MM ATR TEMP ADULT STRL

## (undated) DEVICE — MMIS - DRAPE EQUIPMENT 44X44IN FLUID WRMR TEMP CNTRL PORT

## (undated) DEVICE — MMIS - TUBING INSFL INSUF FLTR HFL LL STRL DISP

## (undated) DEVICE — MMIS - SUTURE ETHIBOND EXCEL TPRCT THK1/16IN 2-0 5-5 30IN

## (undated) DEVICE — MMIS - SUTURE ETHIBOND 0 CTX 18IN POLY CNTRL RELS BRAID 8

## (undated) DEVICE — ENDO KIT,LOURDES HOSPITAL: Brand: MEDLINE INDUSTRIES, INC.